# Patient Record
Sex: FEMALE | Race: WHITE | NOT HISPANIC OR LATINO
[De-identification: names, ages, dates, MRNs, and addresses within clinical notes are randomized per-mention and may not be internally consistent; named-entity substitution may affect disease eponyms.]

---

## 2014-12-09 RX ORDER — DILTIAZEM HCL 120 MG
1 CAPSULE, EXT RELEASE 24 HR ORAL
Qty: 0 | Refills: 0 | DISCHARGE
Start: 2014-12-09

## 2017-01-12 ENCOUNTER — APPOINTMENT (OUTPATIENT)
Dept: CARDIOLOGY | Facility: CLINIC | Age: 67
End: 2017-01-12

## 2017-01-12 VITALS
DIASTOLIC BLOOD PRESSURE: 75 MMHG | SYSTOLIC BLOOD PRESSURE: 131 MMHG | HEART RATE: 69 BPM | BODY MASS INDEX: 43.8 KG/M2 | WEIGHT: 232 LBS | OXYGEN SATURATION: 96 % | HEIGHT: 61 IN

## 2017-09-18 ENCOUNTER — APPOINTMENT (OUTPATIENT)
Dept: CARDIOLOGY | Facility: CLINIC | Age: 67
End: 2017-09-18
Payer: MEDICARE

## 2017-09-18 ENCOUNTER — NON-APPOINTMENT (OUTPATIENT)
Age: 67
End: 2017-09-18

## 2017-09-18 VITALS
DIASTOLIC BLOOD PRESSURE: 80 MMHG | HEART RATE: 61 BPM | BODY MASS INDEX: 44.21 KG/M2 | SYSTOLIC BLOOD PRESSURE: 136 MMHG | OXYGEN SATURATION: 97 % | WEIGHT: 234 LBS

## 2017-09-18 PROCEDURE — 99214 OFFICE O/P EST MOD 30 MIN: CPT

## 2017-09-18 PROCEDURE — 93970 EXTREMITY STUDY: CPT

## 2017-09-18 PROCEDURE — 93000 ELECTROCARDIOGRAM COMPLETE: CPT

## 2017-09-18 RX ORDER — AMOXICILLIN AND CLAVULANATE POTASSIUM 875; 125 MG/1; MG/1
875-125 TABLET, COATED ORAL
Qty: 20 | Refills: 0 | Status: DISCONTINUED | COMMUNITY
Start: 2017-03-16

## 2017-09-18 RX ORDER — TRIFLURIDINE 10 MG/ML
1 SOLUTION OPHTHALMIC
Qty: 7 | Refills: 0 | Status: DISCONTINUED | COMMUNITY
Start: 2017-08-08

## 2017-09-18 RX ORDER — THEOPHYLLINE 300 MG/1
300 TABLET, EXTENDED RELEASE ORAL
Qty: 180 | Refills: 0 | Status: DISCONTINUED | COMMUNITY
Start: 2017-06-15

## 2017-09-18 RX ORDER — HYDROCODONE BITARTRATE AND ACETAMINOPHEN 5; 325 MG/1; MG/1
5-325 TABLET ORAL
Qty: 12 | Refills: 0 | Status: DISCONTINUED | COMMUNITY
Start: 2017-09-11

## 2018-06-02 ENCOUNTER — EMERGENCY (EMERGENCY)
Facility: HOSPITAL | Age: 68
LOS: 1 days | Discharge: HOME | End: 2018-06-02
Attending: STUDENT IN AN ORGANIZED HEALTH CARE EDUCATION/TRAINING PROGRAM
Payer: MEDICARE

## 2018-06-02 VITALS
RESPIRATION RATE: 16 BRPM | OXYGEN SATURATION: 96 % | HEIGHT: 61 IN | DIASTOLIC BLOOD PRESSURE: 102 MMHG | WEIGHT: 231.49 LBS | TEMPERATURE: 98 F | SYSTOLIC BLOOD PRESSURE: 167 MMHG | HEART RATE: 128 BPM

## 2018-06-02 VITALS
OXYGEN SATURATION: 95 % | HEART RATE: 78 BPM | RESPIRATION RATE: 14 BRPM | TEMPERATURE: 98 F | SYSTOLIC BLOOD PRESSURE: 132 MMHG | DIASTOLIC BLOOD PRESSURE: 56 MMHG

## 2018-06-02 DIAGNOSIS — Z98.89 OTHER SPECIFIED POSTPROCEDURAL STATES: Chronic | ICD-10-CM

## 2018-06-02 LAB
ALBUMIN SERPL ELPH-MCNC: 3.3 G/DL — SIGNIFICANT CHANGE UP (ref 3.3–5)
ALP SERPL-CCNC: 76 U/L — SIGNIFICANT CHANGE UP (ref 40–120)
ALT FLD-CCNC: 14 U/L — SIGNIFICANT CHANGE UP (ref 12–78)
ANION GAP SERPL CALC-SCNC: 9 MMOL/L — SIGNIFICANT CHANGE UP (ref 5–17)
APTT BLD: 40.4 SEC — HIGH (ref 27.5–37.4)
AST SERPL-CCNC: 18 U/L — SIGNIFICANT CHANGE UP (ref 15–37)
BASOPHILS # BLD AUTO: 0.04 K/UL — SIGNIFICANT CHANGE UP (ref 0–0.2)
BASOPHILS NFR BLD AUTO: 0.6 % — SIGNIFICANT CHANGE UP (ref 0–2)
BILIRUB SERPL-MCNC: 0.4 MG/DL — SIGNIFICANT CHANGE UP (ref 0.2–1.2)
BUN SERPL-MCNC: 29 MG/DL — HIGH (ref 7–23)
CALCIUM SERPL-MCNC: 8.7 MG/DL — SIGNIFICANT CHANGE UP (ref 8.5–10.1)
CHLORIDE SERPL-SCNC: 109 MMOL/L — HIGH (ref 96–108)
CK MB BLD-MCNC: 1.7 % — SIGNIFICANT CHANGE UP (ref 0–3.5)
CK MB BLD-MCNC: 1.8 % — SIGNIFICANT CHANGE UP (ref 0–3.5)
CK MB CFR SERPL CALC: 1.7 NG/ML — SIGNIFICANT CHANGE UP (ref 0–3.6)
CK MB CFR SERPL CALC: 1.7 NG/ML — SIGNIFICANT CHANGE UP (ref 0–3.6)
CK SERPL-CCNC: 102 U/L — SIGNIFICANT CHANGE UP (ref 26–192)
CK SERPL-CCNC: 97 U/L — SIGNIFICANT CHANGE UP (ref 26–192)
CO2 SERPL-SCNC: 25 MMOL/L — SIGNIFICANT CHANGE UP (ref 22–31)
CREAT SERPL-MCNC: 1.1 MG/DL — SIGNIFICANT CHANGE UP (ref 0.5–1.3)
EOSINOPHIL # BLD AUTO: 0.19 K/UL — SIGNIFICANT CHANGE UP (ref 0–0.5)
EOSINOPHIL NFR BLD AUTO: 2.8 % — SIGNIFICANT CHANGE UP (ref 0–6)
GLUCOSE SERPL-MCNC: 91 MG/DL — SIGNIFICANT CHANGE UP (ref 70–99)
HCT VFR BLD CALC: 35.7 % — SIGNIFICANT CHANGE UP (ref 34.5–45)
HGB BLD-MCNC: 11.4 G/DL — LOW (ref 11.5–15.5)
IMM GRANULOCYTES NFR BLD AUTO: 0.4 % — SIGNIFICANT CHANGE UP (ref 0–1.5)
INR BLD: 2.01 RATIO — HIGH (ref 0.88–1.16)
LYMPHOCYTES # BLD AUTO: 1.27 K/UL — SIGNIFICANT CHANGE UP (ref 1–3.3)
LYMPHOCYTES # BLD AUTO: 19 % — SIGNIFICANT CHANGE UP (ref 13–44)
MCHC RBC-ENTMCNC: 28.5 PG — SIGNIFICANT CHANGE UP (ref 27–34)
MCHC RBC-ENTMCNC: 31.9 GM/DL — LOW (ref 32–36)
MCV RBC AUTO: 89.3 FL — SIGNIFICANT CHANGE UP (ref 80–100)
MONOCYTES # BLD AUTO: 0.47 K/UL — SIGNIFICANT CHANGE UP (ref 0–0.9)
MONOCYTES NFR BLD AUTO: 7 % — SIGNIFICANT CHANGE UP (ref 2–14)
NEUTROPHILS # BLD AUTO: 4.7 K/UL — SIGNIFICANT CHANGE UP (ref 1.8–7.4)
NEUTROPHILS NFR BLD AUTO: 70.2 % — SIGNIFICANT CHANGE UP (ref 43–77)
NRBC # BLD: 0 /100 WBCS — SIGNIFICANT CHANGE UP (ref 0–0)
NT-PROBNP SERPL-SCNC: 418 PG/ML — HIGH (ref 0–125)
PLATELET # BLD AUTO: 171 K/UL — SIGNIFICANT CHANGE UP (ref 150–400)
POTASSIUM SERPL-MCNC: 4.1 MMOL/L — SIGNIFICANT CHANGE UP (ref 3.5–5.3)
POTASSIUM SERPL-SCNC: 4.1 MMOL/L — SIGNIFICANT CHANGE UP (ref 3.5–5.3)
PROT SERPL-MCNC: 6.6 G/DL — SIGNIFICANT CHANGE UP (ref 6–8.3)
PROTHROM AB SERPL-ACNC: 22.2 SEC — HIGH (ref 9.8–12.7)
RBC # BLD: 4 M/UL — SIGNIFICANT CHANGE UP (ref 3.8–5.2)
RBC # FLD: 15.2 % — HIGH (ref 10.3–14.5)
SODIUM SERPL-SCNC: 143 MMOL/L — SIGNIFICANT CHANGE UP (ref 135–145)
TROPONIN I SERPL-MCNC: <.015 NG/ML — SIGNIFICANT CHANGE UP (ref 0.01–0.04)
TROPONIN I SERPL-MCNC: <.015 NG/ML — SIGNIFICANT CHANGE UP (ref 0.01–0.04)
WBC # BLD: 6.7 K/UL — SIGNIFICANT CHANGE UP (ref 3.8–10.5)
WBC # FLD AUTO: 6.7 K/UL — SIGNIFICANT CHANGE UP (ref 3.8–10.5)

## 2018-06-02 PROCEDURE — 71045 X-RAY EXAM CHEST 1 VIEW: CPT | Mod: 26

## 2018-06-02 PROCEDURE — 85610 PROTHROMBIN TIME: CPT

## 2018-06-02 PROCEDURE — 80053 COMPREHEN METABOLIC PANEL: CPT

## 2018-06-02 PROCEDURE — 99285 EMERGENCY DEPT VISIT HI MDM: CPT | Mod: 25

## 2018-06-02 PROCEDURE — 99285 EMERGENCY DEPT VISIT HI MDM: CPT

## 2018-06-02 PROCEDURE — 36415 COLL VENOUS BLD VENIPUNCTURE: CPT

## 2018-06-02 PROCEDURE — 71045 X-RAY EXAM CHEST 1 VIEW: CPT

## 2018-06-02 PROCEDURE — 85730 THROMBOPLASTIN TIME PARTIAL: CPT

## 2018-06-02 PROCEDURE — 82553 CREATINE MB FRACTION: CPT

## 2018-06-02 PROCEDURE — 83880 ASSAY OF NATRIURETIC PEPTIDE: CPT

## 2018-06-02 PROCEDURE — 99284 EMERGENCY DEPT VISIT MOD MDM: CPT | Mod: 25

## 2018-06-02 PROCEDURE — 93005 ELECTROCARDIOGRAM TRACING: CPT

## 2018-06-02 PROCEDURE — 82550 ASSAY OF CK (CPK): CPT

## 2018-06-02 PROCEDURE — 84484 ASSAY OF TROPONIN QUANT: CPT

## 2018-06-02 PROCEDURE — 85027 COMPLETE CBC AUTOMATED: CPT

## 2018-06-02 RX ORDER — IPRATROPIUM/ALBUTEROL SULFATE 18-103MCG
3 AEROSOL WITH ADAPTER (GRAM) INHALATION ONCE
Qty: 0 | Refills: 0 | Status: DISCONTINUED | OUTPATIENT
Start: 2018-06-02 | End: 2018-06-02

## 2018-06-02 RX ORDER — ACETAMINOPHEN 500 MG
650 TABLET ORAL ONCE
Qty: 0 | Refills: 0 | Status: DISCONTINUED | OUTPATIENT
Start: 2018-06-02 | End: 2018-06-02

## 2018-06-02 RX ORDER — SODIUM CHLORIDE 9 MG/ML
3 INJECTION INTRAMUSCULAR; INTRAVENOUS; SUBCUTANEOUS ONCE
Qty: 0 | Refills: 0 | Status: COMPLETED | OUTPATIENT
Start: 2018-06-02 | End: 2018-06-02

## 2018-06-02 RX ORDER — ASPIRIN/CALCIUM CARB/MAGNESIUM 324 MG
325 TABLET ORAL ONCE
Qty: 0 | Refills: 0 | Status: COMPLETED | OUTPATIENT
Start: 2018-06-02 | End: 2018-06-02

## 2018-06-02 RX ADMIN — SODIUM CHLORIDE 3 MILLILITER(S): 9 INJECTION INTRAMUSCULAR; INTRAVENOUS; SUBCUTANEOUS at 07:07

## 2018-06-02 RX ADMIN — Medication 325 MILLIGRAM(S): at 06:57

## 2018-06-02 NOTE — CONSULT NOTE ADULT - SUBJECTIVE AND OBJECTIVE BOX
[facility] - Marcella Macias, Magalie, Ana Maria, Otilio, Mima Araujo  Office Number: [phone]    Initial Consult Note    CHIEF COMPLAINT: Patient is a 67y old  Female who presents with a chief complaint of palpitations    HPI:  This is a 67 year old woman with a history of PAF, on AC with Coumadin for stroke risk reduction, HTN, obesity, chronic LE edema who presents to the ED with palpitations and left arm pain.  SHe was cleaning windows yesterday, and has developed pain in the arm.  She thought nothing of it yesterday, but when it persisted today she was concerned.  SHe also has been feeling her heart racing for a few minutes to hours all week, but today it was worse.  Combined with the arm pain, she came in for evaluation.  She is under a lot of stress this week as she was found to be occult blood positive and needs a colonoscopy.  SHe was a bit dyspeic this AM, but is now better.  She denies PND, orthopnea, chest pain, or syncope.  SHe has occasional hemorrhoidal bleeding.  SHe is going to see ARLIN Navarrete of GI.      PAST MEDICAL & SURGICAL HISTORY:  Edema extremities  Hyperlipidemia  HTN (hypertension)  Hypothyroid  COPD (chronic obstructive pulmonary disease)  Hypothyroid  HTN (hypertension)  JUAN CARLOS (obstructive sleep apnea)  COPD (chronic obstructive pulmonary disease)  S/P eye surgery: age 5 unsure if right or left eye      SOCIAL HISTORY:  No tobacco, ethanol, or drug abuse.    FAMILY HISTORY:    No family history of acute MI or sudden cardiac death.    MEDICATIONS  (STANDING):  acetaminophen   Tablet. 650 milliGRAM(s) Oral once  ALBUTerol/ipratropium for Nebulization. 3 milliLiter(s) Nebulizer once   Home medications reviewed in detail.    Allergies    Levaquin (Anaphylaxis)  sulfa drugs (Unknown)  Sulfur (Unknown)    Intolerances        REVIEW OF SYSTEMS:      All other review of systems is negative unless indicated above    VITAL SIGNS:   Vital Signs Last 24 Hrs  T(C): 36.6 (02 Jun 2018 07:47), Max: 36.8 (02 Jun 2018 06:06)  T(F): 97.8 (02 Jun 2018 07:47), Max: 98.2 (02 Jun 2018 06:06)  HR: 74 (02 Jun 2018 07:47) (74 - 128)  BP: 120/51 (02 Jun 2018 07:47) (120/51 - 167/102)  BP(mean): --  RR: 15 (02 Jun 2018 07:47) (15 - 18)  SpO2: 95% (02 Jun 2018 07:47) (95% - 96%)    I&O's Summary      On Exam:    Constitutional: NAD, alert and oriented x 3  Lungs:  Non-labored, breath sounds are clear bilaterally, No wheezing, rales or rhonchi  Cardiovascular: RRR.  S1 and S2 positive.  2/6 systolic murmur  Gastrointestinal: Bowel Sounds present, soft, nontender.   Lymph: b/l peripheral edema, left greater than right. No cervical lymphadenopathy.  Neurological: Alert, no focal deficits  Skin: No rashes or ulcers   Psych:  Mood & affect appropriate.    LABS: All Labs Reviewed:                        11.4   6.70  )-----------( 171      ( 02 Jun 2018 07:11 )             35.7     02 Jun 2018 07:11    143    |  109    |  29     ----------------------------<  91     4.1     |  25     |  1.10     Ca    8.7        02 Jun 2018 07:11    TPro  6.6    /  Alb  3.3    /  TBili  0.4    /  DBili  x      /  AST  18     /  ALT  14     /  AlkPhos  76     02 Jun 2018 07:11    PT/INR - ( 02 Jun 2018 07:11 )   PT: 22.2 sec;   INR: 2.01 ratio         PTT - ( 02 Jun 2018 07:11 )  PTT:40.4 sec  CARDIAC MARKERS ( 02 Jun 2018 07:11 )  <.015 ng/mL / x     / 102 U/L / x     / 1.7 ng/mL      Blood Culture:   06-02 @ 07:11  Pro Bnp 418        RADIOLOGY:    EKG:  SInus with no acute changes.

## 2018-06-02 NOTE — ED ADULT NURSE NOTE - PMH
COPD (chronic obstructive pulmonary disease)    COPD (chronic obstructive pulmonary disease)    Edema extremities    HTN (hypertension)    HTN (hypertension)    Hyperlipidemia    Hypothyroid    Hypothyroid    JUAN CARLOS (obstructive sleep apnea)

## 2018-06-02 NOTE — ED PROVIDER NOTE - PROGRESS NOTE DETAILS
Patient agreeable to stay in ED for 2 sets CE and cardiology consult. Repeat EKG shows NS with PACs Pt seen by yesenia Abel to dc home after 2nd set - outpt fu with her cardio this week. At length discussion with patient regarding nature of the SOB. Discussed results of all diagnostic testing in ED. Discussed SOB/chest pain precautions and instructions. Patient demonstrates understanding that a cardiac cause of her symptoms has not been totally excluded, but at this time there is no evidence to warrant an inpatient workup.  Discussed the importance of continued follow-up with Cardiology as outpatient to complete cardiac workup.

## 2018-06-02 NOTE — CONSULT NOTE ADULT - ASSESSMENT
67 year old woman with above history with increased palpitations, left arm pain, dyspnea:    - CE x 1 negative.  Send second set at four hour diony.  IF negative, d/c home  - COntinue Coumadin with goal iNR 2-3  - Outpatient GI evaluation scheduled  - To follow with DR. Villarreal for consideration of repeat echo and event monitor

## 2018-06-02 NOTE — ED PROVIDER NOTE - OBJECTIVE STATEMENT
67 year old female with a history of a-fib, COPD, high cholesterol, HTN presents with SOB, left arm pain, rapid heart rate.  She states the symptoms started last night, initially with left arm pain which she associated with recent window cleaning.  She has noted "spurts" of tachycardia for the last few days but this morning, she woke up with sudden persistent tachycardia and SOB.  She was concerned that she was in a-fib, which was diagnosed in 2014.  On 2 liters home O2 at night.  Denies chest pain, fevers. Went to PMD yesterday, had INR of 2.3 (on coumadin.)  Took Tylenol without relief.  PMD Dr. Jj, Cardiology Dr. Beaver

## 2018-06-02 NOTE — ED ADULT NURSE REASSESSMENT NOTE - COMFORT CARE
plan of care explained/repositioned/darkened lights/meal provided/wait time explained/warm blanket provided/assisted to bathroom/side rails up

## 2018-06-02 NOTE — ED ADULT NURSE NOTE - OBJECTIVE STATEMENT
Pt received in wheel chair alert and oriented x 3 c/o heart racing and left upper arm pain. Pt stated 4/10 dull pain to left upper arm, non radiating  x hours. Pt also c/o sob x minutes, resolved. Pt denies chest pain, n/v/d, fever chills. Pt stated she took a tylenol for relief, but no relief. 20 G iv inserted to left ac, blood specimen obtained sent to lab. Aspirin administered as ordered. Pt remains on cardiac monitor. No acute distress at this time.

## 2018-06-02 NOTE — ED PROVIDER NOTE - MUSCULOSKELETAL, MLM
Spine appears normal, range of motion is not limited, no muscle or joint tenderness, b/l LE 1+ pitting edema

## 2018-06-02 NOTE — ED PROVIDER NOTE - CONDUCTED A DETAILED DISCUSSION WITH PATIENT AND/OR GUARDIAN REGARDING, MDM
lab results/radiology results/need for outpatient follow-up radiology results/lab results/return to ED if symptoms worsen, persist or questions arise/need for outpatient follow-up

## 2018-06-18 ENCOUNTER — NON-APPOINTMENT (OUTPATIENT)
Age: 68
End: 2018-06-18

## 2018-06-18 ENCOUNTER — APPOINTMENT (OUTPATIENT)
Dept: CARDIOLOGY | Facility: CLINIC | Age: 68
End: 2018-06-18
Payer: MEDICARE

## 2018-06-18 VITALS
SYSTOLIC BLOOD PRESSURE: 157 MMHG | WEIGHT: 235 LBS | BODY MASS INDEX: 44.37 KG/M2 | HEIGHT: 61 IN | DIASTOLIC BLOOD PRESSURE: 79 MMHG | HEART RATE: 71 BPM | OXYGEN SATURATION: 95 %

## 2018-06-18 VITALS — DIASTOLIC BLOOD PRESSURE: 70 MMHG | SYSTOLIC BLOOD PRESSURE: 164 MMHG

## 2018-06-18 VITALS — SYSTOLIC BLOOD PRESSURE: 158 MMHG | DIASTOLIC BLOOD PRESSURE: 72 MMHG

## 2018-06-18 VITALS — DIASTOLIC BLOOD PRESSURE: 80 MMHG | SYSTOLIC BLOOD PRESSURE: 140 MMHG

## 2018-06-18 PROCEDURE — 99214 OFFICE O/P EST MOD 30 MIN: CPT

## 2018-06-18 PROCEDURE — 93000 ELECTROCARDIOGRAM COMPLETE: CPT

## 2018-06-19 ENCOUNTER — APPOINTMENT (OUTPATIENT)
Dept: CARDIOLOGY | Facility: CLINIC | Age: 68
End: 2018-06-19
Payer: MEDICARE

## 2018-06-19 PROCEDURE — 93306 TTE W/DOPPLER COMPLETE: CPT

## 2018-06-29 ENCOUNTER — RESULT REVIEW (OUTPATIENT)
Age: 68
End: 2018-06-29

## 2018-07-24 NOTE — ED ADULT NURSE NOTE - NS ED NOTE  TALK SOMEONE YN
Spoke with patient's mother, she states patient has been having fever since Friday with body aches. Patient has just come back from [place]. But fever is only reolved with tylenol. But fever comes back when meds ware off. Advised her that non of the providers have anything for today but I have something for tomorrow at 2pm. States patient is leaving tomorrow to go to [place] again. Advised her that she should either bring him to urgent care or ER to resolve this before he leaves tomorrow. States she will bring him to the ER. Nothing further discussed.   No

## 2018-08-06 ENCOUNTER — APPOINTMENT (OUTPATIENT)
Dept: CARDIOLOGY | Facility: CLINIC | Age: 68
End: 2018-08-06

## 2018-08-20 ENCOUNTER — NON-APPOINTMENT (OUTPATIENT)
Age: 68
End: 2018-08-20

## 2018-08-20 ENCOUNTER — APPOINTMENT (OUTPATIENT)
Dept: CARDIOLOGY | Facility: CLINIC | Age: 68
End: 2018-08-20
Payer: MEDICARE

## 2018-08-20 VITALS
OXYGEN SATURATION: 97 % | HEART RATE: 67 BPM | WEIGHT: 224 LBS | BODY MASS INDEX: 42.29 KG/M2 | HEIGHT: 61 IN | DIASTOLIC BLOOD PRESSURE: 78 MMHG | SYSTOLIC BLOOD PRESSURE: 130 MMHG

## 2018-08-20 PROCEDURE — 99214 OFFICE O/P EST MOD 30 MIN: CPT

## 2018-08-20 PROCEDURE — 93000 ELECTROCARDIOGRAM COMPLETE: CPT

## 2018-11-01 ENCOUNTER — EMERGENCY (EMERGENCY)
Facility: HOSPITAL | Age: 68
LOS: 1 days | Discharge: HOME | End: 2018-11-01
Attending: EMERGENCY MEDICINE
Payer: MEDICARE

## 2018-11-01 VITALS
OXYGEN SATURATION: 97 % | HEIGHT: 61 IN | DIASTOLIC BLOOD PRESSURE: 72 MMHG | RESPIRATION RATE: 16 BRPM | SYSTOLIC BLOOD PRESSURE: 173 MMHG | HEART RATE: 76 BPM | WEIGHT: 222.01 LBS | TEMPERATURE: 98 F

## 2018-11-01 VITALS
SYSTOLIC BLOOD PRESSURE: 136 MMHG | RESPIRATION RATE: 16 BRPM | TEMPERATURE: 98 F | HEART RATE: 63 BPM | DIASTOLIC BLOOD PRESSURE: 84 MMHG | OXYGEN SATURATION: 93 %

## 2018-11-01 DIAGNOSIS — Z98.89 OTHER SPECIFIED POSTPROCEDURAL STATES: Chronic | ICD-10-CM

## 2018-11-01 LAB
ALBUMIN SERPL ELPH-MCNC: 3.6 G/DL — SIGNIFICANT CHANGE UP (ref 3.3–5)
ALP SERPL-CCNC: 88 U/L — SIGNIFICANT CHANGE UP (ref 40–120)
ALT FLD-CCNC: 14 U/L — SIGNIFICANT CHANGE UP (ref 12–78)
ANION GAP SERPL CALC-SCNC: 7 MMOL/L — SIGNIFICANT CHANGE UP (ref 5–17)
APTT BLD: 45.8 SEC — HIGH (ref 27.5–36.3)
AST SERPL-CCNC: 17 U/L — SIGNIFICANT CHANGE UP (ref 15–37)
BASOPHILS # BLD AUTO: 0.02 K/UL — SIGNIFICANT CHANGE UP (ref 0–0.2)
BASOPHILS NFR BLD AUTO: 0.3 % — SIGNIFICANT CHANGE UP (ref 0–2)
BILIRUB SERPL-MCNC: 0.5 MG/DL — SIGNIFICANT CHANGE UP (ref 0.2–1.2)
BUN SERPL-MCNC: 19 MG/DL — SIGNIFICANT CHANGE UP (ref 7–23)
CALCIUM SERPL-MCNC: 9.8 MG/DL — SIGNIFICANT CHANGE UP (ref 8.5–10.1)
CHLORIDE SERPL-SCNC: 107 MMOL/L — SIGNIFICANT CHANGE UP (ref 96–108)
CO2 SERPL-SCNC: 28 MMOL/L — SIGNIFICANT CHANGE UP (ref 22–31)
CREAT SERPL-MCNC: 1.3 MG/DL — SIGNIFICANT CHANGE UP (ref 0.5–1.3)
EOSINOPHIL # BLD AUTO: 0.15 K/UL — SIGNIFICANT CHANGE UP (ref 0–0.5)
EOSINOPHIL NFR BLD AUTO: 2.1 % — SIGNIFICANT CHANGE UP (ref 0–6)
GLUCOSE SERPL-MCNC: 88 MG/DL — SIGNIFICANT CHANGE UP (ref 70–99)
HCT VFR BLD CALC: 36 % — SIGNIFICANT CHANGE UP (ref 34.5–45)
HGB BLD-MCNC: 11.7 G/DL — SIGNIFICANT CHANGE UP (ref 11.5–15.5)
IMM GRANULOCYTES NFR BLD AUTO: 0.4 % — SIGNIFICANT CHANGE UP (ref 0–1.5)
INR BLD: 2.89 RATIO — HIGH (ref 0.88–1.16)
LYMPHOCYTES # BLD AUTO: 1.18 K/UL — SIGNIFICANT CHANGE UP (ref 1–3.3)
LYMPHOCYTES # BLD AUTO: 16.8 % — SIGNIFICANT CHANGE UP (ref 13–44)
MCHC RBC-ENTMCNC: 29 PG — SIGNIFICANT CHANGE UP (ref 27–34)
MCHC RBC-ENTMCNC: 32.5 GM/DL — SIGNIFICANT CHANGE UP (ref 32–36)
MCV RBC AUTO: 89.1 FL — SIGNIFICANT CHANGE UP (ref 80–100)
MONOCYTES # BLD AUTO: 0.41 K/UL — SIGNIFICANT CHANGE UP (ref 0–0.9)
MONOCYTES NFR BLD AUTO: 5.8 % — SIGNIFICANT CHANGE UP (ref 2–14)
NEUTROPHILS # BLD AUTO: 5.24 K/UL — SIGNIFICANT CHANGE UP (ref 1.8–7.4)
NEUTROPHILS NFR BLD AUTO: 74.6 % — SIGNIFICANT CHANGE UP (ref 43–77)
PLATELET # BLD AUTO: 210 K/UL — SIGNIFICANT CHANGE UP (ref 150–400)
POTASSIUM SERPL-MCNC: 3.9 MMOL/L — SIGNIFICANT CHANGE UP (ref 3.5–5.3)
POTASSIUM SERPL-SCNC: 3.9 MMOL/L — SIGNIFICANT CHANGE UP (ref 3.5–5.3)
PROT SERPL-MCNC: 7.4 G/DL — SIGNIFICANT CHANGE UP (ref 6–8.3)
PROTHROM AB SERPL-ACNC: 33.8 SEC — HIGH (ref 10–12.9)
RBC # BLD: 4.04 M/UL — SIGNIFICANT CHANGE UP (ref 3.8–5.2)
RBC # FLD: 15 % — HIGH (ref 10.3–14.5)
SODIUM SERPL-SCNC: 142 MMOL/L — SIGNIFICANT CHANGE UP (ref 135–145)
WBC # BLD: 7.03 K/UL — SIGNIFICANT CHANGE UP (ref 3.8–10.5)
WBC # FLD AUTO: 7.03 K/UL — SIGNIFICANT CHANGE UP (ref 3.8–10.5)

## 2018-11-01 PROCEDURE — 80053 COMPREHEN METABOLIC PANEL: CPT

## 2018-11-01 PROCEDURE — 70496 CT ANGIOGRAPHY HEAD: CPT | Mod: 26

## 2018-11-01 PROCEDURE — 70450 CT HEAD/BRAIN W/O DYE: CPT | Mod: 26,59

## 2018-11-01 PROCEDURE — 70496 CT ANGIOGRAPHY HEAD: CPT

## 2018-11-01 PROCEDURE — 85027 COMPLETE CBC AUTOMATED: CPT

## 2018-11-01 PROCEDURE — 85730 THROMBOPLASTIN TIME PARTIAL: CPT

## 2018-11-01 PROCEDURE — 96374 THER/PROPH/DIAG INJ IV PUSH: CPT | Mod: XU

## 2018-11-01 PROCEDURE — 85610 PROTHROMBIN TIME: CPT

## 2018-11-01 PROCEDURE — 99284 EMERGENCY DEPT VISIT MOD MDM: CPT

## 2018-11-01 PROCEDURE — 36415 COLL VENOUS BLD VENIPUNCTURE: CPT

## 2018-11-01 PROCEDURE — 99284 EMERGENCY DEPT VISIT MOD MDM: CPT | Mod: 25

## 2018-11-01 PROCEDURE — 70450 CT HEAD/BRAIN W/O DYE: CPT

## 2018-11-01 RX ORDER — METHOCARBAMOL 500 MG/1
1000 TABLET, FILM COATED ORAL ONCE
Qty: 0 | Refills: 0 | Status: COMPLETED | OUTPATIENT
Start: 2018-11-01 | End: 2018-11-01

## 2018-11-01 RX ORDER — ACETAMINOPHEN 500 MG
650 TABLET ORAL ONCE
Qty: 0 | Refills: 0 | Status: COMPLETED | OUTPATIENT
Start: 2018-11-01 | End: 2018-11-01

## 2018-11-01 RX ORDER — DIPHENHYDRAMINE HCL 50 MG
25 CAPSULE ORAL ONCE
Qty: 0 | Refills: 0 | Status: COMPLETED | OUTPATIENT
Start: 2018-11-01 | End: 2018-11-01

## 2018-11-01 RX ORDER — METOCLOPRAMIDE HCL 10 MG
10 TABLET ORAL ONCE
Qty: 0 | Refills: 0 | Status: COMPLETED | OUTPATIENT
Start: 2018-11-01 | End: 2018-11-01

## 2018-11-01 RX ADMIN — Medication 10 MILLIGRAM(S): at 15:01

## 2018-11-01 RX ADMIN — Medication 25 MILLIGRAM(S): at 15:01

## 2018-11-01 RX ADMIN — Medication 650 MILLIGRAM(S): at 13:28

## 2018-11-01 RX ADMIN — METHOCARBAMOL 1000 MILLIGRAM(S): 500 TABLET, FILM COATED ORAL at 13:28

## 2018-11-01 NOTE — ED ADULT NURSE NOTE - PMH
Patient has [facility] paperwork that needs to be re-filled out. Paperwork is in the red folder awaiting to be filled out by Dr Franco.    COPD (chronic obstructive pulmonary disease)    COPD (chronic obstructive pulmonary disease)    Edema extremities    HTN (hypertension)    HTN (hypertension)    Hyperlipidemia    Hypothyroid    Hypothyroid    JUAN CARLOS (obstructive sleep apnea)

## 2018-11-01 NOTE — ED ADULT NURSE REASSESSMENT NOTE - NS ED NURSE REASSESS COMMENT FT1
Assumed care for pt awake alert and oriented x 3, DUVAL. Pt is feeling much better after medication. Denies any pain at this time. Vss, afebrile. Pt is to be discharge. will continue to monitor.

## 2018-11-01 NOTE — ED PROVIDER NOTE - PHYSICAL EXAMINATION
Neuro- A&Ox3. Speech clear, without articulation or word-finding difficulties. Eyes- PERRL bilaterally. EOMs in tact. No nystagmus. CN II-XII in tact. No facial droop. No sensory or motor deficits throughout extremities bilaterally. Strength 5/5 throughout upper and lower extremities. No pronator drift. Gait stable.  Spine Exam:     Cervical: No erythema, ecchymosis, or visible deformity. No midline tenderness or step-off appreciated on palpation. No paravertebral tenderness. + R TRAP muscle spasm. FROM. NEG NEXUS criteria.

## 2018-11-01 NOTE — ED PROVIDER NOTE - OBJECTIVE STATEMENT
pt is a 66yo female with pmhx of a-fib, htn, hypothyroid, COPD, sleep apena pt is a 66yo female with pmhx of a-fib, htn, hypothyroid, COPD, sleep apena c/o headache x month. pt reports she had a headache 4 weeks ago for a week that resolved, came back and resolved, pt is a 68yo female with pmhx of a-fib, htn, hypothyroid, COPD, sleep apena c/o headache x month. pt reports she had a headache 4 weeks ago for a week that resolved, came back and resolved, and came back again 1.5 weeks ago. pt reports headache is described as pressure around her head. pt took tylenol for symptoms which provided minimal relief. pt reports vision changes for a few hours 1 week ago that resolved. pt is a 66yo female with pmhx of a-fib, htn, hypothyroid, COPD, sleep apnea c/o headache x month. pt reports she had a headache 4 weeks ago for a week that resolved, came back and resolved, and came back again 1.5 weeks ago. pt reports headache is described as pressure around her head. pt took tylenol for symptoms which provided minimal relief. pt reports vision changes for a few hours 1 week ago that resolved.

## 2018-11-01 NOTE — ED ADULT NURSE NOTE - NSIMPLEMENTINTERV_GEN_ALL_ED
Implemented All Universal Safety Interventions:  [place] to call system. Call bell, personal items and telephone within reach. Instruct patient to call for assistance. Room bathroom lighting operational. Non-slip footwear when patient is off stretcher. Physically safe environment: no spills, clutter or unnecessary equipment. Stretcher in lowest position, wheels locked, appropriate side rails in place.

## 2018-11-01 NOTE — ED PROVIDER NOTE - PROGRESS NOTE DETAILS
Dr. Cervantes: Pt still with HA, but now moved to a different part of her head. Will check labs and CTA. labs and ct reviewed. will do cta head and medicate pt improved. labs and ct reviewed. all results reviewed with pt including abnormal. pt anticoagulated already. pt advised to follow up with neuro. All questions answered and concerns addressed. pt verbalized understanding and agreement with plan and dx. pt advised to follow up with PMD. pt advised to return to ed for worsenng symptoms including fever, cp, sob. will dc.

## 2018-11-01 NOTE — ED PROVIDER NOTE - CHPI ED SYMPTOMS NEG
no blurred vision/no fever/no loss of consciousness/no nausea/no numbness/no change in level of consciousness/no vomiting/no dizziness/no confusion/no weakness

## 2018-11-01 NOTE — ED PROVIDER NOTE - ATTENDING CONTRIBUTION TO CARE
67y F hx of HTN on Coumadin for afib here with c/o HA since Oct 7th. States sx present daily but come and go. Pain migrates all over her head Describes as tightening sensation, currently 4/10. No associated vision change, occasional dizziness, no neck stiffness, no fever, no URI sx, no NV. States sx worse when lay down, feels like a "clanging" in her head when lays flat. Has appt with neuro Dr Diaz but not until Dec 4th.   Gen: WNWD NAD  HEENT: NCAT PERRL EOMI normal pharynx  Neck: supple  Neuro: A&Ox3, CN grossly intact, sensation intact, motor 5/5 throughout  AP: 67y F hx of HTN on Coumadin here with c/o HA since Oct 7th. CT head to r/o bleed as on Coumadin though unlikely as sx present for weeks. No infectious signs or sx, do not suspect meningitis. Meds. re-eval.

## 2019-03-25 ENCOUNTER — APPOINTMENT (OUTPATIENT)
Dept: CARDIOLOGY | Facility: CLINIC | Age: 69
End: 2019-03-25
Payer: MEDICARE

## 2019-03-25 ENCOUNTER — NON-APPOINTMENT (OUTPATIENT)
Age: 69
End: 2019-03-25

## 2019-03-25 VITALS
HEIGHT: 61 IN | HEART RATE: 68 BPM | RESPIRATION RATE: 14 BRPM | BODY MASS INDEX: 39.84 KG/M2 | WEIGHT: 211 LBS | OXYGEN SATURATION: 96 % | SYSTOLIC BLOOD PRESSURE: 138 MMHG | DIASTOLIC BLOOD PRESSURE: 74 MMHG

## 2019-03-25 DIAGNOSIS — R91.8 OTHER NONSPECIFIC ABNORMAL FINDING OF LUNG FIELD: ICD-10-CM

## 2019-03-25 PROCEDURE — 99214 OFFICE O/P EST MOD 30 MIN: CPT

## 2019-03-25 PROCEDURE — 93000 ELECTROCARDIOGRAM COMPLETE: CPT

## 2019-03-25 NOTE — HISTORY OF PRESENT ILLNESS
[FreeTextEntry1] : Mrs. Contreras is here for evaluation of palpitations, PAF, HLD\par She is on warfarin, INR is therapeutic most of the time. No bleeding.\par COPD under control, does mot require o2. no cp. no palpitations. \par  and HTN.\par She has lost weight with portion control No syncope or presyncope. \par \par EKG: NSR, normal axis and intervals, no st/t changes \par

## 2019-03-25 NOTE — REVIEW OF SYSTEMS
[Recent Weight Gain (___ Lbs)] : no recent weight gain [Feeling Fatigued] : not feeling fatigued [Recent Weight Loss (___ Lbs)] : no recent weight loss [Blurry Vision] : no blurred vision [Seeing Double (Diplopia)] : no diplopia [Eyeglasses] : currently wearing eyeglasses [Earache] : no earache [Discharge From The Ears] : no discharge from the ears [Mouth Sores] : no mouth sores [Shortness Of Breath] : no shortness of breath [Dyspnea on exertion] : dyspnea during exertion [Chest  Pressure] : no chest pressure [Chest Pain] : no chest pain [Lower Ext Edema] : no extremity edema [Palpitations] : no palpitations [Cough] : no cough [Wheezing] : no wheezing [Abdominal Pain] : no abdominal pain [Nausea] : no nausea [Vomiting] : no vomiting [Heartburn] : no heartburn [Change in Appetite] : no change in appetite [Change In The Stool] : no change in stool [Dysphagia] : no dysphagia [Dysuria] : no dysuria [Pelvic Pain] : no pelvic pain [Dysmenorrhea] : no dysmenorrhea [Joint Pain] : no joint pain [Joint Swelling] : no joint swelling [Joint Stiffness] : no joint stiffness [Muscle Cramps] : no muscle cramps [Skin: A Rash] : no rash: [Itching] : no itching [Skin Lesions] : no skin lesions [Dizziness] : no dizziness [Convulsions] : no convulsions [Confusion] : no confusion was observed [Anxiety] : no anxiety [Excessive Thirst] : no polydipsia [Easy Bleeding] : no tendency for easy bleeding [Swollen Glands] : no swollen glands [Easy Bruising] : no tendency for easy bruising [Swollen Glands In The Neck] : no swollen glands in the neck

## 2019-03-25 NOTE — ASSESSMENT
[FreeTextEntry1] : 1. CT scan from 7/2018 was reviewed with pt. Repeat T advised due to nodules found.\par 2. BP to goal\par 3. Labs reviewed as well, LDL elevated, she does not want to take any more medications at this time and hopes to be able to loose more weight\par 4. Patient was advised to partake in 150 minutes of moderate exercise per week.\par 5. Pt with coronary artery calcification on CT scan, stress test ordered.\par 6. afib, in nsr, cont with warfarin. \par patient was advised to see us in 6 months if stable and certainly earlier with any new symptoms.\par

## 2019-04-16 ENCOUNTER — APPOINTMENT (OUTPATIENT)
Dept: CARDIOLOGY | Facility: CLINIC | Age: 69
End: 2019-04-16
Payer: MEDICARE

## 2019-04-16 PROCEDURE — A9500: CPT

## 2019-04-16 PROCEDURE — 78452 HT MUSCLE IMAGE SPECT MULT: CPT

## 2019-04-16 PROCEDURE — 93015 CV STRESS TEST SUPVJ I&R: CPT

## 2019-11-04 ENCOUNTER — APPOINTMENT (OUTPATIENT)
Dept: CARDIOLOGY | Facility: CLINIC | Age: 69
End: 2019-11-04

## 2019-11-24 ENCOUNTER — INPATIENT (INPATIENT)
Facility: HOSPITAL | Age: 69
LOS: 2 days | Discharge: HOME | DRG: 193 | End: 2019-11-27
Attending: FAMILY MEDICINE | Admitting: FAMILY MEDICINE
Payer: MEDICARE

## 2019-11-24 VITALS
DIASTOLIC BLOOD PRESSURE: 65 MMHG | RESPIRATION RATE: 18 BRPM | SYSTOLIC BLOOD PRESSURE: 158 MMHG | HEART RATE: 83 BPM | OXYGEN SATURATION: 92 % | TEMPERATURE: 99 F | WEIGHT: 203.05 LBS | HEIGHT: 63 IN

## 2019-11-24 DIAGNOSIS — E78.5 HYPERLIPIDEMIA, UNSPECIFIED: ICD-10-CM

## 2019-11-24 DIAGNOSIS — I48.91 UNSPECIFIED ATRIAL FIBRILLATION: ICD-10-CM

## 2019-11-24 DIAGNOSIS — I48.0 PAROXYSMAL ATRIAL FIBRILLATION: ICD-10-CM

## 2019-11-24 DIAGNOSIS — E87.1 HYPO-OSMOLALITY AND HYPONATREMIA: ICD-10-CM

## 2019-11-24 DIAGNOSIS — J18.1 LOBAR PNEUMONIA, UNSPECIFIED ORGANISM: ICD-10-CM

## 2019-11-24 DIAGNOSIS — G47.33 OBSTRUCTIVE SLEEP APNEA (ADULT) (PEDIATRIC): ICD-10-CM

## 2019-11-24 DIAGNOSIS — J44.9 CHRONIC OBSTRUCTIVE PULMONARY DISEASE, UNSPECIFIED: ICD-10-CM

## 2019-11-24 DIAGNOSIS — Z29.9 ENCOUNTER FOR PROPHYLACTIC MEASURES, UNSPECIFIED: ICD-10-CM

## 2019-11-24 DIAGNOSIS — I10 ESSENTIAL (PRIMARY) HYPERTENSION: ICD-10-CM

## 2019-11-24 DIAGNOSIS — E03.9 HYPOTHYROIDISM, UNSPECIFIED: ICD-10-CM

## 2019-11-24 DIAGNOSIS — Z98.89 OTHER SPECIFIED POSTPROCEDURAL STATES: Chronic | ICD-10-CM

## 2019-11-24 LAB
ALBUMIN SERPL ELPH-MCNC: 3.3 G/DL — SIGNIFICANT CHANGE UP (ref 3.3–5)
ALP SERPL-CCNC: 105 U/L — SIGNIFICANT CHANGE UP (ref 40–120)
ALT FLD-CCNC: 12 U/L — SIGNIFICANT CHANGE UP (ref 12–78)
ANION GAP SERPL CALC-SCNC: 9 MMOL/L — SIGNIFICANT CHANGE UP (ref 5–17)
APPEARANCE UR: CLEAR — SIGNIFICANT CHANGE UP
APTT BLD: 45 SEC — HIGH (ref 28.5–37)
AST SERPL-CCNC: 15 U/L — SIGNIFICANT CHANGE UP (ref 15–37)
BACTERIA # UR AUTO: ABNORMAL
BASOPHILS # BLD AUTO: 0.04 K/UL — SIGNIFICANT CHANGE UP (ref 0–0.2)
BASOPHILS NFR BLD AUTO: 0.2 % — SIGNIFICANT CHANGE UP (ref 0–2)
BILIRUB SERPL-MCNC: 0.9 MG/DL — SIGNIFICANT CHANGE UP (ref 0.2–1.2)
BILIRUB UR-MCNC: ABNORMAL
BUN SERPL-MCNC: 17 MG/DL — SIGNIFICANT CHANGE UP (ref 7–23)
CALCIUM SERPL-MCNC: 9.3 MG/DL — SIGNIFICANT CHANGE UP (ref 8.5–10.1)
CHLORIDE SERPL-SCNC: 101 MMOL/L — SIGNIFICANT CHANGE UP (ref 96–108)
CK MB CFR SERPL CALC: 1 NG/ML — SIGNIFICANT CHANGE UP (ref 0–3.6)
CO2 SERPL-SCNC: 23 MMOL/L — SIGNIFICANT CHANGE UP (ref 22–31)
COLOR SPEC: YELLOW — SIGNIFICANT CHANGE UP
COMMENT - URINE: SIGNIFICANT CHANGE UP
CREAT SERPL-MCNC: 1.2 MG/DL — SIGNIFICANT CHANGE UP (ref 0.5–1.3)
DIFF PNL FLD: ABNORMAL
EOSINOPHIL # BLD AUTO: 0.03 K/UL — SIGNIFICANT CHANGE UP (ref 0–0.5)
EOSINOPHIL NFR BLD AUTO: 0.2 % — SIGNIFICANT CHANGE UP (ref 0–6)
EPI CELLS # UR: ABNORMAL
GLUCOSE SERPL-MCNC: 121 MG/DL — HIGH (ref 70–99)
GLUCOSE UR QL: NEGATIVE — SIGNIFICANT CHANGE UP
HCT VFR BLD CALC: 34 % — LOW (ref 34.5–45)
HGB BLD-MCNC: 11.2 G/DL — LOW (ref 11.5–15.5)
HYALINE CASTS # UR AUTO: ABNORMAL /LPF
IMM GRANULOCYTES NFR BLD AUTO: 0.7 % — SIGNIFICANT CHANGE UP (ref 0–1.5)
INR BLD: 2.91 RATIO — HIGH (ref 0.88–1.16)
KETONES UR-MCNC: ABNORMAL
LACTATE SERPL-SCNC: 1.2 MMOL/L — SIGNIFICANT CHANGE UP (ref 0.7–2)
LEUKOCYTE ESTERASE UR-ACNC: ABNORMAL
LYMPHOCYTES # BLD AUTO: 0.51 K/UL — LOW (ref 1–3.3)
LYMPHOCYTES # BLD AUTO: 3.1 % — LOW (ref 13–44)
MAGNESIUM SERPL-MCNC: 1.7 MG/DL — SIGNIFICANT CHANGE UP (ref 1.6–2.6)
MCHC RBC-ENTMCNC: 29.1 PG — SIGNIFICANT CHANGE UP (ref 27–34)
MCHC RBC-ENTMCNC: 32.9 GM/DL — SIGNIFICANT CHANGE UP (ref 32–36)
MCV RBC AUTO: 88.3 FL — SIGNIFICANT CHANGE UP (ref 80–100)
MONOCYTES # BLD AUTO: 0.69 K/UL — SIGNIFICANT CHANGE UP (ref 0–0.9)
MONOCYTES NFR BLD AUTO: 4.2 % — SIGNIFICANT CHANGE UP (ref 2–14)
NEUTROPHILS # BLD AUTO: 15.07 K/UL — HIGH (ref 1.8–7.4)
NEUTROPHILS NFR BLD AUTO: 91.6 % — HIGH (ref 43–77)
NITRITE UR-MCNC: NEGATIVE — SIGNIFICANT CHANGE UP
NRBC # BLD: 0 /100 WBCS — SIGNIFICANT CHANGE UP (ref 0–0)
NT-PROBNP SERPL-SCNC: 417 PG/ML — HIGH (ref 0–125)
PH UR: 5 — SIGNIFICANT CHANGE UP (ref 5–8)
PLATELET # BLD AUTO: 201 K/UL — SIGNIFICANT CHANGE UP (ref 150–400)
POTASSIUM SERPL-MCNC: 3.7 MMOL/L — SIGNIFICANT CHANGE UP (ref 3.5–5.3)
POTASSIUM SERPL-SCNC: 3.7 MMOL/L — SIGNIFICANT CHANGE UP (ref 3.5–5.3)
PROT SERPL-MCNC: 7.6 G/DL — SIGNIFICANT CHANGE UP (ref 6–8.3)
PROT UR-MCNC: 75 MG/DL
PROTHROM AB SERPL-ACNC: 34 SEC — HIGH (ref 10–12.9)
RBC # BLD: 3.85 M/UL — SIGNIFICANT CHANGE UP (ref 3.8–5.2)
RBC # FLD: 14.6 % — HIGH (ref 10.3–14.5)
RBC CASTS # UR COMP ASSIST: SIGNIFICANT CHANGE UP /HPF (ref 0–4)
SODIUM SERPL-SCNC: 133 MMOL/L — LOW (ref 135–145)
SP GR SPEC: 1.01 — SIGNIFICANT CHANGE UP (ref 1.01–1.02)
TROPONIN I SERPL-MCNC: <.015 NG/ML — SIGNIFICANT CHANGE UP (ref 0.01–0.04)
UROBILINOGEN FLD QL: NEGATIVE — SIGNIFICANT CHANGE UP
WBC # BLD: 16.45 K/UL — HIGH (ref 3.8–10.5)
WBC # FLD AUTO: 16.45 K/UL — HIGH (ref 3.8–10.5)
WBC UR QL: SIGNIFICANT CHANGE UP

## 2019-11-24 PROCEDURE — 99285 EMERGENCY DEPT VISIT HI MDM: CPT

## 2019-11-24 PROCEDURE — 93010 ELECTROCARDIOGRAM REPORT: CPT

## 2019-11-24 PROCEDURE — 99223 1ST HOSP IP/OBS HIGH 75: CPT | Mod: GC,AI

## 2019-11-24 PROCEDURE — 71046 X-RAY EXAM CHEST 2 VIEWS: CPT | Mod: 26

## 2019-11-24 PROCEDURE — 71250 CT THORAX DX C-: CPT | Mod: 26

## 2019-11-24 RX ORDER — IPRATROPIUM/ALBUTEROL SULFATE 18-103MCG
3 AEROSOL WITH ADAPTER (GRAM) INHALATION ONCE
Refills: 0 | Status: COMPLETED | OUTPATIENT
Start: 2019-11-24 | End: 2019-11-24

## 2019-11-24 RX ORDER — CEFTRIAXONE 500 MG/1
1000 INJECTION, POWDER, FOR SOLUTION INTRAMUSCULAR; INTRAVENOUS EVERY 24 HOURS
Refills: 0 | Status: DISCONTINUED | OUTPATIENT
Start: 2019-11-24 | End: 2019-11-24

## 2019-11-24 RX ORDER — WARFARIN SODIUM 2.5 MG/1
4 TABLET ORAL ONCE
Refills: 0 | Status: COMPLETED | OUTPATIENT
Start: 2019-11-24 | End: 2019-11-24

## 2019-11-24 RX ORDER — TIOTROPIUM BROMIDE 18 UG/1
1 CAPSULE ORAL; RESPIRATORY (INHALATION) DAILY
Refills: 0 | Status: DISCONTINUED | OUTPATIENT
Start: 2019-11-24 | End: 2019-11-27

## 2019-11-24 RX ORDER — BUDESONIDE AND FORMOTEROL FUMARATE DIHYDRATE 160; 4.5 UG/1; UG/1
2 AEROSOL RESPIRATORY (INHALATION)
Refills: 0 | Status: DISCONTINUED | OUTPATIENT
Start: 2019-11-24 | End: 2019-11-27

## 2019-11-24 RX ORDER — AZITHROMYCIN 500 MG/1
500 TABLET, FILM COATED ORAL ONCE
Refills: 0 | Status: COMPLETED | OUTPATIENT
Start: 2019-11-24 | End: 2019-11-24

## 2019-11-24 RX ORDER — THEOPHYLLINE ANHYDROUS 200 MG
300 TABLET, EXTENDED RELEASE 12 HR ORAL EVERY 12 HOURS
Refills: 0 | Status: DISCONTINUED | OUTPATIENT
Start: 2019-11-24 | End: 2019-11-27

## 2019-11-24 RX ORDER — CEFTRIAXONE 500 MG/1
1000 INJECTION, POWDER, FOR SOLUTION INTRAMUSCULAR; INTRAVENOUS ONCE
Refills: 0 | Status: COMPLETED | OUTPATIENT
Start: 2019-11-24 | End: 2019-11-24

## 2019-11-24 RX ORDER — AZITHROMYCIN 500 MG/1
500 TABLET, FILM COATED ORAL EVERY 24 HOURS
Refills: 0 | Status: DISCONTINUED | OUTPATIENT
Start: 2019-11-25 | End: 2019-11-26

## 2019-11-24 RX ORDER — SODIUM CHLORIDE 9 MG/ML
1000 INJECTION INTRAMUSCULAR; INTRAVENOUS; SUBCUTANEOUS ONCE
Refills: 0 | Status: COMPLETED | OUTPATIENT
Start: 2019-11-24 | End: 2019-11-24

## 2019-11-24 RX ORDER — THEOPHYLLINE ANHYDROUS 200 MG
300 TABLET, EXTENDED RELEASE 12 HR ORAL
Refills: 0 | Status: DISCONTINUED | OUTPATIENT
Start: 2019-11-24 | End: 2019-11-24

## 2019-11-24 RX ORDER — AZITHROMYCIN 500 MG/1
500 TABLET, FILM COATED ORAL EVERY 24 HOURS
Refills: 0 | Status: DISCONTINUED | OUTPATIENT
Start: 2019-11-24 | End: 2019-11-24

## 2019-11-24 RX ORDER — DILTIAZEM HCL 120 MG
240 CAPSULE, EXT RELEASE 24 HR ORAL DAILY
Refills: 0 | Status: DISCONTINUED | OUTPATIENT
Start: 2019-11-24 | End: 2019-11-27

## 2019-11-24 RX ORDER — LEVOTHYROXINE SODIUM 125 MCG
25 TABLET ORAL DAILY
Refills: 0 | Status: DISCONTINUED | OUTPATIENT
Start: 2019-11-24 | End: 2019-11-27

## 2019-11-24 RX ORDER — CEFTRIAXONE 500 MG/1
1000 INJECTION, POWDER, FOR SOLUTION INTRAMUSCULAR; INTRAVENOUS EVERY 24 HOURS
Refills: 0 | Status: DISCONTINUED | OUTPATIENT
Start: 2019-11-25 | End: 2019-11-27

## 2019-11-24 RX ORDER — LOSARTAN POTASSIUM 100 MG/1
100 TABLET, FILM COATED ORAL DAILY
Refills: 0 | Status: DISCONTINUED | OUTPATIENT
Start: 2019-11-24 | End: 2019-11-27

## 2019-11-24 RX ORDER — ACETAMINOPHEN 500 MG
650 TABLET ORAL ONCE
Refills: 0 | Status: COMPLETED | OUTPATIENT
Start: 2019-11-24 | End: 2019-11-24

## 2019-11-24 RX ADMIN — AZITHROMYCIN 255 MILLIGRAM(S): 500 TABLET, FILM COATED ORAL at 07:20

## 2019-11-24 RX ADMIN — CEFTRIAXONE 100 MILLIGRAM(S): 500 INJECTION, POWDER, FOR SOLUTION INTRAMUSCULAR; INTRAVENOUS at 06:58

## 2019-11-24 RX ADMIN — Medication 650 MILLIGRAM(S): at 13:26

## 2019-11-24 RX ADMIN — LOSARTAN POTASSIUM 100 MILLIGRAM(S): 100 TABLET, FILM COATED ORAL at 10:17

## 2019-11-24 RX ADMIN — Medication 650 MILLIGRAM(S): at 12:26

## 2019-11-24 RX ADMIN — WARFARIN SODIUM 4 MILLIGRAM(S): 2.5 TABLET ORAL at 21:12

## 2019-11-24 RX ADMIN — SODIUM CHLORIDE 1000 MILLILITER(S): 9 INJECTION INTRAMUSCULAR; INTRAVENOUS; SUBCUTANEOUS at 07:51

## 2019-11-24 RX ADMIN — SODIUM CHLORIDE 1000 MILLILITER(S): 9 INJECTION INTRAMUSCULAR; INTRAVENOUS; SUBCUTANEOUS at 06:51

## 2019-11-24 RX ADMIN — Medication 25 MICROGRAM(S): at 14:12

## 2019-11-24 RX ADMIN — Medication 300 MILLIGRAM(S): at 18:40

## 2019-11-24 RX ADMIN — Medication 240 MILLIGRAM(S): at 10:17

## 2019-11-24 RX ADMIN — Medication 3 MILLILITER(S): at 06:51

## 2019-11-24 NOTE — H&P ADULT - NSHPSOCIALHISTORY_GEN_ALL_CORE
Lives alone. Ambulates and preforms all ADL's independently. Former smoker: quits 17yrs ago, 1.5-2ppd for 30yrs. Rare alcohol consumption(5 times a year), denies illicit drug use. Lives alone. Ambulates and preforms all ADL's independently. Former smoker: quits 17yrs ago, 1.5-2ppd for 30yrs. Rare alcohol consumption (5 times a year), denies illicit drug use.

## 2019-11-24 NOTE — ED ADULT TRIAGE NOTE - CHIEF COMPLAINT QUOTE
difficulty breathing, mid left sided back pain and cough since Wednesday. " It feels like the last time I had Pneumonia"

## 2019-11-24 NOTE — H&P ADULT - PROBLEM SELECTOR PLAN 4
-Continue home Advair, Spiriva, theophyline -Continue home Advair(therapeutic interchange budesonide/formoterol) , Spiriva, theophyline

## 2019-11-24 NOTE — H&P ADULT - NSHPPHYSICALEXAM_GEN_ALL_CORE
T(C): 37.1 (11-24-19 @ 05:57), Max: 37.1 (11-24-19 @ 05:57)  HR: 75 (11-24-19 @ 07:23) (75 - 83)  BP: 150/80 (11-24-19 @ 07:23) (150/80 - 158/65)  RR: 16 (11-24-19 @ 07:23) (16 - 18)  SpO2: 95% (11-24-19 @ 07:23) (92% - 95%)    GENERAL: patient appears well, no acute distress, appropriate, pleasant  ENMT: oropharynx clear without erythema, no exudates, moist mucous membranes  LUNGS: decreased BS LLL, no wheezing or rhonchi appreciated  HEART: soft S1/S2, regular rate and rhythm, no murmurs noted, no lower extremity edema  GASTROINTESTINAL: abdomen is soft, nontender, nondistended, normoactive bowel sounds, no palpable masses  INTEGUMENT: good skin turgor, warm skin, appears well perfused  MUSCULOSKELETAL: no clubbing or cyanosis, no obvious deformity, back not TPP  NEUROLOGIC: awake, alert, oriented x3, good muscle tone in 4 extremities, no obvious sensory deficits  PSYCHIATRIC: mood is good, affect is congruent, linear and logical thought process

## 2019-11-24 NOTE — H&P ADULT - ATTENDING COMMENTS
Patient seen and examined. Case discussed with PGY1 Dr. Fairchild, and I agree with her documentation above, edited where necessary.     Plan discussed with patient in depth, including antibiotics, pulmonology eval, need for repeat CT scan in 4-6 weeks, anticipated hospital course.

## 2019-11-24 NOTE — H&P ADULT - PROBLEM SELECTOR PLAN 8
-Continue home coumadin based in INR for afib   BB IMPROVE VTE Individual Risk Assessment          RISK                                                          Points    [  ] Previous VTE                                                3  [  ] Thrombophilia                                             2  [  ] Lower limb paralysis                                   2        (unable to hold up >15 seconds)    [  ] Current Cancer                                            2         (within 6 months)  [  ] Immobilization > 24 hrs                              1  [  ] ICU/CCU stay > 24 hours                            1  [ X] Age > 60                                                    1    IMPROVE VTE Score ____1_____

## 2019-11-24 NOTE — H&P ADULT - PROBLEM SELECTOR PLAN 3
-Continue home medications coumadin based on INR   -Continue home medication Diltiazem -Continue home medications coumadin based on INR, INR 2.9 today, will ______  -Continue home medication Diltiazem -Continue home medications coumadin based on INR, INR 2.91 today, will give***  -Continue home medication Diltiazem -Continue home medications coumadin(home dose: 8mg) based on INR, INR 2.91 today, will give 4mg today  -Continue home medication Diltiazem -Continue home medications coumadin (home dose: 8mg) based on INR, INR 2.91 today, will give 4mg today  -Continue home medication Diltiazem

## 2019-11-24 NOTE — ED PROVIDER NOTE - OBJECTIVE STATEMENT
68 female drove to ER c/o difficulty breathing. Patient states on Thursday she had been feeling tired, exhausted, feeling shortness of breath with exertion, last night developing cough with blood tinged sputum, chills and came to ER.

## 2019-11-24 NOTE — ED ADULT NURSE NOTE - OBJECTIVE STATEMENT
Pt. complaining of shortness of breath with dyspnea on exertion and cough starting Wednesday. Pt. reported chills yesterday, complaining of left mid back pain, stated around 1am she observed blood tinged sputum. Denied fevers or chest pain.

## 2019-11-24 NOTE — ED ADULT NURSE REASSESSMENT NOTE - NS ED NURSE REASSESS COMMENT FT1
report received and care assumed. pt resting in bed, reports feeling better, a/o x 3, on monitor, receiving treatment, aware of plan of care and will continue to monitor

## 2019-11-24 NOTE — ED ADULT NURSE NOTE - PMH
Afib    COPD (chronic obstructive pulmonary disease)    COPD (chronic obstructive pulmonary disease)    Edema extremities    HTN (hypertension)    HTN (hypertension)    Hyperlipidemia    Hypothyroid    Hypothyroid    JUAN CARLOS (obstructive sleep apnea)    Psoriasis

## 2019-11-24 NOTE — PATIENT PROFILE ADULT - PRIMARY ROLES/RESPONSIBILITIES
Render Post-Care Instructions In Note?: no Detail Level: Simple Post-Care Instructions: I reviewed with the patient in detail post-care instructions. Patient is to wear sunprotection, and avoid picking at any of the treated lesions. Pt may apply aquaphor to crusted or scabbing areas. Duration Of Freeze Thaw-Cycle (Seconds): 0 Consent: The patient's consent was obtained including but not limited to risks of crusting, scabbing, blistering, scarring, darker or lighter pigmentary change, recurrence, incomplete removal and infection. none

## 2019-11-24 NOTE — H&P ADULT - PROBLEM SELECTOR PLAN 1
-Presented with cough, dyspnea, leukocytosis, CT chest: Left lower lobe consolidation and Patchy subsolid nodular foci in both lungs, admitted for CAP   -S/p ceftriaxone and azithro in the ED, will continue   -F/u blood cultures -Presented with cough, dyspnea, leukocytosis  -CT chest: Left lower lobe consolidation and Patchy subsolid nodular foci in both lungs, admitted for CAP   -S/p ceftriaxone and azithro in the ED, will continue   -F/u blood cultures, UCx  - UA negative  - lactate 1.2  - ProBNP: 417  - f/u am CMP, CBC -Presented with cough, dyspnea, leukocytosis  -CT chest: Left lower lobe consolidation and Patchy subsolid nodular foci in both lungs, admitted for CAP  -Follow-up CT is recommended in one month after therapy to ensure   resolution of the imaging findings end exclude underlying malignancy  -S/p ceftriaxone and azithro in the ED, will continue   -F/u blood cultures, UCx  - UA negative  - lactate 1.2  - ProBNP: 417  - f/u am CMP, CBC

## 2019-11-24 NOTE — H&P ADULT - PROBLEM SELECTOR PLAN 9
-Continue home coumadin based in INR for afib   BB IMPROVE VTE Individual Risk Assessment          RISK                                                          Points    [  ] Previous VTE                                                3  [  ] Thrombophilia                                             2  [  ] Lower limb paralysis                                   2        (unable to hold up >15 seconds)    [  ] Current Cancer                                            2         (within 6 months)  [  ] Immobilization > 24 hrs                              1  [  ] ICU/CCU stay > 24 hours                            1  [  ] Age > 60                                                    1    IMPROVE VTE Score _________

## 2019-11-24 NOTE — H&P ADULT - PROBLEM SELECTOR PLAN 2
-Presented to the ED with mild hyponatremia, 133   -s/p NS bolus in the ED   -F/u AM labs -Presented to the ED with mild hyponatremia, 133  - likely 2/2 decreased PO intake  -s/p NS bolus in the ED   -F/u AM labs

## 2019-11-24 NOTE — ED ADULT NURSE REASSESSMENT NOTE - NS ED NURSE REASSESS COMMENT FT1
bedside report given to rn virginia, 20 g  left forearm, vitals are as charted, pt voices no complaints, a/ ox 4

## 2019-11-24 NOTE — H&P ADULT - ASSESSMENT
INCOMPLETE     68yo female with PMHx of a-fib, HTN, hypothyroid, COPD, sleep apnea _________ presenting with   admitted with pneumonia. INCOMPLETE     66yo female with PMHx of paroxysmal a-fib (on coumadin), HTN, hypothyroid, COPD, sleep apnea presenting with   admitted for 66yo female with PMHx of paroxysmal a-fib (on coumadin), HTN, hypothyroid, COPD presenting with admitted for Pneumonia (CAP) 66yo female with PMHx of paroxysmal a-fib (on coumadin), HTN, hypothyroid, COPD presenting with cough, hemoptysis, OTTO, admitted for Pneumonia (CAP)

## 2019-11-24 NOTE — H&P ADULT - NSICDXPASTMEDICALHX_GEN_ALL_CORE_FT
PAST MEDICAL HISTORY:  Afib     COPD (chronic obstructive pulmonary disease)     COPD (chronic obstructive pulmonary disease)     Edema extremities     HTN (hypertension)     HTN (hypertension)     Hyperlipidemia     Hypothyroid     Hypothyroid     Psoriasis

## 2019-11-24 NOTE — H&P ADULT - NSHPREVIEWOFSYSTEMS_GEN_ALL_CORE
CONSTITUTIONAL: denies fever, admits chills, fatigue  HEENT: denies blurred vision, sore throat  SKIN: denies new lesions, rash  CARDIOVASCULAR: denies chest pain, chest pressure, palpitations  RESPIRATORY: denies shortness of breath at rest, admits sputum production(blood tinged), cough, OTTO  GASTROINTESTINAL: denies nausea, vomiting, diarrhea, abdominal pain  GENITOURINARY: denies dysuria, discharge  NEUROLOGICAL: denies numbness, headache, focal weakness  MUSCULOSKELETAL: denies new joint pain, muscle aches  HEMATOLOGIC: denies gross bleeding, bruising, admits blood tinged sputum  LYMPHATICS: denies extremity swelling

## 2019-11-24 NOTE — ED ADULT NURSE NOTE - NSIMPLEMENTINTERV_GEN_ALL_ED
Implemented All Fall with Harm Risk Interventions:  Panther to call system. Call bell, personal items and telephone within reach. Instruct patient to call for assistance. Room bathroom lighting operational. Non-slip footwear when patient is off stretcher. Physically safe environment: no spills, clutter or unnecessary equipment. Stretcher in lowest position, wheels locked, appropriate side rails in place. Provide visual cue, wrist band, yellow gown, etc. Monitor gait and stability. Monitor for mental status changes and reorient to person, place, and time. Review medications for side effects contributing to fall risk. Reinforce activity limits and safety measures with patient and family. Provide visual clues: red socks.

## 2019-11-24 NOTE — H&P ADULT - HISTORY OF PRESENT ILLNESS
ED Vitals: T: 98.7 HR: 83, BP: 158/65, RR: 18, O2: 92% then 95%   Labs significant for: WBC: 16.45, H/H: 11.2/34, Na: 133, BNP: 417   EKG:   CT chest: Left lower lobe consolidation suspicious for pneumonia.  Patchy subsolid   nodular foci in both lungs may reflect additional foci of infection.    Pulmonary hemorrhage may have a similar appearance.  Follow-up CT is recommended in one month after therapy to ensure   resolution of the imaging findings end exclude underlying malignancy.  Small left pleural effusion.  Small pericardial effusion    In the ED patient was given: Azithro and Ceftriaxone X1, NS bolus X1, DUONEB X1 INCOMPLETE     66yo female with PMHx of a-fib, HTN, hypothyroid, COPD, sleep apnea _________ presenting with   admitted for     ED Vitals: T: 98.7 HR: 83, BP: 158/65, RR: 18, O2: 92% then 95%   Labs significant for: WBC: 16.45, H/H: 11.2/34, Na: 133, BNP: 417   EKG:   CT chest: Left lower lobe consolidation suspicious for pneumonia.  Patchy subsolid   nodular foci in both lungs may reflect additional foci of infection.    Pulmonary hemorrhage may have a similar appearance.  Follow-up CT is recommended in one month after therapy to ensure   resolution of the imaging findings end exclude underlying malignancy.  Small left pleural effusion.  Small pericardial effusion    In the ED patient was given: Azithro and Ceftriaxone X1, NS bolus X1, DUONEB X1 INCOMPLETE     68yo female with PMHx of paroxysmal a-fib (on coumadin), HTN, hypothyroid, COPD, sleep apnea presenting with   admitted for     ED Vitals: T: 98.7 HR: 83, BP: 158/65, RR: 18, O2: 92% then 95%   Labs significant for: WBC: 16.45, H/H: 11.2/34, Na: 133, BNP: 417   EKG:   CT chest: Left lower lobe consolidation suspicious for pneumonia.  Patchy subsolid   nodular foci in both lungs may reflect additional foci of infection.    Pulmonary hemorrhage may have a similar appearance.  Follow-up CT is recommended in one month after therapy to ensure   resolution of the imaging findings end exclude underlying malignancy.  Small left pleural effusion.  Small pericardial effusion    In the ED patient was given: Azithro and Ceftriaxone X1, NS bolus X1, DUONEB X1 68yo female with PMHx of paroxysmal a-fib (on coumadin), HTN, hypothyroid, COPD presenting with feeling fatigued since Thursday. States she spent Friday in bed and last night developed mid back left sided pain that felt like she pulled a muscle. Pt states rated the pain as an 8/10 at that time and states it is now a tolerable 4/10. Pt also reports productive cough since yesterday with blood tinged( red) sputum. Reports she has had pneumonia is the past and it felt similar. Of note pt states on Thursday she had a sore throat and blister on her tongue for which she went to Urgent Care, but it has resolved since. Pt admits to dyspnea on excretion and chills. States she took Tylenol and Robitussin which helped with her symptoms Denies fever, headache, n/v, rash, dysuria.      ED Vitals: T: 98.7 HR: 83, BP: 158/65, RR: 18, O2: 92% then 95%   Labs significant for: WBC: 16.45, H/H: 11.2/34, Na: 133, BNP: 417   EKG: (prelim) NSR  CT chest: Left lower lobe consolidation suspicious for pneumonia.  Patchy subsolid   nodular foci in both lungs may reflect additional foci of infection.    Pulmonary hemorrhage may have a similar appearance.  Follow-up CT is recommended in one month after therapy to ensure   resolution of the imaging findings end exclude underlying malignancy.  Small left pleural effusion.  Small pericardial effusion    In the ED patient was given: Azithro and Ceftriaxone X1, NS bolus X1, DUONEB X1 66yo female with PMHx of paroxysmal a-fib (on coumadin), HTN, hypothyroid, COPD presenting with feeling fatigued since Thursday. States she spent Friday in bed and last night developed mid back left sided pain that felt like she pulled a muscle. Pt states rated the pain as an 8/10 at that time and states it is now a tolerable 4/10. Pt also reports productive cough since yesterday with blood tinged (red) sputum. Reports she has had pneumonia in the past and it felt similar. Of note pt states on Thursday she had a sore throat and blister on her tongue for which she went to Urgent Care, but it has resolved since. Pt admits to dyspnea on excretion and chills. States she took Tylenol and Robitussin which helped with her symptoms Denies fever, headache, n/v, rash, dysuria.      ED Vitals: T: 98.7 HR: 83, BP: 158/65, RR: 18, O2: 92% then 95%   Labs significant for: WBC: 16.45, H/H: 11.2/34, Na: 133, BNP: 417   EKG: (prelim) NSR  CT chest: Left lower lobe consolidation suspicious for pneumonia.  Patchy subsolid   nodular foci in both lungs may reflect additional foci of infection.    Pulmonary hemorrhage may have a similar appearance.  Follow-up CT is recommended in one month after therapy to ensure   resolution of the imaging findings end exclude underlying malignancy.  Small left pleural effusion.  Small pericardial effusion    In the ED patient was given: Azithro and Ceftriaxone X1, NS bolus X1, DUONEB X1

## 2019-11-25 ENCOUNTER — TRANSCRIPTION ENCOUNTER (OUTPATIENT)
Age: 69
End: 2019-11-25

## 2019-11-25 DIAGNOSIS — R04.2 HEMOPTYSIS: ICD-10-CM

## 2019-11-25 LAB
ANION GAP SERPL CALC-SCNC: 7 MMOL/L — SIGNIFICANT CHANGE UP (ref 5–17)
APTT BLD: 43.7 SEC — HIGH (ref 28.5–37)
BASOPHILS # BLD AUTO: 0.02 K/UL — SIGNIFICANT CHANGE UP (ref 0–0.2)
BASOPHILS NFR BLD AUTO: 0.2 % — SIGNIFICANT CHANGE UP (ref 0–2)
BUN SERPL-MCNC: 19 MG/DL — SIGNIFICANT CHANGE UP (ref 7–23)
CALCIUM SERPL-MCNC: 8.4 MG/DL — LOW (ref 8.5–10.1)
CHLORIDE SERPL-SCNC: 107 MMOL/L — SIGNIFICANT CHANGE UP (ref 96–108)
CO2 SERPL-SCNC: 25 MMOL/L — SIGNIFICANT CHANGE UP (ref 22–31)
CREAT SERPL-MCNC: 1.1 MG/DL — SIGNIFICANT CHANGE UP (ref 0.5–1.3)
EOSINOPHIL # BLD AUTO: 0.16 K/UL — SIGNIFICANT CHANGE UP (ref 0–0.5)
EOSINOPHIL NFR BLD AUTO: 1.8 % — SIGNIFICANT CHANGE UP (ref 0–6)
GLUCOSE SERPL-MCNC: 132 MG/DL — HIGH (ref 70–99)
HCT VFR BLD CALC: 29.9 % — LOW (ref 34.5–45)
HCV AB S/CO SERPL IA: 0.11 S/CO — SIGNIFICANT CHANGE UP (ref 0–0.99)
HCV AB SERPL-IMP: SIGNIFICANT CHANGE UP
HGB BLD-MCNC: 9.5 G/DL — LOW (ref 11.5–15.5)
IMM GRANULOCYTES NFR BLD AUTO: 0.3 % — SIGNIFICANT CHANGE UP (ref 0–1.5)
INR BLD: 3.58 RATIO — HIGH (ref 0.88–1.16)
LEGIONELLA AG UR QL: NEGATIVE — SIGNIFICANT CHANGE UP
LYMPHOCYTES # BLD AUTO: 0.62 K/UL — LOW (ref 1–3.3)
LYMPHOCYTES # BLD AUTO: 6.9 % — LOW (ref 13–44)
MCHC RBC-ENTMCNC: 28.4 PG — SIGNIFICANT CHANGE UP (ref 27–34)
MCHC RBC-ENTMCNC: 31.8 GM/DL — LOW (ref 32–36)
MCV RBC AUTO: 89.5 FL — SIGNIFICANT CHANGE UP (ref 80–100)
MONOCYTES # BLD AUTO: 0.55 K/UL — SIGNIFICANT CHANGE UP (ref 0–0.9)
MONOCYTES NFR BLD AUTO: 6.1 % — SIGNIFICANT CHANGE UP (ref 2–14)
NEUTROPHILS # BLD AUTO: 7.57 K/UL — HIGH (ref 1.8–7.4)
NEUTROPHILS NFR BLD AUTO: 84.7 % — HIGH (ref 43–77)
NRBC # BLD: 0 /100 WBCS — SIGNIFICANT CHANGE UP (ref 0–0)
PLATELET # BLD AUTO: 212 K/UL — SIGNIFICANT CHANGE UP (ref 150–400)
POTASSIUM SERPL-MCNC: 3.6 MMOL/L — SIGNIFICANT CHANGE UP (ref 3.5–5.3)
POTASSIUM SERPL-SCNC: 3.6 MMOL/L — SIGNIFICANT CHANGE UP (ref 3.5–5.3)
PROTHROM AB SERPL-ACNC: 42.4 SEC — HIGH (ref 10–12.9)
RBC # BLD: 3.34 M/UL — LOW (ref 3.8–5.2)
RBC # FLD: 14.7 % — HIGH (ref 10.3–14.5)
SODIUM SERPL-SCNC: 139 MMOL/L — SIGNIFICANT CHANGE UP (ref 135–145)
WBC # BLD: 8.95 K/UL — SIGNIFICANT CHANGE UP (ref 3.8–10.5)
WBC # FLD AUTO: 8.95 K/UL — SIGNIFICANT CHANGE UP (ref 3.8–10.5)

## 2019-11-25 PROCEDURE — 99233 SBSQ HOSP IP/OBS HIGH 50: CPT | Mod: GC

## 2019-11-25 RX ORDER — WARFARIN SODIUM 2.5 MG/1
4 TABLET ORAL ONCE
Refills: 0 | Status: DISCONTINUED | OUTPATIENT
Start: 2019-11-25 | End: 2019-11-25

## 2019-11-25 RX ADMIN — BUDESONIDE AND FORMOTEROL FUMARATE DIHYDRATE 2 PUFF(S): 160; 4.5 AEROSOL RESPIRATORY (INHALATION) at 17:22

## 2019-11-25 RX ADMIN — Medication 240 MILLIGRAM(S): at 05:27

## 2019-11-25 RX ADMIN — CEFTRIAXONE 100 MILLIGRAM(S): 500 INJECTION, POWDER, FOR SOLUTION INTRAMUSCULAR; INTRAVENOUS at 05:28

## 2019-11-25 RX ADMIN — LOSARTAN POTASSIUM 100 MILLIGRAM(S): 100 TABLET, FILM COATED ORAL at 05:27

## 2019-11-25 RX ADMIN — AZITHROMYCIN 255 MILLIGRAM(S): 500 TABLET, FILM COATED ORAL at 06:27

## 2019-11-25 RX ADMIN — Medication 300 MILLIGRAM(S): at 17:21

## 2019-11-25 RX ADMIN — TIOTROPIUM BROMIDE 1 CAPSULE(S): 18 CAPSULE ORAL; RESPIRATORY (INHALATION) at 06:32

## 2019-11-25 RX ADMIN — Medication 25 MICROGRAM(S): at 05:27

## 2019-11-25 RX ADMIN — Medication 300 MILLIGRAM(S): at 05:27

## 2019-11-25 RX ADMIN — BUDESONIDE AND FORMOTEROL FUMARATE DIHYDRATE 2 PUFF(S): 160; 4.5 AEROSOL RESPIRATORY (INHALATION) at 06:32

## 2019-11-25 NOTE — PROGRESS NOTE ADULT - SUBJECTIVE AND OBJECTIVE BOX
PULMONARY/CRITICAL CARE      INTERVAL HPI/OVERNIGHT EVENTS:  Pt. well known to me admitted for left pneumonia.   68y FemaleHPI:  68yo female with PMHx of paroxysmal a-fib (on coumadin), HTN, hypothyroid, COPD presenting with feeling fatigued since Thursday. States she spent Friday in bed and last night developed mid back left sided pain that felt like she pulled a muscle. Pt states rated the pain as an 8/10 at that time and states it is now a tolerable 4/10. Pt also reports productive cough since yesterday with blood tinged (red) sputum. Reports she has had pneumonia in the past and it felt similar. Of note pt states on Thursday she had a sore throat and blister on her tongue for which she went to Urgent Care, but it has resolved since. Pt admits to dyspnea on excretion and chills. States she took Tylenol and Robitussin which helped with her symptoms Denies fever, headache, n/v, rash, dysuria.  Coughed up some bloody sputum.   Had hi INR  Feels better today.     ED Vitals: T: 98.7 HR: 83, BP: 158/65, RR: 18, O2: 92% then 95%   Labs significant for: WBC: 16.45, H/H: 11.2/34, Na: 133, BNP: 417   EKG: (prelim) NSR  CT chest: Left lower lobe consolidation suspicious for pneumonia.  Patchy subsolid   nodular foci in both lungs may reflect additional foci of infection.    Pulmonary hemorrhage may have a similar appearance.  Follow-up CT is recommended in one month after therapy to ensure   resolution of the imaging findings end exclude underlying malignancy.  Small left pleural effusion.  Small pericardial effusion    In the ED patient was given: Azithro and Ceftriaxone X1, NS bolus X1, DUONEB X1 (2019 08:19)        PAST MEDICAL & SURGICAL HISTORY:  Psoriasis  Afib  Edema extremities  Hyperlipidemia  HTN (hypertension)  Hypothyroid  COPD (chronic obstructive pulmonary disease)  Hypothyroid  HTN (hypertension)  COPD (chronic obstructive pulmonary disease)  S/P eye surgery: age 5 unsure if right or left eye        ICU Vital Signs Last 24 Hrs  T(C): 36.8 (2019 05:24), Max: 38.4 (2019 11:25)  T(F): 98.3 (2019 05:24), Max: 101.1 (2019 11:25)  HR: 75 (2019 05:24) (71 - 92)  BP: 105/63 (2019 05:24) (105/63 - 119/57)  BP(mean): 74 (2019 21:16) (74 - 74)  ABP: --  ABP(mean): --  RR: 17 (2019 05:24) (14 - 17)  SpO2: 92% (2019 06:31) (89% - 94%)    Qtts:     I&O's Summary    2019 07:01  -  2019 07:00  --------------------------------------------------------  IN: 300 mL / OUT: 500 mL / NET: -200 mL            REVIEW OF SYSTEMS:    CONSTITUTIONAL: had fever,  or fatigue  EYES: No eye pain, visual disturbances, or discharge  ENMT:  No difficulty hearing, tinnitus, vertigo; No sinus or throat pain  NECK: No pain or stiffness  BREASTS: No pain, masses, or nipple discharge  RESPIRATORY: has cough, hemoptysis; mild  shortness of breath  CARDIOVASCULAR: No chest pain, palpitations, dizziness, or leg swelling  GASTROINTESTINAL: No abdominal or epigastric pain. No nausea, vomiting, or hematemesis; No diarrhea or constipation. No melena or hematochezia.  GENITOURINARY: No dysuria, frequency, hematuria, or incontinence  NEUROLOGICAL: No headaches, memory loss, loss of strength, numbness, or tremors  SKIN: No itching, burning, rashes, or lesions   LYMPH NODES: No enlarged glands  ENDOCRINE: No heat or cold intolerance; No hair loss  MUSCULOSKELETAL: No joint pain or swelling; No muscle, back, or extremity pain, no calf tenderness  PSYCHIATRIC: No depression, anxiety, mood swings, or difficulty sleeping  HEME/LYMPH: No easy bruising, or bleeding gums  ALLERGY AND IMMUNOLOGIC: No hives or eczema      PHYSICAL EXAM:    GENERAL: NAD, well-groomed, well-developed, NAD  HEAD:  Atraumatic, Normocephalic  EYES: EOMI, PERRLA, conjunctiva and sclera clear  ENMT: No tonsillar erythema, exudates, or enlargement; Moist mucous membranes, Good dentition, No lesions  NECK: Supple, No JVD, Normal thyroid  NERVOUS SYSTEM:  Alert & Oriented X3, Good concentration; Motor Strength 5/5 B/L upper and lower extremities  CHEST/LUNG: Clear to percussion bilaterally; No rales, rhonchi, wheezing, or rubs  HEART: Regular rate and rhythm; No murmurs, rubs, or gallops  ABDOMEN: Soft, Nontender, Nondistended; Bowel sounds present  EXTREMITIES:  2+ Peripheral Pulses, No clubbing, cyanosis, or edema  LYMPH: No lymphadenopathy noted  SKIN: No rashes or lesions        LABS:                        9.5    8.95  )-----------( 212      ( 2019 08:34 )             29.9         139  |  107  |  19  ----------------------------<  132<H>  3.6   |  25  |  1.10    Ca    8.4<L>      2019 08:34  Mg     1.7         TPro  7.6  /  Alb  3.3  /  TBili  0.9  /  DBili  x   /  AST  15  /  ALT  12  /  AlkPhos  105  -    PT/INR - ( 2019 08:34 )   PT: 42.4 sec;   INR: 3.58 ratio         PTT - ( 2019 08:34 )  PTT:43.7 sec  Urinalysis Basic - ( 2019 06:40 )    Color: Yellow / Appearance: Clear / S.015 / pH: x  Gluc: x / Ketone: Small  / Bili: Small / Urobili: Negative   Blood: x / Protein: 75 mg/dL / Nitrite: Negative   Leuk Esterase: Small / RBC: 0-2 /HPF / WBC 3-5   Sq Epi: x / Non Sq Epi: Moderate / Bacteria: Moderate        vanco through     RADIOLOGY & ADDITIONAL STUDIES:    < from: Xray Chest 2 Views PA/Lat (19 @ 07:47) >    EXAM:  XR CHEST PA LAT 2V                            PROCEDURE DATE:  2019          INTERPRETATION:  Clinical Information: Cough    Technique: PA and lateral views of the chest.     Comparison: -18    Findings/  Impression: The heart is unremarkable. Left pleural pneumonia. Bones are   unremarkable for age.                  JANINE MILNER M.D., ATTENDING RADIOLOGIST  This document has been electronically signed. 2019  8:48AM        < end of copied text >    CRITICAL CARE TIME SPENT:

## 2019-11-25 NOTE — PROGRESS NOTE ADULT - PROBLEM SELECTOR PLAN 8
On Coumadin  BB IMPROVE VTE Individual Risk Assessment          RISK                                                          Points    [  ] Previous VTE                                                3  [  ] Thrombophilia                                             2  [  ] Lower limb paralysis                                   2        (unable to hold up >15 seconds)    [  ] Current Cancer                                            2         (within 6 months)  [  ] Immobilization > 24 hrs                              1  [  ] ICU/CCU stay > 24 hours                            1  [ X] Age > 60                                                    1    IMPROVE VTE Score ____1_____

## 2019-11-25 NOTE — DISCHARGE NOTE PROVIDER - NSDCMRMEDTOKEN_GEN_ALL_CORE_FT
Advair Diskus 500 mcg-50 mcg inhalation powder: 1 puff(s) inhaled 2 times a day  B12 Complex:   Coumadin: 8 milligram(s) orally once a day  dilTIAZem 240 mg/24 hours oral tablet, extended release: 1 tab(s) orally once a day  levothyroxine 25 mcg (0.025 mg) oral tablet: 1 tab(s) orally once a day  losartan 100 mg oral tablet: 1 tab(s) orally once a day  Red Yeast Rice 600 mg oral capsule: 2 tab(s) orally once a day (at bedtime)  Spiriva 18 mcg inhalation capsule: 1 each inhaled once a day  theophylline 300 mg oral tablet, extended release: tab(s) orally every 12 hours  Vitamin D3: Advair Diskus 500 mcg-50 mcg inhalation powder: 1 puff(s) inhaled 2 times a day  azithromycin 500 mg oral tablet: 1 tab(s) orally once a day  B12 Complex:   cefuroxime 500 mg oral tablet: 1 tab(s) orally 2 times a day   Coumadin: 8 milligram(s) orally once a day  dilTIAZem 240 mg/24 hours oral tablet, extended release: 1 tab(s) orally once a day  levothyroxine 25 mcg (0.025 mg) oral tablet: 1 tab(s) orally once a day  losartan 100 mg oral tablet: 1 tab(s) orally once a day  Red Yeast Rice 600 mg oral capsule: 2 tab(s) orally once a day (at bedtime)  Spiriva 18 mcg inhalation capsule: 1 each inhaled once a day  theophylline 300 mg oral tablet, extended release: tab(s) orally every 12 hours  Vitamin D3: Advair Diskus 500 mcg-50 mcg inhalation powder: 1 puff(s) inhaled 2 times a day  azithromycin 500 mg oral tablet: 1 tab(s) orally once a day  B12 Complex:   cefuroxime 500 mg oral tablet: 1 tab(s) orally 2 times a day   Coumadin: Hold for 2 more days 11/27 and 11/28, and then resume one half tablet daily. Please have repeat INR drawn no later than 12/2.  dilTIAZem 240 mg/24 hours oral tablet, extended release: 1 tab(s) orally once a day  levothyroxine 25 mcg (0.025 mg) oral tablet: 1 tab(s) orally once a day  losartan 100 mg oral tablet: 1 tab(s) orally once a day  Red Yeast Rice 600 mg oral capsule: 2 tab(s) orally once a day (at bedtime)  Spiriva 18 mcg inhalation capsule: 1 each inhaled once a day  theophylline 300 mg oral tablet, extended release: tab(s) orally every 12 hours  Vitamin D3:

## 2019-11-25 NOTE — PROGRESS NOTE ADULT - ASSESSMENT
68yo female with PMHx of paroxysmal a-fib (on coumadin), HTN, hypothyroid, COPD presenting with cough, hemoptysis, OTTO, admitted for Pneumonia (CAP)

## 2019-11-25 NOTE — DISCHARGE NOTE PROVIDER - CARE PROVIDER_API CALL
Jero Jj)  Family Medicine Medicine  [address] [place] [address], [address]  [place], [place] [id number]  Phone: [phone]  Fax: [phone]  Follow Up Time:

## 2019-11-25 NOTE — DISCHARGE NOTE PROVIDER - HOSPITAL COURSE
HPI: 68yo female with PMHx of paroxysmal a-fib (on coumadin), HTN, hypothyroid, COPD presenting with feeling fatigued since Thursday. States she spent Friday in bed and last night developed mid back left sided pain that felt like she pulled a muscle. Pt states rated the pain as an 8/10 at that time and states it is now a tolerable 4/10. Pt also reports productive cough since yesterday with blood tinged (red) sputum. Reports she has had pneumonia in the past and it felt similar. Of note pt states on Thursday she had a sore throat and blister on her tongue for which she went to Urgent Care, but it has resolved since. Pt admits to dyspnea on excretion and chills. States she took Tylenol and Robitussin which helped with her symptoms Denies fever, headache, n/v, rash, dysuria.        Hospital course: admitted for CAP, started on broad spec abx, other home medical regimen continued and adjusted as necessary. Urine legionella found to be ____. Pt was transitioned to PO abx to continue for ____ days after discharge. Pt was taken off nc o2 and ambulated without sob. Pt responded well to above treatment and interventions. Pt seen and examined day of discharge. Clinically stable and medically optimized for discharge home. HPI: 66yo female with PMHx of paroxysmal a-fib (on coumadin), HTN, hypothyroid, COPD presenting with feeling fatigued since Thursday. States she spent Friday in bed and last night developed mid back left sided pain that felt like she pulled a muscle. Pt states rated the pain as an 8/10 at that time and states it is now a tolerable 4/10. Pt also reports productive cough since yesterday with blood tinged (red) sputum. Reports she has had pneumonia in the past and it felt similar. Of note pt states on Thursday she had a sore throat and blister on her tongue for which she went to Urgent Care, but it has resolved since. Pt admits to dyspnea on excretion and chills. States she took Tylenol and Robitussin which helped with her symptoms Denies fever, headache, n/v, rash, dysuria.        Hospital course: admitted for CAP, started on broad spec abx, other home medical regimen continued and adjusted as necessary. Urine legionella found to be negative. Pt was transitioned to PO abx to complete course on discharge. Pt was taken off nc o2 and ambulated without sob. Pt responded well to above treatment and interventions. Pt seen and examined day of discharge. Clinically stable and medically optimized for discharge home.

## 2019-11-25 NOTE — DISCHARGE NOTE PROVIDER - NSDCCPCAREPLAN_GEN_ALL_CORE_FT
PRINCIPAL DISCHARGE DIAGNOSIS  Diagnosis: Pneumonia of left lower lobe due to infectious organism  Assessment and Plan of Treatment: You had a infection of your lungs. You were treated with IV antibiotics during your hospital course.  Please continue to take oral ____ for ____ days  Please follow up with your PMD and get a repeat CT to ensure proper resolution of your pneumonia.      SECONDARY DISCHARGE DIAGNOSES  Diagnosis: JUAN CARLOS (obstructive sleep apnea)  Assessment and Plan of Treatment: Continue CPAP    Diagnosis: Hyperlipidemia  Assessment and Plan of Treatment: Continue home reigmen    Diagnosis: Hypothyroid  Assessment and Plan of Treatment: Continue home reigmen    Diagnosis: COPD (chronic obstructive pulmonary disease)  Assessment and Plan of Treatment: Continue home reigmen    Diagnosis: HTN (hypertension)  Assessment and Plan of Treatment: Continue home reigmen    Diagnosis: Paroxysmal atrial fibrillation  Assessment and Plan of Treatment: Continue home reigmen PRINCIPAL DISCHARGE DIAGNOSIS  Diagnosis: Pneumonia of left lower lobe due to infectious organism  Assessment and Plan of Treatment: You had a infection of your lungs. You were treated with IV antibiotics during your hospital course.  Please continue to take antibiotics as instructed  Please follow up with your PMD and get a repeat CT to ensure proper resolution of your pneumonia.      SECONDARY DISCHARGE DIAGNOSES  Diagnosis: JUAN CARLOS (obstructive sleep apnea)  Assessment and Plan of Treatment: Continue CPAP    Diagnosis: Hyperlipidemia  Assessment and Plan of Treatment: Continue home reigmen    Diagnosis: Hypothyroid  Assessment and Plan of Treatment: Continue home reigmen    Diagnosis: COPD (chronic obstructive pulmonary disease)  Assessment and Plan of Treatment: Continue home reigmen    Diagnosis: HTN (hypertension)  Assessment and Plan of Treatment: Continue home reigmen    Diagnosis: Paroxysmal atrial fibrillation  Assessment and Plan of Treatment: Continue home reigmen PRINCIPAL DISCHARGE DIAGNOSIS  Diagnosis: Pneumonia of left lower lobe due to infectious organism  Assessment and Plan of Treatment: You had a infection of your lungs. You were treated with IV antibiotics during your hospital course.  Please continue to take antibiotics as instructed  Please follow up with your PMD and get a repeat CT to ensure proper resolution of your pneumonia.      SECONDARY DISCHARGE DIAGNOSES  Diagnosis: HTN (hypertension)  Assessment and Plan of Treatment: Continue home reigmen    Diagnosis: COPD (chronic obstructive pulmonary disease)  Assessment and Plan of Treatment: Continue home reigmen    Diagnosis: Hypothyroid  Assessment and Plan of Treatment: Continue home reigmen    Diagnosis: Hyperlipidemia  Assessment and Plan of Treatment: Continue home reigmen    Diagnosis: JUAN CARLOS (obstructive sleep apnea)  Assessment and Plan of Treatment: Continue CPAP    Diagnosis: Paroxysmal atrial fibrillation  Assessment and Plan of Treatment: Your INR is high, possibly due to antibiotics. Please hold your Coumadin dose for the next two days (11/27 and 11/28) Then resume ONE HALF TABLET (4 MG) daily. Please have your INR level repeated no later than Monday.

## 2019-11-25 NOTE — PROGRESS NOTE ADULT - SUBJECTIVE AND OBJECTIVE BOX
Patient is a 68y old  Female who presents with a chief complaint of PNA (2019 08:19)      INTERVAL HPI/OVERNIGHT EVENTS: Pt seen and examined at bedside. Reports feeling better, doing well. Still has some productive cough but improving. Mild SOB, pleuritic cp resolved     MEDICATIONS  (STANDING):  azithromycin  IVPB 500 milliGRAM(s) IV Intermittent every 24 hours  budesonide 160 MICROgram(s)/formoterol 4.5 MICROgram(s) Inhaler 2 Puff(s) Inhalation two times a day  cefTRIAXone   IVPB 1000 milliGRAM(s) IV Intermittent every 24 hours  diltiazem    milliGRAM(s) Oral daily  levothyroxine 25 MICROGram(s) Oral daily  losartan 100 milliGRAM(s) Oral daily  theophylline SR Tablet 300 milliGRAM(s) Oral every 12 hours  tiotropium 18 MICROgram(s) Capsule 1 Capsule(s) Inhalation daily    MEDICATIONS  (PRN):      Allergies    artichokes (Unknown)  Levaquin (Anaphylaxis)  sulfa drugs (Unknown)  Sulfur (Unknown)    Intolerances        REVIEW OF SYSTEMS:  CONSTITUTIONAL: No fever or chills  HEENT:  No headache, no sore throat  RESPIRATORY: + SOB, + cough, no wheezing  CARDIOVASCULAR: No chest pain, palpitations, or leg swelling  GASTROINTESTINAL: No nausea, vomiting, or diarrhea  GENITOURINARY: No dysuria, frequency, or hematuria  NEUROLOGICAL: no focal weakness or dizziness  SKIN:  No rashes or lesions   MUSCULOSKELETAL: no myalgias   PSYCHIATRIC: No depression or anxiety  ROS Unable to obtain due to - [ ] Dementia  [ ] Lethargy  REST OF REVIEW Of SYSTEM - [ x] Normal     Vital Signs Last 24 Hrs  T(C): 36.8 (2019 05:24), Max: 38.4 (2019 11:25)  T(F): 98.3 (2019 05:24), Max: 101.1 (2019 11:25)  HR: 75 (2019 05:24) (71 - 92)  BP: 105/63 (2019 05:24) (105/63 - 119/57)  BP(mean): 74 (2019 21:16) (74 - 74)  RR: 17 (2019 05:24) (14 - 17)  SpO2: 92% (2019 06:31) (89% - 94%)    PHYSICAL EXAM:  GENERAL: NAD, obese  HEENT:  EOMI, moist mucous membranes  CHEST/LUNG:  mild  bs left lower lung field, no rales, wheezes  HEART:  RRR, S1, S2, no murmur  ABDOMEN:  BS+, soft, nontender, nondistended  EXTREMITIES: no edema or calf tenderness  SKIN:  no rash  NERVOUS SYSTEM: AA&Ox3    LABS: AM labs pending    CBC Full  -  ( 2019 06:32 )  WBC Count : 16.45 K/uL  Hemoglobin : 11.2 g/dL  Hematocrit : 34.0 %  Platelet Count - Automated : 201 K/uL  Mean Cell Volume : 88.3 fl  Mean Cell Hemoglobin : 29.1 pg  Mean Cell Hemoglobin Concentration : 32.9 gm/dL  Auto Neutrophil # : 15.07 K/uL  Auto Lymphocyte # : 0.51 K/uL  Auto Monocyte # : 0.69 K/uL  Auto Eosinophil # : 0.03 K/uL  Auto Basophil # : 0.04 K/uL  Auto Neutrophil % : 91.6 %  Auto Lymphocyte % : 3.1 %  Auto Monocyte % : 4.2 %  Auto Eosinophil % : 0.2 %  Auto Basophil % : 0.2 %      Ca    9.3        2019 06:32      PT/INR - ( 2019 06:32 )   PT: 34.0 sec;   INR: 2.91 ratio         PTT - ( 2019 06:32 )  PTT:45.0 sec  Urinalysis Basic - ( 2019 06:40 )    Color: Yellow / Appearance: Clear / S.015 / pH: x  Gluc: x / Ketone: Small  / Bili: Small / Urobili: Negative   Blood: x / Protein: 75 mg/dL / Nitrite: Negative   Leuk Esterase: Small / RBC: 0-2 /HPF / WBC 3-5   Sq Epi: x / Non Sq Epi: Moderate / Bacteria: Moderate      CAPILLARY BLOOD GLUCOSE              RADIOLOGY & ADDITIONAL TESTS:    Personally reviewed.     Consultant(s) Notes Reviewed:  [x] YES  [ ] NO    Care Discussed with [x] Consultants  [x] Patient  [ ] Family  [ ]      [ ] Other; RN  DVT ppx

## 2019-11-25 NOTE — PROGRESS NOTE ADULT - PROBLEM SELECTOR PLAN 1
Likely CAP, improving cough/dyspnea/leukocytosis, fever resolved  -CT chest consistent with LLL pna  -Follow-up CT is recommended in one month after therapy to ensure   resolution of the imaging findings end exclude underlying malignancy  -continue ceftriaxone and azithro, can d/c azithro if legionella neg   -F/u blood cultures, UCx  - UA negative  - lactate 1.2  - ProBNP: 417  - will need to monitor o2 while ambulating on room air prior to discharge  - pt is functional/ambulates independently, no assistive devices

## 2019-11-25 NOTE — PROGRESS NOTE ADULT - ASSESSMENT
Pt with hx COPD admitted for Pneumonia.  Hemoptysis likely due to hi INR  Has pulmonary nodules which need followup since pt is former smoker

## 2019-11-25 NOTE — DISCHARGE NOTE PROVIDER - NSDCQMPCI_CARD_ALL_CORE
2017      RE: Stefano Garcia  [address]  [facility] [id number]         Date: 2017    PATIENT:  Stefano Garcia  :          2011  ANNETTE:          2017    Dear Angela Proctor:    I had the pleasure of seeing your patient, Stefano Garcia, for a follow-up visit in the [facility] on 2017 at the [facility].  Stefano was last seen in this clinic 2 mos ago.  Please see below for my assessment and plan of care.    Intercurrent History:    Stefano was accompanied to this appointment by his mom.  As you may recall, Stefano is a 5 year old boy with severe complicated obesity.  Over the past 2 mos he gained 1 lb and grew about 1/2 inch.     Mom reports that Tino's behaviors have worsened since our last visit.  He is having more tantrums and is swearing more.  His eating, however, has not particularly worsened and there has not been an increase in conflicts around food.  Tino continues to meet with the [facility] therapist (play therapy).  Mom says this is kid directed therapy and does not address parenting skills.     He is going to school though not every day.  Mom reports that she thinks Tino is sleepy, sad and looks depressed.  He is going to bed around 9:30.  No snoring or witnessed apneic events.      He continues to take topiramate 75 mg daily.  Seems to be helping.      Current Medications:    Current Outpatient Rx   Medication Sig Dispense Refill     topiramate (TOPAMAX) 50 MG tablet Take 50 mg twice daily 30 tablet 1     [DISCONTINUED] topiramate (TOPAMAX) 25 MG tablet Take 1 tab daily for week 1, then take 2 tabs daily for week 2, then take 3 tabs daily thereafter 90 tablet 1     order for DME XL good nites pull ups (Patient not taking: Reported on 2/15/2017) 180 Units 11       Physical Exam:    Vitals:  B/P: 127/54, P: 103, R: Data Unavailable   BP:  Blood  "pressure percentiles are >99 % systolic and 45 % diastolic based on NHBPEP's 4th Report. Blood pressure percentile targets: 90: 111/70, 95: 115/75, 99 + 5 mmH/88.    Measured Weights:  Wt Readings from Last 4 Encounters:   17 53.8 kg (118 lb 9.7 oz) (>99 %)*   02/15/17 53.4 kg (117 lb 11.6 oz) (>99 %)*   17 55.2 kg (121 lb 11.1 oz) (>99 %)*   17 56.2 kg (123 lb 14.4 oz) (>99 %)*     * Growth percentiles are based on CDC 2-20 Years data.       Height:    Ht Readings from Last 4 Encounters:   17 1.155 m (3' 9.47\") (71 %)*   02/15/17 1.113 m (3' 7.82\") (46 %)*   17 1.141 m (3' 8.92\") (72 %)*   17 1.116 m (3' 7.94\") (55 %)*     * Growth percentiles are based on CDC 2-20 Years data.       Body Mass Index:  Body mass index is 40.33 kg/(m^2).  Body Mass Index Percentile:  >99 %ile based on CDC 2-20 Years BMI-for-age data using vitals from 2017.       Labs:  None today.    Assessment:      Stefano is a 5 year old male with a BMI in the severe obese category (BMI > 1.2 times the 95th percentile or BMI > 35) complicated by a difficult psychosocial situation.  His BMI has been decreasing since about January which is good progress.  Mom reports that he is eating less food and that the topiramate is helping.  She still struggles with managing his behavior.  Mom wishes to have Tino return to PT but she has not been making the appointments.      I spent a total of 25 minutes face-to-face with Stefano during today s office visit. Over 50% of this time was spent counseling the patient and/or coordinating care regarding obesity. See note for details.     Stefano s current problem list reviewed today includes:    Encounter Diagnosis   Name Primary?     Morbid obesity due to excess calories (H)         Care Plan:    We are looking forward to seeing Stefano for a follow-up visit in 4 weeks to see RD and psychology.    Thank you for including me in the care of your patient.  Please " do not hesitate to call with questions or concerns.    Sincerely,    Estephania Joseph MD MPH  Diplomate, American Board of Obesity Medicine    Director, Pediatric Weight Management Clinic  Department of Pediatrics  [facility] [phone]  [facility] [phone]  [facility] [phone]  [facility] [place] [phone]    Copy to patient    Parent(s) of Stefano Garcia  [address]  [facility] [address]         No

## 2019-11-25 NOTE — PROGRESS NOTE ADULT - PROBLEM SELECTOR PLAN 3
-Continue home medications coumadin (home dose: 8mg) based on INR, INR 2.91 on admission, therapeutic, will continue/dose accordingly   -Continue home medication Diltiazem

## 2019-11-26 LAB
-  AMIKACIN: SIGNIFICANT CHANGE UP
-  AMPICILLIN/SULBACTAM: SIGNIFICANT CHANGE UP
-  AMPICILLIN: SIGNIFICANT CHANGE UP
-  AZTREONAM: SIGNIFICANT CHANGE UP
-  CEFAZOLIN: SIGNIFICANT CHANGE UP
-  CEFEPIME: SIGNIFICANT CHANGE UP
-  CEFOXITIN: SIGNIFICANT CHANGE UP
-  CEFTRIAXONE: SIGNIFICANT CHANGE UP
-  CIPROFLOXACIN: SIGNIFICANT CHANGE UP
-  GENTAMICIN: SIGNIFICANT CHANGE UP
-  IMIPENEM: SIGNIFICANT CHANGE UP
-  LEVOFLOXACIN: SIGNIFICANT CHANGE UP
-  MEROPENEM: SIGNIFICANT CHANGE UP
-  NITROFURANTOIN: SIGNIFICANT CHANGE UP
-  PIPERACILLIN/TAZOBACTAM: SIGNIFICANT CHANGE UP
-  TIGECYCLINE: SIGNIFICANT CHANGE UP
-  TOBRAMYCIN: SIGNIFICANT CHANGE UP
-  TRIMETHOPRIM/SULFAMETHOXAZOLE: SIGNIFICANT CHANGE UP
ANION GAP SERPL CALC-SCNC: 7 MMOL/L — SIGNIFICANT CHANGE UP (ref 5–17)
BUN SERPL-MCNC: 21 MG/DL — SIGNIFICANT CHANGE UP (ref 7–23)
CALCIUM SERPL-MCNC: 9.4 MG/DL — SIGNIFICANT CHANGE UP (ref 8.5–10.1)
CHLORIDE SERPL-SCNC: 107 MMOL/L — SIGNIFICANT CHANGE UP (ref 96–108)
CO2 SERPL-SCNC: 25 MMOL/L — SIGNIFICANT CHANGE UP (ref 22–31)
CREAT SERPL-MCNC: 1.1 MG/DL — SIGNIFICANT CHANGE UP (ref 0.5–1.3)
CULTURE RESULTS: SIGNIFICANT CHANGE UP
FOLATE SERPL-MCNC: 12 NG/ML — SIGNIFICANT CHANGE UP
GLUCOSE SERPL-MCNC: 121 MG/DL — HIGH (ref 70–99)
HCT VFR BLD CALC: 32.3 % — LOW (ref 34.5–45)
HGB BLD-MCNC: 10.4 G/DL — LOW (ref 11.5–15.5)
INR BLD: 3.52 RATIO — HIGH (ref 0.88–1.16)
IRON SATN MFR SERPL: 33 UG/DL — SIGNIFICANT CHANGE UP (ref 30–160)
MCHC RBC-ENTMCNC: 28.8 PG — SIGNIFICANT CHANGE UP (ref 27–34)
MCHC RBC-ENTMCNC: 32.2 GM/DL — SIGNIFICANT CHANGE UP (ref 32–36)
MCV RBC AUTO: 89.5 FL — SIGNIFICANT CHANGE UP (ref 80–100)
METHOD TYPE: SIGNIFICANT CHANGE UP
NRBC # BLD: 0 /100 WBCS — SIGNIFICANT CHANGE UP (ref 0–0)
ORGANISM # SPEC MICROSCOPIC CNT: SIGNIFICANT CHANGE UP
ORGANISM # SPEC MICROSCOPIC CNT: SIGNIFICANT CHANGE UP
PLATELET # BLD AUTO: 232 K/UL — SIGNIFICANT CHANGE UP (ref 150–400)
POTASSIUM SERPL-MCNC: 3.7 MMOL/L — SIGNIFICANT CHANGE UP (ref 3.5–5.3)
POTASSIUM SERPL-SCNC: 3.7 MMOL/L — SIGNIFICANT CHANGE UP (ref 3.5–5.3)
PROTHROM AB SERPL-ACNC: 41.4 SEC — HIGH (ref 10–12.9)
RBC # BLD: 3.61 M/UL — LOW (ref 3.8–5.2)
RBC # FLD: 14.7 % — HIGH (ref 10.3–14.5)
S PNEUM AG UR QL: NEGATIVE — SIGNIFICANT CHANGE UP
SODIUM SERPL-SCNC: 139 MMOL/L — SIGNIFICANT CHANGE UP (ref 135–145)
SPECIMEN SOURCE: SIGNIFICANT CHANGE UP
VIT B12 SERPL-MCNC: >2000 PG/ML — HIGH (ref 232–1245)
WBC # BLD: 8.23 K/UL — SIGNIFICANT CHANGE UP (ref 3.8–10.5)
WBC # FLD AUTO: 8.23 K/UL — SIGNIFICANT CHANGE UP (ref 3.8–10.5)

## 2019-11-26 PROCEDURE — 71045 X-RAY EXAM CHEST 1 VIEW: CPT | Mod: 26

## 2019-11-26 PROCEDURE — 99232 SBSQ HOSP IP/OBS MODERATE 35: CPT | Mod: GC

## 2019-11-26 RX ORDER — CEFUROXIME AXETIL 250 MG
1 TABLET ORAL
Qty: 10 | Refills: 0
Start: 2019-11-26 | End: 2019-11-30

## 2019-11-26 RX ORDER — AZITHROMYCIN 500 MG/1
1 TABLET, FILM COATED ORAL
Qty: 5 | Refills: 0
Start: 2019-11-26 | End: 2019-11-30

## 2019-11-26 RX ORDER — AZITHROMYCIN 500 MG/1
500 TABLET, FILM COATED ORAL DAILY
Refills: 0 | Status: DISCONTINUED | OUTPATIENT
Start: 2019-11-27 | End: 2019-11-27

## 2019-11-26 RX ADMIN — Medication 240 MILLIGRAM(S): at 05:24

## 2019-11-26 RX ADMIN — BUDESONIDE AND FORMOTEROL FUMARATE DIHYDRATE 2 PUFF(S): 160; 4.5 AEROSOL RESPIRATORY (INHALATION) at 12:30

## 2019-11-26 RX ADMIN — LOSARTAN POTASSIUM 100 MILLIGRAM(S): 100 TABLET, FILM COATED ORAL at 05:24

## 2019-11-26 RX ADMIN — BUDESONIDE AND FORMOTEROL FUMARATE DIHYDRATE 2 PUFF(S): 160; 4.5 AEROSOL RESPIRATORY (INHALATION) at 18:18

## 2019-11-26 RX ADMIN — AZITHROMYCIN 255 MILLIGRAM(S): 500 TABLET, FILM COATED ORAL at 06:21

## 2019-11-26 RX ADMIN — Medication 300 MILLIGRAM(S): at 18:18

## 2019-11-26 RX ADMIN — CEFTRIAXONE 100 MILLIGRAM(S): 500 INJECTION, POWDER, FOR SOLUTION INTRAMUSCULAR; INTRAVENOUS at 05:24

## 2019-11-26 RX ADMIN — Medication 300 MILLIGRAM(S): at 05:24

## 2019-11-26 RX ADMIN — Medication 25 MICROGRAM(S): at 05:24

## 2019-11-26 RX ADMIN — TIOTROPIUM BROMIDE 1 CAPSULE(S): 18 CAPSULE ORAL; RESPIRATORY (INHALATION) at 12:30

## 2019-11-26 NOTE — PROGRESS NOTE ADULT - PROBLEM SELECTOR PLAN 3
-Continue home medications coumadin (home dose: 8mg) based on INR, INR supra therapeutic yesterday, will continue/dose accordingly   -Continue home medication Diltiazem

## 2019-11-26 NOTE — GOALS OF CARE CONVERSATION - ADVANCED CARE PLANNING - CONVERSATION DETAILS
Patient requested completion of a health care proxy.Proxy completed after careful discussion and explaination.A copy was placed on the chart, original and copies were given to patient.

## 2019-11-26 NOTE — PROGRESS NOTE ADULT - SUBJECTIVE AND OBJECTIVE BOX
Patient is a 68y old  Female who presents with a chief complaint of PNA (2019 08:19)      INTERVAL HPI/OVERNIGHT EVENTS: Pt seen and examined at bedside. Reports feeling much better, cough is improving, not as productive anymore. Reports no long SOB but feels more comfortable on the nc o2, denies cp, abdominal pain, headaches    MEDICATIONS  (STANDING):  budesonide 160 MICROgram(s)/formoterol 4.5 MICROgram(s) Inhaler 2 Puff(s) Inhalation two times a day  cefTRIAXone   IVPB 1000 milliGRAM(s) IV Intermittent every 24 hours  diltiazem    milliGRAM(s) Oral daily  levothyroxine 25 MICROGram(s) Oral daily  losartan 100 milliGRAM(s) Oral daily  theophylline SR Tablet 300 milliGRAM(s) Oral every 12 hours  tiotropium 18 MICROgram(s) Capsule 1 Capsule(s) Inhalation daily    MEDICATIONS  (PRN):      Allergies    artichokes (Unknown)  Levaquin (Anaphylaxis)  sulfa drugs (Unknown)  Sulfur (Unknown)    Intolerances        REVIEW OF SYSTEMS:  CONSTITUTIONAL: No fever or chills  HEENT:  No headache, no sore throat  RESPIRATORY: + cough, no wheezing, no SOB  CARDIOVASCULAR: No chest pain, palpitations, or leg swelling  GASTROINTESTINAL: No nausea, vomiting, or diarrhea  GENITOURINARY: No dysuria, frequency, or hematuria  NEUROLOGICAL: no focal weakness or dizziness  SKIN:  No rashes or lesions   MUSCULOSKELETAL: no myalgias   PSYCHIATRIC: No depression or anxiety  ROS Unable to obtain due to - [ ] Dementia  [ ] Lethargy  REST OF REVIEW Of SYSTEM - [ x] Normal     Vital Signs Last 24 Hrs  T(C): 36.6 (2019 05:13), Max: 37.1 (2019 21:35)  T(F): 97.8 (2019 05:13), Max: 98.7 (2019 21:35)  HR: 73 (2019 05:13) (68 - 73)  BP: 127/69 (2019 05:13) (122/72 - 133/76)  BP(mean): 95 (2019 21:35) (95 - 95)  RR: 17 (2019 05:13) (16 - 17)  SpO2: 92% (2019 05:13) (92% - 94%)    PHYSICAL EXAM:  GENERAL: NAD, obese  HEENT:  EOMI, moist mucous membranes  CHEST/LUNG:  mild  bs left lower lung field, no rales, wheezes  HEART:  RRR, S1, S2, no murmur  ABDOMEN:  BS+, soft, nontender, nondistended  EXTREMITIES: no edema or calf tenderness  SKIN:  no rash  NERVOUS SYSTEM: AA&Ox3    LABS: AM labs reviewed    CBC Full  -  ( 2019 06:32 )  WBC Count : 16.45 K/uL  Hemoglobin : 11.2 g/dL  Hematocrit : 34.0 %  Platelet Count - Automated : 201 K/uL  Mean Cell Volume : 88.3 fl  Mean Cell Hemoglobin : 29.1 pg  Mean Cell Hemoglobin Concentration : 32.9 gm/dL  Auto Neutrophil # : 15.07 K/uL  Auto Lymphocyte # : 0.51 K/uL  Auto Monocyte # : 0.69 K/uL  Auto Eosinophil # : 0.03 K/uL  Auto Basophil # : 0.04 K/uL  Auto Neutrophil % : 91.6 %  Auto Lymphocyte % : 3.1 %  Auto Monocyte % : 4.2 %  Auto Eosinophil % : 0.2 %  Auto Basophil % : 0.2 %      Ca    9.3        2019 06:32      PT/INR - ( 2019 06:32 )   PT: 34.0 sec;   INR: 2.91 ratio         PTT - ( 2019 06:32 )  PTT:45.0 sec  Urinalysis Basic - ( 2019 06:40 )    Color: Yellow / Appearance: Clear / S.015 / pH: x  Gluc: x / Ketone: Small  / Bili: Small / Urobili: Negative   Blood: x / Protein: 75 mg/dL / Nitrite: Negative   Leuk Esterase: Small / RBC: 0-2 /HPF / WBC 3-5   Sq Epi: x / Non Sq Epi: Moderate / Bacteria: Moderate      CAPILLARY BLOOD GLUCOSE              RADIOLOGY & ADDITIONAL TESTS:    Personally reviewed.     Consultant(s) Notes Reviewed:  [x] YES  [ ] NO    Care Discussed with [x] Consultants  [x] Patient  [ ] Family  [ ]      [ ] Other; RN  DVT ppx

## 2019-11-26 NOTE — PROGRESS NOTE ADULT - PROBLEM SELECTOR PLAN 1
Likely CAP, improving cough/dyspnea, fever and leukocytosis resolved  -CT chest consistent with LLL pna  -Follow-up CT is recommended in one month after therapy to ensure   resolution of the imaging findings end exclude underlying malignancy  -continue ceftriaxone, d/c azithro given negative legionella   - blood/urine cx ngtd  - check o2 while ambulating on room air prior to discharge  - pt is functional/ambulates independently, no assistive devices Likely CAP, improving cough/dyspnea, fever and leukocytosis resolved  -CT chest consistent with LLL pna  -Follow-up CT is recommended in one month after therapy to ensure   resolution of the imaging findings end exclude underlying malignancy  -continue ceftriaxone and Azithromycin, legionella negative, will d/c ceftin and azitho for x5 more days   - blood/urine cx ngtd  - check o2 while ambulating on room air prior to discharge  - pt is functional/ambulates independently, no assistive devices Likely CAP, improving cough/dyspnea, fever and leukocytosis resolved  -CT chest consistent with LLL pna  -Follow-up CT is recommended in one month after therapy to ensure   resolution of the imaging findings end exclude underlying malignancy  -continue ceftriaxone and Azithromycin, legionella negative, will discharge home on ceftin and azitho for x5 more days   - blood/urine cx ngtd  - check o2 while ambulating on room air prior to discharge  - pt is functional/ambulates independently, no assistive devices

## 2019-11-26 NOTE — PROGRESS NOTE ADULT - SUBJECTIVE AND OBJECTIVE BOX
PULMONARY/CRITICAL CARE      INTERVAL HPI/OVERNIGHT EVENTS:  No fever. Less sob. No hemoptysis.   68y FemaleHPI:  66yo female with PMHx of paroxysmal a-fib (on coumadin), HTN, hypothyroid, COPD presenting with feeling fatigued since Thursday. States she spent Friday in bed and last night developed mid back left sided pain that felt like she pulled a muscle. Pt states rated the pain as an 8/10 at that time and states it is now a tolerable 4/10. Pt also reports productive cough since yesterday with blood tinged (red) sputum. Reports she has had pneumonia in the past and it felt similar. Of note pt states on Thursday she had a sore throat and blister on her tongue for which she went to Urgent Care, but it has resolved since. Pt admits to dyspnea on excretion and chills. States she took Tylenol and Robitussin which helped with her symptoms Denies fever, headache, n/v, rash, dysuria.      ED Vitals: T: 98.7 HR: 83, BP: 158/65, RR: 18, O2: 92% then 95%   Labs significant for: WBC: 16.45, H/H: 11.2/34, Na: 133, BNP: 417   EKG: (prelim) NSR  CT chest: Left lower lobe consolidation suspicious for pneumonia.  Patchy subsolid   nodular foci in both lungs may reflect additional foci of infection.    Pulmonary hemorrhage may have a similar appearance.  Follow-up CT is recommended in one month after therapy to ensure   resolution of the imaging findings end exclude underlying malignancy.  Small left pleural effusion.  Small pericardial effusion    In the ED patient was given: Azithro and Ceftriaxone X1, NS bolus X1, DUONEB X1 (24 Nov 2019 08:19)        PAST MEDICAL & SURGICAL HISTORY:  Psoriasis  Afib  Edema extremities  Hyperlipidemia  HTN (hypertension)  Hypothyroid  COPD (chronic obstructive pulmonary disease)  Hypothyroid  HTN (hypertension)  COPD (chronic obstructive pulmonary disease)  S/P eye surgery: age 5 unsure if right or left eye        ICU Vital Signs Last 24 Hrs  T(C): 36.6 (26 Nov 2019 05:13), Max: 37.1 (25 Nov 2019 21:35)  T(F): 97.8 (26 Nov 2019 05:13), Max: 98.7 (25 Nov 2019 21:35)  HR: 73 (26 Nov 2019 05:13) (68 - 73)  BP: 127/69 (26 Nov 2019 05:13) (122/72 - 133/76)  BP(mean): 95 (25 Nov 2019 21:35) (95 - 95)  ABP: --  ABP(mean): --  RR: 17 (26 Nov 2019 05:13) (16 - 17)  SpO2: 92% (26 Nov 2019 05:13) (92% - 94%)    Qtts:     I&O's Summary    PHYSICAL EXAM:    GENERAL: NAD, well-groomed, well-developed, NAD  HEAD:  Atraumatic, Normocephalic  EYES: EOMI, PERRLA, conjunctiva and sclera clear  ENMT: No tonsillar erythema, exudates, or enlargement; Moist mucous membranes, Good dentition, No lesions  NECK: Supple, No JVD, Normal thyroid  NERVOUS SYSTEM:  Alert & Oriented X3, Good concentration; Motor Strength 5/5 B/L upper and lower extremities  CHEST/LUNG: few bilat, rhonchi, no rales,  wheezing, or rubs  HEART: Regular rate and rhythm; No murmurs, rubs, or gallops  ABDOMEN: Soft, Nontender, Nondistended; Bowel sounds present  EXTREMITIES:  2+ Peripheral Pulses, No clubbing, cyanosis, or edema varicose veins  LYMPH: No lymphadenopathy noted  SKIN: No rashes or lesions                LABS:                        9.5    8.95  )-----------( 212      ( 25 Nov 2019 08:34 )             29.9     11-25    139  |  107  |  19  ----------------------------<  132<H>  3.6   |  25  |  1.10    Ca    8.4<L>      25 Nov 2019 08:34      PT/INR - ( 25 Nov 2019 08:34 )   PT: 42.4 sec;   INR: 3.58 ratio         PTT - ( 25 Nov 2019 08:34 )  PTT:43.7 sec      vanco through     RADIOLOGY & ADDITIONAL STUDIES:      CRITICAL CARE TIME SPENT< from: Xray Chest 2 Views PA/Lat (11.24.19 @ 07:47) >  EXAM:  XR CHEST PA LAT 2V                            PROCEDURE DATE:  11/24/2019          INTERPRETATION:  Clinical Information: Cough    Technique: PA and lateral views of the chest.     Comparison: 6-18    Findings/  Impression: The heart is unremarkable. Left pleural pneumonia. Bones are   unremarkable for age.                  JANINE MILNER M.D., ATTENDING RADIOLOGIST  This document has been electronically signed. Nov 24 2019  8:48AM              < end of copied text >  :

## 2019-11-26 NOTE — PROGRESS NOTE ADULT - ASSESSMENT
Pt with hx COPD admitted for Pneumonia.  Hemoptysis likely due to hi INR  Has pulmonary nodules which need followup since pt is former smoker  Will check oximetry

## 2019-11-27 ENCOUNTER — TRANSCRIPTION ENCOUNTER (OUTPATIENT)
Age: 69
End: 2019-11-27

## 2019-11-27 VITALS
DIASTOLIC BLOOD PRESSURE: 68 MMHG | TEMPERATURE: 98 F | SYSTOLIC BLOOD PRESSURE: 109 MMHG | RESPIRATION RATE: 17 BRPM | HEART RATE: 71 BPM | OXYGEN SATURATION: 92 %

## 2019-11-27 LAB
HCT VFR BLD CALC: 29.9 % — LOW (ref 34.5–45)
HGB BLD-MCNC: 9.5 G/DL — LOW (ref 11.5–15.5)
INR BLD: 3.45 RATIO — HIGH (ref 0.88–1.16)
MCHC RBC-ENTMCNC: 28.8 PG — SIGNIFICANT CHANGE UP (ref 27–34)
MCHC RBC-ENTMCNC: 31.8 GM/DL — LOW (ref 32–36)
MCV RBC AUTO: 90.6 FL — SIGNIFICANT CHANGE UP (ref 80–100)
NRBC # BLD: 0 /100 WBCS — SIGNIFICANT CHANGE UP (ref 0–0)
PLATELET # BLD AUTO: 243 K/UL — SIGNIFICANT CHANGE UP (ref 150–400)
PROTHROM AB SERPL-ACNC: 40.9 SEC — HIGH (ref 10–12.9)
RBC # BLD: 3.3 M/UL — LOW (ref 3.8–5.2)
RBC # FLD: 14.8 % — HIGH (ref 10.3–14.5)
WBC # BLD: 6.84 K/UL — SIGNIFICANT CHANGE UP (ref 3.8–10.5)
WBC # FLD AUTO: 6.84 K/UL — SIGNIFICANT CHANGE UP (ref 3.8–10.5)

## 2019-11-27 PROCEDURE — 87040 BLOOD CULTURE FOR BACTERIA: CPT

## 2019-11-27 PROCEDURE — 85730 THROMBOPLASTIN TIME PARTIAL: CPT

## 2019-11-27 PROCEDURE — 82607 VITAMIN B-12: CPT

## 2019-11-27 PROCEDURE — 84484 ASSAY OF TROPONIN QUANT: CPT

## 2019-11-27 PROCEDURE — 82746 ASSAY OF FOLIC ACID SERUM: CPT

## 2019-11-27 PROCEDURE — 83735 ASSAY OF MAGNESIUM: CPT

## 2019-11-27 PROCEDURE — 81001 URINALYSIS AUTO W/SCOPE: CPT

## 2019-11-27 PROCEDURE — 99239 HOSP IP/OBS DSCHRG MGMT >30: CPT

## 2019-11-27 PROCEDURE — 80048 BASIC METABOLIC PNL TOTAL CA: CPT

## 2019-11-27 PROCEDURE — 71250 CT THORAX DX C-: CPT

## 2019-11-27 PROCEDURE — 71045 X-RAY EXAM CHEST 1 VIEW: CPT | Mod: 26

## 2019-11-27 PROCEDURE — 87899 AGENT NOS ASSAY W/OPTIC: CPT

## 2019-11-27 PROCEDURE — 82553 CREATINE MB FRACTION: CPT

## 2019-11-27 PROCEDURE — 93005 ELECTROCARDIOGRAM TRACING: CPT

## 2019-11-27 PROCEDURE — 36415 COLL VENOUS BLD VENIPUNCTURE: CPT

## 2019-11-27 PROCEDURE — 83605 ASSAY OF LACTIC ACID: CPT

## 2019-11-27 PROCEDURE — 87449 NOS EACH ORGANISM AG IA: CPT

## 2019-11-27 PROCEDURE — 99285 EMERGENCY DEPT VISIT HI MDM: CPT | Mod: 25

## 2019-11-27 PROCEDURE — 96374 THER/PROPH/DIAG INJ IV PUSH: CPT

## 2019-11-27 PROCEDURE — 71045 X-RAY EXAM CHEST 1 VIEW: CPT

## 2019-11-27 PROCEDURE — 96375 TX/PRO/DX INJ NEW DRUG ADDON: CPT

## 2019-11-27 PROCEDURE — 85027 COMPLETE CBC AUTOMATED: CPT

## 2019-11-27 PROCEDURE — 94640 AIRWAY INHALATION TREATMENT: CPT

## 2019-11-27 PROCEDURE — 85610 PROTHROMBIN TIME: CPT

## 2019-11-27 PROCEDURE — 71046 X-RAY EXAM CHEST 2 VIEWS: CPT

## 2019-11-27 PROCEDURE — 83880 ASSAY OF NATRIURETIC PEPTIDE: CPT

## 2019-11-27 PROCEDURE — 86803 HEPATITIS C AB TEST: CPT

## 2019-11-27 PROCEDURE — 87186 SC STD MICRODIL/AGAR DIL: CPT

## 2019-11-27 PROCEDURE — 87086 URINE CULTURE/COLONY COUNT: CPT

## 2019-11-27 PROCEDURE — 83540 ASSAY OF IRON: CPT

## 2019-11-27 PROCEDURE — 80053 COMPREHEN METABOLIC PANEL: CPT

## 2019-11-27 RX ORDER — WARFARIN SODIUM 2.5 MG/1
8 TABLET ORAL
Qty: 0 | Refills: 0 | DISCHARGE

## 2019-11-27 RX ADMIN — BUDESONIDE AND FORMOTEROL FUMARATE DIHYDRATE 2 PUFF(S): 160; 4.5 AEROSOL RESPIRATORY (INHALATION) at 08:15

## 2019-11-27 RX ADMIN — Medication 25 MICROGRAM(S): at 05:27

## 2019-11-27 RX ADMIN — TIOTROPIUM BROMIDE 1 CAPSULE(S): 18 CAPSULE ORAL; RESPIRATORY (INHALATION) at 08:16

## 2019-11-27 RX ADMIN — Medication 240 MILLIGRAM(S): at 05:27

## 2019-11-27 RX ADMIN — LOSARTAN POTASSIUM 100 MILLIGRAM(S): 100 TABLET, FILM COATED ORAL at 05:27

## 2019-11-27 RX ADMIN — CEFTRIAXONE 100 MILLIGRAM(S): 500 INJECTION, POWDER, FOR SOLUTION INTRAMUSCULAR; INTRAVENOUS at 05:28

## 2019-11-27 RX ADMIN — AZITHROMYCIN 500 MILLIGRAM(S): 500 TABLET, FILM COATED ORAL at 05:27

## 2019-11-27 RX ADMIN — Medication 300 MILLIGRAM(S): at 05:27

## 2019-11-27 NOTE — DISCHARGE NOTE NURSING/CASE MANAGEMENT/SOCIAL WORK - PATIENT PORTAL LINK FT
You can access the FollowMyHealth Patient Portal offered by [facility] by registering at the following website: [email or web address]. By joining [employer]’s FollowMyHealth portal, you will also be able to view your health information using other applications (apps) compatible with our system.

## 2019-11-27 NOTE — PROGRESS NOTE ADULT - PROBLEM SELECTOR PLAN 1
Likely CAP, resolving cough/dyspnea, no more fever or leukocytosis  -CT chest consistent with LLL pna  -Follow-up CT is recommended in one month after therapy to ensure   resolution of the imaging findings end exclude underlying malignancy  -continue ceftriaxone and Azithromycin, legionella negative, will discharge home on ceftin and azitho for x5 more days   - blood/urine cx ngtd  - baseline hx of JUAN CARLOS previously on cpap and copd, will likely need more time to return to baseline pulm fx, encourage ambulation, d/c planning this morning  - pt is functional/ambulates independently, no assistive devices

## 2019-11-27 NOTE — PROGRESS NOTE ADULT - ASSESSMENT
Pt with hx COPD admitted for Pneumonia.  Hemoptysis likely due to hi INR--no recurrence.  Has pulmonary nodules which need followup since pt is former smoker  Will check oximetry as outpt.  Advised brandon meyer in office.

## 2019-11-27 NOTE — PROGRESS NOTE ADULT - ATTENDING COMMENTS
See me in office next week.
66yo female with PMHx of paroxysmal a-fib (on coumadin), HTN, hypothyroid, COPD presenting with cough, hemoptysis, OTTO, admitted for Pneumonia (CAP)     Pneumonia of left lower lobe due to infectious organism.  Plan: Likely CAP, improving cough/dyspnea, fever and leukocytosis resolved  -CT chest consistent with LLL pna  -Follow-up CT is recommended in one month after therapy to ensure   resolution of the imaging findings end exclude underlying malignancy  pt on ambulating without nasal canula support becomes hypoxic and 02 sat goes to 85s,   pt does not want home 02 , will do trial without 02 supplement today, if tolerating will d/c
68yo female with PMHx of paroxysmal a-fib (on coumadin), HTN, hypothyroid, COPD presenting with cough, hemoptysis, OTTO, admitted for Pneumonia (CAP)     Pneumonia of left lower lobe due to infectious organism.  Plan: Likely CAP, improving cough/dyspnea/leukocytosis, fever resolved  -CT chest consistent with LLL pna  -Follow-up CT is recommended in one month after therapy to ensure   resolution of the imaging findings end exclude underlying malignancy  -continue ceftriaxone and azithro, can d/c azithro if legionella neg   d/c planning in am, once all cultures are resulted
Discharge home today.

## 2019-11-27 NOTE — PROGRESS NOTE ADULT - SUBJECTIVE AND OBJECTIVE BOX
PULMONARY/CRITICAL CARE      INTERVAL HPI/OVERNIGHT EVENTS:  Feels much better, wants to go home. Less sob. Some cough. Ambulated. Some desat at nite but refusing O2 home.   69y FemaleHPI:  68yo female with PMHx of paroxysmal a-fib (on coumadin), HTN, hypothyroid, COPD presenting with feeling fatigued since Thursday. States she spent Friday in bed and last night developed mid back left sided pain that felt like she pulled a muscle. Pt states rated the pain as an 8/10 at that time and states it is now a tolerable 4/10. Pt also reports productive cough since yesterday with blood tinged (red) sputum. Reports she has had pneumonia in the past and it felt similar. Of note pt states on Thursday she had a sore throat and blister on her tongue for which she went to Urgent Care, but it has resolved since. Pt admits to dyspnea on excretion and chills. States she took Tylenol and Robitussin which helped with her symptoms Denies fever, headache, n/v, rash, dysuria.      ED Vitals: T: 98.7 HR: 83, BP: 158/65, RR: 18, O2: 92% then 95%   Labs significant for: WBC: 16.45, H/H: 11.2/34, Na: 133, BNP: 417   EKG: (prelim) NSR  CT chest: Left lower lobe consolidation suspicious for pneumonia.  Patchy subsolid   nodular foci in both lungs may reflect additional foci of infection.    Pulmonary hemorrhage may have a similar appearance.  Follow-up CT is recommended in one month after therapy to ensure   resolution of the imaging findings end exclude underlying malignancy.  Small left pleural effusion.  Small pericardial effusion    In the ED patient was given: Azithro and Ceftriaxone X1, NS bolus X1, DUONEB X1 (24 Nov 2019 08:19)        PAST MEDICAL & SURGICAL HISTORY:  Psoriasis  Afib  Edema extremities  Hyperlipidemia  HTN (hypertension)  Hypothyroid  COPD (chronic obstructive pulmonary disease)  Hypothyroid  HTN (hypertension)  COPD (chronic obstructive pulmonary disease)  S/P eye surgery: age 5 unsure if right or left eye        ICU Vital Signs Last 24 Hrs  T(C): 36.8 (27 Nov 2019 05:30), Max: 37.1 (26 Nov 2019 13:08)  T(F): 98.2 (27 Nov 2019 05:30), Max: 98.7 (26 Nov 2019 13:08)  HR: 71 (27 Nov 2019 05:30) (59 - 71)  BP: 109/68 (27 Nov 2019 05:30) (109/68 - 113/57)  BP(mean): 76 (26 Nov 2019 20:50) (76 - 76)  ABP: --  ABP(mean): --  RR: 17 (27 Nov 2019 05:30) (16 - 17)  SpO2: 92% (27 Nov 2019 05:30) (88% - 96%)    Qtts:     I&O's Summary    PHYSICAL EXAM:    GENERAL: NAD, well-groomed, well-developed, NAD  HEAD:  Atraumatic, Normocephalic  EYES: EOMI, PERRLA, conjunctiva and sclera clear  ENMT: No tonsillar erythema, exudates, or enlargement; Moist mucous membranes, Good dentition, No lesions  NECK: Supple, No JVD, Normal thyroid  NERVOUS SYSTEM:  Alert & Oriented X3, Good concentration; Motor Strength 5/5 B/L upper and lower extremities  CHEST/LUNG: few bilat, rhonchi, no rales,  wheezing, or rubs  HEART: Regular rate and rhythm; No murmurs, rubs, or gallops  ABDOMEN: Soft, Nontender, Nondistended; Bowel sounds present  EXTREMITIES:  2+ Peripheral Pulses, No clubbing, cyanosis, or edema varicose veins  LYMPH: No lymphadenopathy noted  SKIN: No rashes or lesions            LABS:                        9.5    6.84  )-----------( 243      ( 27 Nov 2019 04:59 )             29.9     11-26    139  |  107  |  21  ----------------------------<  121<H>  3.7   |  25  |  1.10    Ca    9.4      26 Nov 2019 08:40      PT/INR - ( 27 Nov 2019 06:36 )   PT: 40.9 sec;   INR: 3.45 ratio               vanco through     RADIOLOGY & ADDITIONAL STUDIES:      < from: Xray Chest 1 View- PORTABLE-Routine (11.27.19 @ 08:16) >  EXAM:  XR CHEST PORTABLE ROUTINE 1V                            PROCEDURE DATE:  11/27/2019          INTERPRETATION:  Clinical indication:  pn fever and cough.    Technique: XR CHEST portable AP    Comparison:11/26/2019.    Findings:  Lines: None    Heart/Mediastinum/Lungs: Left basilar airspace disease and atelectasis in   the right lung base, unchanged. Stable appearance of the beverley.    Impression:    As above.      < end of copied text >  CRITICAL CARE TIME SPENT:

## 2019-11-29 LAB
CULTURE RESULTS: SIGNIFICANT CHANGE UP
CULTURE RESULTS: SIGNIFICANT CHANGE UP
SPECIMEN SOURCE: SIGNIFICANT CHANGE UP
SPECIMEN SOURCE: SIGNIFICANT CHANGE UP

## 2020-01-13 NOTE — ED PROVIDER NOTE - INTERPRETATION
----- Message from Leisa Alonso sent at 1/13/2020  2:54 PM CST -----  Contact: self  Pt states she called this morning stating she is in a lot of pain. Ask for pain meds, something she had before, and she want to be send to [facility] on [address]. Have not heard back from anyone with this matter. This is pt second time calling.    Contact Info    normal sinus rhythm

## 2020-03-19 ENCOUNTER — EMERGENCY (EMERGENCY)
Facility: HOSPITAL | Age: 70
LOS: 1 days | Discharge: HOME | End: 2020-03-19
Attending: EMERGENCY MEDICINE | Admitting: EMERGENCY MEDICINE
Payer: MEDICARE

## 2020-03-19 VITALS
OXYGEN SATURATION: 97 % | SYSTOLIC BLOOD PRESSURE: 189 MMHG | HEIGHT: 61 IN | RESPIRATION RATE: 16 BRPM | HEART RATE: 79 BPM | TEMPERATURE: 98 F | DIASTOLIC BLOOD PRESSURE: 83 MMHG | WEIGHT: 205.91 LBS

## 2020-03-19 VITALS
DIASTOLIC BLOOD PRESSURE: 69 MMHG | HEART RATE: 86 BPM | OXYGEN SATURATION: 98 % | RESPIRATION RATE: 16 BRPM | SYSTOLIC BLOOD PRESSURE: 154 MMHG

## 2020-03-19 DIAGNOSIS — Z98.89 OTHER SPECIFIED POSTPROCEDURAL STATES: Chronic | ICD-10-CM

## 2020-03-19 PROBLEM — L40.9 PSORIASIS, UNSPECIFIED: Chronic | Status: ACTIVE | Noted: 2019-11-24

## 2020-03-19 LAB
ALBUMIN SERPL ELPH-MCNC: 3.8 G/DL — SIGNIFICANT CHANGE UP (ref 3.3–5)
ALP SERPL-CCNC: 85 U/L — SIGNIFICANT CHANGE UP (ref 40–120)
ALT FLD-CCNC: 14 U/L — SIGNIFICANT CHANGE UP (ref 12–78)
ANION GAP SERPL CALC-SCNC: 8 MMOL/L — SIGNIFICANT CHANGE UP (ref 5–17)
APPEARANCE UR: CLEAR — SIGNIFICANT CHANGE UP
APTT BLD: 44.5 SEC — HIGH (ref 28.5–37)
AST SERPL-CCNC: 19 U/L — SIGNIFICANT CHANGE UP (ref 15–37)
BASOPHILS # BLD AUTO: 0.03 K/UL — SIGNIFICANT CHANGE UP (ref 0–0.2)
BASOPHILS NFR BLD AUTO: 0.6 % — SIGNIFICANT CHANGE UP (ref 0–2)
BILIRUB SERPL-MCNC: 0.3 MG/DL — SIGNIFICANT CHANGE UP (ref 0.2–1.2)
BILIRUB UR-MCNC: NEGATIVE — SIGNIFICANT CHANGE UP
BUN SERPL-MCNC: 21 MG/DL — SIGNIFICANT CHANGE UP (ref 7–23)
CALCIUM SERPL-MCNC: 9.7 MG/DL — SIGNIFICANT CHANGE UP (ref 8.5–10.1)
CHLORIDE SERPL-SCNC: 106 MMOL/L — SIGNIFICANT CHANGE UP (ref 96–108)
CO2 SERPL-SCNC: 28 MMOL/L — SIGNIFICANT CHANGE UP (ref 22–31)
COLOR SPEC: YELLOW — SIGNIFICANT CHANGE UP
CREAT SERPL-MCNC: 0.95 MG/DL — SIGNIFICANT CHANGE UP (ref 0.5–1.3)
D DIMER BLD IA.RAPID-MCNC: 338 NG/ML DDU — HIGH
DIFF PNL FLD: ABNORMAL
EOSINOPHIL # BLD AUTO: 0.12 K/UL — SIGNIFICANT CHANGE UP (ref 0–0.5)
EOSINOPHIL NFR BLD AUTO: 2.3 % — SIGNIFICANT CHANGE UP (ref 0–6)
EPI CELLS # UR: SIGNIFICANT CHANGE UP
GLUCOSE SERPL-MCNC: 85 MG/DL — SIGNIFICANT CHANGE UP (ref 70–99)
GLUCOSE UR QL: NEGATIVE — SIGNIFICANT CHANGE UP
HCT VFR BLD CALC: 37.7 % — SIGNIFICANT CHANGE UP (ref 34.5–45)
HGB BLD-MCNC: 11.9 G/DL — SIGNIFICANT CHANGE UP (ref 11.5–15.5)
IMM GRANULOCYTES NFR BLD AUTO: 0.4 % — SIGNIFICANT CHANGE UP (ref 0–1.5)
INR BLD: 2.65 RATIO — HIGH (ref 0.88–1.16)
KETONES UR-MCNC: NEGATIVE — SIGNIFICANT CHANGE UP
LACTATE SERPL-SCNC: 0.8 MMOL/L — SIGNIFICANT CHANGE UP (ref 0.7–2)
LEUKOCYTE ESTERASE UR-ACNC: NEGATIVE — SIGNIFICANT CHANGE UP
LYMPHOCYTES # BLD AUTO: 0.98 K/UL — LOW (ref 1–3.3)
LYMPHOCYTES # BLD AUTO: 18.6 % — SIGNIFICANT CHANGE UP (ref 13–44)
MCHC RBC-ENTMCNC: 28.5 PG — SIGNIFICANT CHANGE UP (ref 27–34)
MCHC RBC-ENTMCNC: 31.6 GM/DL — LOW (ref 32–36)
MCV RBC AUTO: 90.4 FL — SIGNIFICANT CHANGE UP (ref 80–100)
MONOCYTES # BLD AUTO: 0.29 K/UL — SIGNIFICANT CHANGE UP (ref 0–0.9)
MONOCYTES NFR BLD AUTO: 5.5 % — SIGNIFICANT CHANGE UP (ref 2–14)
NEUTROPHILS # BLD AUTO: 3.83 K/UL — SIGNIFICANT CHANGE UP (ref 1.8–7.4)
NEUTROPHILS NFR BLD AUTO: 72.6 % — SIGNIFICANT CHANGE UP (ref 43–77)
NITRITE UR-MCNC: NEGATIVE — SIGNIFICANT CHANGE UP
NRBC # BLD: 0 /100 WBCS — SIGNIFICANT CHANGE UP (ref 0–0)
NT-PROBNP SERPL-SCNC: 780 PG/ML — HIGH (ref 0–125)
PH UR: 6.5 — SIGNIFICANT CHANGE UP (ref 5–8)
PLATELET # BLD AUTO: 203 K/UL — SIGNIFICANT CHANGE UP (ref 150–400)
POTASSIUM SERPL-MCNC: 3.9 MMOL/L — SIGNIFICANT CHANGE UP (ref 3.5–5.3)
POTASSIUM SERPL-SCNC: 3.9 MMOL/L — SIGNIFICANT CHANGE UP (ref 3.5–5.3)
PROT SERPL-MCNC: 7.7 G/DL — SIGNIFICANT CHANGE UP (ref 6–8.3)
PROT UR-MCNC: NEGATIVE — SIGNIFICANT CHANGE UP
PROTHROM AB SERPL-ACNC: 30.6 SEC — HIGH (ref 10–12.9)
RBC # BLD: 4.17 M/UL — SIGNIFICANT CHANGE UP (ref 3.8–5.2)
RBC # FLD: 14.8 % — HIGH (ref 10.3–14.5)
RBC CASTS # UR COMP ASSIST: SIGNIFICANT CHANGE UP /HPF (ref 0–4)
SODIUM SERPL-SCNC: 142 MMOL/L — SIGNIFICANT CHANGE UP (ref 135–145)
SP GR SPEC: 1 — LOW (ref 1.01–1.02)
UROBILINOGEN FLD QL: NEGATIVE — SIGNIFICANT CHANGE UP
WBC # BLD: 5.27 K/UL — SIGNIFICANT CHANGE UP (ref 3.8–10.5)
WBC # FLD AUTO: 5.27 K/UL — SIGNIFICANT CHANGE UP (ref 3.8–10.5)
WBC UR QL: SIGNIFICANT CHANGE UP

## 2020-03-19 PROCEDURE — 85730 THROMBOPLASTIN TIME PARTIAL: CPT

## 2020-03-19 PROCEDURE — 36415 COLL VENOUS BLD VENIPUNCTURE: CPT

## 2020-03-19 PROCEDURE — 93010 ELECTROCARDIOGRAM REPORT: CPT

## 2020-03-19 PROCEDURE — 74176 CT ABD & PELVIS W/O CONTRAST: CPT | Mod: 26

## 2020-03-19 PROCEDURE — 71250 CT THORAX DX C-: CPT

## 2020-03-19 PROCEDURE — 83880 ASSAY OF NATRIURETIC PEPTIDE: CPT

## 2020-03-19 PROCEDURE — 78582 LUNG VENTILAT&PERFUS IMAGING: CPT | Mod: 26

## 2020-03-19 PROCEDURE — 80053 COMPREHEN METABOLIC PANEL: CPT

## 2020-03-19 PROCEDURE — A9567: CPT

## 2020-03-19 PROCEDURE — 99284 EMERGENCY DEPT VISIT MOD MDM: CPT | Mod: 25

## 2020-03-19 PROCEDURE — 99284 EMERGENCY DEPT VISIT MOD MDM: CPT

## 2020-03-19 PROCEDURE — 71250 CT THORAX DX C-: CPT | Mod: 26

## 2020-03-19 PROCEDURE — 81001 URINALYSIS AUTO W/SCOPE: CPT

## 2020-03-19 PROCEDURE — 78582 LUNG VENTILAT&PERFUS IMAGING: CPT

## 2020-03-19 PROCEDURE — A9540: CPT

## 2020-03-19 PROCEDURE — 85610 PROTHROMBIN TIME: CPT

## 2020-03-19 PROCEDURE — 93005 ELECTROCARDIOGRAM TRACING: CPT

## 2020-03-19 PROCEDURE — 71046 X-RAY EXAM CHEST 2 VIEWS: CPT

## 2020-03-19 PROCEDURE — 85379 FIBRIN DEGRADATION QUANT: CPT

## 2020-03-19 PROCEDURE — 83605 ASSAY OF LACTIC ACID: CPT

## 2020-03-19 PROCEDURE — 87040 BLOOD CULTURE FOR BACTERIA: CPT

## 2020-03-19 PROCEDURE — 71046 X-RAY EXAM CHEST 2 VIEWS: CPT | Mod: 26

## 2020-03-19 PROCEDURE — 85027 COMPLETE CBC AUTOMATED: CPT

## 2020-03-19 PROCEDURE — 74176 CT ABD & PELVIS W/O CONTRAST: CPT

## 2020-03-19 RX ORDER — CEFUROXIME AXETIL 250 MG
500 TABLET ORAL EVERY 12 HOURS
Refills: 0 | Status: DISCONTINUED | OUTPATIENT
Start: 2020-03-19 | End: 2020-03-23

## 2020-03-19 RX ORDER — ACETAMINOPHEN 500 MG
650 TABLET ORAL ONCE
Refills: 0 | Status: COMPLETED | OUTPATIENT
Start: 2020-03-19 | End: 2020-03-19

## 2020-03-19 RX ORDER — AZITHROMYCIN 500 MG/1
500 TABLET, FILM COATED ORAL ONCE
Refills: 0 | Status: COMPLETED | OUTPATIENT
Start: 2020-03-19 | End: 2020-03-19

## 2020-03-19 RX ORDER — CEFUROXIME AXETIL 250 MG
1 TABLET ORAL
Qty: 20 | Refills: 0
Start: 2020-03-19 | End: 2020-03-28

## 2020-03-19 RX ORDER — AZITHROMYCIN 500 MG/1
1 TABLET, FILM COATED ORAL
Qty: 4 | Refills: 0
Start: 2020-03-19 | End: 2020-03-22

## 2020-03-19 RX ORDER — AZITHROMYCIN 500 MG/1
500 TABLET, FILM COATED ORAL ONCE
Refills: 0 | Status: DISCONTINUED | OUTPATIENT
Start: 2020-03-19 | End: 2020-03-19

## 2020-03-19 RX ORDER — CEFTRIAXONE 500 MG/1
1000 INJECTION, POWDER, FOR SOLUTION INTRAMUSCULAR; INTRAVENOUS ONCE
Refills: 0 | Status: DISCONTINUED | OUTPATIENT
Start: 2020-03-19 | End: 2020-03-19

## 2020-03-19 RX ADMIN — Medication 500 MILLIGRAM(S): at 19:40

## 2020-03-19 RX ADMIN — AZITHROMYCIN 500 MILLIGRAM(S): 500 TABLET, FILM COATED ORAL at 19:40

## 2020-03-19 NOTE — ED PROVIDER NOTE - PROGRESS NOTE DETAILS
pt improved. advised tylenol for pain and ortho follow up. All imaging and labs reviewed. all results reviewed with pt including abnormal results. pt given a copy of results. pt advised to follow up with pmd regarding abnormal results. All questions answered and concerns addressed. pt verbalized understanding and agreement with plan and dx. pt advised on next step and when/where to follow up. pt advised on all take home and otc medications. pt advised to follow up with PMD. pt advised to return to ed for worsenng symptoms including fever, cp, sob. will dc. pt now c/o flank pain prior to dc. will do ct [place] and ua and re-eval ct reviewed. consulted pulm dr reid who advised rx azitho and ceftin and follow up in office. pt without sob or fever. advised follow up with pulm. pt feels comfortable going home. All imaging and labs reviewed. all results reviewed with pt including abnormal results. pt given a copy of results. pt advised to follow up with pmd regarding abnormal results. All questions answered and concerns addressed. pt verbalized understanding and agreement with plan and dx. pt advised on next step and when/where to follow up. pt advised on all take home and otc medications. pt advised to follow up with PMD. pt advised to return to ed for worsenng symptoms including fever, cp, sob. will dc.

## 2020-03-19 NOTE — ED PROVIDER NOTE - CARE PROVIDER_API CALL
Bandar Roland)  Orthopaedic Surgery  [address] [place] [address]  Phone: [phone]  Fax: [phone]  Follow Up Time: Bandar Roland)  Orthopaedic Surgery  [address] [facility]  [place] [address]  Phone: [phone]  Fax: [phone]  Follow Up Time:     Chico Phillips)  Cardiovascular Disease; Internal Medicine  [address] [place] [address], [address]  [place] [address]  Phone: [phone]  Fax: [phone]  Follow Up Time:

## 2020-03-19 NOTE — ED ADULT NURSE NOTE - OBJECTIVE STATEMENT
Pt received sitting on stretcher in NAD. Pt AOx3 C/o pain when she takes a deep breath to her back. Pt states I think I have a blood clot in my lung pt on blood thinners. Pt states it has been low for few days now. Pt . Neuro WNL. PERRLA. Lungs CTA, RR even unlabored. Ab soft non tender, + bowel sounds x 4quads. Denies Nausea, Vomiting, Diarrhea. Skin warm, dry, color appropriate for age and race.

## 2020-03-19 NOTE — ED PROVIDER NOTE - PATIENT PORTAL LINK FT
You can access the FollowMyHealth Patient Portal offered by [facility] by registering at the following website: [email or web address]. By joining [employer]’s FollowMyHealth portal, you will also be able to view your health information using other applications (apps) compatible with our system.

## 2020-03-19 NOTE — ED PROVIDER NOTE - CARE PLAN
Principal Discharge DX:	Back pain Principal Discharge DX:	PNA (pneumonia)  Secondary Diagnosis:	Back pain

## 2020-03-19 NOTE — ED ADULT NURSE NOTE - ED COMFORT CARE
Scribe Attestation (For Scribes USE Only)... Patient informed Scribe Attestation (For Scribes USE Only).../Attending Attestation (For Attendings USE Only)...

## 2020-03-19 NOTE — ED PROVIDER NOTE - CLINICAL SUMMARY MEDICAL DECISION MAKING FREE TEXT BOX
pt with upper back pain pleuritic. will do labs, d-dimer to r/o pe, vq scan as pt has contrast allergy, tylenol and re-eval

## 2020-03-19 NOTE — ED ADULT NURSE NOTE - PMH
Afib    COPD (chronic obstructive pulmonary disease)    COPD (chronic obstructive pulmonary disease)    Edema extremities    HTN (hypertension)    HTN (hypertension)    Hyperlipidemia    Hypothyroid    Hypothyroid    Psoriasis

## 2020-03-19 NOTE — ED PROVIDER NOTE - NSFOLLOWUPINSTRUCTIONS_ED_ALL_ED_FT
Back Pain    Back pain is very common in adults. The cause of back pain is rarely dangerous and the pain often gets better over time. The cause of your back pain may not be known and may include strain of muscles or ligaments, degeneration of the spinal disks, or arthritis. Occasionally the pain may radiate down your leg(s). Over-the-counter medicines to reduce pain and inflammation are often the most helpful. Stretching and remaining active frequently helps the healing process.     SEEK IMMEDIATE MEDICAL CARE IF YOU HAVE ANY OF THE FOLLOWING SYMPTOMS: bowel or bladder control problems, unusual weakness or numbness in your arms or legs, nausea or vomiting, abdominal pain, fever, dizziness/lightheadedness.     1. FOLLOW UP WITH YOUR PRIMARY DOCTOR IN 24-48 HOURS.   2. FOLLOW UP WITH ALL SPECIALIST DISCUSSED DURING YOUR VISIT.   3. TAKE ALL MEDICATIONS PRESCRIBED IN THE ER IF ANY ARE PRESCRIBED. CONTINUE YOUR HOME MEDICATIONS UNLESS OTHERWISE ADVISED DIFFERENTLY.   4. RETURN FOR WORSENING SYMPTOMS OR CONCERNS INCLUDING BUT NOT LIMITED TO FEVER, CHEST PAIN, OR TROUBLE BREATHING OR ANY OTHER CONCERNS  take tylenol as needed for back pain Back Pain    Back pain is very common in adults. The cause of back pain is rarely dangerous and the pain often gets better over time. The cause of your back pain may not be known and may include strain of muscles or ligaments, degeneration of the spinal disks, or arthritis. Occasionally the pain may radiate down your leg(s). Over-the-counter medicines to reduce pain and inflammation are often the most helpful. Stretching and remaining active frequently helps the healing process.     SEEK IMMEDIATE MEDICAL CARE IF YOU HAVE ANY OF THE FOLLOWING SYMPTOMS: bowel or bladder control problems, unusual weakness or numbness in your arms or legs, nausea or vomiting, abdominal pain, fever, dizziness/lightheadedness.       Pneumonia    Pneumonia is an infection of the lungs. Pneumonia may be caused by bacteria, viruses, or funguses. Symptoms include coughing, fever, chest pain when breathing deeply or coughing, shortness of breath, fatigue, or muscle aches. Pneumonia can be diagnosed with a medical history and physical exam, as well as other tests which may include a chest X-ray. If you were prescribed an antibiotic medicine, take it as told by your health care provider and do not stop taking the antibiotic even if you start to feel better. Do not use tobacco products, including cigarettes, chewing tobacco, and e-cigarettes.    SEEK IMMEDIATE MEDICAL CARE IF YOU HAVE ANY OF THE FOLLOWING SYMPTOMS: worsening shortness of breath, worsening chest pain, coughing up blood, change in mental status, lightheadedness/dizziness.     1. FOLLOW UP WITH YOUR PRIMARY DOCTOR IN 24-48 HOURS.   2. FOLLOW UP WITH ALL SPECIALIST DISCUSSED DURING YOUR VISIT.   3. TAKE ALL MEDICATIONS PRESCRIBED IN THE ER IF ANY ARE PRESCRIBED. CONTINUE YOUR HOME MEDICATIONS UNLESS OTHERWISE ADVISED DIFFERENTLY.   4. RETURN FOR WORSENING SYMPTOMS OR CONCERNS INCLUDING BUT NOT LIMITED TO FEVER, CHEST PAIN, OR TROUBLE BREATHING OR ANY OTHER CONCERNS  take tylenol as needed for back pain

## 2020-03-19 NOTE — ED ADULT NURSE REASSESSMENT NOTE - NS ED NURSE REASSESS COMMENT FT1
Pt c/o of right flank pain Pa discontinue Pt educated on discharge. Pt verbalized understanding and agree with plan. and ordered more test.

## 2020-03-19 NOTE — ED ADULT NURSE NOTE - AS SC BRADEN ACTIVITY
"Initial /67 (BP Location: Left arm, Patient Position: Chair, Cuff Size: Adult Regular)   Pulse 78   Temp 96.6  F (35.9  C) (Tympanic)   Ht 1.734 m (5' 8.25\")   Wt 73.2 kg (161 lb 6.4 oz)   LMP 10/30/2018   Breastfeeding? No   BMI 24.36 kg/m   Estimated body mass index is 24.36 kg/m  as calculated from the following:    Height as of this encounter: 1.734 m (5' 8.25\").    Weight as of this encounter: 73.2 kg (161 lb 6.4 oz). .    Estefany Armenta CMA    " (4) walks frequently

## 2020-03-19 NOTE — ED PROVIDER NOTE - OBJECTIVE STATEMENT
pt is a 70yo female with pmhx of a-fib on coumadin, copd former smoker, hypothyroid, htn presents with back pain since yesterday. pt reports right sided upper back pain worse when breathing in and out while cleaning her home. pt reports she had her inr evaluated recently which was low 1.7 so coumadin dose was increased to 12mg daily by dr muse. pt did not take anything for pain. pt denies fever, cp, sob, cough, recent travel, covid exposure,le swelling or pain, hx of dvt/pe.

## 2020-03-19 NOTE — ED PROVIDER NOTE - ATTENDING CONTRIBUTION TO CARE
68 yo F p/w R upper back pain x past several days. Pt states worse with deep insp. Pt with hx afib, on coumadin. Pt has been recently subtherapeutic with her INR. No known trauma. no other chest pain . no acute dyspnea. no weakness / dizziness. no numb/ting/focal weak. no recent travel / sick contacts. no agg/allev factors. No other inj or co.  exam: MM Moist. neck supple. no meningeal signs. abd soft NT, no hsm. Mild tend to lat R post/lat ribs, no crepitus. No other acute findings. Nl resp effort. no w/r/r  check labs, xr, VQ (known contrast allergy), reeval

## 2020-03-19 NOTE — ED PROVIDER NOTE - PHYSICAL EXAMINATION
SPINE: c-spine: supple, no spinal tenderness. no midline tenderness. no paraspinal tenderness. no body step off. no deformity. no muscle spasm. FROM neg nexus   Thoracic spine: no spinal tenderness. no midline tenderness. no paraspinal tenderness. no body step off. no deformity. no muscle spasm.FROM   LS-spine:no spinal tenderness. no midline tenderness. no paraspinal tenderness. no body step off. no deformity.no muscle spasm. FROM neg nexus from x 4. SENSATION GROSSLY INTACT X4. gait intact

## 2020-03-19 NOTE — ED PROVIDER NOTE - PROVIDER TOKENS
PROVIDER:[TOKEN:[[id number]]] PROVIDER:[TOKEN:[[id number]]],PROVIDER:[TOKEN:[[id number]]]

## 2020-03-21 ENCOUNTER — EMERGENCY (EMERGENCY)
Facility: HOSPITAL | Age: 70
LOS: 1 days | Discharge: HOME | End: 2020-03-21
Attending: EMERGENCY MEDICINE | Admitting: EMERGENCY MEDICINE
Payer: MEDICARE

## 2020-03-21 VITALS
RESPIRATION RATE: 20 BRPM | WEIGHT: 205.91 LBS | OXYGEN SATURATION: 97 % | HEART RATE: 75 BPM | HEIGHT: 61 IN | DIASTOLIC BLOOD PRESSURE: 72 MMHG | TEMPERATURE: 98 F | SYSTOLIC BLOOD PRESSURE: 197 MMHG

## 2020-03-21 DIAGNOSIS — Z98.89 OTHER SPECIFIED POSTPROCEDURAL STATES: Chronic | ICD-10-CM

## 2020-03-21 PROCEDURE — 99283 EMERGENCY DEPT VISIT LOW MDM: CPT

## 2020-03-21 NOTE — ED ADULT NURSE NOTE - OBJECTIVE STATEMENT
received pt in bed #8b Pt A&O states she was seen here friday dx w/ PN & placed on Azithromycin & cefuroxime Pt states she is allergic to Levaquin. Pt c/o itchy rash on arm & upper chest that started last & night took benadryl Pt denies any SOB/selling tongue.

## 2020-03-21 NOTE — ED PROVIDER NOTE - PATIENT PORTAL LINK FT
You can access the FollowMyHealth Patient Portal offered by [facility] by registering at the following website: [email or web address]. By joining [employer]’s FollowMyHealth portal, you will also be able to view your health information using other applications (apps) compatible with our system.

## 2020-03-21 NOTE — ED ADULT TRIAGE NOTE - CHIEF COMPLAINT QUOTE
Pt reports that she was here on friday and was diagnosed with PNA, and is now having an allergic reaction to the antibiotic Azithromycin, and Cefuroxime. Reports generalized rash all over body, and took Benadryl with no relief. Denies fever, denies chills, reports that she is otherwise feeling well. Pt reports that she did not take her BP medications today, BP elevated in traige

## 2020-03-21 NOTE — ED PROVIDER NOTE - CARE PROVIDER_API CALL
Joe Pelaez)  Critical Care Medicine; Internal Medicine; Pulmonary Disease  [address] [place] [address]  Phone: [phone]  Fax: [phone]  Follow Up Time:

## 2020-03-21 NOTE — ED PROVIDER NOTE - OBJECTIVE STATEMENT
70 yo white female started on Ceftin and Z-Pack last evening for possible PNA, this morning developed mild itchy rash to hands, upper chest and back and thus presents here today for evaluation. No fever or chills. No chest pain or SOB. No wheezing and no nausea, vomiting or diarrhea. Allergic to Levaquin. Feels better after taking Benadryl.

## 2020-03-21 NOTE — ED PROVIDER NOTE - CONSTITUTIONAL, MLM
normal... Well appearing, awake, elderly white female, alert, oriented to person, place, time/situation and in no apparent distress.

## 2020-03-21 NOTE — ED PROVIDER NOTE - CHPI ED SYMPTOMS NEG
no throat itching/no cough/no difficulty swallowing/no difficulty breathing/no shortness of breath/no swelling of face, tongue/no wheezing

## 2020-03-21 NOTE — ED PROVIDER NOTE - NSFOLLOWUPINSTRUCTIONS_ED_ALL_ED_FT
Rest  Take BENADRYL for itching if needed and as directed  Do Not Take Anymore CEFTIN or Z-PACK  May begin to take DOXYCYCLINE 1-tablet every 12-hours for the next 10-days  Follow-up with your doctor this week  Return here if needed

## 2020-03-21 NOTE — ED ADULT NURSE NOTE - CHPI ED NUR SYMPTOMS NEG
no wheezing/no shortness of breath/no swelling of face, tongue/no throat itching/no vomiting/no congestion/no difficulty breathing/no nausea/no difficulty swallowing

## 2020-03-21 NOTE — ED ADULT NURSE NOTE - NSIMPLEMENTINTERV_GEN_ALL_ED
Implemented All Universal Safety Interventions:  [place] to call system. Call bell, personal items and telephone within reach. Instruct patient to call for assistance. Room bathroom lighting operational. Non-slip footwear when patient is off stretcher. Physically safe environment: no spills, clutter or unnecessary equipment. Stretcher in lowest position, wheels locked, appropriate side rails in place.

## 2020-03-31 ENCOUNTER — EMERGENCY (EMERGENCY)
Facility: HOSPITAL | Age: 70
LOS: 1 days | Discharge: HOME | End: 2020-03-31
Attending: EMERGENCY MEDICINE
Payer: MEDICARE

## 2020-03-31 VITALS
HEIGHT: 61 IN | OXYGEN SATURATION: 99 % | TEMPERATURE: 98 F | HEART RATE: 70 BPM | RESPIRATION RATE: 16 BRPM | SYSTOLIC BLOOD PRESSURE: 154 MMHG | DIASTOLIC BLOOD PRESSURE: 82 MMHG | WEIGHT: 210.1 LBS

## 2020-03-31 VITALS
DIASTOLIC BLOOD PRESSURE: 77 MMHG | RESPIRATION RATE: 16 BRPM | SYSTOLIC BLOOD PRESSURE: 144 MMHG | TEMPERATURE: 98 F | HEART RATE: 60 BPM | OXYGEN SATURATION: 97 %

## 2020-03-31 DIAGNOSIS — Z98.89 OTHER SPECIFIED POSTPROCEDURAL STATES: Chronic | ICD-10-CM

## 2020-03-31 PROCEDURE — 99282 EMERGENCY DEPT VISIT SF MDM: CPT

## 2020-03-31 RX ORDER — FUROSEMIDE 40 MG
20 TABLET ORAL ONCE
Refills: 0 | Status: DISCONTINUED | OUTPATIENT
Start: 2020-03-31 | End: 2020-03-31

## 2020-03-31 NOTE — ED PROVIDER NOTE - OBJECTIVE STATEMENT
pt c/o of a mild pain in the R anterior chest, was seen for the same sx 1.5 weeks ago and had a CT chest which showed a R middle lobe consolidation, pt was treated with abx for pna and just finished her abx yesterday, pt returns to ED today stating that the pain in her R chest  is not resolved.  This is the same pain she had been having when she first came to ED 1.5weeks ago and was dx with PNA.  Also had V/Q scan that was neg. denies cough, fever, sob.  no CAD h/o.  pain in R chest is worse with movement.  Given concerns of being in ED with high cases of COVID pt amenable to outpatient work up with her doctor.

## 2020-03-31 NOTE — ED ADULT NURSE NOTE - OBJECTIVE STATEMENT
68 y/o female comes in with complaints of B/L LE swelling and pain in left arm. States she was diagnosed with pneumonia and taking oral antibiotics. Denies chest pain, SOB, fever, nausea or vomiting.

## 2020-03-31 NOTE — ED PROVIDER NOTE - PATIENT PORTAL LINK FT
You can access the FollowMyHealth Patient Portal offered by  by registering at the following website: [email or web address]. By joining [employer]’s FollowMyHealth portal, you will also be able to view your health information using other applications (apps) compatible with our system.

## 2020-03-31 NOTE — ED ADULT NURSE NOTE - CHPI ED NUR SYMPTOMS NEG
no nausea/no tingling/no weakness/no fever/no vomiting/no decreased eating/drinking/no chills/no dizziness

## 2020-03-31 NOTE — ED PROVIDER NOTE - NSFOLLOWUPINSTRUCTIONS_ED_ALL_ED_FT
-- Follow up with your primary care physician in 48 hours.    -- Start your lasix for your lower extremity swelling.    -- Take tylenol for right chest wall pain.    -- Return to the ER for worsening or persistent symptoms, and/or ANY NEW OR CONCERNING SYMPTOMS.    -- If you have difficulty following up, please call: [phone] to obtain a [facility] doctor or specialist who takes your insurance in your area.

## 2020-03-31 NOTE — ED PROVIDER NOTE - CLINICAL SUMMARY MEDICAL DECISION MAKING FREE TEXT BOX
pt with R chest wall pain likely 2/2 to her R middle lobe consolidation which will need time for resolution and amenable to outpatient follow up.  Pt is stable for discharge and outpatient work up given COVID-19 concerns in ED and high risk of infection.

## 2021-01-07 ENCOUNTER — NON-APPOINTMENT (OUTPATIENT)
Age: 71
End: 2021-01-07

## 2021-01-07 ENCOUNTER — APPOINTMENT (OUTPATIENT)
Dept: CARDIOLOGY | Facility: CLINIC | Age: 71
End: 2021-01-07
Payer: MEDICARE

## 2021-01-07 VITALS
WEIGHT: 200 LBS | HEIGHT: 61 IN | OXYGEN SATURATION: 99 % | SYSTOLIC BLOOD PRESSURE: 167 MMHG | DIASTOLIC BLOOD PRESSURE: 80 MMHG | HEART RATE: 51 BPM | BODY MASS INDEX: 37.76 KG/M2

## 2021-01-07 VITALS — DIASTOLIC BLOOD PRESSURE: 76 MMHG | SYSTOLIC BLOOD PRESSURE: 136 MMHG

## 2021-01-07 DIAGNOSIS — Z86.39 PERSONAL HISTORY OF OTHER ENDOCRINE, NUTRITIONAL AND METABOLIC DISEASE: ICD-10-CM

## 2021-01-07 DIAGNOSIS — Z86.69 PERSONAL HISTORY OF OTHER DISEASES OF THE NERVOUS SYSTEM AND SENSE ORGANS: ICD-10-CM

## 2021-01-07 PROCEDURE — 99205 OFFICE O/P NEW HI 60 MIN: CPT

## 2021-01-07 PROCEDURE — 93000 ELECTROCARDIOGRAM COMPLETE: CPT

## 2021-01-07 PROCEDURE — 99072 ADDL SUPL MATRL&STAF TM PHE: CPT

## 2021-01-07 NOTE — REASON FOR VISIT
[Consultation] : a consultation regarding [Coronary Artery Disease] : coronary artery disease [Dizziness] : dizziness [Hyperlipidemia] : hyperlipidemia [Hypertension] : hypertension [Medication Management] : Medication management

## 2021-01-07 NOTE — PHYSICAL EXAM
[Well Groomed] : well groomed [General Appearance - In No Acute Distress] : no acute distress [Normal Conjunctiva] : the conjunctiva exhibited no abnormalities [Normal Oral Mucosa] : normal oral mucosa [Normal Oropharynx] : normal oropharynx [Normal Jugular Venous A Waves Present] : normal jugular venous A waves present [Normal Jugular Venous V Waves Present] : normal jugular venous V waves present [Heart Rate And Rhythm] : heart rate and rhythm were normal [Heart Sounds] : normal S1 and S2 [Arterial Pulses Normal] : the arterial pulses were normal [Veins - Varicosity Changes] : no varicosital changes were noted in the lower extremities [Respiration, Rhythm And Depth] : normal respiratory rhythm and effort [Exaggerated Use Of Accessory Muscles For Inspiration] : no accessory muscle use [Auscultation Breath Sounds / Voice Sounds] : lungs were clear to auscultation bilaterally [Chest Palpation] : palpation of the chest revealed no abnormalities [Bowel Sounds] : normal bowel sounds [Abdomen Soft] : soft [Abdomen Tenderness] : non-tender [Abnormal Walk] : normal gait [Nail Clubbing] : no clubbing of the fingernails [Skin Turgor] : normal skin turgor [] : no rash [No Venous Stasis] : no venous stasis [Oriented To Time, Place, And Person] : oriented to person, place, and time [Impaired Insight] : insight and judgment were intact [Affect] : the affect was normal [FreeTextEntry1] : 3/6 sm lsb., no edema

## 2021-01-07 NOTE — ASSESSMENT
[FreeTextEntry1] : Dizziness , Palpitations   unclear etiology , possible tachy cem  will obtain zio patch for 2 weeks to rule out any arrhythmia \par \par \par Hx of PAF currently in sinus rhythm   continue cardizem , warfarin , discussed the alternate NOAC  givens names , she will speak to pharmacy for pricing then decide ,  all questions answered \par \par Cardiac murmur  aortic stenosis ; will obtain follow up echo  to significant stenosis , \par \par Hyperlipidemia  with evidence of coronary calcification suggestive coronary atherosclerosis  ( no evidence of ischemia on stress test ) , target LDL <70 explained to the patient about necessity of starting on medication  , will give atorvastatin 10 mg po daily , follow up lipid profile LFTS \par \par Obesity prior hx of sleep apnea  , patient was encouraged to loose more weight \par \par COPD pulmonary nodule stable continue current medication , follow up with pulmonary \par \par \par patient was advised to see us in 1 month  after having above test \par

## 2021-01-07 NOTE — REVIEW OF SYSTEMS
[Eyeglasses] : currently wearing eyeglasses [Dyspnea on exertion] : dyspnea during exertion [Recent Weight Gain (___ Lbs)] : no recent weight gain [Feeling Fatigued] : not feeling fatigued [Recent Weight Loss (___ Lbs)] : no recent weight loss [Blurry Vision] : no blurred vision [Seeing Double (Diplopia)] : no diplopia [Earache] : no earache [Discharge From The Ears] : no discharge from the ears [Mouth Sores] : no mouth sores [Shortness Of Breath] : no shortness of breath [Chest  Pressure] : no chest pressure [Chest Pain] : no chest pain [Lower Ext Edema] : no extremity edema [Palpitations] : no palpitations [Cough] : no cough [Wheezing] : no wheezing [Abdominal Pain] : no abdominal pain [Nausea] : no nausea [Vomiting] : no vomiting [Heartburn] : no heartburn [Change in Appetite] : no change in appetite [Change In The Stool] : no change in stool [Dysphagia] : no dysphagia [Dysuria] : no dysuria [Pelvic Pain] : no pelvic pain [Dysmenorrhea] : no dysmenorrhea [Joint Pain] : no joint pain [Joint Swelling] : no joint swelling [Joint Stiffness] : no joint stiffness [Muscle Cramps] : no muscle cramps [Skin: A Rash] : no rash: [Itching] : no itching [Skin Lesions] : no skin lesions [Dizziness] : no dizziness [Convulsions] : no convulsions [Confusion] : no confusion was observed [Anxiety] : no anxiety [Excessive Thirst] : no polydipsia [Easy Bleeding] : no tendency for easy bleeding [Swollen Glands] : no swollen glands [Easy Bruising] : no tendency for easy bruising [Swollen Glands In The Neck] : no swollen glands in the neck

## 2021-01-21 ENCOUNTER — FORM ENCOUNTER (OUTPATIENT)
Age: 71
End: 2021-01-21

## 2021-01-24 ENCOUNTER — INPATIENT (INPATIENT)
Facility: HOSPITAL | Age: 71
LOS: 3 days | Discharge: HOME | End: 2021-01-28
Attending: INTERNAL MEDICINE | Admitting: INTERNAL MEDICINE
Payer: MEDICARE

## 2021-01-24 ENCOUNTER — EMERGENCY (EMERGENCY)
Facility: HOSPITAL | Age: 71
LOS: 1 days | Discharge: ANOTHER HOSPITAL | End: 2021-01-24
Attending: EMERGENCY MEDICINE | Admitting: EMERGENCY MEDICINE
Payer: MEDICARE

## 2021-01-24 VITALS
OXYGEN SATURATION: 95 % | RESPIRATION RATE: 18 BRPM | HEART RATE: 40 BPM | TEMPERATURE: 99 F | SYSTOLIC BLOOD PRESSURE: 156 MMHG | HEIGHT: 61 IN | DIASTOLIC BLOOD PRESSURE: 54 MMHG

## 2021-01-24 VITALS
OXYGEN SATURATION: 97 % | TEMPERATURE: 97 F | HEIGHT: 61 IN | WEIGHT: 192.9 LBS | RESPIRATION RATE: 18 BRPM | HEART RATE: 43 BPM

## 2021-01-24 VITALS
SYSTOLIC BLOOD PRESSURE: 135 MMHG | RESPIRATION RATE: 17 BRPM | HEART RATE: 45 BPM | OXYGEN SATURATION: 98 % | DIASTOLIC BLOOD PRESSURE: 69 MMHG

## 2021-01-24 DIAGNOSIS — Z98.89 OTHER SPECIFIED POSTPROCEDURAL STATES: Chronic | ICD-10-CM

## 2021-01-24 LAB
ALBUMIN SERPL ELPH-MCNC: 4.1 G/DL — SIGNIFICANT CHANGE UP (ref 3.3–5)
ALP SERPL-CCNC: 98 U/L — SIGNIFICANT CHANGE UP (ref 40–120)
ALT FLD-CCNC: 27 U/L — SIGNIFICANT CHANGE UP (ref 12–78)
ANION GAP SERPL CALC-SCNC: 5 MMOL/L — SIGNIFICANT CHANGE UP (ref 5–17)
APTT BLD: 45.5 SEC — HIGH (ref 27.5–35.5)
AST SERPL-CCNC: 23 U/L — SIGNIFICANT CHANGE UP (ref 15–37)
BASOPHILS # BLD AUTO: 0.03 K/UL — SIGNIFICANT CHANGE UP (ref 0–0.2)
BASOPHILS NFR BLD AUTO: 0.4 % — SIGNIFICANT CHANGE UP (ref 0–2)
BILIRUB SERPL-MCNC: 0.7 MG/DL — SIGNIFICANT CHANGE UP (ref 0.2–1.2)
BUN SERPL-MCNC: 36 MG/DL — HIGH (ref 7–23)
CALCIUM SERPL-MCNC: 9.1 MG/DL — SIGNIFICANT CHANGE UP (ref 8.5–10.1)
CHLORIDE SERPL-SCNC: 108 MMOL/L — SIGNIFICANT CHANGE UP (ref 96–108)
CK MB CFR SERPL CALC: 2 NG/ML — SIGNIFICANT CHANGE UP (ref 0–3.6)
CO2 SERPL-SCNC: 26 MMOL/L — SIGNIFICANT CHANGE UP (ref 22–31)
CREAT SERPL-MCNC: 1.7 MG/DL — HIGH (ref 0.5–1.3)
EOSINOPHIL # BLD AUTO: 0.1 K/UL — SIGNIFICANT CHANGE UP (ref 0–0.5)
EOSINOPHIL NFR BLD AUTO: 1.3 % — SIGNIFICANT CHANGE UP (ref 0–6)
GLUCOSE SERPL-MCNC: 101 MG/DL — HIGH (ref 70–99)
HCT VFR BLD CALC: 39.1 % — SIGNIFICANT CHANGE UP (ref 34.5–45)
HGB BLD-MCNC: 12.7 G/DL — SIGNIFICANT CHANGE UP (ref 11.5–15.5)
IMM GRANULOCYTES NFR BLD AUTO: 0.4 % — SIGNIFICANT CHANGE UP (ref 0–1.5)
INR BLD: 2.78 RATIO — HIGH (ref 0.88–1.16)
LYMPHOCYTES # BLD AUTO: 1.19 K/UL — SIGNIFICANT CHANGE UP (ref 1–3.3)
LYMPHOCYTES # BLD AUTO: 15.7 % — SIGNIFICANT CHANGE UP (ref 13–44)
MAGNESIUM SERPL-MCNC: 2.2 MG/DL — SIGNIFICANT CHANGE UP (ref 1.6–2.6)
MCHC RBC-ENTMCNC: 29.7 PG — SIGNIFICANT CHANGE UP (ref 27–34)
MCHC RBC-ENTMCNC: 32.5 GM/DL — SIGNIFICANT CHANGE UP (ref 32–36)
MCV RBC AUTO: 91.6 FL — SIGNIFICANT CHANGE UP (ref 80–100)
MONOCYTES # BLD AUTO: 0.47 K/UL — SIGNIFICANT CHANGE UP (ref 0–0.9)
MONOCYTES NFR BLD AUTO: 6.2 % — SIGNIFICANT CHANGE UP (ref 2–14)
NEUTROPHILS # BLD AUTO: 5.74 K/UL — SIGNIFICANT CHANGE UP (ref 1.8–7.4)
NEUTROPHILS NFR BLD AUTO: 76 % — SIGNIFICANT CHANGE UP (ref 43–77)
NRBC # BLD: 0 /100 WBCS — SIGNIFICANT CHANGE UP (ref 0–0)
NT-PROBNP SERPL-SCNC: 1166 PG/ML — HIGH (ref 0–125)
PLATELET # BLD AUTO: 166 K/UL — SIGNIFICANT CHANGE UP (ref 150–400)
POTASSIUM SERPL-MCNC: 4.2 MMOL/L — SIGNIFICANT CHANGE UP (ref 3.5–5.3)
POTASSIUM SERPL-SCNC: 4.2 MMOL/L — SIGNIFICANT CHANGE UP (ref 3.5–5.3)
PROT SERPL-MCNC: 7.7 G/DL — SIGNIFICANT CHANGE UP (ref 6–8.3)
PROTHROM AB SERPL-ACNC: 31 SEC — HIGH (ref 10.6–13.6)
RBC # BLD: 4.27 M/UL — SIGNIFICANT CHANGE UP (ref 3.8–5.2)
RBC # FLD: 13.9 % — SIGNIFICANT CHANGE UP (ref 10.3–14.5)
SARS-COV-2 RNA SPEC QL NAA+PROBE: SIGNIFICANT CHANGE UP
SODIUM SERPL-SCNC: 139 MMOL/L — SIGNIFICANT CHANGE UP (ref 135–145)
TROPONIN I SERPL-MCNC: <.015 NG/ML — SIGNIFICANT CHANGE UP (ref 0.01–0.04)
TSH SERPL-MCNC: 4.05 UIU/ML — HIGH (ref 0.36–3.74)
WBC # BLD: 7.56 K/UL — SIGNIFICANT CHANGE UP (ref 3.8–10.5)
WBC # FLD AUTO: 7.56 K/UL — SIGNIFICANT CHANGE UP (ref 3.8–10.5)

## 2021-01-24 PROCEDURE — 83880 ASSAY OF NATRIURETIC PEPTIDE: CPT

## 2021-01-24 PROCEDURE — 71045 X-RAY EXAM CHEST 1 VIEW: CPT

## 2021-01-24 PROCEDURE — 93005 ELECTROCARDIOGRAM TRACING: CPT

## 2021-01-24 PROCEDURE — 36415 COLL VENOUS BLD VENIPUNCTURE: CPT

## 2021-01-24 PROCEDURE — 71045 X-RAY EXAM CHEST 1 VIEW: CPT | Mod: 26

## 2021-01-24 PROCEDURE — U0003: CPT

## 2021-01-24 PROCEDURE — 99285 EMERGENCY DEPT VISIT HI MDM: CPT

## 2021-01-24 PROCEDURE — 99284 EMERGENCY DEPT VISIT MOD MDM: CPT | Mod: 25

## 2021-01-24 PROCEDURE — 85610 PROTHROMBIN TIME: CPT

## 2021-01-24 PROCEDURE — 80053 COMPREHEN METABOLIC PANEL: CPT

## 2021-01-24 PROCEDURE — 83735 ASSAY OF MAGNESIUM: CPT

## 2021-01-24 PROCEDURE — 82553 CREATINE MB FRACTION: CPT

## 2021-01-24 PROCEDURE — 85025 COMPLETE CBC W/AUTO DIFF WBC: CPT

## 2021-01-24 PROCEDURE — U0005: CPT

## 2021-01-24 PROCEDURE — 84443 ASSAY THYROID STIM HORMONE: CPT

## 2021-01-24 PROCEDURE — 99285 EMERGENCY DEPT VISIT HI MDM: CPT | Mod: 25

## 2021-01-24 PROCEDURE — 84484 ASSAY OF TROPONIN QUANT: CPT

## 2021-01-24 PROCEDURE — 85730 THROMBOPLASTIN TIME PARTIAL: CPT

## 2021-01-24 PROCEDURE — 93010 ELECTROCARDIOGRAM REPORT: CPT

## 2021-01-24 NOTE — ED PROVIDER NOTE - ATTENDING CONTRIBUTION TO CARE
I performed a face-to-face evaluation of the patient and performed a history and physical examination. I agree with the history and physical examination.    Likely SSS in context of a. fib. TSH unremarkable. Accepted as transfer, to be seen by EP. Admit.

## 2021-01-24 NOTE — ED PROVIDER NOTE - OBJECTIVE STATEMENT
70 year old female with history of psoarsis, a-fib, lower ext edema 70 year old female with history of psoriases, a-fib, lower ext edema, HTN, HLD, hypothyroid, and COPD presents with low heart rate and on and off feelings of lightheadedness. was seen by PCP for on and off feelings of lightheadedness (not like today, more mild in nature). also was hoping for referral to new cardiology more local in area. PCP noticed Cr was elevated, referred to nephrology (Sammi). also referred to Dr. Palla and was seen on Jan 9th. heart rate was in 50's. told at that time may need a pacemaker, arranged to wear holter monitor for 2 weeks (still wearing). today was sitting and felt "tingling all over" and lightheadeness to the extend she felt like she may pass out. no chest pain or shortness of breath. checked heart rate and was about 55, then was in 40's, then dropped to 38 which prompted her to come to ED. heart rate increased when walking, but continued to be low when she sat back down 70 year old female with history of psoriases, a-fib, lower ext edema, HTN, HLD, hypothyroid, and COPD presents with low heart rate and on and off feelings of lightheadedness. was seen by PCP for on and off feelings of lightheadedness (not like today, more mild in nature). also was hoping for referral to new cardiology more local in area. PCP noticed Cr was elevated, referred to nephrology (Sammi). also referred to Dr. Palla and was seen on Jan 9th. heart rate was in 50's. told at that time may need a pacemaker, arranged to wear holter monitor for 2 weeks (still wearing). today was sitting and felt "tingling all over" and lightheadedness to the extend she felt like she may pass out. no chest pain or shortness of breath. checked heart rate and was about 55, then was in 40's, then dropped to 38 which prompted her to come to ED. heart rate increased when walking, but continued to be low when she sat back down. no fevers, recent illness, abd pain, n/v/d  PCP Jero Jj   Ne[facility]

## 2021-01-24 NOTE — ED ADULT TRIAGE NOTE - CHIEF COMPLAINT QUOTE
from [place] er for evaluation of heart rate 40s. pt states wearing halter monitor since 1/9  for low heart rate. today pt c/o lightheadedness. denies pain,diff breathing. pmh- htn,copd,thyroid . covid pending from today

## 2021-01-24 NOTE — ED PROVIDER NOTE - CLINICAL SUMMARY MEDICAL DECISION MAKING FREE TEXT BOX
presents with on and off feeling dizzy and lightheaded. low heart rate. differentials include but not limited to a-fib, heart block, arrhythmia, thyroid abnormality, dehydration, electrolyte abnormality, infection, ACS. will check labs, EKG, CXR, cardio consult

## 2021-01-24 NOTE — ED PROVIDER NOTE - PHYSICAL EXAMINATION
Well appearing, well nourished, awake, alert, oriented to person, place, time/situation and in no apparent distress.    Airway patent    Eyes without scleral injection. No jaundice.    Strong pulse. No M/R/G.    Respirations unlabored. Lungs clear.    Abdomen soft, non-tender, no guarding.    Spine appears normal, range of motion is not limited, no muscle or joint tenderness. No LE edema.     Alert and oriented, no gross motor or sensory deficits.    Skin normal color for race, warm, dry and intact. No evidence of rash.    No SI/HI.

## 2021-01-24 NOTE — CONSULT NOTE ADULT - SUBJECTIVE AND OBJECTIVE BOX
[facility] - Marcella Macias, Magalie, Ana Maria, Otilio, Mima Araujo  Office Number: [phone]    Initial Consult Note    CHIEF COMPLAINT: Patient is a 70y old  Female who presents with a chief complaint of dizziness.     HPI:  70 year old female with past medical history of afib, hypothyroid, HTN HLD COPD, followed by Dr Palla recently seen in office for dizziness and given holter monitor now presenting with dizziness and bradycardia.     PAST MEDICAL & SURGICAL HISTORY:  Psoriasis    Afib    Edema extremities    Hyperlipidemia    HTN (hypertension)    Hypothyroid    COPD (chronic obstructive pulmonary disease)    Hypothyroid    HTN (hypertension)    COPD (chronic obstructive pulmonary disease)    S/P eye surgery  age 5 unsure if right or left eye        SOCIAL HISTORY:  No tobacco, ethanol, or drug abuse.    FAMILY HISTORY:    No family history of acute MI or sudden cardiac death.    MEDICATIONS  (STANDING):    MEDICATIONS  (PRN):      Allergies    artichokes (Unknown)  IV Contrast (Unknown)  Levaquin (Anaphylaxis)  sulfa drugs (Unknown)  Sulfur (Unknown)    Intolerances        REVIEW OF SYSTEMS:    CONSTITUTIONAL: No weakness, fevers or chills  EYES/ENT: No visual changes;  No vertigo or throat pain   NECK: No pain or stiffness  RESPIRATORY: No cough, wheezing, hemoptysis; No shortness of breath  CARDIOVASCULAR: No chest pain or palpitations  GASTROINTESTINAL: No abdominal pain. No nausea, vomiting, or hematemesis; No diarrhea or constipation. No melena or hematochezia.  GENITOURINARY: No dysuria, frequency or hematuria  NEUROLOGICAL: No numbness or weakness  SKIN: No itching or rash  All other review of systems is negative unless indicated above    VITAL SIGNS:   Vital Signs Last 24 Hrs  T(C): 36.2 (24 Jan 2021 15:24), Max: 36.2 (24 Jan 2021 15:24)  T(F): 97.2 (24 Jan 2021 15:24), Max: 97.2 (24 Jan 2021 15:24)  HR: 62 (24 Jan 2021 16:01) (43 - 62)  BP: 195/85 (24 Jan 2021 16:01) (195/85 - 195/85)  BP(mean): --  RR: 18 (24 Jan 2021 16:01) (18 - 18)  SpO2: 97% (24 Jan 2021 16:01) (97% - 97%)    I&O's Summary      On Exam:    Constitutional: NAD, alert and oriented x 3  Lungs:  Non-labored, breath sounds are clear bilaterally, No wheezing, rales or rhonchi  Cardiovascular: RRR.  S1 and S2 positive.  No murmurs, rubs, gallops or clicks  Gastrointestinal: Bowel Sounds present, soft, nontender.   Lymph: No peripheral edema. No cervical lymphadenopathy.  Neurological: Alert, no focal deficits  Skin: No rashes or ulcers   Psych:  Mood & affect appropriate.    LABS: All Labs Reviewed:                        12.7   7.56  )-----------( 166      ( 24 Jan 2021 16:03 )             39.1                 Blood Culture:         RADIOLOGY:    EKG:       [facility] - Marcella Macias, Magalie, Ana Maria, Otilio, Mima Araujo  Office Number: [phone]    Initial Consult Note    CHIEF COMPLAINT: Patient is a 70y old  Female who presents with a chief complaint of dizziness.     HPI:  70 year old female with past medical history of pafib on coumadin, morbid obesity sp 150 pound weight loss, , hypothyroid, HTN HLD COPD, followed by Dr Palla recently seen in office for dizziness and given Zio monitor now presenting with dizziness and bradycardia.   States today she was in her usual states of health when she developed dizziness and was noted to be 30-40bpm on her monitor prompting her to come to Er.  Currently resting with no further episodes of dizziness. denies syncope no chest pain shortness of breath palpitatons. Denies recent illness, no nausea, vomiting diarrhea fever or chills.    PAST MEDICAL & SURGICAL HISTORY:  Psoriasis    Afib    Edema extremities    Hyperlipidemia    HTN (hypertension)    Hypothyroid    COPD (chronic obstructive pulmonary disease)    Hypothyroid    HTN (hypertension)    COPD (chronic obstructive pulmonary disease)    S/P eye surgery  age 5 unsure if right or left eye        SOCIAL HISTORY:  No tobacco, ethanol, or drug abuse.    FAMILY HISTORY:    No family history of acute MI or sudden cardiac death.    MEDICATIONS  (STANDING):    MEDICATIONS  (PRN):      Allergies    artichokes (Unknown)  IV Contrast (Unknown)  Levaquin (Anaphylaxis)  sulfa drugs (Unknown)  Sulfur (Unknown)    Intolerances        REVIEW OF SYSTEMS:    CONSTITUTIONAL: + weakness, fevers or chills  EYES/ENT: No visual changes;  No vertigo or throat pain   NECK: No pain or stiffness  RESPIRATORY: No cough, wheezing, hemoptysis; No shortness of breath  CARDIOVASCULAR: No chest pain or palpitations  GASTROINTESTINAL: No abdominal pain. No nausea, vomiting, or hematemesis; No diarrhea or constipation. No melena or hematochezia.  GENITOURINARY: No dysuria, frequency or hematuria  NEUROLOGICAL: No numbness or weakness  SKIN: No itching or rash  All other review of systems is negative unless indicated above    VITAL SIGNS:   Vital Signs Last 24 Hrs  T(C): 36.2 (24 Jan 2021 15:24), Max: 36.2 (24 Jan 2021 15:24)  T(F): 97.2 (24 Jan 2021 15:24), Max: 97.2 (24 Jan 2021 15:24)  HR: 62 (24 Jan 2021 16:01) (43 - 62)  BP: 195/85 (24 Jan 2021 16:01) (195/85 - 195/85)  BP(mean): --  RR: 18 (24 Jan 2021 16:01) (18 - 18)  SpO2: 97% (24 Jan 2021 16:01) (97% - 97%)    I&O's Summary      On Exam:    Constitutional: NAD, alert and oriented x 3  Lungs:  Non-labored, breath sounds are clear bilaterally, No wheezing, rales or rhonchi  Cardiovascular: RRR.  S1 and S2 positive.  No murmurs, rubs, gallops or clicks  Gastrointestinal: Bowel Sounds present, soft, nontender.   Lymph: No peripheral edema. No cervical lymphadenopathy.  Neurological: Alert, no focal deficits  Skin: No rashes or ulcers   Psych:  Mood & affect appropriate.    LABS: All Labs Reviewed:                        12.7   7.56  )-----------( 166      ( 24 Jan 2021 16:03 )             39.1                 Blood Culture:         RADIOLOGY:    EKG:       [facility] - Marcella Macias, Ana Maria Mejias, Van Yañez, Wojciech Guillermo  Office Number: [phone]    Initial Consult Note    CHIEF COMPLAINT: Patient is a 70y old  Female who presents with a chief complaint of dizziness.     HPI:  70 year old female with past medical history of pafib on coumadin, morbid obesity sp 150 pound weight loss, hypothyroid, HTN HLD COPD, followed by Dr Palla recently seen in office for dizziness and given Zio monitor now presenting with dizziness and bradycardia.   States today she was in her usual state of health when she developed dizziness, HR was noted to be 30-40bpm on her monitor prompting her to come to Er.  Currently resting with no further episodes of dizziness.  She denies syncope no chest pain shortness of breath palpitatons. Denies recent illness, no nausea, vomiting diarrhea fever or chills.  She is currently taking cardizem, dose has not changed    PAST MEDICAL & SURGICAL HISTORY:  Psoriasis    Afib    Edema extremities    Hyperlipidemia    HTN (hypertension)    Hypothyroid    COPD (chronic obstructive pulmonary disease)    Hypothyroid    HTN (hypertension)    COPD (chronic obstructive pulmonary disease)    S/P eye surgery  age 5 unsure if right or left eye        SOCIAL HISTORY:  No tobacco, ethanol, or drug abuse.    FAMILY HISTORY:    No family history of acute MI or sudden cardiac death.    MEDICATIONS  (STANDING):    MEDICATIONS  (PRN):      Allergies    artichokes (Unknown)  IV Contrast (Unknown)  Levaquin (Anaphylaxis)  sulfa drugs (Unknown)  Sulfur (Unknown)    Intolerances        REVIEW OF SYSTEMS:    CONSTITUTIONAL: + weakness, fevers or chills  EYES/ENT: No visual changes;  No vertigo or throat pain   NECK: No pain or stiffness  RESPIRATORY: No cough, wheezing, hemoptysis; No shortness of breath  CARDIOVASCULAR: No chest pain or palpitations  GASTROINTESTINAL: No abdominal pain. No nausea, vomiting, or hematemesis; No diarrhea or constipation. No melena or hematochezia.  GENITOURINARY: No dysuria, frequency or hematuria  NEUROLOGICAL: No numbness or weakness  SKIN: No itching or rash  All other review of systems is negative unless indicated above    VITAL SIGNS:   Vital Signs Last 24 Hrs  T(C): 36.2 (24 Jan 2021 15:24), Max: 36.2 (24 Jan 2021 15:24)  T(F): 97.2 (24 Jan 2021 15:24), Max: 97.2 (24 Jan 2021 15:24)  HR: 62 (24 Jan 2021 16:01) (43 - 62)  BP: 195/85 (24 Jan 2021 16:01) (195/85 - 195/85)  BP(mean): --  RR: 18 (24 Jan 2021 16:01) (18 - 18)  SpO2: 97% (24 Jan 2021 16:01) (97% - 97%)    I&O's Summary      On Exam:    Constitutional: NAD, alert and oriented x 3  Lungs:  Non-labored, breath sounds are clear bilaterally, No wheezing, rales or rhonchi  Cardiovascular: RRR.  S1 and S2 positive.  No murmurs, rubs, gallops or clicks  Gastrointestinal: Bowel Sounds present, soft, nontender.   Lymph: No peripheral edema. No cervical lymphadenopathy.  Neurological: Alert, no focal deficits  Skin: No rashes or ulcers   Psych:  Mood & affect appropriate.    LABS: All Labs Reviewed:                        12.7   7.56  )-----------( 166      ( 24 Jan 2021 16:03 )             39.1                 Blood Culture:         RADIOLOGY:    EKG:

## 2021-01-24 NOTE — ED PROVIDER NOTE - CARE PLAN
Principal Discharge DX:	Dizziness   Principal Discharge DX:	Dizziness  Secondary Diagnosis:	Heart block

## 2021-01-24 NOTE — ED PROVIDER NOTE - PROGRESS NOTE DETAILS
Patient seen by SUSIE mcgovernA to hospitalist for EP eval. No CCU need at this time, tba tele. KILEY Quinonez PGY2

## 2021-01-24 NOTE — ED PROVIDER NOTE - TEMPLATE, MLM
Endometrial Biopsy     Pre-Procedure Care:   Consent was obtained. Procedure/risks were explained. Questions were answered. Correct patient was identified. Correct side and site were confirmed.       Birth control method(s) used: vasectomy    Indication Cardiac

## 2021-01-24 NOTE — CONSULT NOTE ADULT - ASSESSMENT
70 year old female with past medical history of afib on coumadin, hypothyroid, HTN HLD COPD, followed by Dr Palla recently seen in office for dizziness and given holter monitor now presenting with dizziness and bradycardia.     Bradycardia  ekg revealing     70 year old female with past medical history of afib on coumadin, hypothyroid, HTN HLD COPD, followed by Dr Palla recently seen in office for dizziness and given holter monitor now presenting with dizziness and bradycardia.     Symptomatic Bradycardia  ekg revealing junctional rhythm   Spoke with EP, Dr Grover, pt to be transferred to [facility] for further evaluation, possible PPM  Hold cardizem  BP stable at this time, monitor hemodynamics closely    AF  possible tachy/cem  hold coumadin for now in preparation for possible procedure    HTN  elevated now which is helpful  monitor closely    Plan discussed with pt, ER staff    Thank you for the consult

## 2021-01-24 NOTE — ED ADULT NURSE NOTE - NSIMPLEMENTINTERV_GEN_ALL_ED
Implemented All Universal Safety Interventions:  [place] to call system. Call bell, personal items and telephone within reach. Instruct patient to call for assistance. Room bathroom lighting operational. Non-slip footwear when patient is off stretcher. Physically safe environment: no spills, clutter or unnecessary equipment. Stretcher in lowest position, wheels locked, appropriate side rails in place.

## 2021-01-24 NOTE — ED PROVIDER NOTE - ATTENDING CONTRIBUTION TO CARE
I have personally performed a face to face diagnostic evaluation on this patient.  I have reviewed the PA note and agree with the history, exam, and plan of care, except as noted.  History and Exam by me shows patient drove to ER from home c/o episodes of dizziness, light headed, near syncope, states her heart rate is going low, patient denies chest pain, no shortness of breath no fever, denies LOC or trauma, patient states she took her losartan 100mg and diltiazem 240mg at 11am today, patient alert and oriented, heart sounds irregular, lungs clear, abdomen soft, no pedal edema, f/u ekg, labs, chest xray, cardio consult.

## 2021-01-24 NOTE — ED ADULT NURSE NOTE - OBJECTIVE STATEMENT
71 y/o female presents to ED as a transfer from [place] ER for HR in the ~40's. Pt a&ox3, states that she got a loop recorder placed on 1/19. Denies chest pain, sob, or pain. Pt placed on cardiac monitor. Vitals as per flowsheets. NSR noted. Pt arrives with 20g IV to LAC. MD at bedside for eval. Report given to primary RN.

## 2021-01-24 NOTE — ED ADULT NURSE NOTE - CHIEF COMPLAINT QUOTE
from [place] er for evaluation of heart rate 40s. pt states wearing halter monitor since 1/9  for low heart rate. today pt c/o lightheadedness. denies pain,diff breathing. pmh- htn,copd,thyroid . covid pending from today

## 2021-01-24 NOTE — ED PROVIDER NOTE - PROGRESS NOTE DETAILS
spoke with Dr. Jeffrey (cards). will see patient in ED was seen at evaluated by Dr. Jeffrey. concerns for heart block. She spoke with EP physician (Cely Grover). will transfer to [facility] for possible pacemaker placement. covid pending. accepting physician ED attending, Dr. Dash Segura

## 2021-01-24 NOTE — ED ADULT NURSE NOTE - NSIMPLEMENTINTERV_GEN_ALL_ED
Implemented All Universal Safety Interventions:  [place] to call system. Call bell, personal items and telephone within reach. Instruct patient to call for assistance. Room bathroom lighting operational. Non-slip footwear when patient is off stretcher. Physically safe environment: no spills, clutter or unnecessary equipment. Stretcher in lowest position, wheels locked, appropriate side rails in place.

## 2021-01-25 DIAGNOSIS — Z29.9 ENCOUNTER FOR PROPHYLACTIC MEASURES, UNSPECIFIED: ICD-10-CM

## 2021-01-25 DIAGNOSIS — Z02.9 ENCOUNTER FOR ADMINISTRATIVE EXAMINATIONS, UNSPECIFIED: ICD-10-CM

## 2021-01-25 DIAGNOSIS — J44.9 CHRONIC OBSTRUCTIVE PULMONARY DISEASE, UNSPECIFIED: ICD-10-CM

## 2021-01-25 DIAGNOSIS — E03.9 HYPOTHYROIDISM, UNSPECIFIED: ICD-10-CM

## 2021-01-25 DIAGNOSIS — R00.1 BRADYCARDIA, UNSPECIFIED: ICD-10-CM

## 2021-01-25 DIAGNOSIS — I10 ESSENTIAL (PRIMARY) HYPERTENSION: ICD-10-CM

## 2021-01-25 DIAGNOSIS — I48.91 UNSPECIFIED ATRIAL FIBRILLATION: ICD-10-CM

## 2021-01-25 DIAGNOSIS — E78.5 HYPERLIPIDEMIA, UNSPECIFIED: ICD-10-CM

## 2021-01-25 DIAGNOSIS — N17.9 ACUTE KIDNEY FAILURE, UNSPECIFIED: ICD-10-CM

## 2021-01-25 LAB
A1C WITH ESTIMATED AVERAGE GLUCOSE RESULT: 5.7 % — HIGH (ref 4–5.6)
ANION GAP SERPL CALC-SCNC: 9 MMOL/L — SIGNIFICANT CHANGE UP (ref 7–14)
BASOPHILS # BLD AUTO: 0.05 K/UL — SIGNIFICANT CHANGE UP (ref 0–0.2)
BASOPHILS NFR BLD AUTO: 0.9 % — SIGNIFICANT CHANGE UP (ref 0–2)
BUN SERPL-MCNC: 29 MG/DL — HIGH (ref 7–23)
CALCIUM SERPL-MCNC: 9.9 MG/DL — SIGNIFICANT CHANGE UP (ref 8.4–10.5)
CHLORIDE SERPL-SCNC: 109 MMOL/L — HIGH (ref 98–107)
CHOLEST SERPL-MCNC: 172 MG/DL — SIGNIFICANT CHANGE UP
CO2 SERPL-SCNC: 24 MMOL/L — SIGNIFICANT CHANGE UP (ref 22–31)
CREAT SERPL-MCNC: 1.41 MG/DL — HIGH (ref 0.5–1.3)
EOSINOPHIL # BLD AUTO: 0.1 K/UL — SIGNIFICANT CHANGE UP (ref 0–0.5)
EOSINOPHIL NFR BLD AUTO: 1.8 % — SIGNIFICANT CHANGE UP (ref 0–6)
ESTIMATED AVERAGE GLUCOSE: 117 MG/DL — HIGH (ref 68–114)
GLUCOSE SERPL-MCNC: 78 MG/DL — SIGNIFICANT CHANGE UP (ref 70–99)
HCT VFR BLD CALC: 39.7 % — SIGNIFICANT CHANGE UP (ref 34.5–45)
HDLC SERPL-MCNC: 71 MG/DL — SIGNIFICANT CHANGE UP
HGB BLD-MCNC: 12.2 G/DL — SIGNIFICANT CHANGE UP (ref 11.5–15.5)
IANC: 3.81 K/UL — SIGNIFICANT CHANGE UP (ref 1.5–8.5)
IMM GRANULOCYTES NFR BLD AUTO: 0.2 % — SIGNIFICANT CHANGE UP (ref 0–1.5)
INR BLD: 2.52 RATIO — HIGH (ref 0.88–1.16)
LIPID PNL WITH DIRECT LDL SERPL: 92 MG/DL — SIGNIFICANT CHANGE UP
LYMPHOCYTES # BLD AUTO: 1.25 K/UL — SIGNIFICANT CHANGE UP (ref 1–3.3)
LYMPHOCYTES # BLD AUTO: 22.4 % — SIGNIFICANT CHANGE UP (ref 13–44)
MAGNESIUM SERPL-MCNC: 2.2 MG/DL — SIGNIFICANT CHANGE UP (ref 1.6–2.6)
MCHC RBC-ENTMCNC: 28.8 PG — SIGNIFICANT CHANGE UP (ref 27–34)
MCHC RBC-ENTMCNC: 30.7 GM/DL — LOW (ref 32–36)
MCV RBC AUTO: 93.9 FL — SIGNIFICANT CHANGE UP (ref 80–100)
MONOCYTES # BLD AUTO: 0.36 K/UL — SIGNIFICANT CHANGE UP (ref 0–0.9)
MONOCYTES NFR BLD AUTO: 6.5 % — SIGNIFICANT CHANGE UP (ref 2–14)
NEUTROPHILS # BLD AUTO: 3.81 K/UL — SIGNIFICANT CHANGE UP (ref 1.8–7.4)
NEUTROPHILS NFR BLD AUTO: 68.2 % — SIGNIFICANT CHANGE UP (ref 43–77)
NON HDL CHOLESTEROL: 101 MG/DL — SIGNIFICANT CHANGE UP
NRBC # BLD: 0 /100 WBCS — SIGNIFICANT CHANGE UP
NRBC # FLD: 0 K/UL — SIGNIFICANT CHANGE UP
PHOSPHATE SERPL-MCNC: 3 MG/DL — SIGNIFICANT CHANGE UP (ref 2.5–4.5)
PLATELET # BLD AUTO: 162 K/UL — SIGNIFICANT CHANGE UP (ref 150–400)
POTASSIUM SERPL-MCNC: 4.4 MMOL/L — SIGNIFICANT CHANGE UP (ref 3.5–5.3)
POTASSIUM SERPL-SCNC: 4.4 MMOL/L — SIGNIFICANT CHANGE UP (ref 3.5–5.3)
PROTHROM AB SERPL-ACNC: 27.5 SEC — HIGH (ref 10.6–13.6)
RBC # BLD: 4.23 M/UL — SIGNIFICANT CHANGE UP (ref 3.8–5.2)
RBC # FLD: 13.6 % — SIGNIFICANT CHANGE UP (ref 10.3–14.5)
SARS-COV-2 IGG SERPL QL IA: NEGATIVE — SIGNIFICANT CHANGE UP
SARS-COV-2 IGM SERPL IA-ACNC: 0.07 INDEX — SIGNIFICANT CHANGE UP
SODIUM SERPL-SCNC: 142 MMOL/L — SIGNIFICANT CHANGE UP (ref 135–145)
TRIGL SERPL-MCNC: 43 MG/DL — SIGNIFICANT CHANGE UP
TSH SERPL-MCNC: 4.67 UIU/ML — HIGH (ref 0.27–4.2)
WBC # BLD: 5.58 K/UL — SIGNIFICANT CHANGE UP (ref 3.8–10.5)
WBC # FLD AUTO: 5.58 K/UL — SIGNIFICANT CHANGE UP (ref 3.8–10.5)

## 2021-01-25 PROCEDURE — 99223 1ST HOSP IP/OBS HIGH 75: CPT

## 2021-01-25 PROCEDURE — 99233 SBSQ HOSP IP/OBS HIGH 50: CPT

## 2021-01-25 PROCEDURE — 99285 EMERGENCY DEPT VISIT HI MDM: CPT

## 2021-01-25 RX ORDER — TIOTROPIUM BROMIDE 18 UG/1
1 CAPSULE ORAL; RESPIRATORY (INHALATION) DAILY
Refills: 0 | Status: DISCONTINUED | OUTPATIENT
Start: 2021-01-25 | End: 2021-01-28

## 2021-01-25 RX ORDER — WARFARIN SODIUM 2.5 MG/1
8 TABLET ORAL ONCE
Refills: 0 | Status: DISCONTINUED | OUTPATIENT
Start: 2021-01-25 | End: 2021-01-25

## 2021-01-25 RX ORDER — CHOLECALCIFEROL (VITAMIN D3) 125 MCG
400 CAPSULE ORAL DAILY
Refills: 0 | Status: DISCONTINUED | OUTPATIENT
Start: 2021-01-25 | End: 2021-01-28

## 2021-01-25 RX ORDER — HYDRALAZINE HCL 50 MG
50 TABLET ORAL
Refills: 0 | Status: DISCONTINUED | OUTPATIENT
Start: 2021-01-25 | End: 2021-01-28

## 2021-01-25 RX ORDER — LEVOTHYROXINE SODIUM 125 MCG
25 TABLET ORAL DAILY
Refills: 0 | Status: DISCONTINUED | OUTPATIENT
Start: 2021-01-25 | End: 2021-01-28

## 2021-01-25 RX ORDER — SODIUM CHLORIDE 9 MG/ML
500 INJECTION INTRAMUSCULAR; INTRAVENOUS; SUBCUTANEOUS
Refills: 0 | Status: DISCONTINUED | OUTPATIENT
Start: 2021-01-25 | End: 2021-01-28

## 2021-01-25 RX ORDER — SODIUM CHLORIDE 9 MG/ML
3 INJECTION INTRAMUSCULAR; INTRAVENOUS; SUBCUTANEOUS EVERY 8 HOURS
Refills: 0 | Status: DISCONTINUED | OUTPATIENT
Start: 2021-01-25 | End: 2021-01-28

## 2021-01-25 RX ORDER — BUDESONIDE AND FORMOTEROL FUMARATE DIHYDRATE 160; 4.5 UG/1; UG/1
2 AEROSOL RESPIRATORY (INHALATION)
Refills: 0 | Status: DISCONTINUED | OUTPATIENT
Start: 2021-01-25 | End: 2021-01-28

## 2021-01-25 RX ADMIN — SODIUM CHLORIDE 3 MILLILITER(S): 9 INJECTION INTRAMUSCULAR; INTRAVENOUS; SUBCUTANEOUS at 23:35

## 2021-01-25 RX ADMIN — Medication 400 UNIT(S): at 10:15

## 2021-01-25 RX ADMIN — TIOTROPIUM BROMIDE 1 CAPSULE(S): 18 CAPSULE ORAL; RESPIRATORY (INHALATION) at 09:22

## 2021-01-25 RX ADMIN — SODIUM CHLORIDE 75 MILLILITER(S): 9 INJECTION INTRAMUSCULAR; INTRAVENOUS; SUBCUTANEOUS at 05:30

## 2021-01-25 RX ADMIN — BUDESONIDE AND FORMOTEROL FUMARATE DIHYDRATE 2 PUFF(S): 160; 4.5 AEROSOL RESPIRATORY (INHALATION) at 09:21

## 2021-01-25 RX ADMIN — Medication 25 MICROGRAM(S): at 05:30

## 2021-01-25 RX ADMIN — SODIUM CHLORIDE 3 MILLILITER(S): 9 INJECTION INTRAMUSCULAR; INTRAVENOUS; SUBCUTANEOUS at 05:30

## 2021-01-25 RX ADMIN — BUDESONIDE AND FORMOTEROL FUMARATE DIHYDRATE 2 PUFF(S): 160; 4.5 AEROSOL RESPIRATORY (INHALATION) at 23:35

## 2021-01-25 RX ADMIN — SODIUM CHLORIDE 3 MILLILITER(S): 9 INJECTION INTRAMUSCULAR; INTRAVENOUS; SUBCUTANEOUS at 13:19

## 2021-01-25 NOTE — CHART NOTE - NSCHARTNOTEFT_GEN_A_CORE
Spoke with EP today.   No plans for PPM today.  Patient was on cardizem at home, will monitor for today.   Diet resumed.   Per EP, coumadin should be resumed for afib.   Patient takes 8 mg of coumadin daily.   Will check INR today and dose coumadin accordingly.     Libra Ramirez NP  pager [phone]

## 2021-01-25 NOTE — H&P ADULT - PROBLEM SELECTOR PLAN 3
Continue Spiriva, and Advair. Cr 1.70. In March of 2020 Cr was 0.95.  Continue to trend. Cr 1.70. In March of 2020 Cr was 0.95.  Continue to trend.  Losartan on hold. Cr 1.70. In March of 2020 Cr was 0.95.  Continue to trend.  Losartan on hold.  500 cc NS at 75 cc/hr ordered.  UA ordered.  Bladder scan ordered. CHADVASC score of 3.   Warfarin and Diltiazem on hold due to possible PPM placement in AM.  Echo ordered.  Monitor HR.

## 2021-01-25 NOTE — H&P ADULT - PROBLEM SELECTOR PLAN 8
Transitions of Care Status:  1.  Name of PCP: Dr. Jj  2.  PCP Contacted on Admission: [ ] Y    [ ] N    3.  PCP contacted at Discharge: [ ] Y    [ ] N    [ ] N/A  4.  Post-Discharge Appointment Date and Location:  5.  Summary of Handoff given to PCP: Patient on Warfarin 4 mg daily at night. On hold now due to PPM placement.   Verify with EP in AM when it can be restarted.

## 2021-01-25 NOTE — CONSULT NOTE ADULT - ATTENDING COMMENTS
70 year old female with past medical history of afib on coumadin, hypothyroid, HTN HLD COPD, followed by Dr Palla recently seen in office for dizziness and given holter monitor now presenting with dizziness and bradycardia, transferred to [facility] for PPM. Will watch off cardizem. Doing well with SR in the 70s. Will follow and potential dc tomorrow. May need PPM in future.

## 2021-01-25 NOTE — CONSULT NOTE ADULT - SUBJECTIVE AND OBJECTIVE BOX
CHIEF COMPLAINT:Patient is a 70y old  Female who presents with a chief complaint of Bradycardia (25 Jan 2021 17:05)      HISTORY OF PRESENT ILLNESS:HPI:  69 y/o F with PMH of Atrial fibrillation (On Warfarin, CHADVASC score of 3), HTN, HLD, hypothyroidism, lower extremity edema, COPD, lower extremity swelling, presents to the ED as transfer from [facility] for bradycardia. Patient states that on January 9th she went to a new Cardiologist who noticed that her HR was low patient was given a Holter monitor. Patient states that today at home she put on her pulse ox and noticed her heart rate was in the 30s.  Patient states that she felt lightheaded at that time. Patient states that she got up and started walking and her heart rate improved to the 60s. Patient denies chest pain, shortness of breath, fever, chills. Patient does endorse having elevated Cr for which she saw a renal doctor recently. (25 Jan 2021 02:33)      PAST MEDICAL & SURGICAL HISTORY:  Psoriasis    Afib    Edema extremities    Hyperlipidemia    HTN (hypertension)    Hypothyroid    COPD (chronic obstructive pulmonary disease)    Hypothyroid    HTN (hypertension)    COPD (chronic obstructive pulmonary disease)    S/P eye surgery  age 5 unsure if right or left eye            MEDICATIONS:      budesonide 160 MICROgram(s)/formoterol 4.5 MICROgram(s) Inhaler 2 Puff(s) Inhalation two times a day  tiotropium 18 MICROgram(s) Capsule 1 Capsule(s) Inhalation daily        levothyroxine 25 MICROGram(s) Oral daily    cholecalciferol 400 Unit(s) Oral daily  sodium chloride 0.9% lock flush 3 milliLiter(s) IV Push every 8 hours  sodium chloride 0.9%. 500 milliLiter(s) IV Continuous <Continuous>      FAMILY HISTORY:  FH: hypertension        Non-contributory    SOCIAL HISTORY:    [ ] Tobacco  [ ] Drugs  [ ] Alcohol    Allergies    artichokes (Unknown)  IV Contrast (Unknown)  Levaquin (Anaphylaxis)  sulfa drugs (Unknown)  Sulfur (Unknown)    Intolerances    	    REVIEW OF SYSTEMS:  CONSTITUTIONAL: No fever  EYES: No eye pain, visual disturbances, or discharge  ENMT:  No difficulty hearing, tinnitus  NECK: No pain or stiffness  RESPIRATORY: No cough, wheezing,  CARDIOVASCULAR: No chest pain, palpitations, passing out, dizziness, or leg swelling  GASTROINTESTINAL:  No nausea, vomiting, diarrhea or constipation. No melena.  GENITOURINARY: No dysuria, hematuria  NEUROLOGICAL: No stroke like symptoms  SKIN: No burning or lesions   ENDOCRINE: No heat or cold intolerance  MUSCULOSKELETAL: No joint pain or swelling  PSYCHIATRIC: No  anxiety, mood swings  HEME/LYMPH: No bleeding gums  ALLERGY AND IMMUNOLOGIC: No hives or eczema	    All other ROS negative    PHYSICAL EXAM:  T(C): 36.9 (01-25-21 @ 18:12), Max: 37.1 (01-24-21 @ 19:31)  HR: 58 (01-25-21 @ 18:12) (40 - 68)  BP: 171/87 (01-25-21 @ 18:12) (135/69 - 182/74)  RR: 16 (01-25-21 @ 18:12) (15 - 18)  SpO2: 98% (01-25-21 @ 18:12) (95% - 98%)  Wt(kg): --  I&O's Summary      Appearance: Normal	  HEENT:   Normal oral mucosa, EOMI	  Cardiovascular:  S1 S2, No JVD,    Respiratory: Lungs clear to auscultation	  Psychiatry: Alert  Gastrointestinal:  Soft, Non-tender, + BS	  Skin: No rashes   Neurologic: Non-focal  Extremities:  No edema  Vascular: Peripheral pulses palpable    	    	  CARDIAC MARKERS:  Labs personally reviewed by me  Troponin I, Serum: <.015 ng/mL (01-24 @ 16:03)                        12.2   5.58  )-----------( 162      ( 25 Jan 2021 06:28 )             39.7     01-25    142  |  109<H>  |  29<H>  ----------------------------<  78  4.4   |  24  |  1.41<H>    Ca    9.9      25 Jan 2021 06:28  Phos  3.0     01-25  Mg     2.2     01-25    TPro  7.7  /  Alb  4.1  /  TBili  0.7  /  DBili  x   /  AST  23  /  ALT  27  /  AlkPhos  98  01-2    EKG: Personally reviewed by me -  Radiology: Personally reviewed by me -     CXR- clear lungs     Assessment /Plan:     · Assessment	  69 y/o F with PMH of Atrial fibrillation (On Warfarin), HTN, HLD, hypothyroidism, lower extremity edema, COPD presents to the ED as transfer from [facility] for bradycardia.     Problem/Plan - 1:  ·  Problem: Bradycardia.  Plan: Admit to tele.  Keep atropine and zoll at bedside.  Hold AV nate blocker, hold cardizem  TTE ordered.  NPO past midnight for possible PPM placement tomorrow    Problem/Plan - 2:  ·  Problem: ROBYN (acute kidney injury). Plan: Cr 1.70. In March of 2020 Cr was 0.95-hold ARBcr. 1.41 today   - [facility] renal consult      Problem/Plan - 3:  ·  Problem: Atrial fibrillation. Plan: CHADVASC score of 3.   Warfarinon hold due to possible PPM placement in AM. inr therapeutic today   Echo ordered.  Monitor HR.     Problem/Plan - 5:  ·  Problem: HTN (hypertension).  Plan: Patient takes Losartan 100 mg daily.   Will hold due to elevated Cr.  recc hydralazine 50mg BID and uptitrate as needed        Problem/Plan - 8:  ·  Problem: Need for prophylactic measure.  Plan: Patient on Warfarin 4 mg daily at night. On hold now due to PPM placement.   Verify with EP in AM when it can be restarted.   INR therapeutic 2.52 today         Lauren Sharp ANP-C  Rashi Holland DO [facility]  Cardiovascular Medicine  [address], [address]  Office [phone]  Cell [phone]

## 2021-01-25 NOTE — H&P ADULT - NSHPSOCIALHISTORY_GEN_ALL_CORE
Patient is retired [employer] . Patient lives by herself. Patient states she quit smoking 15 years ago and stopped drinking after being diagnosed with atrial fibrillation in 2014.

## 2021-01-25 NOTE — ED ADULT NURSE REASSESSMENT NOTE - NS ED NURSE REASSESS COMMENT FT1
[facility] No apparent distress noted at this time. Respirations even and unlabored, no accessory muscle use

## 2021-01-25 NOTE — H&P ADULT - NSHPPHYSICALEXAM_GEN_ALL_CORE
T(C): 36.9 (01-25-21 @ 05:21), Max: 37.1 (01-24-21 @ 19:31)  HR: 59 (01-25-21 @ 05:21) (40 - 68)  BP: 158/70 (01-25-21 @ 05:21) (135/69 - 195/85)  RR: 16 (01-25-21 @ 05:21) (15 - 18)  SpO2: 96% (01-25-21 @ 05:21) (95% - 98%)

## 2021-01-25 NOTE — CHART NOTE - NSCHARTNOTEFT_GEN_A_CORE
Electrophysiology:    This is a 70 year old woman with a pmhx of Atrial fibrillation (On Warfarin, CHADVASC score of 3), HTN, HLD, hypothyroidism, HTN, HLD, COPD who presented to [facility] ED for symptomatic bradycardia (lightheadedness, dizziness) who was transfer to [facility] for PPM. Of note patient went to see her cardiologist on 1/9/2021 who noticed that her HR was low patient was given a Holter monitor.     Upon my evaluation patient HR was SB 50s. She denied HA, lightheadedness, dizziness, diaphoresis, covid-19 exposure, sore throat, runny nose, cough, CP, palpitation, hx of syncope/pre-syncope, abdominal pain, n/v/d, hematuria, hematochezia, melena, numbness and tingling. I spoke to the patient was the risk and benefits of a PPM was explained in detail which included but not limited to bleeding, infection, stroke, plural effusion, cardiac tamponade, intubation, and death. Patient expressed understanding an all questions were answered. Patient is consented to PPM.    Plan:  - Admitted to telemeteric floor  - Monitor electrolytes and replete K to 4 and Mg to 2  - Zoll pads on pt and zoll at bedside for transcutaneous pacing  - Hold AV nate blockers  - TTE to evaluate LV/RV, valvular function  - NPO after midnight. Possible PPM placement tomorrow    Gavin Chino PA-C Electrophysiology:    This is a 70 year old woman with a pmhx of Atrial fibrillation (On Warfarin, CHADVASC score of 3), HTN, HLD, hypothyroidism, HTN, HLD, COPD who presented to [facility] ED for symptomatic bradycardia (lightheadedness, dizziness) who was transfer to [facility] for PPM. Of note patient went to see her cardiologist on 1/9/2021 who noticed that her HR was low patient was given a Holter monitor.     Upon my evaluation patient HR was SB 50s. She denied HA, lightheadedness, dizziness, diaphoresis, covid-19 exposure, sore throat, runny nose, cough, CP, palpitation, hx of syncope/pre-syncope, abdominal pain, n/v/d, hematuria, hematochezia, melena, numbness and tingling. I spoke to the patient was the risk and benefits of a PPM was explained in detail which included but not limited to bleeding, infection, stroke, plural effusion, cardiac tamponade, intubation, and death. Patient expressed understanding an all questions were answered. Patient is consented to PPM.    Plan:  - Admitted to telemeteric floor  - Monitor electrolytes and replete K to 4 and Mg to 2  - Zoll pads on pt and zoll at bedside for transcutaneous pacing  - Hold AV nate blockers  - TTE to evaluate LV/RV, valvular function  - NPO after midnight. Possible PPM placement tomorrow (1/26/2021)  - Continue to hold coumadin with plan to change to Eliquis after PPM    Gavin Chino PA-C Electrophysiology:    This is a 70 year old woman with a pmhx of Atrial fibrillation (On Warfarin, CHADVASC score of 3), HTN, HLD, hypothyroidism, COPD who presented to [facility] ED for symptomatic bradycardia (lightheadedness, dizziness) who was transfer to [facility] for PPM. Of note patient went to see her cardiologist on 1/9/2021 who noticed that her HR was low patient was given a Holter monitor.     Upon my evaluation patient HR was SB 50s. She denied HA, lightheadedness, dizziness, diaphoresis, covid-19 exposure, sore throat, runny nose, cough, CP, palpitation, hx of syncope/pre-syncope, abdominal pain, n/v/d, hematuria, hematochezia, melena, numbness and tingling. I spoke to the patient was the risk and benefits of a PPM was explained in detail which included but not limited to bleeding, infection, stroke, plural effusion, cardiac tamponade, intubation, and death. Patient expressed understanding an all questions were answered. Patient is consented to PPM.    Plan:  - Admitted to telemeteric floor  - Monitor electrolytes and replete K to 4 and Mg to 2  - Zoll pads on pt and zoll at bedside for transcutaneous pacing  - Hold AV nate blockers (holding home Cardizem )  - TTE to evaluate LV/RV, valvular function  - NPO after midnight. Possible PPM placement tomorrow (1/26/2021)  - Continue to hold coumadin with plan to change to Eliquis after PPM    Gavin Chino PA-C

## 2021-01-25 NOTE — H&P ADULT - PROBLEM SELECTOR PLAN 4
Patient takes Losartan 100 mg daily.   Will hold due to elevated Cr. Patient takes Losartan 100 mg daily.   Will hold due to elevated Cr.  Consider Hydralazine if blood pressure control needed. Continue Spiriva, and Advair.

## 2021-01-25 NOTE — H&P ADULT - PROBLEM SELECTOR PLAN 5
Lipid level in AM.  Patient not on any medication. Continue to monitoring. Lipid level in AM.  Patient on red yeast rice. Continue to monitoring. Patient takes Losartan 100 mg daily.   Will hold due to elevated Cr.  Consider Hydralazine if blood pressure control needed.

## 2021-01-25 NOTE — H&P ADULT - ASSESSMENT
69 y/o F with PMH of Atrial fibrillation (On Warfarin), HTN, HLD, hypothyroidism, lower extremity edema, COPD presents to the ED as transfer from [facility] for bradycardia.

## 2021-01-25 NOTE — CONSULT NOTE ADULT - PROBLEM SELECTOR RECOMMENDATION 9
- Sinus bradycardia, ? conduction disease.  - Patients HR currently 60,  systolic  - Hemodynamically stable at present.   - Admit to tele for monitoring  - Zoll pads on pt and zoll at bedside for transcutaneous pacing  - Keep Atropine at bedside  - Hold AV nate blockers  - TTE to evaluate LV/RV, valvular function  - NPO after midnight. Possible PPM placement tomorrow.  - Hold Coumadin overnight for possible PPM tomorrow

## 2021-01-25 NOTE — CONSULT NOTE ADULT - ASSESSMENT
1 year old female with past medical history of afib on coumadin, hypothyroid, HTN HLD COPD, followed by Dr Palla recently seen in office for dizziness and given holter monitor now presenting with dizziness and bradycardia.    70 year old female with past medical history of afib on coumadin, hypothyroid, HTN HLD COPD, followed by Dr Palla recently seen in office for dizziness and given holter monitor now presenting with dizziness and bradycardia, transfered to [facility] for PPM

## 2021-01-25 NOTE — H&P ADULT - PROBLEM SELECTOR PLAN 7
Patient on Warfarin 4 mg daily at night. On hold now due to PPM placement.   Verify with EP in AM when it can be restarted. TSH ordered for AM.  Continue Levothyroxine 25 mcg daily.

## 2021-01-25 NOTE — H&P ADULT - PROBLEM SELECTOR PLAN 6
TSH ordered for AM.  Continue Levothyroxine 25 mcg daily. Lipid level in AM.  Patient on red yeast rice. Continue to monitoring.

## 2021-01-25 NOTE — PROGRESS NOTE ADULT - SUBJECTIVE AND OBJECTIVE BOX
DATE OF SERVICE: 01-25-21 @ 17:05    Subjective: Patient seen and examined. No new events except as noted.   Doing okay     REVIEW OF SYSTEMS:    CONSTITUTIONAL: No weakness, fevers or chills  EYES/ENT: No visual changes;  No vertigo or throat pain   NECK: No pain or stiffness  RESPIRATORY: No cough, wheezing, hemoptysis; No shortness of breath  CARDIOVASCULAR: No chest pain or palpitations  GASTROINTESTINAL: No abdominal or epigastric pain. No nausea, vomiting, or hematemesis; No diarrhea or constipation. No melena or hematochezia.  GENITOURINARY: No dysuria, frequency or hematuria  NEUROLOGICAL: No numbness or weakness  SKIN: No itching, burning, rashes, or lesions   All other review of systems is negative unless indicated above.    MEDICATIONS:  MEDICATIONS  (STANDING):  budesonide 160 MICROgram(s)/formoterol 4.5 MICROgram(s) Inhaler 2 Puff(s) Inhalation two times a day  cholecalciferol 400 Unit(s) Oral daily  levothyroxine 25 MICROGram(s) Oral daily  sodium chloride 0.9% lock flush 3 milliLiter(s) IV Push every 8 hours  sodium chloride 0.9%. 500 milliLiter(s) (75 mL/Hr) IV Continuous <Continuous>  tiotropium 18 MICROgram(s) Capsule 1 Capsule(s) Inhalation daily  warfarin 8 milliGRAM(s) Oral once      PHYSICAL EXAM:  T(C): 36.7 (01-25-21 @ 13:35), Max: 37.1 (01-24-21 @ 19:31)  HR: 57 (01-25-21 @ 13:35) (40 - 68)  BP: 159/64 (01-25-21 @ 13:35) (135/69 - 182/74)  RR: 17 (01-25-21 @ 13:35) (15 - 18)  SpO2: 97% (01-25-21 @ 13:35) (95% - 98%)  Wt(kg): --  I&O's Summary    Height (cm): 154.9 (01-24 @ 19:31)    Appearance: Normal	  HEENT:  PERRLA   Lymphatic: No lymphadenopathy   Cardiovascular: Normal S1 S2, no JVD  Respiratory: normal effort , clear  Gastrointestinal:  Soft, Non-tender  Skin: No rashes,  warm to touch  Psychiatry:  Mood & affect appropriate  Musculuskeletal: No edema      All labs, Imaging and EKGs personally reviewed                           12.2   5.58  )-----------( 162      ( 25 Jan 2021 06:28 )             39.7               01-25    142  |  109<H>  |  29<H>  ----------------------------<  78  4.4   |  24  |  1.41<H>    Ca    9.9      25 Jan 2021 06:28  Phos  3.0     01-25  Mg     2.2     01-25    TPro  7.7  /  Alb  4.1  /  TBili  0.7  /  DBili  x   /  AST  23  /  ALT  27  /  AlkPhos  98  01-24    PT/INR - ( 25 Jan 2021 10:56 )   PT: 27.5 sec;   INR: 2.52 ratio         PTT - ( 24 Jan 2021 16:03 )  PTT:45.5 sec       CARDIAC MARKERS ( 24 Jan 2021 16:03 )  <.015 ng/mL / x     / x     / x     / 2.0 ng/mL

## 2021-01-25 NOTE — CONSULT NOTE ADULT - SUBJECTIVE AND OBJECTIVE BOX
CHIEF COMPLAINT: Bradycardia    HISTORY OF PRESENT ILLNESS: 71 y/o female       Allergies  artichokes (Unknown)  IV Contrast (Unknown)  Levaquin (Anaphylaxis)  sulfa drugs (Unknown)  Sulfur (Unknown)    	    MEDICATIONS:  Home Medications:  Advair Diskus 500 mcg-50 mcg inhalation powder: 1 puff(s) inhaled 2 times a day (21 Mar 2020 11:59)  B12 Complex:  (21 Mar 2020 11:59)  Coumadin: Hold for 2 more days 11/27 and 11/28, and then resume one half tablet daily. Please have repeat INR drawn no later than 12/2. (21 Mar 2020 11:59)  dilTIAZem 240 mg/24 hours oral tablet, extended release: 1 tab(s) orally once a day (21 Mar 2020 11:59)  levothyroxine 25 mcg (0.025 mg) oral tablet: 1 tab(s) orally once a day (21 Mar 2020 11:59)  losartan 100 mg oral tablet: 1 tab(s) orally once a day (21 Mar 2020 11:59)  Red Yeast Rice 600 mg oral capsule: 2 tab(s) orally once a day (at bedtime) (21 Mar 2020 11:59)  Spiriva 18 mcg inhalation capsule: 1 each inhaled once a day (21 Mar 2020 11:59)  theophylline 300 mg oral tablet, extended release: tab(s) orally every 12 hours (21 Mar 2020 11:59)  Vitamin D3:  (21 Mar 2020 11:59)                  PAST MEDICAL & SURGICAL HISTORY:  Psoriasis    Afib    Edema extremities    Hyperlipidemia    HTN (hypertension)    Hypothyroid    COPD (chronic obstructive pulmonary disease)    Hypothyroid    HTN (hypertension)    COPD (chronic obstructive pulmonary disease)    S/P eye surgery  age 5 unsure if right or left eye        FAMILY HISTORY:      SOCIAL HISTORY:    [x] Non-smoker  [ ] Smoker  [ ] Alcohol  [ ] Denies Alcohol      REVIEW OF SYSTEMS:  CONSTITUTIONAL: no fevers or chills  EYES/ENT: No visual changes; No vertigo or throat pain  NECK: No JVD  RESPIRATORY:  No cough, wheezing, chills or hemoptysis, SOB  CARDIOVASCULAR: No chest pain, palpitations, passing out, dizziness, or leg swelling  GASTROINTESTINAL: No abdominal or epigastric pain. No nausea/vomiting/melena  Genitourinary: No dysuria, frequency or hematuria  NEUROLOGICAL: No numbness or weakness  SKIN: No itching, burning, rashes or lesions      PHYSICAL EXAM:  T(C): 36.2 (01-24-21 @ 23:02), Max: 37.1 (01-24-21 @ 19:31)  HR: 65 (01-24-21 @ 23:02) (40 - 68)  BP: 182/73 (01-24-21 @ 23:02) (135/69 - 195/85)  RR: 18 (01-24-21 @ 23:02) (17 - 18)  SpO2: 95% (01-24-21 @ 23:02) (95% - 98%)            Appearance: Normal  HEENT:   Normal oral mucosa	  Cardiovascular: Normal S1 S2, No JVD, No murmurs, No edema  Respiratory: Lungs clear to auscultation	  Psychiatry: A & O x 3, Mood & affect appropriate  Gastrointestinal:  Soft, Non-tender, + BS	  Skin: No rashes, No ecchymoses, No cyanosis	  Neurologic: Non-focal  Extremities: Warm and well perfused. 2+ pulses bilaterally        LABS:	 	    CBC Full  -  ( 24 Jan 2021 16:03 )  WBC Count : 7.56 K/uL  Hemoglobin : 12.7 g/dL  Hematocrit : 39.1 %  Platelet Count - Automated : 166 K/uL  Mean Cell Volume : 91.6 fl  Mean Cell Hemoglobin : 29.7 pg  Mean Cell Hemoglobin Concentration : 32.5 gm/dL  Auto Neutrophil # : 5.74 K/uL  Auto Lymphocyte # : 1.19 K/uL  Auto Monocyte # : 0.47 K/uL  Auto Eosinophil # : 0.10 K/uL  Auto Basophil # : 0.03 K/uL  Auto Neutrophil % : 76.0 %  Auto Lymphocyte % : 15.7 %  Auto Monocyte % : 6.2 %  Auto Eosinophil % : 1.3 %  Auto Basophil % : 0.4 %    01-24    139  |  108  |  36<H>  ----------------------------<  101<H>  4.2   |  26  |  1.70<H>    Ca    9.1      24 Jan 2021 16:03  Mg     2.2     01-24    TPro  7.7  /  Alb  4.1  /  TBili  0.7  /  DBili  x   /  AST  23  /  ALT  27  /  AlkPhos  98  01-24      proBNP: Serum Pro-Brain Natriuretic Peptide: 1166 pg/mL (01-24 @ 16:03)    Lipid Profile:   HgA1c:   TSH: Thyroid Stimulating Hormone, Serum: 4.05 uIU/mL (01-24 @ 16:03)      CARDIAC MARKERS:    Troponin I, Serum: <.015 ng/mL (01-24-21 @ 16:03)        TELEMETRY: Normal sinus rhythm @ 63 bpm	    ECG: Normal sinus rhythm @ 66 bpm in [facility], Junctional rhythm while in [place] ED  	    PREVIOUS DIAGNOSTIC TESTING:    IMPRESSION:  Summary:   1. Left ventricular ejection fraction,by visual estimation, is 65 to   70%.   2. Normal global left ventricular systolic function.   3. Spectral Doppler shows impaired relaxation pattern of left   ventricular myocardial filling (Grade I diastolic dysfunction).   4. There is borderline concentric left ventricular hypertrophy.   5. There is no evidence of pericardial effusion.   6. Thickening of the anterior and posterior mitral valve leaflets.   7. Trace mitral valve regurgitation.   8. Sclerotic aortic valve with normal opening.   9.Estimated pulmonary artery systolic pressure is 35.9 mmHg assuming a   right atrial pressure of 10 mmHg, which is consistent with borderline   pulmonary hypertension.    < end of copied text >   ] Echocardiogram:  [ ]  Catheterization:  [ ] Stress Test:  	   CHIEF COMPLAINT: Bradycardia    HISTORY OF PRESENT ILLNESS: 71 y/o female       Allergies  artichokes (Unknown)  IV Contrast (Unknown)  Levaquin (Anaphylaxis)  sulfa drugs (Unknown)  Sulfur (Unknown)    	    MEDICATIONS:  Home Medications:  Advair Diskus 500 mcg-50 mcg inhalation powder: 1 puff(s) inhaled 2 times a day (21 Mar 2020 11:59)  B12 Complex:  (21 Mar 2020 11:59)  Coumadin: Hold for 2 more days 11/27 and 11/28, and then resume one half tablet daily. Please have repeat INR drawn no later than 12/2. (21 Mar 2020 11:59)  dilTIAZem 240 mg/24 hours oral tablet, extended release: 1 tab(s) orally once a day (21 Mar 2020 11:59)  levothyroxine 25 mcg (0.025 mg) oral tablet: 1 tab(s) orally once a day (21 Mar 2020 11:59)  losartan 100 mg oral tablet: 1 tab(s) orally once a day (21 Mar 2020 11:59)  Red Yeast Rice 600 mg oral capsule: 2 tab(s) orally once a day (at bedtime) (21 Mar 2020 11:59)  Spiriva 18 mcg inhalation capsule: 1 each inhaled once a day (21 Mar 2020 11:59)  theophylline 300 mg oral tablet, extended release: tab(s) orally every 12 hours (21 Mar 2020 11:59)  Vitamin D3:  (21 Mar 2020 11:59)                  PAST MEDICAL & SURGICAL HISTORY:  Psoriasis    Afib    Edema extremities    Hyperlipidemia    HTN (hypertension)    Hypothyroid    COPD (chronic obstructive pulmonary disease)    Hypothyroid    HTN (hypertension)    COPD (chronic obstructive pulmonary disease)    S/P eye surgery  age 5 unsure if right or left eye        FAMILY HISTORY:      SOCIAL HISTORY:    [x] Non-smoker  [ ] Smoker  [ ] Alcohol  [ ] Denies Alcohol      REVIEW OF SYSTEMS:  CONSTITUTIONAL: + weakness, fevers or chills  EYES/ENT: No visual changes;  No vertigo or throat pain   NECK: No pain or stiffness  RESPIRATORY: No cough, wheezing, hemoptysis; No shortness of breath  CARDIOVASCULAR: No chest pain or palpitations  GASTROINTESTINAL: No abdominal pain. No nausea, vomiting, or hematemesis; No diarrhea or constipation. No melena or hematochezia.  GENITOURINARY: No dysuria, frequency or hematuria  NEUROLOGICAL: No numbness or weakness  SKIN: No itching or rash      PHYSICAL EXAM:  T(C): 36.2 (01-24-21 @ 23:02), Max: 37.1 (01-24-21 @ 19:31)  HR: 65 (01-24-21 @ 23:02) (40 - 68)  BP: 182/73 (01-24-21 @ 23:02) (135/69 - 195/85)  RR: 18 (01-24-21 @ 23:02) (17 - 18)  SpO2: 95% (01-24-21 @ 23:02) (95% - 98%)            Appearance: 71 y/o female sitting up in bed answering questions  HEENT:   Normal oral mucosa	  Cardiovascular: Normal S1 S2, No JVD, No murmurs, No edema  Respiratory: Lungs clear to auscultation	  Psychiatry: A & O x 3, Mood & affect appropriate  Gastrointestinal:  Soft, Non-tender, + BS	  Skin: No rashes, No ecchymoses, No cyanosis	  Neurologic: Non-focal  Extremities: Warm and well perfused. 2+ pulses bilaterally        LABS:	 	    CBC Full  -  ( 24 Jan 2021 16:03 )  WBC Count : 7.56 K/uL  Hemoglobin : 12.7 g/dL  Hematocrit : 39.1 %  Platelet Count - Automated : 166 K/uL  Mean Cell Volume : 91.6 fl  Mean Cell Hemoglobin : 29.7 pg  Mean Cell Hemoglobin Concentration : 32.5 gm/dL  Auto Neutrophil # : 5.74 K/uL  Auto Lymphocyte # : 1.19 K/uL  Auto Monocyte # : 0.47 K/uL  Auto Eosinophil # : 0.10 K/uL  Auto Basophil # : 0.03 K/uL  Auto Neutrophil % : 76.0 %  Auto Lymphocyte % : 15.7 %  Auto Monocyte % : 6.2 %  Auto Eosinophil % : 1.3 %  Auto Basophil % : 0.4 %    01-24    139  |  108  |  36<H>  ----------------------------<  101<H>  4.2   |  26  |  1.70<H>    Ca    9.1      24 Jan 2021 16:03  Mg     2.2     01-24    TPro  7.7  /  Alb  4.1  /  TBili  0.7  /  DBili  x   /  AST  23  /  ALT  27  /  AlkPhos  98  01-24      proBNP: Serum Pro-Brain Natriuretic Peptide: 1166 pg/mL (01-24 @ 16:03)    Lipid Profile:   HgA1c:   TSH: Thyroid Stimulating Hormone, Serum: 4.05 uIU/mL (01-24 @ 16:03)      CARDIAC MARKERS:    Troponin I, Serum: <.015 ng/mL (01-24-21 @ 16:03)        TELEMETRY: Normal sinus rhythm @ 63 bpm	    ECG: Normal sinus rhythm @ 66 bpm in [facility], Junctional rhythm while in [place] ED  	    PREVIOUS DIAGNOSTIC TESTING:    [x] Echocardiogram  IMPRESSION:  Summary:   1. Left ventricular ejection fraction,by visual estimation, is 65 to   70%.   2. Normal global left ventricular systolic function.   3. Spectral Doppler shows impaired relaxation pattern of left   ventricular myocardial filling (Grade I diastolic dysfunction).   4. There is borderline concentric left ventricular hypertrophy.   5. There is no evidence of pericardial effusion.   6. Thickening of the anterior and posterior mitral valve leaflets.   7. Trace mitral valve regurgitation.   8. Sclerotic aortic valve with normal opening.   9.Estimated pulmonary artery systolic pressure is 35.9 mmHg assuming a   right atrial pressure of 10 mmHg, which is consistent with borderline   pulmonary hypertension.    [x] Stress Test:  	  IMPRESSIONS:Normal Study  * Review of raw data shows: Breast attenuation artifact.,  Increased bowel uptake.  * There are medium sized, moderate defects in inferior and  apical  walls that are predominantly fixed, suggestive of  no clear evidence of ischemia or infarct consistent with  attenuation artifact.  * Perfusion defects are in part the result of breast  attenuation.  * Gated wall motion analysis is performed, and shows  normal wall motion with post stress LVEF of 61%.  * Normal LV size.  * Chest Pain: No chest pain with administration of  Dobutamine.  * Symptom: No Symptom.  * HR Response: Normal.  * BP Response: Appropriate.  * Heart Rhythm: Normal Sinus Rhythm - 74 BPM.  * ECG Abnormalities: There were no diagnostic changes.  * Arrhythmia: Frequent PVC's.       CHIEF COMPLAINT: Bradycardia    HISTORY OF PRESENT ILLNESS: 69 y/o F with PMH of Atrial fibrillation (On Warfarin, CHADVASC score of 3), HTN, HLD, hypothyroidism, lower extremity edema, COPD, lower extremity swelling, presents to the ED as transfer from [facility] for bradycardia. Patient states that on January 9th she went to a new Cardiologist who noticed that her HR was low patient was given a Holter monitor. Patient states that today at home she put on her pulse ox and noticed her heart rate was in the 30s.  Patient states that she felt lightheaded at that time. Patient states that she got up and started walking and her heart rate improved to the 60s. Patient denies chest pain, shortness of breath, fever, chills, lightheadedness, and dizziness while in the ED    Allergies  artichokes (Unknown)  IV Contrast (Unknown)  Levaquin (Anaphylaxis)  sulfa drugs (Unknown)  Sulfur (Unknown)    	    MEDICATIONS:  Home Medications:  Advair Diskus 500 mcg-50 mcg inhalation powder: 1 puff(s) inhaled 2 times a day (21 Mar 2020 11:59)  B12 Complex:  (21 Mar 2020 11:59)  Coumadin: Hold for 2 more days 11/27 and 11/28, and then resume one half tablet daily. Please have repeat INR drawn no later than 12/2. (21 Mar 2020 11:59)  dilTIAZem 240 mg/24 hours oral tablet, extended release: 1 tab(s) orally once a day (21 Mar 2020 11:59)  levothyroxine 25 mcg (0.025 mg) oral tablet: 1 tab(s) orally once a day (21 Mar 2020 11:59)  losartan 100 mg oral tablet: 1 tab(s) orally once a day (21 Mar 2020 11:59)  Red Yeast Rice 600 mg oral capsule: 2 tab(s) orally once a day (at bedtime) (21 Mar 2020 11:59)  Spiriva 18 mcg inhalation capsule: 1 each inhaled once a day (21 Mar 2020 11:59)  theophylline 300 mg oral tablet, extended release: tab(s) orally every 12 hours (21 Mar 2020 11:59)  Vitamin D3:  (21 Mar 2020 11:59)                  PAST MEDICAL & SURGICAL HISTORY:  Psoriasis    Afib    Edema extremities    Hyperlipidemia    HTN (hypertension)    Hypothyroid    COPD (chronic obstructive pulmonary disease)    Hypothyroid    HTN (hypertension)    COPD (chronic obstructive pulmonary disease)    S/P eye surgery  age 5 unsure if right or left eye        FAMILY HISTORY:      SOCIAL HISTORY:    [x] Non-smoker  [ ] Smoker  [ ] Alcohol  [ ] Denies Alcohol      REVIEW OF SYSTEMS:  CONSTITUTIONAL: + weakness, fevers or chills  EYES/ENT: No visual changes;  No vertigo or throat pain   NECK: No pain or stiffness  RESPIRATORY: No cough, wheezing, hemoptysis; No shortness of breath  CARDIOVASCULAR: No chest pain or palpitations  GASTROINTESTINAL: No abdominal pain. No nausea, vomiting, or hematemesis; No diarrhea or constipation. No melena or hematochezia.  GENITOURINARY: No dysuria, frequency or hematuria  NEUROLOGICAL: No numbness or weakness  SKIN: No itching or rash      PHYSICAL EXAM:  T(C): 36.2 (01-24-21 @ 23:02), Max: 37.1 (01-24-21 @ 19:31)  HR: 65 (01-24-21 @ 23:02) (40 - 68)  BP: 182/73 (01-24-21 @ 23:02) (135/69 - 195/85)  RR: 18 (01-24-21 @ 23:02) (17 - 18)  SpO2: 95% (01-24-21 @ 23:02) (95% - 98%)            Appearance: 69 y/o female sitting up in bed answering questions  HEENT:   Normal oral mucosa	  Cardiovascular: Normal S1 S2, No JVD, No murmurs, No edema  Respiratory: Lungs clear to auscultation	  Psychiatry: A & O x 3, Mood & affect appropriate  Gastrointestinal:  Soft, Non-tender, + BS	  Skin: No rashes, No ecchymoses, No cyanosis	  Neurologic: Non-focal  Extremities: Warm and well perfused. 2+ pulses bilaterally        LABS:	 	    CBC Full  -  ( 24 Jan 2021 16:03 )  WBC Count : 7.56 K/uL  Hemoglobin : 12.7 g/dL  Hematocrit : 39.1 %  Platelet Count - Automated : 166 K/uL  Mean Cell Volume : 91.6 fl  Mean Cell Hemoglobin : 29.7 pg  Mean Cell Hemoglobin Concentration : 32.5 gm/dL  Auto Neutrophil # : 5.74 K/uL  Auto Lymphocyte # : 1.19 K/uL  Auto Monocyte # : 0.47 K/uL  Auto Eosinophil # : 0.10 K/uL  Auto Basophil # : 0.03 K/uL  Auto Neutrophil % : 76.0 %  Auto Lymphocyte % : 15.7 %  Auto Monocyte % : 6.2 %  Auto Eosinophil % : 1.3 %  Auto Basophil % : 0.4 %    01-24    139  |  108  |  36<H>  ----------------------------<  101<H>  4.2   |  26  |  1.70<H>    Ca    9.1      24 Jan 2021 16:03  Mg     2.2     01-24    TPro  7.7  /  Alb  4.1  /  TBili  0.7  /  DBili  x   /  AST  23  /  ALT  27  /  AlkPhos  98  01-24      proBNP: Serum Pro-Brain Natriuretic Peptide: 1166 pg/mL (01-24 @ 16:03)    Lipid Profile:   HgA1c:   TSH: Thyroid Stimulating Hormone, Serum: 4.05 uIU/mL (01-24 @ 16:03)      CARDIAC MARKERS:    Troponin I, Serum: <.015 ng/mL (01-24-21 @ 16:03)        TELEMETRY: Normal sinus rhythm @ 63 bpm	    ECG: Normal sinus rhythm @ 66 bpm in [facility] Junctional rhythm while in [place] ED  	    PREVIOUS DIAGNOSTIC TESTING:    [x] Echocardiogram  IMPRESSION:  Summary:   1. Left ventricular ejection fraction,by visual estimation, is 65 to   70%.   2. Normal global left ventricular systolic function.   3. Spectral Doppler shows impaired relaxation pattern of left   ventricular myocardial filling (Grade I diastolic dysfunction).   4. There is borderline concentric left ventricular hypertrophy.   5. There is no evidence of pericardial effusion.   6. Thickening of the anterior and posterior mitral valve leaflets.   7. Trace mitral valve regurgitation.   8. Sclerotic aortic valve with normal opening.   9.Estimated pulmonary artery systolic pressure is 35.9 mmHg assuming a   right atrial pressure of 10 mmHg, which is consistent with borderline   pulmonary hypertension.    [x] Stress Test:  	  IMPRESSIONS:Normal Study  * Review of raw data shows: Breast attenuation artifact.,  Increased bowel uptake.  * There are medium sized, moderate defects in inferior and  apical  walls that are predominantly fixed, suggestive of  no clear evidence of ischemia or infarct consistent with  attenuation artifact.  * Perfusion defects are in part the result of breast  attenuation.  * Gated wall motion analysis is performed, and shows  normal wall motion with post stress LVEF of 61%.  * Normal LV size.  * Chest Pain: No chest pain with administration of  Dobutamine.  * Symptom: No Symptom.  * HR Response: Normal.  * BP Response: Appropriate.  * Heart Rhythm: Normal Sinus Rhythm - 74 BPM.  * ECG Abnormalities: There were no diagnostic changes.  * Arrhythmia: Frequent PVC's.

## 2021-01-25 NOTE — H&P ADULT - NSHPLABSRESULTS_GEN_ALL_CORE
Vital Signs Last 24 Hrs  T(C): 36.3 (25 Jan 2021 01:51), Max: 37.1 (24 Jan 2021 19:31)  T(F): 97.4 (25 Jan 2021 01:51), Max: 98.8 (24 Jan 2021 19:31)  HR: 57 (25 Jan 2021 01:51) (40 - 68)  BP: 163/73 (25 Jan 2021 01:51) (135/69 - 195/85)  BP(mean): --  RR: 15 (25 Jan 2021 01:51) (15 - 18)  SpO2: 97% (25 Jan 2021 01:51) (95% - 98%)                          12.7   7.56  )-----------( 166      ( 24 Jan 2021 16:03 )             39.1   01-24    139  |  108  |  36<H>  ----------------------------<  101<H>  4.2   |  26  |  1.70<H>    Ca    9.1      24 Jan 2021 16:03  Mg     2.2     01-24    TPro  7.7  /  Alb  4.1  /  TBili  0.7  /  DBili  x   /  AST  23  /  ALT  27  /  AlkPhos  98  01-24    PT/INR - ( 24 Jan 2021 16:03 )   PT: 31.0 sec;   INR: 2.78 ratio         PTT - ( 24 Jan 2021 16:03 )  PTT:45.5 sec Vital Signs Last 24 Hrs  T(C): 36.3 (25 Jan 2021 01:51), Max: 37.1 (24 Jan 2021 19:31)  T(F): 97.4 (25 Jan 2021 01:51), Max: 98.8 (24 Jan 2021 19:31)  HR: 57 (25 Jan 2021 01:51) (40 - 68)  BP: 163/73 (25 Jan 2021 01:51) (135/69 - 195/85)  BP(mean): --  RR: 15 (25 Jan 2021 01:51) (15 - 18)  SpO2: 97% (25 Jan 2021 01:51) (95% - 98%)                          12.7   7.56  )-----------( 166      ( 24 Jan 2021 16:03 )             39.1   01-24    139  |  108  |  36<H>  ----------------------------<  101<H>  4.2   |  26  |  1.70<H>    Ca    9.1      24 Jan 2021 16:03  Mg     2.2     01-24    TPro  7.7  /  Alb  4.1  /  TBili  0.7  /  DBili  x   /  AST  23  /  ALT  27  /  AlkPhos  98  01-24    PT/INR - ( 24 Jan 2021 16:03 )   PT: 31.0 sec;   INR: 2.78 ratio         PTT - ( 24 Jan 2021 16:03 )  PTT:45.5 sec    ProBNP: 1166    COVID PCR: Negative

## 2021-01-25 NOTE — H&P ADULT - PROBLEM SELECTOR PLAN 1
Admit to tele, EP recs appreciated.  Monitor HR currently in 70s.  Keep atropine and zoll at bedside.  Hold AV nate blockers.  TTE ordered.  NPO for possible PPM placement.  Coumadin held for possible PPM placement. Admit to tele, EP recs appreciated.  Monitor HR currently in 70s.  Keep atropine and zoll at bedside.  Hold AV nate blockers.  TTE ordered.  NPO for possible PPM placement.  Coumadin held for possible PPM placement.  Patient last took Warfarin on night of 1/23.   As per Cardiology no need to reverse INR.

## 2021-01-25 NOTE — H&P ADULT - PROBLEM SELECTOR PLAN 2
CHADVASC score of 3.   Warfarin and Diltiazem on hold due to possible PPM placement in AM.  Echo ordered. CHADVASC score of 3.   Warfarin and Diltiazem on hold due to possible PPM placement in AM.  Echo ordered.  Monitor HR. Cr 1.70. In March of 2020 Cr was 0.95, start IVF, urine studies, hold ARB, check bladder scan

## 2021-01-25 NOTE — PROGRESS NOTE ADULT - ASSESSMENT
71 y/o F with PMH of Atrial fibrillation (On Warfarin), HTN, HLD, hypothyroidism, lower extremity edema, COPD presents to the ED as transfer from [facility] for bradycardia.

## 2021-01-25 NOTE — H&P ADULT - HISTORY OF PRESENT ILLNESS
71 y/o F with PMH of Atrial fibrillation (On Warfarin), HTN, HLD, hypothyroidism, lower extremity edema, COPD presents to the ED as transfer from [facility] for bradycardia. Patient states that on January 9th she went to a new Cardiologist who noticed that her HR was low patient was given a Holter monitor. Patient states that today at home she put on her pulse ox and noticed her heart rate was in the 30s.  Patient states that she felt lightheaded at that time. Patient states that she got up and started walking and her heart rate improved to the 60s. Patient denies chest pain, shortness of breath, fever, chills. 71 y/o F with PMH of Atrial fibrillation (On Warfarin, CHADVASC score of 3), HTN, HLD, hypothyroidism, lower extremity edema, COPD presents to the ED as transfer from [facility] for bradycardia. Patient states that on January 9th she went to a new Cardiologist who noticed that her HR was low patient was given a Holter monitor. Patient states that today at home she put on her pulse ox and noticed her heart rate was in the 30s.  Patient states that she felt lightheaded at that time. Patient states that she got up and started walking and her heart rate improved to the 60s. Patient denies chest pain, shortness of breath, fever, chills. 69 y/o F with PMH of Atrial fibrillation (On Warfarin, CHADVASC score of 3), HTN, HLD, hypothyroidism, lower extremity edema, COPD presents to the ED as transfer from [facility] for bradycardia. Patient states that on January 9th she went to a new Cardiologist who noticed that her HR was low patient was given a Holter monitor. Patient states that today at home she put on her pulse ox and noticed her heart rate was in the 30s.  Patient states that she felt lightheaded at that time. Patient states that she got up and started walking and her heart rate improved to the 60s. Patient denies chest pain, shortness of breath, fever, chills. Patient does endorse having elevated Cr for which she saw a renal doctor recently. 71 y/o F with PMH of Atrial fibrillation (On Warfarin, CHADVASC score of 3), HTN, HLD, hypothyroidism, lower extremity edema, COPD, lower extremity swelling, presents to the ED as transfer from [facility] for bradycardia. Patient states that on January 9th she went to a new Cardiologist who noticed that her HR was low patient was given a Holter monitor. Patient states that today at home she put on her pulse ox and noticed her heart rate was in the 30s.  Patient states that she felt lightheaded at that time. Patient states that she got up and started walking and her heart rate improved to the 60s. Patient denies chest pain, shortness of breath, fever, chills. Patient does endorse having elevated Cr for which she saw a renal doctor recently.

## 2021-01-25 NOTE — H&P ADULT - PROBLEM SELECTOR PLAN 9
Transitions of Care Status:  1.  Name of PCP: Dr. Jj  2.  PCP Contacted on Admission: [ ] Y    [ ] N    3.  PCP contacted at Discharge: [ ] Y    [ ] N    [ ] N/A  4.  Post-Discharge Appointment Date and Location:  5.  Summary of Handoff given to PCP:

## 2021-01-26 ENCOUNTER — APPOINTMENT (OUTPATIENT)
Dept: CARDIOLOGY | Facility: CLINIC | Age: 71
End: 2021-01-26

## 2021-01-26 LAB
ANION GAP SERPL CALC-SCNC: 8 MMOL/L — SIGNIFICANT CHANGE UP (ref 7–14)
BUN SERPL-MCNC: 26 MG/DL — HIGH (ref 7–23)
CALCIUM SERPL-MCNC: 9 MG/DL — SIGNIFICANT CHANGE UP (ref 8.4–10.5)
CHLORIDE SERPL-SCNC: 107 MMOL/L — SIGNIFICANT CHANGE UP (ref 98–107)
CO2 SERPL-SCNC: 26 MMOL/L — SIGNIFICANT CHANGE UP (ref 22–31)
CREAT SERPL-MCNC: 1.33 MG/DL — HIGH (ref 0.5–1.3)
GLUCOSE SERPL-MCNC: 78 MG/DL — SIGNIFICANT CHANGE UP (ref 70–99)
HCT VFR BLD CALC: 41.5 % — SIGNIFICANT CHANGE UP (ref 34.5–45)
HGB BLD-MCNC: 12.5 G/DL — SIGNIFICANT CHANGE UP (ref 11.5–15.5)
INR BLD: 2.16 RATIO — HIGH (ref 0.88–1.16)
MAGNESIUM SERPL-MCNC: 2 MG/DL — SIGNIFICANT CHANGE UP (ref 1.6–2.6)
MCHC RBC-ENTMCNC: 28.7 PG — SIGNIFICANT CHANGE UP (ref 27–34)
MCHC RBC-ENTMCNC: 30.1 GM/DL — LOW (ref 32–36)
MCV RBC AUTO: 95.2 FL — SIGNIFICANT CHANGE UP (ref 80–100)
NRBC # BLD: 0 /100 WBCS — SIGNIFICANT CHANGE UP
NRBC # FLD: 0 K/UL — SIGNIFICANT CHANGE UP
PHOSPHATE SERPL-MCNC: 3.4 MG/DL — SIGNIFICANT CHANGE UP (ref 2.5–4.5)
PLATELET # BLD AUTO: 171 K/UL — SIGNIFICANT CHANGE UP (ref 150–400)
POTASSIUM SERPL-MCNC: 4.5 MMOL/L — SIGNIFICANT CHANGE UP (ref 3.5–5.3)
POTASSIUM SERPL-SCNC: 4.5 MMOL/L — SIGNIFICANT CHANGE UP (ref 3.5–5.3)
PROTHROM AB SERPL-ACNC: 23.7 SEC — HIGH (ref 10.6–13.6)
RBC # BLD: 4.36 M/UL — SIGNIFICANT CHANGE UP (ref 3.8–5.2)
RBC # FLD: 13.5 % — SIGNIFICANT CHANGE UP (ref 10.3–14.5)
SODIUM SERPL-SCNC: 141 MMOL/L — SIGNIFICANT CHANGE UP (ref 135–145)
WBC # BLD: 5.64 K/UL — SIGNIFICANT CHANGE UP (ref 3.8–10.5)
WBC # FLD AUTO: 5.64 K/UL — SIGNIFICANT CHANGE UP (ref 3.8–10.5)

## 2021-01-26 PROCEDURE — 99232 SBSQ HOSP IP/OBS MODERATE 35: CPT

## 2021-01-26 RX ORDER — LOSARTAN POTASSIUM 100 MG/1
1 TABLET, FILM COATED ORAL
Qty: 0 | Refills: 0 | DISCHARGE

## 2021-01-26 RX ORDER — WARFARIN SODIUM 2.5 MG/1
8 TABLET ORAL
Qty: 0 | Refills: 0 | DISCHARGE

## 2021-01-26 RX ORDER — SODIUM CHLORIDE 0.65 %
1 AEROSOL, SPRAY (ML) NASAL EVERY 6 HOURS
Refills: 0 | Status: DISCONTINUED | OUTPATIENT
Start: 2021-01-26 | End: 2021-01-28

## 2021-01-26 RX ORDER — APIXABAN 2.5 MG/1
1 TABLET, FILM COATED ORAL
Qty: 60 | Refills: 0
Start: 2021-01-26 | End: 2021-02-24

## 2021-01-26 RX ADMIN — SODIUM CHLORIDE 75 MILLILITER(S): 9 INJECTION INTRAMUSCULAR; INTRAVENOUS; SUBCUTANEOUS at 22:09

## 2021-01-26 RX ADMIN — Medication 50 MILLIGRAM(S): at 17:19

## 2021-01-26 RX ADMIN — BUDESONIDE AND FORMOTEROL FUMARATE DIHYDRATE 2 PUFF(S): 160; 4.5 AEROSOL RESPIRATORY (INHALATION) at 21:59

## 2021-01-26 RX ADMIN — SODIUM CHLORIDE 75 MILLILITER(S): 9 INJECTION INTRAMUSCULAR; INTRAVENOUS; SUBCUTANEOUS at 06:01

## 2021-01-26 RX ADMIN — SODIUM CHLORIDE 3 MILLILITER(S): 9 INJECTION INTRAMUSCULAR; INTRAVENOUS; SUBCUTANEOUS at 05:55

## 2021-01-26 RX ADMIN — SODIUM CHLORIDE 75 MILLILITER(S): 9 INJECTION INTRAMUSCULAR; INTRAVENOUS; SUBCUTANEOUS at 17:19

## 2021-01-26 RX ADMIN — Medication 400 UNIT(S): at 11:57

## 2021-01-26 RX ADMIN — BUDESONIDE AND FORMOTEROL FUMARATE DIHYDRATE 2 PUFF(S): 160; 4.5 AEROSOL RESPIRATORY (INHALATION) at 09:22

## 2021-01-26 RX ADMIN — TIOTROPIUM BROMIDE 1 CAPSULE(S): 18 CAPSULE ORAL; RESPIRATORY (INHALATION) at 10:19

## 2021-01-26 RX ADMIN — Medication 1 SPRAY(S): at 22:09

## 2021-01-26 RX ADMIN — SODIUM CHLORIDE 3 MILLILITER(S): 9 INJECTION INTRAMUSCULAR; INTRAVENOUS; SUBCUTANEOUS at 22:00

## 2021-01-26 RX ADMIN — SODIUM CHLORIDE 3 MILLILITER(S): 9 INJECTION INTRAMUSCULAR; INTRAVENOUS; SUBCUTANEOUS at 13:22

## 2021-01-26 NOTE — PROGRESS NOTE ADULT - SUBJECTIVE AND OBJECTIVE BOX
Patient is a 70y old  Female who presents with a chief complaint of Bradycardia (26 Jan 2021 18:54)      INTERVAL HISTORY: feels ok  	  MEDICATIONS:  hydrALAZINE 50 milliGRAM(s) Oral two times a day        PHYSICAL EXAM:  T(C): 36.6 (01-26-21 @ 17:20), Max: 36.8 (01-26-21 @ 10:00)  HR: 85 (01-26-21 @ 17:20) (45 - 85)  BP: 147/55 (01-26-21 @ 17:20) (139/75 - 170/75)  RR: 18 (01-26-21 @ 17:20) (17 - 18)  SpO2: 97% (01-26-21 @ 17:20) (97% - 99%)  Wt(kg): --  I&O's Summary    25 Jan 2021 07:01  -  26 Jan 2021 07:00  --------------------------------------------------------  IN: 0 mL / OUT: 600 mL / NET: -600 mL      Height (cm): 154.9 (01-25 @ 23:35)  Weight (kg): 86.7 (01-25 @ 23:35)  BMI (kg/m2): 36.1 (01-25 @ 23:35)  BSA (m2): 1.85 (01-25 @ 23:35)    Appearance: In no distress	  HEENT:    PERRL, EOMI	  Cardiovascular:  S1 S2, No JVD  Respiratory: Lungs clear to auscultation	  Gastrointestinal:  Soft, Non-tender, + BS	  Vascularature:  No edema of LE  Psychiatric: Appropriate affect   Neuro: no acute focal deficits                               12.5   5.64  )-----------( 171      ( 26 Jan 2021 06:45 )             41.5     01-26    141  |  107  |  26<H>  ----------------------------<  78  4.5   |  26  |  1.33<H>    Ca    9.0      26 Jan 2021 06:45  Phos  3.4     01-26  Mg     2.0     01-26          Labs personally reviewed      Assessment /Plan:     · Assessment	  69 y/o F with PMH of Atrial fibrillation (On Warfarin), HTN, HLD, hypothyroidism, lower extremity edema, COPD presents to the ED as transfer from [facility] for bradycardia.     Problem/Plan - 1:  ·  Problem: Bradycardia.  Plan: Admit to tele.  Hold AV nate blocker, discontinue cardizem  TTE ordered.    Problem/Plan - 2:  ·  Problem: ROBYN (acute kidney injury). Plan: Cr 1.70. In March of 2020 Cr was 0.95-hold ARBcr.   improving  - [facility] renal consult      Problem/Plan - 3:  ·  Problem: Atrial fibrillation. Plan: CHADVASC score of 3.   Resume coumadin if no PPM planned    Problem/Plan - 4:  ·  Problem: HTN (hypertension).  Plan: Patient takes Losartan 100 mg daily.   Holding due to elevated Cr.  recc hydralazine 50mg BID and uptitrate as needed               Rashi Holland DO [facility]  Cardiovascular Medicine  [address], [address]  Office: [phone]  Cell: [phone]

## 2021-01-26 NOTE — PROGRESS NOTE ADULT - SUBJECTIVE AND OBJECTIVE BOX
Interval History:  Sinus bradycardia 40s bpm while sleeping; NSR 60-70's while awake  Patient denies any chest pain, palpitations, lightheadedness, dizziness   Able to ambulate without any presyncopal symptoms     MEDICATIONS  (STANDING):  budesonide 160 MICROgram(s)/formoterol 4.5 MICROgram(s) Inhaler 2 Puff(s) Inhalation two times a day  cholecalciferol 400 Unit(s) Oral daily  hydrALAZINE 50 milliGRAM(s) Oral two times a day  levothyroxine 25 MICROGram(s) Oral daily  sodium chloride 0.9% lock flush 3 milliLiter(s) IV Push every 8 hours  sodium chloride 0.9%. 500 milliLiter(s) (75 mL/Hr) IV Continuous <Continuous>  tiotropium 18 MICROgram(s) Capsule 1 Capsule(s) Inhalation daily    MEDICATIONS  (PRN):    Vital Signs Last 24 Hrs  T(C): 36.8 (26 Jan 2021 10:00), Max: 36.9 (25 Jan 2021 18:12)  T(F): 98.2 (26 Jan 2021 10:00), Max: 98.4 (25 Jan 2021 18:12)  HR: 65 (26 Jan 2021 10:00) (45 - 65)  BP: 151/74 (26 Jan 2021 10:00) (139/75 - 171/87)  BP(mean): --  RR: 18 (26 Jan 2021 10:00) (16 - 18)  SpO2: 97% (26 Jan 2021 10:00) (97% - 99%)    Appearance: Normal	  HEENT:   Normal oral mucosa, PERRL, EOMI	  Lymphatic: No lymphadenopathy  Cardiovascular: Normal S1 S2, No JVD, No murmurs, No edema  Respiratory: Lungs clear to auscultation	  Psychiatry: A & O x 3, Mood & affect appropriate  Gastrointestinal:  Soft, Non-tender, + BS	  Skin: No rashes, No ecchymoses, No cyanosis	  Neurologic: Non-focal  Extremities: Normal range of motion, No clubbing, cyanosis or edema  Vascular: Peripheral pulses palpable 2+ bilaterally    LABS:	 	    CBC Full  -  ( 26 Jan 2021 06:45 )  WBC Count : 5.64 K/uL  Hemoglobin : 12.5 g/dL  Hematocrit : 41.5 %  Platelet Count - Automated : 171 K/uL  Mean Cell Volume : 95.2 fL  Mean Cell Hemoglobin : 28.7 pg  Mean Cell Hemoglobin Concentration : 30.1 gm/dL  Auto Neutrophil # : x  Auto Lymphocyte # : x  Auto Monocyte # : x  Auto Eosinophil # : x  Auto Basophil # : x  Auto Neutrophil % : x  Auto Lymphocyte % : x  Auto Monocyte % : x  Auto Eosinophil % : x  Auto Basophil % : x    01-26    141  |  107  |  26<H>  ----------------------------<  78  4.5   |  26  |  1.33<H>  01-25    142  |  109<H>  |  29<H>  ----------------------------<  78  4.4   |  24  |  1.41<H>    Ca    9.0      26 Jan 2021 06:45  Ca    9.9      25 Jan 2021 06:28  Phos  3.4     01-26  Phos  3.0     01-25  Mg     2.0     01-26  Mg     2.2     01-25    TPro  7.7  /  Alb  4.1  /  TBili  0.7  /  DBili  x   /  AST  23  /  ALT  27  /  AlkPhos  98  01-24    PT/INR - ( 26 Jan 2021 06:45 )   PT: 23.7 sec;   INR: 2.16 ratio         PTT - ( 24 Jan 2021 16:03 )  PTT:45.5 sec    LIVER FUNCTIONS - ( 24 Jan 2021 16:03 )  Alb: 4.1 g/dL / Pro: 7.7 g/dL / ALK PHOS: 98 U/L / ALT: 27 U/L / AST: 23 U/L / GGT: x             CARDIAC MARKERS:  Troponin I, Serum: <.015 ng/mL (01-24 @ 16:03)

## 2021-01-26 NOTE — PROGRESS NOTE ADULT - ASSESSMENT
69 y/o F with PMH of Atrial fibrillation (On Warfarin), HTN, HLD, hypothyroidism, lower extremity edema, COPD presents to the ED as transfer from [facility] for bradycardia.

## 2021-01-26 NOTE — PROVIDER CONTACT NOTE (OTHER) - BACKGROUND
Patient is a 71 yo F admitted for bradycardia pending pacemaker placement, Zio device in place for remote telemetry monitoring [Since 1/7/21] The patient has been re-examined and I agree with the above assessment or I updated with my findings.

## 2021-01-26 NOTE — PROGRESS NOTE ADULT - ASSESSMENT
70 year old female with past medical history of afib on coumadin, hypothyroid, HTN HLD COPD, followed by Dr Palla recently seen in office for dizziness and given holter monitor now presenting with dizziness and bradycardia, transfered to [facility] for PPM    Patient's cardizem was discontinued with improvement of symptoms  Patient remains asymptomatic with HR in 60-70's when awake   Continue to monitor off cardizem  Hold AV nate blockers for now    70 year old female with past medical history of afib on coumadin, hypothyroid, HTN HLD COPD, followed by Dr Palla recently seen in office for dizziness and given holter monitor now presenting with dizziness and bradycardia, transfered to [facility] for PPM    Patient's cardizem was discontinued with improvement of symptoms  Patient remains asymptomatic with HR in 60-70's when awake   Continue to monitor off cardizem  Hold AV nate blockers for now   Hold PPM at this time  Follow up with Reilly Navarro MD after discharge; awaiting appointment date 70 year old female with past medical history of afib on coumadin, hypothyroid, HTN HLD COPD, followed by Dr Palla recently seen in office for dizziness and given holter monitor now presenting with dizziness and bradycardia, transfered to [facility] for PPM    Patient's cardizem was discontinued with improvement of symptoms  Patient remains asymptomatic with HR in 60-70's when awake   Continue to monitor off cardizem  Hold AV nate blockers for now   Hold PPM at this time  Follow up with Reilly Jacobson MD after discharge; awaiting appointment date

## 2021-01-26 NOTE — PROGRESS NOTE ADULT - SUBJECTIVE AND OBJECTIVE BOX
DATE OF SERVICE: 01-26-21 @ 18:54    Subjective: Patient seen and examined. No new events except as noted.   HR is betetr since stopping cardizem  no plan for PPM placement       REVIEW OF SYSTEMS:    CONSTITUTIONAL: No weakness, fevers or chills  EYES/ENT: No visual changes;  No vertigo or throat pain   NECK: No pain or stiffness  RESPIRATORY: No cough, wheezing, hemoptysis; No shortness of breath  CARDIOVASCULAR: No chest pain or palpitations  GASTROINTESTINAL: No abdominal or epigastric pain. No nausea, vomiting, or hematemesis; No diarrhea or constipation. No melena or hematochezia.  GENITOURINARY: No dysuria, frequency or hematuria  NEUROLOGICAL: No numbness or weakness  SKIN: No itching, burning, rashes, or lesions   All other review of systems is negative unless indicated above.    MEDICATIONS:  MEDICATIONS  (STANDING):  budesonide 160 MICROgram(s)/formoterol 4.5 MICROgram(s) Inhaler 2 Puff(s) Inhalation two times a day  cholecalciferol 400 Unit(s) Oral daily  hydrALAZINE 50 milliGRAM(s) Oral two times a day  levothyroxine 25 MICROGram(s) Oral daily  sodium chloride 0.9% lock flush 3 milliLiter(s) IV Push every 8 hours  sodium chloride 0.9%. 500 milliLiter(s) (75 mL/Hr) IV Continuous <Continuous>  tiotropium 18 MICROgram(s) Capsule 1 Capsule(s) Inhalation daily      PHYSICAL EXAM:  T(C): 36.6 (01-26-21 @ 17:20), Max: 36.8 (01-26-21 @ 10:00)  HR: 85 (01-26-21 @ 17:20) (45 - 85)  BP: 147/55 (01-26-21 @ 17:20) (139/75 - 170/75)  RR: 18 (01-26-21 @ 17:20) (17 - 18)  SpO2: 97% (01-26-21 @ 17:20) (97% - 99%)  Wt(kg): --  I&O's Summary    25 Jan 2021 07:01  -  26 Jan 2021 07:00  --------------------------------------------------------  IN: 0 mL / OUT: 600 mL / NET: -600 mL      Height (cm): 154.9 (01-25 @ 23:35)  Weight (kg): 86.7 (01-25 @ 23:35)  BMI (kg/m2): 36.1 (01-25 @ 23:35)  BSA (m2): 1.85 (01-25 @ 23:35)    Appearance: Normal	  HEENT:  PERRLA   Lymphatic: No lymphadenopathy   Cardiovascular: Normal S1 S2, no JVD  Respiratory: normal effort , clear  Gastrointestinal:  Soft, Non-tender  Skin: No rashes,  warm to touch  Psychiatry:  Mood & affect appropriate  Musculuskeletal: No edema      All labs, Imaging and EKGs personally reviewed                           12.5   5.64  )-----------( 171      ( 26 Jan 2021 06:45 )             41.5               01-26    141  |  107  |  26<H>  ----------------------------<  78  4.5   |  26  |  1.33<H>    Ca    9.0      26 Jan 2021 06:45  Phos  3.4     01-26  Mg     2.0     01-26      PT/INR - ( 26 Jan 2021 06:45 )   PT: 23.7 sec;   INR: 2.16 ratio

## 2021-01-26 NOTE — PROVIDER CONTACT NOTE (OTHER) - ASSESSMENT
NPO after Midnight, IV Fluids infusing, telemetry monitoring in place, patient noted with asymptomatic bradycardia while sleeping overnight, Zoll and atropine at bedside.

## 2021-01-26 NOTE — PROVIDER CONTACT NOTE (OTHER) - SITUATION
Patient c/o of 2/10 [place] and states it may be related to being nervous. Patients Blood pressure 170/75 HR: 58

## 2021-01-27 ENCOUNTER — TRANSCRIPTION ENCOUNTER (OUTPATIENT)
Age: 71
End: 2021-01-27

## 2021-01-27 LAB
ANION GAP SERPL CALC-SCNC: 10 MMOL/L — SIGNIFICANT CHANGE UP (ref 7–14)
APTT BLD: 37.3 SEC — HIGH (ref 27–36.3)
BUN SERPL-MCNC: 27 MG/DL — HIGH (ref 7–23)
CALCIUM SERPL-MCNC: 9.3 MG/DL — SIGNIFICANT CHANGE UP (ref 8.4–10.5)
CHLORIDE SERPL-SCNC: 107 MMOL/L — SIGNIFICANT CHANGE UP (ref 98–107)
CO2 SERPL-SCNC: 25 MMOL/L — SIGNIFICANT CHANGE UP (ref 22–31)
CREAT SERPL-MCNC: 1.38 MG/DL — HIGH (ref 0.5–1.3)
GLUCOSE SERPL-MCNC: 72 MG/DL — SIGNIFICANT CHANGE UP (ref 70–99)
HCT VFR BLD CALC: 40.3 % — SIGNIFICANT CHANGE UP (ref 34.5–45)
HGB BLD-MCNC: 12.4 G/DL — SIGNIFICANT CHANGE UP (ref 11.5–15.5)
INR BLD: 1.68 RATIO — HIGH (ref 0.88–1.16)
MCHC RBC-ENTMCNC: 28.8 PG — SIGNIFICANT CHANGE UP (ref 27–34)
MCHC RBC-ENTMCNC: 30.8 GM/DL — LOW (ref 32–36)
MCV RBC AUTO: 93.5 FL — SIGNIFICANT CHANGE UP (ref 80–100)
NRBC # BLD: 0 /100 WBCS — SIGNIFICANT CHANGE UP
NRBC # FLD: 0 K/UL — SIGNIFICANT CHANGE UP
PLATELET # BLD AUTO: 166 K/UL — SIGNIFICANT CHANGE UP (ref 150–400)
POTASSIUM SERPL-MCNC: 4.3 MMOL/L — SIGNIFICANT CHANGE UP (ref 3.5–5.3)
POTASSIUM SERPL-SCNC: 4.3 MMOL/L — SIGNIFICANT CHANGE UP (ref 3.5–5.3)
PROTHROM AB SERPL-ACNC: 18.8 SEC — HIGH (ref 10.6–13.6)
RBC # BLD: 4.31 M/UL — SIGNIFICANT CHANGE UP (ref 3.8–5.2)
RBC # FLD: 13.5 % — SIGNIFICANT CHANGE UP (ref 10.3–14.5)
SODIUM SERPL-SCNC: 142 MMOL/L — SIGNIFICANT CHANGE UP (ref 135–145)
WBC # BLD: 6.16 K/UL — SIGNIFICANT CHANGE UP (ref 3.8–10.5)
WBC # FLD AUTO: 6.16 K/UL — SIGNIFICANT CHANGE UP (ref 3.8–10.5)

## 2021-01-27 PROCEDURE — 99232 SBSQ HOSP IP/OBS MODERATE 35: CPT

## 2021-01-27 PROCEDURE — 93306 TTE W/DOPPLER COMPLETE: CPT | Mod: 26

## 2021-01-27 RX ORDER — APIXABAN 2.5 MG/1
5 TABLET, FILM COATED ORAL EVERY 12 HOURS
Refills: 0 | Status: DISCONTINUED | OUTPATIENT
Start: 2021-01-27 | End: 2021-01-28

## 2021-01-27 RX ORDER — CHOLECALCIFEROL (VITAMIN D3) 125 MCG
400 CAPSULE ORAL
Qty: 0 | Refills: 0 | DISCHARGE
Start: 2021-01-27

## 2021-01-27 RX ORDER — HYDRALAZINE HCL 50 MG
1 TABLET ORAL
Qty: 60 | Refills: 0
Start: 2021-01-27 | End: 2021-02-25

## 2021-01-27 RX ORDER — CHOLECALCIFEROL (VITAMIN D3) 125 MCG
0 CAPSULE ORAL
Qty: 0 | Refills: 0 | DISCHARGE

## 2021-01-27 RX ADMIN — APIXABAN 5 MILLIGRAM(S): 2.5 TABLET, FILM COATED ORAL at 18:18

## 2021-01-27 RX ADMIN — SODIUM CHLORIDE 3 MILLILITER(S): 9 INJECTION INTRAMUSCULAR; INTRAVENOUS; SUBCUTANEOUS at 21:22

## 2021-01-27 RX ADMIN — TIOTROPIUM BROMIDE 1 CAPSULE(S): 18 CAPSULE ORAL; RESPIRATORY (INHALATION) at 08:50

## 2021-01-27 RX ADMIN — SODIUM CHLORIDE 75 MILLILITER(S): 9 INJECTION INTRAMUSCULAR; INTRAVENOUS; SUBCUTANEOUS at 05:45

## 2021-01-27 RX ADMIN — SODIUM CHLORIDE 3 MILLILITER(S): 9 INJECTION INTRAMUSCULAR; INTRAVENOUS; SUBCUTANEOUS at 12:32

## 2021-01-27 RX ADMIN — Medication 25 MICROGRAM(S): at 05:45

## 2021-01-27 RX ADMIN — Medication 50 MILLIGRAM(S): at 18:17

## 2021-01-27 RX ADMIN — SODIUM CHLORIDE 3 MILLILITER(S): 9 INJECTION INTRAMUSCULAR; INTRAVENOUS; SUBCUTANEOUS at 05:46

## 2021-01-27 RX ADMIN — Medication 400 UNIT(S): at 12:04

## 2021-01-27 RX ADMIN — BUDESONIDE AND FORMOTEROL FUMARATE DIHYDRATE 2 PUFF(S): 160; 4.5 AEROSOL RESPIRATORY (INHALATION) at 08:50

## 2021-01-27 RX ADMIN — Medication 1 SPRAY(S): at 18:18

## 2021-01-27 RX ADMIN — BUDESONIDE AND FORMOTEROL FUMARATE DIHYDRATE 2 PUFF(S): 160; 4.5 AEROSOL RESPIRATORY (INHALATION) at 21:25

## 2021-01-27 RX ADMIN — Medication 50 MILLIGRAM(S): at 05:45

## 2021-01-27 NOTE — PROGRESS NOTE ADULT - SUBJECTIVE AND OBJECTIVE BOX
DATE OF SERVICE: 01-27-21 @ 18:58    Subjective: Patient seen and examined. No new events except as noted.   doing okay HR controlled  started on Eliquis     REVIEW OF SYSTEMS:    CONSTITUTIONAL: No weakness, fevers or chills  EYES/ENT: No visual changes;  No vertigo or throat pain   NECK: No pain or stiffness  RESPIRATORY: No cough, wheezing, hemoptysis; No shortness of breath  CARDIOVASCULAR: No chest pain or palpitations  GASTROINTESTINAL: No abdominal or epigastric pain. No nausea, vomiting, or hematemesis; No diarrhea or constipation. No melena or hematochezia.  GENITOURINARY: No dysuria, frequency or hematuria  NEUROLOGICAL: No numbness or weakness  SKIN: No itching, burning, rashes, or lesions   All other review of systems is negative unless indicated above.    MEDICATIONS:  MEDICATIONS  (STANDING):  apixaban 5 milliGRAM(s) Oral every 12 hours  budesonide 160 MICROgram(s)/formoterol 4.5 MICROgram(s) Inhaler 2 Puff(s) Inhalation two times a day  cholecalciferol 400 Unit(s) Oral daily  hydrALAZINE 50 milliGRAM(s) Oral two times a day  levothyroxine 25 MICROGram(s) Oral daily  sodium chloride 0.9% lock flush 3 milliLiter(s) IV Push every 8 hours  sodium chloride 0.9%. 500 milliLiter(s) (75 mL/Hr) IV Continuous <Continuous>  tiotropium 18 MICROgram(s) Capsule 1 Capsule(s) Inhalation daily      PHYSICAL EXAM:  T(C): 36.6 (01-27-21 @ 18:16), Max: 36.9 (01-26-21 @ 21:58)  HR: 73 (01-27-21 @ 18:16) (62 - 99)  BP: 176/75 (01-27-21 @ 18:16) (148/66 - 176/75)  RR: 18 (01-27-21 @ 18:16) (18 - 18)  SpO2: 98% (01-27-21 @ 18:16) (96% - 98%)  Wt(kg): --  I&O's Summary        Appearance: Normal	  HEENT:  PERRLA   Lymphatic: No lymphadenopathy   Cardiovascular: Normal S1 S2, no JVD  Respiratory: normal effort , clear  Gastrointestinal:  Soft, Non-tender  Skin: No rashes,  warm to touch  Psychiatry:  Mood & affect appropriate  Musculuskeletal: No edema      All labs, Imaging and EKGs personally reviewed                           12.4   6.16  )-----------( 166      ( 27 Jan 2021 07:21 )             40.3               01-27    142  |  107  |  27<H>  ----------------------------<  72  4.3   |  25  |  1.38<H>    Ca    9.3      27 Jan 2021 07:21  Phos  3.4     01-26  Mg     2.0     01-26      PT/INR - ( 27 Jan 2021 07:21 )   PT: 18.8 sec;   INR: 1.68 ratio         PTT - ( 27 Jan 2021 07:21 )  PTT:37.3 sec                < from: Transthoracic Echocardiogram (01.27.21 @ 02:49) >  CONCLUSIONS:  1. Mitral annular calcification, otherwise normal mitral  valve. Mild mitral regurgitation.  2. Calcified trileaflet aortic valve with decreased  opening. Peak transaortic valve gradient equals 22 mm Hg,  mean transaortic valve gradient equals 11 mm Hg, consistent  with mild aortic stenosis. Mild aortic regurgitation.  3. Mildly dilated left atrium.  LA volume index = 36 cc/m2.  4. Normal left ventricular internal dimensions and wall  thicknesses.  5. Normal left ventricular systolic function. No segmental  wall motion abnormalities.  6. Mild diastolic dysfunction (Stage I).  7. Mild right atrial enlargement.  8. Normal right ventricular size and function.  9. Normal pericardium with trace pericardial effusion.

## 2021-01-27 NOTE — DISCHARGE NOTE PROVIDER - NSDCFUSCHEDAPPT_GEN_ALL_CORE_FT
BALBIR ANGEL ; 02/11/2021 ; [facility] [address]  ABLBIR ANGEL ; 02/25/2021 ; NPP Cardio [facility] [phone]

## 2021-01-27 NOTE — PROGRESS NOTE ADULT - SUBJECTIVE AND OBJECTIVE BOX
Subjective: No new complaints. Denies HA, lightheadedness, dizziness, CP, SOB, abdominal pain, N/V.      Interval events:  - Presented to [facility] ED for symptomatic bradycardia (lightheadedness, dizziness) who was transfer to [facility] for possisble PPM. Of note patient went to see her cardiologist on 1/9/2021 who noticed that her HR was low patient was given a Holter monitor.   - Home Cardizem was d/c with improvement in symptoms and SB overnight with chronotropic response. HR 70-80s when awake/ ambulating.  - F/u with Dr. Jacobson in the office from continue monitoring for SB while off Cardizem No plan for PPM during this admission as patient has chronotropic response      MEDICATIONS  (STANDING):  budesonide 160 MICROgram(s)/formoterol 4.5 MICROgram(s) Inhaler 2 Puff(s) Inhalation two times a day  cholecalciferol 400 Unit(s) Oral daily  hydrALAZINE 50 milliGRAM(s) Oral two times a day  levothyroxine 25 MICROGram(s) Oral daily  sodium chloride 0.9% lock flush 3 milliLiter(s) IV Push every 8 hours  sodium chloride 0.9%. 500 milliLiter(s) (75 mL/Hr) IV Continuous <Continuous>  tiotropium 18 MICROgram(s) Capsule 1 Capsule(s) Inhalation daily    MEDICATIONS  (PRN):  sodium chloride 0.65% Nasal 1 Spray(s) Both Nostrils every 6 hours PRN Nasal Congestion      Vital Signs Last 24 Hrs  T(C): 36.7 (27 Jan 2021 05:44), Max: 36.9 (26 Jan 2021 21:58)  T(F): 98.1 (27 Jan 2021 05:44), Max: 98.5 (26 Jan 2021 21:58)  HR: 65 (27 Jan 2021 05:44) (62 - 85)  BP: 155/65 (27 Jan 2021 05:44) (146/72 - 155/65)  BP(mean): --  RR: 18 (27 Jan 2021 05:44) (18 - 18)  SpO2: 97% (27 Jan 2021 05:44) (96% - 97%)  I&O's Detail      Physical Exam:  GENERAL: Lying in bed, well appearing, speaking in full sentence, in NAD  HEART: S1S2 RRR; No murmurs, rubs, or gallops appreciated  PULMONARY: CTABL, normal respiratory effort.  No rales, wheezing, or rhonchi apperciated bilaterally.  ABDOMEN: + Bowel sounds present, soft, NDNT  EXTREMITIES:  Warm, well -perfused, no pedal edema, distal pulses present  NEUROLOGICAL:AOx3    TELEMETERIC:                            12.4   6.16  )-----------( 166      ( 27 Jan 2021 07:21 )             40.3     PT/INR - ( 26 Jan 2021 06:45 )   PT: 23.7 sec;   INR: 2.16 ratio           01-26    141  |  107  |  26<H>  ----------------------------<  78  4.5   |  26  |  1.33<H>    Ca    9.0      26 Jan 2021 06:45  Phos  3.4     01-26  Mg     2.0     01-26             Subjective: No complaints. Denies HA, lightheadedness, dizziness, CP, SOB, abdominal pain, N/V.      Interval events:  - Presented to [facility] ED for symptomatic bradycardia (lightheadedness, dizziness) who was transfer to [facility] for possisble PPM. Of note patient went to see her cardiologist on 1/9/2021 who noticed that her HR was low patient was given a Holter monitor.   - Home Cardizem was d/c with improvement in symptoms and SB. Patient does have chronotropic response. HR 70-80s when awake/ ambulating when she was in SB.  - F/u with Dr. Jacobson in the office from continue monitoring for SB while off Cardizem No plan for PPM during this admission as patient has chronotropic response. Patient has an appointment on 2/22/2021 at 1pm    MEDICATIONS  (STANDING):  budesonide 160 MICROgram(s)/formoterol 4.5 MICROgram(s) Inhaler 2 Puff(s) Inhalation two times a day  cholecalciferol 400 Unit(s) Oral daily  hydrALAZINE 50 milliGRAM(s) Oral two times a day  levothyroxine 25 MICROGram(s) Oral daily  sodium chloride 0.9% lock flush 3 milliLiter(s) IV Push every 8 hours  sodium chloride 0.9%. 500 milliLiter(s) (75 mL/Hr) IV Continuous <Continuous>  tiotropium 18 MICROgram(s) Capsule 1 Capsule(s) Inhalation daily    MEDICATIONS  (PRN):  sodium chloride 0.65% Nasal 1 Spray(s) Both Nostrils every 6 hours PRN Nasal Congestion      Vital Signs Last 24 Hrs  T(C): 36.7 (27 Jan 2021 05:44), Max: 36.9 (26 Jan 2021 21:58)  T(F): 98.1 (27 Jan 2021 05:44), Max: 98.5 (26 Jan 2021 21:58)  HR: 65 (27 Jan 2021 05:44) (62 - 85)  BP: 155/65 (27 Jan 2021 05:44) (146/72 - 155/65)  BP(mean): --  RR: 18 (27 Jan 2021 05:44) (18 - 18)  SpO2: 97% (27 Jan 2021 05:44) (96% - 97%)  I&O's Detail      Physical Exam:  GENERAL: Lying in bed, well appearing, speaking in full sentence, in NAD  HEART: S1S2 RRR; No murmurs, rubs, or gallops appreciated  PULMONARY: CTABL, normal respiratory effort.  No rales, wheezing, or rhonchi appreciated bilaterally.  ABDOMEN: + Bowel sounds present, soft, NDNT  EXTREMITIES:  Warm, well -perfused, no pedal edema, distal pulses present  NEUROLOGICAL:AOx3    TELEMETERIC:SR 70-80s with PVCs                            12.4   6.16  )-----------( 166      ( 27 Jan 2021 07:21 )             40.3     PT/INR - ( 26 Jan 2021 06:45 )   PT: 23.7 sec;   INR: 2.16 ratio           01-26    141  |  107  |  26<H>  ----------------------------<  78  4.5   |  26  |  1.33<H>    Ca    9.0      26 Jan 2021 06:45  Phos  3.4     01-26  Mg     2.0     01-26             Subjective: No complaints. Denies HA, lightheadedness, dizziness, CP, SOB, abdominal pain, N/V.      Interval events:  - Presented to [facility] ED for symptomatic bradycardia (lightheadedness, dizziness) and was transfer to [facility] for possible PPM. Of note patient went to see her cardiologist on 1/9/2021 who noticed that her HR was low. Patient was given a Holter monitor.   - Home Cardizem was d/c with improvement in symptoms and SB. Patient does have chronotropic response. HR 70-80s when awake/ ambulating when she was in SB.  - F/u with Dr. Jacobson in the office from continue monitoring for SB while off Cardizem No plan for PPM during this admission as patient has chronotropic response. Patient has an appointment on 2/22/2021 at 1pm    MEDICATIONS  (STANDING):  budesonide 160 MICROgram(s)/formoterol 4.5 MICROgram(s) Inhaler 2 Puff(s) Inhalation two times a day  cholecalciferol 400 Unit(s) Oral daily  hydrALAZINE 50 milliGRAM(s) Oral two times a day  levothyroxine 25 MICROGram(s) Oral daily  sodium chloride 0.9% lock flush 3 milliLiter(s) IV Push every 8 hours  sodium chloride 0.9%. 500 milliLiter(s) (75 mL/Hr) IV Continuous <Continuous>  tiotropium 18 MICROgram(s) Capsule 1 Capsule(s) Inhalation daily    MEDICATIONS  (PRN):  sodium chloride 0.65% Nasal 1 Spray(s) Both Nostrils every 6 hours PRN Nasal Congestion      Vital Signs Last 24 Hrs  T(C): 36.7 (27 Jan 2021 05:44), Max: 36.9 (26 Jan 2021 21:58)  T(F): 98.1 (27 Jan 2021 05:44), Max: 98.5 (26 Jan 2021 21:58)  HR: 65 (27 Jan 2021 05:44) (62 - 85)  BP: 155/65 (27 Jan 2021 05:44) (146/72 - 155/65)  BP(mean): --  RR: 18 (27 Jan 2021 05:44) (18 - 18)  SpO2: 97% (27 Jan 2021 05:44) (96% - 97%)  I&O's Detail      Physical Exam:  GENERAL: Lying in bed, well appearing, speaking in full sentence, in NAD  HEART: S1S2 RRR; No murmurs, rubs, or gallops appreciated  PULMONARY: CTABL, normal respiratory effort.  No rales, wheezing, or rhonchi appreciated bilaterally.  ABDOMEN: + Bowel sounds present, soft, NDNT  EXTREMITIES:  Warm, well -perfused, no pedal edema, distal pulses present  NEUROLOGICAL:AOx3    TELEMETERIC:SR 70-80s with PVCs                            12.4   6.16  )-----------( 166      ( 27 Jan 2021 07:21 )             40.3     PT/INR - ( 26 Jan 2021 06:45 )   PT: 23.7 sec;   INR: 2.16 ratio           01-26    141  |  107  |  26<H>  ----------------------------<  78  4.5   |  26  |  1.33<H>    Ca    9.0      26 Jan 2021 06:45  Phos  3.4     01-26  Mg     2.0     01-26

## 2021-01-27 NOTE — DISCHARGE NOTE PROVIDER - HOSPITAL COURSE
69 y/o F with PMH of Atrial fibrillation (On Warfarin), HTN, HLD, hypothyroidism, lower extremity edema, COPD presents to the ED as transfer from [facility] for bradycardia.     Hospital Course    · Bradycardia.  Plan: Cont tele,   EP recs appreciated.  Monitor HR currently in 70s.  Keep atropine and zoll at bedside.  Hold AV nate blockers and monitor on tele   TTE ordered.  PPM placement canceled due to improvement o HR off Cardizem   Coumadin D/Candido, Now on Eliquis    · ROBYN (acute kidney injury).  Plan: monitor renal function   avoid nephrotoxic medications   monitor urine output.     ·  Atrial fibrillation.  Plan: CHADVASC score of 3.   warfarin switch to eliquis for AC   Echo:-------------------------------------------------  Monitor HR.     ·  COPD (chronic obstructive pulmonary disease).  Plan: Continue Spiriva, and Advair.     ·  HTN (hypertension).  Plan: Patient takes Losartan 100 mg daily.   Will hold due to elevated Cr.     .   Hyperlipidemia. Plan: Lipid panel   Patient on red yeast rice  Continue to monitoring.    · Hypothyroid.  Plan: TFts   Continue Levothyroxine 25 mcg daily.   Case discussed with attending, Pt is stable for Discharge  Home 71 y/o F with PMH of Atrial fibrillation (On Warfarin), HTN, HLD, hypothyroidism, lower extremity edema, COPD presents to the ED as transfer from [facility] for bradycardia.     Hospital Course    · Bradycardia.  Plan: Cont tele,   EP recs appreciated.  Monitor HR currently in 70s.  Keep atropine and zoll at bedside.  Hold AV nate blockers and monitor on tele   TTE : Unremarkable  PPM placement canceled due to improvement o HR off Cardizem , Pt to follow up with Dr Jacobson on 2/22/2021 at 1pm.     Coumadin D/Candido, Now on Eliquis    · ROBYN (acute kidney injury).  Plan: monitor renal function   avoid nephrotoxic medications   monitor urine output.     ·  Atrial fibrillation.  Plan: CHADVASC score of 3.   warfarin switch to eliquis for AC   Monitor HR.     ·  COPD (chronic obstructive pulmonary disease).  Plan: Continue Spiriva, and Advair.     ·  HTN (hypertension).  Plan: Patient takes Losartan 100 mg daily.   Will hold due to elevated Cr.     .   Hyperlipidemia. Plan: Lipid panel   Patient on red yeast rice  Continue to monitoring.    · Hypothyroid.  Plan: TFts   Continue Levothyroxine 25 mcg daily.   Case discussed with attending, Pt is stable for Discharge  Home

## 2021-01-27 NOTE — PROGRESS NOTE ADULT - ASSESSMENT
This is a 70 year old woman with a pmhx of Atrial fibrillation (On Warfarin, CHADVASC score of 3), HTN, HLD, hypothyroidism, COPD who presented to [facility] ED for symptomatic bradycardia (lightheadedness, dizziness) who was [facility]. Home Cardizem was d/c with improvement in symptoms. Patient also demonstrate chronotropic response. HR 70-80s when awake/ ambulating.     Plan:  - Continuous telemeteric monitoring for bradycardia  - Continue to hold home Cardizem  - Hold AV nate blockers for now   - Given that patient symptoms has improve off cardizem and has chronotropic response, PPM is not indicated at this time. Patient will f/u with Dr. Jacobson for continue monitoring for bradycardia and symptoms while off Cardizem     Gavin Chino PA-C  Patient to be staff with attending. Please awaiting attending addendum      This is a 70 year old woman with a pmhx of Atrial fibrillation (On Warfarin, CHADVASC score of 3), HTN, HLD, hypothyroidism, COPD who presented to [facility] ED for symptomatic bradycardia (lightheadedness, dizziness) who was transfer to [facility] for PPM. Home Cardizem was d/c with improvement in symptoms. Patient also demonstrate chronotropic response. HR 70-80s when awake/ ambulating.     Plan:  - Continuous telemeteric monitoring for bradycardia  - Continue to hold home Cardizem  - Hold AV nate blockers for now   - Given that patient symptoms has improve off cardizem and has chronotropic response, PPM is not indicated at this time. Patient will f/u with Dr. Jacobson for continue monitoring for bradycardia and symptoms while off Cardizem. Patient has an appointment on 2/22/2021 at 1pm     Gavin Chino PA-C  Patient to be staff with attending. Please awaiting attending addendum      This is a 70 year old woman with a pmhx of Atrial fibrillation (On Warfarin, CHADVASC score of 3), HTN, HLD, hypothyroidism, COPD who presented to [facility] ED for symptomatic bradycardia (lightheadedness, dizziness) who was transfer to [facility] for PPM. Home Cardizem was d/c with improvement in symptoms. Patient also demonstrate chronotropic response. HR 70-80s when awake/ ambulating.     Plan:  - Continuous telemeteric monitoring for bradycardia  - Monitor electrolytes and replete K to 4 and Mg to 2  - Continue to hold home Cardizem  - Hold AV nate blockers for now   - Given that patient symptoms has improve off cardizem and has chronotropic response, PPM is not indicated at this time. Patient will f/u with Dr. Jacobson for continue monitoring for bradycardia and symptoms while off Cardizem. Patient has an appointment on 2/22/2021 at 1pm   - Continue care per primary team     Gavin Chino PA-C  Patient to be staff with attending. Please awaiting attending addendum

## 2021-01-27 NOTE — DISCHARGE NOTE PROVIDER - CARE PROVIDERS DIRECT ADDRESSES
,DirectAddress_Unknown ,DirectAddress_Unknown,[email or web address]

## 2021-01-27 NOTE — PROGRESS NOTE ADULT - ASSESSMENT
69 y/o F with PMH of Atrial fibrillation (On Warfarin), HTN, HLD, hypothyroidism, lower extremity edema, COPD presents to the ED as transfer from [facility] for bradycardia.

## 2021-01-27 NOTE — PROGRESS NOTE ADULT - SUBJECTIVE AND OBJECTIVE BOX
Patient is a 70y old  Female who presents with a chief complaint of Bradycardia (27 Jan 2021 15:24)      INTERVAL HISTORY: feels ok going home today     REVIEW OF SYSTEMS:   CONSTITUTIONAL: No weakness  EYES/ENT: No visual changes; No throat pain  Neck: No pain or stiffness  Respiratory: No cough, wheezing, No shortness of breath  CARDIOVASCULAR: no chest pain or palpitations  GASTROINTESTINAL: No abdominal pain, no nausea, vomiting or hematemesis  GENITOURINARY: No dysuria, frequency or hematuria  NEUROLOGICAL: No stroke like symptoms  SKIN: No rashes  	  MEDICATIONS:  hydrALAZINE 50 milliGRAM(s) Oral two times a day      PHYSICAL EXAM:  T(C): 36.7 (01-27-21 @ 13:40), Max: 36.9 (01-26-21 @ 21:58)  HR: 89 (01-27-21 @ 13:40) (62 - 99)  BP: 151/71 (01-27-21 @ 13:40) (147/55 - 155/65)  RR: 18 (01-27-21 @ 13:40) (18 - 18)  SpO2: 97% (01-27-21 @ 13:40) (96% - 98%)  Wt(kg): --  I&O's Summary        Appearance: In no distress	  HEENT:    PERRL, EOMI	  Cardiovascular:  S1 S2, No JVD  Respiratory: Lungs clear to auscultation	  Gastrointestinal:  Soft, Non-tender, + BS	  Vascularature:  No edema of LE  Psychiatric: Appropriate affect   Neuro: no acute focal deficits                           12.4   6.16  )-----------( 166      ( 27 Jan 2021 07:21 )             40.3     01-27    142  |  107  |  27<H>  ----------------------------<  72  4.3   |  25  |  1.38<H>    Ca    9.3      27 Jan 2021 07:21  Phos  3.4     01-26  Mg     2.0     01-26    Labs personally reviewed  ASSESSMENT/PLAN: 	    69 y/o F with PMH of Atrial fibrillation (On Warfarin), HTN, HLD, hypothyroidism, lower extremity edema, COPD presents to the ED as transfer from [facility] for bradycardia.     Problem/Plan - 1:  ·  Problem: Bradycardia.  Plan: Admit to tele.  Hold AV nate blocker, discontinue cardizem  Heart rate improved off cardizem - PPM implant discontinued.   echo     Problem/Plan - 2:  ·  Problem: ROBYN (acute kidney injury). Plan: Cr 1.70. In March of 2020 Cr was 0.95-hold ARBcr.   improving  - [facility] renal consult      Problem/Plan - 3:  ·  Problem: Atrial fibrillation. Plan: CHADVASC score of 3.   switched from coumadin to eliquis today     Problem/Plan - 4:  ·  Problem: HTN (hypertension).  Plan: Patient takes Losartan 100 mg daily.   Holding due to elevated Cr.  recc hydralazine 50mg BID and uptitrate as needed               Lauren Holland DO [facility]  Cardiovascular Medicine  [address] [place] [address], [address]  Office: [phone]  Cell: [phone]

## 2021-01-27 NOTE — DISCHARGE NOTE PROVIDER - NSDCCPCAREPLAN_GEN_ALL_CORE_FT
PRINCIPAL DISCHARGE DIAGNOSIS  Diagnosis: Bradycardia  Assessment and Plan of Treatment: Now Improved, Follow up with Your Cardiologist      SECONDARY DISCHARGE DIAGNOSES  Diagnosis: Hypothyroid  Assessment and Plan of Treatment: Continue withYour Current Medication    Diagnosis: Hyperlipidemia  Assessment and Plan of Treatment: Low fat diet, exercise daily and continue current medications. Follow up with primary care physician and cardiologist for management.      Diagnosis: HTN (hypertension)  Assessment and Plan of Treatment: Low sodium and fat diet, continue anti-hypertensive medications, and follow up with primary care physician.      Diagnosis: COPD (chronic obstructive pulmonary disease)  Assessment and Plan of Treatment: Smoking cessation, continue current medications as prescribed. Follow up with your routine physician's appointments.      Diagnosis: Atrial fibrillation  Assessment and Plan of Treatment: Please take your medications as prescribed.  Continue to take your blood thinner as prescribed and follow with your physician to monitor your levels.  Low fat diet, reduce caffeine intake, and exercise at least 30 minutes daily.      Diagnosis: ROBYN (acute kidney injury)  Assessment and Plan of Treatment: Stable forNow, Please repeat BMP in 1 week at your PMD     PRINCIPAL DISCHARGE DIAGNOSIS  Diagnosis: Bradycardia  Assessment and Plan of Treatment: Now Improved, Follow up with Dr Jacobson on 2/22/2021 at 1pm.         SECONDARY DISCHARGE DIAGNOSES  Diagnosis: Hypothyroid  Assessment and Plan of Treatment: Continue withYour Current Medication    Diagnosis: Hyperlipidemia  Assessment and Plan of Treatment: Low fat diet, exercise daily and continue current medications. Follow up with primary care physician and cardiologist for management.      Diagnosis: HTN (hypertension)  Assessment and Plan of Treatment: Low sodium and fat diet, continue anti-hypertensive medications, and follow up with primary care physician.      Diagnosis: COPD (chronic obstructive pulmonary disease)  Assessment and Plan of Treatment: Smoking cessation, continue current medications as prescribed. Follow up with your routine physician's appointments.      Diagnosis: Atrial fibrillation  Assessment and Plan of Treatment: Please take your medications as prescribed.  Continue to take your blood thinner as prescribed and follow with your physician to monitor your levels.  Low fat diet, reduce caffeine intake, and exercise at least 30 minutes daily.      Diagnosis: ROBYN (acute kidney injury)  Assessment and Plan of Treatment: Stable forNow, Please repeat BMP in 1 week at your PMD

## 2021-01-27 NOTE — DISCHARGE NOTE PROVIDER - NSDCMRMEDTOKEN_GEN_ALL_CORE_FT
cholecalciferol oral tablet: 400 unit(s) orally once a day  Eliquis 5 mg oral tablet: 1 tab(s) orally 2 times a day     AFIB   Test [id number]  hydrALAZINE 50 mg oral tablet: 1 tab(s) orally 2 times a day  levothyroxine 25 mcg (0.025 mg) oral tablet: 1 tab(s) orally once a day  Spiriva 18 mcg inhalation capsule: 1 each inhaled once a day

## 2021-01-27 NOTE — DISCHARGE NOTE PROVIDER - PROVIDER TOKENS
PROVIDER:[TOKEN:[[id number]]] PROVIDER:[TOKEN:[[id number]]],PROVIDER:[TOKEN:[[id number]]]

## 2021-01-28 ENCOUNTER — TRANSCRIPTION ENCOUNTER (OUTPATIENT)
Age: 71
End: 2021-01-28

## 2021-01-28 VITALS
TEMPERATURE: 98 F | SYSTOLIC BLOOD PRESSURE: 154 MMHG | OXYGEN SATURATION: 96 % | DIASTOLIC BLOOD PRESSURE: 70 MMHG | RESPIRATION RATE: 18 BRPM | HEART RATE: 69 BPM

## 2021-01-28 LAB
ANION GAP SERPL CALC-SCNC: 9 MMOL/L — SIGNIFICANT CHANGE UP (ref 7–14)
BASOPHILS # BLD AUTO: 0.04 K/UL — SIGNIFICANT CHANGE UP (ref 0–0.2)
BASOPHILS NFR BLD AUTO: 0.6 % — SIGNIFICANT CHANGE UP (ref 0–2)
BUN SERPL-MCNC: 24 MG/DL — HIGH (ref 7–23)
CALCIUM SERPL-MCNC: 9 MG/DL — SIGNIFICANT CHANGE UP (ref 8.4–10.5)
CHLORIDE SERPL-SCNC: 110 MMOL/L — HIGH (ref 98–107)
CO2 SERPL-SCNC: 24 MMOL/L — SIGNIFICANT CHANGE UP (ref 22–31)
CREAT SERPL-MCNC: 1.15 MG/DL — SIGNIFICANT CHANGE UP (ref 0.5–1.3)
EOSINOPHIL # BLD AUTO: 0.14 K/UL — SIGNIFICANT CHANGE UP (ref 0–0.5)
EOSINOPHIL NFR BLD AUTO: 2.2 % — SIGNIFICANT CHANGE UP (ref 0–6)
GLUCOSE SERPL-MCNC: 71 MG/DL — SIGNIFICANT CHANGE UP (ref 70–99)
HCT VFR BLD CALC: 37.4 % — SIGNIFICANT CHANGE UP (ref 34.5–45)
HGB BLD-MCNC: 11.7 G/DL — SIGNIFICANT CHANGE UP (ref 11.5–15.5)
IANC: 4.56 K/UL — SIGNIFICANT CHANGE UP (ref 1.5–8.5)
IMM GRANULOCYTES NFR BLD AUTO: 0.3 % — SIGNIFICANT CHANGE UP (ref 0–1.5)
LYMPHOCYTES # BLD AUTO: 1.29 K/UL — SIGNIFICANT CHANGE UP (ref 1–3.3)
LYMPHOCYTES # BLD AUTO: 19.9 % — SIGNIFICANT CHANGE UP (ref 13–44)
MCHC RBC-ENTMCNC: 28.8 PG — SIGNIFICANT CHANGE UP (ref 27–34)
MCHC RBC-ENTMCNC: 31.3 GM/DL — LOW (ref 32–36)
MCV RBC AUTO: 92.1 FL — SIGNIFICANT CHANGE UP (ref 80–100)
MONOCYTES # BLD AUTO: 0.43 K/UL — SIGNIFICANT CHANGE UP (ref 0–0.9)
MONOCYTES NFR BLD AUTO: 6.6 % — SIGNIFICANT CHANGE UP (ref 2–14)
NEUTROPHILS # BLD AUTO: 4.56 K/UL — SIGNIFICANT CHANGE UP (ref 1.8–7.4)
NEUTROPHILS NFR BLD AUTO: 70.4 % — SIGNIFICANT CHANGE UP (ref 43–77)
NRBC # BLD: 0 /100 WBCS — SIGNIFICANT CHANGE UP
NRBC # FLD: 0 K/UL — SIGNIFICANT CHANGE UP
PLATELET # BLD AUTO: 148 K/UL — LOW (ref 150–400)
POTASSIUM SERPL-MCNC: 4.1 MMOL/L — SIGNIFICANT CHANGE UP (ref 3.5–5.3)
POTASSIUM SERPL-SCNC: 4.1 MMOL/L — SIGNIFICANT CHANGE UP (ref 3.5–5.3)
RBC # BLD: 4.06 M/UL — SIGNIFICANT CHANGE UP (ref 3.8–5.2)
RBC # FLD: 13.7 % — SIGNIFICANT CHANGE UP (ref 10.3–14.5)
SODIUM SERPL-SCNC: 143 MMOL/L — SIGNIFICANT CHANGE UP (ref 135–145)
WBC # BLD: 6.48 K/UL — SIGNIFICANT CHANGE UP (ref 3.8–10.5)
WBC # FLD AUTO: 6.48 K/UL — SIGNIFICANT CHANGE UP (ref 3.8–10.5)

## 2021-01-28 PROCEDURE — 99232 SBSQ HOSP IP/OBS MODERATE 35: CPT

## 2021-01-28 RX ADMIN — APIXABAN 5 MILLIGRAM(S): 2.5 TABLET, FILM COATED ORAL at 05:26

## 2021-01-28 RX ADMIN — Medication 25 MICROGRAM(S): at 05:26

## 2021-01-28 RX ADMIN — Medication 50 MILLIGRAM(S): at 05:26

## 2021-01-28 RX ADMIN — TIOTROPIUM BROMIDE 1 CAPSULE(S): 18 CAPSULE ORAL; RESPIRATORY (INHALATION) at 10:56

## 2021-01-28 RX ADMIN — BUDESONIDE AND FORMOTEROL FUMARATE DIHYDRATE 2 PUFF(S): 160; 4.5 AEROSOL RESPIRATORY (INHALATION) at 10:57

## 2021-01-28 RX ADMIN — Medication 400 UNIT(S): at 10:56

## 2021-01-28 RX ADMIN — SODIUM CHLORIDE 3 MILLILITER(S): 9 INJECTION INTRAMUSCULAR; INTRAVENOUS; SUBCUTANEOUS at 05:26

## 2021-01-28 NOTE — PROGRESS NOTE ADULT - PROBLEM SELECTOR PLAN 1
Cont tele,   EP recs appreciated.  Monitor HR currently in 70s.  Keep atropine and zoll at bedside.  Hold AV nate blockers and monitor on tele   TTE ordered.  PPM placement canceled due to improvement o HR off Cardizem   Coumadin D/Candido, started on eliquis 5 BID  Echo noted  D/C planing with outpatient follow up
Cont tele,   EP recs appreciated.  Monitor HR currently in 70s.  Keep atropine and zoll at bedside.  Hold AV nate blockers and monitor on tele   TTE ordered.  PPM placement for likely tomorrow   Coumadin held for possible PPM placement. will switch to oral eliquis for full AC after procedure   Patient last took Warfarin on night of 1/23.   As per Cardiology no need to reverse INR.
Cont tele,   EP recs appreciated.  Monitor HR currently in 70s.  Keep atropine and zoll at bedside.  Hold AV nate blockers and monitor on tele   TTE ordered.  PPM placement canceled due to improvement o HR off Cardizem   Coumadin D/Candido, awaiting for INR to be elow 2 and start eliquis for full AC
Cont tele,   EP recs appreciated.  Monitor HR currently in 70s.  Keep atropine and zoll at bedside.  Hold AV nate blockers and monitor on tele   TTE ordered.  PPM placement canceled due to improvement o HR off Cardizem   Coumadin D/Candido, started on eliquis 5 BID  Echo noted  D/C planing with outpatient follow up

## 2021-01-28 NOTE — PROGRESS NOTE ADULT - PROVIDER SPECIALTY LIST ADULT
Electrophysiology
Cardiology
Cardiology
Electrophysiology
Electrophysiology
Internal Medicine

## 2021-01-28 NOTE — PROGRESS NOTE ADULT - PROBLEM SELECTOR PLAN 3
CHADVASC score of 3.   warfarin switch to eliquis for AC   Echo noted   Monitor HR.
CHADVASC score of 3.   Warfarin and Diltiazem on hold due to possible PPM placement   resume AC with eliquis when stable   Echo ordered.  Monitor HR.
CHADVASC score of 3.   warfarin switch to eliquis for AC   Echo ordered.  Monitor HR.
CHADVASC score of 3.   warfarin switch to eliquis for AC   Echo noted   Monitor HR.

## 2021-01-28 NOTE — PROGRESS NOTE ADULT - PROBLEM SELECTOR PLAN 6
Lipid panel   Patient on red yeast rice  Continue to monitoring.

## 2021-01-28 NOTE — DISCHARGE NOTE NURSING/CASE MANAGEMENT/SOCIAL WORK - PATIENT PORTAL LINK FT
You can access the FollowMyHealth Patient Portal offered by [facility] by registering at the following website: [email or web address]. By joining [employer]’s FollowMyHealth portal, you will also be able to view your health information using other applications (apps) compatible with our system.

## 2021-01-28 NOTE — PROGRESS NOTE ADULT - SUBJECTIVE AND OBJECTIVE BOX
chief complaint:    Subjective:Waiting to go home. Denies HA, lightheadedness, dizziness, CP, SOB, abdominal pain, N/V.      Interval events:  - Presented to [facility] ED for symptomatic bradycardia (lightheadedness, dizziness) and was transfer to [facility] for possible PPM. Of note patient went to see her cardiologist on 1/9/2021 who noticed that her HR was low. Patient was given a Holter monitor.   - Home Cardizem was d/c with improvement in symptoms and SB. Patient does have chronotropic response. HR 70-80s when awake/ ambulating when she was in SB.  - F/u with Dr. Jacobson in the office from continue monitoring for SB while off Cardizem No plan for PPM during this admission as patient has chronotropic response. Patient has an appointment on 2/22/2021 at 1pm      MEDICATIONS  (STANDING):  apixaban 5 milliGRAM(s) Oral every 12 hours  budesonide 160 MICROgram(s)/formoterol 4.5 MICROgram(s) Inhaler 2 Puff(s) Inhalation two times a day  cholecalciferol 400 Unit(s) Oral daily  hydrALAZINE 50 milliGRAM(s) Oral two times a day  levothyroxine 25 MICROGram(s) Oral daily  sodium chloride 0.9% lock flush 3 milliLiter(s) IV Push every 8 hours  sodium chloride 0.9%. 500 milliLiter(s) (75 mL/Hr) IV Continuous <Continuous>  tiotropium 18 MICROgram(s) Capsule 1 Capsule(s) Inhalation daily    MEDICATIONS  (PRN):  sodium chloride 0.65% Nasal 1 Spray(s) Both Nostrils every 6 hours PRN Nasal Congestion          Vital Signs Last 24 Hrs  T(C): 36.4 (28 Jan 2021 05:23), Max: 36.7 (27 Jan 2021 13:40)  T(F): 97.6 (28 Jan 2021 05:23), Max: 98.1 (27 Jan 2021 13:40)  HR: 69 (28 Jan 2021 05:23) (69 - 89)  BP: 154/70 (28 Jan 2021 05:23) (132/56 - 176/75)  BP(mean): --  RR: 18 (28 Jan 2021 05:23) (18 - 18)  SpO2: 96% (28 Jan 2021 05:23) (96% - 98%)  I&O's Detail    Physical Exam:  GENERAL: Lying in bed, well appearing, speaking in full sentence, in NAD  HEART: S1S2 RRR; No murmurs, rubs, or gallops appreciated  PULMONARY: CTABL, normal respiratory effort.  No rales, wheezing, or rhonchi appreciated bilaterally.  ABDOMEN: + Bowel sounds present, soft, NDNT  EXTREMITIES:  Warm, well -perfused, no pedal edema, distal pulses present  NEUROLOGICAL:AOx3    TELEMETERIC:SR 70-80s with PVCs, overnight 2 pauses which cause the patient to go bradycardia to the high 40s briefly (Around 5 am)                            11.7   6.48  )-----------( 148      ( 28 Jan 2021 08:38 )             37.4     PT/INR - ( 27 Jan 2021 07:21 )   PT: 18.8 sec;   INR: 1.68 ratio         PTT - ( 27 Jan 2021 07:21 )  PTT:37.3 sec  01-28    143  |  110<H>  |  24<H>  ----------------------------<  71  4.1   |  24  |  1.15    Ca    9.0      28 Jan 2021 08:38

## 2021-01-28 NOTE — PROGRESS NOTE ADULT - PROBLEM SELECTOR PLAN 2
monitor renal function   avoid nephrotoxic medications   monitor urine output

## 2021-01-28 NOTE — PROGRESS NOTE ADULT - PROBLEM SELECTOR PROBLEM 2
ROBYN (acute kidney injury)

## 2021-01-28 NOTE — PROGRESS NOTE ADULT - ASSESSMENT
71 y/o F with PMH of Atrial fibrillation (On Warfarin), HTN, HLD, hypothyroidism, lower extremity edema, COPD presents to the ED as transfer from [facility] for bradycardia.

## 2021-01-28 NOTE — PROGRESS NOTE ADULT - PROBLEM SELECTOR PLAN 7
TFts   Continue Levothyroxine 25 mcg daily.

## 2021-01-28 NOTE — PROGRESS NOTE ADULT - SUBJECTIVE AND OBJECTIVE BOX
DATE OF SERVICE: 01-28-21 @ 13:58    Subjective: Patient seen and examined. No new events except as noted.   doing okay  pending discharge     REVIEW OF SYSTEMS:    CONSTITUTIONAL: No weakness, fevers or chills  EYES/ENT: No visual changes;  No vertigo or throat pain   NECK: No pain or stiffness  RESPIRATORY: No cough, wheezing, hemoptysis; No shortness of breath  CARDIOVASCULAR: No chest pain or palpitations  GASTROINTESTINAL: No abdominal or epigastric pain.   GENITOURINARY: No dysuria, frequency or hematuria  NEUROLOGICAL: No numbness or weakness  SKIN: No itching, burning, rashes, or lesions   All other review of systems is negative unless indicated above.    MEDICATIONS:  MEDICATIONS  (STANDING):  apixaban 5 milliGRAM(s) Oral every 12 hours  budesonide 160 MICROgram(s)/formoterol 4.5 MICROgram(s) Inhaler 2 Puff(s) Inhalation two times a day  cholecalciferol 400 Unit(s) Oral daily  hydrALAZINE 50 milliGRAM(s) Oral two times a day  levothyroxine 25 MICROGram(s) Oral daily  sodium chloride 0.9% lock flush 3 milliLiter(s) IV Push every 8 hours  sodium chloride 0.9%. 500 milliLiter(s) (75 mL/Hr) IV Continuous <Continuous>  tiotropium 18 MICROgram(s) Capsule 1 Capsule(s) Inhalation daily      PHYSICAL EXAM:  T(C): 36.4 (01-28-21 @ 05:23), Max: 36.6 (01-27-21 @ 18:16)  HR: 69 (01-28-21 @ 05:23) (69 - 75)  BP: 154/70 (01-28-21 @ 05:23) (132/56 - 176/75)  RR: 18 (01-28-21 @ 05:23) (18 - 18)  SpO2: 96% (01-28-21 @ 05:23) (96% - 98%)  Wt(kg): --  I&O's Summary        Appearance: Normal	  HEENT:  PERRLA   Lymphatic: No lymphadenopathy   Cardiovascular: Normal S1 S2, no JVD  Respiratory: normal effort , clear  Gastrointestinal:  Soft, Non-tender  Skin: No rashes,  warm to touch  Psychiatry:  Mood & affect appropriate  Musculuskeletal: No edema      All labs, Imaging and EKGs personally reviewed                             11.7   6.48  )-----------( 148      ( 28 Jan 2021 08:38 )             37.4               01-28    143  |  110<H>  |  24<H>  ----------------------------<  71  4.1   |  24  |  1.15    Ca    9.0      28 Jan 2021 08:38      PT/INR - ( 27 Jan 2021 07:21 )   PT: 18.8 sec;   INR: 1.68 ratio         PTT - ( 27 Jan 2021 07:21 )  PTT:37.3 sec

## 2021-01-28 NOTE — PROGRESS NOTE ADULT - ATTENDING COMMENTS
Differential diagnosis and plan of care discussed with patient after the evaluation.   Advanced care planning was discussed with patient.  Advanced care planning forms were reviewed and discussed.  OMT on six regions for acute somatic dysfunctions done at the bedside   Pain assessed and judicious use of narcotics when appropriate was discussed.  Importance of Fall prevention discussed.  Counseling on Smoking and Alcohol cessation was offered when appropriate.  Counseling on Diet, exercise, and medication compliance was done.
Differential diagnosis and plan of care discussed with patient after the evaluation.   Advanced care planning was discussed with patient.  Advanced care planning forms were reviewed and discussed.  OMT on six regions for acute somatic dysfunctions done at the bedside   Pain assessed and judicious use of narcotics when appropriate was discussed.  Importance of Fall prevention discussed.  Counseling on Smoking and Alcohol cessation was offered when appropriate.  Counseling on Diet, exercise, and medication compliance was done.
70 year old female with past medical history of afib on coumadin, hypothyroid, HTN HLD COPD, followed by Dr Palla recently seen in office for dizziness and given holter monitor now presenting with dizziness and bradycardia, transferred to [facility] for PPM. Off cardizem and doing well. Will fu as outpt.
70 year old female with past medical history of afib on coumadin, hypothyroid, HTN HLD COPD, followed by Dr Palla recently seen in office for dizziness and given holter monitor now presenting with dizziness and bradycardia, transferred to [facility] for PPM. Off cardizem and doing well. Will fu as outpt.
D/C planing with outpatient follow up

## 2021-01-28 NOTE — PROGRESS NOTE ADULT - PROBLEM SELECTOR PLAN 5
Patient takes Losartan 100 mg daily.   Will hold due to elevated Cr.

## 2021-01-28 NOTE — PROGRESS NOTE ADULT - ASSESSMENT
This is a 70 year old woman with a pmhx of Atrial fibrillation (On Warfarin, CHADVASC score of 3), HTN, HLD, hypothyroidism, COPD who presented to [facility] ED for symptomatic bradycardia (lightheadedness, dizziness) who was transfer to [facility] for PPM. Home Cardizem was d/c with improvement in symptoms. Patient also demonstrate chronotropic response. HR 70-80s when awake/ ambulating.     Plan:  - Continuous telemeteric monitoring for bradycardia  - Monitor electrolytes and replete K to 4 and Mg to 2  - Continue to hold home Cardizem  - Hold AV nate blockers for now   - Given that patient symptoms has improve off cardizem and has chronotropic response, PPM is not indicated at this time. Patient will f/u with Dr. Jacobson for continue monitoring for bradycardia and symptoms while off Cardizem. OKay to be d/c from EP perspective .Patient has an appointment on 2/22/2021 at 1pm.   - Continue care per primary team     Gavin Chino PA-C  Patient to be staff with attending. Please awaiting attending addendum

## 2021-01-31 ENCOUNTER — TRANSCRIPTION ENCOUNTER (OUTPATIENT)
Age: 71
End: 2021-01-31

## 2021-02-06 ENCOUNTER — INPATIENT (INPATIENT)
Facility: HOSPITAL | Age: 71
LOS: 4 days | Discharge: HOME | DRG: 177 | End: 2021-02-11
Attending: INTERNAL MEDICINE | Admitting: STUDENT IN AN ORGANIZED HEALTH CARE EDUCATION/TRAINING PROGRAM
Payer: MEDICARE

## 2021-02-06 VITALS
OXYGEN SATURATION: 94 % | SYSTOLIC BLOOD PRESSURE: 194 MMHG | RESPIRATION RATE: 16 BRPM | HEART RATE: 89 BPM | HEIGHT: 61 IN | DIASTOLIC BLOOD PRESSURE: 84 MMHG | WEIGHT: 160.06 LBS | TEMPERATURE: 99 F

## 2021-02-06 DIAGNOSIS — I89.0 LYMPHEDEMA, NOT ELSEWHERE CLASSIFIED: ICD-10-CM

## 2021-02-06 DIAGNOSIS — J44.9 CHRONIC OBSTRUCTIVE PULMONARY DISEASE, UNSPECIFIED: ICD-10-CM

## 2021-02-06 DIAGNOSIS — U07.1 COVID-19: ICD-10-CM

## 2021-02-06 DIAGNOSIS — E03.9 HYPOTHYROIDISM, UNSPECIFIED: ICD-10-CM

## 2021-02-06 DIAGNOSIS — E78.5 HYPERLIPIDEMIA, UNSPECIFIED: ICD-10-CM

## 2021-02-06 DIAGNOSIS — I10 ESSENTIAL (PRIMARY) HYPERTENSION: ICD-10-CM

## 2021-02-06 DIAGNOSIS — Z29.9 ENCOUNTER FOR PROPHYLACTIC MEASURES, UNSPECIFIED: ICD-10-CM

## 2021-02-06 DIAGNOSIS — R09.02 HYPOXEMIA: ICD-10-CM

## 2021-02-06 DIAGNOSIS — L40.9 PSORIASIS, UNSPECIFIED: ICD-10-CM

## 2021-02-06 DIAGNOSIS — I48.91 UNSPECIFIED ATRIAL FIBRILLATION: ICD-10-CM

## 2021-02-06 DIAGNOSIS — Z98.89 OTHER SPECIFIED POSTPROCEDURAL STATES: Chronic | ICD-10-CM

## 2021-02-06 LAB
ALBUMIN SERPL ELPH-MCNC: 3.5 G/DL — SIGNIFICANT CHANGE UP (ref 3.3–5)
ALP SERPL-CCNC: 87 U/L — SIGNIFICANT CHANGE UP (ref 40–120)
ALT FLD-CCNC: 20 U/L — SIGNIFICANT CHANGE UP (ref 12–78)
ANION GAP SERPL CALC-SCNC: 4 MMOL/L — LOW (ref 5–17)
APTT BLD: 35.4 SEC — SIGNIFICANT CHANGE UP (ref 27.5–35.5)
AST SERPL-CCNC: 31 U/L — SIGNIFICANT CHANGE UP (ref 15–37)
BASOPHILS # BLD AUTO: 0.02 K/UL — SIGNIFICANT CHANGE UP (ref 0–0.2)
BASOPHILS NFR BLD AUTO: 0.6 % — SIGNIFICANT CHANGE UP (ref 0–2)
BILIRUB SERPL-MCNC: 0.6 MG/DL — SIGNIFICANT CHANGE UP (ref 0.2–1.2)
BUN SERPL-MCNC: 17 MG/DL — SIGNIFICANT CHANGE UP (ref 7–23)
CALCIUM SERPL-MCNC: 8.5 MG/DL — SIGNIFICANT CHANGE UP (ref 8.5–10.1)
CHLORIDE SERPL-SCNC: 105 MMOL/L — SIGNIFICANT CHANGE UP (ref 96–108)
CO2 SERPL-SCNC: 29 MMOL/L — SIGNIFICANT CHANGE UP (ref 22–31)
CREAT SERPL-MCNC: 1.1 MG/DL — SIGNIFICANT CHANGE UP (ref 0.5–1.3)
CRP SERPL-MCNC: 4.44 MG/DL — HIGH (ref 0–0.4)
D DIMER BLD IA.RAPID-MCNC: <150 NG/ML DDU — SIGNIFICANT CHANGE UP
EOSINOPHIL # BLD AUTO: 0.03 K/UL — SIGNIFICANT CHANGE UP (ref 0–0.5)
EOSINOPHIL NFR BLD AUTO: 0.8 % — SIGNIFICANT CHANGE UP (ref 0–6)
FERRITIN SERPL-MCNC: 249 NG/ML — HIGH (ref 15–150)
FIBRINOGEN PPP-MCNC: 868 MG/DL — HIGH (ref 290–520)
GLUCOSE SERPL-MCNC: 87 MG/DL — SIGNIFICANT CHANGE UP (ref 70–99)
HCT VFR BLD CALC: 36.6 % — SIGNIFICANT CHANGE UP (ref 34.5–45)
HGB BLD-MCNC: 11.6 G/DL — SIGNIFICANT CHANGE UP (ref 11.5–15.5)
IMM GRANULOCYTES NFR BLD AUTO: 0.3 % — SIGNIFICANT CHANGE UP (ref 0–1.5)
INR BLD: 1.26 RATIO — HIGH (ref 0.88–1.16)
LACTATE SERPL-SCNC: 0.5 MMOL/L — LOW (ref 0.7–2)
LYMPHOCYTES # BLD AUTO: 0.51 K/UL — LOW (ref 1–3.3)
LYMPHOCYTES # BLD AUTO: 14.1 % — SIGNIFICANT CHANGE UP (ref 13–44)
MCHC RBC-ENTMCNC: 29.3 PG — SIGNIFICANT CHANGE UP (ref 27–34)
MCHC RBC-ENTMCNC: 31.7 GM/DL — LOW (ref 32–36)
MCV RBC AUTO: 92.4 FL — SIGNIFICANT CHANGE UP (ref 80–100)
MONOCYTES # BLD AUTO: 0.3 K/UL — SIGNIFICANT CHANGE UP (ref 0–0.9)
MONOCYTES NFR BLD AUTO: 8.3 % — SIGNIFICANT CHANGE UP (ref 2–14)
NEUTROPHILS # BLD AUTO: 2.74 K/UL — SIGNIFICANT CHANGE UP (ref 1.8–7.4)
NEUTROPHILS NFR BLD AUTO: 75.9 % — SIGNIFICANT CHANGE UP (ref 43–77)
NRBC # BLD: 0 /100 WBCS — SIGNIFICANT CHANGE UP (ref 0–0)
NT-PROBNP SERPL-SCNC: 771 PG/ML — HIGH (ref 0–125)
PLATELET # BLD AUTO: 117 K/UL — LOW (ref 150–400)
POTASSIUM SERPL-MCNC: 4.2 MMOL/L — SIGNIFICANT CHANGE UP (ref 3.5–5.3)
POTASSIUM SERPL-SCNC: 4.2 MMOL/L — SIGNIFICANT CHANGE UP (ref 3.5–5.3)
PROCALCITONIN SERPL-MCNC: <0.05 — SIGNIFICANT CHANGE UP (ref 0–0.04)
PROT SERPL-MCNC: 7 G/DL — SIGNIFICANT CHANGE UP (ref 6–8.3)
PROTHROM AB SERPL-ACNC: 14.6 SEC — HIGH (ref 10.6–13.6)
RBC # BLD: 3.96 M/UL — SIGNIFICANT CHANGE UP (ref 3.8–5.2)
RBC # FLD: 13.9 % — SIGNIFICANT CHANGE UP (ref 10.3–14.5)
SARS-COV-2 RNA SPEC QL NAA+PROBE: DETECTED
SODIUM SERPL-SCNC: 138 MMOL/L — SIGNIFICANT CHANGE UP (ref 135–145)
TROPONIN I SERPL-MCNC: <.015 NG/ML — SIGNIFICANT CHANGE UP (ref 0.01–0.04)
WBC # BLD: 3.61 K/UL — LOW (ref 3.8–10.5)
WBC # FLD AUTO: 3.61 K/UL — LOW (ref 3.8–10.5)

## 2021-02-06 PROCEDURE — 93970 EXTREMITY STUDY: CPT | Mod: 26

## 2021-02-06 PROCEDURE — 99223 1ST HOSP IP/OBS HIGH 75: CPT | Mod: GC,AI

## 2021-02-06 PROCEDURE — 93010 ELECTROCARDIOGRAM REPORT: CPT

## 2021-02-06 PROCEDURE — 99285 EMERGENCY DEPT VISIT HI MDM: CPT

## 2021-02-06 PROCEDURE — 71045 X-RAY EXAM CHEST 1 VIEW: CPT | Mod: 26

## 2021-02-06 RX ORDER — ENOXAPARIN SODIUM 100 MG/ML
72 INJECTION SUBCUTANEOUS ONCE
Refills: 0 | Status: DISCONTINUED | OUTPATIENT
Start: 2021-02-06 | End: 2021-02-06

## 2021-02-06 RX ORDER — DEXAMETHASONE 0.5 MG/5ML
6 ELIXIR ORAL ONCE
Refills: 0 | Status: COMPLETED | OUTPATIENT
Start: 2021-02-06 | End: 2021-02-06

## 2021-02-06 RX ORDER — ACETAMINOPHEN 500 MG
650 TABLET ORAL EVERY 6 HOURS
Refills: 0 | Status: DISCONTINUED | OUTPATIENT
Start: 2021-02-06 | End: 2021-02-11

## 2021-02-06 RX ORDER — HYDRALAZINE HCL 50 MG
50 TABLET ORAL
Refills: 0 | Status: DISCONTINUED | OUTPATIENT
Start: 2021-02-06 | End: 2021-02-11

## 2021-02-06 RX ORDER — FLUTICASONE PROPIONATE AND SALMETEROL 50; 250 UG/1; UG/1
1 POWDER ORAL; RESPIRATORY (INHALATION)
Qty: 0 | Refills: 0 | DISCHARGE

## 2021-02-06 RX ORDER — APIXABAN 2.5 MG/1
5 TABLET, FILM COATED ORAL EVERY 12 HOURS
Refills: 0 | Status: DISCONTINUED | OUTPATIENT
Start: 2021-02-06 | End: 2021-02-11

## 2021-02-06 RX ORDER — LEVOTHYROXINE SODIUM 125 MCG
25 TABLET ORAL DAILY
Refills: 0 | Status: DISCONTINUED | OUTPATIENT
Start: 2021-02-06 | End: 2021-02-11

## 2021-02-06 RX ORDER — PREGABALIN 225 MG/1
1000 CAPSULE ORAL DAILY
Refills: 0 | Status: DISCONTINUED | OUTPATIENT
Start: 2021-02-06 | End: 2021-02-11

## 2021-02-06 RX ORDER — REMDESIVIR 5 MG/ML
100 INJECTION INTRAVENOUS EVERY 24 HOURS
Refills: 0 | Status: COMPLETED | OUTPATIENT
Start: 2021-02-07 | End: 2021-02-10

## 2021-02-06 RX ORDER — BUDESONIDE AND FORMOTEROL FUMARATE DIHYDRATE 160; 4.5 UG/1; UG/1
2 AEROSOL RESPIRATORY (INHALATION)
Refills: 0 | Status: DISCONTINUED | OUTPATIENT
Start: 2021-02-06 | End: 2021-02-11

## 2021-02-06 RX ORDER — DEXAMETHASONE 0.5 MG/5ML
6 ELIXIR ORAL DAILY
Refills: 0 | Status: DISCONTINUED | OUTPATIENT
Start: 2021-02-07 | End: 2021-02-11

## 2021-02-06 RX ORDER — TIOTROPIUM BROMIDE 18 UG/1
1 CAPSULE ORAL; RESPIRATORY (INHALATION) DAILY
Refills: 0 | Status: DISCONTINUED | OUTPATIENT
Start: 2021-02-06 | End: 2021-02-11

## 2021-02-06 RX ORDER — REMDESIVIR 5 MG/ML
INJECTION INTRAVENOUS
Refills: 0 | Status: COMPLETED | OUTPATIENT
Start: 2021-02-06 | End: 2021-02-10

## 2021-02-06 RX ORDER — REMDESIVIR 5 MG/ML
200 INJECTION INTRAVENOUS EVERY 24 HOURS
Refills: 0 | Status: COMPLETED | OUTPATIENT
Start: 2021-02-06 | End: 2021-02-06

## 2021-02-06 RX ORDER — CHOLECALCIFEROL (VITAMIN D3) 125 MCG
400 CAPSULE ORAL DAILY
Refills: 0 | Status: DISCONTINUED | OUTPATIENT
Start: 2021-02-06 | End: 2021-02-11

## 2021-02-06 RX ORDER — CHOLECALCIFEROL (VITAMIN D3) 125 MCG
1 CAPSULE ORAL
Qty: 0 | Refills: 0 | DISCHARGE

## 2021-02-06 RX ORDER — SODIUM CHLORIDE 9 MG/ML
1000 INJECTION, SOLUTION INTRAVENOUS ONCE
Refills: 0 | Status: COMPLETED | OUTPATIENT
Start: 2021-02-06 | End: 2021-02-06

## 2021-02-06 RX ADMIN — Medication 6 MILLIGRAM(S): at 16:52

## 2021-02-06 RX ADMIN — SODIUM CHLORIDE 1000 MILLILITER(S): 9 INJECTION, SOLUTION INTRAVENOUS at 19:00

## 2021-02-06 RX ADMIN — REMDESIVIR 500 MILLIGRAM(S): 5 INJECTION INTRAVENOUS at 22:30

## 2021-02-06 RX ADMIN — SODIUM CHLORIDE 1000 MILLILITER(S): 9 INJECTION, SOLUTION INTRAVENOUS at 17:40

## 2021-02-06 NOTE — H&P ADULT - NSHPSOCIALHISTORY_GEN_ALL_CORE
Lives alone  Ambulates without assistance, ADLs independently   Former smoker, 30pack year history, quit 15 years ago  No EtOH use

## 2021-02-06 NOTE — ED PROVIDER NOTE - PROGRESS NOTE DETAILS
when pt ambulated in the room she desat to 85% on room air and required oxygen to normalize. Will require admission for hypoxia

## 2021-02-06 NOTE — H&P ADULT - PROBLEM SELECTOR PLAN 1
Day of symptom onset & diagnosis 1/31  -Continue with O2 support (currently on 2L); may use NC, Venturi Mask, NRB, HFNC; avoid aerosolizing procedures, metered dose inhalers acceptable   -Prone PRN, tolerate lower O2 saturations as possible; avoid intubation with such techniques  -Continue Decadron for 10 day course total in setting of hypoxia  -Start Remdeisvir for a 5 day course; closely monitor renal function and LFTs  -VTE ppx w/ home Eliquis 5mg PO BID  -Monitor volume status closely, will exercise caution with aggressive hydration  -Tylenol prn myalgias, fever  -Daily labs to include: CBC with differential along with a CMP  -CRP, CPK, trop, procal, lactate, fibrinogen, proBNP, procal, troponin resulted  -LDH, d-dimer, ferritin, PT/PTT, INR pending, f/u results   -If clinically decompensating, repeat d-dimer, CRP, ferritin q48-72h  -NLR on admission: 5.37  -Follow up culture data; will monitor off antibiotics unless there is high suspicion for superimposed bacterial infection  -Maintain isolation precautions - airborne and contact  -Code Status:   -ID Consulted (-- ), f/u recs Day of symptom onset & diagnosis 1/31  -Continue with O2 support (currently on 2L); may use NC, Venturi Mask, NRB, HFNC; avoid aerosolizing procedures, metered dose inhalers acceptable   -Prone PRN, tolerate lower O2 saturations as possible; avoid intubation with such techniques  -Continue Decadron for 10 day course total in setting of hypoxia  -Start Remdeisvir for a 5 day course; closely monitor renal function and LFTs  -VTE ppx w/ home Eliquis 5mg PO BID  -Monitor volume status closely, will exercise caution with aggressive hydration  -Tylenol prn myalgias, fever  -Robitussin, tessalon perles PRN cough   -Daily labs to include: CBC with differential along with a CMP  -CRP, CPK, trop, procal, lactate, fibrinogen, proBNP, procal, troponin resulted  -LDH, d-dimer, ferritin, PT/PTT, INR pending, f/u results   -If clinically decompensating, repeat d-dimer, CRP, ferritin q48-72h  -NLR on admission: 5.37  -Follow up culture data; will monitor off antibiotics unless there is high suspicion for superimposed bacterial infection  -Maintain isolation precautions - airborne and contact  -Code Status: Full Code  -Pulm (Dr. Walters) consulted, f/u recs acute hypoxic respiratory failure 2/2 COVID-19 PNA - no obvious consolidation noted on prelim read of CXR but with bilateral markings suspicious of developing viral pneumonia  Day of symptom onset & diagnosis 1/31  -Continue with O2 support (currently on 2L); may use NC, Venturi Mask, NRB, HFNC; avoid aerosolizing procedures, metered dose inhalers acceptable   -Prone PRN, tolerate lower O2 saturations as possible; avoid intubation with such techniques  -Continue Decadron for 10 day course total in setting of hypoxia  -Start Remdeisvir for a 5 day course; closely monitor renal function and LFTs  -VTE ppx w/ home Eliquis 5mg PO BID  -Monitor volume status closely, will exercise caution with aggressive hydration  -Tylenol prn myalgias, fever  -Robitussin, tessalon perles PRN cough   -Daily labs to include: CBC with differential along with a CMP  -CRP, CPK, trop, procal, lactate, fibrinogen, proBNP, procal, troponin resulted  -LDH, d-dimer, ferritin, PT/PTT, INR pending, f/u results   -If clinically decompensating, repeat d-dimer, CRP, ferritin q48-72h  -NLR on admission: 5.37  -Follow up culture data; will monitor off antibiotics unless there is high suspicion for superimposed bacterial infection  -Maintain isolation precautions - airborne and contact  -Code Status: Full Code  -Pulm (Dr. Walters) consulted, f/u recs

## 2021-02-06 NOTE — H&P ADULT - NSHPPHYSICALEXAM_GEN_ALL_CORE
T(C): 37.3 (06 Feb 2021 20:00), Max: 37.3 (06 Feb 2021 20:00)  T(F): 99.2 (06 Feb 2021 20:00), Max: 99.2 (06 Feb 2021 20:00)  HR: 74 (06 Feb 2021 20:00) (74 - 89)  BP: 165/83 (06 Feb 2021 20:00) (165/83 - 194/84)  RR: 16 (06 Feb 2021 20:00) (16 - 16)  SpO2: 95% (06 Feb 2021 20:00) (94% - 95%) T(C): 37.3 (06 Feb 2021 20:00), Max: 37.3 (06 Feb 2021 20:00)  T(F): 99.2 (06 Feb 2021 20:00), Max: 99.2 (06 Feb 2021 20:00)  HR: 74 (06 Feb 2021 20:00) (74 - 89)  BP: 165/83 (06 Feb 2021 20:00) (165/83 - 194/84)  RR: 16 (06 Feb 2021 20:00) (16 - 16)  SpO2: 95% (06 Feb 2021 20:00) (94% - 95%)    General: No apparent distress, on supplemental O2  Head: normocephalic, atraumatic  Eyes: EOMI, anicteric  ENT: moist mucous membranes, no pharyngeal exudates  Heart: RRR, S1, S2, no murmurs  Chest: decreased breath sounds  Abd: BS+, soft, NT, ND  Back: no CVA tenderness  Extr: no edema or cyanosis  Neuro: AA&Ox3, no focal weakness, sensation to light touch intact  Psych: normal affect T(C): 37.3 (06 Feb 2021 20:00), Max: 37.3 (06 Feb 2021 20:00)  T(F): 99.2 (06 Feb 2021 20:00), Max: 99.2 (06 Feb 2021 20:00)  HR: 74 (06 Feb 2021 20:00) (74 - 89)  BP: 165/83 (06 Feb 2021 20:00) (165/83 - 194/84)  RR: 16 (06 Feb 2021 20:00) (16 - 16)  SpO2: 95% (06 Feb 2021 20:00) (94% - 95%)    General: No apparent distress, on supplemental O2  Head: normocephalic, atraumatic  Eyes: EOMI, anicteric  ENT: moist mucous membranes, no pharyngeal exudates  Heart: RRR, S1, S2, no murmurs  Chest: fair air entry bilaterally; course breath sounds in bilateral lower lungs  Abd: BS+, soft, NT, ND  Back: no CVA tenderness  Extr: no edema or cyanosis  Neuro: AA&Ox3, no focal weakness, sensation to light touch intact  Psych: normal affect

## 2021-02-06 NOTE — H&P ADULT - NSICDXPASTMEDICALHX_GEN_ALL_CORE_FT
PAST MEDICAL HISTORY:  Afib On Eliquis    COPD (chronic obstructive pulmonary disease)     Edema extremities     HTN (hypertension)     Hyperlipidemia     Hypothyroid     Psoriasis

## 2021-02-06 NOTE — H&P ADULT - PROBLEM SELECTOR PLAN 4
Chronic, not currently on home O2  -Pt previously on home O2, has not needed for several years Chronic, not currently on home O2  -Pt previously on home O2, has not needed for several years  -Continue home Spiriva  -On Advair at home, will start Symbicort   -Pulm (Dr. Walters) consulted, f/u recs

## 2021-02-06 NOTE — ED ADULT TRIAGE NOTE - CHIEF COMPLAINT QUOTE
Pt dx with COVID last Sunday - pt here today states she thinks she has pneumonia - pt with left side pain and cough -denies fever

## 2021-02-06 NOTE — H&P ADULT - PROBLEM SELECTOR PLAN 2
Acute 2/2 Covid-19  -Plan as above   -F/u CXR Acute 2/2 Covid-19  -Plan as above   -F/u CXR - no obvious infiltrates on read - increased bilateral markings - possible developing PNA

## 2021-02-06 NOTE — H&P ADULT - PROBLEM SELECTOR PLAN 10
IMPROVE VTE Individual Risk Assessment        RISK                                                          Points  [  ] Previous VTE                                                3  [  ] Thrombophilia                                             2  [  ] Lower limb paralysis                                   2        (unable to hold up >15 seconds)    [  ] Current Cancer                                             2         (within 6 months)  [  ] Immobilization > 24 hrs                              1  [  ] ICU/CCU stay > 24 hours                             1  [ x ] Age > 60                                                         1  IMPROVE VTE Score: 1 - DVT ppx w/ home Eliquis 5mg PO BID

## 2021-02-06 NOTE — ED ADULT NURSE NOTE - CHPI ED NUR SYMPTOMS NEG
no abdominal pain/no chills/no decreased eating/drinking/no diarrhea/no fever/no headache/no rash/no shortness of breath/no vomiting

## 2021-02-06 NOTE — ED PROVIDER NOTE - OBJECTIVE STATEMENT
Pt is a 69 yo female who presents to the ED with a cc of cough and left sided pain in the setting of COVID 19. PMHx of psoriases, A-fib on Eliquis, HTN, HLD, hypothyroidism, COPD was on home oxygen prior but has not required oxygen in several years. Pt was initially seen in the ED on 1/24 with lightheadedness and low HR. She was found to be bradycardic. Pt was admitted and transferred to [place] for possible pacemaker placement. She was observed off Cardizem per reports and her bradycardia resolved. She underwent medication adjustment and the pacemaker was deemed not necessary. She reports that since discharge she has continued to experience intermittent lightheadedness but she had not been bradycardic like she was on the prior admission. This past Sunday she developed myalgias and was seen at urgent care where she had a positive COVID PCR. Pt was sent home to isolate and had been doing well until today. She reports that she noted she had left posterior back pain similar to the time she had pneumonia. She also noted that she developed a cough productive of clear sputum. Denies documented fevers but reports that she has felt warm. Denies chills, N/V/D/C, CP, abd pain, ext numbness. Pt has noted SOB on exertion

## 2021-02-06 NOTE — ED PROVIDER NOTE - CLINICAL SUMMARY MEDICAL DECISION MAKING FREE TEXT BOX
Pt is a 71 yo female who presents to the ED with a cc of cough and left sided pain in the setting of COVID 19. PMHx of psoriases, A-fib on Eliquis, HTN, HLD, hypothyroidism, COPD was on home oxygen prior but has not required oxygen in several years. Pt was initially seen in the ED on 1/24 with lightheadedness and low HR. She was found to be bradycardic. Pt was admitted and transferred to [place] for possible pacemaker placement. She was observed off Cardizem per reports and her bradycardia resolved. She underwent medication adjustment and the pacemaker was deemed not necessary. She reports that since discharge she has continued to experience intermittent lightheadedness but she had not been bradycardic like she was on the prior admission. This past Sunday she developed myalgias and was seen at urgent care where she had a positive COVID PCR. Pt was sent home to isolate and had been doing well until today. She reports that she noted she had left posterior back pain similar to the time she had pneumonia. She also noted that she developed a cough productive of clear sputum. Denies documented fevers but reports that she has felt warm. Denies chills, N/V/D/C, CP, abd pain, ext numbness. Pt has noted SOB on exertion Pt is a 71 yo female who presents to the ED with a cc of cough and left sided pain in the setting of COVID 19. PMHx of psoriases, A-fib on Eliquis, HTN, HLD, hypothyroidism, COPD was on home oxygen prior but has not required oxygen in several years. Pt was initially seen in the ED on 1/24 with lightheadedness and low HR. She was found to be bradycardic. Pt was admitted and transferred to [place] for possible pacemaker placement. She was observed off Cardizem per reports and her bradycardia resolved. She underwent medication adjustment and the pacemaker was deemed not necessary. She reports that since discharge she has continued to experience intermittent lightheadedness but she had not been bradycardic like she was on the prior admission. This past Sunday she developed myalgias and was seen at urgent care where she had a positive COVID PCR. Pt was sent home to isolate and had been doing well until today. She reports that she noted she had left posterior back pain similar to the time she had pneumonia. She also noted that she developed a cough productive of clear sputum. Denies documented fevers but reports that she has felt warm. Denies chills, N/V/D/C, CP, abd pain, ext numbness. Pt has noted SOB on exertion. Pt presenting with worsening symptoms in the setting of COVID 19 now also with hypoxia. Will obtain screening COVID 19 biomarkers, will obtain chest x-ray, EKG, and monitor. Pt now on oxygen and will require admission

## 2021-02-06 NOTE — H&P ADULT - PROBLEM SELECTOR PLAN 3
Chronic  -Monitor on remote tele   -Continue a/c with home Eliquis 5mg PO BID Chronic  -Monitor on remote tele   -Continue a/c with home Eliquis 5mg PO BID  -Not currently on rate control given hospitalization for bradycardia Jan 2021

## 2021-02-06 NOTE — ED ADULT NURSE NOTE - OBJECTIVE STATEMENT
Pt A&Ox4, ambulatory to ED c/o cough and left sided pain.  Pt states she was diagnosed with COVID on 1/25/2021, had very little symptoms.  Today, pt awoke with a sharp pain to left back/ribs radiating to front and states it feels the same as when she had pneumonia.  Pt states highest temperature she had was 99.4. Pt A&Ox4, ambulatory to ED c/o cough and left sided pain.  Pt states she was diagnosed with COVID last Sunday and had very little symptoms.  Today, pt awoke with a sharp pain to left back/ribs radiating to front and states it feels the same as when she had pneumonia.  Pt states highest temperature she had was 99.4.

## 2021-02-06 NOTE — H&P ADULT - PROBLEM SELECTOR PLAN 8
Chronic IMPROVE VTE Individual Risk Assessment        RISK                                                          Points  [  ] Previous VTE                                                3  [  ] Thrombophilia                                             2  [  ] Lower limb paralysis                                   2        (unable to hold up >15 seconds)    [  ] Current Cancer                                             2         (within 6 months)  [  ] Immobilization > 24 hrs                              1  [  ] ICU/CCU stay > 24 hours                             1  [ x ] Age > 60                                                         1  IMPROVE VTE Score: 1 - DVT ppx w/ home Eliquis 5mg PO BID

## 2021-02-06 NOTE — H&P ADULT - HISTORY OF PRESENT ILLNESS
71 y/o female with PMHx Afib (on Eliquis), HTN, HLD, hypothyroid, psoriasis, COPD (not currently on home O2, was several years ago), lymphedema presented to the ED c/o cough and left sided pain in the setting of COVID-19. Of note, Pt was seen in the ED on 1/24 with lightheadedness and low HR, found to be bradycardic, admitted and transferred to [facility] for possible PPM. She was observed, bradycardia resolved, pacemaker deemed not necessary. Pt reports since discharge she has continued to experience intermittent lightheadedness but she had not been bradycardic like she was on the prior admission. This past Sunday (1/31) she developed myalgias and was seen at urgent care where she had a positive COVID-19 PCR.     The date the patient first felt unwell:   Fever or chills: yes [  ]   no [  ]   - Tmax:   Fatigue, malaise or generalized weakness: yes [  ]   no [  ]  Shortness of breath/dyspnea: yes [  ]   no [  ]  Cough: yes [  ]   no [  ], sputum production: yes [  ]   no [  ]  Blood in sputum: yes [  ]   no [  ]  Anorexia/PO intolerance: yes [  ]   no [  ]  Chest pain or chest tightness: yes [  ]   no [  ]  Edema in legs: yes [  ]   no [  ]  Myalgias: yes [  ]   no [  ]  Headache: yes [  ]   no [  ]  Sore throat: yes [  ]   no [  ]  Rhinorrhea: yes [  ]   no [  ]  Abdominal pain: yes [  ]   no [  ]  Nausea: yes [  ]   no [  ]  Vomiting: yes [  ]   no [  ]  Diarrhea: yes [  ]   no [  ]  Skin rashes: yes [  ]   no [  ]  Loss of sense of smell/anosmia: yes [  ]   no [  ]  Conjunctivitis: yes [  ]   no [  ]  Recent travel: yes [  ]   no [  ] - Location:   Any sick contacts: yes [  ]   no [  ]  Close contact with someone confirmed positive with COVID-19 / SARS-CoV2 in the last 14 days: yes [ x ]   no [  ]  Code status:    ED course:  VS T 98.8, HR 89, /84, RR 16 94% on RA -> 95% 2L NC  Labs significant for WBC 3.61, plts 117, fibrinogen 868  Pt received 1L LR x1, 6mg Decadron x1  EKG:   CXR: pending   US Duplex Venous Lower Ext Complete, Bilateral: No evidence of deep venous thrombosis in either lower extremity. 71 y/o female with PMHx Afib (on Eliquis), HTN, HLD, hypothyroid, psoriasis, COPD (not currently on home O2, was several years ago), lymphedema presented to the ED c/o cough and left sided pain in the setting of COVID-19. Of note, Pt was seen in the ED on 1/24 with lightheadedness and low HR, found to be bradycardic, admitted and transferred to [facility] for possible PPM. She was observed, bradycardia resolved, pacemaker deemed not necessary. Pt reports since discharge she has continued to experience intermittent lightheadedness but she had not been bradycardic like she was on the prior admission. This past Sunday (1/31) she developed myalgias and was seen at urgent care where she had a positive COVID-19 PCR.     The date the patient first felt unwell:   Fever or chills: yes [  ]   no [  ]   - Tmax:   Fatigue, malaise or generalized weakness: yes [  ]   no [  ]  Shortness of breath/dyspnea: yes [  ]   no [  ]  Cough: yes [  ]   no [  ], sputum production: yes [  ]   no [  ]  Blood in sputum: yes [  ]   no [  ]  Anorexia/PO intolerance: yes [  ]   no [  ]  Chest pain or chest tightness: yes [  ]   no [  ]  Edema in legs: yes [  ]   no [  ]  Myalgias: yes [  ]   no [  ]  Headache: yes [  ]   no [  ]  Sore throat: yes [  ]   no [  ]  Rhinorrhea: yes [  ]   no [  ]  Abdominal pain: yes [  ]   no [  ]  Nausea: yes [  ]   no [  ]  Vomiting: yes [  ]   no [  ]  Diarrhea: yes [  ]   no [  ]  Skin rashes: yes [  ]   no [  ]  Loss of sense of smell/anosmia: yes [  ]   no [  ]  Conjunctivitis: yes [  ]   no [  ]  Recent travel: yes [  ]   no [  ] - Location:   Any sick contacts: yes [  ]   no [  ]  Close contact with someone confirmed positive with COVID-19 / SARS-CoV2 in the last 14 days: yes [ x ]   no [  ]  Code status:    ED course:  VS T 98.8, HR 89, /84, RR 16 94% on RA -> 95% 2L NC  Labs significant for WBC 3.61, plts 117, fibrinogen 868, proBNP 771, trop neg x1  Pt received 1L LR x1, 6mg Decadron x1  EKG:   CXR: pending   US Duplex Venous Lower Ext Complete, Bilateral: No evidence of deep venous thrombosis in either lower extremity. 69 y/o female with PMHx Afib (on Eliquis), HTN, HLD, hypothyroid, psoriasis, COPD (not currently on home O2, was several years ago) presented to the ED c/o cough. Per patient, she reported to the ED after talking with pulm Dr. Walters because she has had PNA previously and "it feels the same" Denies SOB, chest pain, palpitations. Of note, Pt was seen in the ED on 1/24 with lightheadedness and low HR, found to be bradycardic, admitted and transferred to [facility] for possible PPM. She was observed, bradycardia resolved, pacemaker deemed not necessary. Pt reports since discharge she has continued to experience intermittent lightheadedness but she had not been bradycardic like she was on the prior admission. This past Sunday (1/31) she developed myalgias and was seen at urgent care where she had a positive COVID-19 PCR.     The date the patient first felt unwell: 1/31  Fever or chills: yes [  ]   no [ x ]   - Tmax:   Fatigue, malaise or generalized weakness: yes [  ]   no [ x ]  Shortness of breath/dyspnea: yes [  ]   no [ x ]  Cough: yes [ x ]   no [  ], sputum production: yes [ x ]   no [  ]  Blood in sputum: yes [  ]   no [ x ]  Anorexia/PO intolerance: yes [  ]   no [  x]  Chest pain or chest tightness: yes [  ]   no [ x ]  Edema in legs: yes [  ]   no [ x ]  Myalgias: yes [  ]   no [ x ]  Headache: yes [ x ]   no [  ]  Sore throat: yes [ x ]   no [  ]  Rhinorrhea: yes [ x ]   no [  ]  Abdominal pain: yes [  ]   no [ x ]  Nausea: yes [  ]   no [ x ]  Vomiting: yes [  ]   no [ x ]  Diarrhea: yes [  ]   no [ x ]  Skin rashes: yes [  ]   no [  ]  Loss of sense of smell/anosmia: yes [ x ]   no [  ]  Conjunctivitis: yes [ x ]   no [  ]  Recent travel: yes [  ]   no [ x ] - Location:   Any sick contacts: yes [  ]   no [ x ]  Close contact with someone confirmed positive with COVID-19 / SARS-CoV2 in the last 14 days: yes [ x ]   no [  ]  Code status: Full Code     ED course:  VS T 98.8, HR 89, /84, RR 16 94% on RA -> 95% 2L NC  Labs significant for WBC 3.61, plts 117, fibrinogen 868, proBNP 771, trop neg x1  Pt received 1L LR x1, 6mg Decadron x1  EKG:   CXR: pending   US Duplex Venous Lower Ext Complete, Bilateral: No evidence of deep venous thrombosis in either lower extremity. 69 y/o female with PMHx Afib (on Eliquis), HTN, HLD, hypothyroid, psoriasis, COPD (not currently on home O2, was several years ago) presented to the ED c/o cough. Per patient, she reported to the ED after talking with pulm Dr. Walters because she has had PNA previously and "it feels the same." Of note, Pt was seen in the ED on 1/24 with lightheadedness and low HR, found to be bradycardic, admitted and transferred to [facility] for possible PPM. She was observed, bradycardia resolved, pacemaker deemed not necessary. Pt reports since discharge she has continued to experience intermittent lightheadedness but she had not been bradycardic like she was on the prior admission. This past Sunday (1/31) she developed myalgias and was seen at urgent care where she had a positive COVID-19 PCR. Admits cough, headache, runny nose, sore throat, anosmia and conjunctivitis. Denies chest pain, palpitations, dyspnea, abdominal pain, n/v/d.     ED course:  VS T 98.8, HR 89, /84, RR 16 94% on RA -> 95% 2L NC  Labs significant for WBC 3.61, plts 117, fibrinogen 868, proBNP 771, trop neg x1  Pt received 1L LR x1, 6mg Decadron x1  EKG: NSR   CXR: bilateral patchiness on personal read - similar to prior XR but with increased markings   US Duplex Venous Lower Ext Complete, Bilateral: No evidence of deep venous thrombosis in either lower extremity.

## 2021-02-06 NOTE — ED ADULT NURSE NOTE - NSIMPLEMENTINTERV_GEN_ALL_ED
Implemented All Universal Safety Interventions:  [place] to call system. Call bell, personal items and telephone within reach. Instruct patient to call for assistance. Room bathroom lighting operational. Non-slip footwear when patient is off stretcher. Physically safe environment: no spills, clutter or unnecessary equipment. Stretcher in lowest position, wheels locked, appropriate side rails in place. Implemented All Fall with Harm Risk Interventions:  [place] to call system. Call bell, personal items and telephone within reach. Instruct patient to call for assistance. Room bathroom lighting operational. Non-slip footwear when patient is off stretcher. Physically safe environment: no spills, clutter or unnecessary equipment. Stretcher in lowest position, wheels locked, appropriate side rails in place. Provide visual cue, wrist band, yellow gown, etc. Monitor gait and stability. Monitor for mental status changes and reorient to person, place, and time. Review medications for side effects contributing to fall risk. Reinforce activity limits and safety measures with patient and family. Provide visual clues: red socks.

## 2021-02-06 NOTE — H&P ADULT - ASSESSMENT
69 y/o female with PMHx Afib (on Eliquis), HTN, HLD, hypothyroid, psoriasis, COPD (not currently on home O2, was several years ago), lymphedema presented to the ED c/o cough and left sided pain in the setting of COVID-19, admitted for hypoxia in setting of Covid-19.  69 y/o female with PMHx Afib (on Eliquis), HTN, HLD, hypothyroid, psoriasis, COPD (not currently on home O2, was several years ago), lymphedema presented to the ED c/o cough and left sided pain in the setting of COVID-19, admitted for hypoxia in setting of Covid-19.      69 y/o female with PMHx Afib (on Eliquis), HTN, HLD, hypothyroid, psoriasis, COPD (not currently on home O2, was several years ago) presented to the ED c/o cough and left sided pain in the setting of COVID-19, admitted for hypoxia in setting of Covid-19.      69 y/o female with PMHx Afib (on Eliquis), HTN, HLD, hypothyroid, psoriasis, COPD (not currently on home O2, was several years ago) presented to the ED c/o cough and left sided pain in the setting of COVID-19, admitted for hypoxia in setting of Covid-19.

## 2021-02-06 NOTE — H&P ADULT - PROBLEM SELECTOR PLAN 5
Chronic  - Chronic, BP elevated on admission 194/84  -Continue home hydralazine 50mg BID with hold parameters  -DASH/TLC diet   -Follows outpatient with cardio Dr. Palla

## 2021-02-06 NOTE — H&P ADULT - NSHPREVIEWOFSYSTEMS_GEN_ALL_CORE
See HPI General: no fever, chills  Eyes: no vision changes  ENT: no hearing changes; admits rhinorrhea and sore throat  CV: no chest pain or palpitations  Pulm: no SOB, wheezing or hemoptysis; admits cough  Abd/GI: no nausea, vomiting, diarrhea, constipation, abd pain  : no dysuria, hematuria, urinary frequency  MSK: no joint pain; admits myalgias  Neuro: no syncope, dizziness, focal weakness; admits headache  Psych: no anxiety or depression  Endo: no heat or cold intolerance

## 2021-02-07 LAB
ALBUMIN SERPL ELPH-MCNC: 3 G/DL — LOW (ref 3.3–5)
ALP SERPL-CCNC: 76 U/L — SIGNIFICANT CHANGE UP (ref 40–120)
ALT FLD-CCNC: 17 U/L — SIGNIFICANT CHANGE UP (ref 12–78)
ANION GAP SERPL CALC-SCNC: 8 MMOL/L — SIGNIFICANT CHANGE UP (ref 5–17)
AST SERPL-CCNC: 26 U/L — SIGNIFICANT CHANGE UP (ref 15–37)
BASOPHILS # BLD AUTO: 0 K/UL — SIGNIFICANT CHANGE UP (ref 0–0.2)
BASOPHILS NFR BLD AUTO: 0 % — SIGNIFICANT CHANGE UP (ref 0–2)
BILIRUB SERPL-MCNC: 0.5 MG/DL — SIGNIFICANT CHANGE UP (ref 0.2–1.2)
BUN SERPL-MCNC: 17 MG/DL — SIGNIFICANT CHANGE UP (ref 7–23)
CALCIUM SERPL-MCNC: 8.3 MG/DL — LOW (ref 8.5–10.1)
CHLORIDE SERPL-SCNC: 108 MMOL/L — SIGNIFICANT CHANGE UP (ref 96–108)
CO2 SERPL-SCNC: 26 MMOL/L — SIGNIFICANT CHANGE UP (ref 22–31)
CREAT SERPL-MCNC: 0.93 MG/DL — SIGNIFICANT CHANGE UP (ref 0.5–1.3)
EOSINOPHIL # BLD AUTO: 0 K/UL — SIGNIFICANT CHANGE UP (ref 0–0.5)
EOSINOPHIL NFR BLD AUTO: 0 % — SIGNIFICANT CHANGE UP (ref 0–6)
GLUCOSE SERPL-MCNC: 100 MG/DL — HIGH (ref 70–99)
HCT VFR BLD CALC: 34.6 % — SIGNIFICANT CHANGE UP (ref 34.5–45)
HGB BLD-MCNC: 11 G/DL — LOW (ref 11.5–15.5)
LDH SERPL L TO P-CCNC: 316 U/L — HIGH (ref 50–242)
LYMPHOCYTES # BLD AUTO: 0.39 K/UL — LOW (ref 1–3.3)
LYMPHOCYTES # BLD AUTO: 24 % — SIGNIFICANT CHANGE UP (ref 13–44)
MAGNESIUM SERPL-MCNC: 2.2 MG/DL — SIGNIFICANT CHANGE UP (ref 1.6–2.6)
MCHC RBC-ENTMCNC: 29.3 PG — SIGNIFICANT CHANGE UP (ref 27–34)
MCHC RBC-ENTMCNC: 31.8 GM/DL — LOW (ref 32–36)
MCV RBC AUTO: 92 FL — SIGNIFICANT CHANGE UP (ref 80–100)
MONOCYTES # BLD AUTO: 0.11 K/UL — SIGNIFICANT CHANGE UP (ref 0–0.9)
MONOCYTES NFR BLD AUTO: 7 % — SIGNIFICANT CHANGE UP (ref 2–14)
NEUTROPHILS # BLD AUTO: 1.03 K/UL — LOW (ref 1.8–7.4)
NEUTROPHILS NFR BLD AUTO: 54 % — SIGNIFICANT CHANGE UP (ref 43–77)
NRBC # BLD: SIGNIFICANT CHANGE UP /100 WBCS (ref 0–0)
PHOSPHATE SERPL-MCNC: 3.5 MG/DL — SIGNIFICANT CHANGE UP (ref 2.5–4.5)
PLATELET # BLD AUTO: 120 K/UL — LOW (ref 150–400)
POTASSIUM SERPL-MCNC: 4.4 MMOL/L — SIGNIFICANT CHANGE UP (ref 3.5–5.3)
POTASSIUM SERPL-SCNC: 4.4 MMOL/L — SIGNIFICANT CHANGE UP (ref 3.5–5.3)
PROT SERPL-MCNC: 6.3 G/DL — SIGNIFICANT CHANGE UP (ref 6–8.3)
RBC # BLD: 3.76 M/UL — LOW (ref 3.8–5.2)
RBC # FLD: 13.6 % — SIGNIFICANT CHANGE UP (ref 10.3–14.5)
SODIUM SERPL-SCNC: 142 MMOL/L — SIGNIFICANT CHANGE UP (ref 135–145)
WBC # BLD: 1.64 K/UL — LOW (ref 3.8–10.5)
WBC # FLD AUTO: 1.64 K/UL — LOW (ref 3.8–10.5)

## 2021-02-07 PROCEDURE — 99233 SBSQ HOSP IP/OBS HIGH 50: CPT

## 2021-02-07 RX ADMIN — REMDESIVIR 500 MILLIGRAM(S): 5 INJECTION INTRAVENOUS at 22:00

## 2021-02-07 RX ADMIN — BUDESONIDE AND FORMOTEROL FUMARATE DIHYDRATE 2 PUFF(S): 160; 4.5 AEROSOL RESPIRATORY (INHALATION) at 17:20

## 2021-02-07 RX ADMIN — PREGABALIN 1000 MICROGRAM(S): 225 CAPSULE ORAL at 11:28

## 2021-02-07 RX ADMIN — APIXABAN 5 MILLIGRAM(S): 2.5 TABLET, FILM COATED ORAL at 06:16

## 2021-02-07 RX ADMIN — TIOTROPIUM BROMIDE 1 CAPSULE(S): 18 CAPSULE ORAL; RESPIRATORY (INHALATION) at 06:18

## 2021-02-07 RX ADMIN — Medication 6 MILLIGRAM(S): at 06:16

## 2021-02-07 RX ADMIN — Medication 50 MILLIGRAM(S): at 17:20

## 2021-02-07 RX ADMIN — BUDESONIDE AND FORMOTEROL FUMARATE DIHYDRATE 2 PUFF(S): 160; 4.5 AEROSOL RESPIRATORY (INHALATION) at 06:17

## 2021-02-07 RX ADMIN — Medication 50 MILLIGRAM(S): at 06:17

## 2021-02-07 RX ADMIN — Medication 400 UNIT(S): at 11:29

## 2021-02-07 RX ADMIN — Medication 25 MICROGRAM(S): at 06:16

## 2021-02-07 RX ADMIN — APIXABAN 5 MILLIGRAM(S): 2.5 TABLET, FILM COATED ORAL at 17:20

## 2021-02-07 NOTE — PROGRESS NOTE ADULT - PROBLEM SELECTOR PLAN 1
acute hypoxic respiratory failure 2/2 COVID-19 PNA - no obvious consolidation noted on prelim read of CXR but with bilateral markings suspicious of developing viral pneumonia  Day of symptom onset & diagnosis 1/31  -Continue with O2 support (currently on 2L); may use NC, Venturi Mask, NRB, HFNC; avoid aerosolizing procedures, metered dose inhalers acceptable   -Prone PRN, tolerate lower O2 saturations as possible; avoid intubation with such techniques  -Continue Decadron, today is day 2/10  -Continue Remdesivir [day 2/5]  -VTE ppx w/ home Eliquis 5mg PO BID  -Monitor volume status closely, will exercise caution with aggressive hydration  -Tylenol prn myalgias, fever  -Robitussin, tessalon perles PRN cough   -Daily labs to include: CBC with differential along with a CMP  -CRP, CPK, trop, procal, lactate, fibrinogen, proBNP, procal, troponin resulted  -LDH, d-dimer, ferritin, PT/PTT, INR pending, f/u results   -If clinically decompensating, repeat d-dimer, CRP, ferritin q48-72h  -NLR on admission: 5.37  -Follow up culture data; will monitor off antibiotics unless there is high suspicion for superimposed bacterial infection  -Maintain isolation precautions - airborne and contact  -Code Status: Full Code  -Pulm (Dr. Walters) consulted, f/u recs acute hypoxic respiratory failure 2/2 COVID-19 PNA - no obvious consolidation noted on prelim read of CXR but with bilateral markings suspicious of developing viral pneumonia  Day of symptom onset & diagnosis 1/31  -Continue with O2 support (currently on 2L); may use NC, Venturi Mask, NRB, HFNC; avoid aerosolizing procedures, metered dose inhalers acceptable   -Prone PRN, tolerate lower O2 saturations as possible; avoid intubation with such techniques  -Continue Decadron, today is day 2/10  -Continue Remdesivir [day 2/5]  -VTE ppx w/ home Eliquis 5mg PO BID  -Monitor volume status closely, will exercise caution with aggressive hydration  -Tylenol prn myalgias, fever  -Robitussin, tessalon perles PRN cough   -Daily labs to include: CBC with differential along with a CMP  -CRP, CPK, trop, procal, lactate, fibrinogen, proBNP, procal, troponin resulted  -LDH, d-dimer, ferritin, PT/PTT, INR pending, f/u results   -If clinically decompensating, repeat d-dimer, CRP, ferritin q48-72h  -NLR on admission: 5.37  -Follow up culture data; will monitor off antibiotics unless there is high suspicion for superimposed bacterial infection  -Maintain isolation precautions - airborne and contact  -her leukopenia likely due to COVID infection. Continue to monitor  -Code Status: Full Code  -Pulm (Dr. Walters) consulted, f/u recs acute hypoxic respiratory failure 2/2 COVID-19 PNA - no obvious consolidation noted on prelim read of CXR but with bilateral markings suspicious of developing viral pneumonia  Day of symptom onset & diagnosis 1/31  -Continue with O2 support (currently on 2L); may use NC, Venturi Mask, NRB, HFNC; avoid aerosolizing procedures, metered dose inhalers acceptable   -Prone PRN, tolerate lower O2 saturations as possible; avoid intubation with such techniques  -Continue Decadron, today is day 2/10  -Continue Remdesivir [day 2/5]  -VTE ppx w/ home Eliquis 5mg PO BID  -Monitor volume status closely, will exercise caution with aggressive hydration  -Tylenol prn myalgias, fever  -Robitussin, tessalon perles PRN cough   -Daily labs to include: CBC with differential along with a CMP  -CRP, CPK, trop, procal, lactate, fibrinogen, proBNP, procal, troponin resulted  -LDH, d-dimer, ferritin, PT/PTT, INR pending, f/u results   -If clinically decompensating, repeat d-dimer, CRP, ferritin q48-72h  -NLR on admission: 5.37  -Follow up culture data; will monitor off antibiotics unless there is high suspicion for superimposed bacterial infection  -Maintain isolation precautions - airborne and contact  -her leukopenia and thrombocytopenia likely due to COVID infection. Continue to monitor  -Code Status: Full Code  -Pulm (Dr. Walters) consulted, f/u recs

## 2021-02-07 NOTE — PROGRESS NOTE ADULT - PROBLEM SELECTOR PLAN 8
IMPROVE VTE Individual Risk Assessment        RISK                                                          Points  [  ] Previous VTE                                                3  [  ] Thrombophilia                                             2  [  ] Lower limb paralysis                                   2        (unable to hold up >15 seconds)    [  ] Current Cancer                                             2         (within 6 months)  [  ] Immobilization > 24 hrs                              1  [  ] ICU/CCU stay > 24 hours                             1  [ x ] Age > 60                                                         1  IMPROVE VTE Score: 1 - DVT ppx w/ home Eliquis 5mg PO BID Chronic, stable  -Continue home Synthroid

## 2021-02-07 NOTE — PROGRESS NOTE ADULT - PROBLEM SELECTOR PLAN 4
Chronic, not currently on home O2  -Pt previously on home O2, has not needed for several years  -Continue home Spiriva  -On Advair at home, will start Symbicort   -Pulm (Dr. Walters) consulted, f/u recs no signs or symptoms of active bleeding. Continue to monitor hgb.

## 2021-02-07 NOTE — PROGRESS NOTE ADULT - PROBLEM SELECTOR PLAN 5
Chronic, BP elevated but improved since admission  -Continue home hydralazine 50mg BID with hold parameters  -DASH/TLC diet   -Follows outpatient with cardio Dr. Palla Chronic, not currently on home O2  -Pt previously on home O2, has not needed for several years  -Continue home Spiriva  -On Advair at home, will start Symbicort   -Pulm (Dr. Walters) consulted, f/u recs

## 2021-02-07 NOTE — PROGRESS NOTE ADULT - PROBLEM SELECTOR PLAN 6
Chronic, stable  -Diet controlled Chronic, BP elevated but improved since admission  -Continue home hydralazine 50mg BID with hold parameters  -DASH/TLC diet   -Follows outpatient with cardio Dr. Palla

## 2021-02-07 NOTE — PROGRESS NOTE ADULT - SUBJECTIVE AND OBJECTIVE BOX
Ms Contreras is a 71 yo F who presented to the ED with cough found to have hypoxia due to COVID.     Patient seen and examined at bedside.          Ms Contreras is a 71 yo F who presented to the ED with cough found to have hypoxia due to COVID.   Patient seen and examined at bedside.   She is feeling better. Her SOB is improved, She has no other complaints.   Denies fevers, chills, headaches, nausea, vomiting, chest pain, palpitations, abdominal pain, constipation, diarrhea, melena, hematochezia, dysuria.    T(C): 36.5 (02-07-21 @ 04:20), Max: 37.3 (02-06-21 @ 20:00)  HR: 66 (02-07-21 @ 04:20) (65 - 89)  BP: 155/74 (02-07-21 @ 04:20) (155/74 - 194/84)  RR: 18 (02-07-21 @ 04:20) (16 - 18)  SpO2: 96% (02-07-21 @ 04:20) (94% - 97%)  Wt(kg): --    Physical Exam:   GENERAL: well-groomed, well-developed, NAD  HEENT: head NC/AT; EOM intact, conjunctiva & sclera clear; hearing grossly intact, moist mucous membranes  NECK: supple, no JVD  RESPIRATORY: decreased breath sounds b/l, no rales  CARDIOVASCULAR: S1&S2, RRR, no murmurs or gallops  ABDOMEN: soft, non-tender, non-distended, + Bowel sounds x4 quadrants, no guarding, rebound or rigidity  MUSCULOSKELETAL:  no clubbing, cyanosis or edema of all 4 extremities  LYMPH: no cervical lymphadenopathy  VASCULAR: Radial pulses 2+ bilaterally, no varicose veins   SKIN: warm and dry, color normal  NEUROLOGIC: AA&O X3, CN2-12 intact w/ no focal deficits, no sensory loss, motor Strength 5/5 in UE & LE B/L  Psych: Normal mood and affect, normal behavior

## 2021-02-08 LAB
ALBUMIN SERPL ELPH-MCNC: 2.8 G/DL — LOW (ref 3.3–5)
ALP SERPL-CCNC: 76 U/L — SIGNIFICANT CHANGE UP (ref 40–120)
ALT FLD-CCNC: 18 U/L — SIGNIFICANT CHANGE UP (ref 12–78)
ANION GAP SERPL CALC-SCNC: 6 MMOL/L — SIGNIFICANT CHANGE UP (ref 5–17)
AST SERPL-CCNC: 22 U/L — SIGNIFICANT CHANGE UP (ref 15–37)
BASOPHILS # BLD AUTO: 0.01 K/UL — SIGNIFICANT CHANGE UP (ref 0–0.2)
BASOPHILS NFR BLD AUTO: 0.1 % — SIGNIFICANT CHANGE UP (ref 0–2)
BILIRUB SERPL-MCNC: 0.4 MG/DL — SIGNIFICANT CHANGE UP (ref 0.2–1.2)
BUN SERPL-MCNC: 26 MG/DL — HIGH (ref 7–23)
CALCIUM SERPL-MCNC: 8.3 MG/DL — LOW (ref 8.5–10.1)
CHLORIDE SERPL-SCNC: 108 MMOL/L — SIGNIFICANT CHANGE UP (ref 96–108)
CO2 SERPL-SCNC: 28 MMOL/L — SIGNIFICANT CHANGE UP (ref 22–31)
CREAT SERPL-MCNC: 1.1 MG/DL — SIGNIFICANT CHANGE UP (ref 0.5–1.3)
EOSINOPHIL # BLD AUTO: 0.01 K/UL — SIGNIFICANT CHANGE UP (ref 0–0.5)
EOSINOPHIL NFR BLD AUTO: 0.1 % — SIGNIFICANT CHANGE UP (ref 0–6)
GLUCOSE SERPL-MCNC: 98 MG/DL — SIGNIFICANT CHANGE UP (ref 70–99)
HCT VFR BLD CALC: 34.7 % — SIGNIFICANT CHANGE UP (ref 34.5–45)
HGB BLD-MCNC: 10.9 G/DL — LOW (ref 11.5–15.5)
IMM GRANULOCYTES NFR BLD AUTO: 0.4 % — SIGNIFICANT CHANGE UP (ref 0–1.5)
LYMPHOCYTES # BLD AUTO: 0.67 K/UL — LOW (ref 1–3.3)
LYMPHOCYTES # BLD AUTO: 10 % — LOW (ref 13–44)
MCHC RBC-ENTMCNC: 29.1 PG — SIGNIFICANT CHANGE UP (ref 27–34)
MCHC RBC-ENTMCNC: 31.4 GM/DL — LOW (ref 32–36)
MCV RBC AUTO: 92.8 FL — SIGNIFICANT CHANGE UP (ref 80–100)
MONOCYTES # BLD AUTO: 0.38 K/UL — SIGNIFICANT CHANGE UP (ref 0–0.9)
MONOCYTES NFR BLD AUTO: 5.7 % — SIGNIFICANT CHANGE UP (ref 2–14)
NEUTROPHILS # BLD AUTO: 5.59 K/UL — SIGNIFICANT CHANGE UP (ref 1.8–7.4)
NEUTROPHILS NFR BLD AUTO: 83.7 % — HIGH (ref 43–77)
NRBC # BLD: 0 /100 WBCS — SIGNIFICANT CHANGE UP (ref 0–0)
PLATELET # BLD AUTO: 139 K/UL — LOW (ref 150–400)
POTASSIUM SERPL-MCNC: 4.8 MMOL/L — SIGNIFICANT CHANGE UP (ref 3.5–5.3)
POTASSIUM SERPL-SCNC: 4.8 MMOL/L — SIGNIFICANT CHANGE UP (ref 3.5–5.3)
PROT SERPL-MCNC: 6 G/DL — SIGNIFICANT CHANGE UP (ref 6–8.3)
RBC # BLD: 3.74 M/UL — LOW (ref 3.8–5.2)
RBC # FLD: 13.4 % — SIGNIFICANT CHANGE UP (ref 10.3–14.5)
SODIUM SERPL-SCNC: 142 MMOL/L — SIGNIFICANT CHANGE UP (ref 135–145)
WBC # BLD: 6.69 K/UL — SIGNIFICANT CHANGE UP (ref 3.8–10.5)
WBC # FLD AUTO: 6.69 K/UL — SIGNIFICANT CHANGE UP (ref 3.8–10.5)

## 2021-02-08 PROCEDURE — 99233 SBSQ HOSP IP/OBS HIGH 50: CPT

## 2021-02-08 RX ADMIN — TIOTROPIUM BROMIDE 1 CAPSULE(S): 18 CAPSULE ORAL; RESPIRATORY (INHALATION) at 05:33

## 2021-02-08 RX ADMIN — Medication 50 MILLIGRAM(S): at 05:33

## 2021-02-08 RX ADMIN — BUDESONIDE AND FORMOTEROL FUMARATE DIHYDRATE 2 PUFF(S): 160; 4.5 AEROSOL RESPIRATORY (INHALATION) at 16:02

## 2021-02-08 RX ADMIN — BUDESONIDE AND FORMOTEROL FUMARATE DIHYDRATE 2 PUFF(S): 160; 4.5 AEROSOL RESPIRATORY (INHALATION) at 05:34

## 2021-02-08 RX ADMIN — REMDESIVIR 500 MILLIGRAM(S): 5 INJECTION INTRAVENOUS at 22:02

## 2021-02-08 RX ADMIN — Medication 25 MICROGRAM(S): at 05:33

## 2021-02-08 RX ADMIN — Medication 6 MILLIGRAM(S): at 05:32

## 2021-02-08 RX ADMIN — APIXABAN 5 MILLIGRAM(S): 2.5 TABLET, FILM COATED ORAL at 16:01

## 2021-02-08 RX ADMIN — Medication 400 UNIT(S): at 10:22

## 2021-02-08 RX ADMIN — PREGABALIN 1000 MICROGRAM(S): 225 CAPSULE ORAL at 10:22

## 2021-02-08 RX ADMIN — APIXABAN 5 MILLIGRAM(S): 2.5 TABLET, FILM COATED ORAL at 05:33

## 2021-02-08 RX ADMIN — Medication 50 MILLIGRAM(S): at 16:01

## 2021-02-08 NOTE — PROGRESS NOTE ADULT - SUBJECTIVE AND OBJECTIVE BOX
INTERVAL HPI/OVERNIGHT EVENTS:  Patient seen and examined at bedside. States she feels well. States her breathing is stable, denies feeling more short of breath than yesterday. Patient on 5L O2 on initial examination, titrated down to 3L throughout the day. Patient ambulating well without getting SOB (on O2).    MEDICATIONS  (STANDING):  apixaban 5 milliGRAM(s) Oral every 12 hours  budesonide 160 MICROgram(s)/formoterol 4.5 MICROgram(s) Inhaler 2 Puff(s) Inhalation two times a day  cholecalciferol 400 Unit(s) Oral daily  cyanocobalamin 1000 MICROGram(s) Oral daily  dexAMETHasone     Tablet 6 milliGRAM(s) Oral daily  hydrALAZINE 50 milliGRAM(s) Oral two times a day  levothyroxine 25 MICROGram(s) Oral daily  remdesivir  IVPB   IV Intermittent   remdesivir  IVPB 100 milliGRAM(s) IV Intermittent every 24 hours  tiotropium 18 MICROgram(s) Capsule 1 Capsule(s) Inhalation daily    MEDICATIONS  (PRN):  acetaminophen   Tablet .. 650 milliGRAM(s) Oral every 6 hours PRN Temp greater or equal to 38C (100.4F)  benzonatate 100 milliGRAM(s) Oral three times a day PRN Cough  guaiFENesin   Syrup  (Sugar-Free) 200 milliGRAM(s) Oral every 6 hours PRN Cough      Allergies    artichokes (Unknown)  IV Contrast (Unknown)  Levaquin (Anaphylaxis)  sulfa drugs (Unknown)  Sulfur (Unknown)    Intolerances      ROS:  CONSTITUTIONAL: No weakness, fevers or chills  EYES/ENT: No visual changes;  No vertigo or throat pain   NECK: No pain or stiffness  RESPIRATORY: No wheezing, hemoptysis; No shortness of breath (while on O2)  CARDIOVASCULAR: No chest pain or palpitations  GASTROINTESTINAL: No abdominal or epigastric pain. No nausea, vomiting, or hematemesis  GENITOURINARY: No dysuria, frequency or hematuria  NEUROLOGICAL: No numbness or weakness  SKIN: No itching, burning, rashes, or lesions   All other review of systems is negative unless indicated above.    Vital Signs Last 24 Hrs  T(C): 36.9 (08 Feb 2021 17:03), Max: 36.9 (08 Feb 2021 17:03)  T(F): 98.4 (08 Feb 2021 17:03), Max: 98.4 (08 Feb 2021 17:03)  HR: 59 (08 Feb 2021 17:03) (59 - 63)  BP: 146/81 (08 Feb 2021 17:03) (130/75 - 146/81)  BP(mean): --  RR: 18 (08 Feb 2021 17:03) (18 - 18)  SpO2: 99% (08 Feb 2021 17:03) (95% - 99%)    Physical Exam:  General: WN/WD NAD  Neurology: A&Ox3, nonfocal, DUVAL x 4  Respiratory: +bibasilar crackles (R>L)  CV: RRR, S1S2, no murmurs, rubs or gallops  Abdominal: Soft, NT, ND +BS  Extremities: No edema, + peripheral pulses      LABS:                        10.9   6.69  )-----------( 139      ( 08 Feb 2021 07:29 )             34.7     02-08    142  |  108  |  26<H>  ----------------------------<  98  4.8   |  28  |  1.10    Ca    8.3<L>      08 Feb 2021 07:29  Phos  3.5     02-07  Mg     2.2     02-07    TPro  6.0  /  Alb  2.8<L>  /  TBili  0.4  /  DBili  x   /  AST  22  /  ALT  18  /  AlkPhos  76  02-08    PT/INR - ( 06 Feb 2021 22:19 )   PT: 14.6 sec;   INR: 1.26 ratio         PTT - ( 06 Feb 2021 22:19 )  PTT:35.4 sec      RADIOLOGY & ADDITIONAL TESTS:

## 2021-02-08 NOTE — PROGRESS NOTE ADULT - PROBLEM SELECTOR PLAN 5
Chronic, not currently on home O2  -Pt previously on home O2, has not needed for several years  -Continue home Spiriva  -On Advair at home, will start Symbicort   -Pulm (Dr. Walters) consulted, f/u recs

## 2021-02-08 NOTE — PROGRESS NOTE ADULT - SUBJECTIVE AND OBJECTIVE BOX
BALBIR ANGEL is a 70yFemale , patient examined and chart reviewed.     INTERVAL HPI/ OVERNIGHT EVENTS:   No events. Feeling well.   On 2L NC.    PAST MEDICAL & SURGICAL HISTORY:  Psoriasis  Afib  On Eliquis  Edema extremities  Hyperlipidemia  HTN (hypertension)  Hypothyroid  COPD (chronic obstructive pulmonary disease)  S/P eye surgery  age 5 unsure if right or left eye      For details regarding the patient's social history, family history, and other miscellaneous elements, please refer the initial infectious diseases consultation and/or the admitting history and physical examination for this admission.      ROS:  CONSTITUTIONAL:  Negative fever or chills  EYES:  Negative  blurry vision or double vision  CARDIOVASCULAR:  Negative for chest pain or palpitations  RESPIRATORY:  Negative for cough, wheezing, or SOB   GASTROINTESTINAL:  Negative for nausea, vomiting, diarrhea, constipation, or abdominal pain  GENITOURINARY:  Negative frequency, urgency or dysuria  NEUROLOGIC:  No headache, confusion, dizziness, lightheadedness  All other systems were reviewed and are negative       ALLERGIES  artichokes (Unknown)  IV Contrast (Unknown)  Levaquin (Anaphylaxis)  sulfa drugs (Unknown)  Sulfur (Unknown)      Current inpatient medications :    ANTIBIOTICS/RELEVANT:  remdesivir  IVPB   IV Intermittent   remdesivir  IVPB 100 milliGRAM(s) IV Intermittent every 24 hours      acetaminophen   Tablet .. 650 milliGRAM(s) Oral every 6 hours PRN  apixaban 5 milliGRAM(s) Oral every 12 hours  benzonatate 100 milliGRAM(s) Oral three times a day PRN  budesonide 160 MICROgram(s)/formoterol 4.5 MICROgram(s) Inhaler 2 Puff(s) Inhalation two times a day  cholecalciferol 400 Unit(s) Oral daily  cyanocobalamin 1000 MICROGram(s) Oral daily  dexAMETHasone     Tablet 6 milliGRAM(s) Oral daily  guaiFENesin   Syrup  (Sugar-Free) 200 milliGRAM(s) Oral every 6 hours PRN  hydrALAZINE 50 milliGRAM(s) Oral two times a day  levothyroxine 25 MICROGram(s) Oral daily  tiotropium 18 MICROgram(s) Capsule 1 Capsule(s) Inhalation daily      Objective:    T(C): 36.9 (02-08-21 @ 17:03), Max: 36.9 (02-08-21 @ 17:03)  HR: 59 (02-08-21 @ 17:03) (59 - 63)  BP: 146/81 (02-08-21 @ 17:03) (130/75 - 146/81)  RR: 18 (02-08-21 @ 17:03) (18 - 18)  SpO2: 99% (02-08-21 @ 17:03) (95% - 99%)  Wt(kg): --      Physical Exam:  General: well developed well nourished, in no acute distress  Neck: supple, trachea midline  Lungs: clear, no wheeze/rhonchi  Cardiovascular: regular rate and rhythm, S1 S2  Abdomen: soft, nontender,  bowel sounds normal  Neurological: alert and oriented x3  Skin: no rash  Extremities: no cyanosis/clubbing/edema    LABS:                        10.9   6.69  )-----------( 139      ( 08 Feb 2021 07:29 )             34.7       02-08    142  |  108  |  26<H>  ----------------------------<  98  4.8   |  28  |  1.10    Ca    8.3<L>      08 Feb 2021 07:29  Phos  3.5     02-07  Mg     2.2     02-07    TPro  6.0  /  Alb  2.8<L>  /  TBili  0.4  /  DBili  x   /  AST  22  /  ALT  18  /  AlkPhos  76  02-08      PT/INR - ( 06 Feb 2021 22:19 )   PT: 14.6 sec;   INR: 1.26 ratio         PTT - ( 06 Feb 2021 22:19 )  PTT:35.4 sec    Ferritin, Serum (02.06.21 @ 22:48)    Ferritin, Serum: 249 ng/mL    D-Dimer Assay, Quantitative (02.06.21 @ 22:19)    D-Dimer Assay, Quantitative: <150 ng/mL DDU    MICROBIOLOGY:  COVID-19 PCR . (02.06.21 @ 16:53)    COVID-19 PCR: Detected      RADIOLOGY & ADDITIONAL STUDIES:    EXAM:  XR CHEST PORTABLE IMMED 1V                            PROCEDURE DATE:  02/06/2021          INTERPRETATION:  Portable chest radiograph    CLINICAL INFORMATION: Dyspnea, shortness of breath.    TECHNIQUE:  Portable  AP view of the chest was obtained.    COMPARISON: 11/27/2019 chest available for review.    FINDINGS:  The lungs are clear of airspace consolidations or effusions. No pneumothorax.    Heart size within normal limits allowing for magnification effect of AP projection. Visualizedosseous structures are intact.        IMPRESSION:   No evidence of active chest disease.      Assessment :   69 y/o female with PMHx Afib (on Eliquis), HTN, HLD, hypothyroid, psoriasis, COPD, recent admission at [facility] for bradycardia- pacemaker deemed not necessary after med adjusted and was dc home1/28/21 when a few days after she developed myalgias and was seen at urgent care where she had a positive COVID-19 PCR on 1/31/21 admitted with worsening SOB and hypoxia likely COVID 19 pna Noted to have progressive leukocytopenia and with bandemia. On 2L NC.  Leukopenia sec Viral illness resolved  Bandemia likely sec steroids    2/8/21- On 2L NC sats 99% NLR 8.34    Plan :   Trend temps and cbc  Cont Decadron 6mg daily x 10 days  Remdesivir x 5 days course  AC per primary team  COVID-19--critical period for decompensation  (7-14 days post symptom onset), avoid aerosolizing procedures, NSAIDs   -monitor respiratory status closely ( route of 02 and saturation) and titrate when able  -isolation precautions per protocol  -avoid excessive blood draws, blood cultures and frequent CXRs  -monitor biomarkers- CBC w diff  for NLR<3 low vs >5 high) Ferritin (lower risk <450 vs >850) CRP (low risk <2 and higher risk >6) and LDH, D Dimer, procalcitonin  -therapies remains investigational  -antibiotics only if there is concern for a bacterial process  -Steroids for hypoxia/ARDS /shock          Continue with present regiment.  Appropriate use of antibiotics and adverse effects reviewed.      I have discussed the above plan of care with patient/ family in detail. They expressed understanding of the  treatment plan . Risks, benefits and alternatives discussed in detail. I have asked if they have any questions or concerns and appropriately addressed them to the best of my ability .    > 35 minutes were spent in direct patient care reviewing notes, medications ,labs data/ imaging , discussion with multidisciplinary team.    Thank you for allowing me to participate in care of your patient .    Britt Peraza MD  Infectious Disease  [phone]

## 2021-02-08 NOTE — PROGRESS NOTE ADULT - PROBLEM SELECTOR PLAN 1
acute hypoxic respiratory failure 2/2 COVID-19 PNA - no obvious consolidation noted on prelim read of CXR but with bilateral markings suspicious of developing viral pneumonia  Day of symptom onset & diagnosis 1/31  -Continue with O2 support (currently on 3L); may use NC, Venturi Mask, NRB, HFNC; avoid aerosolizing procedures, metered dose inhalers acceptable   -Prone PRN, tolerate lower O2 saturations as possible; avoid intubation with such techniques  -Continue Decadron, today is day 3/10  -Continue Remdesivir [day 3/5]  -VTE ppx w/ home Eliquis 5mg PO BID  -Monitor volume status closely, will exercise caution with aggressive hydration  -Tylenol prn myalgias, fever  -Robitussin, tessalon perles PRN cough   -Daily labs to include: CBC with differential along with a CMP  -CRP, CPK, trop, procal, lactate, fibrinogen, proBNP, procal, troponin resulted  -LDH, d-dimer, ferritin, PT/PTT, INR pending, f/u results   -If clinically decompensating, repeat d-dimer, CRP, ferritin q48-72h  -NLR on admission: 5.37  -Follow up culture data; will monitor off antibiotics unless there is high suspicion for superimposed bacterial infection  -Maintain isolation precautions - airborne and contact  -her leukopenia and thrombocytopenia likely due to COVID infection. Continue to monitor  -Code Status: Full Code  -Pulm (Dr. Walters) consulted, f/u recs

## 2021-02-08 NOTE — PROGRESS NOTE ADULT - PROBLEM SELECTOR PLAN 6
Chronic, BP elevated but improved since admission  -Continue home hydralazine 50mg BID with hold parameters  -DASH/TLC diet   -Follows outpatient with cardio Dr. Palla

## 2021-02-09 DIAGNOSIS — D61.818 OTHER PANCYTOPENIA: ICD-10-CM

## 2021-02-09 DIAGNOSIS — J43.9 EMPHYSEMA, UNSPECIFIED: ICD-10-CM

## 2021-02-09 LAB
ALBUMIN SERPL ELPH-MCNC: 3 G/DL — LOW (ref 3.3–5)
ALP SERPL-CCNC: 82 U/L — SIGNIFICANT CHANGE UP (ref 40–120)
ALT FLD-CCNC: 20 U/L — SIGNIFICANT CHANGE UP (ref 12–78)
ANION GAP SERPL CALC-SCNC: 4 MMOL/L — LOW (ref 5–17)
AST SERPL-CCNC: 23 U/L — SIGNIFICANT CHANGE UP (ref 15–37)
BASOPHILS # BLD AUTO: 0 K/UL — SIGNIFICANT CHANGE UP (ref 0–0.2)
BASOPHILS NFR BLD AUTO: 0 % — SIGNIFICANT CHANGE UP (ref 0–2)
BILIRUB SERPL-MCNC: 0.4 MG/DL — SIGNIFICANT CHANGE UP (ref 0.2–1.2)
BUN SERPL-MCNC: 31 MG/DL — HIGH (ref 7–23)
CALCIUM SERPL-MCNC: 8.5 MG/DL — SIGNIFICANT CHANGE UP (ref 8.5–10.1)
CHLORIDE SERPL-SCNC: 110 MMOL/L — HIGH (ref 96–108)
CO2 SERPL-SCNC: 29 MMOL/L — SIGNIFICANT CHANGE UP (ref 22–31)
CREAT SERPL-MCNC: 1.1 MG/DL — SIGNIFICANT CHANGE UP (ref 0.5–1.3)
EOSINOPHIL # BLD AUTO: 0 K/UL — SIGNIFICANT CHANGE UP (ref 0–0.5)
EOSINOPHIL NFR BLD AUTO: 0 % — SIGNIFICANT CHANGE UP (ref 0–6)
GLUCOSE SERPL-MCNC: 138 MG/DL — HIGH (ref 70–99)
HCT VFR BLD CALC: 35 % — SIGNIFICANT CHANGE UP (ref 34.5–45)
HGB BLD-MCNC: 11 G/DL — LOW (ref 11.5–15.5)
IMM GRANULOCYTES NFR BLD AUTO: 2.4 % — HIGH (ref 0–1.5)
LYMPHOCYTES # BLD AUTO: 0.5 K/UL — LOW (ref 1–3.3)
LYMPHOCYTES # BLD AUTO: 10.7 % — LOW (ref 13–44)
MCHC RBC-ENTMCNC: 29.3 PG — SIGNIFICANT CHANGE UP (ref 27–34)
MCHC RBC-ENTMCNC: 31.4 GM/DL — LOW (ref 32–36)
MCV RBC AUTO: 93.3 FL — SIGNIFICANT CHANGE UP (ref 80–100)
MONOCYTES # BLD AUTO: 0.29 K/UL — SIGNIFICANT CHANGE UP (ref 0–0.9)
MONOCYTES NFR BLD AUTO: 6.2 % — SIGNIFICANT CHANGE UP (ref 2–14)
NEUTROPHILS # BLD AUTO: 3.77 K/UL — SIGNIFICANT CHANGE UP (ref 1.8–7.4)
NEUTROPHILS NFR BLD AUTO: 80.7 % — HIGH (ref 43–77)
NRBC # BLD: 0 /100 WBCS — SIGNIFICANT CHANGE UP (ref 0–0)
PLATELET # BLD AUTO: 173 K/UL — SIGNIFICANT CHANGE UP (ref 150–400)
POTASSIUM SERPL-MCNC: 4.7 MMOL/L — SIGNIFICANT CHANGE UP (ref 3.5–5.3)
POTASSIUM SERPL-SCNC: 4.7 MMOL/L — SIGNIFICANT CHANGE UP (ref 3.5–5.3)
PROT SERPL-MCNC: 6.2 G/DL — SIGNIFICANT CHANGE UP (ref 6–8.3)
RBC # BLD: 3.75 M/UL — LOW (ref 3.8–5.2)
RBC # FLD: 13.6 % — SIGNIFICANT CHANGE UP (ref 10.3–14.5)
SARS-COV-2 IGG SERPL QL IA: NEGATIVE — SIGNIFICANT CHANGE UP
SARS-COV-2 IGM SERPL IA-ACNC: <3.8 AU/ML — SIGNIFICANT CHANGE UP
SODIUM SERPL-SCNC: 143 MMOL/L — SIGNIFICANT CHANGE UP (ref 135–145)
WBC # BLD: 4.67 K/UL — SIGNIFICANT CHANGE UP (ref 3.8–10.5)
WBC # FLD AUTO: 4.67 K/UL — SIGNIFICANT CHANGE UP (ref 3.8–10.5)

## 2021-02-09 PROCEDURE — 99232 SBSQ HOSP IP/OBS MODERATE 35: CPT

## 2021-02-09 RX ADMIN — APIXABAN 5 MILLIGRAM(S): 2.5 TABLET, FILM COATED ORAL at 16:35

## 2021-02-09 RX ADMIN — Medication 50 MILLIGRAM(S): at 16:35

## 2021-02-09 RX ADMIN — BUDESONIDE AND FORMOTEROL FUMARATE DIHYDRATE 2 PUFF(S): 160; 4.5 AEROSOL RESPIRATORY (INHALATION) at 05:36

## 2021-02-09 RX ADMIN — PREGABALIN 1000 MICROGRAM(S): 225 CAPSULE ORAL at 11:06

## 2021-02-09 RX ADMIN — REMDESIVIR 500 MILLIGRAM(S): 5 INJECTION INTRAVENOUS at 21:48

## 2021-02-09 RX ADMIN — TIOTROPIUM BROMIDE 1 CAPSULE(S): 18 CAPSULE ORAL; RESPIRATORY (INHALATION) at 05:35

## 2021-02-09 RX ADMIN — Medication 50 MILLIGRAM(S): at 05:35

## 2021-02-09 RX ADMIN — Medication 400 UNIT(S): at 11:06

## 2021-02-09 RX ADMIN — Medication 6 MILLIGRAM(S): at 05:35

## 2021-02-09 RX ADMIN — APIXABAN 5 MILLIGRAM(S): 2.5 TABLET, FILM COATED ORAL at 05:35

## 2021-02-09 RX ADMIN — BUDESONIDE AND FORMOTEROL FUMARATE DIHYDRATE 2 PUFF(S): 160; 4.5 AEROSOL RESPIRATORY (INHALATION) at 16:35

## 2021-02-09 RX ADMIN — Medication 25 MICROGRAM(S): at 05:35

## 2021-02-09 NOTE — PROGRESS NOTE ADULT - ATTENDING COMMENTS
Patient off supplemental O2, feeling well. Still with SOB on exertion- SpO2 88%- will reevaluate in AM. Continue remdesivir and steroids.
Updated MERRILL Coto with plan.   Patient does not wish for me to call her family to update them.

## 2021-02-09 NOTE — PROGRESS NOTE ADULT - SUBJECTIVE AND OBJECTIVE BOX
INTERVAL HPI/OVERNIGHT EVENTS:  Patient seen and examined at bedside. States she feels a bit SOB after walking the hallway today. Patient off O2, on RA, saturating in low 90s. Patient's O2 sat did drop down to 88% while ambulating but increased after resting. Patient states she feels well, denies any CP, abd pain. Endorses cough.    MEDICATIONS  (STANDING):  apixaban 5 milliGRAM(s) Oral every 12 hours  budesonide 160 MICROgram(s)/formoterol 4.5 MICROgram(s) Inhaler 2 Puff(s) Inhalation two times a day  cholecalciferol 400 Unit(s) Oral daily  cyanocobalamin 1000 MICROGram(s) Oral daily  dexAMETHasone     Tablet 6 milliGRAM(s) Oral daily  hydrALAZINE 50 milliGRAM(s) Oral two times a day  levothyroxine 25 MICROGram(s) Oral daily  remdesivir  IVPB   IV Intermittent   remdesivir  IVPB 100 milliGRAM(s) IV Intermittent every 24 hours  tiotropium 18 MICROgram(s) Capsule 1 Capsule(s) Inhalation daily    MEDICATIONS  (PRN):  acetaminophen   Tablet .. 650 milliGRAM(s) Oral every 6 hours PRN Temp greater or equal to 38C (100.4F)  benzonatate 100 milliGRAM(s) Oral three times a day PRN Cough  guaiFENesin   Syrup  (Sugar-Free) 200 milliGRAM(s) Oral every 6 hours PRN Cough      Allergies    artichokes (Unknown)  IV Contrast (Unknown)  Levaquin (Anaphylaxis)  sulfa drugs (Unknown)  Sulfur (Unknown)    Intolerances        ROS:  CONSTITUTIONAL: No weakness, fevers or chills  EYES/ENT: No visual changes;  No vertigo or throat pain   NECK: No pain or stiffness  RESPIRATORY: No wheezing, hemoptysis; + shortness of breath on exertion  CARDIOVASCULAR: No chest pain or palpitations  GASTROINTESTINAL: No abdominal or epigastric pain. No nausea, vomiting, or hematemesis  GENITOURINARY: No dysuria, frequency or hematuria  NEUROLOGICAL: No numbness or weakness  SKIN: No itching, burning, rashes, or lesions   All other review of systems is negative unless indicated above.    Vital Signs Last 24 Hrs  T(C): 36.5 (09 Feb 2021 09:07), Max: 36.9 (09 Feb 2021 04:20)  T(F): 97.7 (09 Feb 2021 09:07), Max: 98.4 (09 Feb 2021 04:20)  HR: 83 (09 Feb 2021 10:06) (61 - 90)  BP: 146/80 (09 Feb 2021 09:07) (146/80 - 173/89)  BP(mean): --  RR: 19 (09 Feb 2021 10:06) (19 - 19)  SpO2: 93% (09 Feb 2021 10:06) (88% - 98%)      Physical Exam:  General: WN/WD NAD  Neurology: A&Ox3, nonfocal, DUVAL x 4  Respiratory: CTA B/L  CV: RRR, S1S2, no murmurs, rubs or gallops  Abdominal: Soft, NT, ND +BS  Extremities: No edema, + peripheral pulses        LABS:                        11.0   4.67  )-----------( 173      ( 09 Feb 2021 16:53 )             35.0     02-09    143  |  110<H>  |  31<H>  ----------------------------<  138<H>  4.7   |  29  |  1.10    Ca    8.5      09 Feb 2021 16:53    TPro  6.2  /  Alb  3.0<L>  /  TBili  0.4  /  DBili  x   /  AST  23  /  ALT  20  /  AlkPhos  82  02-09          RADIOLOGY & ADDITIONAL TESTS:

## 2021-02-09 NOTE — CONSULT NOTE ADULT - SUBJECTIVE AND OBJECTIVE BOX
PULMONARY/CRITICAL CARE        Patient is a 70y old  Female who presents with a chief complaint of Hypoxia (08 Feb 2021 17:39)    BRIEF HOSPITAL COURSE: ***69 y/o female with PMHx Afib (on Eliquis), HTN, HLD, hypothyroid, psoriasis, COPD, recent admission at [facility] for bradycardia- pacemaker deemed not necessary after med adjusted and was dc home1/28/21 when a few days after she developed myalgias and was seen at urgent care where she had a positive COVID-19 PCR on 1/31/21 who presented to the hospital with CC of worsening SOB and hypoxia. Noted to have progressive leukocytopenia and with bandemia. On 2L NC.  Improved breathing, but some wheeze.  Events last 24 hours: ***    PAST MEDICAL & SURGICAL HISTORY:  Psoriasis    Afib  On Eliquis    Edema extremities    Hyperlipidemia    HTN (hypertension)    Hypothyroid    COPD (chronic obstructive pulmonary disease)    S/P eye surgery  age 5 unsure if right or left eye      Allergies    artichokes (Unknown)  IV Contrast (Unknown)  Levaquin (Anaphylaxis)  sulfa drugs (Unknown)  Sulfur (Unknown)    Intolerances      FAMILY HISTORY: former smoker  FH: hypertension        Review of Systems:  CONSTITUTIONAL: No fever, chills, or fatigue  EYES: No eye pain, visual disturbances, or discharge  ENMT:  No difficulty hearing, tinnitus, vertigo; No sinus or throat pain  NECK: No pain or stiffness  RESPIRATORY: has mild  cough, wheezing, no chills or hemoptysis; some shortness of breath  CARDIOVASCULAR: No chest pain, palpitations, dizziness, or leg swelling  GASTROINTESTINAL: No abdominal or epigastric pain. No nausea, vomiting, or hematemesis; No diarrhea or constipation. No melena or hematochezia.  GENITOURINARY: No dysuria, frequency, hematuria, or incontinence  NEUROLOGICAL: No headaches, memory loss, loss of strength, numbness, or tremors  SKIN: No itching, burning, rashes, or lesions   MUSCULOSKELETAL: No joint pain or swelling; No muscle, back, or extremity pain  PSYCHIATRIC: No depression, anxiety, mood swings, or difficulty sleeping      Medications:  remdesivir  IVPB   IV Intermittent   remdesivir  IVPB 100 milliGRAM(s) IV Intermittent every 24 hours    hydrALAZINE 50 milliGRAM(s) Oral two times a day    benzonatate 100 milliGRAM(s) Oral three times a day PRN  budesonide 160 MICROgram(s)/formoterol 4.5 MICROgram(s) Inhaler 2 Puff(s) Inhalation two times a day  guaiFENesin   Syrup  (Sugar-Free) 200 milliGRAM(s) Oral every 6 hours PRN  tiotropium 18 MICROgram(s) Capsule 1 Capsule(s) Inhalation daily    acetaminophen   Tablet .. 650 milliGRAM(s) Oral every 6 hours PRN      apixaban 5 milliGRAM(s) Oral every 12 hours        dexAMETHasone     Tablet 6 milliGRAM(s) Oral daily  levothyroxine 25 MICROGram(s) Oral daily    cholecalciferol 400 Unit(s) Oral daily  cyanocobalamin 1000 MICROGram(s) Oral daily                ICU Vital Signs Last 24 Hrs  T(C): 36.9 (09 Feb 2021 04:20), Max: 36.9 (08 Feb 2021 17:03)  T(F): 98.4 (09 Feb 2021 04:20), Max: 98.4 (08 Feb 2021 17:03)  HR: 67 (09 Feb 2021 04:20) (59 - 67)  BP: 173/89 (09 Feb 2021 04:20) (130/75 - 173/89)  BP(mean): --  ABP: --  ABP(mean): --  RR: 19 (09 Feb 2021 04:20) (18 - 19)  SpO2: 98% (09 Feb 2021 04:20) (95% - 99%)    Vital Signs Last 24 Hrs  T(C): 36.9 (09 Feb 2021 04:20), Max: 36.9 (08 Feb 2021 17:03)  T(F): 98.4 (09 Feb 2021 04:20), Max: 98.4 (08 Feb 2021 17:03)  HR: 67 (09 Feb 2021 04:20) (59 - 67)  BP: 173/89 (09 Feb 2021 04:20) (130/75 - 173/89)  BP(mean): --  RR: 19 (09 Feb 2021 04:20) (18 - 19)  SpO2: 98% (09 Feb 2021 04:20) (95% - 99%)        I&O's Detail        LABS:                        10.9   6.69  )-----------( 139      ( 08 Feb 2021 07:29 )             34.7     02-08    142  |  108  |  26<H>  ----------------------------<  98  4.8   |  28  |  1.10    Ca    8.3<L>      08 Feb 2021 07:29    TPro  6.0  /  Alb  2.8<L>  /  TBili  0.4  /  DBili  x   /  AST  22  /  ALT  18  /  AlkPhos  76  02-08          CAPILLARY BLOOD GLUCOSE            CULTURES:  Culture Results:   No growth to date. (02-07 @ 21:28)  Culture Results:   No growth to date. (02-07 @ 21:28)      Physical Examination:    General: No acute distress.  overweight female    HEENT: Pupils equal, reactive to light.  Symmetric.    PULM: few wheeze bilat, rhonchi; no rales; good excursion.    CVS: Regular rate and rhythm, no murmurs, rubs, or gallops    ABD: Soft, nondistended, nontender, normoactive bowel sounds, no masses    EXT: No edema, nontender calves    SKIN: Warm and well perfused, no rashes noted.    NEURO: Alert, oriented, interactive, nonfocal    RADIOLOGY: ***rad< from: US Duplex Venous Lower Ext Complete, Bilateral (02.06.21 @ 19:51) >  EXAM:  US DPLX LWR EXT VEINS COMPL BI                            PROCEDURE DATE:  02/06/2021          INTERPRETATION:  CLINICAL INFORMATION: Lower extremity edema, Covid positive    COMPARISON: 12/16/2014.    TECHNIQUE: Duplex sonography of the BILATERAL LOWER extremity veins with color and spectral Doppler, with and without compression.    FINDINGS:    There is normal compressibility of the bilateral common femoral, femoral and popliteal veins.  Doppler examination shows normal spontaneous andphasic flow.    No calf vein thrombosis is detected.    IMPRESSION:  No evidence of deep venous thrombosis in either lower extremity.    < end of copied text >  < from: Xray Chest 1 View-PORTABLE IMMEDIATE (02.06.21 @ 17:39) >  EXAM:  XR CHEST PORTABLE IMMED 1V                            PROCEDURE DATE:  02/06/2021          INTERPRETATION:  Portable chest radiograph    CLINICAL INFORMATION: Dyspnea, shortness of breath.    TECHNIQUE:  Portable  AP view of the chest was obtained.    COMPARISON: 11/27/2019 chest available for review.    FINDINGS:  The lungs are clear of airspace consolidations or effusions. No pneumothorax.    Heart size within normal limits allowing for magnification effect of AP projection. Visualizedosseous structures are intact.        IMPRESSION:   No evidence of active chest disease.    < end of copied text >      CRITICAL CARE TIME SPENT: ***  
    HPI:  69 y/o female with PMHx Afib (on Eliquis), HTN, HLD, hypothyroid, psoriasis, COPD, recent admission at [facility] for bradycardia- pacemaker deemed not necessary after med adjusted and was dc home1/28/21 when a few days after she developed myalgias and was seen at urgent care where she had a positive COVID-19 PCR on 1/31/21 who presented to the hospital with CC of worsening SOB and hypoxia. Noted to have progressive leukocytopenia and with bandemia. On 2L NC.    Infectious Disease consult was called to evaluate pt.;    Past Medical & Surgical Hx:  PAST MEDICAL & SURGICAL HISTORY:  Psoriasis  Afib  On Eliquis  Edema extremities  Hyperlipidemia  HTN (hypertension)  Hypothyroid  COPD (chronic obstructive pulmonary disease)  S/P eye surgery  age 5 unsure if right or left eye      Social History--  EtOH: denies  Tobacco: denies   Drug Use: denies    FAMILY HISTORY:  FH: hypertension      Allergies  artichokes (Unknown)  IV Contrast (Unknown)  Levaquin (Anaphylaxis)  sulfa drugs (Unknown)  Sulfur (Unknown)    Intolerances  NONe      Home Medications:  Advair Diskus 500 mcg-50 mcg inhalation powder: 1 puff(s) inhaled 2 times a day (06 Feb 2021 21:08)  cholecalciferol oral tablet: 400 unit(s) orally once a day (06 Feb 2021 21:08)  levothyroxine 25 mcg (0.025 mg) oral tablet: 1 tab(s) orally once a day (06 Feb 2021 21:08)  Spiriva 18 mcg inhalation capsule: 1 each inhaled once a day (06 Feb 2021 21:08)  Vitamin B-12: 1 tab(s) orally once a day (06 Feb 2021 21:08)      Current Inpatient Medications :    ANTIBIOTICS:   remdesivir  IVPB   IV Intermittent   remdesivir  IVPB 100 milliGRAM(s) IV Intermittent every 24 hours      OTHER RELEVANT MEDICATIONS :  acetaminophen   Tablet .. 650 milliGRAM(s) Oral every 6 hours PRN  apixaban 5 milliGRAM(s) Oral every 12 hours  benzonatate 100 milliGRAM(s) Oral three times a day PRN  budesonide 160 MICROgram(s)/formoterol 4.5 MICROgram(s) Inhaler 2 Puff(s) Inhalation two times a day  cholecalciferol 400 Unit(s) Oral daily  cyanocobalamin 1000 MICROGram(s) Oral daily  dexAMETHasone     Tablet 6 milliGRAM(s) Oral daily  guaiFENesin   Syrup  (Sugar-Free) 200 milliGRAM(s) Oral every 6 hours PRN  hydrALAZINE 50 milliGRAM(s) Oral two times a day  levothyroxine 25 MICROGram(s) Oral daily  tiotropium 18 MICROgram(s) Capsule 1 Capsule(s) Inhalation daily      ROS:  CONSTITUTIONAL:  Negative fever or chills  EYES:  Negative  blurry vision or double vision  CARDIOVASCULAR:  Negative for chest pain or palpitations  RESPIRATORY:  Negative for cough, wheezing, or SOB   GASTROINTESTINAL:  Negative for nausea, vomiting, diarrhea, constipation, or abdominal pain  GENITOURINARY:  Negative frequency, urgency , dysuria or hematuria   NEUROLOGIC:  No headache, confusion, dizziness, lightheadedness  All other systems were reviewed and are negative      I&O's Detail    06 Feb 2021 07:01  -  07 Feb 2021 07:00  --------------------------------------------------------  IN:    Lactated Ringers Bolus: 1000 mL  Total IN: 1000 mL    OUT:  Total OUT: 0 mL    Total NET: 1000 mL      Physical Exam:  Vital Signs Last 24 Hrs  T(C): 37.1 (07 Feb 2021 15:41), Max: 37.2 (06 Feb 2021 22:10)  T(F): 98.7 (07 Feb 2021 15:41), Max: 99 (06 Feb 2021 22:10)  HR: 61 (07 Feb 2021 15:41) (61 - 75)  BP: 155/74 (07 Feb 2021 15:41) (124/74 - 164/81)  RR: 18 (07 Feb 2021 15:41) (16 - 18)  SpO2: 96% (07 Feb 2021 15:41) (96% - 97%)  Height (cm): 154.9 (02-07 @ 05:46)  Weight (kg): 88.3 (02-07 @ 05:46)  BMI (kg/m2): 36.8 (02-07 @ 05:46)  BSA (m2): 1.87 (02-07 @ 05:46)    General: no acute distress  Neck: supple, trachea midline  Lungs: Decreased, no wheeze/rhonchi  Cardiovascular: regular rate and rhythm, S1 S2  Abdomen: soft, nontender, ND, bowel sounds normal  Neurological:  alert and oriented x3  Skin: no rash  Extremities: no cyanosis/clubbing/edema    Labs:                         11.0   1.64  )-----------( 120      ( 07 Feb 2021 07:34 )             34.6     Manual Differential (02.07.21 @ 07:34)    Red Cell Morphology: Normal    Platelet Morphology: Normal    Reactive Lymphocytes %: 6.0 %    Manual Smear Verification: Performed    Band Neutrophils %: 9.0 %    Nucleated RBC: 0      02-07    142  |  108  |  17  ----------------------------<  100<H>  4.4   |  26  |  0.93    Ca    8.3<L>      07 Feb 2021 07:34  Phos  3.5     02-07  Mg     2.2     02-07    TPro  6.3  /  Alb  3.0<L>  /  TBili  0.5  /  DBili  x   /  AST  26  /  ALT  17  /  AlkPhos  76  02-07      RECENT CULTURES:  COVID-19 PCR . (02.06.21 @ 16:53)    COVID-19 PCR: Detected      RADIOLOGY & ADDITIONAL STUDIES:    EXAM:  XR CHEST PORTABLE IMMED 1V                            PROCEDURE DATE:  02/06/2021          INTERPRETATION:  Portable chest radiograph    CLINICAL INFORMATION: Dyspnea, shortness of breath.    TECHNIQUE:  Portable  AP view of the chest was obtained.    COMPARISON: 11/27/2019 chest available for review.    FINDINGS:  The lungs are clear of airspace consolidations or effusions. No pneumothorax.    Heart size within normal limits allowing for magnification effect of AP projection. Visualizedosseous structures are intact.        IMPRESSION:   No evidence of active chest disease.      Assessment :   69 y/o female with PMHx Afib (on Eliquis), HTN, HLD, hypothyroid, psoriasis, COPD, recent admission at [facility] for bradycardia- pacemaker deemed not necessary after med adjusted and was dc home1/28/21 when a few days after she developed myalgias and was seen at urgent care where she had a positive COVID-19 PCR on 1/31/21 admitted with worsening SOB and hypoxia likely COVID 19 pna Noted to have progressive leukocytopenia and with bandemia. On 2L NC.  Leukopenia sec Viral illness  Bandemia likely sec steroids    Plan :   Trend temps and cbc  Cont Decadron 6mg daily x 10 days  Remdesivir x 5 days course  AC per primary team  COVID-19--critical period for decompensation  (7-14 days post symptom onset), avoid aerosolizing procedures, NSAIDs   -monitor respiratory status closely ( route of 02 and saturation) and titrate when able  -isolation precautions per protocol  -avoid excessive blood draws, blood cultures and frequent CXRs  -monitor biomarkers- CBC w diff  for NLR<3 low vs >5 high) Ferritin (lower risk <450 vs >850) CRP (low risk <2 and higher risk >6) and LDH, D Dimer, procalcitonin  -therapies remains investigational  -antibiotics only if there is concern for a bacterial process  -Steroids for hypoxia/ARDS /shock        Continue with present regiment .  Approptiate use of antibiotics and adverse effects reviewed.      I have discussed the above plan of care with patient/family in detail. They expressed understanding of the treatment plan . Risks, benefits and alternatives discussed in detail. I have asked if they have any questions or concerns and appropriately addressed them to the best of my ability .      > 45 minutes spent in direct patient care reviewing  the notes, lab data/ imaging , discussion with multidisciplinary team. All questions were addressed and answered to the best of my capacity .    Thank you for allowing me to participate in the care of your patient .      Britt Peraza MD  Infectious Disease  [phone]

## 2021-02-09 NOTE — PROGRESS NOTE ADULT - PROBLEM SELECTOR PLAN 5
Chronic, not currently on home O2  -Pt previously on home O2, has not needed for several years  -Continue home Spiriva  -On Advair at home, will start Symbicort   -Pulm (Dr. Pelaez) consulted, f/u recs

## 2021-02-09 NOTE — PROGRESS NOTE ADULT - PROBLEM SELECTOR PLAN 1
acute hypoxic respiratory failure 2/2 COVID-19 PNA - no obvious consolidation noted on prelim read of CXR but with bilateral markings suspicious of developing viral pneumonia  Day of symptom onset & diagnosis 1/31  -Continue with O2 support as needed; may use NC, Venturi Mask, NRB, HFNC; avoid aerosolizing procedures, metered dose inhalers acceptable   -Prone PRN, tolerate lower O2 saturations as possible; avoid intubation with such techniques  -Continue Decadron, today is day 4/10  -Continue Remdesivir [day 4/5]  -VTE ppx w/ home Eliquis 5mg PO BID  -Monitor volume status closely, will exercise caution with aggressive hydration  -Tylenol prn myalgias, fever  -Robitussin, tessalon perles PRN cough   -Daily labs  -CRP, CPK, trop, procal, lactate, fibrinogen, proBNP, procal, troponin resulted  -If clinically decompensating, repeat d-dimer, CRP, ferritin q48-72h  -NLR on admission: 5.37  -Follow up culture data; will monitor off antibiotics unless there is high suspicion for superimposed bacterial infection  -Maintain isolation precautions - airborne and contact  -her leukopenia and thrombocytopenia likely due to COVID infection. Continue to monitor  -Code Status: Full Code  -Pulm (Dr. Pelaez) consulted, f/u recs

## 2021-02-09 NOTE — CONSULT NOTE ADULT - ASSESSMENT
Pt with covid, possible occult pneumonia.   COPD with wheezing.  Pancytopenia due to covid, improved.  Hx bradycardia/AF

## 2021-02-09 NOTE — PROGRESS NOTE ADULT - SUBJECTIVE AND OBJECTIVE BOX
BALBIR ANGEL is a 70yFemale , patient examined and chart reviewed.    INTERVAL HPI/ OVERNIGHT EVENTS:   Feeling better. Afebrile.   O 02. On RA.    PAST MEDICAL & SURGICAL HISTORY:  Psoriasis  Afib  On Eliquis  Edema extremities  Hyperlipidemia  HTN (hypertension)  Hypothyroid  COPD (chronic obstructive pulmonary disease)  S/P eye surgery  age 5 unsure if right or left eye      For details regarding the patient's social history, family history, and other miscellaneous elements, please refer the initial infectious diseases consultation and/or the admitting history and physical examination for this admission.    ROS:  CONSTITUTIONAL:  Negative fever or chills  EYES:  Negative  blurry vision or double vision  CARDIOVASCULAR:  Negative for chest pain or palpitations  RESPIRATORY:  Negative for cough, wheezing, or SOB   GASTROINTESTINAL:  Negative for nausea, vomiting, diarrhea, constipation, or abdominal pain  GENITOURINARY:  Negative frequency, urgency or dysuria  NEUROLOGIC:  No headache, confusion, dizziness, lightheadedness  All other systems were reviewed and are negative     ALLERGIES  artichokes (Unknown)  IV Contrast (Unknown)  Levaquin (Anaphylaxis)  sulfa drugs (Unknown)  Sulfur (Unknown)      Current inpatient medications :    ANTIBIOTICS/RELEVANT:  remdesivir  IVPB   IV Intermittent   remdesivir  IVPB 100 milliGRAM(s) IV Intermittent every 24 hours      acetaminophen   Tablet .. 650 milliGRAM(s) Oral every 6 hours PRN  apixaban 5 milliGRAM(s) Oral every 12 hours  benzonatate 100 milliGRAM(s) Oral three times a day PRN  budesonide 160 MICROgram(s)/formoterol 4.5 MICROgram(s) Inhaler 2 Puff(s) Inhalation two times a day  cholecalciferol 400 Unit(s) Oral daily  cyanocobalamin 1000 MICROGram(s) Oral daily  dexAMETHasone     Tablet 6 milliGRAM(s) Oral daily  guaiFENesin   Syrup  (Sugar-Free) 200 milliGRAM(s) Oral every 6 hours PRN  hydrALAZINE 50 milliGRAM(s) Oral two times a day  levothyroxine 25 MICROGram(s) Oral daily  tiotropium 18 MICROgram(s) Capsule 1 Capsule(s) Inhalation daily      Objective:    T(C): 37.1 (02-09-21 @ 19:47), Max: 37.1 (02-09-21 @ 19:47)  HR: 67 (02-09-21 @ 19:47) (61 - 90)  BP: 151/79 (02-09-21 @ 19:47) (146/80 - 173/89)  RR: 17 (02-09-21 @ 19:47) (17 - 19)  SpO2: 92% (02-09-21 @ 19:47) (88% - 98%)    Physical Exam:  General: no acute distress  Neck: supple, trachea midline  Lungs: Decreased, no wheeze/rhonchi  Cardiovascular: regular rate and rhythm, S1 S2  Abdomen: soft, nontender,  bowel sounds normal  Neurological: alert and oriented x3  Skin: no rash  Extremities: no cyanosis/clubbing/edema      LABS:                          11.0   4.67  )-----------( 173      ( 09 Feb 2021 16:53 )             35.0       02-09    143  |  110<H>  |  31<H>  ----------------------------<  138<H>  4.7   |  29  |  1.10    Ca    8.5      09 Feb 2021 16:53    TPro  6.2  /  Alb  3.0<L>  /  TBili  0.4  /  DBili  x   /  AST  23  /  ALT  20  /  AlkPhos  82  02-09      Ferritin, Serum (02.06.21 @ 22:48)    Ferritin, Serum: 249 ng/mL    D-Dimer Assay, Quantitative (02.06.21 @ 22:19)    D-Dimer Assay, Quantitative: <150 ng/mL DDU    MICROBIOLOGY:  COVID-19 PCR . (02.06.21 @ 16:53)    COVID-19 PCR: Detected      RADIOLOGY & ADDITIONAL STUDIES:    EXAM:  XR CHEST PORTABLE IMMED 1V                            PROCEDURE DATE:  02/06/2021          INTERPRETATION:  Portable chest radiograph    CLINICAL INFORMATION: Dyspnea, shortness of breath.    TECHNIQUE:  Portable  AP view of the chest was obtained.    COMPARISON: 11/27/2019 chest available for review.    FINDINGS:  The lungs are clear of airspace consolidations or effusions. No pneumothorax.    Heart size within normal limits allowing for magnification effect of AP projection. Visualizedosseous structures are intact.        IMPRESSION:   No evidence of active chest disease.      Assessment :   69 y/o female with PMHx Afib (on Eliquis), HTN, HLD, hypothyroid, psoriasis, COPD, recent admission at [facility] for bradycardia- pacemaker deemed not necessary after med adjusted and was dc home1/28/21 when a few days after she developed myalgias and was seen at urgent care where she had a positive COVID-19 PCR on 1/31/21 admitted with worsening SOB and hypoxia likely COVID 19 pna Noted to have progressive leukocytopenia and with bandemia. On 2L NC.  Leukopenia sec Viral illness resolved    2/8/21- On 2L NC sats 99% NLR 8.34  2/9/21- NLR 7.54 Off 02 on RA    Plan :   Trend temps and cbc  Cont Decadron 6mg daily x 10 days  Remdesivir x 5 days course  AC per primary team  COVID-19--critical period for decompensation  (7-14 days post symptom onset), avoid aerosolizing procedures, NSAIDs   -monitor respiratory status closely ( route of 02 and saturation) and titrate when able  -isolation precautions per protocol  -avoid excessive blood draws, blood cultures and frequent CXRs  -monitor biomarkers- CBC w diff  for NLR<3 low vs >5 high) Ferritin (lower risk <450 vs >850) CRP (low risk <2 and higher risk >6) and LDH, D Dimer, procalcitonin  -therapies remains investigational  -antibiotics only if there is concern for a bacterial process  -Steroids for hypoxia/ARDS /shock      Continue with present regiment.  Appropriate use of antibiotics and adverse effects reviewed.      I have discussed the above plan of care with patient in detail. She expressed understanding of the  treatment plan . Risks, benefits and alternatives discussed in detail. I have asked if she has any questions or concerns and appropriately addressed them to the best of my ability .    > 35 minutes were spent in direct patient care reviewing notes, medications ,labs data/ imaging , discussion with multidisciplinary team.    Thank you for allowing me to participate in care of your patient .    Britt Peraza MD  Infectious Disease  [phone]

## 2021-02-10 LAB
ANION GAP SERPL CALC-SCNC: 8 MMOL/L — SIGNIFICANT CHANGE UP (ref 5–17)
BASOPHILS # BLD AUTO: 0.01 K/UL — SIGNIFICANT CHANGE UP (ref 0–0.2)
BASOPHILS NFR BLD AUTO: 0.1 % — SIGNIFICANT CHANGE UP (ref 0–2)
BUN SERPL-MCNC: 29 MG/DL — HIGH (ref 7–23)
CALCIUM SERPL-MCNC: 8.8 MG/DL — SIGNIFICANT CHANGE UP (ref 8.5–10.1)
CHLORIDE SERPL-SCNC: 109 MMOL/L — HIGH (ref 96–108)
CO2 SERPL-SCNC: 25 MMOL/L — SIGNIFICANT CHANGE UP (ref 22–31)
CREAT SERPL-MCNC: 1.1 MG/DL — SIGNIFICANT CHANGE UP (ref 0.5–1.3)
EOSINOPHIL # BLD AUTO: 0.01 K/UL — SIGNIFICANT CHANGE UP (ref 0–0.5)
EOSINOPHIL NFR BLD AUTO: 0.1 % — SIGNIFICANT CHANGE UP (ref 0–6)
FERRITIN SERPL-MCNC: 293 NG/ML — HIGH (ref 15–150)
GLUCOSE SERPL-MCNC: 165 MG/DL — HIGH (ref 70–99)
HCT VFR BLD CALC: 36.1 % — SIGNIFICANT CHANGE UP (ref 34.5–45)
HGB BLD-MCNC: 11.7 G/DL — SIGNIFICANT CHANGE UP (ref 11.5–15.5)
IMM GRANULOCYTES NFR BLD AUTO: 0.8 % — SIGNIFICANT CHANGE UP (ref 0–1.5)
LYMPHOCYTES # BLD AUTO: 0.7 K/UL — LOW (ref 1–3.3)
LYMPHOCYTES # BLD AUTO: 7.3 % — LOW (ref 13–44)
MCHC RBC-ENTMCNC: 29.8 PG — SIGNIFICANT CHANGE UP (ref 27–34)
MCHC RBC-ENTMCNC: 32.4 GM/DL — SIGNIFICANT CHANGE UP (ref 32–36)
MCV RBC AUTO: 91.9 FL — SIGNIFICANT CHANGE UP (ref 80–100)
MONOCYTES # BLD AUTO: 0.17 K/UL — SIGNIFICANT CHANGE UP (ref 0–0.9)
MONOCYTES NFR BLD AUTO: 1.8 % — LOW (ref 2–14)
NEUTROPHILS # BLD AUTO: 8.68 K/UL — HIGH (ref 1.8–7.4)
NEUTROPHILS NFR BLD AUTO: 89.9 % — HIGH (ref 43–77)
NRBC # BLD: 0 /100 WBCS — SIGNIFICANT CHANGE UP (ref 0–0)
PLATELET # BLD AUTO: 191 K/UL — SIGNIFICANT CHANGE UP (ref 150–400)
POTASSIUM SERPL-MCNC: 4 MMOL/L — SIGNIFICANT CHANGE UP (ref 3.5–5.3)
POTASSIUM SERPL-SCNC: 4 MMOL/L — SIGNIFICANT CHANGE UP (ref 3.5–5.3)
RBC # BLD: 3.93 M/UL — SIGNIFICANT CHANGE UP (ref 3.8–5.2)
RBC # FLD: 13.3 % — SIGNIFICANT CHANGE UP (ref 10.3–14.5)
SODIUM SERPL-SCNC: 142 MMOL/L — SIGNIFICANT CHANGE UP (ref 135–145)
WBC # BLD: 9.65 K/UL — SIGNIFICANT CHANGE UP (ref 3.8–10.5)
WBC # FLD AUTO: 9.65 K/UL — SIGNIFICANT CHANGE UP (ref 3.8–10.5)

## 2021-02-10 PROCEDURE — 71045 X-RAY EXAM CHEST 1 VIEW: CPT | Mod: 26

## 2021-02-10 PROCEDURE — 99232 SBSQ HOSP IP/OBS MODERATE 35: CPT | Mod: GC

## 2021-02-10 RX ADMIN — BUDESONIDE AND FORMOTEROL FUMARATE DIHYDRATE 2 PUFF(S): 160; 4.5 AEROSOL RESPIRATORY (INHALATION) at 17:46

## 2021-02-10 RX ADMIN — Medication 6 MILLIGRAM(S): at 05:19

## 2021-02-10 RX ADMIN — Medication 400 UNIT(S): at 13:10

## 2021-02-10 RX ADMIN — APIXABAN 5 MILLIGRAM(S): 2.5 TABLET, FILM COATED ORAL at 05:19

## 2021-02-10 RX ADMIN — APIXABAN 5 MILLIGRAM(S): 2.5 TABLET, FILM COATED ORAL at 17:44

## 2021-02-10 RX ADMIN — Medication 25 MICROGRAM(S): at 05:19

## 2021-02-10 RX ADMIN — TIOTROPIUM BROMIDE 1 CAPSULE(S): 18 CAPSULE ORAL; RESPIRATORY (INHALATION) at 05:34

## 2021-02-10 RX ADMIN — Medication 50 MILLIGRAM(S): at 17:44

## 2021-02-10 RX ADMIN — BUDESONIDE AND FORMOTEROL FUMARATE DIHYDRATE 2 PUFF(S): 160; 4.5 AEROSOL RESPIRATORY (INHALATION) at 05:34

## 2021-02-10 RX ADMIN — REMDESIVIR 500 MILLIGRAM(S): 5 INJECTION INTRAVENOUS at 21:08

## 2021-02-10 RX ADMIN — PREGABALIN 1000 MICROGRAM(S): 225 CAPSULE ORAL at 13:10

## 2021-02-10 RX ADMIN — Medication 50 MILLIGRAM(S): at 05:19

## 2021-02-10 NOTE — PROGRESS NOTE ADULT - SUBJECTIVE AND OBJECTIVE BOX
PULMONARY/CRITICAL CARE    DOS 2/10/21  Less sob, cough. Some wheeze. Desats on ambulation.     Patient is a 70y old  Female who presents with a chief complaint of Hypoxia (08 Feb 2021 17:39)    BRIEF HOSPITAL COURSE: ***71 y/o female with PMHx Afib (on Eliquis), HTN, HLD, hypothyroid, psoriasis, COPD, recent admission at [facility] for bradycardia- pacemaker deemed not necessary after med adjusted and was dc home1/28/21 when a few days after she developed myalgias and was seen at urgent care where she had a positive COVID-19 PCR on 1/31/21 who presented to the hospital with CC of worsening SOB and hypoxia. Noted to have progressive leukocytopenia and with bandemia. On 2L NC.  Improved breathing, but some wheeze.  Events last 24 hours: ***    PAST MEDICAL & SURGICAL HISTORY:  Psoriasis    Afib  On Eliquis    Edema extremities    Hyperlipidemia    HTN (hypertension)    Hypothyroid    COPD (chronic obstructive pulmonary disease)    S/P eye surgery  age 5 unsure if right or left eye      Allergies    artichokes (Unknown)  IV Contrast (Unknown)  Levaquin (Anaphylaxis)  sulfa drugs (Unknown)  Sulfur (Unknown)    Intolerances      FAMILY HISTORY: former smoker  FH: hypertension            Medications:  remdesivir  IVPB   IV Intermittent   remdesivir  IVPB 100 milliGRAM(s) IV Intermittent every 24 hours    hydrALAZINE 50 milliGRAM(s) Oral two times a day    benzonatate 100 milliGRAM(s) Oral three times a day PRN  budesonide 160 MICROgram(s)/formoterol 4.5 MICROgram(s) Inhaler 2 Puff(s) Inhalation two times a day  guaiFENesin   Syrup  (Sugar-Free) 200 milliGRAM(s) Oral every 6 hours PRN  tiotropium 18 MICROgram(s) Capsule 1 Capsule(s) Inhalation daily    acetaminophen   Tablet .. 650 milliGRAM(s) Oral every 6 hours PRN      apixaban 5 milliGRAM(s) Oral every 12 hours        dexAMETHasone     Tablet 6 milliGRAM(s) Oral daily  levothyroxine 25 MICROGram(s) Oral daily    cholecalciferol 400 Unit(s) Oral daily  cyanocobalamin 1000 MICROGram(s) Oral daily                ICU Vital Signs Last 24 Hrs  T(C): 36.9 (09 Feb 2021 04:20), Max: 36.9 (08 Feb 2021 17:03)  T(F): 98.4 (09 Feb 2021 04:20), Max: 98.4 (08 Feb 2021 17:03)  HR: 67 (09 Feb 2021 04:20) (59 - 67)  BP: 173/89 (09 Feb 2021 04:20) (130/75 - 173/89)  BP(mean): --  ABP: --  ABP(mean): --  RR: 19 (09 Feb 2021 04:20) (18 - 19)  SpO2: 98% (09 Feb 2021 04:20) (95% - 99%)    Vital Signs Last 24 Hrs  T(C): 36.9 (09 Feb 2021 04:20), Max: 36.9 (08 Feb 2021 17:03)  T(F): 98.4 (09 Feb 2021 04:20), Max: 98.4 (08 Feb 2021 17:03)  HR: 67 (09 Feb 2021 04:20) (59 - 67)  BP: 173/89 (09 Feb 2021 04:20) (130/75 - 173/89)  BP(mean): --  RR: 19 (09 Feb 2021 04:20) (18 - 19)  SpO2: 98% (09 Feb 2021 04:20) (95% - 99%)        I&O's Detail        LABS:                        10.9   6.69  )-----------( 139      ( 08 Feb 2021 07:29 )             34.7     02-08    142  |  108  |  26<H>  ----------------------------<  98  4.8   |  28  |  1.10    Ca    8.3<L>      08 Feb 2021 07:29    TPro  6.0  /  Alb  2.8<L>  /  TBili  0.4  /  DBili  x   /  AST  22  /  ALT  18  /  AlkPhos  76  02-08          CAPILLARY BLOOD GLUCOSE            CULTURES:  Culture Results:   No growth to date. (02-07 @ 21:28)  Culture Results:   No growth to date. (02-07 @ 21:28)      Physical Examination:    General: No acute distress.  overweight female    HEENT: Pupils equal, reactive to light.  Symmetric.    PULM: few wheeze bilat, rhonchi; no rales; good excursion.    CVS: Regular rate and rhythm, no murmurs, rubs, or gallops    ABD: Soft, nondistended, nontender, normoactive bowel sounds, no masses    EXT: No edema, nontender calves    SKIN: Warm and well perfused, no rashes noted.    NEURO: Alert, oriented, interactive, nonfocal    RADIOLOGY: ***rad< from: US Duplex Venous Lower Ext Complete, Bilateral (02.06.21 @ 19:51) >  EXAM:  US DPLX LWR EXT VEINS COMPL BI                            PROCEDURE DATE:  02/06/2021          INTERPRETATION:  CLINICAL INFORMATION: Lower extremity edema, Covid positive    COMPARISON: 12/16/2014.    TECHNIQUE: Duplex sonography of the BILATERAL LOWER extremity veins with color and spectral Doppler, with and without compression.    FINDINGS:    There is normal compressibility of the bilateral common femoral, femoral and popliteal veins.  Doppler examination shows normal spontaneous andphasic flow.    No calf vein thrombosis is detected.    IMPRESSION:  No evidence of deep venous thrombosis in either lower extremity.    < end of copied text >  < from: Xray Chest 1 View-PORTABLE IMMEDIATE (02.06.21 @ 17:39) >  EXAM:  XR CHEST PORTABLE IMMED 1V                            PROCEDURE DATE:  02/06/2021          INTERPRETATION:  Portable chest radiograph    CLINICAL INFORMATION: Dyspnea, shortness of breath.    TECHNIQUE:  Portable  AP view of the chest was obtained.    COMPARISON: 11/27/2019 chest available for review.    FINDINGS:  The lungs are clear of airspace consolidations or effusions. No pneumothorax.    Heart size within normal limits allowing for magnification effect of AP projection. Visualizedosseous structures are intact.        IMPRESSION:   No evidence of active chest disease.    < end of copied text >      CXR ok

## 2021-02-10 NOTE — PROGRESS NOTE ADULT - SUBJECTIVE AND OBJECTIVE BOX
Patient is a 70y old  Female who presents with a chief complaint of Hypoxia (10 Feb 2021 07:46)        HPI:  71 y/o female with PMHx Afib (on Eliquis), HTN, HLD, hypothyroid, psoriasis, COPD (not currently on home O2, was several years ago) presented to the ED c/o cough. Per patient, she reported to the ED after talking with pulm Dr. Walters because she has had PNA previously and "it feels the same." Of note, Pt was seen in the ED on 1/24 with lightheadedness and low HR, found to be bradycardic, admitted and transferred to [facility] for possible PPM. She was observed, bradycardia resolved, pacemaker deemed not necessary. Pt reports since discharge she has continued to experience intermittent lightheadedness but she had not been bradycardic like she was on the prior admission. This past Sunday (1/31) she developed myalgias and was seen at urgent care where she had a positive COVID-19 PCR. Admits cough, headache, runny nose, sore throat, anosmia and conjunctivitis. Denies chest pain, palpitations, dyspnea, abdominal pain, n/v/d.     ED course:  VS T 98.8, HR 89, /84, RR 16 94% on RA -> 95% 2L NC  Labs significant for WBC 3.61, plts 117, fibrinogen 868, proBNP 771, trop neg x1  Pt received 1L LR x1, 6mg Decadron x1  EKG: NSR   CXR: bilateral patchiness on personal read - similar to prior XR but with increased markings   US Duplex Venous Lower Ext Complete, Bilateral: No evidence of deep venous thrombosis in either lower extremity. (06 Feb 2021 19:58)      SUBJECTIVE & OBJECTIVE: Pt seen and examined at bedside. decrease air entry at hte bases     PHYSICAL EXAM:  T(C): 36.6 (02-10-21 @ 11:46), Max: 37.1 (02-09-21 @ 19:47)  HR: 73 (02-10-21 @ 11:46) (61 - 73)  BP: 156/75 (02-10-21 @ 11:46) (151/79 - 156/75)  RR: 15 (02-10-21 @ 11:46) (15 - 17)  SpO2: 91% (02-10-21 @ 11:46) (89% - 92%)  Wt(kg): --   GENERAL: NAD, well-groomed, well-developed  HEAD:  Atraumatic, Normocephalic  EYES: EOMI, PERRLA, conjunctiva and sclera clear  ENMT: Moist mucous membranes  NECK: Supple, No JVD  NERVOUS SYSTEM:  Alert & Oriented X3  CHEST/LUNG: decrease air entry at the bases   HEART: Regular rate and rhythm; No murmurs, rubs, or gallops  ABDOMEN: Soft, Nontender, Nondistended; Bowel sounds present  EXTREMITIES:  2+ Peripheral Pulses, No clubbing, cyanosis, or edema        MEDICATIONS  (STANDING):  apixaban 5 milliGRAM(s) Oral every 12 hours  budesonide 160 MICROgram(s)/formoterol 4.5 MICROgram(s) Inhaler 2 Puff(s) Inhalation two times a day  cholecalciferol 400 Unit(s) Oral daily  cyanocobalamin 1000 MICROGram(s) Oral daily  dexAMETHasone     Tablet 6 milliGRAM(s) Oral daily  hydrALAZINE 50 milliGRAM(s) Oral two times a day  levothyroxine 25 MICROGram(s) Oral daily  remdesivir  IVPB 100 milliGRAM(s) IV Intermittent every 24 hours  remdesivir  IVPB   IV Intermittent   tiotropium 18 MICROgram(s) Capsule 1 Capsule(s) Inhalation daily    MEDICATIONS  (PRN):  acetaminophen   Tablet .. 650 milliGRAM(s) Oral every 6 hours PRN Temp greater or equal to 38C (100.4F)  benzonatate 100 milliGRAM(s) Oral three times a day PRN Cough  guaiFENesin   Syrup  (Sugar-Free) 200 milliGRAM(s) Oral every 6 hours PRN Cough      LABS:                        11.7   9.65  )-----------( 191      ( 10 Feb 2021 09:54 )             36.1     02-10    142  |  109<H>  |  29<H>  ----------------------------<  165<H>  4.0   |  25  |  1.10    Ca    8.8      10 Feb 2021 09:54    TPro  6.2  /  Alb  3.0<L>  /  TBili  0.4  /  DBili  x   /  AST  23  /  ALT  20  /  AlkPhos  82  02-09          CAPILLARY BLOOD GLUCOSE          CAPILLARY BLOOD GLUCOSE        CAPILLARY BLOOD GLUCOSE                RECENT CULTURES:      RADIOLOGY & ADDITIONAL TESTS:                        DVT/GI ppx  Discussed with pt @ bedside

## 2021-02-10 NOTE — PROGRESS NOTE ADULT - PROBLEM SELECTOR PLAN 3
Chronic  -Monitor on remote tele   -Continue a/c with home Eliquis 5mg PO BID  -Not currently on rate control given hospitalization for bradycardia Jan 2021

## 2021-02-10 NOTE — PROGRESS NOTE ADULT - PROBLEM SELECTOR PLAN 1
acute hypoxic respiratory failure 2/2 COVID-19 PNA - no obvious consolidation noted on prelim read of CXR but with bilateral markings suspicious of developing viral pneumonia  Day of symptom onset & diagnosis 1/31  -Continue with O2 support as needed; may use NC, Venturi Mask, NRB, HFNC; avoid aerosolizing procedures, metered dose inhalers acceptable   -Prone PRN, tolerate lower O2 saturations as possible; avoid intubation with such techniques  -Continue Decadron, today is day 4/10  -Continue Remdesivir [day 4/5]  -VTE ppx w/ home Eliquis 5mg PO BID  -Monitor volume status closely, will exercise caution with aggressive hydration  -Tylenol prn myalgias, fever  -Robitussin, tessalon perles PRN cough   -Daily labs  -CRP, CPK, trop, procal, lactate, fibrinogen, proBNP, procal, troponin resulted  -If clinically decompensating, repeat d-dimer, CRP, ferritin q48-72h  -NLR on admission: 5.37  -Follow up culture data; will monitor off antibiotics unless there is high suspicion for superimposed bacterial infection  -Maintain isolation precautions - airborne and contact  -her leukopenia and thrombocytopenia likely due to COVID infection. Continue to monitor  -Code Status: Full Code  -Pulm (Dr. Pelaez) consulted, f/u recs  currently on ra saturating 98% at rest,ifno hypoxic episdes within 24 hours, will start d/c planning  used

## 2021-02-10 NOTE — PROGRESS NOTE ADULT - SUBJECTIVE AND OBJECTIVE BOX
BALBIR ANGEL is a 70yFemale , patient examined and chart reviewed.    INTERVAL HPI/ OVERNIGHT EVENTS:   Feeling better. Afebrile.   Remains stable on RA.    PAST MEDICAL & SURGICAL HISTORY:  Psoriasis  Afib  On Eliquis  Edema extremities  Hyperlipidemia  HTN (hypertension)  Hypothyroid  COPD (chronic obstructive pulmonary disease)  S/P eye surgery  age 5 unsure if right or left eye      For details regarding the patient's social history, family history, and other miscellaneous elements, please refer the initial infectious diseases consultation and/or the admitting history and physical examination for this admission.    ROS:  CONSTITUTIONAL:  Negative fever or chills  EYES:  Negative  blurry vision or double vision  CARDIOVASCULAR:  Negative for chest pain or palpitations  RESPIRATORY:  Negative for cough, wheezing, or SOB   GASTROINTESTINAL:  Negative for nausea, vomiting, diarrhea, constipation, or abdominal pain  GENITOURINARY:  Negative frequency, urgency or dysuria  NEUROLOGIC:  No headache, confusion, dizziness, lightheadedness  All other systems were reviewed and are negative     ALLERGIES  artichokes (Unknown)  IV Contrast (Unknown)  Levaquin (Anaphylaxis)  sulfa drugs (Unknown)  Sulfur (Unknown)      Current inpatient medications :    ANTIBIOTICS/RELEVANT:  remdesivir  IVPB   IV Intermittent   remdesivir  IVPB 100 milliGRAM(s) IV Intermittent every 24 hours      acetaminophen   Tablet .. 650 milliGRAM(s) Oral every 6 hours PRN  apixaban 5 milliGRAM(s) Oral every 12 hours  benzonatate 100 milliGRAM(s) Oral three times a day PRN  budesonide 160 MICROgram(s)/formoterol 4.5 MICROgram(s) Inhaler 2 Puff(s) Inhalation two times a day  cholecalciferol 400 Unit(s) Oral daily  cyanocobalamin 1000 MICROGram(s) Oral daily  dexAMETHasone     Tablet 6 milliGRAM(s) Oral daily  guaiFENesin   Syrup  (Sugar-Free) 200 milliGRAM(s) Oral every 6 hours PRN  hydrALAZINE 50 milliGRAM(s) Oral two times a day  levothyroxine 25 MICROGram(s) Oral daily  tiotropium 18 MICROgram(s) Capsule 1 Capsule(s) Inhalation daily      Vital Signs Last 24 Hrs  T(C): 36.6 (10 Feb 2021 11:46), Max: 37.1 (09 Feb 2021 19:47)  T(F): 97.9 (10 Feb 2021 11:46), Max: 98.7 (09 Feb 2021 19:47)  HR: 73 (10 Feb 2021 11:46) (61 - 73)  BP: 156/75 (10 Feb 2021 11:46) (151/79 - 156/75)  RR: 15 (10 Feb 2021 11:46) (15 - 17)  SpO2: 91% (10 Feb 2021 11:46) (89% - 92%)    Physical Exam:  General: no acute distress  Neck: supple, trachea midline  Lungs: Decreased, no wheeze/rhonchi  Cardiovascular: regular rate and rhythm, S1 S2  Abdomen: soft, nontender,  bowel sounds normal  Neurological: alert and oriented x3  Skin: no rash  Extremities: no cyanosis/clubbing/edema      LABS:                        11.7   9.65  )-----------( 191      ( 10 Feb 2021 09:54 )             36.1   02-10    142  |  109<H>  |  29<H>  ----------------------------<  165<H>  4.0   |  25  |  1.10    Ca    8.8      10 Feb 2021 09:54    TPro  6.2  /  Alb  3.0<L>  /  TBili  0.4  /  DBili  x   /  AST  23  /  ALT  20  /  AlkPhos  82  02-09      Ferritin, Serum (02.06.21 @ 22:48)    Ferritin, Serum: 249 ng/mL    D-Dimer Assay, Quantitative (02.06.21 @ 22:19)    D-Dimer Assay, Quantitative: <150 ng/mL DDU    MICROBIOLOGY:  COVID-19 PCR . (02.06.21 @ 16:53)    COVID-19 PCR: Detected      RADIOLOGY & ADDITIONAL STUDIES:    EXAM:  XR CHEST PORTABLE IMMED 1V                            PROCEDURE DATE:  02/06/2021          INTERPRETATION:  Portable chest radiograph    CLINICAL INFORMATION: Dyspnea, shortness of breath.    TECHNIQUE:  Portable  AP view of the chest was obtained.    COMPARISON: 11/27/2019 chest available for review.    FINDINGS:  The lungs are clear of airspace consolidations or effusions. No pneumothorax.    Heart size within normal limits allowing for magnification effect of AP projection. Visualizedosseous structures are intact.        IMPRESSION:   No evidence of active chest disease.      Assessment :   71 y/o female with PMHx Afib (on Eliquis), HTN, HLD, hypothyroid, psoriasis, COPD, recent admission at [facility] for bradycardia- pacemaker deemed not necessary after med adjusted and was dc home1/28/21 when a few days after she developed myalgias and was seen at urgent care where she had a positive COVID-19 PCR on 1/31/21 admitted with worsening SOB and hypoxia likely COVID 19 pna Noted to have progressive leukocytopenia and with bandemia. On 2L NC.  Leukopenia sec Viral illness resolved    2/8/21- On 2L NC sats 99% NLR 8.34  2/9/21- NLR 7.54 Off 02 on RA    Plan :   Supportive care  Cont Decadron 6mg daily x 10 days  Remdesivir x 5 days course (day 5/5 today)  AC per primary team  COVID-19--critical period for decompensation  (7-14 days post symptom onset), avoid aerosolizing procedures, NSAIDs   -monitor respiratory status closely ( route of 02 and saturation) and titrate when able  -isolation precautions per protocol  -avoid excessive blood draws, blood cultures and frequent CXRs  -monitor biomarkers- CBC w diff  for NLR<3 low vs >5 high) Ferritin (lower risk <450 vs >850) CRP (low risk <2 and higher risk >6) and LDH, D Dimer, procalcitonin  -therapies remains investigational  -antibiotics only if there is concern for a bacterial process  -Steroids for hypoxia/ARDS /shock    I will be away 2/11/2021 to 2/21/2021. Dr Ayush Harris et al will be covering. call if needed.      Continue with present regiment.  Appropriate use of antibiotics and adverse effects reviewed.      I have discussed the above plan of care with patient in detail. She expressed understanding of the  treatment plan . Risks, benefits and alternatives discussed in detail. I have asked if she has any questions or concerns and appropriately addressed them to the best of my ability .    > 35 minutes were spent in direct patient care reviewing notes, medications ,labs data/ imaging , discussion with multidisciplinary team.    Thank you for allowing me to participate in care of your patient .    Britt Peraza MD  Infectious Disease  [phone]

## 2021-02-10 NOTE — PROGRESS NOTE ADULT - PROBLEM SELECTOR PLAN 2
Acute 2/2 Covid-19  -Plan as above   -F/u CXR - no obvious infiltrates on read - increased bilateral markings - possible developing PNA

## 2021-02-11 ENCOUNTER — TRANSCRIPTION ENCOUNTER (OUTPATIENT)
Age: 71
End: 2021-02-11

## 2021-02-11 VITALS
HEART RATE: 71 BPM | OXYGEN SATURATION: 92 % | RESPIRATION RATE: 20 BRPM | SYSTOLIC BLOOD PRESSURE: 148 MMHG | DIASTOLIC BLOOD PRESSURE: 80 MMHG | TEMPERATURE: 98 F

## 2021-02-11 LAB
HCT VFR BLD CALC: 33.3 % — LOW (ref 34.5–45)
HGB BLD-MCNC: 10.6 G/DL — LOW (ref 11.5–15.5)
MCHC RBC-ENTMCNC: 29.1 PG — SIGNIFICANT CHANGE UP (ref 27–34)
MCHC RBC-ENTMCNC: 31.8 GM/DL — LOW (ref 32–36)
MCV RBC AUTO: 91.5 FL — SIGNIFICANT CHANGE UP (ref 80–100)
NRBC # BLD: 0 /100 WBCS — SIGNIFICANT CHANGE UP (ref 0–0)
PLATELET # BLD AUTO: 199 K/UL — SIGNIFICANT CHANGE UP (ref 150–400)
RBC # BLD: 3.64 M/UL — LOW (ref 3.8–5.2)
RBC # FLD: 13.3 % — SIGNIFICANT CHANGE UP (ref 10.3–14.5)
WBC # BLD: 7.23 K/UL — SIGNIFICANT CHANGE UP (ref 3.8–10.5)
WBC # FLD AUTO: 7.23 K/UL — SIGNIFICANT CHANGE UP (ref 3.8–10.5)

## 2021-02-11 PROCEDURE — 85610 PROTHROMBIN TIME: CPT

## 2021-02-11 PROCEDURE — 85027 COMPLETE CBC AUTOMATED: CPT

## 2021-02-11 PROCEDURE — 93005 ELECTROCARDIOGRAM TRACING: CPT

## 2021-02-11 PROCEDURE — 93970 EXTREMITY STUDY: CPT

## 2021-02-11 PROCEDURE — 94640 AIRWAY INHALATION TREATMENT: CPT

## 2021-02-11 PROCEDURE — 85025 COMPLETE CBC W/AUTO DIFF WBC: CPT

## 2021-02-11 PROCEDURE — 83735 ASSAY OF MAGNESIUM: CPT

## 2021-02-11 PROCEDURE — 85384 FIBRINOGEN ACTIVITY: CPT

## 2021-02-11 PROCEDURE — 99239 HOSP IP/OBS DSCHRG MGMT >30: CPT | Mod: GC

## 2021-02-11 PROCEDURE — U0003: CPT

## 2021-02-11 PROCEDURE — 84145 PROCALCITONIN (PCT): CPT

## 2021-02-11 PROCEDURE — 85730 THROMBOPLASTIN TIME PARTIAL: CPT

## 2021-02-11 PROCEDURE — 84100 ASSAY OF PHOSPHORUS: CPT

## 2021-02-11 PROCEDURE — 86769 SARS-COV-2 COVID-19 ANTIBODY: CPT

## 2021-02-11 PROCEDURE — 84484 ASSAY OF TROPONIN QUANT: CPT

## 2021-02-11 PROCEDURE — 83880 ASSAY OF NATRIURETIC PEPTIDE: CPT

## 2021-02-11 PROCEDURE — 80053 COMPREHEN METABOLIC PANEL: CPT

## 2021-02-11 PROCEDURE — 82728 ASSAY OF FERRITIN: CPT

## 2021-02-11 PROCEDURE — U0005: CPT

## 2021-02-11 PROCEDURE — 87040 BLOOD CULTURE FOR BACTERIA: CPT

## 2021-02-11 PROCEDURE — 36415 COLL VENOUS BLD VENIPUNCTURE: CPT

## 2021-02-11 PROCEDURE — 83605 ASSAY OF LACTIC ACID: CPT

## 2021-02-11 PROCEDURE — 83615 LACTATE (LD) (LDH) ENZYME: CPT

## 2021-02-11 PROCEDURE — 96374 THER/PROPH/DIAG INJ IV PUSH: CPT

## 2021-02-11 PROCEDURE — 86140 C-REACTIVE PROTEIN: CPT

## 2021-02-11 PROCEDURE — 85379 FIBRIN DEGRADATION QUANT: CPT

## 2021-02-11 PROCEDURE — 71045 X-RAY EXAM CHEST 1 VIEW: CPT

## 2021-02-11 PROCEDURE — 99285 EMERGENCY DEPT VISIT HI MDM: CPT | Mod: 25

## 2021-02-11 PROCEDURE — 80048 BASIC METABOLIC PNL TOTAL CA: CPT

## 2021-02-11 PROCEDURE — 96361 HYDRATE IV INFUSION ADD-ON: CPT

## 2021-02-11 RX ORDER — DEXAMETHASONE 0.5 MG/5ML
1 ELIXIR ORAL
Qty: 6 | Refills: 0
Start: 2021-02-11 | End: 2021-02-16

## 2021-02-11 RX ADMIN — TIOTROPIUM BROMIDE 1 CAPSULE(S): 18 CAPSULE ORAL; RESPIRATORY (INHALATION) at 05:30

## 2021-02-11 RX ADMIN — PREGABALIN 1000 MICROGRAM(S): 225 CAPSULE ORAL at 11:12

## 2021-02-11 RX ADMIN — BUDESONIDE AND FORMOTEROL FUMARATE DIHYDRATE 2 PUFF(S): 160; 4.5 AEROSOL RESPIRATORY (INHALATION) at 05:30

## 2021-02-11 RX ADMIN — Medication 50 MILLIGRAM(S): at 05:25

## 2021-02-11 RX ADMIN — Medication 400 UNIT(S): at 11:12

## 2021-02-11 RX ADMIN — APIXABAN 5 MILLIGRAM(S): 2.5 TABLET, FILM COATED ORAL at 05:25

## 2021-02-11 RX ADMIN — Medication 25 MICROGRAM(S): at 05:25

## 2021-02-11 RX ADMIN — Medication 6 MILLIGRAM(S): at 05:25

## 2021-02-11 NOTE — DISCHARGE NOTE PROVIDER - HOSPITAL COURSE
71 y/o female with PMHx Afib (on Eliquis), HTN, HLD, hypothyroid, psoriasis, COPD (not currently on home O2, was several years ago) presented to the ED c/o cough and left sided pain in the setting of COVID-19, admitted for hypoxia in setting of Covid-19.        COVID-19.  acute hypoxic respiratory failure 2/2 COVID-19 PNA - no obvious consolidation noted on prelim read of CXR but with bilateral markings suspicious of developing viral pneumonia  Day of symptom onset & diagnosis 1/31  -Continue with O2 support as needed; may use NC, Venturi Mask, NRB, HFNC; avoid aerosolizing procedures, metered dose inhalers acceptable   -Prone PRN, tolerate lower O2 saturations as possible; avoid intubation with such techniques  -Continue Decadron, today is day 4/10  -Continue Remdesivir [day 4/5]  -VTE ppx w/ home Eliquis 5mg PO BID  -Monitor volume status closely, will exercise caution with aggressive hydration  -Tylenol prn myalgias, fever  -Robitussin, tessalon perles PRN cough   -Daily labs  -CRP, CPK, trop, procal, lactate, fibrinogen, proBNP, procal, troponin resulted  -If clinically decompensating, repeat d-dimer, CRP, ferritin q48-72h  -NLR o     Afib.  Chronic  -Monitor on remote tele   -Continue a/c with home Eliquis 5mg PO BID  -Not currently on rate control given hospitalization for bradycardia Jan 2021.        R/O Anemia, unspecified type.  Plan: no signs or symptoms of active bleeding. Continue to monitor hgb.      COPD (chronic obstructive pulmonary disease).  Plan: Chronic, not currently on home O2  -Pt previously on home O2, has not needed for several years  -Continue home Spiriva  -On Advair at home, will start Symbicort   -Pulm (Dr. Pelaez) consulted, f/u recs.     HTN (hypertension). Plan: Chronic, BP elevated but improved since admission  -Continue home hydralazine 50mg BID with hold parameters       Hyperlipidemia.  Plan: Chronic, stable  -Diet controlled.     Hypothyroid.  Plan: Chronic, stable

## 2021-02-11 NOTE — DISCHARGE NOTE PROVIDER - NSDCFUSCHEDAPPT_GEN_ALL_CORE_FT
BALBIR ANGEL ; 02/25/2021 ; [facility] [id number]  BALBIR ANGEL ; 03/04/2021 ; [facility] [address]

## 2021-02-11 NOTE — PROGRESS NOTE ADULT - ASSESSMENT
69 y/o female with PMHx Afib (on Eliquis), HTN, HLD, hypothyroid, psoriasis, COPD (not currently on home O2, was several years ago) presented to the ED c/o cough and left sided pain in the setting of COVID-19, admitted for hypoxia in setting of Covid-19. 
Pt with covid, possible occult pneumonia.   COPD with wheezing. Some improvement.  Can dc pt on tapering dose steroids, inhalers.    To see me in office next week for fu CXR and oximetry and PFT.   Pancytopenia due to covid, improved.  Hx bradycardia/AF
Pt with covid, possible occult pneumonia.   COPD with wheezing. Some improvement.  Likely will need home portable O2 for a while.   Can dc pt after 5th dose Remdesivir.   To see me in office next week for fu CXR and oximetry and PFT.   Pancytopenia due to covid, improved.  Hx bradycardia/AF
71 y/o female with PMHx Afib (on Eliquis), HTN, HLD, hypothyroid, psoriasis, COPD (not currently on home O2, was several years ago) presented to the ED c/o cough and left sided pain in the setting of COVID-19, admitted for hypoxia in setting of Covid-19. 
69 y/o female with PMHx Afib (on Eliquis), HTN, HLD, hypothyroid, psoriasis, COPD (not currently on home O2, was several years ago) presented to the ED c/o cough and left sided pain in the setting of COVID-19, admitted for hypoxia in setting of Covid-19. 
71 y/o female with PMHx Afib (on Eliquis), HTN, HLD, hypothyroid, psoriasis, COPD (not currently on home O2, was several years ago) presented to the ED c/o cough and left sided pain in the setting of COVID-19, admitted for hypoxia in setting of Covid-19.

## 2021-02-11 NOTE — DISCHARGE NOTE PROVIDER - NSDCCPCAREPLAN_GEN_ALL_CORE_FT
PRINCIPAL DISCHARGE DIAGNOSIS  Diagnosis: COVID-19  Assessment and Plan of Treatment: resolving  saturatnig 98% on RA      SECONDARY DISCHARGE DIAGNOSES  Diagnosis: Hypoxia  Assessment and Plan of Treatment:

## 2021-02-11 NOTE — DISCHARGE NOTE NURSING/CASE MANAGEMENT/SOCIAL WORK - NSDCPEPTCAREGIVEDUMATLIST _GEN_ALL_CORE
Influenza Vaccination/Apixaban/Eliquis/Coronavirus/COVID19 The patient is a 49y Male complaining of pain, abdominal.

## 2021-02-11 NOTE — DISCHARGE NOTE NURSING/CASE MANAGEMENT/SOCIAL WORK - PATIENT PORTAL LINK FT
You can access the FollowMyHealth Patient Portal offered by [facility] by registering at the following website: [email or web address]. By joining [facility]’s FollowMyHealth portal, you will also be able to view your health information using other applications (apps) compatible with our system.

## 2021-02-11 NOTE — DISCHARGE NOTE PROVIDER - NSDCMRMEDTOKEN_GEN_ALL_CORE_FT
Advair Diskus 500 mcg-50 mcg inhalation powder: 1 puff(s) inhaled 2 times a day  benzonatate 100 mg oral capsule: 1 cap(s) orally 3 times a day, As needed, Cough  cholecalciferol oral tablet: 400 unit(s) orally once a day  dexamethasone 2 mg oral tablet: 1 tab(s) orally once a day (at bedtime)   Eliquis 5 mg oral tablet: 1 tab(s) orally 2 times a day     AFIB   Test [id number]  hydrALAZINE 50 mg oral tablet: 1 tab(s) orally 2 times a day  levothyroxine 25 mcg (0.025 mg) oral tablet: 1 tab(s) orally once a day  Spiriva 18 mcg inhalation capsule: 1 each inhaled once a day  Vitamin B-12: 1 tab(s) orally once a day

## 2021-02-11 NOTE — PROGRESS NOTE ADULT - SUBJECTIVE AND OBJECTIVE BOX
PULMONARY/CRITICAL CARE    DOS 2/11/21  Doing well. Sats better on RA  Less sob, cough. Some wheeze.     Patient is a 70y old  Female who presents with a chief complaint of Hypoxia (08 Feb 2021 17:39)    BRIEF HOSPITAL COURSE: ***71 y/o female with PMHx Afib (on Eliquis), HTN, HLD, hypothyroid, psoriasis, COPD, recent admission at [facility] for bradycardia- pacemaker deemed not necessary after med adjusted and was dc home1/28/21 when a few days after she developed myalgias and was seen at urgent care where she had a positive COVID-19 PCR on 1/31/21 who presented to the hospital with CC of worsening SOB and hypoxia. Noted to have progressive leukocytopenia and with bandemia. On 2L NC.  Improved breathing, but some wheeze.  Events last 24 hours: ***    PAST MEDICAL & SURGICAL HISTORY:  Psoriasis    Afib  On Eliquis    Edema extremities    Hyperlipidemia    HTN (hypertension)    Hypothyroid    COPD (chronic obstructive pulmonary disease)    S/P eye surgery  age 5 unsure if right or left eye      Allergies    artichokes (Unknown)  IV Contrast (Unknown)  Levaquin (Anaphylaxis)  sulfa drugs (Unknown)  Sulfur (Unknown)    Intolerances      FAMILY HISTORY: former smoker  FH: hypertension            Medications:  remdesivir  IVPB   IV Intermittent   remdesivir  IVPB 100 milliGRAM(s) IV Intermittent every 24 hours    hydrALAZINE 50 milliGRAM(s) Oral two times a day    benzonatate 100 milliGRAM(s) Oral three times a day PRN  budesonide 160 MICROgram(s)/formoterol 4.5 MICROgram(s) Inhaler 2 Puff(s) Inhalation two times a day  guaiFENesin   Syrup  (Sugar-Free) 200 milliGRAM(s) Oral every 6 hours PRN  tiotropium 18 MICROgram(s) Capsule 1 Capsule(s) Inhalation daily    acetaminophen   Tablet .. 650 milliGRAM(s) Oral every 6 hours PRN      apixaban 5 milliGRAM(s) Oral every 12 hours        dexAMETHasone     Tablet 6 milliGRAM(s) Oral daily  levothyroxine 25 MICROGram(s) Oral daily    cholecalciferol 400 Unit(s) Oral daily  cyanocobalamin 1000 MICROGram(s) Oral daily                ICU Vital Signs Last 24 Hrs  T(C): 36.9 (09 Feb 2021 04:20), Max: 36.9 (08 Feb 2021 17:03)  T(F): 98.4 (09 Feb 2021 04:20), Max: 98.4 (08 Feb 2021 17:03)  HR: 67 (09 Feb 2021 04:20) (59 - 67)  BP: 173/89 (09 Feb 2021 04:20) (130/75 - 173/89)  BP(mean): --  ABP: --  ABP(mean): --  RR: 19 (09 Feb 2021 04:20) (18 - 19)  SpO2: 98% (09 Feb 2021 04:20) (95% - 99%)    Vital Signs Last 24 Hrs  T(C): 36.9 (09 Feb 2021 04:20), Max: 36.9 (08 Feb 2021 17:03)  T(F): 98.4 (09 Feb 2021 04:20), Max: 98.4 (08 Feb 2021 17:03)  HR: 67 (09 Feb 2021 04:20) (59 - 67)  BP: 173/89 (09 Feb 2021 04:20) (130/75 - 173/89)  BP(mean): --  RR: 19 (09 Feb 2021 04:20) (18 - 19)  SpO2: 98% (09 Feb 2021 04:20) (95% - 99%)        I&O's Detail        LABS:                        10.9   6.69  )-----------( 139      ( 08 Feb 2021 07:29 )             34.7     02-08    142  |  108  |  26<H>  ----------------------------<  98  4.8   |  28  |  1.10    Ca    8.3<L>      08 Feb 2021 07:29    TPro  6.0  /  Alb  2.8<L>  /  TBili  0.4  /  DBili  x   /  AST  22  /  ALT  18  /  AlkPhos  76  02-08          CAPILLARY BLOOD GLUCOSE            CULTURES:  Culture Results:   No growth to date. (02-07 @ 21:28)  Culture Results:   No growth to date. (02-07 @ 21:28)      Physical Examination:    General: No acute distress.  overweight female    HEENT: Pupils equal, reactive to light.  Symmetric.    PULM: few wheeze bilat, rhonchi; no rales; good excursion.    CVS: Regular rate and rhythm, no murmurs, rubs, or gallops    ABD: Soft, nondistended, nontender, normoactive bowel sounds, no masses    EXT: No edema, nontender calves    SKIN: Warm and well perfused, no rashes noted.    NEURO: Alert, oriented, interactive, nonfocal    RADIOLOGY: ***rad< from: US Duplex Venous Lower Ext Complete, Bilateral (02.06.21 @ 19:51) >  EXAM:  US DPLX LWR EXT VEINS COMPL BI                            PROCEDURE DATE:  02/06/2021          INTERPRETATION:  CLINICAL INFORMATION: Lower extremity edema, Covid positive    COMPARISON: 12/16/2014.    TECHNIQUE: Duplex sonography of the BILATERAL LOWER extremity veins with color and spectral Doppler, with and without compression.    FINDINGS:    There is normal compressibility of the bilateral common femoral, femoral and popliteal veins.  Doppler examination shows normal spontaneous andphasic flow.    No calf vein thrombosis is detected.    IMPRESSION:  No evidence of deep venous thrombosis in either lower extremity.    < end of copied text >  < from: Xray Chest 1 View-PORTABLE IMMEDIATE (02.06.21 @ 17:39) >  EXAM:  XR CHEST PORTABLE IMMED 1V                            PROCEDURE DATE:  02/06/2021          INTERPRETATION:  Portable chest radiograph    CLINICAL INFORMATION: Dyspnea, shortness of breath.    TECHNIQUE:  Portable  AP view of the chest was obtained.    COMPARISON: 11/27/2019 chest available for review.    FINDINGS:  The lungs are clear of airspace consolidations or effusions. No pneumothorax.    Heart size within normal limits allowing for magnification effect of AP projection. Visualizedosseous structures are intact.        IMPRESSION:   No evidence of active chest disease.    < end of copied text >      CXR ok

## 2021-02-11 NOTE — PROGRESS NOTE ADULT - PROBLEM SELECTOR PROBLEM 2
Pulmonary emphysema, unspecified emphysema type
Pulmonary emphysema, unspecified emphysema type
Hypoxia

## 2021-02-11 NOTE — PROGRESS NOTE ADULT - PROVIDER SPECIALTY LIST ADULT
Hospitalist
Infectious Disease
Pulmonology
Pulmonology
Hospitalist

## 2021-02-12 ENCOUNTER — TRANSCRIPTION ENCOUNTER (OUTPATIENT)
Age: 71
End: 2021-02-12

## 2021-02-21 NOTE — CONSULT NOTE ADULT - PROBLEM/RECOMMENDATION-3
Event Note Event Note Event Note Surgery/Event Note Surgery/Event Note Code Transfusion./Event Note Discussion/Event Note Event Note Event Note Event Note Nutrition Services DISPLAY PLAN FREE TEXT

## 2021-02-25 ENCOUNTER — APPOINTMENT (OUTPATIENT)
Dept: ELECTROPHYSIOLOGY | Facility: CLINIC | Age: 71
End: 2021-02-25
Payer: MEDICARE

## 2021-02-25 ENCOUNTER — NON-APPOINTMENT (OUTPATIENT)
Age: 71
End: 2021-02-25

## 2021-02-25 VITALS
DIASTOLIC BLOOD PRESSURE: 83 MMHG | HEIGHT: 61 IN | SYSTOLIC BLOOD PRESSURE: 177 MMHG | HEART RATE: 74 BPM | OXYGEN SATURATION: 96 % | BODY MASS INDEX: 35.71 KG/M2

## 2021-02-25 VITALS — WEIGHT: 189 LBS | BODY MASS INDEX: 35.71 KG/M2

## 2021-02-25 VITALS — SYSTOLIC BLOOD PRESSURE: 158 MMHG | DIASTOLIC BLOOD PRESSURE: 74 MMHG | TEMPERATURE: 96.7 F

## 2021-02-25 PROCEDURE — 99215 OFFICE O/P EST HI 40 MIN: CPT

## 2021-02-25 PROCEDURE — 99072 ADDL SUPL MATRL&STAF TM PHE: CPT

## 2021-02-25 PROCEDURE — 93000 ELECTROCARDIOGRAM COMPLETE: CPT

## 2021-02-25 NOTE — HISTORY OF PRESENT ILLNESS
[FreeTextEntry1] : \par Melissa Contreras is a 69y/o woman with Hx of Atrial fibrillation (On Eliquis, CHADVASC score of 3), HTN, HLD, hypothyroidism, COPD and recent hospitalization for symptomatic bradycardia who presents today for routine f/u. Was initially at [facility] where she presented with lightheadedness, dizziness in setting of bradycardia. She was [facility]  [facility]. Home Cardizem was d/c with improvement in symptoms. Patient also demonstrated chronotropic response. HR 70-80s when awake/ ambulating and PPM ultimately was not placed. Has been feeling well of diltiazem. Unfortunately, she was hospitalized for symptomatic COVID earlier this month. During hospitalization, no noted marked bradycardia. Denies any recurrence of dizziness or feeling lightheaded. Underwent 2 week Holter which reveals  no evidence of pauses > 4 sec and brief runs of tachycardia to the 150s. No indication for PPM or need to resume AV nodals at this time. Denies chest pain, palpitations, SOB, syncope or near syncope.\par \par

## 2021-02-25 NOTE — PHYSICAL EXAM
[General Appearance - Well Developed] : well developed [Normal Appearance] : normal appearance [Well Groomed] : well groomed [General Appearance - Well Nourished] : well nourished [No Deformities] : no deformities [General Appearance - In No Acute Distress] : no acute distress [Normal Conjunctiva] : the conjunctiva exhibited no abnormalities [Eyelids - No Xanthelasma] : the eyelids demonstrated no xanthelasmas [Normal Oral Mucosa] : normal oral mucosa [No Oral Pallor] : no oral pallor [No Oral Cyanosis] : no oral cyanosis [Normal Jugular Venous A Waves Present] : normal jugular venous A waves present [Normal Jugular Venous V Waves Present] : normal jugular venous V waves present [No Jugular Venous Delaney A Waves] : no jugular venous delaney A waves [Heart Rate And Rhythm] : heart rate and rhythm were normal [Heart Sounds] : normal S1 and S2 [Murmurs] : no murmurs present [Respiration, Rhythm And Depth] : normal respiratory rhythm and effort [Exaggerated Use Of Accessory Muscles For Inspiration] : no accessory muscle use [Auscultation Breath Sounds / Voice Sounds] : lungs were clear to auscultation bilaterally [Abdomen Soft] : soft [Abdomen Tenderness] : non-tender [Abdomen Mass (___ Cm)] : no abdominal mass palpated [Abnormal Walk] : normal gait [Gait - Sufficient For Exercise Testing] : the gait was sufficient for exercise testing [Nail Clubbing] : no clubbing of the fingernails [Cyanosis, Localized] : no localized cyanosis [Petechial Hemorrhages (___cm)] : no petechial hemorrhages [Skin Color & Pigmentation] : normal skin color and pigmentation [] : no rash [No Venous Stasis] : no venous stasis [Skin Lesions] : no skin lesions [No Skin Ulcers] : no skin ulcer [No Xanthoma] : no  xanthoma was observed [Oriented To Time, Place, And Person] : oriented to person, place, and time [Affect] : the affect was normal [Mood] : the mood was normal [No Anxiety] : not feeling anxious

## 2021-02-25 NOTE — DISCUSSION/SUMMARY
[FreeTextEntry1] : Melissa Contreras is a 69y/o woman with Hx of Atrial fibrillation (On Eliquis, CHADVASC score of 3), HTN, HLD, hypothyroidism, COPD and recent hospitalization for symptomatic bradycardia who presents today for routine f/u. \par \par Impression:\par \par 1. Sick sinus syndrome: EKG performed today to assess for presence of conduction disease and reveals NSR with apc's, heart rate 66bpm. Review of recent hospitalization without marked bradycardia noted. Remains off AV nodals without symptomatic bradycardia/symptoms. Review of 14 day Holter reveals no evidence of marked bradycardia or pauses > 4 sec. Remain off AV nodals. No indication for PPM at this time. \par \par 2. Paroxysmal afib: resume Eliquis for thromboembolic prophylaxis. \par \par 3. HTN: resume oral antihypertensives as prescribed. Encouraged heart healthy diet, sodium restriction, and weight loss. Continue regular f/u with Cardiologist for further HTN management.\par \par 4. HLD: resume statin therapy as prescribed and regular f/u with Cardiologist for routine lipid monitoring and management.\par \par 5. Hypothyroid: resume levothyroxine as prescribed and regular f/u with PCP for routine TFT monitoring and management.\par \par Will continue f/u with Cardiologist and may RTO as needed or if any new or worsening symptoms or findings occur.

## 2021-03-04 ENCOUNTER — NON-APPOINTMENT (OUTPATIENT)
Age: 71
End: 2021-03-04

## 2021-03-04 ENCOUNTER — APPOINTMENT (OUTPATIENT)
Dept: CARDIOLOGY | Facility: CLINIC | Age: 71
End: 2021-03-04
Payer: MEDICARE

## 2021-03-04 VITALS — SYSTOLIC BLOOD PRESSURE: 140 MMHG | DIASTOLIC BLOOD PRESSURE: 70 MMHG

## 2021-03-04 VITALS
HEART RATE: 76 BPM | DIASTOLIC BLOOD PRESSURE: 79 MMHG | SYSTOLIC BLOOD PRESSURE: 171 MMHG | TEMPERATURE: 98.7 F | OXYGEN SATURATION: 100 % | BODY MASS INDEX: 37 KG/M2 | HEIGHT: 61 IN | WEIGHT: 196 LBS

## 2021-03-04 PROCEDURE — 99072 ADDL SUPL MATRL&STAF TM PHE: CPT

## 2021-03-04 PROCEDURE — 99215 OFFICE O/P EST HI 40 MIN: CPT

## 2021-03-04 PROCEDURE — 93000 ELECTROCARDIOGRAM COMPLETE: CPT

## 2021-03-04 RX ORDER — WARFARIN SODIUM 4 MG/1
4 TABLET ORAL AT BEDTIME
Refills: 0 | Status: DISCONTINUED | COMMUNITY
End: 2021-03-04

## 2021-03-04 NOTE — PHYSICAL EXAM
[Well Groomed] : well groomed [General Appearance - In No Acute Distress] : no acute distress [Normal Conjunctiva] : the conjunctiva exhibited no abnormalities [Normal Oral Mucosa] : normal oral mucosa [Normal Oropharynx] : normal oropharynx [Normal Jugular Venous A Waves Present] : normal jugular venous A waves present [Normal Jugular Venous V Waves Present] : normal jugular venous V waves present [Respiration, Rhythm And Depth] : normal respiratory rhythm and effort [Exaggerated Use Of Accessory Muscles For Inspiration] : no accessory muscle use [Auscultation Breath Sounds / Voice Sounds] : lungs were clear to auscultation bilaterally [Chest Palpation] : palpation of the chest revealed no abnormalities [Heart Rate And Rhythm] : heart rate and rhythm were normal [Heart Sounds] : normal S1 and S2 [Arterial Pulses Normal] : the arterial pulses were normal [Veins - Varicosity Changes] : no varicosital changes were noted in the lower extremities [Bowel Sounds] : normal bowel sounds [Abdomen Soft] : soft [Abdomen Tenderness] : non-tender [Abnormal Walk] : normal gait [Nail Clubbing] : no clubbing of the fingernails [Skin Turgor] : normal skin turgor [] : no rash [No Venous Stasis] : no venous stasis [Oriented To Time, Place, And Person] : oriented to person, place, and time [Impaired Insight] : insight and judgment were intact [Affect] : the affect was normal [FreeTextEntry1] : 3/6 sm lsb., no edema

## 2021-03-04 NOTE — ASSESSMENT
[FreeTextEntry1] : Hx of PAF currently in sinus rhythm  tachy cem syndrome with resolved symptoms  after changing medication ,   avoid negative chronotropic drugs , continue losartan , hydralazine \par \par Hypertension , normal home blood pressure reading , continue low salt diet and current medication \par \par Cardiac murmur  mild   aortic stenosis ;  continue to monitor \par \par Hyperlipidemia  with evidence of coronary calcification suggestive coronary atherosclerosis  ( no evidence of ischemia on stress test ) , target LDL <70 explained to the patient about necessity of starting on medication  , will give atorvastatin 10 mg po daily , follow up lipid profile LFTS \par \par Obesity prior hx of sleep apnea  , patient was encouraged to loose more weight \par \par COPD pulmonary nodule stable continue current medication , follow up with pulmonary \par \par s/p covid  infection ,hypoxia , resolved \par \par \par follow up after 2 months \par

## 2021-03-04 NOTE — HISTORY OF PRESENT ILLNESS
[FreeTextEntry1] : 70 year old female with hx of morbid obesity  improved sleep apnea with weight loss , HTN  hyperlipidemia  PAF   who was  hospitalized with bradycardia  , patient  was evaluated by EP , her Cardizem was discontinued  , placed on hydralazine , patients palpitations resolved , the following week she was  hospitalized  with covid pneumonia 2/7/21  patient was treated and sent home , patient doing overall better , \par \par patient does get sob with fatigue if she walks longer than usual , patient denies any chest pain or dizziness , \par \par Patient blood pressure  is fairly  controlled , based on her home blood pressure readings  patient did have blood work showed mild elevated cholesterol , \par \par Patient used to be very obese , did loose some weight , with some improvement in sleep apnea , now she does not use cpap , patient had long standing hx of copd ,  used to use oxygen  NC  now she is fine on bronchodilator treatment , \par \par

## 2021-03-05 NOTE — ED ADULT NURSE NOTE - FALLEN IN THE PAST
no Transposition Flap Text: The defect edges were debeveled with a #15 scalpel blade. Given the location of the defect and the proximity to free margins a transposition flap was deemed most appropriate. Using a sterile surgical marker, an appropriate transposition flap was drawn incorporating the defect. The area thus outlined was incised deep to adipose tissue with a #15 scalpel blade. The skin margins were undermined to an appropriate distance in all directions utilizing iris scissors.

## 2021-04-06 PROBLEM — I48.91 UNSPECIFIED ATRIAL FIBRILLATION: Chronic | Status: ACTIVE | Noted: 2019-11-24

## 2021-05-19 ENCOUNTER — INPATIENT (INPATIENT)
Facility: HOSPITAL | Age: 71
LOS: 1 days | Discharge: HOME | End: 2021-05-21
Attending: INTERNAL MEDICINE | Admitting: INTERNAL MEDICINE
Payer: MEDICARE

## 2021-05-19 VITALS
OXYGEN SATURATION: 100 % | HEIGHT: 61 IN | TEMPERATURE: 97 F | SYSTOLIC BLOOD PRESSURE: 147 MMHG | RESPIRATION RATE: 16 BRPM | DIASTOLIC BLOOD PRESSURE: 53 MMHG | HEART RATE: 46 BPM | WEIGHT: 192.02 LBS

## 2021-05-19 DIAGNOSIS — R00.1 BRADYCARDIA, UNSPECIFIED: ICD-10-CM

## 2021-05-19 DIAGNOSIS — Z29.9 ENCOUNTER FOR PROPHYLACTIC MEASURES, UNSPECIFIED: ICD-10-CM

## 2021-05-19 DIAGNOSIS — E78.5 HYPERLIPIDEMIA, UNSPECIFIED: ICD-10-CM

## 2021-05-19 DIAGNOSIS — E03.9 HYPOTHYROIDISM, UNSPECIFIED: ICD-10-CM

## 2021-05-19 DIAGNOSIS — Z98.89 OTHER SPECIFIED POSTPROCEDURAL STATES: Chronic | ICD-10-CM

## 2021-05-19 DIAGNOSIS — J44.9 CHRONIC OBSTRUCTIVE PULMONARY DISEASE, UNSPECIFIED: ICD-10-CM

## 2021-05-19 DIAGNOSIS — N17.9 ACUTE KIDNEY FAILURE, UNSPECIFIED: ICD-10-CM

## 2021-05-19 DIAGNOSIS — I10 ESSENTIAL (PRIMARY) HYPERTENSION: ICD-10-CM

## 2021-05-19 DIAGNOSIS — I48.91 UNSPECIFIED ATRIAL FIBRILLATION: ICD-10-CM

## 2021-05-19 LAB
ALBUMIN SERPL ELPH-MCNC: 3.7 G/DL — SIGNIFICANT CHANGE UP (ref 3.3–5)
ALBUMIN SERPL ELPH-MCNC: 4.3 G/DL — SIGNIFICANT CHANGE UP (ref 3.3–5)
ALP SERPL-CCNC: 106 U/L — SIGNIFICANT CHANGE UP (ref 40–120)
ALP SERPL-CCNC: 93 U/L — SIGNIFICANT CHANGE UP (ref 40–120)
ALT FLD-CCNC: 23 U/L — SIGNIFICANT CHANGE UP (ref 4–33)
ALT FLD-CCNC: 24 U/L — SIGNIFICANT CHANGE UP (ref 4–33)
ANION GAP SERPL CALC-SCNC: 9 MMOL/L — SIGNIFICANT CHANGE UP (ref 7–14)
ANION GAP SERPL CALC-SCNC: 9 MMOL/L — SIGNIFICANT CHANGE UP (ref 7–14)
APTT BLD: 43.8 SEC — HIGH (ref 27–36.3)
AST SERPL-CCNC: 36 U/L — HIGH (ref 4–32)
AST SERPL-CCNC: 39 U/L — HIGH (ref 4–32)
BASOPHILS # BLD AUTO: 0.07 K/UL — SIGNIFICANT CHANGE UP (ref 0–0.2)
BASOPHILS NFR BLD AUTO: 0.7 % — SIGNIFICANT CHANGE UP (ref 0–2)
BILIRUB SERPL-MCNC: 0.3 MG/DL — SIGNIFICANT CHANGE UP (ref 0.2–1.2)
BILIRUB SERPL-MCNC: 0.4 MG/DL — SIGNIFICANT CHANGE UP (ref 0.2–1.2)
BLD GP AB SCN SERPL QL: NEGATIVE — SIGNIFICANT CHANGE UP
BUN SERPL-MCNC: 33 MG/DL — HIGH (ref 7–23)
BUN SERPL-MCNC: 35 MG/DL — HIGH (ref 7–23)
CALCIUM SERPL-MCNC: 8.9 MG/DL — SIGNIFICANT CHANGE UP (ref 8.4–10.5)
CALCIUM SERPL-MCNC: 9.3 MG/DL — SIGNIFICANT CHANGE UP (ref 8.4–10.5)
CHLORIDE SERPL-SCNC: 101 MMOL/L — SIGNIFICANT CHANGE UP (ref 98–107)
CHLORIDE SERPL-SCNC: 99 MMOL/L — SIGNIFICANT CHANGE UP (ref 98–107)
CO2 SERPL-SCNC: 19 MMOL/L — LOW (ref 22–31)
CO2 SERPL-SCNC: 23 MMOL/L — SIGNIFICANT CHANGE UP (ref 22–31)
CREAT SERPL-MCNC: 1.33 MG/DL — HIGH (ref 0.5–1.3)
CREAT SERPL-MCNC: 1.49 MG/DL — HIGH (ref 0.5–1.3)
EOSINOPHIL # BLD AUTO: 0.09 K/UL — SIGNIFICANT CHANGE UP (ref 0–0.5)
EOSINOPHIL NFR BLD AUTO: 1 % — SIGNIFICANT CHANGE UP (ref 0–6)
GLUCOSE SERPL-MCNC: 113 MG/DL — HIGH (ref 70–99)
GLUCOSE SERPL-MCNC: 205 MG/DL — HIGH (ref 70–99)
HCT VFR BLD CALC: 37.1 % — SIGNIFICANT CHANGE UP (ref 34.5–45)
HGB BLD-MCNC: 11.5 G/DL — SIGNIFICANT CHANGE UP (ref 11.5–15.5)
IANC: 6.84 K/UL — SIGNIFICANT CHANGE UP (ref 1.5–8.5)
IMM GRANULOCYTES NFR BLD AUTO: 0.4 % — SIGNIFICANT CHANGE UP (ref 0–1.5)
INR BLD: 1.49 RATIO — HIGH (ref 0.88–1.16)
LYMPHOCYTES # BLD AUTO: 1.85 K/UL — SIGNIFICANT CHANGE UP (ref 1–3.3)
LYMPHOCYTES # BLD AUTO: 19.6 % — SIGNIFICANT CHANGE UP (ref 13–44)
MAGNESIUM SERPL-MCNC: 2.2 MG/DL — SIGNIFICANT CHANGE UP (ref 1.6–2.6)
MCHC RBC-ENTMCNC: 29.3 PG — SIGNIFICANT CHANGE UP (ref 27–34)
MCHC RBC-ENTMCNC: 31 GM/DL — LOW (ref 32–36)
MCV RBC AUTO: 94.4 FL — SIGNIFICANT CHANGE UP (ref 80–100)
MONOCYTES # BLD AUTO: 0.56 K/UL — SIGNIFICANT CHANGE UP (ref 0–0.9)
MONOCYTES NFR BLD AUTO: 5.9 % — SIGNIFICANT CHANGE UP (ref 2–14)
NEUTROPHILS # BLD AUTO: 6.84 K/UL — SIGNIFICANT CHANGE UP (ref 1.8–7.4)
NEUTROPHILS NFR BLD AUTO: 72.4 % — SIGNIFICANT CHANGE UP (ref 43–77)
NRBC # BLD: 0 /100 WBCS — SIGNIFICANT CHANGE UP
NRBC # FLD: 0 K/UL — SIGNIFICANT CHANGE UP
PLATELET # BLD AUTO: 219 K/UL — SIGNIFICANT CHANGE UP (ref 150–400)
POTASSIUM SERPL-MCNC: 5.4 MMOL/L — HIGH (ref 3.5–5.3)
POTASSIUM SERPL-MCNC: 5.7 MMOL/L — HIGH (ref 3.5–5.3)
POTASSIUM SERPL-SCNC: 5.4 MMOL/L — HIGH (ref 3.5–5.3)
POTASSIUM SERPL-SCNC: 5.7 MMOL/L — HIGH (ref 3.5–5.3)
PROT SERPL-MCNC: 6.5 G/DL — SIGNIFICANT CHANGE UP (ref 6–8.3)
PROT SERPL-MCNC: 7.1 G/DL — SIGNIFICANT CHANGE UP (ref 6–8.3)
PROTHROM AB SERPL-ACNC: 16.8 SEC — HIGH (ref 10.6–13.6)
RBC # BLD: 3.93 M/UL — SIGNIFICANT CHANGE UP (ref 3.8–5.2)
RBC # FLD: 15.3 % — HIGH (ref 10.3–14.5)
RH IG SCN BLD-IMP: POSITIVE — SIGNIFICANT CHANGE UP
RH IG SCN BLD-IMP: POSITIVE — SIGNIFICANT CHANGE UP
SARS-COV-2 RNA SPEC QL NAA+PROBE: SIGNIFICANT CHANGE UP
SODIUM SERPL-SCNC: 129 MMOL/L — LOW (ref 135–145)
SODIUM SERPL-SCNC: 131 MMOL/L — LOW (ref 135–145)
TROPONIN T, HIGH SENSITIVITY RESULT: 22 NG/L — SIGNIFICANT CHANGE UP
TROPONIN T, HIGH SENSITIVITY RESULT: 25 NG/L — SIGNIFICANT CHANGE UP
WBC # BLD: 9.45 K/UL — SIGNIFICANT CHANGE UP (ref 3.8–10.5)
WBC # FLD AUTO: 9.45 K/UL — SIGNIFICANT CHANGE UP (ref 3.8–10.5)

## 2021-05-19 PROCEDURE — 99285 EMERGENCY DEPT VISIT HI MDM: CPT | Mod: 25

## 2021-05-19 PROCEDURE — 99233 SBSQ HOSP IP/OBS HIGH 50: CPT

## 2021-05-19 PROCEDURE — 71045 X-RAY EXAM CHEST 1 VIEW: CPT | Mod: 26

## 2021-05-19 PROCEDURE — 93010 ELECTROCARDIOGRAM REPORT: CPT | Mod: 76

## 2021-05-19 RX ORDER — HYDRALAZINE HCL 50 MG
50 TABLET ORAL
Refills: 0 | Status: DISCONTINUED | OUTPATIENT
Start: 2021-05-19 | End: 2021-05-20

## 2021-05-19 RX ORDER — DEXAMETHASONE 0.5 MG/5ML
2 ELIXIR ORAL AT BEDTIME
Refills: 0 | Status: DISCONTINUED | OUTPATIENT
Start: 2021-05-19 | End: 2021-05-21

## 2021-05-19 RX ORDER — LEVOTHYROXINE SODIUM 125 MCG
25 TABLET ORAL DAILY
Refills: 0 | Status: DISCONTINUED | OUTPATIENT
Start: 2021-05-19 | End: 2021-05-21

## 2021-05-19 RX ORDER — DEXTROSE 50 % IN WATER 50 %
100 SYRINGE (ML) INTRAVENOUS ONCE
Refills: 0 | Status: DISCONTINUED | OUTPATIENT
Start: 2021-05-19 | End: 2021-05-19

## 2021-05-19 RX ORDER — INSULIN HUMAN 100 [IU]/ML
5 INJECTION, SOLUTION SUBCUTANEOUS ONCE
Refills: 0 | Status: COMPLETED | OUTPATIENT
Start: 2021-05-19 | End: 2021-05-19

## 2021-05-19 RX ORDER — SODIUM ZIRCONIUM CYCLOSILICATE 10 G/10G
5 POWDER, FOR SUSPENSION ORAL ONCE
Refills: 0 | Status: COMPLETED | OUTPATIENT
Start: 2021-05-19 | End: 2021-05-19

## 2021-05-19 RX ORDER — TIOTROPIUM BROMIDE 18 UG/1
1 CAPSULE ORAL; RESPIRATORY (INHALATION) DAILY
Refills: 0 | Status: DISCONTINUED | OUTPATIENT
Start: 2021-05-19 | End: 2021-05-21

## 2021-05-19 RX ORDER — HEPARIN SODIUM 5000 [USP'U]/ML
5000 INJECTION INTRAVENOUS; SUBCUTANEOUS EVERY 8 HOURS
Refills: 0 | Status: DISCONTINUED | OUTPATIENT
Start: 2021-05-19 | End: 2021-05-21

## 2021-05-19 RX ORDER — DEXTROSE 50 % IN WATER 50 %
50 SYRINGE (ML) INTRAVENOUS ONCE
Refills: 0 | Status: COMPLETED | OUTPATIENT
Start: 2021-05-19 | End: 2021-05-19

## 2021-05-19 RX ADMIN — Medication 2 MILLIGRAM(S): at 22:04

## 2021-05-19 RX ADMIN — Medication 50 MILLILITER(S): at 09:33

## 2021-05-19 RX ADMIN — SODIUM ZIRCONIUM CYCLOSILICATE 5 GRAM(S): 10 POWDER, FOR SUSPENSION ORAL at 14:59

## 2021-05-19 RX ADMIN — HEPARIN SODIUM 5000 UNIT(S): 5000 INJECTION INTRAVENOUS; SUBCUTANEOUS at 22:05

## 2021-05-19 RX ADMIN — Medication 50 MILLIGRAM(S): at 22:04

## 2021-05-19 RX ADMIN — INSULIN HUMAN 5 UNIT(S): 100 INJECTION, SOLUTION SUBCUTANEOUS at 09:33

## 2021-05-19 NOTE — ED ADULT TRIAGE NOTE - CHIEF COMPLAINT QUOTE
Pt arrives to ED reporting she felt "jolted" from sleep.  Pt saw that here HR was in the 40's.  Pt was admitted at [facility] for low HR for about 1 week in January.  Pt states Dr. Larson changed her meds to Hydralazine and also switched to Eliquis from Warfarin for her Afib.  There was a discussion of potentially needing a pacemaker. Pt denies CP.  Pt states she feels ok now but reports while she was at home she felt a little bit of dizziness.

## 2021-05-19 NOTE — ED PROVIDER NOTE - NS ED ROS FT
Constitutional: No fever or Chills.    Head, Eyes, Ears, Nose, Throat: No visual changes.   Neck: No neck stiffness.  Pulmonary: No cough.  No wheezing.  Cardiovascular: No chest pain, palpitations, or diaphoresis.  Abdominal: No abdominal pain. No nausea, vomiting, diarrhea.  No bloody or melenic stools.  Genitourinary: No dysuria or hematuria.    Allergic: No new exposures, mucosal swelling  Neurologic: No headache, weakness, visual changes, speech changes, or sensory abnormalities.  No fainting episodes.

## 2021-05-19 NOTE — H&P ADULT - NSHPPHYSICALEXAM_GEN_ALL_CORE
Vital Signs Last 24 Hrs  T(C): 36.8 (19 May 2021 14:46), Max: 36.8 (19 May 2021 14:46)  T(F): 98.2 (19 May 2021 14:46), Max: 98.2 (19 May 2021 14:46)  HR: 63 (19 May 2021 14:46) (36 - 63)  BP: 145/67 (19 May 2021 14:46) (126/42 - 171/70)  BP(mean): --  RR: 17 (19 May 2021 14:46) (16 - 18)  SpO2: 99% (19 May 2021 14:46) (97% - 100%)    Appearance: Normal	  HEENT:   Normal oral mucosa, PERRL, EOMI	  Lymphatic: No lymphadenopathy , no edema  Cardiovascular: Normal S1 S2, No JVD, No murmurs , Peripheral pulses palpable 2+ bilaterally  Respiratory: Lungs clear to auscultation, normal effort 	  Gastrointestinal:  Soft, Non-tender, + BS	  Skin: No rashes, No ecchymoses, No cyanosis, warm to touch  Musculoskeletal: Normal range of motion, normal strength  Psychiatry:  Mood & affect appropriate  Ext: No edema

## 2021-05-19 NOTE — H&P ADULT - HISTORY OF PRESENT ILLNESS
Patient is a 70 year old female with PMHx of HTN, HLD, COPD, psoriasis, hx of SARS-CoV 2 infection in Feb 2021 s/p Remdesvir, hypothyroidism, PAF-on AC Eliquis, hx of symptomatic PAF with slow ventricular rate secondary to AV nate agent  now presents with recurrent symptomatic bradycardia.  Patient endorses developed transient SOB and lightheadedness when her heart rate went down to 38 bpm this am.  She denies CP/near syncope or syncope.  EKG demonstrated junctional bradycardia at 42 bpm.  Telemetry recorded persistent junctional bradycardia from 35 to low 40's most of time.  HR up to high 40's low 50's transiently.  Patient currently asymptomatic and with stable BP.  Patient was seen by EP for PPM evaluation during January 2021.  No PPM recommended at that time as her heart rate improved to NSR after discontinuation of Cardizem.  She was followed as an outpatient by Dr. Jacobson.  She denies any recurrent symptoms of lightheadedness or dizziness since off Cardizem until this am.

## 2021-05-19 NOTE — ED PROVIDER NOTE - ATTENDING CONTRIBUTION TO CARE
Afebrile. Awake and Alert. Lungs CTA. Heart regular slow. Abdomen soft NTND. CN II-XII grossly intact. Moves all extremities without lateralization.    r/o heart block (pt currently asymptomatic)  r/o electrolyte disturbance

## 2021-05-19 NOTE — CONSULT NOTE ADULT - SUBJECTIVE AND OBJECTIVE BOX
[facility] [place] [facility]   MD MADDIE DENNIS DO ANAM SIDDIQUI ANGELA WONG, PA        TEL:  OFFICE: [phone]  DR RUBIO CELL: [phone]  DR. FITZGERALD CELL: [phone]  DR. WETZEL CELL: [phone]  SHARON VAN CELL: [phone]    From 5pm-7am answering service [phone]    --- INITIAL RENAL CONSULT NOTE ---date of service 05-19-21 @ 11:51    HPI:  70 year old female with PMHx of HTN, HLD, COPD, psoriasis, hx of SARS-CoV 2 infection in Feb 2021 s/p Remdesvir, hypothyroidism, PAF-on AC Eliquis, hx of symptomatic PAF with slow ventricular rate secondary to AV nate agent  now presents with recurrent symptomatic bradycardia.  Patient endorses developed transient SOB and lightheadedness when her heart rate went down to 38 bpm this am.  She denies CP/near syncope or syncope.  EKG demonstrated junctional bradycardia at 42 bpm.  Telemetry recorded persistent junctional bradycardia from 35 to low 40's most of time.  HR up to high 40's low 50's transiently.  Patient currently asymptomatic and with stable BP.  Patient was seen by EP for PPM evaluation during January 2021.  No PPM recommended at that time as her heart rate improved to NSR after discontinuation of Cardizem.  She was followed as an outpatient by Dr. Jacobson.  She denies any recurrent symptoms of lightheadedness or dizziness since off Cardizem until this am.       nephrology consulted for electrolyte abnormalities         Allergies:  artichokes (Unknown)  IV Contrast (Unknown)  Levaquin (Anaphylaxis)  sulfa drugs (Unknown)  Sulfur (Unknown)      PAST MEDICAL & SURGICAL HISTORY:  COPD (chronic obstructive pulmonary disease)    Hypothyroid    HTN (hypertension)    Hyperlipidemia    Edema extremities    Afib  On Eliquis    Psoriasis    S/P eye surgery  age 5 unsure if right or left eye        Home Medications Reviewed    Hospital Medications:   MEDICATIONS  (STANDING):  dexAMETHasone     Tablet 2 milliGRAM(s) Oral at bedtime  hydrALAZINE 50 milliGRAM(s) Oral two times a day  levothyroxine 25 MICROGram(s) Oral daily  tiotropium 18 MICROgram(s) Capsule 1 Capsule(s) Inhalation daily      SOCIAL HISTORY:  Denies ETOh, Smoking,     FAMILY HISTORY:  FH: hypertension        REVIEW OF SYSTEMS:  CONSTITUTIONAL: No weakness, fevers or chills  EYES/ENT: No visual changes;  No vertigo or throat pain   NECK: No pain or stiffness  RESPIRATORY: No cough, wheezing, hemoptysis; No shortness of breath  CARDIOVASCULAR: No chest pain or palpitations.  GASTROINTESTINAL: No abdominal or epigastric pain. No nausea, vomiting, or hematemesis; No diarrhea or constipation. No melena or hematochezia.  GENITOURINARY: No dysuria, frequency, foamy urine, urinary urgency, incontinence or hematuria  NEUROLOGICAL: No numbness or weakness  SKIN: No itching, burning, rashes, or lesions   VASCULAR: No bilateral lower extremity edema.   All other review of systems is negative unless indicated above.    VITALS:  T(F): 98.1 (05-19-21 @ 07:42), Max: 98.1 (05-19-21 @ 07:42)  HR: 45 (05-19-21 @ 10:36)  BP: 136/52 (05-19-21 @ 10:36)  RR: 18 (05-19-21 @ 10:36)  SpO2: 97% (05-19-21 @ 10:36)  Wt(kg): --    Height (cm): 154.9 (05-19 @ 07:11)  Weight (kg): 87.1 (05-19 @ 07:11)  BMI (kg/m2): 36.3 (05-19 @ 07:11)  BSA (m2): 1.86 (05-19 @ 07:11)    PHYSICAL EXAM:  Constitutional: NAD  HEENT: anicteric sclera, oropharynx clear, MMM  Neck: No JVD  Respiratory: CTAB, no wheezes, rales or rhonchi  Cardiovascular: S1, S2, RRR  Gastrointestinal: BS+, soft, NT/ND  Extremities: No cyanosis or clubbing. No peripheral edema  Neurological: A/O x 3, no focal deficits  Psychiatric: Normal mood, normal affect  : No CVA tenderness. No allison.   Skin: No rashes      LABS:  05-19    129<L>  |  101  |  33<H>  ----------------------------<  205<H>  5.7<H>   |  19<L>  |  1.33<H>    Ca    8.9      19 May 2021 10:09  Mg     2.2     05-19    TPro  6.5  /  Alb  3.7  /  TBili  0.3  /  DBili      /  AST  39<H>  /  ALT  23  /  AlkPhos  93  05-19    Creatinine Trend: 1.33 <--, 1.49 <--                        11.5   9.45  )-----------( 219      ( 19 May 2021 08:06 )             37.1     Urine Studies:        RADIOLOGY & ADDITIONAL STUDIES:                
Patient is a 70y old  Female who presents with a chief complaint of         HPI:    70 year old female who is known to EP team Dr. Jacobson during her previous admission in Jan, 2021.  Patient has a PMH of HTN, HLD, COPD, psoriasis, hx of SARS-CoV 2 infection in Feb 2021 s/p Remdesvir, hypothyroidism, PAF-on AC Eliquis, hx of symptomatic PAF with slow ventricular rate secondary to AV nate agent  now presents with recurrent symptomatic bradycardia.  Patient endorses developed transient SOB and lightheadedness when her heart rate went down to 38 bpm this am.  She denies CP/near syncope or syncope.  EKG demonstrated junctional bradycardia at 42 bpm.  Telemetry recorded persistent junctional bradycardia from 35 to low 40's most of time.  HR up to high 40's low 50's transiently.  Patient currently asymptomatic and with stable BP.  Patient was seen by EP for PPM evaluation during January 2021.  No PPM recommended at that time as her heart rate improved to NSR after discontinuation of Cardizem.  She was followed as an outpatient by Dr. Jacobson.  She denies any recurrent symptoms of lightheadedness or dizziness since off Cardizem until this am.       Echo from 1/27/21 showed LVEF 55% with mildly dilated LA and mild diastolic dysfunction.  EKG's from Jan to Feb 2021 demonstrated SR as well as PAF.  Of note, patient states she followed by her pulmonologist for COPD and had a CT of chest done which showed a mucus plug. She uses inhaler for prevention of wheezing and not on home O2.         PAST MEDICAL & SURGICAL HISTORY:  COPD (chronic obstructive pulmonary disease)    Hypothyroid    HTN (hypertension)    Hyperlipidemia    Edema extremities    Afib  On Eliquis    Psoriasis    S/P eye surgery  age 5 unsure if right or left eye        MEDICATIONS  (STANDING): Eliquis, inhalers, thyroid HTN, HLD, DMT2, losartan,    MEDICATIONS  (PRN):    Allergies    artichokes (Unknown)  IV Contrast (Unknown)  Levaquin (Anaphylaxis)  sulfa drugs (Unknown)  Sulfur (Unknown)    Intolerances      FAMILY HISTORY:  FH: hypertension        SOCIAL HISTORY:  ex- smoker, quit 15 yo ago; no   Alcohol  or  Drug abuse         REVIEW OF SYSTEMS:    CONSTITUTIONAL: No fever, weight loss, chills, shakes, or fatigue  EYES: No eye pain, visual disturbances, or discharge  ENMT:  No difficulty hearing, tinnitus, vertigo; No sinus or throat pain  NECK: No pain or stiffness  RESPIRATORY: No cough, wheezing, hemoptysis, + transient shortness of breath  CARDIOVASCULAR: No chest pain, dyspnea, palpitations, syncope, paroxysmal nocturnal dyspnea, orthopnea, or arm or leg swelling  + dizziness,  GASTROINTESTINAL: No abdominal  or epigastric pain, nausea, vomiting, hematemesis, diarrhea, constipation, melena or bright red blood.  GENITOURINARY: No dysuria, nocturia, hematuria, or urinary incontinence  NEUROLOGICAL: No headaches, memory loss, slurred speech, limb weakness, loss of strength, numbness, or tremors  SKIN: No itching, burning, rashes, or lesions   LYMPH NODES: No enlarged glands  ENDOCRINE: No heat or cold intolerance, or hair loss  MUSCULOSKELETAL: No joint pain or swelling, muscle, back, or extremity pain  PSYCHIATRIC: No depression, anxiety, or difficulty sleeping  HEME/LYMPH: No easy bruising or bleeding gums  ALLERY AND IMMUNOLOGIC: No hives or rash.      Vital Signs Last 24 Hrs  T(C): 36.7 (19 May 2021 07:42), Max: 36.7 (19 May 2021 07:42)  T(F): 98.1 (19 May 2021 07:42), Max: 98.1 (19 May 2021 07:42)  HR: 43 (19 May 2021 09:05) (36 - 46)  BP: 126/42 (19 May 2021 09:05) (126/42 - 171/70)  BP(mean): --  RR: 18 (19 May 2021 09:05) (16 - 18)  SpO2: 98% (19 May 2021 09:05) (98% - 100%)    PHYSICAL EXAM:    GENERAL: In no apparent distress, well nourished, and hydrated.  HEAD:  Atraumatic, Normocephalic  NECK: Supple . No JVD or carotid bruit or thyroidmegaly.  Carotid pulse is 2+ bilaterally.  PULMONARY: Clear to auscultation and perfusion.  No rales, wheezing, or rhonchi bilaterally.  HEART: S1S2 cem; No murmurs, rubs, or gallops.  ABDOMEN: Soft, Nontender, Nondistended; Bowel sounds present  EXTREMITIES: No clubbing, cyanosis, or edema  NEUROLOGICAL: Alert oriented to person, place and time.  Speech clear.  Skin: Dry intact, no rashes or lesions.          INTERPRETATION OF TELEMETRY:  junctional bradycardia in 35-40's bpm    ECG: junctional bradycardia 42         LABS:                        11.5   9.45  )-----------( 219      ( 19 May 2021 08:06 )             37.1     05-19    131<L>  |  99  |  35<H>  ----------------------------<  113<H>  5.4<H>   |  23  |  1.49<H>    Ca    9.3      19 May 2021 08:06  Mg     2.2     05-19    TPro  7.1  /  Alb  4.3  /  TBili  0.4  /  DBili  x   /  AST  36<H>  /  ALT  24  /  AlkPhos  106  05-19        PT/INR - ( 19 May 2021 08:06 )   PT: 16.8 sec;   INR: 1.49 ratio         PTT - ( 19 May 2021 08:06 )  PTT:43.8 sec      BNP  RADIOLOGY & ADDITIONAL STUDIES:  FINDINGS:    No evidence of mediastinal or hilar lymphadenopathy. No evidence of pleural  effusion. There is a small hiatus hernia.    No evidence of axillary or supraclavicular lymphadenopathy. Atherosclerotic changes of the abdominal aorta. No evidence of aortic aneurysm.    There is a small pericardial effusion. No evidence of cardiomegaly.    There is a 4 mm nodule in the left upper lobe, unchanged since 11/24/2019. There is a linear band of atelectasis inthe left lower lobe. The left lung is otherwise clear. Consolidation noted in the left lower lobe on 11/24/2019 has resolved since that time. There is a region of consolidation in the lateral segment of the right middle lobe which is new since 11/24/2019. No underlying endobronchial lesion. Linear atelectasis in the right lower lobe, unchanged.    No significant chest wall abnormality. Mild degenerative changes in the spine.        IMPRESSION:     Small pericardial effusion, smaller since 11/24/2019..    Region of consolidation in the lateral segment of the right middle lobe. This is new since 11/24/2019.    4 mm left upper lobe nodule, unchanged.                    CARMEN ROWLEY M.D., ATTENDING RADIOLOGIST  This document has been electronically signed. Mar 19 2020  6:11PM                PREVIOUS DIAGNOSTIC TESTING:      ECHO FINDINGS:  CONCLUSIONS:  1. Mitral annular calcification, otherwise normal mitral  valve. Mild mitral regurgitation.  2. Calcified trileaflet aortic valve with decreased  opening. Peak transaortic valve gradient equals 22 mm Hg,  mean transaortic valve gradient equals 11 mm Hg, consistent  with mild aortic stenosis. Mild aortic regurgitation.  3. Mildly dilated left atrium.  LA volume index = 36 cc/m2.  4. Normal left ventricular internal dimensions and wall  thicknesses.  5. Normal left ventricular systolic function. No segmental  wall motion abnormalities.  6. Mild diastolic dysfunction (Stage I).  7. Mild right atrial enlargement.  8. Normal right ventricular size and function.  9. Normal pericardium with trace pericardial effusion.  ------------------------------------------------------------------------  Confirmed on  1/27/2021 - 18:19:27 by BILL Victoria  ------------------------------------------------------------------------      STRESS FINDINGS:    CATHETERIZATION FINDINGS:

## 2021-05-19 NOTE — ED PROVIDER NOTE - OBJECTIVE STATEMENT
69 y/o female with PMHx Afib (on Eliquis), HTN, HLD, hypothyroid, psoriasis, COPD, admited in Jan at [facility] for bradycardia, pacemaker deemed not necessary after diltiazem discontinued, presents for bradycardia. Patient reports she woke up from sleep about 4 hours ago with SOB. SOB quickly improved but when she checked her heart rate it ranged from 38 to low 50s. No symptoms at that time including pain, SOB, dizziness. Came to the ED out of concern for her heart rate. Denies other bradycardia since stopping diltiazem in Jan. Feeling well recently including no fever, significant pain, cough/congestions, n/v/d, urinary or new GI symptoms, or change to LE swelling. Denies recent med changes or EtOH/drugs.   Here in the ED she denies symptoms although did feel some dizziness when walking in.

## 2021-05-19 NOTE — ED ADULT NURSE NOTE - PMH
Afib  On Eliquis  COPD (chronic obstructive pulmonary disease)    Edema extremities    HTN (hypertension)    Hyperlipidemia    Hypothyroid    Psoriasis

## 2021-05-19 NOTE — CONSULT NOTE ADULT - ATTENDING COMMENTS
70 year old female with a PMH of HTN, HLD, COPD, psoriasis, hx of SARS-CoV 2 infection in Feb 2021 s/p Remdesvir, hypothyroidism, PAF-on AC Eliquis, hx of symptomatic PAF with slow ventricular rate secondary to AV nate agent  now presents with recurrent symptomatic bradycardia.  She is noted new ROBYN ?etiology.  EKG demonstrated junctional bradycardia at 42 bpm.  Telemetry recorded persistent junctional bradycardia from 35 to low 40's most of time.  HR up to high 40's low 50's transiently.  Patient is asymptomatic and hemodynamically stable at present time.  The etiology of her junctional cem is unclear, possibly associated with ROBYN. Patient meets class I criteria for a permanent pacemaker in light of her symptomatic junctional cem in 30-40'sbpm.   She has been off AV nate  agent (Cardizem) since previous admission in Jan, 2021. Plan for possible PPM on Fri. Will follow.

## 2021-05-19 NOTE — ED PROVIDER NOTE - PHYSICAL EXAMINATION
General: Seated in Stretcher, Awake, Alert, Conversant  Head, Ears, Eyes, Nose, Throat: Pupils equal, round, reactive to light, normal sclera, moist oral mucosa  Pulmonary: Breath sounds bilaterally, no increased work of breathing, speaking full sentences  Cardiovascular: Normal and regular heart rate, normal S1/S2, no murmurs, rubs, or gallops  Abdominal: Soft and nontender, no rebound or guarding  Extremities: No lower extremity edema or erythema, nontender  Dermatologic: No rash  Neurologic: Alert and oriented x3, clear and fluent speech, moving all extremities with good strength, ambulates with steady gait

## 2021-05-19 NOTE — ED ADULT NURSE NOTE - OBJECTIVE STATEMENT
Received pt to [facility], A&Ox4, ambulatory. 71y/o female hx of afib on eliquis, HLD, HTN, Hypothyroid, COPD complaining of low HR. pt states she woke up this morning "with a start," though it could be a "breathing issue," checked her O2 on a home pulse ox and saw HR was in the 40s. pt states she began feeling dizzy upon arriving to ED. PT HR noted to be in the 40s-50s, EKG obtained. Pt also noted to have bilateral swelling of lower extremities, 2+ pitting edema observed. Resps are even and unlabored, spo2 100% on RA. Abdomen soft, nontender. Denies chest pain, abdominal pain, fever/chills, palpitations, blurred vision. 18G IV placed to right arm, labs sent. MD at bedside for eval.

## 2021-05-19 NOTE — ED PROVIDER NOTE - PROGRESS NOTE DETAILS
Discussed with EP who will come see Patient resting comfortably. Asymptomatic. HRs mid 30s-50s. Pads in place. Evaluated by EP.   GREGORIA 5.4. Treating with insulin/dextrose, will repeat K. +ROBYN.

## 2021-05-19 NOTE — H&P ADULT - PROBLEM SELECTOR PLAN 8
DVT an dgI PPX     Differential diagnosis and plan of care discussed with patient after the evaluation.   Advanced care planning/advanced directives discussed with patient/family. DNR status including forceful chest compressions to attempt to restart the heart, ventilator support/artificial breathing, electric shock, artificial nutrition, health care proxy, Molst form all discussed with pt. Pt wishes to consider.   OMT on six regions for acute somatic dysfunctions done at the bedside   Importance of Fall prevention discussed.

## 2021-05-19 NOTE — CONSULT NOTE ADULT - ASSESSMENT
70 year old female with a PMH of HTN, HLD, COPD, psoriasis, hx of SARS-CoV 2 infection in Feb 2021 s/p Remdesvir, hypothyroidism, PAF-on AC Eliquis, hx of symptomatic PAF with slow ventricular rate secondary to AV nate agent  now presents with recurrent symptomatic bradycardia.  EKG demonstrated junctional bradycardia at 42 bpm.  Telemetry recorded persistent junctional bradycardia from 35 to low 40's most of time.  HR up to high 40's low 50's transiently.  Patient is asymptomatic and hemodynamically stable at present time.  Patient meets class I criteria for a permanent pacemaker in light of her symptomatic junctional cem in 30-40'sbpm.   She has been off AV nate  agent (Cardizem) since previous admission in Jan, 2021.   -Admit to telemetry bed under hospitalist service  -Repeat echocardiogram  -keep Zoll pads on, Atropine at bedside, administer Atropine if patient become symptomatic  -NPO after midnight for possible PPM  -Hold off Eliquis pm dose for now  -Will discuss with Dr. Jacobson   70 year old female with a PMH of HTN, HLD, COPD, psoriasis, hx of SARS-CoV 2 infection in Feb 2021 s/p Remdesvir, hypothyroidism, PAF-on AC Eliquis, hx of symptomatic PAF with slow ventricular rate secondary to AV nate agent  now presents with recurrent symptomatic bradycardia.  She is noted new ROBYN ?etiology.  EKG demonstrated junctional bradycardia at 42 bpm.  Telemetry recorded persistent junctional bradycardia from 35 to low 40's most of time.  HR up to high 40's low 50's transiently.  Patient is asymptomatic and hemodynamically stable at present time.  The etiology of her junctional cem is unclear, possibly associated with ROBYN. Patient meets class I criteria for a permanent pacemaker in light of her symptomatic junctional cem in 30-40'sbpm.   She has been off AV nate  agent (Cardizem) since previous admission in Jan, 2021.   -Admit to telemetry bed under hospitalist service  -Repeat echocardiogram  -Repeat EKG after correction of electrolytes (K 5.4)  -Continue to monitor for symptomatic cem    -Correct electrolytes abnormality, consider renal consult  -keep Zoll pads on, Atropine at bedside, administer Atropine if patient become symptomatic  -NPO after midnight for possible PPM  -Hold off Eliquis pm dose for now  -Will discuss with Dr. Jacobson

## 2021-05-19 NOTE — ED PROVIDER NOTE - CLINICAL SUMMARY MEDICAL DECISION MAKING FREE TEXT BOX
69 yo F with prior admission for bradycardia presents for recurrent bradycardia. Non toxic appearing. Asymptomatic on presentation. HR high 30s-low 50s mostly on monitor. Repeat EKGs show junctional bradycardia. Will obtain labs and discuss with EP. Pads placed. No indication for emergent pacing at this time.

## 2021-05-19 NOTE — ED ADULT NURSE REASSESSMENT NOTE - NS ED NURSE REASSESS COMMENT FT1
Pt received from [facility], a&ox4. No complaints at this time. No distress noted. Pt on zoll for bradycardia. ACP made aware of potassium level. Admitted to tele. Awaiting bed. Will continue to monitor.

## 2021-05-19 NOTE — CONSULT NOTE ADULT - ASSESSMENT
70 year old female with PMHx of HTN, HLD, COPD, psoriasis, hx of SARS-CoV 2 infection in Feb 2021 s/p Remdesvir, hypothyroidism, PAF-on AC Eliquis, hx of symptomatic PAF with slow ventricular rate secondary to AV nate agent  now presents with recurrent symptomatic bradycardia.  nephrology consulted for electrolyte abnormalities     ROBYN  baseline ~ 1-1.1  admitted with scr 1.49 slightly better now  ROBYN possible ATN vs prerenal on losartan and hyperglycemia  check urine cr, na, UA  hold losartan  monitor bmp  avoid nephrotoxic agents    hyperkalemia  likely sec to ROBYN on losartan. high k diet  given d50 and insulin   lokelma 5x1  optimize dm control  monitor     Hyponatremia  acute, asymptomatic  ? etiology  elevated TSH check T3 T4  check urine na and osmo  check cortisol   monitor  avoid over correction. Na should not correct >8meq in 24 hrs    bradycardia  f/u EP  plan for PPM

## 2021-05-19 NOTE — H&P ADULT - ASSESSMENT
Patient is a 70 year old female with PMHx of HTN, HLD, COPD, psoriasis, hx of SARS-CoV 2 infection in Feb 2021 s/p Remdesvir, hypothyroidism, PAF-on AC Eliquis, hx of symptomatic PAF with slow ventricular rate secondary to AV nate agent  now presents with recurrent symptomatic bradycardia.  Patient endorses developed transient SOB and lightheadedness when her heart rate went down to 38 bpm this am.  She denies CP/near syncope or syncope.  EKG demonstrated junctional bradycardia at 42 bpm.  Telemetry recorded persistent junctional bradycardia from 35 to low 40's most of time.

## 2021-05-19 NOTE — ED ADULT NURSE NOTE - CHIEF COMPLAINT QUOTE
Pt arrives to ED reporting she felt "jolted" from sleep.  Pt saw that here HR was in the 40's.  Pt was admitted at [facility] for low HR for about 1 week in January.  Pt states Dr. Larson changed her meds to Hydralazine and also switched to Eliquis from Warfarin for her Afib.  There was a discussion of potentially needing a pacemaker. Pt denies CP.  Pt states she feels ok now but reports while she was at home she felt a little bit of dizziness.

## 2021-05-19 NOTE — H&P ADULT - PROBLEM SELECTOR PLAN 2
acute onset iv hydration  adjust electrolytes, elevated K level  renal eval  EKLG noted   monitor urine output

## 2021-05-19 NOTE — ED ADULT NURSE REASSESSMENT NOTE - NS ED NURSE REASSESS COMMENT FT1
Pt awake, alert and oriented x 4 denies dizziness, denies headache, denies chest pain or SOB.   Respirations even and unlabored.   Pt remains on cardiac monitor with zoll monitor and continuous pulse oximetry.   IV site unremarkable.   Pt denies n/v/d.   Pt remains bradycardic with BPs as charted.   Pt c/o some tingling to B/L lower extremities and left hand earlier today.   PMH HTN, COPD, a-fib and bradycardia.   Insulin/dextrose to be given for hyperkalemia with repeat CMP.    will continue to monitor pt closely.

## 2021-05-19 NOTE — ED ADULT NURSE REASSESSMENT NOTE - NS ED NURSE REASSESS COMMENT FT1
Pt awake, alert and oriented x 4 denies chest pain or SOB.   Denies n/v/d.   Respirations even and unlabored.   Denies dizziness and headache.   IV site unremarkable.   Pt remains on cardiac monitor, continuous pulse ox and zoll monitor.   Awaiting admission to tele bed.   pt urinated to bedpan without difficulty.   awaiting further plan of care.

## 2021-05-20 LAB
ALBUMIN SERPL ELPH-MCNC: 3.7 G/DL — SIGNIFICANT CHANGE UP (ref 3.3–5)
ALP SERPL-CCNC: 95 U/L — SIGNIFICANT CHANGE UP (ref 40–120)
ALT FLD-CCNC: 19 U/L — SIGNIFICANT CHANGE UP (ref 4–33)
ANION GAP SERPL CALC-SCNC: 10 MMOL/L — SIGNIFICANT CHANGE UP (ref 7–14)
APPEARANCE UR: CLEAR — SIGNIFICANT CHANGE UP
AST SERPL-CCNC: 27 U/L — SIGNIFICANT CHANGE UP (ref 4–32)
BILIRUB SERPL-MCNC: 0.3 MG/DL — SIGNIFICANT CHANGE UP (ref 0.2–1.2)
BILIRUB UR-MCNC: NEGATIVE — SIGNIFICANT CHANGE UP
BUN SERPL-MCNC: 25 MG/DL — HIGH (ref 7–23)
CALCIUM SERPL-MCNC: 9.7 MG/DL — SIGNIFICANT CHANGE UP (ref 8.4–10.5)
CHLORIDE SERPL-SCNC: 107 MMOL/L — SIGNIFICANT CHANGE UP (ref 98–107)
CO2 SERPL-SCNC: 24 MMOL/L — SIGNIFICANT CHANGE UP (ref 22–31)
COLOR SPEC: SIGNIFICANT CHANGE UP
COVID-19 SPIKE DOMAIN AB INTERP: POSITIVE
COVID-19 SPIKE DOMAIN ANTIBODY RESULT: >250 U/ML — HIGH
CREAT ?TM UR-MCNC: 64 MG/DL — SIGNIFICANT CHANGE UP
CREAT SERPL-MCNC: 1.2 MG/DL — SIGNIFICANT CHANGE UP (ref 0.5–1.3)
DIFF PNL FLD: NEGATIVE — SIGNIFICANT CHANGE UP
GLUCOSE SERPL-MCNC: 116 MG/DL — HIGH (ref 70–99)
GLUCOSE UR QL: NEGATIVE — SIGNIFICANT CHANGE UP
HCT VFR BLD CALC: 34.7 % — SIGNIFICANT CHANGE UP (ref 34.5–45)
HGB BLD-MCNC: 10.8 G/DL — LOW (ref 11.5–15.5)
KETONES UR-MCNC: NEGATIVE — SIGNIFICANT CHANGE UP
LEUKOCYTE ESTERASE UR-ACNC: NEGATIVE — SIGNIFICANT CHANGE UP
MAGNESIUM SERPL-MCNC: 2 MG/DL — SIGNIFICANT CHANGE UP (ref 1.6–2.6)
MCHC RBC-ENTMCNC: 29.3 PG — SIGNIFICANT CHANGE UP (ref 27–34)
MCHC RBC-ENTMCNC: 31.1 GM/DL — LOW (ref 32–36)
MCV RBC AUTO: 94 FL — SIGNIFICANT CHANGE UP (ref 80–100)
NITRITE UR-MCNC: NEGATIVE — SIGNIFICANT CHANGE UP
NRBC # BLD: 0 /100 WBCS — SIGNIFICANT CHANGE UP
NRBC # FLD: 0 K/UL — SIGNIFICANT CHANGE UP
PH UR: 5 — SIGNIFICANT CHANGE UP (ref 5–8)
PLATELET # BLD AUTO: 163 K/UL — SIGNIFICANT CHANGE UP (ref 150–400)
POTASSIUM SERPL-MCNC: 4.6 MMOL/L — SIGNIFICANT CHANGE UP (ref 3.5–5.3)
POTASSIUM SERPL-SCNC: 4.6 MMOL/L — SIGNIFICANT CHANGE UP (ref 3.5–5.3)
PROT SERPL-MCNC: 6.2 G/DL — SIGNIFICANT CHANGE UP (ref 6–8.3)
PROT UR-MCNC: NEGATIVE — SIGNIFICANT CHANGE UP
RBC # BLD: 3.69 M/UL — LOW (ref 3.8–5.2)
RBC # FLD: 15.4 % — HIGH (ref 10.3–14.5)
SARS-COV-2 IGG+IGM SERPL QL IA: >250 U/ML — HIGH
SARS-COV-2 IGG+IGM SERPL QL IA: POSITIVE
SODIUM SERPL-SCNC: 141 MMOL/L — SIGNIFICANT CHANGE UP (ref 135–145)
SODIUM UR-SCNC: 52 MMOL/L — SIGNIFICANT CHANGE UP
SP GR SPEC: 1.01 — SIGNIFICANT CHANGE UP (ref 1.01–1.02)
T4 FREE SERPL-MCNC: 1.3 NG/DL — SIGNIFICANT CHANGE UP (ref 0.9–1.8)
TSH SERPL-MCNC: 2.52 UIU/ML — SIGNIFICANT CHANGE UP (ref 0.27–4.2)
UROBILINOGEN FLD QL: SIGNIFICANT CHANGE UP
WBC # BLD: 4.38 K/UL — SIGNIFICANT CHANGE UP (ref 3.8–10.5)
WBC # FLD AUTO: 4.38 K/UL — SIGNIFICANT CHANGE UP (ref 3.8–10.5)

## 2021-05-20 PROCEDURE — 93306 TTE W/DOPPLER COMPLETE: CPT | Mod: 26

## 2021-05-20 PROCEDURE — 99232 SBSQ HOSP IP/OBS MODERATE 35: CPT

## 2021-05-20 RX ORDER — HYDRALAZINE HCL 50 MG
50 TABLET ORAL
Refills: 0 | Status: DISCONTINUED | OUTPATIENT
Start: 2021-05-20 | End: 2021-05-21

## 2021-05-20 RX ORDER — BUDESONIDE AND FORMOTEROL FUMARATE DIHYDRATE 160; 4.5 UG/1; UG/1
2 AEROSOL RESPIRATORY (INHALATION)
Refills: 0 | Status: DISCONTINUED | OUTPATIENT
Start: 2021-05-20 | End: 2021-05-21

## 2021-05-20 RX ADMIN — Medication 50 MILLIGRAM(S): at 17:14

## 2021-05-20 RX ADMIN — HEPARIN SODIUM 5000 UNIT(S): 5000 INJECTION INTRAVENOUS; SUBCUTANEOUS at 05:47

## 2021-05-20 RX ADMIN — HEPARIN SODIUM 5000 UNIT(S): 5000 INJECTION INTRAVENOUS; SUBCUTANEOUS at 22:33

## 2021-05-20 RX ADMIN — Medication 25 MICROGRAM(S): at 05:46

## 2021-05-20 RX ADMIN — TIOTROPIUM BROMIDE 1 CAPSULE(S): 18 CAPSULE ORAL; RESPIRATORY (INHALATION) at 15:24

## 2021-05-20 RX ADMIN — Medication 50 MILLIGRAM(S): at 05:46

## 2021-05-20 RX ADMIN — Medication 2 MILLIGRAM(S): at 22:33

## 2021-05-20 NOTE — PROGRESS NOTE ADULT - PROBLEM SELECTOR PLAN 2
acute onset iv hydration  improved,   adjust electrolytes, elevated K level  renal eval  EKG noted   monitor urine output

## 2021-05-20 NOTE — PROGRESS NOTE ADULT - ASSESSMENT
70 year old female with a PMH of HTN, HLD, COPD, psoriasis, hx of SARS-CoV 2 infection in Feb 2021 s/p Remdesvir, hypothyroidism, PAF-on AC Eliquis, hx of symptomatic PAF with slow ventricular rate secondary to AV nate agent  now presents with recurrent symptomatic bradycardia.  Renal consulted for new ROBYN and hyperkalemia/hyponatremia.   Junctional bradycardia resolved after correction of hyperkalemia at present time.  Overnight telemetry recorded transient sinus cem down to 38-40 and 2 seconds pauses.  PPM is recommended for significant conduction diseases as evidenced by sick sinus syndrome sinus cem with pauses and junctional cem.        -follow up echo result  -Repeat EKG today   -Maintain optimal electrolytes, off ARB per renal   -NPO after midnight for  PPM on 5/21  -Hold off Eliquis for now  -discussed with Dr. Jacobson

## 2021-05-20 NOTE — PROGRESS NOTE ADULT - PROBLEM SELECTOR PLAN 1
symptomatic bradycardia   EP eval appreciated   plan for ppm placement tomorrow per EP   monitor electrolytes   tele monitoring, atropin at the bedside   fall precautions  Avoid AV nate blocker

## 2021-05-20 NOTE — PROGRESS NOTE ADULT - ASSESSMENT
70 year old female with PMHx of HTN, HLD, COPD, psoriasis, hx of SARS-CoV 2 infection in Feb 2021 s/p Remdesvir, hypothyroidism, PAF-on AC Eliquis, hx of symptomatic PAF with slow ventricular rate secondary to AV nate agent  now presents with recurrent symptomatic bradycardia.  nephrology consulted for electrolyte abnormalities     ROBYN  baseline ~ 1-1.1  admitted with scr 1.49 better now  ROBYN possible ATN vs prerenal on losartan and hyperglycemia  check urine cr, na, UA  hold losartan  monitor bmp  avoid nephrotoxic agents    hyperkalemia  likely sec to ROBYN on losartan. high k diet  given d50 and insulin   lokelma 5x1  optimize dm control  improved  monitor     Hyponatremia  acute, asymptomatic  improved  ? etiology  elevated TSH check T3 T4  check urine na and osmo  check cortisol   monitor  avoid over correction. Na should not correct >8meq in 24 hrs    bradycardia  f/u EP  plan for PPM

## 2021-05-21 ENCOUNTER — TRANSCRIPTION ENCOUNTER (OUTPATIENT)
Age: 71
End: 2021-05-21

## 2021-05-21 VITALS
DIASTOLIC BLOOD PRESSURE: 61 MMHG | HEART RATE: 74 BPM | OXYGEN SATURATION: 99 % | RESPIRATION RATE: 16 BRPM | SYSTOLIC BLOOD PRESSURE: 122 MMHG

## 2021-05-21 LAB
ALBUMIN SERPL ELPH-MCNC: 3.6 G/DL — SIGNIFICANT CHANGE UP (ref 3.3–5)
ALP SERPL-CCNC: 87 U/L — SIGNIFICANT CHANGE UP (ref 40–120)
ALT FLD-CCNC: 17 U/L — SIGNIFICANT CHANGE UP (ref 4–33)
ANION GAP SERPL CALC-SCNC: 9 MMOL/L — SIGNIFICANT CHANGE UP (ref 7–14)
AST SERPL-CCNC: 23 U/L — SIGNIFICANT CHANGE UP (ref 4–32)
BILIRUB SERPL-MCNC: 0.2 MG/DL — SIGNIFICANT CHANGE UP (ref 0.2–1.2)
BUN SERPL-MCNC: 29 MG/DL — HIGH (ref 7–23)
CALCIUM SERPL-MCNC: 9.4 MG/DL — SIGNIFICANT CHANGE UP (ref 8.4–10.5)
CHLORIDE SERPL-SCNC: 108 MMOL/L — HIGH (ref 98–107)
CO2 SERPL-SCNC: 24 MMOL/L — SIGNIFICANT CHANGE UP (ref 22–31)
CREAT SERPL-MCNC: 1.28 MG/DL — SIGNIFICANT CHANGE UP (ref 0.5–1.3)
GLUCOSE SERPL-MCNC: 124 MG/DL — HIGH (ref 70–99)
HCT VFR BLD CALC: 34.5 % — SIGNIFICANT CHANGE UP (ref 34.5–45)
HGB BLD-MCNC: 10.9 G/DL — LOW (ref 11.5–15.5)
MCHC RBC-ENTMCNC: 29.5 PG — SIGNIFICANT CHANGE UP (ref 27–34)
MCHC RBC-ENTMCNC: 31.6 GM/DL — LOW (ref 32–36)
MCV RBC AUTO: 93.5 FL — SIGNIFICANT CHANGE UP (ref 80–100)
NRBC # BLD: 0 /100 WBCS — SIGNIFICANT CHANGE UP
NRBC # FLD: 0 K/UL — SIGNIFICANT CHANGE UP
PLATELET # BLD AUTO: 177 K/UL — SIGNIFICANT CHANGE UP (ref 150–400)
POTASSIUM SERPL-MCNC: 4.7 MMOL/L — SIGNIFICANT CHANGE UP (ref 3.5–5.3)
POTASSIUM SERPL-SCNC: 4.7 MMOL/L — SIGNIFICANT CHANGE UP (ref 3.5–5.3)
PROT SERPL-MCNC: 6.2 G/DL — SIGNIFICANT CHANGE UP (ref 6–8.3)
RBC # BLD: 3.69 M/UL — LOW (ref 3.8–5.2)
RBC # FLD: 15.1 % — HIGH (ref 10.3–14.5)
SODIUM SERPL-SCNC: 141 MMOL/L — SIGNIFICANT CHANGE UP (ref 135–145)
WBC # BLD: 5.55 K/UL — SIGNIFICANT CHANGE UP (ref 3.8–10.5)
WBC # FLD AUTO: 5.55 K/UL — SIGNIFICANT CHANGE UP (ref 3.8–10.5)

## 2021-05-21 PROCEDURE — 99232 SBSQ HOSP IP/OBS MODERATE 35: CPT

## 2021-05-21 RX ADMIN — HEPARIN SODIUM 5000 UNIT(S): 5000 INJECTION INTRAVENOUS; SUBCUTANEOUS at 05:19

## 2021-05-21 RX ADMIN — Medication 50 MILLIGRAM(S): at 05:20

## 2021-05-21 RX ADMIN — BUDESONIDE AND FORMOTEROL FUMARATE DIHYDRATE 2 PUFF(S): 160; 4.5 AEROSOL RESPIRATORY (INHALATION) at 09:46

## 2021-05-21 RX ADMIN — Medication 25 MICROGRAM(S): at 05:20

## 2021-05-21 RX ADMIN — TIOTROPIUM BROMIDE 1 CAPSULE(S): 18 CAPSULE ORAL; RESPIRATORY (INHALATION) at 09:44

## 2021-05-21 NOTE — DISCHARGE NOTE PROVIDER - NSDCCPCAREPLAN_GEN_ALL_CORE_FT
PRINCIPAL DISCHARGE DIAGNOSIS  Diagnosis: Bradycardia  Assessment and Plan of Treatment: you were evaluated for a Pacemaker and it was determined that you should have one placed you currenlty declined. Please follow up with Dr. Jacobson on 6/21 at 11am for further management of your bradycardia, Please return to the ER if you experince any lightheaded, dizziness, shortness of breath, or CP.         SECONDARY DISCHARGE DIAGNOSES  Diagnosis: ROBYN (acute kidney injury)  Assessment and Plan of Treatment: now resolved, your losartan was discontinued    Diagnosis: Hypothyroid  Assessment and Plan of Treatment: please have your thyroid function test checked and have your snythroid dose adjusted accordingly.    Diagnosis: Hyperkalemia  Assessment and Plan of Treatment: now resolved after a dose of lokelma, your losartan was also discontinued to prevent high potassium    Diagnosis: Afib  Assessment and Plan of Treatment: continue your eliquis for anticoagulation, currently rate controlled    Diagnosis: Hyponatremia  Assessment and Plan of Treatment: now resolved s/p intravenous fluids

## 2021-05-21 NOTE — DISCHARGE NOTE PROVIDER - NSDCMRMEDTOKEN_GEN_ALL_CORE_FT
Advair Diskus 500 mcg-50 mcg inhalation powder: 1 puff(s) inhaled 2 times a day  cholecalciferol oral tablet: 400 unit(s) orally once a day  dexamethasone 2 mg oral tablet: 1 tab(s) orally once a day (at bedtime)   Eliquis 5 mg oral tablet: 1 tab(s) orally 2 times a day     AFIB   Test [id number]  hydrALAZINE 50 mg oral tablet: 1 tab(s) orally 2 times a day  levothyroxine 25 mcg (0.025 mg) oral tablet: 1 tab(s) orally once a day  Spiriva 18 mcg inhalation capsule: 1 each inhaled once a day  Vitamin B-12: 1 tab(s) orally once a day

## 2021-05-21 NOTE — DISCHARGE NOTE PROVIDER - PROVIDER TOKENS
PROVIDER:[TOKEN:[[id number]],FOLLOWUP:[1 week]],PROVIDER:[TOKEN:[[id number]],SCHEDULEDAPPT:[06/21/2021],SCHEDULEDAPPTTIME:[11:00 AM]]

## 2021-05-21 NOTE — PROGRESS NOTE ADULT - ASSESSMENT
70 year old female with PMHx of HTN, HLD, COPD, psoriasis, hx of SARS-CoV 2 infection in Feb 2021 s/p Remdesvir, hypothyroidism, PAF-on AC Eliquis, hx of symptomatic PAF with slow ventricular rate secondary to AV nate agent  now presents with recurrent symptomatic bradycardia.  nephrology consulted for electrolyte abnormalities     ROBYN  baseline ~ 1-1.1  admitted with scr 1.49 better now  ROBYN  prerenal on losartan and hyperglycemia  Fena<1%  hold losartan  monitor bmp  avoid nephrotoxic agents    hyperkalemia  likely sec to ROBYN on losartan. high k diet  given d50 and insulin   lokelma 5x1  optimize dm control  improved  monitor     Hyponatremia  acute, asymptomatic  improved  ? etiology  elevated TSH check T3 T4  monitor  avoid over correction. Na should not correct >8meq in 24 hrs    bradycardia  f/u EP

## 2021-05-21 NOTE — DISCHARGE NOTE NURSING/CASE MANAGEMENT/SOCIAL WORK - PATIENT PORTAL LINK FT
You can access the FollowMyHealth Patient Portal offered by [facility] by registering at the following website: [email or web address]. By joining [employer]’s FollowMyHealth portal, you will also be able to view your health information using other applications (apps) compatible with our system.

## 2021-05-21 NOTE — DISCHARGE NOTE PROVIDER - NSDCFUSCHEDAPPT_GEN_ALL_CORE_FT
BALBIR ANGEL ; 06/10/2021 ; NPP Cardio [address]  BALBIR ANGEL ; 06/21/2021 ; NPP Cardio Electro [phone]

## 2021-05-21 NOTE — PROGRESS NOTE ADULT - SUBJECTIVE AND OBJECTIVE BOX
[facility] [place] [facility]   MD MADDIE DENNIS DO ANAM SIDDIQUI ANGELA WONG, PA    TEL:  OFFICE: [phone]  DR RUBIO CELL: [phone]  DR. FITZGERALD CELL: [phone]  DR. WETZEL CELL: [phone]  SHARON VAN CELL: [phone]    From 5pm-7am Answering Service [phone]    -- RENAL FOLLOW UP NOTE ---Date of Service 05-21-21 @ 10:43    Patient is a 70y old  Female who presents with a chief complaint of SOB/lightheadedness (21 May 2021 09:07)      Patient seen and examined at bedside. No chest pain/sob    VITALS:  T(F): 98.1 (05-21-21 @ 05:17), Max: 98.1 (05-20-21 @ 10:51)  HR: 74 (05-21-21 @ 10:21)  BP: 122/61 (05-21-21 @ 10:21)  RR: 16 (05-21-21 @ 10:21)  SpO2: 99% (05-21-21 @ 10:21)  Wt(kg): --        PHYSICAL EXAM:  Constitutional: NAD  Neck: No JVD  Respiratory: CTAB, no wheezes, rales or rhonchi  Cardiovascular: S1, S2, RRR  Gastrointestinal: BS+, soft, NT/ND  Extremities: No peripheral edema    Hospital Medications:   MEDICATIONS  (STANDING):  budesonide 160 MICROgram(s)/formoterol 4.5 MICROgram(s) Inhaler 2 Puff(s) Inhalation two times a day  dexAMETHasone     Tablet 2 milliGRAM(s) Oral at bedtime  heparin   Injectable 5000 Unit(s) SubCutaneous every 8 hours  hydrALAZINE 50 milliGRAM(s) Oral two times a day  levothyroxine 25 MICROGram(s) Oral daily  tiotropium 18 MICROgram(s) Capsule 1 Capsule(s) Inhalation daily      LABS:  05-21    141  |  108<H>  |  29<H>  ----------------------------<  124<H>  4.7   |  24  |  1.28    Ca    9.4      21 May 2021 08:28  Mg     2.0     05-20    TPro  6.2  /  Alb  3.6  /  TBili  0.2  /  DBili      /  AST  23  /  ALT  17  /  AlkPhos  87  05-21    Creatinine Trend: 1.28 <--, 1.20 <--, 1.33 <--, 1.49 <--    Albumin, Serum: 3.6 g/dL (05-21 @ 08:28)                              10.9   5.55  )-----------( 177      ( 21 May 2021 08:28 )             34.5     Urine Studies:  Urinalysis - [05-20-21 @ 19:08]      Color Light Yellow / Appearance Clear / SG 1.012 / pH 5.0      Gluc Negative / Ketone Negative  / Bili Negative / Urobili <2 mg/dL       Blood Negative / Protein Negative / Leuk Est Negative / Nitrite Negative      RBC  / WBC  / Hyaline  / Gran  / Sq Epi  / Non Sq Epi  / Bacteria     Urine Creatinine 64      [05-20-21 @ 19:08]  Urine Sodium 52      [05-20-21 @ 19:08]    Ferritin 293      [02-10-21 @ 17:21]  TSH 2.52      [05-20-21 @ 07:27]  Lipid: chol 172, TG 43, HDL 71, LDL --      [01-25-21 @ 06:28]        RADIOLOGY & ADDITIONAL STUDIES:  
Name of Patient : BALBIR ANGEL  MRN: [id number]  DATE OF SERVICE: 05-20-21 @ 09:32    Subjective: Patient seen and examined. No new events except as noted.   HR around 60  doing okay     REVIEW OF SYSTEMS:    CONSTITUTIONAL: No weakness, fevers or chills  EYES/ENT: No visual changes;  No vertigo or throat pain   NECK: No pain or stiffness  RESPIRATORY: No cough, wheezing, hemoptysis; No shortness of breath  CARDIOVASCULAR: No chest pain or palpitations  GASTROINTESTINAL: No abdominal or epigastric pain. No nausea, vomiting, or hematemesis; No diarrhea or constipation. No melena or hematochezia.  GENITOURINARY: No dysuria, frequency or hematuria  NEUROLOGICAL: No numbness or weakness  SKIN: No itching, burning, rashes, or lesions   All other review of systems is negative unless indicated above.    MEDICATIONS:  MEDICATIONS  (STANDING):  dexAMETHasone     Tablet 2 milliGRAM(s) Oral at bedtime  heparin   Injectable 5000 Unit(s) SubCutaneous every 8 hours  hydrALAZINE 50 milliGRAM(s) Oral two times a day  levothyroxine 25 MICROGram(s) Oral daily  tiotropium 18 MICROgram(s) Capsule 1 Capsule(s) Inhalation daily      PHYSICAL EXAM:  T(C): 36.5 (05-20-21 @ 05:40), Max: 36.8 (05-19-21 @ 14:46)  HR: 63 (05-20-21 @ 05:40) (45 - 76)  BP: 140/62 (05-20-21 @ 05:40) (127/63 - 155/74)  RR: 17 (05-20-21 @ 05:40) (17 - 18)  SpO2: 97% (05-20-21 @ 05:40) (97% - 99%)  Wt(kg): --  I&O's Summary        Appearance: Normal	  HEENT:  PERRLA   Lymphatic: No lymphadenopathy   Cardiovascular: Normal S1 S2, no JVD  Respiratory: normal effort , clear  Gastrointestinal:  Soft, Non-tender  Skin: No rashes,  warm to touch  Psychiatry:  Mood & affect appropriate  Musculuskeletal: No edema      All labs, Imaging and EKGs personally reviewed                           10.8   4.38  )-----------( 163      ( 20 May 2021 07:27 )             34.7               05-20    141  |  107  |  25<H>  ----------------------------<  116<H>  4.6   |  24  |  1.20    Ca    9.7      20 May 2021 07:27  Mg     2.0     05-20    TPro  6.2  /  Alb  3.7  /  TBili  0.3  /  DBili  x   /  AST  27  /  ALT  19  /  AlkPhos  95  05-20    PT/INR - ( 19 May 2021 08:06 )   PT: 16.8 sec;   INR: 1.49 ratio         PTT - ( 19 May 2021 08:06 )  PTT:43.8 sec                                       
Patient is a 70y old  Female who presents with a chief complaint of symptomatic bradycardia (20 May 2021 08:59)  States "I felt the pauses".  Denies lightheadedness or dizziness.  HR trend in 60's most of time.     PAST MEDICAL & SURGICAL HISTORY:  COPD (chronic obstructive pulmonary disease)    JUAN CARLOS (obstructive sleep apnea)    HTN (hypertension)    Hypothyroid    COPD (chronic obstructive pulmonary disease)    Hypothyroid    HTN (hypertension)    Hyperlipidemia    Edema extremities    Afib  On Eliquis    Psoriasis    HTN (hypertension)    Hypothyroid    No significant past surgical history    S/P eye surgery  age 5 unsure if right or left eye        MEDICATIONS  (STANDING):  budesonide 160 MICROgram(s)/formoterol 4.5 MICROgram(s) Inhaler 2 Puff(s) Inhalation two times a day  dexAMETHasone     Tablet 2 milliGRAM(s) Oral at bedtime  heparin   Injectable 5000 Unit(s) SubCutaneous every 8 hours  hydrALAZINE 50 milliGRAM(s) Oral two times a day  levothyroxine 25 MICROGram(s) Oral daily  tiotropium 18 MICROgram(s) Capsule 1 Capsule(s) Inhalation daily    MEDICATIONS  (PRN):            Vital Signs Last 24 Hrs  T(C): 36.7 (21 May 2021 05:17), Max: 36.7 (20 May 2021 10:51)  T(F): 98.1 (21 May 2021 05:17), Max: 98.1 (20 May 2021 10:51)  HR: 78 (21 May 2021 05:17) (66 - 82)  BP: 129/61 (21 May 2021 05:17) (129/61 - 156/74)  BP(mean): --  RR: 18 (21 May 2021 05:17) (16 - 18)  SpO2: 99% (21 May 2021 05:17) (97% - 99%)            INTERPRETATION OF TELEMETRY:  SR 60's with occasional pauses <2 seconds/PAC's    ECG:        LABS:                        10.9   5.55  )-----------( 177      ( 21 May 2021 08:28 )             34.5     05-20    141  |  107  |  25<H>  ----------------------------<  116<H>  4.6   |  24  |  1.20    Ca    9.7      20 May 2021 07:27  Mg     2.0     -    TPro  6.2  /  Alb  3.7  /  TBili  0.3  /  DBili  x   /  AST  27  /  ALT  19  /  AlkPhos  95  -  Thyroid Stimulating Hormone, Serum: 2.52 uIU/mL (21 @ 07:27)              Urinalysis Basic - ( 20 May 2021 19:08 )    Color: Light Yellow / Appearance: Clear / S.012 / pH: x  Gluc: x / Ketone: Negative  / Bili: Negative / Urobili: <2 mg/dL   Blood: x / Protein: Negative / Nitrite: Negative   Leuk Esterase: Negative / RBC: x / WBC x   Sq Epi: x / Non Sq Epi: x / Bacteria: x        BNP  RADIOLOGY & ADDITIONAL STUDIES:      PHYSICAL EXAM:    GENERAL: In no apparent distress, well nourished, and hydrated.  NECK: Supple and normal thyroid.  No JVD or carotid bruit.  Carotid pulse is 2+ bilaterally.  HEART: Regular rate and rhythm; No murmurs, rubs, or gallops.  PULMONARY: Clear to auscultation and perfusion.  No rales, wheezing, or rhonchi bilaterally.  ABDOMEN: Soft, Nontender, Nondistended; Bowel sounds present  EXTREMITIES:  2+ Peripheral Pulses, No clubbing, cyanosis, or edema  NEUROLOGICAL: Grossly nonfocal        
[facility] [place] [facility]   MD MADDIE DENNIS DO ANAM SIDDIQUI ANGELA WONG, [place]    TEL:  OFFICE: [phone]  DR RUBOI CELL: [phone]  DR. FITZGERALD CELL: [phone]  DR. WETZEL CELL: [phone]  SHARON VAN CELL: [phone]    From 5pm-7am Answering Service [phone]    -- RENAL FOLLOW UP NOTE ---Date of Service 05-20-21 @ 13:02    Patient is a 70y old  Female who presents with a chief complaint of symptomatic bradycardia (20 May 2021 08:59)      Patient seen and examined at bedside. No chest pain/sob    VITALS:  T(F): 98.1 (05-20-21 @ 10:51), Max: 98.2 (05-19-21 @ 14:46)  HR: 66 (05-20-21 @ 10:51)  BP: 156/74 (05-20-21 @ 10:51)  RR: 17 (05-20-21 @ 10:51)  SpO2: 97% (05-20-21 @ 10:51)  Wt(kg): --        PHYSICAL EXAM:  Constitutional: NAD  Neck: No JVD  Respiratory: CTAB, no wheezes, rales or rhonchi  Cardiovascular: S1, S2, RRR  Gastrointestinal: BS+, soft, NT/ND  Extremities: No peripheral edema    Hospital Medications:   MEDICATIONS  (STANDING):  dexAMETHasone     Tablet 2 milliGRAM(s) Oral at bedtime  heparin   Injectable 5000 Unit(s) SubCutaneous every 8 hours  hydrALAZINE 50 milliGRAM(s) Oral two times a day  levothyroxine 25 MICROGram(s) Oral daily  tiotropium 18 MICROgram(s) Capsule 1 Capsule(s) Inhalation daily      LABS:  05-20    141  |  107  |  25<H>  ----------------------------<  116<H>  4.6   |  24  |  1.20    Ca    9.7      20 May 2021 07:27  Mg     2.0     05-20    TPro  6.2  /  Alb  3.7  /  TBili  0.3  /  DBili      /  AST  27  /  ALT  19  /  AlkPhos  95  05-20    Creatinine Trend: 1.20 <--, 1.33 <--, 1.49 <--    Albumin, Serum: 3.7 g/dL (05-20 @ 07:27)                              10.8   4.38  )-----------( 163      ( 20 May 2021 07:27 )             34.7     Urine Studies:      Ferritin 293      [02-10-21 @ 17:21]  TSH 2.52      [05-20-21 @ 07:27]  Lipid: chol 172, TG 43, HDL 71, LDL --      [01-25-21 @ 06:28]        RADIOLOGY & ADDITIONAL STUDIES:  
Patient is a 70y old  Female who presents with a chief complaint of bradycardia (19 May 2021 11:51)  Patient denies CP, SOB, palpitations, lightheadedness or dizziness.     PAST MEDICAL & SURGICAL HISTORY:  COPD (chronic obstructive pulmonary disease)    JUAN CARLOS (obstructive sleep apnea)    HTN (hypertension)    Hypothyroid    COPD (chronic obstructive pulmonary disease)    Hypothyroid    HTN (hypertension)    Hyperlipidemia    Edema extremities    Afib  On Eliquis    Psoriasis    HTN (hypertension)    Hypothyroid    No significant past surgical history    S/P eye surgery  age 5 unsure if right or left eye        MEDICATIONS  (STANDING):  dexAMETHasone     Tablet 2 milliGRAM(s) Oral at bedtime  heparin   Injectable 5000 Unit(s) SubCutaneous every 8 hours  hydrALAZINE 50 milliGRAM(s) Oral two times a day  levothyroxine 25 MICROGram(s) Oral daily  tiotropium 18 MICROgram(s) Capsule 1 Capsule(s) Inhalation daily    MEDICATIONS  (PRN):            Vital Signs Last 24 Hrs  T(C): 36.5 (20 May 2021 05:40), Max: 36.8 (19 May 2021 14:46)  T(F): 97.7 (20 May 2021 05:40), Max: 98.2 (19 May 2021 14:46)  HR: 63 (20 May 2021 05:40) (43 - 76)  BP: 140/62 (20 May 2021 05:40) (126/42 - 155/74)  BP(mean): --  RR: 17 (20 May 2021 05:40) (17 - 18)  SpO2: 97% (20 May 2021 05:40) (97% - 99%)            INTERPRETATION OF TELEMETRY:  SR with transient sinus bradyarrhythmia down to 38-40's seen on tele and sinus pauses up to 2 seconds, no recurrent junctional rhythm seen    ECG:        LABS:                        10.8   4.38  )-----------( 163      ( 20 May 2021 07:27 )             34.7     05-20    141  |  107  |  25<H>  ----------------------------<  116<H>  4.6   |  24  |  1.20    Ca    9.7      20 May 2021 07:27  Mg     2.0     05-20    TPro  6.2  /  Alb  3.7  /  TBili  0.3  /  DBili  x   /  AST  27  /  ALT  19  /  AlkPhos  95  05-20        PT/INR - ( 19 May 2021 08:06 )   PT: 16.8 sec;   INR: 1.49 ratio         PTT - ( 19 May 2021 08:06 )  PTT:43.8 sec      BNP  RADIOLOGY & ADDITIONAL STUDIES:      PHYSICAL EXAM:    GENERAL: In no apparent distress, well nourished, and hydrated.  NECK: Supple and normal thyroid.  No JVD or carotid bruit.  Carotid pulse is 2+ bilaterally.  HEART: S1S2 cem; No murmurs, rubs, or gallops.  PULMONARY: Clear to auscultation and perfusion.  No rales, wheezing, or rhonchi bilaterally.  ABDOMEN: Soft, Nontender, Nondistended; Bowel sounds present  EXTREMITIES:  2+ Peripheral Pulses, No clubbing, cyanosis, trace pitting edema  NEUROLOGICAL: Grossly nonfocal

## 2021-05-21 NOTE — DISCHARGE NOTE PROVIDER - HOSPITAL COURSE
70 year old female with a PMH of HTN, HLD, COPD, psoriasis, hx of SARS-CoV 2 infection in Feb 2021 s/p Remdesvir, hypothyroidism, PAF-on AC Eliquis, hx of symptomatic PAF with slow ventricular rate secondary to AV nate agent now presents with recurrent symptomatic bradycardia.  Renal consulted for new ROBYN and hyperkalemia/hyponatremia.   Junctional bradycardia resolved after correction of hyperkalemia at present time.  Overnight telemetry recorded transient sinus cem down to 38-40 and 2 seconds pauses.  PPM is recommended for significant conduction diseases as evidenced by sick sinus syndrome sinus cem with pauses and junctional cem.  Junctional cem resolved now after correction of electrolytes abnormality associated with ROBYN.  Echo showed normal LVEF.  Patient now desire to postpone the PPM implantation states that she has been skipping her synthroid for past few weeks.  Currently in SR with few pauses < 2 seconds.  Asymptomatic. EP team discussed with Dr. Jacobson, cancelled PPM for today per patient's requests as her rhythm improved.   Eliquis was restarted on discharge. As per renal will continue to hold Losartan.   Above discussed with Dr. Mahoney, patient stable and cleared for discharge home 5/21, outpatient follow up for  PPM eval with Dr. Jacobson on June 21 at 11:00am setup.

## 2021-05-21 NOTE — PROGRESS NOTE ADULT - ASSESSMENT
70 year old female with a PMH of HTN, HLD, COPD, psoriasis, hx of SARS-CoV 2 infection in Feb 2021 s/p Remdesvir, hypothyroidism, PAF-on AC Eliquis, hx of symptomatic PAF with slow ventricular rate secondary to AV nate agent  now presents with recurrent symptomatic bradycardia.  Renal consulted for new ROBYN and hyperkalemia/hyponatremia.   Junctional bradycardia resolved after correction of hyperkalemia at present time.  Overnight telemetry recorded transient sinus cem down to 38-40 and 2 seconds pauses.  PPM is recommended for significant conduction diseases as evidenced by sick sinus syndrome sinus cem with pauses and junctional cem.  Junctional cem resolved now after correction of electrolytes abnormality associated with ROBYN.  Echo showed normal LVEF.  Patient now desire to postpone the PPM implantation states that she has been skipping her synthroid for past few weeks.  Currently in SR with few pauses < 2 seconds.  Asymptomatic.        Discussed with Dr. Jacobson, cancel PPM today per patient's requests as her rhythm imporved  -Resume AC Eliquis for discharge  -Repeat EKG today   -Maintain optimal electrolytes, off ARB per renal   -Advise to follow up with her endocrinologist for hypothyroid management (TSH 2.52 on 5/20)  -Will provide a follow up for  PPM eval with Dr. Jacobson in one month  -Stable from EP standpoint for discharge if remains asymptomatic  70 year old female with a PMH of HTN, HLD, COPD, psoriasis, hx of SARS-CoV 2 infection in Feb 2021 s/p Remdesvir, hypothyroidism, PAF-on AC Eliquis, hx of symptomatic PAF with slow ventricular rate secondary to AV nate agent  now presents with recurrent symptomatic bradycardia.  Renal consulted for new ROBYN and hyperkalemia/hyponatremia.   Junctional bradycardia resolved after correction of hyperkalemia at present time.  Overnight telemetry recorded transient sinus cem down to 38-40 and 2 seconds pauses.  PPM is recommended for significant conduction diseases as evidenced by sick sinus syndrome sinus cem with pauses and junctional cem.  Junctional cem resolved now after correction of electrolytes abnormality associated with ROBYN.  Echo showed normal LVEF.  Patient now desire to postpone the PPM implantation states that she has been skipping her synthroid for past few weeks.  Currently in SR with few pauses < 2 seconds.  Asymptomatic.        Discussed with Dr. Jacobson, cancel PPM today per patient's requests as her rhythm imporved  -Resume AC Eliquis for discharge  -Repeat EKG today   -Maintain optimal electrolytes, off ARB per renal   -Advise to follow up with her endocrinologist for hypothyroid management (TSH 2.52 on 5/20)  - follow up for  PPM eval with Dr. Jacobson on June 21 at 11:00am  -Stable from EP standpoint for discharge if remains asymptomatic  70 year old female with a PMH of HTN, HLD, COPD, psoriasis, hx of SARS-CoV 2 infection in Feb 2021 s/p Remdesvir, hypothyroidism, PAF-on AC Eliquis, hx of symptomatic PAF with slow ventricular rate secondary to AV nate agent  now presents with recurrent symptomatic bradycardia.  Renal consulted for new ROBYN and hyperkalemia/hyponatremia.   Junctional bradycardia resolved after correction of hyperkalemia at present time.  Overnight telemetry recorded transient sinus cem down to 38-40 and 2 seconds pauses.  PPM is recommended for significant conduction diseases as evidenced by sick sinus syndrome sinus cem with pauses and junctional cem.  Junctional cem resolved now after correction of electrolytes abnormality associated with ROBYN.  Echo showed normal LVEF.  Patient now desire to postpone the PPM implantation states that she has been skipping her synthroid for past few weeks.  Currently in SR with few pauses < 2 seconds.  Asymptomatic.        Discussed with Dr. Jacobson, cancel PPM today as her rhythm imporved  -Resume AC Eliquis for discharge  -Repeat EKG today   -Maintain optimal electrolytes, off ARB per renal   -Advise to follow up with her endocrinologist for hypothyroid management (TSH 2.52 on 5/20)  - follow up for  PPM eval with Dr. Jacobson on June 21 at 11:00am  -Stable from EP standpoint for discharge if remains asymptomatic

## 2021-05-21 NOTE — DISCHARGE NOTE PROVIDER - CARE PROVIDER_API CALL
Jero Jj)  Family Medicine Medicine  [address] [place] [address], [address]  [place], [place] [id number]  Phone: [phone]  Fax: [phone]  Follow Up Time: 1 week    Reilly Jacobson)  Cardiac Electrophysiology; Cardiology; Internal Medicine  [address] [place], [place] [address]  Phone: [phone]  Fax: [phone]  Scheduled Appointment: 06/21/2021 11:00 AM

## 2021-05-21 NOTE — PROGRESS NOTE ADULT - ATTENDING COMMENTS
as above
70 year old female with a PMH of HTN, HLD, COPD, psoriasis, hx of SARS-CoV 2 infection in Feb 2021 s/p Remdesvir, hypothyroidism, PAF-on AC Eliquis, hx of symptomatic PAF with slow ventricular rate secondary to AV nate agent  now presents with recurrent symptomatic bradycardia.  Renal consulted for new ROBYN and hyperkalemia/hyponatremia.   Junctional bradycardia resolved after correction of hyperkalemia at present time.  Overnight telemetry recorded transient sinus cem down to 38-40 and 2 seconds pauses.  PPM is recommended for significant conduction diseases as evidenced by sick sinus syndrome sinus cem with pauses and junctional cem. Will plan for tomorrow.
70 year old female with a PMH of HTN, HLD, COPD, psoriasis, hx of SARS-CoV 2 infection in Feb 2021 s/p Remdesvir, hypothyroidism, PAF-on AC Eliquis, hx of symptomatic PAF with slow ventricular rate secondary to AV nate agent  now presents with recurrent symptomatic bradycardia.  Renal consulted for new ROBYN and hyperkalemia/hyponatremia.   Junctional bradycardia resolved after correction of hyperkalemia at present time.  Overnight telemetry recorded transient sinus cem down to 38-40 and 2 seconds pauses.  PPM is recommended for significant conduction diseases as evidenced by sick sinus syndrome sinus cem with pauses and junctional cem.  Junctional cem resolved now after correction of electrolytes abnormality associated with ROBYN.  Echo showed normal LVEF.  Will fu as outpt.
improved renal function

## 2021-05-27 LAB — CORTIS SERPL-MCNC: <1 UG/DL — LOW

## 2021-06-09 NOTE — REASON FOR VISIT
[Symptom and Test Evaluation] : symptom and test evaluation [Arrhythmia/ECG Abnorrmalities] : arrhythmia/ECG abnormalities [Structural Heart and Valve Disease] : structural heart and valve disease [Hyperlipidemia] : hyperlipidemia [Hypertension] : hypertension [Other: ____] : [unfilled]

## 2021-06-10 ENCOUNTER — TRANSCRIPTION ENCOUNTER (OUTPATIENT)
Age: 71
End: 2021-06-10

## 2021-06-10 ENCOUNTER — APPOINTMENT (OUTPATIENT)
Dept: CARDIOLOGY | Facility: CLINIC | Age: 71
End: 2021-06-10
Payer: COMMERCIAL

## 2021-06-10 ENCOUNTER — NON-APPOINTMENT (OUTPATIENT)
Age: 71
End: 2021-06-10

## 2021-06-10 VITALS
OXYGEN SATURATION: 98 % | HEIGHT: 61 IN | HEART RATE: 71 BPM | DIASTOLIC BLOOD PRESSURE: 80 MMHG | SYSTOLIC BLOOD PRESSURE: 178 MMHG | WEIGHT: 190 LBS | TEMPERATURE: 98.6 F | BODY MASS INDEX: 35.87 KG/M2

## 2021-06-10 DIAGNOSIS — I49.8 OTHER SPECIFIED CARDIAC ARRHYTHMIAS: ICD-10-CM

## 2021-06-10 PROCEDURE — 93000 ELECTROCARDIOGRAM COMPLETE: CPT

## 2021-06-10 PROCEDURE — 99072 ADDL SUPL MATRL&STAF TM PHE: CPT

## 2021-06-10 PROCEDURE — 99214 OFFICE O/P EST MOD 30 MIN: CPT

## 2021-06-10 RX ORDER — LOSARTAN POTASSIUM 100 MG/1
100 TABLET, FILM COATED ORAL DAILY
Refills: 0 | Status: DISCONTINUED | COMMUNITY
End: 2021-06-10

## 2021-06-10 NOTE — PHYSICAL EXAM
[Well Developed] : well developed [Well Nourished] : well nourished [No Acute Distress] : no acute distress [Normal Conjunctiva] : normal conjunctiva [Normal Venous Pressure] : normal venous pressure [Normal S1, S2] : normal S1, S2 [No Rub] : no rub [No Gallop] : no gallop [Clear Lung Fields] : clear lung fields [Good Air Entry] : good air entry [No Respiratory Distress] : no respiratory distress  [Soft] : abdomen soft [Non Tender] : non-tender [No Masses/organomegaly] : no masses/organomegaly [Normal Bowel Sounds] : normal bowel sounds [Normal Gait] : normal gait [No Edema] : no edema [No Cyanosis] : no cyanosis [No Clubbing] : no clubbing [No Varicosities] : no varicosities [No Rash] : no rash [No Skin Lesions] : no skin lesions [Moves all extremities] : moves all extremities [No Focal Deficits] : no focal deficits [Normal Speech] : normal speech [Alert and Oriented] : alert and oriented [Normal memory] : normal memory [Carotid Bruit] : carotid bruit [de-identified] : bilateral  [de-identified] : ALYSSA

## 2021-06-10 NOTE — HISTORY OF PRESENT ILLNESS
[FreeTextEntry1] : 70 year old female with hx of morbid obesity  improved sleep apnea with weight loss ,  Monoclonal gammopathy HTN  hyperlipidemia  PAF   who was  hospitalized with bradycardia  again  in may 2021 she was noted to have ROBYN , patient  was evaluated by EP  placed on hydralazine , patients palpitations resolved came for follow up , patient did not feel well , feel like having cough , no fever or chills ,  patient had negative covid test this AM , \par \par  patient denies any chest pain or dizziness , \par \par Patient blood pressure  is elevated as she has white coat component  her home BP is 130s  ,, based on her home blood pressure readings  patient did have blood work showed mild elevated cholesterol , \par \par Patient used to be very obese , did loose some weight , with some improvement in sleep apnea , now she does not use cpap , patient had long standing hx of copd ,  used to use oxygen  NC  now she is fine on bronchodilator treatment , \par \par \par Patient has elevated cholesterol , did not start medication yet , she had questions which was answered in details , \par

## 2021-06-10 NOTE — ASSESSMENT
[FreeTextEntry1] : Hx of PAF currently in sinus rhythm  tachy cem syndrome with resolved symptoms  after changing medication ,   avoid negative chronotropic drugs , continue losartan , hydralazine \par \par Hypertension , normal home blood pressure reading , continue low salt diet and current medication \par \par Cardiac murmur  mild   aortic stenosis ;  continue to monitor \par \par Hyperlipidemia  with evidence of coronary calcification suggestive coronary atherosclerosis  ( no evidence of ischemia on stress test ) , target LDL <70 explained to the patient about necessity of starting on medication  , will give atorvastatin 10 mg po daily , follow up lipid profile LFTS \par \par Obesity prior hx of sleep apnea  , patient was encouraged to loose more weight \par \par COPD pulmonary nodule stable continue current medication , follow up with pulmonary \par \par \par \par \par follow up after 3  months \par

## 2021-06-10 NOTE — CARDIOLOGY SUMMARY
[de-identified] :  normal sinus rhythm  [de-identified] : MILES 1/27/21  Mild MR , Mild aortic stenosis , mild AI  normal LVEF 55% mild DD  JADE

## 2021-06-10 NOTE — REVIEW OF SYSTEMS
[Dyspnea on exertion] : dyspnea during exertion [Negative] : Heme/Lymph [SOB] : no shortness of breath [Leg Claudication] : no intermittent leg claudication [Syncope] : no syncope

## 2021-06-18 ENCOUNTER — APPOINTMENT (OUTPATIENT)
Dept: CARDIOLOGY | Facility: CLINIC | Age: 71
End: 2021-06-18
Payer: MEDICARE

## 2021-06-18 PROCEDURE — 93880 EXTRACRANIAL BILAT STUDY: CPT

## 2021-06-18 PROCEDURE — 99072 ADDL SUPL MATRL&STAF TM PHE: CPT

## 2021-06-21 ENCOUNTER — APPOINTMENT (OUTPATIENT)
Dept: ELECTROPHYSIOLOGY | Facility: CLINIC | Age: 71
End: 2021-06-21
Payer: MEDICARE

## 2021-06-21 VITALS
DIASTOLIC BLOOD PRESSURE: 82 MMHG | HEIGHT: 61 IN | OXYGEN SATURATION: 96 % | SYSTOLIC BLOOD PRESSURE: 178 MMHG | BODY MASS INDEX: 36.06 KG/M2 | HEART RATE: 66 BPM | WEIGHT: 191 LBS

## 2021-06-21 PROCEDURE — 93000 ELECTROCARDIOGRAM COMPLETE: CPT

## 2021-06-21 PROCEDURE — 99072 ADDL SUPL MATRL&STAF TM PHE: CPT

## 2021-06-21 PROCEDURE — 99215 OFFICE O/P EST HI 40 MIN: CPT

## 2021-06-21 NOTE — CARDIOLOGY SUMMARY
[de-identified] : 1/27/2021, CONCLUSIONS:1. Mitral annular calcification, otherwise normal mitralvalve. Mild mitral regurgitation.2. Calcified trileaflet aortic valve with decreasedopening. Peak transaortic valve gradient equals 22 mm Hg,mean transaortic valve gradient equals 11 mm Hg, consistentwith mild aortic stenosis. Mild aortic regurgitation.3. Mildly dilated left atrium. LA volume index = 36 cc/m2.4. Normal left ventricular internal dimensions and wallthicknesses.5. Normal left ventricular systolic function. No segmentalwall motion abnormalities.6. Mild diastolic dysfunction (Stage I).7. Mild right atrial enlargement.8. Normal right ventricular size and function.9. Normal pericardium with trace pericardial effusion. LVEF 55%.

## 2021-06-21 NOTE — HISTORY OF PRESENT ILLNESS
[FreeTextEntry1] : Melissa Contreras is a 71y/o woman with Hx of Atrial fibrillation (On Eliquis, CHADVASC score of 3), HTN, HLD, hypothyroidism, COPD and recent hospitalization for symptomatic bradycardia who presents today for routine f/u. Was initially at [facility] where she presented with lightheadedness, dizziness in setting of bradycardia. She was transferred to [facility] for PPM. Home Cardizem was d/c with improvement in symptoms. Patient also demonstrated chronotropic response. HR 70-80s when awake/ ambulating and PPM ultimately was not placed. Has been feeling well off diltiazem. Was hospitalized again in May for bradycardia as well in setting of hyperkalemia. Was noted to be in junctional but resolved once hyperkalemia was corrected. Was offered pacemaker at the time but refused. Denies any recurrence of dizziness or feeling lightheaded. Underwent 2 week Holter which reveals no evidence of pauses > 4 sec and brief runs of tachycardia to the 150s. No indication for PPM or need to resume AV nodals at this time. Denies chest pain, palpitations, SOB, syncope or near syncope.

## 2021-06-23 ENCOUNTER — NON-APPOINTMENT (OUTPATIENT)
Age: 71
End: 2021-06-23

## 2021-06-29 ENCOUNTER — NON-APPOINTMENT (OUTPATIENT)
Age: 71
End: 2021-06-29

## 2021-07-16 NOTE — PROGRESS NOTE ADULT - PROBLEM SELECTOR PLAN 4
Refilled per protocol.        
Continue Spiriva, and Advair.

## 2021-09-02 ENCOUNTER — FORM ENCOUNTER (OUTPATIENT)
Age: 71
End: 2021-09-02

## 2021-09-09 ENCOUNTER — FORM ENCOUNTER (OUTPATIENT)
Age: 71
End: 2021-09-09

## 2021-09-09 ENCOUNTER — NON-APPOINTMENT (OUTPATIENT)
Age: 71
End: 2021-09-09

## 2021-09-09 ENCOUNTER — APPOINTMENT (OUTPATIENT)
Dept: CARDIOLOGY | Facility: CLINIC | Age: 71
End: 2021-09-09
Payer: MEDICARE

## 2021-09-09 VITALS
SYSTOLIC BLOOD PRESSURE: 134 MMHG | OXYGEN SATURATION: 97 % | HEIGHT: 61 IN | BODY MASS INDEX: 36.82 KG/M2 | HEART RATE: 65 BPM | WEIGHT: 195 LBS | DIASTOLIC BLOOD PRESSURE: 72 MMHG

## 2021-09-09 VITALS — SYSTOLIC BLOOD PRESSURE: 130 MMHG | DIASTOLIC BLOOD PRESSURE: 70 MMHG

## 2021-09-09 PROCEDURE — 93000 ELECTROCARDIOGRAM COMPLETE: CPT

## 2021-09-09 PROCEDURE — 99214 OFFICE O/P EST MOD 30 MIN: CPT

## 2021-09-09 NOTE — PHYSICAL EXAM
[Well Developed] : well developed [Well Nourished] : well nourished [No Acute Distress] : no acute distress [Normal Conjunctiva] : normal conjunctiva [Normal Venous Pressure] : normal venous pressure [Carotid Bruit] : carotid bruit [Clear Lung Fields] : clear lung fields [Good Air Entry] : good air entry [No Respiratory Distress] : no respiratory distress  [Soft] : abdomen soft [Non Tender] : non-tender [Normal Bowel Sounds] : normal bowel sounds [Normal Gait] : normal gait [No Edema] : no edema [No Cyanosis] : no cyanosis [No Clubbing] : no clubbing [No Varicosities] : no varicosities [No Rash] : no rash [No Skin Lesions] : no skin lesions [Moves all extremities] : moves all extremities [No Focal Deficits] : no focal deficits [Normal Speech] : normal speech [Alert and Oriented] : alert and oriented [Normal memory] : normal memory [Normal Rate] : normal [Normal S1] : normal S1 [Normal S2] : normal S2 [II] : a grade 2 [No Pitting Edema] : no pitting edema present [Right Carotid Bruit] : right carotid bruit heard [Left Carotid Bruit] : left carotid bruit heard [2+] : left 2+ [No Abnormalities] : the abdominal aorta was not enlarged and no bruit was heard [S3] : no S3 [S4] : no S4 [Rt] : no varicose veins of the right leg [Lt] : no varicose veins of the left leg [Right Femoral Bruit] : no bruit heard over the right femoral artery [Left Femoral Bruit] : no bruit heard over the left femoral artery [de-identified] : ALYSSA [de-identified] : bilateral

## 2021-09-09 NOTE — ASSESSMENT
[FreeTextEntry1] : Hx of PAF currently in sinus rhythm  tachy cem syndrome with resolved symptoms  after changing medication ,   avoid negative chronotropic drugs , continue losartan , hydralazine  eliquis \par \par Hypertension , normal home blood pressure reading , continue low salt diet and current medication \par \par Cardiac murmur  mild   aortic stenosis ;  continue to monitor \par \par Hyperlipidemia  with evidence of coronary calcification suggestive coronary atherosclerosis  ( no evidence of ischemia on stress test ) , target LDL <70 explained to the patient about necessity of starting on medication  , will continue atorvastatin 10 mg po daily , follow up lipid profile LFTS \par \par Obesity prior hx of sleep apnea  , patient was encouraged to loose more weight \par \par COPD pulmonary nodule stable continue current medication , follow up with pulmonary \par \par \par follow up after 4  months \par

## 2021-09-09 NOTE — HISTORY OF PRESENT ILLNESS
[FreeTextEntry1] : 70 year old female with hx of morbid obesity  improved sleep apnea with weight loss ,  Monoclonal gammopathy HTN  hyperlipidemia  PAF   who was  hospitalized with bradycardia with daisy, chronotropic response  ,was evaluated by EP  placed on hydralazine , patients palpitations resolved came for follow up ,says she is doing well \par \par  patient denies any chest pain or dizziness , \par \par Patient blood pressure  is elevated as she has white coat component  her home BP is 130s  ,, based on her home blood pressure readings  patient did have blood work showed mild elevated cholesterol , \par \par Patient used to be very obese , did loose some weight , with some improvement in sleep apnea , now she does not use cpap , patient had long standing hx of copd ,  used to use oxygen  NC  now she is fine on bronchodilator treatment , \par \par \par Patient tolerating statin , her blood work showed  improved cholesterol LDL level at target level , \par

## 2021-09-09 NOTE — REVIEW OF SYSTEMS
[Dyspnea on exertion] : dyspnea during exertion [Negative] : Musculoskeletal [SOB] : no shortness of breath [Chest Discomfort] : no chest discomfort [Leg Claudication] : no intermittent leg claudication [Joint Pain] : no joint pain [Syncope] : no syncope

## 2021-09-09 NOTE — CARDIOLOGY SUMMARY
[de-identified] : 9/9/21 normal sinus rhythm  [de-identified] : 4/16/19  no ischemia on chemical nuclear stress test  [de-identified] : 1/27/21  [facility]    EF 55% Mild DD MIld AS AI , JADE

## 2021-10-28 ENCOUNTER — APPOINTMENT (OUTPATIENT)
Dept: ELECTROPHYSIOLOGY | Facility: CLINIC | Age: 71
End: 2021-10-28
Payer: MEDICARE

## 2021-10-28 VITALS
HEIGHT: 61 IN | OXYGEN SATURATION: 97 % | WEIGHT: 190 LBS | HEART RATE: 68 BPM | TEMPERATURE: 96.9 F | SYSTOLIC BLOOD PRESSURE: 209 MMHG | RESPIRATION RATE: 16 BRPM | DIASTOLIC BLOOD PRESSURE: 90 MMHG | BODY MASS INDEX: 35.87 KG/M2

## 2021-10-28 VITALS — DIASTOLIC BLOOD PRESSURE: 84 MMHG | SYSTOLIC BLOOD PRESSURE: 186 MMHG

## 2021-10-28 DIAGNOSIS — E03.9 HYPOTHYROIDISM, UNSPECIFIED: ICD-10-CM

## 2021-10-28 DIAGNOSIS — R00.2 PALPITATIONS: ICD-10-CM

## 2021-10-28 PROCEDURE — 99215 OFFICE O/P EST HI 40 MIN: CPT

## 2021-10-28 PROCEDURE — 93000 ELECTROCARDIOGRAM COMPLETE: CPT

## 2021-10-28 NOTE — CARDIOLOGY SUMMARY
[de-identified] : 1/27/2021, CONCLUSIONS:1. Mitral annular calcification, otherwise normal mitralvalve. Mild mitral regurgitation.2. Calcified trileaflet aortic valve with decreasedopening. Peak transaortic valve gradient equals 22 mm Hg,mean transaortic valve gradient equals 11 mm Hg, consistentwith mild aortic stenosis. Mild aortic regurgitation.3. Mildly dilated left atrium. LA volume index = 36 cc/m2.4. Normal left ventricular internal dimensions and wallthicknesses.5. Normal left ventricular systolic function. No segmentalwall motion abnormalities.6. Mild diastolic dysfunction (Stage I).7. Mild right atrial enlargement.8. Normal right ventricular size and function.9. Normal pericardium with trace pericardial effusion. LVEF 55%.

## 2021-10-30 ENCOUNTER — NON-APPOINTMENT (OUTPATIENT)
Age: 71
End: 2021-10-30

## 2021-11-02 NOTE — DISCUSSION/SUMMARY
Will continue current insulin regimen for now. Will continue monitoring FS, log, and FU.  Patient counseled for compliance with consistent low carb diet and exercise as tolerated outpatient. [FreeTextEntry1] : Impression:\par \par 1. Sick sinus syndrome: EKG performed today to assess for presence of conduction disease and reveals NSR. Review of recent hospitalization with junctional bradycardia in setting of hyperkalemia. Has prior history of symptomatic bradycardia as well. Remains off AV nodals without symptomatic bradycardia/symptoms. Now off losartan with hopes to avoid recurrent hyperkalemia. If continues to have recurrent symptomatic bradycardia, consider PPM placement at that time. \par \par 2. Paroxysmal afib: resume Eliquis for thromboembolic prophylaxis. \par \par 3. HTN: resume oral antihypertensives as prescribed. Encouraged heart healthy diet, sodium restriction, and weight loss. Continue regular f/u with Cardiologist for further HTN management.\par \par 4. HLD: resume statin therapy as prescribed and regular f/u with Cardiologist for routine lipid monitoring and management.\par \par 5. Hypothyroid: resume levothyroxine as prescribed and regular f/u with PCP for routine TFT monitoring and management.\par \par Will continue f/u with Cardiologist and may RTO as needed or if any new or worsening symptoms or findings occur.

## 2021-11-08 ENCOUNTER — NON-APPOINTMENT (OUTPATIENT)
Age: 71
End: 2021-11-08

## 2021-12-16 ENCOUNTER — NON-APPOINTMENT (OUTPATIENT)
Age: 71
End: 2021-12-16

## 2021-12-16 ENCOUNTER — APPOINTMENT (OUTPATIENT)
Dept: ELECTROPHYSIOLOGY | Facility: CLINIC | Age: 71
End: 2021-12-16
Payer: MEDICARE

## 2021-12-16 VITALS
BODY MASS INDEX: 35.14 KG/M2 | DIASTOLIC BLOOD PRESSURE: 74 MMHG | HEIGHT: 61 IN | SYSTOLIC BLOOD PRESSURE: 165 MMHG | HEART RATE: 65 BPM | OXYGEN SATURATION: 99 %

## 2021-12-16 VITALS — BODY MASS INDEX: 35.14 KG/M2 | WEIGHT: 186 LBS

## 2021-12-16 DIAGNOSIS — I49.5 SICK SINUS SYNDROME: ICD-10-CM

## 2021-12-16 PROCEDURE — 93000 ELECTROCARDIOGRAM COMPLETE: CPT

## 2021-12-16 PROCEDURE — 99215 OFFICE O/P EST HI 40 MIN: CPT

## 2021-12-20 NOTE — HISTORY OF PRESENT ILLNESS
[FreeTextEntry1] : Melissa Contreras is a 71y/o woman with Hx of Atrial fibrillation (On Eliquis, CHADVASC score of 3), HTN, HLD, hypothyroidism, COPD and recent hospitalization for symptomatic bradycardia who presents today for routine f/u post CardioNet monitoring. On prior visit (10/2021) she had two episodes of palpitations which were bothersome to her lasting for up to 6 hours in duration. Not on AV nodals given history of bradycardia (TBS).  Has remained on Eliquis for thromboembolic prophylaxis. Unsure of afib recurrence. Denies chest pain, SOB, syncope or near syncope. Now s/p CardioNet monitor. \par \par On initial visit:  Was initially at [facility] where she presented with lightheadedness, dizziness in setting of bradycardia. She was transferred to [facility] for PPM. Home Cardizem was d/c with improvement in symptoms. Patient also demonstrated chronotropic response. HR 70-80s when awake/ ambulating and PPM ultimately was not placed. Has been feeling well off diltiazem. Was hospitalized again in May for bradycardia as well in setting of hyperkalemia. Was noted to be in junctional but resolved once hyperkalemia was corrected. Was offered pacemaker at the time but refused. Denies any recurrence of dizziness or feeling lightheaded. Underwent 2 week Holter which reveals no evidence of pauses > 4 sec and brief runs of tachycardia to the 150s. No indication for PPM or need to resume AV nodals at this time.

## 2021-12-20 NOTE — DISCUSSION/SUMMARY
[FreeTextEntry1] : Impression: \par \par 1. Sick sinus syndrome: EKG performed today to assess for presence of conduction disease and reveals NSR. Review of past hospitalization with junctional bradycardia in setting of hyperkalemia. Has prior history of symptomatic bradycardia as well. Remains off AV nodals without symptomatic bradycardia/symptoms. Now off losartan with hopes to avoid recurrent hyperkalemia. Review of 30 day CardioNet without evidence of bradyarrhythmias and no indication for PPM placement. \par \par 2. Paroxysmal afib: Review of 30 day CardioNet without evidence of paroxysmal afib. Remains on Eliquis for thromboembolic prophylaxis. Concern about long term use of anticoagulation. Recommend undergoing placement of ILR for long term monitoring of afib and to allow for safe discontinuation of AC in future. Risks, benefits, and alternatives discussed.\par \par 3. HTN: resume oral antihypertensives as prescribed. Encouraged heart healthy diet, sodium restriction, and weight loss. Continue regular f/u with Cardiologist for further HTN management.\par \par 4. HLD: resume statin therapy as prescribed and regular f/u with Cardiologist for routine lipid monitoring and management.\par \par 5. Hypothyroid: resume levothyroxine as prescribed and regular f/u with PCP for routine TFT monitoring and management.\par \par 6. NSVT: noted NSVT during sleeping hours on CardioNet, not on beta blockers given bradycardia in the past. Review of ECHO with normal LVEF. Consider JUAN CARLOS eval. States had JUAN CARLOS in the past and used to be on CPAP. Discussed importance of compliance with JUAN CARLOS management to prevent future sinus node dysfunction and atrial fibrillation. Encouraged weight loss.\par \par Will continue f/u with Cardiologist and may RTO as needed or if any new or worsening symptoms or findings occur. Call office to schedule ILR placement if chooses to proceed. \par

## 2021-12-20 NOTE — CARDIOLOGY SUMMARY
[de-identified] : 1/27/2021, CONCLUSIONS:1. Mitral annular calcification, otherwise normal mitralvalve. Mild mitral regurgitation.2. Calcified trileaflet aortic valve with decreasedopening. Peak transaortic valve gradient equals 22 mm Hg,mean transaortic valve gradient equals 11 mm Hg, consistentwith mild aortic stenosis. Mild aortic regurgitation.3. Mildly dilated left atrium. LA volume index = 36 cc/m2.4. Normal left ventricular internal dimensions and wallthicknesses.5. Normal left ventricular systolic function. No segmentalwall motion abnormalities.6. Mild diastolic dysfunction (Stage I).7. Mild right atrial enlargement.8. Normal right ventricular size and function.9. Normal pericardium with trace pericardial effusion. LVEF 55%.

## 2022-01-13 ENCOUNTER — APPOINTMENT (OUTPATIENT)
Dept: CARDIOLOGY | Facility: CLINIC | Age: 72
End: 2022-01-13

## 2022-01-17 ENCOUNTER — FORM ENCOUNTER (OUTPATIENT)
Age: 72
End: 2022-01-17

## 2022-01-20 ENCOUNTER — FORM ENCOUNTER (OUTPATIENT)
Age: 72
End: 2022-01-20

## 2022-02-04 ENCOUNTER — APPOINTMENT (OUTPATIENT)
Dept: CARDIOLOGY | Facility: CLINIC | Age: 72
End: 2022-02-04
Payer: MEDICARE

## 2022-02-04 ENCOUNTER — NON-APPOINTMENT (OUTPATIENT)
Age: 72
End: 2022-02-04

## 2022-02-04 VITALS
OXYGEN SATURATION: 99 % | DIASTOLIC BLOOD PRESSURE: 72 MMHG | SYSTOLIC BLOOD PRESSURE: 148 MMHG | BODY MASS INDEX: 35.3 KG/M2 | HEART RATE: 62 BPM | HEIGHT: 61 IN | WEIGHT: 187 LBS

## 2022-02-04 PROCEDURE — 99214 OFFICE O/P EST MOD 30 MIN: CPT

## 2022-02-04 PROCEDURE — 93000 ELECTROCARDIOGRAM COMPLETE: CPT

## 2022-02-04 NOTE — PHYSICAL EXAM
[S3] : no S3 [S4] : no S4 [Rt] : no varicose veins of the right leg [Lt] : no varicose veins of the left leg [Right Femoral Bruit] : no bruit heard over the right femoral artery [Left Femoral Bruit] : no bruit heard over the left femoral artery [de-identified] : bilateral  [de-identified] : ALYSSA

## 2022-02-04 NOTE — HISTORY OF PRESENT ILLNESS
[FreeTextEntry1] : 70 year old female with hx of morbid obesity  improved sleep apnea with weight loss ,  Monoclonal gammopathy HTN  hyperlipidemia  PAF , bradycardia with normal chronotropic response  ,was evaluated by EP  placed on hydralazine , patients palpitations resolved came for follow up ,says she is doing well \par \par  patient denies any chest pain or dizziness ,  her pulsation in ear resolved ( carotid pulsations ) \par \par Patient blood pressure  is elevated as she has white coat component  her home BP is 130s  ,, based on her home blood pressure readings   \par \par Patient used to be very obese , did loose some weight , with some improvement in sleep apnea , now she does not use cpap , patient had long standing hx of copd ,  used to use oxygen  NC  now she is fine on bronchodilator treatment , \par \par Patient tolerating statin , her blood work showed  improved cholesterol LDL level at target level , \par

## 2022-02-04 NOTE — ASSESSMENT
[FreeTextEntry1] : Hx of PAF currently in sinus rhythm  tachy cem syndrome with resolved symptoms  after changing medication ,   avoid negative chronotropic drugs , continue losartan , hydralazine  eliquis   patient scheduled to have  ILR placement  with EP \par \par Hypertension ,elevated today ?   normal home blood pressure reading , continue low salt diet and current medication \par \par Cardiac murmur  mild   aortic stenosis ;  continue to monitor \par \par Hyperlipidemia   controlled , with evidence of coronary calcification suggestive coronary atherosclerosis  ( no evidence of ischemia on stress test ) , target LDL <70, will continue atorvastatin 10 mg po daily , follow up lipid profile LFTS  periodically \par \par Obesity prior hx of sleep apnea  , patient was encouraged to loose more weight \par \par COPD pulmonary nodule stable continue current medication , follow up with pulmonary \par \par \par follow up after 4  months \par

## 2022-02-04 NOTE — REVIEW OF SYSTEMS
[SOB] : no shortness of breath [Chest Discomfort] : no chest discomfort [Leg Claudication] : no intermittent leg claudication [Syncope] : no syncope [Joint Pain] : no joint pain

## 2022-02-04 NOTE — CARDIOLOGY SUMMARY
[de-identified] : 2/4/22  sinus bradycardia  [de-identified] : 4/16/19  no ischemia on chemical nuclear stress test  [de-identified] : 1/27/21  [facility]    EF 55% Mild DD MIld AS AI , JADE

## 2022-03-06 ENCOUNTER — EMERGENCY (EMERGENCY)
Facility: HOSPITAL | Age: 72
LOS: 1 days | Discharge: HOME | End: 2022-03-06
Attending: EMERGENCY MEDICINE | Admitting: EMERGENCY MEDICINE
Payer: MEDICARE

## 2022-03-06 VITALS
DIASTOLIC BLOOD PRESSURE: 84 MMHG | HEIGHT: 61 IN | WEIGHT: 186.07 LBS | TEMPERATURE: 98 F | RESPIRATION RATE: 14 BRPM | HEART RATE: 88 BPM | SYSTOLIC BLOOD PRESSURE: 178 MMHG | OXYGEN SATURATION: 99 %

## 2022-03-06 VITALS
OXYGEN SATURATION: 99 % | DIASTOLIC BLOOD PRESSURE: 84 MMHG | HEART RATE: 70 BPM | RESPIRATION RATE: 16 BRPM | TEMPERATURE: 98 F | SYSTOLIC BLOOD PRESSURE: 164 MMHG

## 2022-03-06 DIAGNOSIS — Z98.89 OTHER SPECIFIED POSTPROCEDURAL STATES: Chronic | ICD-10-CM

## 2022-03-06 PROCEDURE — 30901 CONTROL OF NOSEBLEED: CPT | Mod: RT

## 2022-03-06 PROCEDURE — 99282 EMERGENCY DEPT VISIT SF MDM: CPT | Mod: 25

## 2022-03-06 PROCEDURE — 30901 CONTROL OF NOSEBLEED: CPT

## 2022-03-06 PROCEDURE — 99284 EMERGENCY DEPT VISIT MOD MDM: CPT | Mod: 25

## 2022-03-06 NOTE — ED PROVIDER NOTE - CARE PROVIDER_API CALL
Deniz Ramires)  Otolaryngology  [address], [address]  [place] [address]  Phone: [phone]  Fax: [phone]  Follow Up Time: 1-3 Days

## 2022-03-06 NOTE — ED PROVIDER NOTE - CLINICAL SUMMARY MEDICAL DECISION MAKING FREE TEXT BOX
72 y/o F with hx of afib on eliquis, HTN, HLD, hypothyroid presents with c/o epistaxis from R nostril today. Pt states that she was at home reading newspaper when she had spontaneous epistaxis from R nostril this morning. Denies trauma, nasal congestion, dizziness, weakness or other symptoms. Pt has hx of nose bleed and has seen ENT, Dr. Ramires. PE: as above A/P: EPISTAXIS, will cauterize, nasal packing if warranted, reassess, f/u ENT

## 2022-03-06 NOTE — ED PROVIDER NOTE - ENMT, MLM
Airway patent, +mild active bleeding noted from R anterior nasal septal mucosa, Left nostril clear, posterior pharynx clear

## 2022-03-06 NOTE — ED PROVIDER NOTE - OBJECTIVE STATEMENT
72 y/o F with hx of afib on eliquis, HTN, HLD, hypothyroid, COPD presents with c/o epistaxis from R nostril today. Pt states that she was at home reading newspaper when she had spontaneous epistaxis from R nostril this morning. Denies trauma, nasal congestion, dizziness, weakness or other symptoms. Pt has hx of nose bleed and has seen ENT, Dr. Ramires.

## 2022-03-06 NOTE — ED ADULT NURSE NOTE - OBJECTIVE STATEMENT
71 YOF A&OX3 with pmh of a-fib presents to ED for nose bleed. pt on blood thinner Eliquis, started having nosebleed few minutes prior to arrival to ED. upon assessment pt noted to have oozing blood from right nostril. pt denies sob, chest pain, headaches, dizziness, blurry vision. safety maintained.

## 2022-03-06 NOTE — ED PROVIDER NOTE - PROGRESS NOTE DETAILS
Geni ROMERO for ED attending, Dr. Mina: 72 y/o F w/  PMHx of HTN, HLD, COPD, psoriasis, hx of SARS-CoV 2 infection in 2021 s/p Remdesvir, hypothyroidism, PAF-on AC Eliquis, hx of symptomatic PAF with slow ventricular rate secondary to AV nate agent presents to ED c/o sudden onset epistaxis today. Pt states she in on Eliquis for possible Afib. On exam: vital signs normal, afebrile and in no distress, small active bleeding R nostril nasal septal mucosa, L nostril clear posterior pharynx clear, remainder unremarkable. Impression is nose bleed. Will attempt cauterize and may need nasal packing w/ ENT f/u. Geni ROMERO for ED attending, Dr. Mina: 72 y/o F w/  PMHx of HTN, HLD, COPD, psoriasis, hx of SARS-CoV 2 infection in 2021 s/p Remdesvir, hypothyroidism, PAF-on AC Eliquis, hx of symptomatic PAF with slow ventricular rate secondary to AV nate agent presents to ED c/o sudden onset epistaxis today. Denies dizziness, weakness, trauma or any other complaint. Pt has history of nose bleeds and follows w/ ENT Dr. Ramires. Pt states she in on Eliquis for possible Afib. On exam: vital signs normal, afebrile and in no distress, small active bleeding R nostril nasal septal mucosa, L nostril clear posterior pharynx clear, remainder unremarkable. Impression is nose bleed. Will attempt cauterize and may need nasal packing w/ ENT f/u. Silver nitrate used to cauterize the nose bleed. Pt observed in ED without any further bleeding, Hemostasis obtained, Will d/c home and f/u ENT.

## 2022-03-06 NOTE — ED PROVIDER NOTE - PATIENT PORTAL LINK FT
You can access the FollowMyHealth Patient Portal offered by [facility] by registering at the following website: [email or web address]. By joining [employer]’s FollowMyHealth portal, you will also be able to view your health information using other applications (apps) compatible with our system.

## 2022-03-06 NOTE — ED ADULT NURSE NOTE - BREATH SOUNDS, RIGHT
Methadone and Oxycodone       Last Written Prescription Date: 02/28/2017  Last Fill Quantity: one month of each ,  # refills: 0   Last Office Visit with [facility], [facility] or [facility] prescribing provider: 03/17/2017    Thanks  Dorothy Tony [facility]   [phone]                                                    clear

## 2022-03-06 NOTE — ED PROVIDER NOTE - NSFOLLOWUPINSTRUCTIONS_ED_ALL_ED_FT
Nosebleed    WHAT YOU NEED TO KNOW:    A nosebleed, or epistaxis, occurs when one or more of the blood vessels in your nose break. You may have dark or bright red blood from one or both nostrils. A nosebleed is most commonly caused by dry air or picking your nose. A direct blow to your nose, irritation from a cold or allergies, or a foreign object can also cause a nosebleed.     DISCHARGE INSTRUCTIONS:    Return to the emergency department if:   •Your nasal packing is soaked with blood.      •Your nose is still bleeding after 20 minutes, even after you pinch it.       •You have a foul-smelling discharge coming out of your nose.      •You feel so weak and dizzy that you have trouble standing up.      •You have trouble breathing or talking.       Contact your healthcare provider if:   •You have a fever and are vomiting.      •You have pain in and around your nose that is getting worse even after you take pain medicines.      •Your nasal pack is loose.      •You have questions or concerns about your condition or care.      First aid:   •Sit up and lean forward. This will help prevent you from swallowing blood. Spit blood and saliva into a bowl.       •Apply pressure to your nose. Use 2 fingers to pinch your nose shut for 10       •Apply ice on the bridge of your nose to decrease swelling and bleeding. Use a cold pack or put crushed ice in a plastic bag. Cover it with a towel to protect your skin.      •Pack your nose with a cotton ball, tissue, tampon, or gauze bandage to stop the bleeding.      Medicines:   •Medicines applied to a small piece of cotton and placed in your nose. Medicine may also be sprayed in or applied directly to your nose. You may need medicine to prevent an infection. If bleeding is severe, medicine may be injected into a blood vessel in your nose.       •Take your medicine as directed. Contact your healthcare provider if you think your medicine is not helping or if you have side effects. Tell him of her if you are allergic to any medicine. Keep a list of the medicines, vitamins, and herbs you take. Include the amounts, and when and why you take them. Bring the list or the pill bottles to follow-up visits. Carry your medicine list with you in case of an emergency.      Prevent another nosebleed:   •Keep your nose moist. Put a small amount of petroleum jelly inside your nostrils as needed. Use a saline (saltwater) nasal spray. Do not put anything else inside your nose unless your healthcare provider says it is okay. Do not use oil-based lubricants if you use oxygen therapy. They may be flammable.      •Use a cool mist humidifier to increase air moisture in your home. This will help your nose stay moist.       •Do not pick or blow your nose for at least a week. You can irritate or damage your nose if you pick it. Blowing your nose too hard may cause the bleeding to start again. Do not bend over or strain as this can cause the bleeding to start again.      •Avoid irritants such as tobacco smoke or chemical sprays such as .      Follow up with your healthcare provider as directed: Any packing in your nose should be removed within 2 to 3 days. Write down your questions so you remember to ask them during your visits. Follow up with ENT in 1-3 days for re-evaluation, ongoing care and treatment. Avoid blowing your nose. If having worsening of symptoms or other related symptoms, RETURN TO THE ER IMMEDIATELY.     Nosebleed    WHAT YOU NEED TO KNOW:    A nosebleed, or epistaxis, occurs when one or more of the blood vessels in your nose break. You may have dark or bright red blood from one or both nostrils. A nosebleed is most commonly caused by dry air or picking your nose. A direct blow to your nose, irritation from a cold or allergies, or a foreign object can also cause a nosebleed.     DISCHARGE INSTRUCTIONS:    Return to the emergency department if:   •Your nasal packing is soaked with blood.      •Your nose is still bleeding after 20 minutes, even after you pinch it.       •You have a foul-smelling discharge coming out of your nose.      •You feel so weak and dizzy that you have trouble standing up.      •You have trouble breathing or talking.       Contact your healthcare provider if:   •You have a fever and are vomiting.      •You have pain in and around your nose that is getting worse even after you take pain medicines.      •Your nasal pack is loose.      •You have questions or concerns about your condition or care.      First aid:   •Sit up and lean forward. This will help prevent you from swallowing blood. Spit blood and saliva into a bowl.       •Apply pressure to your nose. Use 2 fingers to pinch your nose shut for 10       •Apply ice on the bridge of your nose to decrease swelling and bleeding. Use a cold pack or put crushed ice in a plastic bag. Cover it with a towel to protect your skin.      •Pack your nose with a cotton ball, tissue, tampon, or gauze bandage to stop the bleeding.      Medicines:   •Medicines applied to a small piece of cotton and placed in your nose. Medicine may also be sprayed in or applied directly to your nose. You may need medicine to prevent an infection. If bleeding is severe, medicine may be injected into a blood vessel in your nose.       •Take your medicine as directed. Contact your healthcare provider if you think your medicine is not helping or if you have side effects. Tell him of her if you are allergic to any medicine. Keep a list of the medicines, vitamins, and herbs you take. Include the amounts, and when and why you take them. Bring the list or the pill bottles to follow-up visits. Carry your medicine list with you in case of an emergency.      Prevent another nosebleed:   •Keep your nose moist. Put a small amount of petroleum jelly inside your nostrils as needed. Use a saline (saltwater) nasal spray. Do not put anything else inside your nose unless your healthcare provider says it is okay. Do not use oil-based lubricants if you use oxygen therapy. They may be flammable.      •Use a cool mist humidifier to increase air moisture in your home. This will help your nose stay moist.       •Do not pick or blow your nose for at least a week. You can irritate or damage your nose if you pick it. Blowing your nose too hard may cause the bleeding to start again. Do not bend over or strain as this can cause the bleeding to start again.      •Avoid irritants such as tobacco smoke or chemical sprays such as .      Follow up with your healthcare provider as directed: Any packing in your nose should be removed within 2 to 3 days. Write down your questions so you remember to ask them during your visits.

## 2022-03-06 NOTE — ED ADULT NURSE NOTE - NSIMPLEMENTINTERV_GEN_ALL_ED
Implemented All Universal Safety Interventions:  [place] to call system. Call bell, personal items and telephone within reach. Instruct patient to call for assistance. Room bathroom lighting operational. Non-slip footwear when patient is off stretcher. Physically safe environment: no spills, clutter or unnecessary equipment. Stretcher in lowest position, wheels locked, appropriate side rails in place. Implemented All Fall with Harm Risk Interventions:  [place] to call system. Call bell, personal items and telephone within reach. Instruct patient to call for assistance. Room bathroom lighting operational. Non-slip footwear when patient is off stretcher. Physically safe environment: no spills, clutter or unnecessary equipment. Stretcher in lowest position, wheels locked, appropriate side rails in place. Provide visual cue, wrist band, yellow gown, etc. Monitor gait and stability. Monitor for mental status changes and reorient to person, place, and time. Review medications for side effects contributing to fall risk. Reinforce activity limits and safety measures with patient and family. Provide visual clues: red socks.

## 2022-03-07 ENCOUNTER — APPOINTMENT (OUTPATIENT)
Dept: OTOLARYNGOLOGY | Facility: CLINIC | Age: 72
End: 2022-03-07
Payer: MEDICARE

## 2022-03-07 VITALS
WEIGHT: 186 LBS | BODY MASS INDEX: 35.12 KG/M2 | HEART RATE: 64 BPM | DIASTOLIC BLOOD PRESSURE: 68 MMHG | SYSTOLIC BLOOD PRESSURE: 130 MMHG | HEIGHT: 61 IN

## 2022-03-07 DIAGNOSIS — J31.0 CHRONIC RHINITIS: ICD-10-CM

## 2022-03-07 PROCEDURE — 99213 OFFICE O/P EST LOW 20 MIN: CPT

## 2022-03-07 NOTE — PHYSICAL EXAM
[de-identified] : scab intact right septum.  no bleeding [Normal] : mucosa is normal [Midline] : trachea located in midline position

## 2022-03-07 NOTE — HISTORY OF PRESENT ILLNESS
[de-identified] : 71 yr old female with recurrent right sided epistaxis, most recently yesterday, she went to the ER at [facility] and it was cauterized.  No bleeding since then.\par was cauterized in the past and packed in an ER once\par was switched from warfarin to eloquis 1/2021\par

## 2022-03-09 ENCOUNTER — FORM ENCOUNTER (OUTPATIENT)
Age: 72
End: 2022-03-09

## 2022-03-10 ENCOUNTER — FORM ENCOUNTER (OUTPATIENT)
Age: 72
End: 2022-03-10

## 2022-03-10 ENCOUNTER — EMERGENCY (EMERGENCY)
Facility: HOSPITAL | Age: 72
LOS: 1 days | Discharge: HOME | End: 2022-03-10
Attending: EMERGENCY MEDICINE | Admitting: EMERGENCY MEDICINE
Payer: MEDICARE

## 2022-03-10 VITALS
WEIGHT: 186.07 LBS | HEIGHT: 61 IN | DIASTOLIC BLOOD PRESSURE: 72 MMHG | RESPIRATION RATE: 16 BRPM | HEART RATE: 68 BPM | OXYGEN SATURATION: 97 % | SYSTOLIC BLOOD PRESSURE: 189 MMHG | TEMPERATURE: 98 F

## 2022-03-10 VITALS
OXYGEN SATURATION: 95 % | RESPIRATION RATE: 16 BRPM | DIASTOLIC BLOOD PRESSURE: 66 MMHG | SYSTOLIC BLOOD PRESSURE: 155 MMHG | HEART RATE: 70 BPM | TEMPERATURE: 98 F

## 2022-03-10 DIAGNOSIS — Z98.89 OTHER SPECIFIED POSTPROCEDURAL STATES: Chronic | ICD-10-CM

## 2022-03-10 PROCEDURE — 99283 EMERGENCY DEPT VISIT LOW MDM: CPT

## 2022-03-10 PROCEDURE — 99282 EMERGENCY DEPT VISIT SF MDM: CPT

## 2022-03-10 NOTE — ED PROVIDER NOTE - CLINICAL SUMMARY MEDICAL DECISION MAKING FREE TEXT BOX
70 y/o F with hx of afib on eliquis, HTN, HLD, hypothyroid, COPD presents with c/o epistaxis from R nostril tonight. Pt states that she was at home eating dinner when she had an episode of spontaneous nose bleed, she felt small "trickle" of blood from her R nostril which immediately stopped after wiping it off as per pt. States that she was nervous about worsening bleeding in the middle of the night and hence came to be evaluated. States that she was seen at [facility] on 3/6, had nasal cautery done, followed up with ENT, Dr. Kelly on 3/7 and was advised that everything looked fine and to use saline nasal spray and humidifier. States that she does have nasal congestion. States that she did not take her dose of hydralazine tonight since she had to come to ED but will take it when she gets home. Denies trauma, dizziness, weakness or other symptoms. PE; as above A/P: Epistaxis resolved, will observe in ED and if no further bleeding, d/c home and f/u ent

## 2022-03-10 NOTE — ED PROVIDER NOTE - OBJECTIVE STATEMENT
72 y/o F with hx of afib on eliquis, HTN, HLD, hypothyroid, COPD presents with c/o epistaxis from R nostril tonight. Pt states that she was at home eating dinner when she had an episode of spontaneous nose bleed, she felt small "trickle" of blood from her R nostril which immediately stopped after wiping it off as per pt. States that she was nervous about worsening bleeding in the middle of the night and hence came to be evaluated. States that she was seen at [facility] ED on 3/6, had nasal cautery done, followed up with ENT, Dr. Kelly on 3/7 and was advised that everything looked fine and to use saline nasal spray and humidifier. States that she does have nasal congestion. States that she did not take her dose of hydralazine tonight since she had to come to ED but will take it when she gets home. Denies trauma, dizziness, headache, weakness or other symptoms.   ENT: Dr. Ramires.

## 2022-03-10 NOTE — ED PROVIDER NOTE - CARE PROVIDER_API CALL
Deniz Ramires)  Otolaryngology  [address], [address]  [place] [address]  Phone: [phone]  Fax: [phone]  Follow Up Time: 1-3 Days

## 2022-03-10 NOTE — ED PROVIDER NOTE - ATTENDING CONTRIBUTION TO CARE
71 female on eliqis, presents with epistaxis, now resolved, was recently in ER for same and saw her ENT on Monday, patient alert and oriented, no acute distress, dried blood in right nares, to observe patient for any rebleeding, otherwise dc home, noted elevated blood pressure, patient states she is due for her htn meds.

## 2022-03-10 NOTE — ED ADULT NURSE NOTE - OBJECTIVE STATEMENT
71 yr old female c/o epistaxis since today. A+O x 4. On eliquis daily. Pt reports that nose bleeding stopped before admission to the ED, but was afraid "it would restart again like last time." Bilateral nares clean and dry. Throat asymptomatic. Pt denies swallowing blood or metallic taste in her mouth. no cough noted. Lungs clear bilaterally. Resp even and unlabored. skin warm dry and intact. Pt denies any pain at this time. will continue to monitor.

## 2022-03-10 NOTE — ED PROVIDER NOTE - PATIENT PORTAL LINK FT
You can access the FollowMyHealth Patient Portal offered by [facility] by registering at the following website: [email or web address]. By joining [facility]’s FollowMyHealth portal, you will also be able to view your health information using other applications (apps) compatible with our system.

## 2022-03-10 NOTE — ED PROVIDER NOTE - ENMT, MLM
Airway patent. Mouth with normal mucosa. Throat has no vesicles, no oropharyngeal exudates and uvula is midline. +cauterized right nasal septal mucosa noted without any active bleeding, L nostril clear, posterior pharynx clear

## 2022-03-10 NOTE — ED PROVIDER NOTE - PROGRESS NOTE DETAILS
Reevaluated patient at bedside.  Patient feeling much improved. No active epistaxis in ED, advised to continue use nasal spray and humidifier and to take her BP meds as directed (will take her night time dose when she gets home). an opportunity to ask questions was given.  Discussed the importance of prompt, close medical follow-up.  Patient will return with any changes, concerns or persistent / worsening symptoms.  Understanding of all instructions verbalized. Reevaluated patient at bedside.  Patient feeling much improved. No active epistaxis in ED, advised to continue use nasal spray and humidifier and to take her BP meds as directed (will take her night time dose when she gets home), f/u ent. an opportunity to ask questions was given.  Discussed the importance of prompt, close medical follow-up.  Patient will return with any changes, concerns or persistent / worsening symptoms.  Understanding of all instructions verbalized.

## 2022-03-10 NOTE — ED ADULT NURSE NOTE - NSIMPLEMENTINTERV_GEN_ALL_ED
Implemented All Universal Safety Interventions:  [place] to call system. Call bell, personal items and telephone within reach. Instruct patient to call for assistance. Room bathroom lighting operational. Non-slip footwear when patient is off stretcher. Physically safe environment: no spills, clutter or unnecessary equipment. Stretcher in lowest position, wheels locked, appropriate side rails in place.

## 2022-03-17 ENCOUNTER — FORM ENCOUNTER (OUTPATIENT)
Age: 72
End: 2022-03-17

## 2022-03-31 ENCOUNTER — FORM ENCOUNTER (OUTPATIENT)
Age: 72
End: 2022-03-31

## 2022-04-06 ENCOUNTER — APPOINTMENT (OUTPATIENT)
Dept: OTOLARYNGOLOGY | Facility: CLINIC | Age: 72
End: 2022-04-06
Payer: MEDICARE

## 2022-04-06 VITALS
DIASTOLIC BLOOD PRESSURE: 92 MMHG | HEIGHT: 61 IN | SYSTOLIC BLOOD PRESSURE: 181 MMHG | HEART RATE: 62 BPM | BODY MASS INDEX: 34.93 KG/M2 | WEIGHT: 185 LBS

## 2022-04-06 PROCEDURE — 99213 OFFICE O/P EST LOW 20 MIN: CPT | Mod: 25

## 2022-04-06 PROCEDURE — 30901 CONTROL OF NOSEBLEED: CPT

## 2022-04-06 NOTE — ASSESSMENT
[FreeTextEntry1] : EPISTAXIS INSTRUCTIONS\par RIGHT SIDE NORMAL SALINE DROPS 3 TO 4 DROPS EVERY 3 TO 4 HOURS WHILE NASOPORE THERE\par MUPIROCIN BID\par F/U 10 DAYS\par ELIQUIS AGGRAVATING FACTOR

## 2022-04-06 NOTE — PHYSICAL EXAM
[de-identified] : RIGHT SIDED BLEEDING AREA ANTERIOR SEPTUM IDENTIFIED AND CAUTERIZED/ NASOPORE APPLIED [Normal] : mucosa is normal [Midline] : trachea located in midline position

## 2022-04-06 NOTE — HISTORY OF PRESENT ILLNESS
[de-identified] : RIGHT SIDED RECURRENT NOSE BLEED FOR A WHILE\par TAKING ELIQUIS\par MEDICAL HX REVIEWED\par LAST TIME BLED TODAY

## 2022-04-07 ENCOUNTER — FORM ENCOUNTER (OUTPATIENT)
Age: 72
End: 2022-04-07

## 2022-04-15 ENCOUNTER — APPOINTMENT (OUTPATIENT)
Dept: OTOLARYNGOLOGY | Facility: CLINIC | Age: 72
End: 2022-04-15
Payer: MEDICARE

## 2022-04-15 VITALS
HEIGHT: 61 IN | BODY MASS INDEX: 34.93 KG/M2 | SYSTOLIC BLOOD PRESSURE: 189 MMHG | HEART RATE: 67 BPM | WEIGHT: 185 LBS | DIASTOLIC BLOOD PRESSURE: 80 MMHG

## 2022-04-15 PROCEDURE — 99213 OFFICE O/P EST LOW 20 MIN: CPT

## 2022-04-15 NOTE — PHYSICAL EXAM
[de-identified] : CAUTERIZED AREA HEALING WELL [Normal] : mucosa is normal [Midline] : trachea located in midline position

## 2022-04-28 ENCOUNTER — FORM ENCOUNTER (OUTPATIENT)
Age: 72
End: 2022-04-28

## 2022-06-10 ENCOUNTER — NON-APPOINTMENT (OUTPATIENT)
Age: 72
End: 2022-06-10

## 2022-06-10 ENCOUNTER — APPOINTMENT (OUTPATIENT)
Dept: CARDIOLOGY | Facility: CLINIC | Age: 72
End: 2022-06-10
Payer: MEDICARE

## 2022-06-10 VITALS
HEIGHT: 61 IN | HEART RATE: 65 BPM | WEIGHT: 193 LBS | DIASTOLIC BLOOD PRESSURE: 80 MMHG | OXYGEN SATURATION: 95 % | BODY MASS INDEX: 36.44 KG/M2 | SYSTOLIC BLOOD PRESSURE: 150 MMHG

## 2022-06-10 DIAGNOSIS — R52 PAIN, UNSPECIFIED: ICD-10-CM

## 2022-06-10 PROCEDURE — 99214 OFFICE O/P EST MOD 30 MIN: CPT

## 2022-06-10 PROCEDURE — 93000 ELECTROCARDIOGRAM COMPLETE: CPT

## 2022-06-10 NOTE — ASSESSMENT
[FreeTextEntry1] : Hx of PAF currently in sinus rhythm  tachy cem syndrome with resolved symptoms  after changing medication ,   avoid negative chronotropic drugs , continue losartan , hydralazine  eliquis   patient scheduled to have  ILR placement  with EP \par \par Hypertension ,elevated  , continue low salt diet and  increase losartan to 50 mg po daily , blood work in 1 month \par \par Cardiac murmur  mild   aortic stenosis ;  continue to monitor \par \par Hyperlipidemia   controlled , with evidence of coronary calcification suggestive coronary atherosclerosis  ( no evidence of ischemia on stress test ) , target LDL <70,  hold  lipitor for 4 weeks and observe for symptom resolution , \par \par Body aches ? arthritis    doubt statin related , recommend to hold lipitor and observe \par \par Obesity prior hx of sleep apnea  , patient was encouraged to loose more weight \par \par COPD pulmonary nodule stable continue current medication , follow up with pulmonary \par \par \par follow up after 3 months \par

## 2022-06-10 NOTE — REVIEW OF SYSTEMS
[Dyspnea on exertion] : dyspnea during exertion [Negative] : Heme/Lymph [Fever] : no fever [Chills] : no chills [Blurry Vision] : no blurred vision [SOB] : no shortness of breath [Chest Discomfort] : no chest discomfort [Leg Claudication] : no intermittent leg claudication [Syncope] : no syncope [Joint Pain] : no joint pain

## 2022-06-10 NOTE — HISTORY OF PRESENT ILLNESS
[FreeTextEntry1] : 70 year old female with hx of morbid obesity  improved sleep apnea with weight loss ,  Monoclonal gammopathy HTN  hyperlipidemia  PAF , bradycardia with normal chronotropic response  ,was evaluated by EP  placed on hydralazine , patients palpitations resolved came for follow up says she is having side effects with lipitor ,multiple joint pains , unable to sleep longer at night , can not lay on left side due to hip pain \par \par  patient denies any chest pain or dizziness ,  her pulsation in ear resolved ( carotid pulsations ) \par \par Patient blood pressure  is elevated as she has white coat component \par \par Patient used to be very obese , did loose some weight , with some improvement in sleep apnea , now she does not use cpap , patient had long standing hx of copd ,  used to use oxygen  NC  now she is fine on bronchodilator treatment ,  now she is not using oxygen \par \par Patient tolerating statin , her blood work showed  improved cholesterol LDL level at target level , \par

## 2022-06-10 NOTE — PHYSICAL EXAM
[Normal Conjunctiva] : normal conjunctiva [Normal Venous Pressure] : normal venous pressure [Carotid Bruit] : carotid bruit [Normal Rate] : normal [Normal S1] : normal S1 [Normal S2] : normal S2 [II] : a grade 2 [No Pitting Edema] : no pitting edema present [Right Carotid Bruit] : right carotid bruit heard [Left Carotid Bruit] : left carotid bruit heard [2+] : left 2+ [No Abnormalities] : the abdominal aorta was not enlarged and no bruit was heard [Clear Lung Fields] : clear lung fields [Good Air Entry] : good air entry [No Respiratory Distress] : no respiratory distress  [Soft] : abdomen soft [Non Tender] : non-tender [Normal Bowel Sounds] : normal bowel sounds [No Edema] : no edema [No Cyanosis] : no cyanosis [No Clubbing] : no clubbing [No Varicosities] : no varicosities [No Rash] : no rash [No Skin Lesions] : no skin lesions [Moves all extremities] : moves all extremities [No Focal Deficits] : no focal deficits [Normal Speech] : normal speech [Alert and Oriented] : alert and oriented [Normal memory] : normal memory [Obese] : obese [Abnormal Gait] : abnormal gait [Normal Radial B/L] : normal radial B/L [S3] : no S3 [S4] : no S4 [Rt] : no varicose veins of the right leg [Lt] : no varicose veins of the left leg [Right Femoral Bruit] : no bruit heard over the right femoral artery [Left Femoral Bruit] : no bruit heard over the left femoral artery [de-identified] : bilateral  [de-identified] : ALYSSA [de-identified] : arthritis

## 2022-06-10 NOTE — CARDIOLOGY SUMMARY
[de-identified] : 6/10/22   sinus bradycardia  [de-identified] : 4/16/19  no ischemia on chemical nuclear stress test  [de-identified] : 1/27/21  [facility]    EF 55% Mild DD MIld AS AI , JADE

## 2022-07-07 RX ORDER — ATORVASTATIN CALCIUM 10 MG/1
10 TABLET, FILM COATED ORAL DAILY
Qty: 90 | Refills: 3 | Status: DISCONTINUED | COMMUNITY
Start: 2021-03-04 | End: 2022-07-07

## 2022-07-21 ENCOUNTER — FORM ENCOUNTER (OUTPATIENT)
Age: 72
End: 2022-07-21

## 2022-08-25 NOTE — ED ADULT NURSE NOTE - NS ED NOTE  TALK SOMEONE YN
[Fatigue] : fatigue [Heartburn] : heartburn [Nocturia] : nocturia [Headache] : headache [Insomnia] : insomnia [Anxiety] : anxiety [Depression] : depression [Fever] : no fever [Chills] : no chills [Hot Flashes] : no hot flashes [Night Sweats] : no night sweats [Recent Change In Weight] : ~T no recent weight change [Discharge] : no discharge [Pain] : no pain [Redness] : no redness [Dryness] : no dryness  No [Vision Problems] : no vision problems [Itching] : no itching [Earache] : no earache [Hearing Loss] : no hearing loss [Nosebleed] : no nosebleeds [Hoarseness] : no hoarseness [Nasal Discharge] : no nasal discharge [Sore Throat] : no sore throat [Postnasal Drip] : no postnasal drip [Chest Pain] : no chest pain [Palpitations] : no palpitations [Leg Claudication] : no leg claudication [Lower Ext Edema] : no lower extremity edema [Orthopnea] : no orthopnea [Paroxysmal Nocturnal Dyspnea] : no paroxysmal nocturnal dyspnea [Shortness Of Breath] : no shortness of breath [Wheezing] : no wheezing [Cough] : no cough [Dyspnea on Exertion] : no dyspnea on exertion [Abdominal Pain] : no abdominal pain [Nausea] : no nausea [Constipation] : no constipation [Diarrhea] : diarrhea [Vomiting] : no vomiting [Melena] : no melena [Dysuria] : no dysuria [Incontinence] : no incontinence [Poor Libido] : libido not poor [Hematuria] : no hematuria [Frequency] : no frequency [Vaginal Discharge] : no vaginal discharge [Dysmenorrhea] : no dysmenorrhea [Joint Pain] : joint pain [Joint Stiffness] : no joint stiffness [Joint Swelling] : no joint swelling [Muscle Weakness] : no muscle weakness [Muscle Pain] : no muscle pain [Back Pain] : no back pain [Itching] : no itching [Mole Changes] : no mole changes [Nail Changes] : no nail changes [Hair Changes] : no hair changes [Skin Rash] : no skin rash [Dizziness] : no dizziness [Fainting] : no fainting [Confusion] : no confusion [Memory Loss] : no memory loss [Unsteady Walking] : no ataxia [Suicidal] : not suicidal [Easy Bleeding] : no easy bleeding [Easy Bruising] : no easy bruising [Swollen Glands] : no swollen glands [FreeTextEntry9] : right hip

## 2022-09-29 ENCOUNTER — APPOINTMENT (OUTPATIENT)
Dept: CARDIOLOGY | Facility: CLINIC | Age: 72
End: 2022-09-29

## 2022-10-14 NOTE — DISCHARGE NOTE NURSING/CASE MANAGEMENT/SOCIAL WORK - NURSING SECTION COMPLETE
BMI Counseling: Body mass index is 29 4 kg/m²  The BMI is above normal  Nutrition recommendations include reducing portion sizes, decreasing overall calorie intake and 3-5 servings of fruits/vegetables daily  Patient/Caregiver provided printed discharge information.

## 2022-10-20 NOTE — H&P ADULT - PROBLEM SELECTOR PLAN 6
Patient came in for new ob and the gestational sac was to small to be measured, spoke with Adali Leon and she advised to draw HCG and Type and Screen.
-Continue home synthroid

## 2022-10-23 ENCOUNTER — FORM ENCOUNTER (OUTPATIENT)
Age: 72
End: 2022-10-23

## 2022-10-27 ENCOUNTER — FORM ENCOUNTER (OUTPATIENT)
Age: 72
End: 2022-10-27

## 2022-10-31 ENCOUNTER — INPATIENT (INPATIENT)
Facility: HOSPITAL | Age: 72
LOS: 1 days | Discharge: HOME | DRG: 379 | End: 2022-11-02
Attending: HOSPITALIST | Admitting: INTERNAL MEDICINE
Payer: MEDICARE

## 2022-10-31 VITALS
DIASTOLIC BLOOD PRESSURE: 84 MMHG | TEMPERATURE: 98 F | HEART RATE: 68 BPM | WEIGHT: 188.94 LBS | SYSTOLIC BLOOD PRESSURE: 204 MMHG | OXYGEN SATURATION: 98 % | RESPIRATION RATE: 16 BRPM | HEIGHT: 61 IN

## 2022-10-31 DIAGNOSIS — J44.9 CHRONIC OBSTRUCTIVE PULMONARY DISEASE, UNSPECIFIED: ICD-10-CM

## 2022-10-31 DIAGNOSIS — E03.9 HYPOTHYROIDISM, UNSPECIFIED: ICD-10-CM

## 2022-10-31 DIAGNOSIS — Z29.9 ENCOUNTER FOR PROPHYLACTIC MEASURES, UNSPECIFIED: ICD-10-CM

## 2022-10-31 DIAGNOSIS — D64.9 ANEMIA, UNSPECIFIED: ICD-10-CM

## 2022-10-31 DIAGNOSIS — K62.5 HEMORRHAGE OF ANUS AND RECTUM: ICD-10-CM

## 2022-10-31 DIAGNOSIS — K92.2 GASTROINTESTINAL HEMORRHAGE, UNSPECIFIED: ICD-10-CM

## 2022-10-31 DIAGNOSIS — E78.5 HYPERLIPIDEMIA, UNSPECIFIED: ICD-10-CM

## 2022-10-31 DIAGNOSIS — I10 ESSENTIAL (PRIMARY) HYPERTENSION: ICD-10-CM

## 2022-10-31 DIAGNOSIS — I48.0 PAROXYSMAL ATRIAL FIBRILLATION: ICD-10-CM

## 2022-10-31 DIAGNOSIS — Z98.89 OTHER SPECIFIED POSTPROCEDURAL STATES: Chronic | ICD-10-CM

## 2022-10-31 LAB
ALBUMIN SERPL ELPH-MCNC: 3.6 G/DL — SIGNIFICANT CHANGE UP (ref 3.3–5)
ALP SERPL-CCNC: 75 U/L — SIGNIFICANT CHANGE UP (ref 40–120)
ALT FLD-CCNC: 33 U/L — SIGNIFICANT CHANGE UP (ref 12–78)
ANION GAP SERPL CALC-SCNC: 6 MMOL/L — SIGNIFICANT CHANGE UP (ref 5–17)
APPEARANCE UR: CLEAR — SIGNIFICANT CHANGE UP
APTT BLD: 41.8 SEC — HIGH (ref 27.5–35.5)
AST SERPL-CCNC: 39 U/L — HIGH (ref 15–37)
BASOPHILS # BLD AUTO: 0.04 K/UL — SIGNIFICANT CHANGE UP (ref 0–0.2)
BASOPHILS NFR BLD AUTO: 0.7 % — SIGNIFICANT CHANGE UP (ref 0–2)
BILIRUB SERPL-MCNC: 0.5 MG/DL — SIGNIFICANT CHANGE UP (ref 0.2–1.2)
BILIRUB UR-MCNC: NEGATIVE — SIGNIFICANT CHANGE UP
BUN SERPL-MCNC: 23 MG/DL — SIGNIFICANT CHANGE UP (ref 7–23)
CALCIUM SERPL-MCNC: 9.3 MG/DL — SIGNIFICANT CHANGE UP (ref 8.5–10.1)
CHLORIDE SERPL-SCNC: 107 MMOL/L — SIGNIFICANT CHANGE UP (ref 96–108)
CO2 SERPL-SCNC: 28 MMOL/L — SIGNIFICANT CHANGE UP (ref 22–31)
COLOR SPEC: SIGNIFICANT CHANGE UP
CREAT SERPL-MCNC: 1.1 MG/DL — SIGNIFICANT CHANGE UP (ref 0.5–1.3)
DIFF PNL FLD: NEGATIVE — SIGNIFICANT CHANGE UP
EGFR: 54 ML/MIN/1.73M2 — LOW
EOSINOPHIL # BLD AUTO: 0.14 K/UL — SIGNIFICANT CHANGE UP (ref 0–0.5)
EOSINOPHIL NFR BLD AUTO: 2.4 % — SIGNIFICANT CHANGE UP (ref 0–6)
EPI CELLS # UR: NEGATIVE — SIGNIFICANT CHANGE UP
GLUCOSE SERPL-MCNC: 83 MG/DL — SIGNIFICANT CHANGE UP (ref 70–99)
GLUCOSE UR QL: NEGATIVE — SIGNIFICANT CHANGE UP
HCT VFR BLD CALC: 35.3 % — SIGNIFICANT CHANGE UP (ref 34.5–45)
HGB BLD-MCNC: 11.1 G/DL — LOW (ref 11.5–15.5)
IMM GRANULOCYTES NFR BLD AUTO: 0.2 % — SIGNIFICANT CHANGE UP (ref 0–0.9)
INR BLD: 1.43 RATIO — HIGH (ref 0.88–1.16)
KETONES UR-MCNC: ABNORMAL
LEUKOCYTE ESTERASE UR-ACNC: NEGATIVE — SIGNIFICANT CHANGE UP
LYMPHOCYTES # BLD AUTO: 1.1 K/UL — SIGNIFICANT CHANGE UP (ref 1–3.3)
LYMPHOCYTES # BLD AUTO: 18.7 % — SIGNIFICANT CHANGE UP (ref 13–44)
MCHC RBC-ENTMCNC: 30.6 PG — SIGNIFICANT CHANGE UP (ref 27–34)
MCHC RBC-ENTMCNC: 31.4 GM/DL — LOW (ref 32–36)
MCV RBC AUTO: 97.2 FL — SIGNIFICANT CHANGE UP (ref 80–100)
MONOCYTES # BLD AUTO: 0.36 K/UL — SIGNIFICANT CHANGE UP (ref 0–0.9)
MONOCYTES NFR BLD AUTO: 6.1 % — SIGNIFICANT CHANGE UP (ref 2–14)
NEUTROPHILS # BLD AUTO: 4.23 K/UL — SIGNIFICANT CHANGE UP (ref 1.8–7.4)
NEUTROPHILS NFR BLD AUTO: 71.9 % — SIGNIFICANT CHANGE UP (ref 43–77)
NITRITE UR-MCNC: NEGATIVE — SIGNIFICANT CHANGE UP
NRBC # BLD: 0 /100 WBCS — SIGNIFICANT CHANGE UP (ref 0–0)
OB PNL STL: POSITIVE
PH UR: 6.5 — SIGNIFICANT CHANGE UP (ref 5–8)
PLATELET # BLD AUTO: 172 K/UL — SIGNIFICANT CHANGE UP (ref 150–400)
POTASSIUM SERPL-MCNC: 4.3 MMOL/L — SIGNIFICANT CHANGE UP (ref 3.5–5.3)
POTASSIUM SERPL-SCNC: 4.3 MMOL/L — SIGNIFICANT CHANGE UP (ref 3.5–5.3)
PROT SERPL-MCNC: 6.9 G/DL — SIGNIFICANT CHANGE UP (ref 6–8.3)
PROT UR-MCNC: 15
PROTHROM AB SERPL-ACNC: 16.8 SEC — HIGH (ref 10.5–13.4)
RBC # BLD: 3.63 M/UL — LOW (ref 3.8–5.2)
RBC # FLD: 14 % — SIGNIFICANT CHANGE UP (ref 10.3–14.5)
RBC CASTS # UR COMP ASSIST: SIGNIFICANT CHANGE UP /HPF (ref 0–4)
SARS-COV-2 RNA SPEC QL NAA+PROBE: SIGNIFICANT CHANGE UP
SODIUM SERPL-SCNC: 141 MMOL/L — SIGNIFICANT CHANGE UP (ref 135–145)
SP GR SPEC: 1 — LOW (ref 1.01–1.02)
UROBILINOGEN FLD QL: NEGATIVE — SIGNIFICANT CHANGE UP
WBC # BLD: 5.88 K/UL — SIGNIFICANT CHANGE UP (ref 3.8–10.5)
WBC # FLD AUTO: 5.88 K/UL — SIGNIFICANT CHANGE UP (ref 3.8–10.5)
WBC UR QL: SIGNIFICANT CHANGE UP

## 2022-10-31 PROCEDURE — 99285 EMERGENCY DEPT VISIT HI MDM: CPT

## 2022-10-31 PROCEDURE — 93010 ELECTROCARDIOGRAM REPORT: CPT

## 2022-10-31 PROCEDURE — 99223 1ST HOSP IP/OBS HIGH 75: CPT | Mod: GC

## 2022-10-31 PROCEDURE — 74176 CT ABD & PELVIS W/O CONTRAST: CPT | Mod: 26,MA

## 2022-10-31 RX ORDER — ACETAMINOPHEN 500 MG
650 TABLET ORAL EVERY 6 HOURS
Refills: 0 | Status: DISCONTINUED | OUTPATIENT
Start: 2022-10-31 | End: 2022-11-02

## 2022-10-31 RX ORDER — PREGABALIN 225 MG/1
1000 CAPSULE ORAL DAILY
Refills: 0 | Status: DISCONTINUED | OUTPATIENT
Start: 2022-10-31 | End: 2022-11-02

## 2022-10-31 RX ORDER — LOSARTAN POTASSIUM 100 MG/1
50 TABLET, FILM COATED ORAL DAILY
Refills: 0 | Status: DISCONTINUED | OUTPATIENT
Start: 2022-10-31 | End: 2022-11-02

## 2022-10-31 RX ORDER — INFLUENZA VIRUS VACCINE 15; 15; 15; 15 UG/.5ML; UG/.5ML; UG/.5ML; UG/.5ML
0.7 SUSPENSION INTRAMUSCULAR ONCE
Refills: 0 | Status: DISCONTINUED | OUTPATIENT
Start: 2022-10-31 | End: 2022-11-02

## 2022-10-31 RX ORDER — CHOLECALCIFEROL (VITAMIN D3) 125 MCG
400 CAPSULE ORAL DAILY
Refills: 0 | Status: DISCONTINUED | OUTPATIENT
Start: 2022-10-31 | End: 2022-11-02

## 2022-10-31 RX ORDER — LANOLIN ALCOHOL/MO/W.PET/CERES
3 CREAM (GRAM) TOPICAL AT BEDTIME
Refills: 0 | Status: DISCONTINUED | OUTPATIENT
Start: 2022-10-31 | End: 2022-11-02

## 2022-10-31 RX ORDER — TIOTROPIUM BROMIDE 18 UG/1
1 CAPSULE ORAL; RESPIRATORY (INHALATION) DAILY
Refills: 0 | Status: DISCONTINUED | OUTPATIENT
Start: 2022-10-31 | End: 2022-11-02

## 2022-10-31 RX ORDER — ONDANSETRON 8 MG/1
4 TABLET, FILM COATED ORAL EVERY 8 HOURS
Refills: 0 | Status: DISCONTINUED | OUTPATIENT
Start: 2022-10-31 | End: 2022-11-02

## 2022-10-31 RX ORDER — LEVOTHYROXINE SODIUM 125 MCG
25 TABLET ORAL DAILY
Refills: 0 | Status: DISCONTINUED | OUTPATIENT
Start: 2022-10-31 | End: 2022-11-02

## 2022-10-31 RX ORDER — BUDESONIDE AND FORMOTEROL FUMARATE DIHYDRATE 160; 4.5 UG/1; UG/1
2 AEROSOL RESPIRATORY (INHALATION)
Refills: 0 | Status: DISCONTINUED | OUTPATIENT
Start: 2022-10-31 | End: 2022-11-02

## 2022-10-31 RX ORDER — PANTOPRAZOLE SODIUM 20 MG/1
40 TABLET, DELAYED RELEASE ORAL
Refills: 0 | Status: DISCONTINUED | OUTPATIENT
Start: 2022-10-31 | End: 2022-10-31

## 2022-10-31 RX ORDER — SODIUM CHLORIDE 9 MG/ML
1000 INJECTION INTRAMUSCULAR; INTRAVENOUS; SUBCUTANEOUS
Refills: 0 | Status: DISCONTINUED | OUTPATIENT
Start: 2022-10-31 | End: 2022-11-02

## 2022-10-31 RX ORDER — ASCORBIC ACID 60 MG
500 TABLET,CHEWABLE ORAL DAILY
Refills: 0 | Status: DISCONTINUED | OUTPATIENT
Start: 2022-10-31 | End: 2022-11-02

## 2022-10-31 RX ORDER — HYDRALAZINE HCL 50 MG
50 TABLET ORAL THREE TIMES A DAY
Refills: 0 | Status: DISCONTINUED | OUTPATIENT
Start: 2022-10-31 | End: 2022-11-02

## 2022-10-31 RX ADMIN — SODIUM CHLORIDE 60 MILLILITER(S): 9 INJECTION INTRAMUSCULAR; INTRAVENOUS; SUBCUTANEOUS at 18:10

## 2022-10-31 RX ADMIN — Medication 1 DROP(S): at 21:58

## 2022-10-31 RX ADMIN — SODIUM CHLORIDE 60 MILLILITER(S): 9 INJECTION INTRAMUSCULAR; INTRAVENOUS; SUBCUTANEOUS at 19:51

## 2022-10-31 RX ADMIN — PREGABALIN 1000 MICROGRAM(S): 225 CAPSULE ORAL at 21:58

## 2022-10-31 RX ADMIN — Medication 50 MILLIGRAM(S): at 21:58

## 2022-10-31 RX ADMIN — BUDESONIDE AND FORMOTEROL FUMARATE DIHYDRATE 2 PUFF(S): 160; 4.5 AEROSOL RESPIRATORY (INHALATION) at 19:50

## 2022-10-31 NOTE — H&P ADULT - NSHPSOCIALHISTORY_GEN_ALL_CORE
Living Situation: lives at home alone  ADLs/Mobility: independent ambulation and ADLs  Tobacco Use: previous 30 pack year smoker, quit >15 year sago  Alcohol Use: denies  Drug Use: denies  Vaccination: covid vaccinated x3 pfizer x1 booster moderna

## 2022-10-31 NOTE — ED ADULT NURSE NOTE - NSIMPLEMENTINTERV_GEN_ALL_ED
Implemented All Universal Safety Interventions:  [place] to call system. Call bell, personal items and telephone within reach. Instruct patient to call for assistance. Room bathroom lighting operational. Non-slip footwear when patient is off stretcher. Physically safe environment: no spills, clutter or unnecessary equipment. Stretcher in lowest position, wheels locked, appropriate side rails in place.

## 2022-10-31 NOTE — H&P ADULT - ATTENDING COMMENTS
71y Female with PMHx of paroxysmal AFib on Eliquis, HTN, HLD, COPD, hypothyroidism, psoriasis presenting with bright red bloody stools being admitted for likely diverticular bleed    GI consulted, plan for colonoscopy this admission, clear liquid diet, IVF NS, monitor Hb closely, at this time no indication for transfusion.  Hold eliquis, cards consult.    Iggy [place], Attending Physician

## 2022-10-31 NOTE — H&P ADULT - PROBLEM SELECTOR PLAN 1
Patient presents with bright red blood stools likely 2/2 diverticular bleed  - Hg 11.1 on admission, baseline hemoglobin ~11  - CTAP showed extensive colonic diverticulosis without diverticulitis. Hiatal hernia. Subtle hypodense hepatic lesions unchanged from 2019.  - FOBT+  - NPO (except meds)  - IVF 75cc/hr maintenance fluids  - Protonix 40 mg IVP BID  - SCDs, hold home Eliquis for now   - Currently hemodynamically stable, monitor for signs and symptoms   - Consider transfusion for hemoglobin <7   - Trend CBC  - F/u vitamin B12, folate, iron panel: iron, ferritin, TIBC, transferrin  - GI (Dr. Low) consulted, f/u recs Patient presents with bright red blood stools likely 2/2 diverticular bleed  - Hg 11.1 on admission, baseline hemoglobin ~11  - CTAP showed extensive colonic diverticulosis without diverticulitis. Hiatal hernia. Subtle hypodense hepatic lesions unchanged from 2019.  - FOBT+  - Clear liquid diet  - IVF 60cc/hr maintenance fluids  - SCDs, hold home Eliquis for now   - Currently hemodynamically stable, monitor for signs and symptoms   - Consider transfusion for hemoglobin <7   - Trend CBC  - F/u vitamin B12, folate, iron panel: iron, ferritin, TIBC, transferrin  - GI (Dr. Low) consulted, f/u recs Patient presents with bright red blood stools likely 2/2 diverticular bleed  - Hg 11.1 on admission, baseline hemoglobin ~11  - CTAP showed extensive colonic diverticulosis without diverticulitis. Hiatal hernia. Subtle hypodense hepatic lesions unchanged from 2019.  - FOBT+  - Clear liquid diet  - IVF 60cc/hr maintenance fluids  - SCDs, hold home Eliquis for now   - Currently hemodynamically stable, monitor for signs and symptoms   - Consider transfusion for hemoglobin <7   - Trend CBC  - F/u vitamin B12, folate, iron panel: iron, ferritin, TIBC, transferrin  - Pending possible colonoscopy Wednesday as per GI  - GI (Dr. Low) consulted, f/u recs  - Cardioloy (Dr. Araujo's group) consulted for medical optimization, f/u recs

## 2022-10-31 NOTE — H&P ADULT - PROBLEM SELECTOR PLAN 4
Chronic, stable on admission  - Continue home hydralazine 50mg TID  - Continue home losartan 50mg qd  - Monitor routine hemodynamics

## 2022-10-31 NOTE — PATIENT PROFILE ADULT - LIVING ENVIRONMENT
Elevated tumor markers Empyema Empyema Empyema Plasma cell dyscrasia Empyema Empyema Empyema Empyema no

## 2022-10-31 NOTE — ED PROVIDER NOTE - CLINICAL SUMMARY MEDICAL DECISION MAKING FREE TEXT BOX
Patient is a 71-year-old female who has a history of COPD A. fib on Eliquis hypertension and hyperlipidemia.  She presents emergency room today with sudden onset of urge to defecate passing large bright red blood per rectum for 2 episodes.  She also has mild abdominal pain.  She never had bleeding like this before.  She has no history of abdominal surgeries.  She has no recent illness or injury.  She is a remote former smoker.    On evaluation is a well-developed well-nourished female no apparent distress.  HEENT is normal without GFR.  Neck is supple.  Mucous members are moist without pallor.  Cardiac exam is essentially normal, lung exam patient has end expiratory wheeze without respiratory distress.  Abdominal exam patient has been mild nonspecific abdominal discomfort to palpation in the upper abdomen, without guarding or rebound.  No CVA tenderness.  Musculoskeletal exam patient has nonpitting bilateral lower extremity edema.  Skin exam patient has mild petechial bruising on her arms.  No rashes.  Neurologic exam is normal.    Plan 71-year-old female on Eliquis with rectal bleeding without abdominal pain.  CAT scan rule out colitis or diverticulitis.  Consider bleeding scan.  Consult GI.  Type and screen plan for admission, coag COVID testing.

## 2022-10-31 NOTE — PATIENT PROFILE ADULT - FALL HARM RISK - UNIVERSAL INTERVENTIONS
Bed in lowest position, wheels locked, appropriate side rails in place/Call bell, personal items and telephone in reach/Instruct patient to call for assistance before getting out of bed or chair/Non-slip footwear when patient is out of bed/[place] to call system/Physically safe environment - no spills, clutter or unnecessary equipment/Purposeful Proactive Rounding/Room/bathroom lighting operational, light cord in reach

## 2022-10-31 NOTE — CONSULT NOTE ADULT - ASSESSMENT
Anemia  Rectal bleed    CT noted, d/w patient  Monitor cbc, transfuse prn  Hold a/c  Suspect diverticular bleed  Ok to have clear liquids  Monitor for further overt bleeding  Will follow    I reviewed the overnight course of events on the unit, re-confirming the patient history. I discussed the care with the patient and their family  Differential diagnosis and plan of care discussed with patient after the evaluation  35 minutes spent on total encounter of which more than fifty percent of the encounter was spent counseling and/or coordinating care by the attending physician.  Advanced care planning was discussed with patient and family.  Advanced care planning forms were reviewed and discussed.  Risks, benefits and alternatives of gastroenterologic procedures were discussed in detail and all questions were answered. Anemia  Rectal bleed    CT noted, d/w patient  Monitor cbc, transfuse prn  Hold a/c  Suspect diverticular bleed  Ok to have clear liquids  Monitor for further overt bleeding  Will need colonoscopy this week, likely Wednesday  Will need cardiac clearance  Will follow    I reviewed the overnight course of events on the unit, re-confirming the patient history. I discussed the care with the patient and their family  Differential diagnosis and plan of care discussed with patient after the evaluation  35 minutes spent on total encounter of which more than fifty percent of the encounter was spent counseling and/or coordinating care by the attending physician.  Advanced care planning was discussed with patient and family.  Advanced care planning forms were reviewed and discussed.  Risks, benefits and alternatives of gastroenterologic procedures were discussed in detail and all questions were answered.

## 2022-10-31 NOTE — H&P ADULT - ASSESSMENT
71y Female with PMHx of paroxysmal AFib on Eliquis, HTN, HLD, COPD, hypothyroidism, psoriasis presenting with bright red bloody stools being admitted for likely diverticular bleed.

## 2022-10-31 NOTE — H&P ADULT - PROBLEM SELECTOR PLAN 3
Chronic COPD  - Currently asymptomatic, satting 94-97% on RA  - Continue home Advair therapeutic interchange   - Continue home Spiriva  - Monitor respiratory status

## 2022-10-31 NOTE — ED PROVIDER NOTE - OBJECTIVE STATEMENT
72 yo F PMHx Afib on Eliquis, HTN, HLD, COPD presents to ED c/o BRBPR x this AM. states toilet was filled with blood after BM this morning. later again stool mixed with BRB. pt reports some minimal abd discomfort this AM, resolved. Pt asymptomatic. Denies HA, dizziness, palpitations, SOB, abd pain, N/V/D, fever/chills, urinary symptoms

## 2022-10-31 NOTE — ED PROVIDER NOTE - NSCAREINITIATED _GEN_ER
Call to make a follow-up appointment with [facility] or at [facility] for further evaluation of IUD.  I would continue with ibuprofen and Tylenol as needed for pain control.  However would recommend that you were seen in the next few days.  Return with new or worsening symptoms of worsening abdominal pain, high fever, persistent vomiting.  
Xiang Nevarez)

## 2022-10-31 NOTE — ED ADULT NURSE NOTE - OBJECTIVE STATEMENT
Presents to ER with rectal bleeding since this morning (2 episodes total).  Denies any bloody vomit, nausea, dizziness, or abd pain.  Has been on Eliquis since 2021.

## 2022-10-31 NOTE — CONSULT NOTE ADULT - SUBJECTIVE AND OBJECTIVE BOX
[facility]  Smith Comer PA-C  [address] [place] [id number] [phone]      Chief Complaint:  Patient is a 71y old  Female who presents with a chief complaint of rectal bleed    HPI:  72 yo F PMHx Afib on Eliquis, HTN, HLD, COPD presents to ED c/o BRBPR x this AM. states toilet was filled with blood after BM this morning. later again stool mixed with BRB. pt reports some minimal abd discomfort this AM, resolved. Pt asymptomatic. Denies HA, dizziness, palpitations, SOB, abd pain, N/V/D, fever/chills, urinary symptoms    INTERVAL HPI:  Pt seen in emergency department  She reports same history as above  States this is the first time she's had rectal bleeding and she has never had colonoscopy before  Last bloody BM was about 9am this am  Denies further GI complaints    Allergies:  artichokes (Unknown)  IV Contrast (Unknown)  Levaquin (Anaphylaxis)  sulfa drugs (Unknown)  Sulfur (Unknown)      Medications:  acetaminophen     Tablet .. 650 milliGRAM(s) Oral every 6 hours PRN  artificial  tears Solution 1 Drop(s) Both EYES three times a day  ascorbic acid 500 milliGRAM(s) Oral daily  budesonide 160 MICROgram(s)/formoterol 4.5 MICROgram(s) Inhaler 2 Puff(s) Inhalation two times a day  cholecalciferol 400 Unit(s) Oral daily  cyanocobalamin 1000 MICROGram(s) Oral daily  hydrALAZINE 50 milliGRAM(s) Oral three times a day  levothyroxine 25 MICROGram(s) Oral daily  losartan 50 milliGRAM(s) Oral daily  melatonin 3 milliGRAM(s) Oral at bedtime PRN  ondansetron Injectable 4 milliGRAM(s) IV Push every 8 hours PRN  pantoprazole  Injectable 40 milliGRAM(s) IV Push two times a day  tiotropium 18 MICROgram(s) Capsule 1 Capsule(s) Inhalation daily      PMHX/PSHX:  COPD (chronic obstructive pulmonary disease)    JUAN CARLOS (obstructive sleep apnea)    HTN (hypertension)    Hypothyroid    COPD (chronic obstructive pulmonary disease)    Hypothyroid    HTN (hypertension)    Hyperlipidemia    Edema extremities    Afib    Psoriasis    HTN (hypertension)    Hypothyroid    No significant past surgical history    S/P eye surgery        Family history:  FH: hypertension      ROS:   General:  No wt loss, fevers, chills, night sweats, fatigue,   Eyes:  Good vision, no reported pain  ENT:  No sore throat, pain, runny nose, dysphagia  CV:  No pain, palpitations, hypo/hypertension  Resp:  No dyspnea, cough, tachypnea, wheezing  GI:  No pain, No nausea, No vomiting, No diarrhea, No constipation, No weight loss, No fever, No pruritis, +rectal bleeding, No tarry stools, No dysphagia  :  No pain, bleeding, incontinence, nocturia  Muscle:  No pain, weakness  Neuro:  No weakness, tingling, memory problems  Psych:  No fatigue, insomnia, mood problems, depression  Endocrine:  No polyuria, polydipsia, cold/heat intolerance  Heme:  No petechiae, ecchymosis, easy bruisability  Skin:  No rash, tattoos, scars, edema      PHYSICAL EXAM:   Vital Signs:  Vital Signs Last 24 Hrs  T(C): 36.7 (31 Oct 2022 14:32), Max: 36.8 (31 Oct 2022 09:54)  T(F): 98 (31 Oct 2022 14:32), Max: 98.3 (31 Oct 2022 09:54)  HR: 76 (31 Oct 2022 14:32) (68 - 76)  BP: 159/76 (31 Oct 2022 14:32) (159/76 - 204/84)  BP(mean): --  RR: 17 (31 Oct 2022 14:32) (16 - 17)  SpO2: 94% (31 Oct 2022 14:32) (94% - 98%)    Parameters below as of 31 Oct 2022 14:32  Patient On (Oxygen Delivery Method): room air      Daily Height in cm: 154.94 (31 Oct 2022 09:54)    Daily     GENERAL:  Appears stated age  ABDOMEN:  Soft, non-tender, non-distended  NEURO:  Alert    LABS:                        11.1   5.88  )-----------( 172      ( 31 Oct 2022 10:15 )             35.3     10-31    141  |  107  |  23  ----------------------------<  83  4.3   |  28  |  1.10    Ca    9.3      31 Oct 2022 10:15    TPro  6.9  /  Alb  3.6  /  TBili  0.5  /  DBili  x   /  AST  39<H>  /  ALT  33  /  AlkPhos  75  10-31    LIVER FUNCTIONS - ( 31 Oct 2022 10:15 )  Alb: 3.6 g/dL / Pro: 6.9 g/dL / ALK PHOS: 75 U/L / ALT: 33 U/L / AST: 39 U/L / GGT: x           PT/INR - ( 31 Oct 2022 10:15 )   PT: 16.8 sec;   INR: 1.43 ratio         PTT - ( 31 Oct 2022 10:15 )  PTT:41.8 sec        Imaging:    < from: CT Abdomen and Pelvis No Cont (10.31.22 @ 12:23) >    ACC: [id number] EXAM:  CT ABDOMEN AND PELVIS                          PROCEDURE DATE:  10/31/2022          INTERPRETATION:  CLINICAL INFORMATION: Rectal bleeding    COMPARISON: 3/19/2020    CONTRAST/COMPLICATIONS:  IV Contrast: NONE  Oral Contrast: NONE  Complications: None reported at time of study completion    PROCEDURE:  CT of the Abdomen and Pelvis was performed.  Sagittal and coronal reformats were performed.    FINDINGS:  LOWER CHEST: Chronic changes in lung bases. Trace pericardial effusion.   Coronary vascular calcification. Hiatal hernia.    LIVER: Subtle hypodense lesions in the right hepatic dome which in   retrospect appear unchanged as compared to 3/19/2020 however limitedly   evaluated in this noncontrast study.  BILE DUCTS: Normal caliber.  GALLBLADDER: Within normal limits.  SPLEEN: Within normal limits.  PANCREAS: Within normal limits.  ADRENALS: Within normal limits.  KIDNEYS/URETERS: No hydronephrosis bilaterally. No intrarenal calculus or   renal masses. The ureters are unremarkable.    BLADDER: Within normal limits.  REPRODUCTIVE ORGANS: Posterior calcified fibroid. No adnexal masses. No   pelvic adenopathy or pelvic free fluid.    BOWEL: No bowel obstruction. Appendix is normal. Extensive colonic   diverticulosis without diverticulitis.  PERITONEUM: No ascites.  VESSELS: Atherosclerotic changes.  RETROPERITONEUM/LYMPH NODES: No lymphadenopathy.  ABDOMINAL WALL: Within normal limits.  BONES: Degenerative changes.    IMPRESSION:  No bowel obstruction or gross bowel wall thickening. Colonic   diverticulosis without diverticulitis. Normal appendix.    --- End of Report ---      ASHA MARISCAL MD; Attending Radiologist  This document has been electronically signed. Oct 31 2022 12:43PM    < end of copied text >

## 2022-10-31 NOTE — H&P ADULT - PROBLEM SELECTOR PLAN 5
Chronic  - Was previously on atorvastatin but pt discussed with her PCP and has been off statins for a bit, wanted to try using red yeast rice

## 2022-10-31 NOTE — H&P ADULT - NSHPREVIEWOFSYSTEMS_GEN_ALL_CORE
CONSTITUTIONAL: Denies weakness, fevers, or chills. Denies headaches. Denies dizziness or lightheadedness.  EYES/ENT: Denies vertigo. Denies visual changes. Denies hearing changes.  NECK: Denies pain or stiffness.  RESPIRATORY: Denies cough, wheezing. Denies shortness of breath.  CARDIOVASCULAR: Denies chest pain or palpitations.  GASTROINTESTINAL: Denies abdominal pain. Denies nausea, vomiting. Denies diarrhea or constipation. +Bright red blood mixed with formed stools x2  GENITOURINARY: Denies dysuria, frequency, or hematuria.  EXTREMITIES: Denies any pain. +B/l LE swelling  NEUROLOGICAL: Denies numbness. Denies any focal weakness. Denies any episode of syncope.  SKIN: Denies rashes, bruising.

## 2022-10-31 NOTE — H&P ADULT - HISTORY OF PRESENT ILLNESS
The patient is a 71y Female with PMHx of paroxysmal AFib on Eliquis, HTN, HLD, COPD, hypothyroidism, psoriasis presenting with bright red bloody stools. Patient stated that this morning after breakfast of cheerio and water, she had a bowel movement that had bright red blood mixed with brown formed stools. She again had another similar bloody bowel movement a couple hours later so she came to the ED for evaluation. No associated abdominal or rectal pain. Last night, she ate some turkey cutlets with rice, salad, and kidney beans. This has never happened to her before. Never had a previous EGD or colonoscopy. Did take her Eliquis this AM. Denies any fevers, chills, chest pain, sob, n/v/c/d, numbness, dizziness, or lightheadedness.    In the ED,  VS: Temp 98F, HR 76, /76. RR 17. SpO2 94% on RA  Labs: Hg 11.1, PT 16.8, INR 1.43, PTT 4.18, +FOBT  Imaging: CTAP showed extensive colonic diverticulosis without diverticulitis. Hiatal hernia. Subtle hypodense hepatic lesions unchanged from 2019.  EKG: NSR 75bpm  Received:

## 2022-11-01 ENCOUNTER — TRANSCRIPTION ENCOUNTER (OUTPATIENT)
Age: 72
End: 2022-11-01

## 2022-11-01 LAB
ALBUMIN SERPL ELPH-MCNC: 3.2 G/DL — LOW (ref 3.3–5)
ALP SERPL-CCNC: 66 U/L — SIGNIFICANT CHANGE UP (ref 40–120)
ALT FLD-CCNC: 28 U/L — SIGNIFICANT CHANGE UP (ref 12–78)
ANION GAP SERPL CALC-SCNC: 5 MMOL/L — SIGNIFICANT CHANGE UP (ref 5–17)
AST SERPL-CCNC: 31 U/L — SIGNIFICANT CHANGE UP (ref 15–37)
BASOPHILS # BLD AUTO: 0.04 K/UL — SIGNIFICANT CHANGE UP (ref 0–0.2)
BASOPHILS NFR BLD AUTO: 0.8 % — SIGNIFICANT CHANGE UP (ref 0–2)
BILIRUB SERPL-MCNC: 0.6 MG/DL — SIGNIFICANT CHANGE UP (ref 0.2–1.2)
BUN SERPL-MCNC: 18 MG/DL — SIGNIFICANT CHANGE UP (ref 7–23)
CALCIUM SERPL-MCNC: 9.1 MG/DL — SIGNIFICANT CHANGE UP (ref 8.5–10.1)
CHLORIDE SERPL-SCNC: 112 MMOL/L — HIGH (ref 96–108)
CO2 SERPL-SCNC: 27 MMOL/L — SIGNIFICANT CHANGE UP (ref 22–31)
CREAT SERPL-MCNC: 0.87 MG/DL — SIGNIFICANT CHANGE UP (ref 0.5–1.3)
EGFR: 71 ML/MIN/1.73M2 — SIGNIFICANT CHANGE UP
EOSINOPHIL # BLD AUTO: 0.15 K/UL — SIGNIFICANT CHANGE UP (ref 0–0.5)
EOSINOPHIL NFR BLD AUTO: 2.8 % — SIGNIFICANT CHANGE UP (ref 0–6)
FERRITIN SERPL-MCNC: 55 NG/ML — SIGNIFICANT CHANGE UP (ref 15–150)
FOLATE SERPL-MCNC: >20 NG/ML — SIGNIFICANT CHANGE UP
GLUCOSE SERPL-MCNC: 80 MG/DL — SIGNIFICANT CHANGE UP (ref 70–99)
HCT VFR BLD CALC: 32.5 % — LOW (ref 34.5–45)
HGB BLD-MCNC: 10.3 G/DL — LOW (ref 11.5–15.5)
IMM GRANULOCYTES NFR BLD AUTO: 0.2 % — SIGNIFICANT CHANGE UP (ref 0–0.9)
IRON SATN MFR SERPL: 19 % — SIGNIFICANT CHANGE UP (ref 14–50)
IRON SATN MFR SERPL: 63 UG/DL — SIGNIFICANT CHANGE UP (ref 30–160)
LYMPHOCYTES # BLD AUTO: 1.11 K/UL — SIGNIFICANT CHANGE UP (ref 1–3.3)
LYMPHOCYTES # BLD AUTO: 20.9 % — SIGNIFICANT CHANGE UP (ref 13–44)
MCHC RBC-ENTMCNC: 30.7 PG — SIGNIFICANT CHANGE UP (ref 27–34)
MCHC RBC-ENTMCNC: 31.7 GM/DL — LOW (ref 32–36)
MCV RBC AUTO: 96.7 FL — SIGNIFICANT CHANGE UP (ref 80–100)
MONOCYTES # BLD AUTO: 0.36 K/UL — SIGNIFICANT CHANGE UP (ref 0–0.9)
MONOCYTES NFR BLD AUTO: 6.8 % — SIGNIFICANT CHANGE UP (ref 2–14)
NEUTROPHILS # BLD AUTO: 3.64 K/UL — SIGNIFICANT CHANGE UP (ref 1.8–7.4)
NEUTROPHILS NFR BLD AUTO: 68.5 % — SIGNIFICANT CHANGE UP (ref 43–77)
NRBC # BLD: 0 /100 WBCS — SIGNIFICANT CHANGE UP (ref 0–0)
PLATELET # BLD AUTO: 162 K/UL — SIGNIFICANT CHANGE UP (ref 150–400)
POTASSIUM SERPL-MCNC: 4 MMOL/L — SIGNIFICANT CHANGE UP (ref 3.5–5.3)
POTASSIUM SERPL-SCNC: 4 MMOL/L — SIGNIFICANT CHANGE UP (ref 3.5–5.3)
PROT SERPL-MCNC: 6 G/DL — SIGNIFICANT CHANGE UP (ref 6–8.3)
RBC # BLD: 3.36 M/UL — LOW (ref 3.8–5.2)
RBC # FLD: 13.7 % — SIGNIFICANT CHANGE UP (ref 10.3–14.5)
SODIUM SERPL-SCNC: 144 MMOL/L — SIGNIFICANT CHANGE UP (ref 135–145)
TIBC SERPL-MCNC: 329 UG/DL — SIGNIFICANT CHANGE UP (ref 220–430)
TRANSFERRIN SERPL-MCNC: 270 MG/DL — SIGNIFICANT CHANGE UP (ref 200–360)
UIBC SERPL-MCNC: 265 UG/DL — SIGNIFICANT CHANGE UP (ref 110–370)
VIT B12 SERPL-MCNC: 1085 PG/ML — SIGNIFICANT CHANGE UP (ref 232–1245)
WBC # BLD: 5.31 K/UL — SIGNIFICANT CHANGE UP (ref 3.8–10.5)
WBC # FLD AUTO: 5.31 K/UL — SIGNIFICANT CHANGE UP (ref 3.8–10.5)

## 2022-11-01 PROCEDURE — 99222 1ST HOSP IP/OBS MODERATE 55: CPT

## 2022-11-01 PROCEDURE — 99233 SBSQ HOSP IP/OBS HIGH 50: CPT

## 2022-11-01 RX ORDER — SOD SULF/SODIUM/NAHCO3/KCL/PEG
1000 SOLUTION, RECONSTITUTED, ORAL ORAL EVERY 4 HOURS
Refills: 0 | Status: COMPLETED | OUTPATIENT
Start: 2022-11-01 | End: 2022-11-01

## 2022-11-01 RX ADMIN — Medication 1000 MILLILITER(S): at 18:24

## 2022-11-01 RX ADMIN — Medication 500 MILLIGRAM(S): at 14:00

## 2022-11-01 RX ADMIN — Medication 25 MICROGRAM(S): at 05:52

## 2022-11-01 RX ADMIN — Medication 50 MILLIGRAM(S): at 05:53

## 2022-11-01 RX ADMIN — Medication 1 DROP(S): at 13:59

## 2022-11-01 RX ADMIN — BUDESONIDE AND FORMOTEROL FUMARATE DIHYDRATE 2 PUFF(S): 160; 4.5 AEROSOL RESPIRATORY (INHALATION) at 21:09

## 2022-11-01 RX ADMIN — PREGABALIN 1000 MICROGRAM(S): 225 CAPSULE ORAL at 13:59

## 2022-11-01 RX ADMIN — Medication 50 MILLIGRAM(S): at 13:59

## 2022-11-01 RX ADMIN — Medication 1 DROP(S): at 05:53

## 2022-11-01 RX ADMIN — TIOTROPIUM BROMIDE 1 CAPSULE(S): 18 CAPSULE ORAL; RESPIRATORY (INHALATION) at 05:53

## 2022-11-01 RX ADMIN — Medication 50 MILLIGRAM(S): at 21:10

## 2022-11-01 RX ADMIN — Medication 1 DROP(S): at 21:10

## 2022-11-01 RX ADMIN — Medication 10 MILLIGRAM(S): at 16:26

## 2022-11-01 RX ADMIN — Medication 400 UNIT(S): at 13:59

## 2022-11-01 RX ADMIN — BUDESONIDE AND FORMOTEROL FUMARATE DIHYDRATE 2 PUFF(S): 160; 4.5 AEROSOL RESPIRATORY (INHALATION) at 05:53

## 2022-11-01 RX ADMIN — LOSARTAN POTASSIUM 50 MILLIGRAM(S): 100 TABLET, FILM COATED ORAL at 05:52

## 2022-11-01 RX ADMIN — Medication 1000 MILLILITER(S): at 21:08

## 2022-11-01 RX ADMIN — Medication 10 MILLIGRAM(S): at 21:09

## 2022-11-01 NOTE — CONSULT NOTE ADULT - SUBJECTIVE AND OBJECTIVE BOX
Patient is a 71y old  Female who presents with a chief complaint of GIB (31 Oct 2022 15:36)      HPI:  The patient is a 71y Female with PMHx of paroxysmal AFib on Eliquis, HTN, HLD, COPD, hypothyroidism, psoriasis presenting with bright red bloody stools. Patient stated that this morning after breakfast of cheerio and water, she had a bowel movement that had bright red blood mixed with brown formed stools. She again had another similar bloody bowel movement a couple hours later so she came to the ED for evaluation. No associated abdominal or rectal pain. Last night, she ate some turkey cutlets with rice, salad, and kidney beans. This has never happened to her before. Never had a previous EGD or colonoscopy. Did take her Eliquis this AM. Denies any fevers, chills, chest pain, sob, n/v/c/d, numbness, dizziness, or lightheadedness.    Last saw Dr. Palla (cardio) 06/10/22 and was supposed to have ILR placement. Losartan dose was increased at that time from 25 to 50mg daily.  Lipitor was also stopped in the setting of joint pain.     Previously hospitalized in May 2021 for bradycardia and was told she needed a PPM which she declined at the time.       In the ED,  VS: Temp 98F, HR 76, /76. RR 17. SpO2 94% on RA  Labs: Hg 11.1, PT 16.8, INR 1.43, PTT 4.18, +FOBT  Imaging: CTAP showed extensive colonic diverticulosis without diverticulitis. Hiatal hernia. Subtle hypodense hepatic lesions unchanged from 2019.  EKG: NSR 75bpm    INTERVAL HPI: Patient seen and examined at bedside. ***anesthesia. Eliquis stopped 10/31 in the setting of possible GI bleed.  Denies lighteheadedness, dizziness, chest pain, sob, nausea, vomiting, diarrhea.              PAST MEDICAL & SURGICAL HISTORY:  COPD (chronic obstructive pulmonary disease)      Hypothyroid      HTN (hypertension)      Hyperlipidemia      Edema extremities      Afib  On Eliquis      Psoriasis      S/P eye surgery  age 5 unsure if right or left eye                ECHO  FINDINGS: ( @[facility]) EF 55%, Mild AS, AI, mild diastolic dysfunction, b/l atrial enlargement.      MEDICATIONS  (STANDING):  artificial  tears Solution 1 Drop(s) Both EYES three times a day  ascorbic acid 500 milliGRAM(s) Oral daily  budesonide 160 MICROgram(s)/formoterol 4.5 MICROgram(s) Inhaler 2 Puff(s) Inhalation two times a day  cholecalciferol 400 Unit(s) Oral daily  cyanocobalamin 1000 MICROGram(s) Oral daily  hydrALAZINE 50 milliGRAM(s) Oral three times a day  influenza  Vaccine (HIGH DOSE) 0.7 milliLiter(s) IntraMuscular once  levothyroxine 25 MICROGram(s) Oral daily  losartan 50 milliGRAM(s) Oral daily  sodium chloride 0.9%. 1000 milliLiter(s) (60 mL/Hr) IV Continuous <Continuous>  tiotropium 18 MICROgram(s) Capsule 1 Capsule(s) Inhalation daily    MEDICATIONS  (PRN):  acetaminophen     Tablet .. 650 milliGRAM(s) Oral every 6 hours PRN Temp greater or equal to 38C (100.4F), Mild Pain (1 - 3)  melatonin 3 milliGRAM(s) Oral at bedtime PRN Insomnia  ondansetron Injectable 4 milliGRAM(s) IV Push every 8 hours PRN Nausea and/or Vomiting      FAMILY HISTORY:  FH: hypertension      Denies Family history of CAD or early MI      Constitutional: denies fever, chills  HEENT: denies blurry vision, difficulty hearing  Respiratory: denies SOB, OTTO, cough  Cardiovascular: denies CP, palpitations, orthopnea, PND, LE edema  Gastrointestinal: denies nausea, vomiting, abdominal pain  Genitourinary: denies urinary changes  Skin: Denies rashes, itching  Neurologic: denies headache, weakness, dizziness  Hematology/Oncology: denies bleeding, easy bruising  ROS negative except as noted above      SOCIAL HISTORY:    No tobacco, Alcohol or Drug use  Previous smoker with 30 pack year hx, quit over 15 years ago.    Vital Signs Last 24 Hrs  T(C): 36.6 (2022 04:24), Max: 36.8 (31 Oct 2022 09:54)  T(F): 97.9 (2022 04:24), Max: 98.3 (31 Oct 2022 09:54)  HR: 63 (2022 04:24) (63 - 76)  BP: 128/81 (2022 04:24) (121/71 - 204/84)  BP(mean): --  RR: 18 (2022 04:24) (16 - 18)  SpO2: 94% (2022 04:24) (94% - 98%)    Parameters below as of 2022 04:24  Patient On (Oxygen Delivery Method): room air        Physical Exam:  General: Well developed, well nourished, NAD  HEENT: NCAT, PERRLA, EOMI bl, moist mucous membranes   Neck: Supple, nontender, no mass  Neurology: A&Ox3, nonfocal, sensation intact   Respiratory: CTA B/L, No W/R/R  CV: RRR, +S1/S2, no murmurs, rubs or gallops  Abdominal: Soft, NT, ND +BSx4, no palpable masses  Extremities: No C/C/E, + peripheral pulses  MSK: Normal ROM, no joint erythema or warmth, no joint swelling   Heme: No obvious ecchymosis or petechiae   Skin: warm, dry, normal color      ECG: < from: 12 Lead ECG (10.31.22 @ 14:37) >  Ventricular Rate 64 BPM    Atrial Rate 64 BPM    P-R Interval 132 ms    QRS Duration 94 ms    Q-T Interval 408 ms    QTC Calculation(Bazett) 420 ms    P Axis 51 degrees    R Axis -11 degrees    T Axis 32 degrees    Diagnosis Line Normal sinus rhythm  Normal ECG  When compared with ECG of 2021 18:49,  No significant change was found  Confirmed by GRIFFIN LOPEZ ([id number]) on 10/31/2022 5:20:22 PM    < end of copied text >      I&O's Detail    31 Oct 2022 07:01  -  2022 07:00  --------------------------------------------------------  IN:    sodium chloride 0.9%: 720 mL  Total IN: 720 mL    OUT:  Total OUT: 0 mL    Total NET: 720 mL          LABS:                        10.3   5.31  )-----------( 162      ( 2022 06:30 )             32.5     11-01    x   |  x   |  18  ----------------------------<  80  x    |  27  |  0.87    Ca    9.1      2022 06:30    TPro  x   /  Alb  3.2<L>  /  TBili  x   /  DBili  x   /  AST  31  /  ALT  28  /  AlkPhos  x   11-01        PT/INR - ( 31 Oct 2022 10:15 )   PT: 16.8 sec;   INR: 1.43 ratio         PTT - ( 31 Oct 2022 10:15 )  PTT:41.8 sec  Urinalysis Basic - ( 31 Oct 2022 18:07 )    Color: Pale Yellow / Appearance: Clear / S.005 / pH: x  Gluc: x / Ketone: Trace  / Bili: Negative / Urobili: Negative   Blood: x / Protein: 15 / Nitrite: Negative   Leuk Esterase: Negative / RBC: 0-2 /HPF / WBC 0-2   Sq Epi: x / Non Sq Epi: Negative / Bacteria: x      I&O's Summary    31 Oct 2022 07:01  -  2022 07:00  --------------------------------------------------------  IN: 720 mL / OUT: 0 mL / NET: 720 mL      BNP  RADIOLOGY & ADDITIONAL STUDIES:  < from: CT Abdomen and Pelvis No Cont (10.31.22 @ 12:23) >  IMPRESSION:  No bowel obstruction or gross bowel wall thickening. Colonic   diverticulosis without diverticulitis. Normal appendix.      < end of copied text >   Patient is a 71y old  Female who presents with a chief complaint of GIB (31 Oct 2022 15:36)      HPI:  The patient is a 71y Female with PMHx of paroxysmal AFib on Eliquis, HTN, HLD, COPD, hypothyroidism, psoriasis presenting with bright red bloody stools. Patient stated that this morning after breakfast of cheerio and water, she had a bowel movement that had bright red blood mixed with brown formed stools. She again had another similar bloody bowel movement a couple hours later so she came to the ED for evaluation. No associated abdominal or rectal pain. Last night, she ate some turkey cutlets with rice, salad, and kidney beans. This has never happened to her before. Never had a previous EGD or colonoscopy. Did take her Eliquis this AM. Denies any fevers, chills, chest pain, sob, n/v/c/d, numbness, dizziness, or lightheadedness.    Last saw Dr. Palla (cardio) 06/10/22 and was supposed to have ILR placement. Losartan dose was increased at that time from 25 to 50mg daily.  Lipitor was also stopped in the setting of joint pain.     Previously hospitalized in May 2021 for bradycardia and was told she needed a PPM which she declined at the time.       In the ED,  VS: Temp 98F, HR 76, /76. RR 17. SpO2 94% on RA  Labs: Hg 11.1, PT 16.8, INR 1.43, PTT 4.18, +FOBT  Imaging: CTAP showed extensive colonic diverticulosis without diverticulitis. Hiatal hernia. Subtle hypodense hepatic lesions unchanged from 2019.  EKG: NSR 75bpm    INTERVAL HPI: Patient seen and examined at bedside. Had been under anesthesia when she was 5 years old but no since then. Eliquis stopped 10/31 in the setting of possible GI bleed. Last dose was in the AM on 10/31. Patient has no hx of stroke, CHF, ischemic heart disease, or insulin use. RCRI: 0 class I risk  She has no acute complaints today.  Denies light-headedness dizziness, chest pain, sob, nausea, vomiting, diarrhea.              PAST MEDICAL & SURGICAL HISTORY:  COPD (chronic obstructive pulmonary disease)      Hypothyroid      HTN (hypertension)      Hyperlipidemia      Edema extremities      Afib  On Eliquis      Psoriasis      S/P eye surgery  age 5 unsure if right or left eye                ECHO  FINDINGS: ( @[facility]) EF 55%, Mild AS, AI, mild diastolic dysfunction, b/l atrial enlargement.      MEDICATIONS  (STANDING):  artificial  tears Solution 1 Drop(s) Both EYES three times a day  ascorbic acid 500 milliGRAM(s) Oral daily  budesonide 160 MICROgram(s)/formoterol 4.5 MICROgram(s) Inhaler 2 Puff(s) Inhalation two times a day  cholecalciferol 400 Unit(s) Oral daily  cyanocobalamin 1000 MICROGram(s) Oral daily  hydrALAZINE 50 milliGRAM(s) Oral three times a day  influenza  Vaccine (HIGH DOSE) 0.7 milliLiter(s) IntraMuscular once  levothyroxine 25 MICROGram(s) Oral daily  losartan 50 milliGRAM(s) Oral daily  sodium chloride 0.9%. 1000 milliLiter(s) (60 mL/Hr) IV Continuous <Continuous>  tiotropium 18 MICROgram(s) Capsule 1 Capsule(s) Inhalation daily    MEDICATIONS  (PRN):  acetaminophen     Tablet .. 650 milliGRAM(s) Oral every 6 hours PRN Temp greater or equal to 38C (100.4F), Mild Pain (1 - 3)  melatonin 3 milliGRAM(s) Oral at bedtime PRN Insomnia  ondansetron Injectable 4 milliGRAM(s) IV Push every 8 hours PRN Nausea and/or Vomiting      FAMILY HISTORY:  FH: hypertension      Denies Family history of CAD or early MI      Constitutional: denies fever, chills  HEENT: denies blurry vision, difficulty hearing  Respiratory: denies SOB, OTTO, cough  Cardiovascular: denies CP, palpitations, orthopnea, PND, LE edema  Gastrointestinal: denies nausea, vomiting, abdominal pain  Genitourinary: denies urinary changes  Skin: Denies rashes, itching  Neurologic: denies headache, weakness, dizziness  Hematology/Oncology: denies bleeding, easy bruising  ROS negative except as noted above      SOCIAL HISTORY:    No tobacco, Alcohol or Drug use  Previous smoker with 30 pack year hx, quit over 15 years ago.    Vital Signs Last 24 Hrs  T(C): 36.6 (2022 04:24), Max: 36.8 (31 Oct 2022 09:54)  T(F): 97.9 (2022 04:24), Max: 98.3 (31 Oct 2022 09:54)  HR: 63 (2022 04:24) (63 - 76)  BP: 128/81 (2022 04:24) (121/71 - 204/84)  BP(mean): --  RR: 18 (2022 04:24) (16 - 18)  SpO2: 94% (2022 04:24) (94% - 98%)    Parameters below as of 2022 04:24  Patient On (Oxygen Delivery Method): room air        Physical Exam:  General: Well developed, well nourished, NAD  HEENT: NCAT, PERRLA, EOMI bl, moist mucous membranes   Neck: Supple, nontender, no mass  Neurology: A&Ox3, nonfocal, sensation intact   Respiratory: CTA B/L, No W/R/R  CV: RRR, +S1/S2, +systolic murmur  Abdominal: Soft, NT, ND +BSx4, no palpable masses  Extremities: +nonpitting edema No C/C, + peripheral pulses  MSK: Normal ROM, no joint erythema or warmth, no joint swelling   Heme: No obvious ecchymosis or petechiae   Skin: warm, dry, normal color      ECG: < from: 12 Lead ECG (10.31.22 @ 14:37) >  Ventricular Rate 64 BPM    Atrial Rate 64 BPM    P-R Interval 132 ms    QRS Duration 94 ms    Q-T Interval 408 ms    QTC Calculation(Bazett) 420 ms    P Axis 51 degrees    R Axis -11 degrees    T Axis 32 degrees    Diagnosis Line Normal sinus rhythm  Normal ECG  When compared with ECG of 2021 18:49,  No significant change was found  Confirmed by GRIFFIN LOPEZ ([id number]) on 10/31/2022 5:20:22 PM    < end of copied text >      I&O's Detail    31 Oct 2022 07:01  -  2022 07:00  --------------------------------------------------------  IN:    sodium chloride 0.9%: 720 mL  Total IN: 720 mL    OUT:  Total OUT: 0 mL    Total NET: 720 mL          LABS:                        10.3   5.31  )-----------( 162      ( 2022 06:30 )             32.5     11-01    x   |  x   |  18  ----------------------------<  80  x    |  27  |  0.87    Ca    9.1      2022 06:30    TPro  x   /  Alb  3.2<L>  /  TBili  x   /  DBili  x   /  AST  31  /  ALT  28  /  AlkPhos  x   11        PT/INR - ( 31 Oct 2022 10:15 )   PT: 16.8 sec;   INR: 1.43 ratio         PTT - ( 31 Oct 2022 10:15 )  PTT:41.8 sec  Urinalysis Basic - ( 31 Oct 2022 18:07 )    Color: Pale Yellow / Appearance: Clear / S.005 / pH: x  Gluc: x / Ketone: Trace  / Bili: Negative / Urobili: Negative   Blood: x / Protein: 15 / Nitrite: Negative   Leuk Esterase: Negative / RBC: 0-2 /HPF / WBC 0-2   Sq Epi: x / Non Sq Epi: Negative / Bacteria: x      I&O's Summary    31 Oct 2022 07:01  -  2022 07:00  --------------------------------------------------------  IN: 720 mL / OUT: 0 mL / NET: 720 mL      BNP  RADIOLOGY & ADDITIONAL STUDIES:  < from: CT Abdomen and Pelvis No Cont (10.31.22 @ 12:23) >  IMPRESSION:  No bowel obstruction or gross bowel wall thickening. Colonic   diverticulosis without diverticulitis. Normal appendix.      < end of copied text >

## 2022-11-01 NOTE — PROGRESS NOTE ADULT - SUBJECTIVE AND OBJECTIVE BOX
Patient seen and examined  no overnight events  patient lying comfortable denies any symptoms  Gi eval appreciated for colonoscopy wednesday  cardio evaluation appreciated    Review of Systems:  General:denies fever chills, headache, weakness  HEENT: denies blurry vision,diffculty swallowing, difficulty hearing, tinnitus  Cardiovascular: denies chest pain  ,palpitations  Pulmonary:denies shortness of breath, cough, wheezing, hemoptysis  Gastrointestinal: denies abdominal pain, constipation, diarrhea,nausea , vomiting, hematochezia  : denies hematuria, dysuria, or incontinence  Neurological: denies weakness, numbness , tingling, dizziness, tremors  MSK: denies muscle pain, difficulty ambulating, swelling, back pain  skin: denies skin rash, itching, burning, or  skin lesions  Psychiatrical: denies mood disturbances, anxierty, feeling depressed, depression , or difficulty sleeping    Objective:  Vitals  T(C): 36.6 (11-01-22 @ 04:24), Max: 36.8 (10-31-22 @ 09:54)  HR: 63 (11-01-22 @ 04:24) (63 - 76)  BP: 128/81 (11-01-22 @ 04:24) (121/71 - 204/84)  RR: 18 (11-01-22 @ 04:24) (16 - 18)  SpO2: 94% (11-01-22 @ 04:24) (94% - 98%)    Physical Exam:  General: comfortable, no acute distress, well nourished  HEENT: Atraumatic, no LAD, trachea midline, PERRLA  Cardiovascular: normal s1s2, no murmurs, gallops or fricition rubs  Pulmonary: clear to ausculation Bilaterally, no wheezing , rhonchi  Gastrointestinal: soft non tender non distended, no masses felt, no organomegally  Muscloskeletal: no lower extremity edema, intact bilateral lower extremity pulses  Neurological: CN II-12 intact. No focal weakness  Psychiatrical: normal mood, cooperative  SKIN: no rash, lesions or ulcers    Labs:                          10.3   5.31  )-----------( 162      ( 01 Nov 2022 06:30 )             32.5     11-01    144  |  112<H>  |  18  ----------------------------<  80  4.0   |  27  |  0.87    Ca    9.1      01 Nov 2022 06:30    TPro  6.0  /  Alb  3.2<L>  /  TBili  0.6  /  DBili  x   /  AST  31  /  ALT  28  /  AlkPhos  66  11-01    LIVER FUNCTIONS - ( 01 Nov 2022 06:30 )  Alb: 3.2 g/dL / Pro: 6.0 g/dL / ALK PHOS: 66 U/L / ALT: 28 U/L / AST: 31 U/L / GGT: x           PT/INR - ( 31 Oct 2022 10:15 )   PT: 16.8 sec;   INR: 1.43 ratio         PTT - ( 31 Oct 2022 10:15 )  PTT:41.8 sec      Active Medications  MEDICATIONS  (STANDING):  artificial  tears Solution 1 Drop(s) Both EYES three times a day  ascorbic acid 500 milliGRAM(s) Oral daily  bisacodyl 10 milliGRAM(s) Oral every 4 hours  budesonide 160 MICROgram(s)/formoterol 4.5 MICROgram(s) Inhaler 2 Puff(s) Inhalation two times a day  cholecalciferol 400 Unit(s) Oral daily  cyanocobalamin 1000 MICROGram(s) Oral daily  hydrALAZINE 50 milliGRAM(s) Oral three times a day  influenza  Vaccine (HIGH DOSE) 0.7 milliLiter(s) IntraMuscular once  levothyroxine 25 MICROGram(s) Oral daily  losartan 50 milliGRAM(s) Oral daily  polyethylene glycol/electrolyte Solution 1000 milliLiter(s) Oral every 4 hours  sodium chloride 0.9%. 1000 milliLiter(s) (60 mL/Hr) IV Continuous <Continuous>  tiotropium 18 MICROgram(s) Capsule 1 Capsule(s) Inhalation daily    MEDICATIONS  (PRN):  acetaminophen     Tablet .. 650 milliGRAM(s) Oral every 6 hours PRN Temp greater or equal to 38C (100.4F), Mild Pain (1 - 3)  melatonin 3 milliGRAM(s) Oral at bedtime PRN Insomnia  ondansetron Injectable 4 milliGRAM(s) IV Push every 8 hours PRN Nausea and/or Vomiting

## 2022-11-01 NOTE — PROGRESS NOTE ADULT - SUBJECTIVE AND OBJECTIVE BOX
[facility]  Smith Comer PA-C  [address] [place] [address]  [phone]      INTERVAL HPI/OVERNIGHT EVENTS:  Pt s/e  Reports no further overt GI bleeding  Otherwise no GI complaints    MEDICATIONS  (STANDING):  artificial  tears Solution 1 Drop(s) Both EYES three times a day  ascorbic acid 500 milliGRAM(s) Oral daily  bisacodyl 10 milliGRAM(s) Oral every 4 hours  budesonide 160 MICROgram(s)/formoterol 4.5 MICROgram(s) Inhaler 2 Puff(s) Inhalation two times a day  cholecalciferol 400 Unit(s) Oral daily  cyanocobalamin 1000 MICROGram(s) Oral daily  hydrALAZINE 50 milliGRAM(s) Oral three times a day  influenza  Vaccine (HIGH DOSE) 0.7 milliLiter(s) IntraMuscular once  levothyroxine 25 MICROGram(s) Oral daily  losartan 50 milliGRAM(s) Oral daily  polyethylene glycol/electrolyte Solution 1000 milliLiter(s) Oral every 4 hours  sodium chloride 0.9%. 1000 milliLiter(s) (60 mL/Hr) IV Continuous <Continuous>  tiotropium 18 MICROgram(s) Capsule 1 Capsule(s) Inhalation daily    MEDICATIONS  (PRN):  acetaminophen     Tablet .. 650 milliGRAM(s) Oral every 6 hours PRN Temp greater or equal to 38C (100.4F), Mild Pain (1 - 3)  melatonin 3 milliGRAM(s) Oral at bedtime PRN Insomnia  ondansetron Injectable 4 milliGRAM(s) IV Push every 8 hours PRN Nausea and/or Vomiting      Allergies    artichokes (Unknown)  IV Contrast (Unknown)  Levaquin (Anaphylaxis)  sulfa drugs (Unknown)  Sulfur (Unknown)      PHYSICAL EXAM:   Vital Signs:  Vital Signs Last 24 Hrs  T(C): 36.6 (2022 04:24), Max: 36.8 (31 Oct 2022 18:17)  T(F): 97.9 (2022 04:24), Max: 98.3 (31 Oct 2022 18:17)  HR: 63 (2022 04:24) (63 - 76)  BP: 128/81 (2022 04:24) (121/71 - 159/76)  BP(mean): --  RR: 18 (2022 04:24) (16 - 18)  SpO2: 94% (2022 04:24) (94% - 96%)    Parameters below as of 2022 04:24  Patient On (Oxygen Delivery Method): room air    GENERAL:  Appears stated age  ABDOMEN:  Soft, non-tender, non-distended  NEURO:  Alert    LABS:                        10.3   5.31  )-----------( 162      ( 2022 06:30 )             32.5     11-    144  |  112<H>  |  18  ----------------------------<  80  4.0   |  27  |  0.87    Ca    9.1      2022 06:30    TPro  6.0  /  Alb  3.2<L>  /  TBili  0.6  /  DBili  x   /  AST  31  /  ALT  28  /  AlkPhos  66  11-01    PT/INR - ( 31 Oct 2022 10:15 )   PT: 16.8 sec;   INR: 1.43 ratio         PTT - ( 31 Oct 2022 10:15 )  PTT:41.8 sec  Urinalysis Basic - ( 31 Oct 2022 18:07 )    Color: Pale Yellow / Appearance: Clear / S.005 / pH: x  Gluc: x / Ketone: Trace  / Bili: Negative / Urobili: Negative   Blood: x / Protein: 15 / Nitrite: Negative   Leuk Esterase: Negative / RBC: 0-2 /HPF / WBC 0-2   Sq Epi: x / Non Sq Epi: Negative / Bacteria: x    I spent 120 minutes face to face with the patient. Time was spent in discussion with patient. Discussed GI bleed, possible causes, plan for colonoscopy, bowel preparation, diet protocol, explained risks/benefits/alternatives to procedure, answered all patient questions. Visit start time: 8:00. Visit end time: 10:00.

## 2022-11-01 NOTE — CONSULT NOTE ADULT - NS ATTEND AMEND GEN_ALL_CORE FT
paf, in sr  cont to hold eliquis  history of bradycardia, and there was discussion for ilr  no cardiac contraindication to proceeding with colonoscopy

## 2022-11-01 NOTE — CONSULT NOTE ADULT - ASSESSMENT
71y Female with PMHx of paroxysmal AFib on Eliquis, HTN, HLD, COPD, hypothyroidism, psoriasis presenting with bright red bloody stools being admitted for likely diverticular bleed.   Cardiology consulted for colonoscopy clearance.    Afib on Eliquis/ HTN/ HLD  - EKG shows NSR, HR 64  - No acute changes on EKG compared to previous.  - No meaningful evidence of volume overload.  - Previous TTE (2021 @[facility]) shows EF 55% w/ mild AS, AI, DD, JADE  - BP well controlled, monitor routine hemodynamics.  - Continue Losartan and hydralazine  - Continue to hold Eliquis in setting of GI bleed  - Monitor and replete lytes, keep K>4, Mg>2.  - Pt has no active ischemia, decompensated heart failure, unstable arrythmia, or severe stenotic valvular disease, and has minimal cardiac risk factors. In the setting of low risk ,she is optimized from cardiovascular standpoint to proceed with planned procedure with routine hemodynamic monitoring.   - Other cardiovascular workup will depend on clinical course.  - All other workup per primary team.  - Will continue to follow.             71y Female with PMHx of paroxysmal AFib on Eliquis, HTN, HLD, COPD, hypothyroidism, psoriasis presenting with bright red bloody stools being admitted for likely diverticular bleed.   Cardiology consulted for colonoscopy clearance.    Afib on Eliquis/ HTN/ HLD  - EKG shows NSR, HR 64  - No acute changes on EKG compared to previous.  - No meaningful evidence of volume overload.  - Previous TTE (2021 @[facility]) shows EF 55% w/ mild AS, AI, DD, JADE  - BP well controlled, monitor routine hemodynamics.  - Continue Losartan and hydralazine  - Continue to hold Eliquis in setting of GI bleed, last dose was 10/31/22 in the AM  - Monitor and replete lytes, keep K>4, Mg>2.  - Pt has no active ischemia, decompensated heart failure, unstable arrythmia, or severe stenotic valvular disease, and has minimal cardiac risk factors. In the setting of low risk ,she is optimized from cardiovascular standpoint to proceed with planned procedure with routine hemodynamic monitoring.   - Plan for colonoscopy 11/02/22  - Other cardiovascular workup will depend on clinical course.  - All other workup per primary team.  - Will continue to follow.             71y Female with PMHx of paroxysmal AFib on Eliquis, HTN, HLD, COPD, hypothyroidism, psoriasis presenting with bright red bloody stools being admitted for likely diverticular bleed.   Cardiology consulted for colonoscopy clearance.    Afib on Eliquis/ HTN/ HLD  - EKG shows NSR, HR 64  - No acute changes on EKG compared to previous.  - No meaningful evidence of volume overload.  - Previous TTE (2021 @[facility]) shows EF 55% w/ mild AS, AI, DD, JADE  - BP well controlled, monitor routine hemodynamics.  - Continue Losartan and hydralazine  - Continue to hold Eliquis in setting of GI bleed, last dose was 10/31/22 in the AM  - Monitor and replete lytes, keep K>4, Mg>2.  - Pt has no active ischemia, decompensated heart failure, unstable arrythmia, or severe stenotic valvular disease, and has minimal cardiac risk factors. In the setting of low risk ,she is optimized from cardiovascular standpoint to proceed with planned procedure with routine hemodynamic monitoring.   - RCRI: 0 Class I risk  - Plan for colonoscopy 11/02/22  - Other cardiovascular workup will depend on clinical course.  - All other workup per primary team.  - Will continue to follow.

## 2022-11-01 NOTE — PROGRESS NOTE ADULT - PROBLEM SELECTOR PLAN 1
Patient presents with bright red blood stools likely 2/2 diverticular bleed  - Hg 11.1 on admission, baseline hemoglobin ~11  - CTAP showed extensive colonic diverticulosis without diverticulitis. Hiatal hernia. Subtle hypodense hepatic lesions unchanged from 2019.  - FOBT+  - Clear liquid diet  - IVF 60cc/hr maintenance fluids  - SCDs, hold home Eliquis for now   - Currently hemodynamically stable, monitor for signs and symptoms   - Consider transfusion for hemoglobin <7   - Trend CBC  - F/u vitamin B12, folate, iron panel: iron, ferritin, TIBC, transferrin  - Pending possible colonoscopy Wednesday as per GI  - GI (Dr. Low) consulted, f/u recs  - Cardioloy (Dr. Araujo's group) consulted for medical optimization, f/u recs

## 2022-11-02 ENCOUNTER — RESULT REVIEW (OUTPATIENT)
Age: 72
End: 2022-11-02

## 2022-11-02 ENCOUNTER — TRANSCRIPTION ENCOUNTER (OUTPATIENT)
Age: 72
End: 2022-11-02

## 2022-11-02 VITALS
DIASTOLIC BLOOD PRESSURE: 76 MMHG | SYSTOLIC BLOOD PRESSURE: 150 MMHG | RESPIRATION RATE: 18 BRPM | TEMPERATURE: 98 F | HEART RATE: 62 BPM | OXYGEN SATURATION: 93 %

## 2022-11-02 LAB
ANION GAP SERPL CALC-SCNC: 6 MMOL/L — SIGNIFICANT CHANGE UP (ref 5–17)
BASOPHILS # BLD AUTO: 0.04 K/UL — SIGNIFICANT CHANGE UP (ref 0–0.2)
BASOPHILS NFR BLD AUTO: 0.7 % — SIGNIFICANT CHANGE UP (ref 0–2)
BUN SERPL-MCNC: 16 MG/DL — SIGNIFICANT CHANGE UP (ref 7–23)
CALCIUM SERPL-MCNC: 8.7 MG/DL — SIGNIFICANT CHANGE UP (ref 8.5–10.1)
CHLORIDE SERPL-SCNC: 114 MMOL/L — HIGH (ref 96–108)
CO2 SERPL-SCNC: 26 MMOL/L — SIGNIFICANT CHANGE UP (ref 22–31)
CREAT SERPL-MCNC: 1.1 MG/DL — SIGNIFICANT CHANGE UP (ref 0.5–1.3)
EGFR: 54 ML/MIN/1.73M2 — LOW
EOSINOPHIL # BLD AUTO: 0.12 K/UL — SIGNIFICANT CHANGE UP (ref 0–0.5)
EOSINOPHIL NFR BLD AUTO: 2.2 % — SIGNIFICANT CHANGE UP (ref 0–6)
GLUCOSE SERPL-MCNC: 84 MG/DL — SIGNIFICANT CHANGE UP (ref 70–99)
HCT VFR BLD CALC: 33.1 % — LOW (ref 34.5–45)
HGB BLD-MCNC: 10.4 G/DL — LOW (ref 11.5–15.5)
IMM GRANULOCYTES NFR BLD AUTO: 0.2 % — SIGNIFICANT CHANGE UP (ref 0–0.9)
LYMPHOCYTES # BLD AUTO: 1.12 K/UL — SIGNIFICANT CHANGE UP (ref 1–3.3)
LYMPHOCYTES # BLD AUTO: 20.7 % — SIGNIFICANT CHANGE UP (ref 13–44)
MAGNESIUM SERPL-MCNC: 2.1 MG/DL — SIGNIFICANT CHANGE UP (ref 1.6–2.6)
MCHC RBC-ENTMCNC: 30.9 PG — SIGNIFICANT CHANGE UP (ref 27–34)
MCHC RBC-ENTMCNC: 31.4 GM/DL — LOW (ref 32–36)
MCV RBC AUTO: 98.2 FL — SIGNIFICANT CHANGE UP (ref 80–100)
MONOCYTES # BLD AUTO: 0.46 K/UL — SIGNIFICANT CHANGE UP (ref 0–0.9)
MONOCYTES NFR BLD AUTO: 8.5 % — SIGNIFICANT CHANGE UP (ref 2–14)
NEUTROPHILS # BLD AUTO: 3.65 K/UL — SIGNIFICANT CHANGE UP (ref 1.8–7.4)
NEUTROPHILS NFR BLD AUTO: 67.7 % — SIGNIFICANT CHANGE UP (ref 43–77)
NRBC # BLD: 0 /100 WBCS — SIGNIFICANT CHANGE UP (ref 0–0)
PHOSPHATE SERPL-MCNC: 4 MG/DL — SIGNIFICANT CHANGE UP (ref 2.5–4.5)
PLATELET # BLD AUTO: 168 K/UL — SIGNIFICANT CHANGE UP (ref 150–400)
POTASSIUM SERPL-MCNC: 4.1 MMOL/L — SIGNIFICANT CHANGE UP (ref 3.5–5.3)
POTASSIUM SERPL-SCNC: 4.1 MMOL/L — SIGNIFICANT CHANGE UP (ref 3.5–5.3)
RBC # BLD: 3.37 M/UL — LOW (ref 3.8–5.2)
RBC # FLD: 13.8 % — SIGNIFICANT CHANGE UP (ref 10.3–14.5)
SODIUM SERPL-SCNC: 146 MMOL/L — HIGH (ref 135–145)
WBC # BLD: 5.4 K/UL — SIGNIFICANT CHANGE UP (ref 3.8–10.5)
WBC # FLD AUTO: 5.4 K/UL — SIGNIFICANT CHANGE UP (ref 3.8–10.5)

## 2022-11-02 PROCEDURE — 99232 SBSQ HOSP IP/OBS MODERATE 35: CPT

## 2022-11-02 PROCEDURE — C1889: CPT

## 2022-11-02 PROCEDURE — 82607 VITAMIN B-12: CPT

## 2022-11-02 PROCEDURE — 85025 COMPLETE CBC W/AUTO DIFF WBC: CPT

## 2022-11-02 PROCEDURE — 86900 BLOOD TYPING SEROLOGIC ABO: CPT

## 2022-11-02 PROCEDURE — 94640 AIRWAY INHALATION TREATMENT: CPT

## 2022-11-02 PROCEDURE — 82272 OCCULT BLD FECES 1-3 TESTS: CPT

## 2022-11-02 PROCEDURE — 99239 HOSP IP/OBS DSCHRG MGMT >30: CPT

## 2022-11-02 PROCEDURE — 82728 ASSAY OF FERRITIN: CPT

## 2022-11-02 PROCEDURE — 81001 URINALYSIS AUTO W/SCOPE: CPT

## 2022-11-02 PROCEDURE — 74176 CT ABD & PELVIS W/O CONTRAST: CPT | Mod: MA

## 2022-11-02 PROCEDURE — 86850 RBC ANTIBODY SCREEN: CPT

## 2022-11-02 PROCEDURE — 80048 BASIC METABOLIC PNL TOTAL CA: CPT

## 2022-11-02 PROCEDURE — 88305 TISSUE EXAM BY PATHOLOGIST: CPT

## 2022-11-02 PROCEDURE — 83540 ASSAY OF IRON: CPT

## 2022-11-02 PROCEDURE — 85730 THROMBOPLASTIN TIME PARTIAL: CPT

## 2022-11-02 PROCEDURE — 84466 ASSAY OF TRANSFERRIN: CPT

## 2022-11-02 PROCEDURE — 82746 ASSAY OF FOLIC ACID SERUM: CPT

## 2022-11-02 PROCEDURE — 84100 ASSAY OF PHOSPHORUS: CPT

## 2022-11-02 PROCEDURE — 86901 BLOOD TYPING SEROLOGIC RH(D): CPT

## 2022-11-02 PROCEDURE — 99285 EMERGENCY DEPT VISIT HI MDM: CPT

## 2022-11-02 PROCEDURE — 83735 ASSAY OF MAGNESIUM: CPT

## 2022-11-02 PROCEDURE — 83550 IRON BINDING TEST: CPT

## 2022-11-02 PROCEDURE — 88305 TISSUE EXAM BY PATHOLOGIST: CPT | Mod: 26

## 2022-11-02 PROCEDURE — U0005: CPT

## 2022-11-02 PROCEDURE — 93005 ELECTROCARDIOGRAM TRACING: CPT

## 2022-11-02 PROCEDURE — U0003: CPT

## 2022-11-02 PROCEDURE — 36415 COLL VENOUS BLD VENIPUNCTURE: CPT

## 2022-11-02 PROCEDURE — 80053 COMPREHEN METABOLIC PANEL: CPT

## 2022-11-02 PROCEDURE — 85610 PROTHROMBIN TIME: CPT

## 2022-11-02 DEVICE — CLIP RESOLUTION 360 235CM: Type: IMPLANTABLE DEVICE | Status: FUNCTIONAL

## 2022-11-02 DEVICE — HEMOSPRAY HEMOSTAT ENDO 7F: Type: IMPLANTABLE DEVICE | Status: FUNCTIONAL

## 2022-11-02 RX ORDER — SODIUM CHLORIDE 9 MG/ML
1000 INJECTION, SOLUTION INTRAVENOUS
Refills: 0 | Status: DISCONTINUED | OUTPATIENT
Start: 2022-11-02 | End: 2022-11-02

## 2022-11-02 RX ADMIN — Medication 400 UNIT(S): at 12:55

## 2022-11-02 RX ADMIN — Medication 1 DROP(S): at 05:31

## 2022-11-02 RX ADMIN — BUDESONIDE AND FORMOTEROL FUMARATE DIHYDRATE 2 PUFF(S): 160; 4.5 AEROSOL RESPIRATORY (INHALATION) at 12:54

## 2022-11-02 RX ADMIN — Medication 50 MILLIGRAM(S): at 05:31

## 2022-11-02 RX ADMIN — LOSARTAN POTASSIUM 50 MILLIGRAM(S): 100 TABLET, FILM COATED ORAL at 05:32

## 2022-11-02 RX ADMIN — SODIUM CHLORIDE 75 MILLILITER(S): 9 INJECTION, SOLUTION INTRAVENOUS at 08:54

## 2022-11-02 RX ADMIN — TIOTROPIUM BROMIDE 1 CAPSULE(S): 18 CAPSULE ORAL; RESPIRATORY (INHALATION) at 12:54

## 2022-11-02 RX ADMIN — PREGABALIN 1000 MICROGRAM(S): 225 CAPSULE ORAL at 12:54

## 2022-11-02 RX ADMIN — Medication 25 MICROGRAM(S): at 05:32

## 2022-11-02 RX ADMIN — Medication 500 MILLIGRAM(S): at 12:54

## 2022-11-02 NOTE — DISCHARGE NOTE PROVIDER - HOSPITAL COURSE
71y Female with PMHx of paroxysmal AFib on Eliquis, HTN, HLD, COPD, hypothyroidism, psoriasis presented with bright red bloody stools, admitted for GIB. She was seen by GI and had Colonoscopy performed :  colonoscopy showed , no rectal bleeding, pandiverticular disease; , no colitis, 4 polyps resected, removed, clipped. Recommended to hold Eliquis for one weeks and out patient follow up. GI cleared patient for dc. VSS. Physical exam per note from today. Totals discharge time spent 35 minutes, including medication reconciliation, follow up with consultants, counseling and education.

## 2022-11-02 NOTE — PROGRESS NOTE ADULT - SUBJECTIVE AND OBJECTIVE BOX
s/p colonoscopy    no rectal bleeding  pandiverticular disease;   no colitis   4 polyps resected, removed, clipped    rec:   reg diet  dc home   resume a/c 1 week  outpatient follow up for results of biopsies

## 2022-11-02 NOTE — PROGRESS NOTE ADULT - PROBLEM SELECTOR PLAN 1
Patient presents with bright red blood stools likely 2/2 diverticular bleed  - Hg 11.1 on admission, baseline hemoglobin ~11  - CTAP showed extensive colonic diverticulosis without diverticulitis. Hiatal hernia. Subtle hypodense hepatic lesions unchanged from 2019.  - FOBT+  - NPO for Colonoscopy today. LR at 75ml per hour while NPO.   - SCDs, hold home Eliquis for now   - Currently hemodynamically stable, monitor for signs and symptoms   - Consider transfusion for hemoglobin <7   - Monitor CBC  - Pending possible colonoscopy Wednesday as per GI. Medically optimized for this urgent procedure   - Cardioloy (Dr. Araujo's group) consulted for medical optimization and clearance

## 2022-11-02 NOTE — PROGRESS NOTE ADULT - PROBLEM SELECTOR PLAN 4
Chronic, stable on admission  - Continue home hydralazine 50mg TID  - Continue home losartan 50mg qd  - Monitor routine hemodynamics
Chronic, stable on admission  - Continue home hydralazine 50mg TID  - Continue home losartan 50mg qd  - Monitor routine hemodynamics

## 2022-11-02 NOTE — PROGRESS NOTE ADULT - SUBJECTIVE AND OBJECTIVE BOX
[facility] -- Marcella Macias,  Ana Maria, Van Yañez Savella, Goodger  Office # [phone]    Follow Up:  Afib on Eliquis, HTN, HLD    Subjective/Observations: No events overnight resting comfortably in bed.  No complaints of chest pain, dyspnea, or palpitations reported. No signs of orthopnea or PND.      REVIEW OF SYSTEMS: All other review of systems is negative unless indicated above  PAST MEDICAL & SURGICAL HISTORY:  COPD (chronic obstructive pulmonary disease)      Hypothyroid      HTN (hypertension)      Hyperlipidemia      Edema extremities      Afib  On Eliquis      Psoriasis      S/P eye surgery  age 5 unsure if right or left eye        MEDICATIONS  (STANDING):  artificial  tears Solution 1 Drop(s) Both EYES three times a day  ascorbic acid 500 milliGRAM(s) Oral daily  budesonide 160 MICROgram(s)/formoterol 4.5 MICROgram(s) Inhaler 2 Puff(s) Inhalation two times a day  cholecalciferol 400 Unit(s) Oral daily  cyanocobalamin 1000 MICROGram(s) Oral daily  hydrALAZINE 50 milliGRAM(s) Oral three times a day  influenza  Vaccine (HIGH DOSE) 0.7 milliLiter(s) IntraMuscular once  lactated ringers. 1000 milliLiter(s) (75 mL/Hr) IV Continuous <Continuous>  levothyroxine 25 MICROGram(s) Oral daily  losartan 50 milliGRAM(s) Oral daily  sodium chloride 0.9%. 1000 milliLiter(s) (60 mL/Hr) IV Continuous <Continuous>  tiotropium 18 MICROgram(s) Capsule 1 Capsule(s) Inhalation daily    MEDICATIONS  (PRN):  acetaminophen     Tablet .. 650 milliGRAM(s) Oral every 6 hours PRN Temp greater or equal to 38C (100.4F), Mild Pain (1 - 3)  melatonin 3 milliGRAM(s) Oral at bedtime PRN Insomnia  ondansetron Injectable 4 milliGRAM(s) IV Push every 8 hours PRN Nausea and/or Vomiting    Allergies    artichokes (Unknown)  IV Contrast (Unknown)  Levaquin (Anaphylaxis)  sulfa drugs (Unknown)  Sulfur (Unknown)    Intolerances      Vital Signs Last 24 Hrs  T(C): 36.7 (02 Nov 2022 04:37), Max: 36.7 (01 Nov 2022 20:00)  T(F): 98.1 (02 Nov 2022 04:37), Max: 98.1 (01 Nov 2022 20:00)  HR: 66 (02 Nov 2022 04:37) (61 - 67)  BP: 143/77 (02 Nov 2022 04:37) (132/71 - 147/71)  BP(mean): --  RR: 18 (02 Nov 2022 04:37) (17 - 18)  SpO2: 95% (02 Nov 2022 04:37) (93% - 95%)    Parameters below as of 02 Nov 2022 04:37  Patient On (Oxygen Delivery Method): room air      I&O's Summary      PHYSICAL EXAM:  Constitutional: NAD, awake and alert  HEENT: Moist Mucous Membranes, Anicteric  Pulmonary: Non-labored, breath sounds are clear bilaterally, No wheezing, rales or rhonchi  Cardiovascular: Regular, S1 and S2, + sys murmur, rubs, gallops or clicks  Gastrointestinal: Bowel Sounds present, soft, nontender.   Lymph: trace b/l LE peripheral edema. No lymphadenopathy.  Skin: No visible rashes or ulcers.  Psych:  Mood & affect appropriate    LABS: All Labs Reviewed:                        10.4   5.40  )-----------( 168      ( 02 Nov 2022 07:25 )             33.1                         10.3   5.31  )-----------( 162      ( 01 Nov 2022 06:30 )             32.5                         11.1   5.88  )-----------( 172      ( 31 Oct 2022 10:15 )             35.3     02 Nov 2022 07:25    146    |  114    |  16     ----------------------------<  84     4.1     |  26     |  1.10   01 Nov 2022 06:30    144    |  112    |  18     ----------------------------<  80     4.0     |  27     |  0.87   31 Oct 2022 10:15    141    |  107    |  23     ----------------------------<  83     4.3     |  28     |  1.10     Ca    8.7        02 Nov 2022 07:25  Ca    9.1        01 Nov 2022 06:30  Ca    9.3        31 Oct 2022 10:15  Phos  4.0       02 Nov 2022 07:25  Mg     2.1       02 Nov 2022 07:25    TPro  6.0    /  Alb  3.2    /  TBili  0.6    /  DBili  x      /  AST  31     /  ALT  28     /  AlkPhos  66     01 Nov 2022 06:30  TPro  6.9    /  Alb  3.6    /  TBili  0.5    /  DBili  x      /  AST  39     /  ALT  33     /  AlkPhos  75     31 Oct 2022 10:15    PT/INR - ( 31 Oct 2022 10:15 )   PT: 16.8 sec;   INR: 1.43 ratio         PTT - ( 31 Oct 2022 10:15 )  PTT:41.8 sec      12 Lead ECG:   Ventricular Rate 64 BPM    Atrial Rate 64 BPM    P-R Interval 132 ms    QRS Duration 94 ms    Q-T Interval 408 ms    QTC Calculation(Bazett) 420 ms    P Axis 51 degrees    R Axis -11 degrees    T Axis 32 degrees    Diagnosis Line Normal sinus rhythm  Normal ECG  When compared with ECG of 06-FEB-2021 18:49,  No significant change was found  Confirmed by GRIFFIN LOPEZ ([id number]) on 10/31/2022 5:20:22 PM (10-31-22 @ 14:37)      Patient name: BALBIR ANGEL  YOB: 1950   Age: 70 (F)   MR#: [id number]  Study Date: 5/20/2021  [facility]  2nd Sonographer: Lisetet Adams M.D.  Study quality: Technically good  Referring Physician: Ayush Mahoney MD  Blood Pressure: 140/62 mmHg  Height: 152 cm  Weight: 87 kg  BSA: 1.8 m2  ------------------------------------------------------------------------  PROCEDURE: Transthoracic echocardiogram with 2-D, M-Mode  and complete spectral and color flow Doppler.  INDICATION: Abnormal electrocardiogram (ECG) (EKG) (R94.31)  ------------------------------------------------------------------------  DIMENSIONS:  Dimensions:     Normal Values:  LA:     4.1 cm    2.0 - 4.0 cm  Ao:     3.4 cm    2.0- 3.8 cm  SEPTUM: 0.8 cm    0.6 - 1.2 cm  PWT:    0.9 cm    0.6 - 1.1 cm  LVIDd:  5.2 cm    3.0 - 5.6 cm  LVIDs:  3.5 cm    1.8 - 4.0 cm  Derived Variables:  LVMI: 86 g/m2  RWT: 0.34  Fractional short: 33 %  Ejection Fraction (Teicholtz): 61 %  Peak Velocity (m/sec): AoV=2.4  ------------------------------------------------------------------------  OBSERVATIONS:  Mitral Valve: Mitral annular calcification, otherwise  normal mitral valve. Minimal mitral regurgitation.  Aortic Root: Normal aortic root.  Aortic Valve: Calcified trileaflet aortic valve with  decreased opening.The valve is probably mild to moderately  stenotic.  Peak transaortic valve gradient equals 23 mm Hg,  mean transaortic valve gradient equals 11 mm Hg.  Mild  aortic regurgitation.  Left Atrium: Mildly dilated left atrium.  LA volume index =  36 cc/m2.  Left Ventricle: Normal left ventricular systolic function.  No segmental wall motion abnormalities. Normal left  ventricular internal dimensions and wall thicknesses. Mild  diastolic dysfunction (Stage I).  Right Heart: Mild right atrial enlargement. Normal right  ventricular size and function. Normal tricuspid valve.  Minimal tricuspid regurgitation. Normal pulmonic valve.  Minimal pulmonic regurgitation.  Pericardium/PleuraNormal pericardium with trace pericardial  effusion.  Hemodynamic: Estimated right ventricular systolic pressure  equals 22 mm Hg, assuming right atrial pressure equals 10  mm Hg, consistent with normal pulmonary pressures.  ------------------------------------------------------------------------  CONCLUSIONS:  1. Mitral annular calcification, otherwise normal mitral  valve. Minimal mitral regurgitation.  2. Calcified trileaflet aortic valve with decreased  opening.The valve is probably mild to moderately stenotic.  Peak transaortic valve gradient equals 23 mm Hg, mean  transaortic valve gradient equals 11 mm Hg.  Mild aortic  regurgitation.  3. Mildly dilated left atrium.  LA volume index = 36 cc/m2.  4. Normal left ventricular internal dimensions and wall  thicknesses.  5. Normal left ventricular systolic function. No segmental  wall motion abnormalities.  6. Mild diastolic dysfunction (Stage I).  7. Normal right ventricular size and function.  8. Normal pericardium with trace pericardial effusion.  ------------------------------------------------------------------------  Confirmed on  5/20/2021 - 11:37:30 by BILL Victoria  ------------------------------------------------------------------------      [facility] -- Marcella Macias,  Ana Maria, Van Yañez Savella, Goodger  Office # [phone]    Follow Up:  Afib on Eliquis, HTN, HLD    Subjective/Observations: No events overnight resting comfortably in bed.  No complaints of chest pain, dyspnea, or palpitations reported. No signs of orthopnea or PND.  NPO for plan for colonoscopy 11/02/22    REVIEW OF SYSTEMS: All other review of systems is negative unless indicated above  PAST MEDICAL & SURGICAL HISTORY:  COPD (chronic obstructive pulmonary disease)      Hypothyroid      HTN (hypertension)      Hyperlipidemia      Edema extremities      Afib  On Eliquis      Psoriasis      S/P eye surgery  age 5 unsure if right or left eye        MEDICATIONS  (STANDING):  artificial  tears Solution 1 Drop(s) Both EYES three times a day  ascorbic acid 500 milliGRAM(s) Oral daily  budesonide 160 MICROgram(s)/formoterol 4.5 MICROgram(s) Inhaler 2 Puff(s) Inhalation two times a day  cholecalciferol 400 Unit(s) Oral daily  cyanocobalamin 1000 MICROGram(s) Oral daily  hydrALAZINE 50 milliGRAM(s) Oral three times a day  influenza  Vaccine (HIGH DOSE) 0.7 milliLiter(s) IntraMuscular once  lactated ringers. 1000 milliLiter(s) (75 mL/Hr) IV Continuous <Continuous>  levothyroxine 25 MICROGram(s) Oral daily  losartan 50 milliGRAM(s) Oral daily  sodium chloride 0.9%. 1000 milliLiter(s) (60 mL/Hr) IV Continuous <Continuous>  tiotropium 18 MICROgram(s) Capsule 1 Capsule(s) Inhalation daily    MEDICATIONS  (PRN):  acetaminophen     Tablet .. 650 milliGRAM(s) Oral every 6 hours PRN Temp greater or equal to 38C (100.4F), Mild Pain (1 - 3)  melatonin 3 milliGRAM(s) Oral at bedtime PRN Insomnia  ondansetron Injectable 4 milliGRAM(s) IV Push every 8 hours PRN Nausea and/or Vomiting    Allergies    artichokes (Unknown)  IV Contrast (Unknown)  Levaquin (Anaphylaxis)  sulfa drugs (Unknown)  Sulfur (Unknown)    Intolerances      Vital Signs Last 24 Hrs  T(C): 36.7 (02 Nov 2022 04:37), Max: 36.7 (01 Nov 2022 20:00)  T(F): 98.1 (02 Nov 2022 04:37), Max: 98.1 (01 Nov 2022 20:00)  HR: 66 (02 Nov 2022 04:37) (61 - 67)  BP: 143/77 (02 Nov 2022 04:37) (132/71 - 147/71)  BP(mean): --  RR: 18 (02 Nov 2022 04:37) (17 - 18)  SpO2: 95% (02 Nov 2022 04:37) (93% - 95%)    Parameters below as of 02 Nov 2022 04:37  Patient On (Oxygen Delivery Method): room air      I&O's Summary      PHYSICAL EXAM:  Constitutional: NAD, awake and alert  HEENT: Moist Mucous Membranes, Anicteric  Pulmonary: Non-labored, breath sounds are clear bilaterally, No wheezing, rales or rhonchi  Cardiovascular: Regular, S1 and S2, + sys murmur, rubs, gallops or clicks  Gastrointestinal: Bowel Sounds present, soft, nontender.   Lymph: trace b/l LE peripheral edema. No lymphadenopathy.  Skin: No visible rashes or ulcers.  Psych:  Mood & affect appropriate    LABS: All Labs Reviewed:                        10.4   5.40  )-----------( 168      ( 02 Nov 2022 07:25 )             33.1                         10.3   5.31  )-----------( 162      ( 01 Nov 2022 06:30 )             32.5                         11.1   5.88  )-----------( 172      ( 31 Oct 2022 10:15 )             35.3     02 Nov 2022 07:25    146    |  114    |  16     ----------------------------<  84     4.1     |  26     |  1.10   01 Nov 2022 06:30    144    |  112    |  18     ----------------------------<  80     4.0     |  27     |  0.87   31 Oct 2022 10:15    141    |  107    |  23     ----------------------------<  83     4.3     |  28     |  1.10     Ca    8.7        02 Nov 2022 07:25  Ca    9.1        01 Nov 2022 06:30  Ca    9.3        31 Oct 2022 10:15  Phos  4.0       02 Nov 2022 07:25  Mg     2.1       02 Nov 2022 07:25    TPro  6.0    /  Alb  3.2    /  TBili  0.6    /  DBili  x      /  AST  31     /  ALT  28     /  AlkPhos  66     01 Nov 2022 06:30  TPro  6.9    /  Alb  3.6    /  TBili  0.5    /  DBili  x      /  AST  39     /  ALT  33     /  AlkPhos  75     31 Oct 2022 10:15    PT/INR - ( 31 Oct 2022 10:15 )   PT: 16.8 sec;   INR: 1.43 ratio         PTT - ( 31 Oct 2022 10:15 )  PTT:41.8 sec      12 Lead ECG:   Ventricular Rate 64 BPM    Atrial Rate 64 BPM    P-R Interval 132 ms    QRS Duration 94 ms    Q-T Interval 408 ms    QTC Calculation(Bazett) 420 ms    P Axis 51 degrees    R Axis -11 degrees    T Axis 32 degrees    Diagnosis Line Normal sinus rhythm  Normal ECG  When compared with ECG of 06-FEB-2021 18:49,  No significant change was found  Confirmed by GRIFFIN LOPEZ ([id number]) on 10/31/2022 5:20:22 PM (10-31-22 @ 14:37)      Patient name: BALBIR ANGEL  YOB: 1950   Age: 70 (F)   MR#: [id number]  Study Date: 5/20/2021  [facility]  2nd Sonographer: Lisette Adams M.D.  Study quality: Technically good  Referring Physician: Ayush Mahoney MD  Blood Pressure: 140/62 mmHg  Height: 152 cm  Weight: 87 kg  BSA: 1.8 m2  ------------------------------------------------------------------------  PROCEDURE: Transthoracic echocardiogram with 2-D, M-Mode  and complete spectral and color flow Doppler.  INDICATION: Abnormal electrocardiogram (ECG) (EKG) (R94.31)  ------------------------------------------------------------------------  DIMENSIONS:  Dimensions:     Normal Values:  LA:     4.1 cm    2.0 - 4.0 cm  Ao:     3.4 cm    2.0- 3.8 cm  SEPTUM: 0.8 cm    0.6 - 1.2 cm  PWT:    0.9 cm    0.6 - 1.1 cm  LVIDd:  5.2 cm    3.0 - 5.6 cm  LVIDs:  3.5 cm    1.8 - 4.0 cm  Derived Variables:  LVMI: 86 g/m2  RWT: 0.34  Fractional short: 33 %  Ejection Fraction (Teicholtz): 61 %  Peak Velocity (m/sec): AoV=2.4  ------------------------------------------------------------------------  OBSERVATIONS:  Mitral Valve: Mitral annular calcification, otherwise  normal mitral valve. Minimal mitral regurgitation.  Aortic Root: Normal aortic root.  Aortic Valve: Calcified trileaflet aortic valve with  decreased opening.The valve is probably mild to moderately  stenotic.  Peak transaortic valve gradient equals 23 mm Hg,  mean transaortic valve gradient equals 11 mm Hg.  Mild  aortic regurgitation.  Left Atrium: Mildly dilated left atrium.  LA volume index =  36 cc/m2.  Left Ventricle: Normal left ventricular systolic function.  No segmental wall motion abnormalities. Normal left  ventricular internal dimensions and wall thicknesses. Mild  diastolic dysfunction (Stage I).  Right Heart: Mild right atrial enlargement. Normal right  ventricular size and function. Normal tricuspid valve.  Minimal tricuspid regurgitation. Normal pulmonic valve.  Minimal pulmonic regurgitation.  Pericardium/PleuraNormal pericardium with trace pericardial  effusion.  Hemodynamic: Estimated right ventricular systolic pressure  equals 22 mm Hg, assuming right atrial pressure equals 10  mm Hg, consistent with normal pulmonary pressures.  ------------------------------------------------------------------------  CONCLUSIONS:  1. Mitral annular calcification, otherwise normal mitral  valve. Minimal mitral regurgitation.  2. Calcified trileaflet aortic valve with decreased  opening.The valve is probably mild to moderately stenotic.  Peak transaortic valve gradient equals 23 mm Hg, mean  transaortic valve gradient equals 11 mm Hg.  Mild aortic  regurgitation.  3. Mildly dilated left atrium.  LA volume index = 36 cc/m2.  4. Normal left ventricular internal dimensions and wall  thicknesses.  5. Normal left ventricular systolic function. No segmental  wall motion abnormalities.  6. Mild diastolic dysfunction (Stage I).  7. Normal right ventricular size and function.  8. Normal pericardium with trace pericardial effusion.  ------------------------------------------------------------------------  Confirmed on  5/20/2021 - 11:37:30 by BILL Victoria  ------------------------------------------------------------------------

## 2022-11-02 NOTE — DISCHARGE NOTE PROVIDER - NSDCFUSCHEDAPPT_GEN_ALL_CORE_FT
Palla, Venugopal R  [facility] Physician [facility]  CARDIOLOGY [address] [facility] [address]  Scheduled Appointment: 11/04/2022

## 2022-11-02 NOTE — PROGRESS NOTE ADULT - PROBLEM SELECTOR PLAN 3
Chronic COPD  - Currently asymptomatic, satting 94-97% on RA  - Continue home Advair therapeutic interchange   - Continue home Spiriva  - Monitor respiratory status
Chronic COPD  - Currently asymptomatic, satting 94-97% on RA  - Continue home Advair therapeutic interchange   - Continue home Spiriva  - Monitor respiratory status

## 2022-11-02 NOTE — PROGRESS NOTE ADULT - SUBJECTIVE AND OBJECTIVE BOX
Patient is a 71y old  Female who presents with a chief complaint of GIB (01 Nov 2022 10:38)       INTERVAL HPI/OVERNIGHT EVENTS:  Seen and examined at bedside. No new events, complaints noted     MEDICATIONS  (STANDING):  artificial  tears Solution 1 Drop(s) Both EYES three times a day  ascorbic acid 500 milliGRAM(s) Oral daily  budesonide 160 MICROgram(s)/formoterol 4.5 MICROgram(s) Inhaler 2 Puff(s) Inhalation two times a day  cholecalciferol 400 Unit(s) Oral daily  cyanocobalamin 1000 MICROGram(s) Oral daily  hydrALAZINE 50 milliGRAM(s) Oral three times a day  influenza  Vaccine (HIGH DOSE) 0.7 milliLiter(s) IntraMuscular once  lactated ringers. 1000 milliLiter(s) (75 mL/Hr) IV Continuous <Continuous>  levothyroxine 25 MICROGram(s) Oral daily  losartan 50 milliGRAM(s) Oral daily  sodium chloride 0.9%. 1000 milliLiter(s) (60 mL/Hr) IV Continuous <Continuous>  tiotropium 18 MICROgram(s) Capsule 1 Capsule(s) Inhalation daily    MEDICATIONS  (PRN):  acetaminophen     Tablet .. 650 milliGRAM(s) Oral every 6 hours PRN Temp greater or equal to 38C (100.4F), Mild Pain (1 - 3)  melatonin 3 milliGRAM(s) Oral at bedtime PRN Insomnia  ondansetron Injectable 4 milliGRAM(s) IV Push every 8 hours PRN Nausea and/or Vomiting      Allergies    artichokes (Unknown)  IV Contrast (Unknown)  Levaquin (Anaphylaxis)  sulfa drugs (Unknown)  Sulfur (Unknown)    Intolerances        REVIEW OF SYSTEMS:  CONSTITUTIONAL: No fever, weight loss, or fatigue  EYES: No eye pain, visual disturbances, or discharge  ENMT:  No difficulty hearing, tinnitus, vertigo; No sinus or throat pain  NECK: No pain or stiffness  RESPIRATORY: No cough, wheezing, chills or hemoptysis; No shortness of breath  CARDIOVASCULAR: No chest pain, palpitations, dizziness, or leg swelling  GASTROINTESTINAL: No abdominal or epigastric pain. No nausea, vomiting, or hematemesis; No diarrhea or constipation. No melena or hematochezia.  GENITOURINARY: No dysuria, frequency, hematuria, or incontinence  NEUROLOGICAL: No headaches, memory loss, loss of strength, numbness, or tremors  SKIN: No itching, burning, rashes, or lesions   LYMPH NODES: No enlarged glands  ENDOCRINE: No heat or cold intolerance; No hair loss; No polydipsia or polyuria  MUSCULOSKELETAL: No joint pain or swelling; No muscle, back, or extremity pain  PSYCHIATRIC: No depression, anxiety, mood swings, or difficulty sleeping  HEME/LYMPH: No easy bruising, or bleeding gums  ALLERGY AND IMMUNOLOGIC: No hives or eczema    Vital Signs Last 24 Hrs  T(C): 36.7 (02 Nov 2022 04:37), Max: 36.7 (01 Nov 2022 20:00)  T(F): 98.1 (02 Nov 2022 04:37), Max: 98.1 (01 Nov 2022 20:00)  HR: 66 (02 Nov 2022 04:37) (61 - 67)  BP: 143/77 (02 Nov 2022 04:37) (132/71 - 147/71)  BP(mean): --  RR: 18 (02 Nov 2022 04:37) (17 - 18)  SpO2: 95% (02 Nov 2022 04:37) (93% - 95%)    Parameters below as of 02 Nov 2022 04:37  Patient On (Oxygen Delivery Method): room air        PHYSICAL EXAM:  GENERAL: NAD, well-groomed  HEAD:  Atraumatic, Normocephalic  EYES: EOMI, PERRLA, conjunctiva and sclera clear  ENMT: No tonsillar erythema, exudates, or enlargement; Moist mucous membranes  NECK: Supple, No JVD, Normal thyroid  NERVOUS SYSTEM:  Alert & Oriented X3, Good concentration; Motor Strength 5/5 B/L upper and lower extremities  CHEST/LUNG: Clear to auscultation bilaterally; No rales, rhonchi, wheezing, or rubs  HEART: Regular rate and rhythm; No murmurs, rubs, or gallops  ABDOMEN: Soft, Nontender, Nondistended; Bowel sounds present  EXTREMITIES:  2+ Peripheral Pulses, No clubbing, cyanosis, or edema  LYMPH: No lymphadenopathy noted  SKIN: No rashes or lesions    LABS:                        10.4   5.40  )-----------( 168      ( 02 Nov 2022 07:25 )             33.1     02 Nov 2022 07:25    146    |  114    |  16     ----------------------------<  84     4.1     |  26     |  1.10     Ca    8.7        02 Nov 2022 07:25  Phos  4.0       02 Nov 2022 07:25  Mg     2.1       02 Nov 2022 07:25      PT/INR - ( 31 Oct 2022 10:15 )   PT: 16.8 sec;   INR: 1.43 ratio         PTT - ( 31 Oct 2022 10:15 )  PTT:41.8 sec  CAPILLARY BLOOD GLUCOSE        BLOOD CULTURE    RADIOLOGY & ADDITIONAL TESTS:    Imaging Personally Reviewed:  [ ] YES     Consultant(s) Notes Reviewed:      Care Discussed with Consultants/Other Providers:

## 2022-11-02 NOTE — DISCHARGE NOTE NURSING/CASE MANAGEMENT/SOCIAL WORK - PATIENT PORTAL LINK FT
You can access the FollowMyHealth Patient Portal offered by [facility] by registering at the following website: [email or web address]. By joining [employer]’s FollowMyHealth portal, you will also be able to view your health information using other applications (apps) compatible with our system.

## 2022-11-02 NOTE — DISCHARGE NOTE PROVIDER - CARE PROVIDER_API CALL
Alex Low (DO)  Gastroenterology; Internal Medicine  [address] [place] [address]  Phone: [phone]  Fax: [phone]  Follow Up Time:

## 2022-11-02 NOTE — DISCHARGE NOTE NURSING/CASE MANAGEMENT/SOCIAL WORK - NSDCPEFALRISK_GEN_ALL_CORE
For information on Fall & Injury Prevention, visit: [email or web address] OR  [email or web address] OR  https://www.cdc.gov/steadi/patient.html

## 2022-11-02 NOTE — DISCHARGE NOTE PROVIDER - NSDCCPCAREPLAN_GEN_ALL_CORE_FT
PRINCIPAL DISCHARGE DIAGNOSIS  Diagnosis: Rectal bleeding  Assessment and Plan of Treatment: Hold Eliquis for one week as per GI Dr. Low  F/carol with GI in 1 week to follow up biopsy results  Meds per the list  Follow up with PCP in 2- 3 days.

## 2022-11-02 NOTE — PROGRESS NOTE ADULT - ASSESSMENT
71y Female with PMHx of paroxysmal AFib on Eliquis, HTN, HLD, COPD, hypothyroidism, psoriasis presenting with bright red bloody stools being admitted for likely diverticular bleed.   Cardiology consulted for colonoscopy clearance.    Afib on Eliquis/ HTN/ HLD  - EKG shows NSR, HR 64  - No acute changes on EKG compared to previous.  - No meaningful evidence of volume overload.  - Previous TTE (2021 @[facility]) shows EF 55% w/ mild AS, AI, DD, JADE  - BP well controlled, monitor routine hemodynamics.  - Continue Losartan and hydralazine  - Continue to hold Eliquis in setting of GI bleed, last dose was 10/31/22 in the AM    - Pt has no active ischemia, decompensated heart failure, unstable arrythmia, or severe stenotic valvular disease, and has minimal cardiac risk factors. In the setting of low risk ,she is optimized from cardiovascular standpoint to proceed with planned procedure with routine hemodynamic monitoring.   - RCRI: 0 Class I risk  - Plan for colonoscopy 11/02/22    - Monitor and replete lytes, keep K>4, Mg>2.  - Will continue to follow.    Anita Goodman NP  Nurse Practitioner- Cardiology   Spectra #[phone]
Anemia  Rectal bleed    CT noted, d/w patient  Monitor cbc, transfuse prn keep hgb >8  Hold a/c  Colonoscopy tomorrow  Clear liquids today  NPO after midnight  Bowel prep ordered  Cardiac clearance appreciated  Will need medical clearance  D/w patient  Will follow    I reviewed the overnight course of events on the unit, re-confirming the patient history. I discussed the care with the patient and their family  Differential diagnosis and plan of care discussed with patient after the evaluation  35 minutes spent on total encounter of which more than fifty percent of the encounter was spent counseling and/or coordinating care by the attending physician.  Advanced care planning was discussed with patient and family.  Advanced care planning forms were reviewed and discussed.  Risks, benefits and alternatives of gastroenterologic procedures were discussed in detail and all questions were answered.
71y Female with PMHx of paroxysmal AFib on Eliquis, HTN, HLD, COPD, hypothyroidism, psoriasis presenting with bright red bloody stools being admitted for likely diverticular bleed.
71y Female with PMHx of paroxysmal AFib on Eliquis, HTN, HLD, COPD, hypothyroidism, psoriasis presenting with bright red bloody stools being admitted for likely diverticular bleed.

## 2022-11-02 NOTE — PROGRESS NOTE ADULT - PROBLEM SELECTOR PLAN 7
DVT ppx w/ SCDs in setting of GIB, was on home Eliquis for Afib
DVT ppx w/ SCDs in setting of GIB, was on home Eliquis for Afib

## 2022-11-02 NOTE — PROGRESS NOTE ADULT - PROBLEM SELECTOR PLAN 2
Patient with known history of afib on Eliquis  - States that her previous EKGs has been normal  - NSR 75bpm on admission  - PT 16.8, INR 1.43, PTT 4.18  - Hold Eliquis in setting of GIB  - Monitor lytes and replete as needed
Patient with known history of afib on Eliquis  - States that her previous EKGs has been normal  - NSR 75bpm on admission  - PT 16.8, INR 1.43, PTT 4.18  - Hold Eliquis in setting of GIB  - Monitor lytes and replete as needed

## 2022-11-02 NOTE — PROGRESS NOTE ADULT - PROBLEM SELECTOR PLAN 5
Chronic  - Was previously on atorvastatin but pt discussed with her PCP and has been off statins for a bit, wanted to try using red yeast rice
Chronic  - Was previously on atorvastatin but pt discussed with her PCP and has been off statins for a bit, wanted to try using red yeast rice

## 2022-11-02 NOTE — DISCHARGE NOTE PROVIDER - NSDCMRMEDTOKEN_GEN_ALL_CORE_FT
Advair Diskus 500 mcg-50 mcg inhalation powder: 1 puff(s) inhaled 2 times a day  Artificial Tears ophthalmic solution: 1 drop(s) to each affected eye 3 times a day  cholecalciferol oral tablet: 400 unit(s) orally once a day  hydrALAZINE 50 mg oral tablet: 1 tab(s) orally 3 times a day  levothyroxine 25 mcg (0.025 mg) oral tablet: 1 tab(s) orally once a day  losartan 50 mg oral tablet: 1 tab(s) orally once a day  Lubricant Eye Drops ophthalmic solution: 1 drop(s) to each affected eye once a day (at bedtime)  Spiriva 18 mcg inhalation capsule: 1 each inhaled once a day  Vitamin B-12: 1 tab(s) orally once a day  Vitamin C 500 mg oral tablet: 1 tab(s) orally once a day

## 2022-11-04 ENCOUNTER — APPOINTMENT (OUTPATIENT)
Dept: CARDIOLOGY | Facility: CLINIC | Age: 72
End: 2022-11-04

## 2022-11-04 ENCOUNTER — NON-APPOINTMENT (OUTPATIENT)
Age: 72
End: 2022-11-04

## 2022-11-04 VITALS
BODY MASS INDEX: 35.5 KG/M2 | RESPIRATION RATE: 16 BRPM | OXYGEN SATURATION: 98 % | DIASTOLIC BLOOD PRESSURE: 68 MMHG | SYSTOLIC BLOOD PRESSURE: 142 MMHG | TEMPERATURE: 97 F | WEIGHT: 188 LBS | HEART RATE: 62 BPM | HEIGHT: 61 IN

## 2022-11-04 VITALS
SYSTOLIC BLOOD PRESSURE: 142 MMHG | BODY MASS INDEX: 35.5 KG/M2 | HEIGHT: 61 IN | HEART RATE: 62 BPM | OXYGEN SATURATION: 98 % | WEIGHT: 188 LBS | DIASTOLIC BLOOD PRESSURE: 66 MMHG

## 2022-11-04 DIAGNOSIS — K92.2 GASTROINTESTINAL HEMORRHAGE, UNSPECIFIED: ICD-10-CM

## 2022-11-04 PROCEDURE — 93000 ELECTROCARDIOGRAM COMPLETE: CPT

## 2022-11-04 PROCEDURE — 99214 OFFICE O/P EST MOD 30 MIN: CPT | Mod: 25

## 2022-11-04 RX ORDER — VITAMIN B COMPLEX
TABLET ORAL DAILY
Refills: 0 | Status: DISCONTINUED | COMMUNITY
End: 2022-11-04

## 2022-11-04 NOTE — ASSESSMENT
[FreeTextEntry1] : \par Recent GI bleeding less than 1 week ago , s/p colonoscopy, colonic polyps x 4 , off anticoagulation for one week , advised to resume 1 week from procedure ( as per GI /patient ) , patient is in sinus rhythm \par \par \par Hx of PAF currently in sinus rhythm  tachy cem syndrome with resolved symptoms  after changing medication ,   avoid negative chronotropic drugs , continue losartan , hydralazine  eliquis   patient scheduled to have  ILR placement  with EP \par \par Hypertension ,elevated  , continue low salt diet and  increase losartan to 50 mg po daily , blood work in 1 month \par \par Cardiac murmur/ caortid bruits :  mild   aortic stenosis ;  continue to monitor , mild carotid disease \par \par Hyperlipidemia   elevated compared to prior , with evidence of coronary calcification suggestive coronary atherosclerosis  ( no evidence of ischemia on stress test ) , target LDL <70,  hold  lipitor for 4 weeks and observe for symptom resolution , \par \par Body aches ? arthritis   improved aches on holding  lipitor, will rpeat blood work on red yeast \par \par Obesity prior hx of sleep apnea  , patient was encouraged to loose more weight \par \par COPD pulmonary nodule stable continue current medication , follow up with pulmonary \par \par \par follow up after 6 weeks \par

## 2022-11-04 NOTE — CARDIOLOGY SUMMARY
[de-identified] : 6/10/22   sinus bradycardia  [de-identified] : 4/16/19  no ischemia on chemical nuclear stress test  [de-identified] : 1/27/21  [facility]    EF 55% Mild DD MIld AS AI , JADE

## 2022-11-04 NOTE — PHYSICAL EXAM
[Obese] : obese [Normal Conjunctiva] : normal conjunctiva [Normal Venous Pressure] : normal venous pressure [Carotid Bruit] : carotid bruit [Normal Rate] : normal [Normal S1] : normal S1 [Normal S2] : normal S2 [II] : a grade 2 [No Pitting Edema] : no pitting edema present [Right Carotid Bruit] : right carotid bruit heard [Left Carotid Bruit] : left carotid bruit heard [2+] : left 2+ [No Abnormalities] : the abdominal aorta was not enlarged and no bruit was heard [Clear Lung Fields] : clear lung fields [Good Air Entry] : good air entry [No Respiratory Distress] : no respiratory distress  [Soft] : abdomen soft [Non Tender] : non-tender [Normal Bowel Sounds] : normal bowel sounds [Abnormal Gait] : abnormal gait [No Edema] : no edema [No Cyanosis] : no cyanosis [No Clubbing] : no clubbing [No Varicosities] : no varicosities [Normal Radial B/L] : normal radial B/L [No Rash] : no rash [No Skin Lesions] : no skin lesions [Moves all extremities] : moves all extremities [No Focal Deficits] : no focal deficits [Normal Speech] : normal speech [Alert and Oriented] : alert and oriented [Normal memory] : normal memory [S3] : no S3 [S4] : no S4 [Rt] : no varicose veins of the right leg [Lt] : no varicose veins of the left leg [Right Femoral Bruit] : no bruit heard over the right femoral artery [Left Femoral Bruit] : no bruit heard over the left femoral artery [de-identified] : bilateral  [de-identified] : ALYSSA [de-identified] : arthritis

## 2022-11-04 NOTE — HISTORY OF PRESENT ILLNESS
[FreeTextEntry1] : 70 year old female with hx of morbid obesity  improved sleep apnea with weight loss ,  Monoclonal gammopathy HTN  hyperlipidemia  PAF , bradycardia with normal chronotropic response  ,was evaluated by EP  placed on hydralazine , patients palpitations resolved came for follow up after hospitalization with GI bleeding ,last monday , had colonoscopy Wednesday ,had colonic polyps , advised to stop the eliquis until one week . patient had mild anemia , no PRBC transfusion given , patient is feeling fine , no chest pain or shortness of breath or palpitation or dizziness \par \par \par says she is having side effects with lipitor ,multiple joint pains , unable to sleep longer at night , can not lay on left side due to hip pain \par \par Patient blood pressure  is elevated as she has white coat component \par \par Patient used to be very obese , did loose some weight , with some improvement in sleep apnea , now she does not use cpap , patient had long standing hx of copd ,  used to use oxygen  NC  now she is fine on bronchodilator treatment ,  now she is not using oxygen \par  \par \par her blood work showed mild elevated TC LDL  compared to prior done july 2022  ? stopped medication \par

## 2022-12-16 NOTE — ED ADULT NURSE NOTE - NSFALLRSKINDICATORS_ED_ALL_ED
[FreeTextEntry1] : Reviewed recent notes, labs and imaging today. And updated patient's EMR. Discussed plan as below with patient. 
no

## 2022-12-19 NOTE — ED ADULT NURSE NOTE - CHIEF COMPLAINT QUOTE
C/O Rectal bleeding since this morning. Patient is on Eliquis for A fib. Minoxidil Pregnancy And Lactation Text: This medication has not been assigned a Pregnancy Risk Category but animal studies failed to show danger with the topical medication. It is unknown if the medication is excreted in breast milk.

## 2023-01-12 ENCOUNTER — APPOINTMENT (OUTPATIENT)
Dept: CARDIOLOGY | Facility: CLINIC | Age: 73
End: 2023-01-12
Payer: MEDICARE

## 2023-01-12 ENCOUNTER — NON-APPOINTMENT (OUTPATIENT)
Age: 73
End: 2023-01-12

## 2023-01-12 VITALS
BODY MASS INDEX: 36.06 KG/M2 | OXYGEN SATURATION: 98 % | HEART RATE: 56 BPM | DIASTOLIC BLOOD PRESSURE: 70 MMHG | SYSTOLIC BLOOD PRESSURE: 160 MMHG | WEIGHT: 191 LBS | RESPIRATION RATE: 16 BRPM | TEMPERATURE: 97 F | HEIGHT: 61 IN

## 2023-01-12 VITALS
DIASTOLIC BLOOD PRESSURE: 70 MMHG | SYSTOLIC BLOOD PRESSURE: 142 MMHG | OXYGEN SATURATION: 93 % | HEIGHT: 61 IN | HEART RATE: 67 BPM | WEIGHT: 191 LBS | BODY MASS INDEX: 36.06 KG/M2

## 2023-01-12 PROCEDURE — 99214 OFFICE O/P EST MOD 30 MIN: CPT | Mod: 25

## 2023-01-12 PROCEDURE — 93000 ELECTROCARDIOGRAM COMPLETE: CPT

## 2023-01-12 NOTE — PHYSICAL EXAM
[Obese] : obese [Normal Conjunctiva] : normal conjunctiva [Normal Venous Pressure] : normal venous pressure [Carotid Bruit] : carotid bruit [Normal Rate] : normal [Normal S1] : normal S1 [Normal S2] : normal S2 [II] : a grade 2 [No Pitting Edema] : no pitting edema present [Right Carotid Bruit] : right carotid bruit heard [Left Carotid Bruit] : left carotid bruit heard [2+] : left 2+ [No Abnormalities] : the abdominal aorta was not enlarged and no bruit was heard [Clear Lung Fields] : clear lung fields [Good Air Entry] : good air entry [No Respiratory Distress] : no respiratory distress  [Soft] : abdomen soft [Non Tender] : non-tender [Normal Bowel Sounds] : normal bowel sounds [Abnormal Gait] : abnormal gait [No Edema] : no edema [No Cyanosis] : no cyanosis [No Clubbing] : no clubbing [No Varicosities] : no varicosities [Normal Radial B/L] : normal radial B/L [No Rash] : no rash [No Skin Lesions] : no skin lesions [Moves all extremities] : moves all extremities [No Focal Deficits] : no focal deficits [Normal Speech] : normal speech [Alert and Oriented] : alert and oriented [Normal memory] : normal memory [S3] : no S3 [S4] : no S4 [Rt] : no varicose veins of the right leg [Lt] : no varicose veins of the left leg [Right Femoral Bruit] : no bruit heard over the right femoral artery [Left Femoral Bruit] : no bruit heard over the left femoral artery [de-identified] : bilateral  [de-identified] : ALYSSA [de-identified] : arthritis

## 2023-01-12 NOTE — ASSESSMENT
[FreeTextEntry1] : Prior GI bleeding  , s/p colonoscopy, colonic polyps x 4 , , patient is in sinus rhythm \par \par \par Hx of PAF currently in sinus rhythm  tachy cem syndrome with resolved symptoms  after changing medication ,   avoid negative chronotropic drugs , continue losartan , hydralazine  eliquis   patient scheduled to have  ILR placement  with EP \par \par Hypertension ,elevated  ,  normal home BP continue low salt diet and continue  losartan to 50 mg po daily , blood work in 1 month \par \par Cardiac murmur/ caortid bruits :  mild   aortic stenosis ;  continue to monitor , mild carotid disease   will obtain follow up echocardiogram \par \par Hyperlipidemia   elevated compared to prior , with evidence of coronary calcification suggestive coronary atherosclerosis  ( no evidence of ischemia on stress test ) , target LDL <70,  hold  lipitor for 4 weeks and observe for symptom resolution , \par \par Body aches ? arthritis   improved aches on holding  lipitor, will repeat blood work on red yeast \par \par Obesity prior hx of sleep apnea  , patient was encouraged to loose more weight \par \par COPD pulmonary nodule stable continue current medication , follow up with pulmonary \par \par \par follow up after 3 months \par  Less than or equal to 5 Contractions in 30 minutes

## 2023-01-12 NOTE — CARDIOLOGY SUMMARY
[de-identified] : 1/12/23    sinus bradycardia  [de-identified] : 4/16/19  no ischemia on chemical nuclear stress test  [de-identified] : 1/27/21  [facility]    EF 55% Mild DD MIld AS AI , JADE

## 2023-01-12 NOTE — HISTORY OF PRESENT ILLNESS
[FreeTextEntry1] : 70 year old female with hx of morbid obesity  improved sleep apnea with weight loss ,  Monoclonal gammopathy HTN  hyperlipidemia  PAF , bradycardia with normal chronotropic response  ,was evaluated by EP  placed on hydralazine , patients palpitations resolved , GI bleeding , on eliquis , came for follow up , say she was treated for pneumonia  last week , she is feeling better ,  no chest pain or shortness of breath or palpitation or dizziness \par \par Patient blood pressure  is elevated as she has white coat component , patient says her home BP readings are normal range \par \par says she is having side effects with lipitor ,multiple joint pains , unable to sleep longer at night , can not lay on left side due to hip pain stopped taking medication then she felt better \par \par Patient used to be very obese , did loose some weight , with some improvement in sleep apnea , now she does not use cpap , patient had long standing hx of copd ,  used to use oxygen  NC  now she is fine on bronchodilator treatment ,  now she is not using oxygen \par  \par \par her blood work showed mild elevated TC LDL  compared to prior done july 2022  ? total cholesterol 205 HDL 76  stopped medication  \par

## 2023-01-15 NOTE — ED ADULT NURSE NOTE - DRUG PRE-SCREENING (DAST -1)
"Chief Complaint  Hypertension and Follow-up (Pt states this is routine, he had labs with [facility] labs. He has no new issues. )    Subjective          Telly Fitch presents to [facility]     History of Present Illness  Patient pleasant 75-year-old male with underlying hypertension, hyperlipidemia, chronic kidney disease stage 3a, as well as diabetes mellitus, who is coming in 1/23 for routine 4-6-month follow-up.  We will review his labs, address any new concerns, and make new recommendations at that time.    Review of Systems   Constitutional: Negative for appetite change, fatigue and fever.   HENT: Negative for congestion and ear pain.    Eyes: Negative for blurred vision.   Respiratory: Negative for cough, chest tightness, shortness of breath and wheezing.    Cardiovascular: Negative for chest pain, palpitations and leg swelling.   Gastrointestinal: Negative for abdominal pain.   Genitourinary: Negative for difficulty urinating, dysuria and hematuria.   Musculoskeletal: Negative for arthralgias and gait problem.   Skin: Negative for skin lesions.   Neurological: Negative for syncope, memory problem and confusion.   Psychiatric/Behavioral: Negative for self-injury and depressed mood.       Objective   Vital Signs:   /78   Pulse 70   Temp 97.8 °F (36.6 °C) (Skin)   Ht 177.8 cm (70\")   Wt 98.5 kg (217 lb 3.2 oz)   SpO2 96%   BMI 31.16 kg/m²           Physical Exam  Vitals and nursing note reviewed.   Constitutional:       General: He is not in acute distress.     Appearance: Normal appearance. He is not toxic-appearing.   HENT:      Head: Atraumatic.      Right Ear: External ear normal.      Left Ear: External ear normal.      Nose: Nose normal.      Mouth/Throat:      Mouth: Mucous membranes are moist.   Eyes:      General:         Right eye: No discharge.         Left eye: No discharge.      Extraocular Movements: Extraocular movements intact.      Pupils: " Pupils are equal, round, and reactive to light.   Cardiovascular:      Rate and Rhythm: Normal rate and regular rhythm.      Pulses: Normal pulses.      Heart sounds: Normal heart sounds. No murmur heard.    No gallop.   Pulmonary:      Effort: Pulmonary effort is normal. No respiratory distress.      Breath sounds: No wheezing, rhonchi or rales.   Abdominal:      General: There is no distension.      Palpations: Abdomen is soft. There is no mass.      Tenderness: There is no abdominal tenderness. There is no guarding.   Musculoskeletal:         General: No swelling or tenderness.      Cervical back: No tenderness.      Right lower leg: No edema.      Left lower leg: No edema.   Skin:     General: Skin is warm and dry.      Findings: No rash.   Neurological:      General: No focal deficit present.      Mental Status: He is alert and oriented to person, place, and time. Mental status is at baseline.      Motor: No weakness.      Gait: Gait normal.   Psychiatric:         Mood and Affect: Mood normal.         Thought Content: Thought content normal.          Result Review :   The following data was reviewed by: Anuel Fisher MD on 09/16/2021:                  Assessment and Plan    Diagnoses and all orders for this visit:    1. Benign essential HTN (Primary)  Assessment & Plan:  Blood pressure remains well controlled as of his 1/23 office visit.  He stable to continue with full dose ARB along with full dose doxazosin and nifedipine.  Additionally he has moderate dose carvedilol as well as low-dose diuretic on board.      2. Mixed hyperlipidemia  -     Lipid Panel; Future    3. Type 2 diabetes mellitus without complication, without long-term current use of insulin ([facility])  Assessment & Plan:  A1c was 6.8 in 11/22.  His GFR is steady, I think were stable to continue with just 500 twice daily of metformin.  Follow-up labs in the spring.    Orders:  -     Hemoglobin A1c; Future    4. Hyperkalemia  Assessment &  Plan:  Potassium was better as of his 1/23 OV, it was 4.4.  He is being maintained on a 2000 mg daily potassium diet, that certainly helping.      5. Stage 3b chronic kidney disease (HCC)  Assessment & Plan:  GFR is stable around 41 as of his 1/23 OV.  He is following closely with nephrology as well.  Sodium bicarb was ordered recently to see if this would help with his loose stools.  No significant acidosis noted on recent labs.    Orders:  -     Comprehensive Metabolic Panel; Future    6. Vitamin D deficiency  Assessment & Plan:  Vitamin D is 36 as of his 1/23 OV.  Patient stable to continue with low-dose over-the-counter supplementation.       --  --  OLDER NOTES:  VISIT 5/21:  --  HTN typically controlled and please check at home...HCTZ added since last OV; currently high in AM, ? related to CPAP he quit using; will change flomax to cardura and titrate; if no benefit, then will need to get back on CPAP...try 12.5 HCTZ given urinary c/o...seeing Dr Barrera and I rec 24-hr BP monitor and then review results with him on RTO ( ? related to not being on CPAP)...better 8/18, but will try off HCTZ given incont and increase cardura...has not needed PRN clonidine given at 1/19 urgent visit; stable 2/19...up 3/20 and will see if RYR helps=wife thinks it will...145/70 at home=bring wrist cuff on RTO...his cuff reads about 10 pts high top/bottom, but his numbers are up more at home as well; will max out cardura 9/20---> wel controlled 5/21.  CKD3a stable=1.6 (42%) and d/w no nsaids...stable 10/16 = 44%...54% nice...42% ? realted to recent stone tx...46% is holding...53%...48%...60% is nice to see as of 9/20 OV...55% is fine 1/21---> 42% is a bit concerning b/c of proteinuria, but not too far off his norm.  --  LIPIDS...he's maxed out as far as TG's go, but ; rec add fish oil...TG's down with wt loss and/or fish oil... and defer changes...110...99...114 ok for now 2/19...109 in 3/20 and will stop Tricor and try  RYR with the statin...TG's 250 off tricor and on RYR; LDL only 70, so no changes needed---> 80 in 1/21.  CP is vague and has appt with cards next week or two...S/P CATH 4/13 with no signif lesion.  --  DM up some to 6.8, but no change meds needed...ditto for 7.0 after holidays...6.0 with wt loss, so lower amaryl to qd...5.5 so stop it...6.8 so resume 1/2 of 2 mg tab in AM only...he was on 1/2 bid up until just a week ago; A1C 6.0, so ok to stay on 1/2 bid...6.3...6.1...5.9 and rec only take 1/2 in AM...6.4 is fine and will stop it on RTO if same...6.0 and will stop the 2 mg of amaryl now=2/19...6.8...6.3 is better ballpark off it---> 6.3 is same in 5/21.  MICRO-ALB = 140 at 2/18 OV; on max ARB/CCB, so will just monitor for now---> was 500 in '20 and now is 5000 in 5/21 = to RENAL and I will get U/S.  MORBID OBESITY down 25 past 6 mo to 226 at 6/16 OV--->225 holding.  --  PULM NODULES 11/14 (former tobacco screening)...no change 6/15 with f/u needed...LLL 5 mm is stable many years, but RUL 7 mm needs one more...neg and no further rec.  --  UGI BLEED=PUD s/p inject...Dr Duarte 12/12 with neg EGD...Hgb recovered...12.5 so resume iron...13.1 better and likely== CKD...12.7 is fine...13 +  FE DEF ANEMIA and I rec lower to qd for ferritin of 310 at 10/17 OV...12.2 and ferritin better, so stay on qd...had scopes and has f/u with Dr Duarte for path and labs as of 5/18 OV...12.2... 12.7 and ok to stay on for now...12.1 with stable iron is ok...11.4 is wrong direction, so will ask Dr Arguello to eval...she has him on more iron and B12; had capsule endoscopy neg '19 as well=defer to Dr Arguello---> 12.7 in 1/21 with stable iron.  LFT's with mild bump that Dr Duarte noted as well...wnl...still wnl, but agree with checking Hep C... Hep B/C neg.  GERD w/o dysphagia.  --  A.R.....on zyrtec/nasonex/singulair, and sees Dr Charles...had CT per Dr Mosqueda (ENT).  RAD w/o flare.   RUPESH on CPAP @ 15 cmH2O...quit using it past year b/c issues with  getting parts?  --  URGENT VISIT 7/19 and 11/20:  R LEG PAIN=twisted knee a few weeks ago, and then fell in hole yesterday; throbs at rest, and painful to walk on; no swelling in knee, and actually says pain is moreso in the hip; decent ROM, but has point tenderness in the greater trochanter; per orders and call Monday.  LBP into L LEG=I d/w him it's not the hip=saw Dr Meredith last month; no change bowel/bladder; is tender L greater trochanter, neg SLR; will get into PTA for L hip and LBP and add gabapentin/medrol...sxs resolved with just gabapentin and ? with passing kidney stone=d/w try weaning off it now...sxs in L hand and in L leg as of 3/20 OV that may be overuse syndrome=laying tile/etc for past 3 months; will get NCS given weak  and refer to Dr Randhawa again if worse.  --  KIDNEY STONES per a Dr Benavidez.  BPH/OAB per Urology in [place].  --  PSA 0.3 (7/11/12)...defer  COLON/EGD 4/18...polyps x1...f/u per Dr Duarte...capsule endo neg '19.  Pneumovax x 1; Prevnar rec 10/16 and 8/18. Hep A x1-2.  Neither of them are interested in Covid vaccine.  (, my pt)    Follow Up   Return in about 14 weeks (around 4/24/2023).  Patient was given instructions and counseling regarding his condition or for health maintenance advice. Please see specific information pulled into the AVS if appropriate.        Statement Selected

## 2023-01-16 ENCOUNTER — FORM ENCOUNTER (OUTPATIENT)
Age: 73
End: 2023-01-16

## 2023-01-19 ENCOUNTER — FORM ENCOUNTER (OUTPATIENT)
Age: 73
End: 2023-01-19

## 2023-01-20 VITALS
DIASTOLIC BLOOD PRESSURE: 66 MMHG | TEMPERATURE: 96.4 F | HEIGHT: 61 IN | WEIGHT: 187 LBS | BODY MASS INDEX: 35.3 KG/M2 | SYSTOLIC BLOOD PRESSURE: 122 MMHG

## 2023-01-25 ENCOUNTER — EMERGENCY (EMERGENCY)
Facility: HOSPITAL | Age: 73
LOS: 1 days | Discharge: HOME | End: 2023-01-25
Attending: EMERGENCY MEDICINE | Admitting: EMERGENCY MEDICINE
Payer: MEDICARE

## 2023-01-25 VITALS
TEMPERATURE: 98 F | HEART RATE: 87 BPM | WEIGHT: 184.09 LBS | OXYGEN SATURATION: 94 % | HEIGHT: 61 IN | DIASTOLIC BLOOD PRESSURE: 84 MMHG | SYSTOLIC BLOOD PRESSURE: 170 MMHG | RESPIRATION RATE: 20 BRPM

## 2023-01-25 DIAGNOSIS — Z98.89 OTHER SPECIFIED POSTPROCEDURAL STATES: Chronic | ICD-10-CM

## 2023-01-25 PROCEDURE — 30903 CONTROL OF NOSEBLEED: CPT

## 2023-01-25 PROCEDURE — 99284 EMERGENCY DEPT VISIT MOD MDM: CPT | Mod: 25

## 2023-01-25 RX ORDER — TRANEXAMIC ACID 100 MG/ML
5 INJECTION, SOLUTION INTRAVENOUS ONCE
Refills: 0 | Status: COMPLETED | OUTPATIENT
Start: 2023-01-25 | End: 2023-01-25

## 2023-01-25 RX ORDER — OXYMETAZOLINE HYDROCHLORIDE 0.5 MG/ML
2 SPRAY NASAL ONCE
Refills: 0 | Status: COMPLETED | OUTPATIENT
Start: 2023-01-25 | End: 2023-01-25

## 2023-01-25 RX ADMIN — TRANEXAMIC ACID 5 MILLILITER(S): 100 INJECTION, SOLUTION INTRAVENOUS at 23:29

## 2023-01-25 RX ADMIN — OXYMETAZOLINE HYDROCHLORIDE 2 SPRAY(S): 0.5 SPRAY NASAL at 21:54

## 2023-01-25 NOTE — ED ADULT NURSE NOTE - OBJECTIVE STATEMENT
pt a/o x 4 with a calm affect c/o epistaxis while on Eliquis.  pending MD valdes and dispo.  patient denies headache or trauma

## 2023-01-26 ENCOUNTER — APPOINTMENT (OUTPATIENT)
Dept: OTOLARYNGOLOGY | Facility: CLINIC | Age: 73
End: 2023-01-26
Payer: MEDICARE

## 2023-01-26 VITALS
HEIGHT: 61 IN | DIASTOLIC BLOOD PRESSURE: 77 MMHG | WEIGHT: 184 LBS | BODY MASS INDEX: 34.74 KG/M2 | HEART RATE: 80 BPM | SYSTOLIC BLOOD PRESSURE: 143 MMHG

## 2023-01-26 VITALS
OXYGEN SATURATION: 95 % | HEART RATE: 90 BPM | DIASTOLIC BLOOD PRESSURE: 79 MMHG | SYSTOLIC BLOOD PRESSURE: 135 MMHG | TEMPERATURE: 98 F | RESPIRATION RATE: 18 BRPM

## 2023-01-26 PROCEDURE — 99214 OFFICE O/P EST MOD 30 MIN: CPT

## 2023-01-26 PROCEDURE — 99283 EMERGENCY DEPT VISIT LOW MDM: CPT | Mod: 25

## 2023-01-26 PROCEDURE — 30903 CONTROL OF NOSEBLEED: CPT | Mod: LT

## 2023-01-26 RX ORDER — CEPHALEXIN 500 MG
500 CAPSULE ORAL ONCE
Refills: 0 | Status: COMPLETED | OUTPATIENT
Start: 2023-01-26 | End: 2023-01-26

## 2023-01-26 RX ORDER — CEPHALEXIN 500 MG
1 CAPSULE ORAL
Qty: 15 | Refills: 0
Start: 2023-01-26 | End: 2023-01-30

## 2023-01-26 RX ADMIN — Medication 500 MILLIGRAM(S): at 00:30

## 2023-01-26 NOTE — ED PROVIDER NOTE - PROGRESS NOTE DETAILS
pt given afrin and after 15 minutes of correct pressure bleeding initially appeared resolved, but resumed after 10 minutes and pt prepared for nasal packing Patient's left nare was packed quite some time ago within the left Rhino Rocket soaked in TXA.  Patient initially with trace oozing of blood around the Rhino Rocket and the minimal clots from right nare which resolved quickly patient with no blood dripping in the posterior oropharynx and is ambulating without dizziness or recheck vital signs and discharge the patient with nasal packing on Keflex to follow-up with her ENT in 1 to 2 days

## 2023-01-26 NOTE — ED PROVIDER NOTE - OBJECTIVE STATEMENT
This patient is a 72-year-old female on Eliquis for atrial fibrillation patient states she has had multiple nosebleeds in her life and states that she uses mupirocin in the naris daily for moisture.  Patient states that 8 PM tonight she began with nasal bleeding that was unresolved with pressure with some spitting out of clots.  Patient denies shortness of breath, dizziness, chest pain or any other symptoms other than nasal bleeding.  Patient tried applying pressure to the upper part of the nose and bleeding has not resolved.  Patient came to the emergency room for evaluation

## 2023-01-26 NOTE — ED PROVIDER NOTE - CARE PROVIDER_API CALL
Deniz Ramires)  Otolaryngology  [address], [address]  [place] [address]  Phone: [phone]  Fax: [phone]  Follow Up Time: 1-3 Days

## 2023-01-26 NOTE — ED PROVIDER NOTE - CONSTITUTIONAL, MLM
normal... Well appearing, awake, alert, oriented to person, place, time/situation and in mild distress , anxious from bleeding

## 2023-01-26 NOTE — ED PROVIDER NOTE - PATIENT PORTAL LINK FT
You can access the FollowMyHealth Patient Portal offered by [facility] by registering at the following website: [email or web address]. By joining [employer]’s FollowMyHealth portal, you will also be able to view your health information using other applications (apps) compatible with our system.

## 2023-01-26 NOTE — ED PROVIDER NOTE - ENMT, MLM
Airway patent, b/l nares with blood minimal in right nare and left nare appears to have active bleeding in anterior plexus of capillaries but not able to visualize exact bleeding spot. Mouth with normal mucosa. Throat has no vesicles, no oropharyngeal exudates and uvula is midline.

## 2023-01-26 NOTE — ED PROVIDER NOTE - CLINICAL SUMMARY MEDICAL DECISION MAKING FREE TEXT BOX
PatientPatient on Eliquis with epistaxis since 8:30 PM not relieved with pressure or Afrin with pressure and with no symptoms of anemia or hypovolemia left nare was packed with a Rhino Rocket and TXA patient was treated with Keflex will be discharged on Keflex 3 times a day with Rhino Rocket in place to follow-up with her ENT in the next 1 to 2 days she will call in the morning for follow-up

## 2023-01-26 NOTE — ASSESSMENT
[FreeTextEntry1] : ADVISED TO KEEP THE PACKINBG FOR ANOTHER DAY AT LEAST\par ANTIBIOTICS AS PRESCRIBED\par F/U TOMORROW

## 2023-01-26 NOTE — ED PROVIDER NOTE - CARDIAC, MLM
Bariatric Manual    You were provided a manual specific to the procedure that you have chosen.  Please refer to that with any questions or call the office at [phone]    
Statement Selected
irreg, regular rhythm.  Heart sounds S1, S2.  No murmurs, rubs or gallops.

## 2023-01-26 NOTE — REASON FOR VISIT
[Subsequent Evaluation] : a subsequent evaluation for [FreeTextEntry2] : Bloody nose/ref by ER 1/25/23

## 2023-01-26 NOTE — ED PROVIDER NOTE - NSFOLLOWUPINSTRUCTIONS_ED_ALL_ED_FT
Avoid heavy straining or blowing your nose.  Keep nasal packing in place.  Take Keflex 1 tablet 3 times daily until finished.  Return to the emergency room for recurrent nasal bleeding or swallowing large amounts of blood down the back your throat, dizziness, palpitations, chest pain or shortness of breath.  Call  in the a.m. to arrange follow-up in the next 1 to 2 days.  Apply nasal saline or Vaseline to the right nare

## 2023-01-26 NOTE — PHYSICAL EXAM
[de-identified] : LEFT RHINOROKET IN PLACE/ NO ACTIVE BLEED [Normal] : mucosa is normal [Midline] : trachea located in midline position

## 2023-01-26 NOTE — HISTORY OF PRESENT ILLNESS
[de-identified] : LEFT EPISTAXIS PACKED RHINOROCKET MIDNIGH\par NO FURTHER BLEED SINCE\par ON ELOQUIS\par MEDICAL HX REVIEWED\par

## 2023-01-27 ENCOUNTER — APPOINTMENT (OUTPATIENT)
Dept: OTOLARYNGOLOGY | Facility: CLINIC | Age: 73
End: 2023-01-27
Payer: MEDICARE

## 2023-01-27 ENCOUNTER — EMERGENCY (EMERGENCY)
Facility: HOSPITAL | Age: 73
LOS: 1 days | Discharge: HOME | End: 2023-01-27
Attending: EMERGENCY MEDICINE | Admitting: EMERGENCY MEDICINE
Payer: MEDICARE

## 2023-01-27 VITALS
SYSTOLIC BLOOD PRESSURE: 130 MMHG | HEIGHT: 60.5 IN | WEIGHT: 178 LBS | DIASTOLIC BLOOD PRESSURE: 78 MMHG | HEART RATE: 79 BPM | BODY MASS INDEX: 34.04 KG/M2

## 2023-01-27 VITALS
TEMPERATURE: 98 F | DIASTOLIC BLOOD PRESSURE: 82 MMHG | HEART RATE: 72 BPM | RESPIRATION RATE: 18 BRPM | SYSTOLIC BLOOD PRESSURE: 128 MMHG | OXYGEN SATURATION: 97 %

## 2023-01-27 VITALS
RESPIRATION RATE: 18 BRPM | OXYGEN SATURATION: 96 % | SYSTOLIC BLOOD PRESSURE: 144 MMHG | HEIGHT: 66 IN | HEART RATE: 68 BPM | DIASTOLIC BLOOD PRESSURE: 82 MMHG | WEIGHT: 177.91 LBS | TEMPERATURE: 98 F

## 2023-01-27 DIAGNOSIS — Z98.89 OTHER SPECIFIED POSTPROCEDURAL STATES: Chronic | ICD-10-CM

## 2023-01-27 LAB
ANION GAP SERPL CALC-SCNC: 6 MMOL/L — SIGNIFICANT CHANGE UP (ref 5–17)
BUN SERPL-MCNC: 16 MG/DL — SIGNIFICANT CHANGE UP (ref 7–23)
CALCIUM SERPL-MCNC: 9.2 MG/DL — SIGNIFICANT CHANGE UP (ref 8.5–10.1)
CHLORIDE SERPL-SCNC: 108 MMOL/L — SIGNIFICANT CHANGE UP (ref 96–108)
CO2 SERPL-SCNC: 26 MMOL/L — SIGNIFICANT CHANGE UP (ref 22–31)
CREAT SERPL-MCNC: 1 MG/DL — SIGNIFICANT CHANGE UP (ref 0.5–1.3)
EGFR: 60 ML/MIN/1.73M2 — SIGNIFICANT CHANGE UP
GLUCOSE SERPL-MCNC: 90 MG/DL — SIGNIFICANT CHANGE UP (ref 70–99)
HCT VFR BLD CALC: 34.2 % — LOW (ref 34.5–45)
HGB BLD-MCNC: 10.7 G/DL — LOW (ref 11.5–15.5)
MCHC RBC-ENTMCNC: 29.8 PG — SIGNIFICANT CHANGE UP (ref 27–34)
MCHC RBC-ENTMCNC: 31.3 GM/DL — LOW (ref 32–36)
MCV RBC AUTO: 95.3 FL — SIGNIFICANT CHANGE UP (ref 80–100)
NRBC # BLD: 0 /100 WBCS — SIGNIFICANT CHANGE UP (ref 0–0)
PLATELET # BLD AUTO: 170 K/UL — SIGNIFICANT CHANGE UP (ref 150–400)
POTASSIUM SERPL-MCNC: 3.8 MMOL/L — SIGNIFICANT CHANGE UP (ref 3.5–5.3)
POTASSIUM SERPL-SCNC: 3.8 MMOL/L — SIGNIFICANT CHANGE UP (ref 3.5–5.3)
RBC # BLD: 3.59 M/UL — LOW (ref 3.8–5.2)
RBC # FLD: 13.3 % — SIGNIFICANT CHANGE UP (ref 10.3–14.5)
SODIUM SERPL-SCNC: 140 MMOL/L — SIGNIFICANT CHANGE UP (ref 135–145)
WBC # BLD: 8.95 K/UL — SIGNIFICANT CHANGE UP (ref 3.8–10.5)
WBC # FLD AUTO: 8.95 K/UL — SIGNIFICANT CHANGE UP (ref 3.8–10.5)

## 2023-01-27 PROCEDURE — 99283 EMERGENCY DEPT VISIT LOW MDM: CPT

## 2023-01-27 PROCEDURE — 30903 CONTROL OF NOSEBLEED: CPT

## 2023-01-27 PROCEDURE — 80048 BASIC METABOLIC PNL TOTAL CA: CPT

## 2023-01-27 PROCEDURE — 99215 OFFICE O/P EST HI 40 MIN: CPT | Mod: 25

## 2023-01-27 PROCEDURE — 99284 EMERGENCY DEPT VISIT MOD MDM: CPT

## 2023-01-27 PROCEDURE — 36415 COLL VENOUS BLD VENIPUNCTURE: CPT

## 2023-01-27 PROCEDURE — 93005 ELECTROCARDIOGRAM TRACING: CPT

## 2023-01-27 PROCEDURE — 85027 COMPLETE CBC AUTOMATED: CPT

## 2023-01-27 PROCEDURE — 93010 ELECTROCARDIOGRAM REPORT: CPT

## 2023-01-27 NOTE — ED PROVIDER NOTE - PATIENT PORTAL LINK FT
You can access the FollowMyHealth Patient Portal offered by [facility] by registering at the following website: [email or web address]. By joining [employer]’s FollowMyHealth portal, you will also be able to view your health information using other applications (apps) compatible with our system.

## 2023-01-27 NOTE — ED PROVIDER NOTE - PHYSICAL EXAMINATION
Gen: WD/WN older white female in NAD  Head:  NC/AT  ENT:  PERRLA, EOMI, Mucous membranes moist. Packing in place left nostril.  Neck: Supple/No Midline tenderness/No meningismus  CV:  RRR w/o any murmurs/rubs or gallops  Pulm:  Clear to A and P  Abd:  Soft, nontender, nondistended, +BS, no rebound/guarding  Ext:  warm, well perfused, moving all extremities spontaneously, no peripheral edema, distal pulses intact  Msk:No C-Spine tenderness/Pelvis and Hips non tender  Neuro:  A&Ox3, no focal neuro deficit, speech clear

## 2023-01-27 NOTE — ED PROVIDER NOTE - NSFOLLOWUPINSTRUCTIONS_ED_ALL_ED_FT
yes
Rest.  Increase fluid intake.  Follow-up with ENT as directed.  May take nasal packing out prior to going to sleep tonight as directed by your ENT doctor.  Return here if needed.        [employer]® CareNotes®     :  [facility]  	                     NEAR SYNCOPE - AfterCare(R) Instructions(ER/ED)            Near Syncope    WHAT YOU NEED TO KNOW:    Near syncope, also called presyncope, is the feeling that you may faint (lose consciousness), but you do not. You can control some health conditions that cause near syncope. Your healthcare providers can help you create a plan to manage near syncope and prevent episodes.    DISCHARGE INSTRUCTIONS:    Return to the emergency department if:   •You have sudden chest pain.       •You have trouble breathing or shortness of breath.       •You have vision changes, are sweating, and have nausea while you are sitting or lying down.       •You feel dizzy or flushed and your heart is fluttering.      •You lose consciousness.      •You cannot use your arm, hand, foot, or leg, or it feels weak.      •You have trouble speaking or understanding others when they speak.      Contact your healthcare provider if:   •You have new or worsening symptoms.      •Your heart beats faster or slower than usual.       •You have questions or concerns about your condition or care.      Medicines:   •Medicines may be needed to help your heart pump strongly and regularly. Your healthcare provider may also make changes to any medicines that are causing syncope.       •Take your medicine as directed. Contact your healthcare provider if you think your medicine is not helping or if you have side effects. Tell your provider if you are allergic to any medicine. Keep a list of the medicines, vitamins, and herbs you take. Include the amounts, and when and why you take them. Bring the list or the pill bottles to follow-up visits. Carry your medicine list with you in case of an emergency.      Follow up with your healthcare provider as directed: You may need more tests to help find the cause of your near syncope episodes. The tests will help healthcare providers plan the best treatment for you. Write down your questions so you remember to ask them during your visits.     Manage near syncope:   •Sit or lie down when needed. This includes when you feel dizzy, your throat is getting tight, and your vision changes. Raise your legs above the level of your heart.      •Take slow, deep breaths if you start to breathe faster with anxiety or fear. This can help decrease dizziness and the feeling that you might faint.       •Keep a record of your near syncope episodes. Include your symptoms and your activity before and after the episode. The record can help your healthcare provider find the cause of your near syncope and help you manage episodes.      Prevent a near syncope episode:   •Move slowly and let yourself get used to one position before you move to another position. This is very important when you change from a lying or sitting position to a standing position. Take some deep breaths before you stand up from a lying position. Stand up slowly. Sudden movements may cause a fainting spell. Sit on the side of the bed or couch for a few minutes before you stand up. If you are on bedrest, try to be upright for about 2 hours each day, or as directed. Do not lock your legs if you are standing for a long period of time. Move your legs and bend your knees to keep blood flowing.      •Follow your healthcare provider's recommendations. Your provider may recommend that you drink more liquids to prevent dehydration. You may also need to have more salt to keep your blood pressure from dropping too low and causing syncope. Your provider will tell you how much liquid and sodium to have each day. He or she will also tell you how much physical activity is safe for you. This will depend on what is causing your near syncope.      •Watch for signs of low blood sugar. These include hunger, nervousness, sweating, and fast or fluttery heartbeats. Talk with your healthcare provider about ways to keep your blood sugar level steady.      •Check your blood pressure often. This is important if you take medicine to lower your blood pressure. Check your blood pressure when you are lying down and when you are standing. Ask how often to check during the day. Keep a record of your blood pressure numbers. Your healthcare provider may use the record to help plan your treatment.  How to take a Blood Pressure           •Do not strain if you are constipated. You may faint if you strain to have a bowel movement. Walking is the best way to get your bowels moving. Eat foods high in fiber to make it easier to have a bowel movement. Good examples are high-fiber cereals, beans, vegetables, and whole-grain breads. Prune juice may help make bowel movements softer.      •Do not exercise outside on a hot day. The combination of physical activity and heat can lead to dehydration. This can cause syncope.         © Copyright [employer] 2023           back to top                          © Copyright [employer] 2023

## 2023-01-27 NOTE — ED ADULT NURSE NOTE - NSIMPLEMENTINTERV_GEN_ALL_ED
Implemented All Fall with Harm Risk Interventions:  [place] to call system. Call bell, personal items and telephone within reach. Instruct patient to call for assistance. Room bathroom lighting operational. Non-slip footwear when patient is off stretcher. Physically safe environment: no spills, clutter or unnecessary equipment. Stretcher in lowest position, wheels locked, appropriate side rails in place. Provide visual cue, wrist band, yellow gown, etc. Monitor gait and stability. Monitor for mental status changes and reorient to person, place, and time. Review medications for side effects contributing to fall risk. Reinforce activity limits and safety measures with patient and family. Provide visual clues: red socks.

## 2023-01-27 NOTE — ED PROVIDER NOTE - DIFFERENTIAL DIAGNOSIS
Differential Diagnosis Differential diagnosis includes vasovagal near syncope arrhythmia either tacky arrhythmia or bradycardia arrhythmia, TIA doubt GI bleed doubt seizure doubt

## 2023-01-27 NOTE — ED PROVIDER NOTE - CLINICAL SUMMARY MEDICAL DECISION MAKING FREE TEXT BOX
Near syncope post nasal procedure manipulation requiring thorough evaluation as well as labs and EKG.

## 2023-01-27 NOTE — ED PROVIDER NOTE - CONSIDERATION OF ADMISSION OBSERVATION
Consideration of Admission/Observation Will consider escalation of care to include admission if labs EKG warrant and/or if new symptoms develop.

## 2023-01-27 NOTE — ASSESSMENT
[FreeTextEntry1] : SHE HAD VASOVEGAL REACTION FOLLOWING CAUTERIZATION\par NO LOC\par WILL SENT TO ED FOR FURTHER EVALUATION\par

## 2023-01-27 NOTE — ED PROVIDER NOTE - CARE PROVIDER_API CALL
Ayush Rodgers)  Cardiovascular Disease  [address] [place] [address]  Phone: [phone]  Fax: [phone]  Follow Up Time:

## 2023-01-27 NOTE — PHYSICAL EXAM
[de-identified] : LEFT RHINOROKET removed/ left kisselbach irritated / oozing area cauterized [Normal] : mucosa is normal [Midline] : trachea located in midline position

## 2023-01-27 NOTE — HISTORY OF PRESENT ILLNESS
[de-identified] : LEFT EPISTAXIS PACKED RHINOROCKET for 1 1/2 day\par NO FURTHER BLEED SINCE\par ON ELOQUIS\par MEDICAL HX REVIEWED\par

## 2023-01-27 NOTE — ED PROVIDER NOTE - OBJECTIVE STATEMENT
Patient is a 72-year-old white female with history of atrial fibrillation on Eliquis COPD hypertension hyperlipidemia hypothyroidism and psoriasis presenting to the emergency department here at [facility] for evaluation of near syncope.  Patient was seen and evaluated in the emergency department here for epistaxis and was referred to ENT for further evaluation.  While in the office today and after packing was replaced patient began to feel slightly lightheaded warm and sweaty and had a near syncopal episode.  Because of those symptoms patient was ultimately sent here via EMS for further evaluation treatment and management.  Denied any headache chest pain palpitations shortness of breath.

## 2023-01-27 NOTE — ED ADULT NURSE NOTE - OBJECTIVE STATEMENT
pt BIBA from [facility] where she was having procedure r/t nosebleed in which she was seen in [facility]. during procedure pt felt dizzy with syncopal episode, denies hitting head. takes eliquis for hx afib. denies chest pain/shortness of breath. denies headache/dizziness at this time. EKG obtained pt BIBA from [facility] where she was having procedure r/t nosebleed in which she was seen in [facility]. during procedure pt felt dizzy with near syncopal episode, denies hitting head. takes eliquis for hx afib. denies chest pain/shortness of breath. denies headache/dizziness at this time. EKG obtained

## 2023-01-27 NOTE — ED ADULT TRIAGE NOTE - CHIEF COMPLAINT QUOTE
Patient A/Ox4 with clear speech. BIBA from ENT s/p cautery procedure for near syncope. Is on Eliquis and was having a lot of bleeding related to the procedure. Denies LOC and denies complaint at this time.

## 2023-01-31 NOTE — DISCHARGE NOTE PROVIDER - CARE PROVIDER_API CALL
Jero Jj  [address], [address]  [place], [place] [id number]  Phone: [phone]  Fax: [phone]  Follow Up Time:    No Vaccines Administered. Jero Jj  [address], [address]  [place], [place] [id number]  Phone: [phone]  Fax: [phone]  Follow Up Time:     Reilly Jacobosn)  Cardiac Electrophysiology; Cardiology; Internal Medicine  [address] [place] [address]  Phone: [phone]  Fax: [phone]  Follow Up Time:

## 2023-02-01 ENCOUNTER — APPOINTMENT (OUTPATIENT)
Dept: OTOLARYNGOLOGY | Facility: CLINIC | Age: 73
End: 2023-02-01
Payer: MEDICARE

## 2023-02-01 VITALS
SYSTOLIC BLOOD PRESSURE: 137 MMHG | HEIGHT: 60 IN | BODY MASS INDEX: 34.75 KG/M2 | DIASTOLIC BLOOD PRESSURE: 81 MMHG | HEART RATE: 70 BPM | WEIGHT: 177 LBS

## 2023-02-01 DIAGNOSIS — J31.0 CHRONIC RHINITIS: ICD-10-CM

## 2023-02-01 PROCEDURE — 99214 OFFICE O/P EST MOD 30 MIN: CPT

## 2023-02-01 NOTE — PHYSICAL EXAM
[de-identified] : CRUSTING OVER CAUTERIZED AREA / RIGHT MUCOSA ATROPHIC / DRYNESS [Normal] : mucosa is normal [Midline] : trachea located in midline position

## 2023-02-01 NOTE — HISTORY OF PRESENT ILLNESS
[de-identified] : F/U EPISTAXIS\par NO FURTHER BLEED SINCE LAST VISIT\par OFF ELOQUIS SINCE\par MEDICAL HX REVIEWED\par ED VISIT REVIEWED

## 2023-02-01 NOTE — ASSESSMENT
[FreeTextEntry1] : EPISTAXIS INSTRUCTIONS/ PERCAUTIONS\par EUCALYPTUS HUMIDIFIER\par F/U CARDIOLOGY\par F/U ONE MONTH PRN

## 2023-02-02 ENCOUNTER — NON-APPOINTMENT (OUTPATIENT)
Age: 73
End: 2023-02-02

## 2023-02-02 ENCOUNTER — APPOINTMENT (OUTPATIENT)
Dept: CARDIOLOGY | Facility: CLINIC | Age: 73
End: 2023-02-02
Payer: MEDICARE

## 2023-02-02 VITALS
HEIGHT: 62 IN | HEART RATE: 85 BPM | SYSTOLIC BLOOD PRESSURE: 148 MMHG | DIASTOLIC BLOOD PRESSURE: 70 MMHG | OXYGEN SATURATION: 95 % | WEIGHT: 185 LBS | BODY MASS INDEX: 34.04 KG/M2

## 2023-02-02 PROCEDURE — 93000 ELECTROCARDIOGRAM COMPLETE: CPT

## 2023-02-02 PROCEDURE — 99214 OFFICE O/P EST MOD 30 MIN: CPT | Mod: 25

## 2023-02-02 NOTE — HISTORY OF PRESENT ILLNESS
[FreeTextEntry1] : 72 year old female with hx of morbid obesity  improved sleep apnea with weight loss ,  Monoclonal gammopathy HTN  hyperlipidemia  PAF , bradycardia with normal chronotropic response  ,was evaluated by EP  placed on hydralazine , patients palpitations resolved , GI bleeding , on eliquis , came for follow up ,with complain that she felt dizziness when her nasal balloon removed which was placed for epistaxis , Patient was evaluated in [facility] , patient was having recurrent epistaxis ,\par Patient blood pressure  is elevated as she has white coat component , patient says her home BP readings are normal range \par says she is having side effects with lipitor ,multiple joint pains , unable to sleep longer at night , can not lay on left side due to hip pain stopped taking medication then she felt better \par \par Patient used to be very obese , did loose some weight , with some improvement in sleep apnea , now she does not use cpap , patient had long standing hx of copd ,  used to use oxygen  NC  now she is fine on bronchodilator treatment ,  now she is not using oxygen \par  \par \par her blood work showed mild elevated TC LDL  compared to prior done july 2022  ? total cholesterol 205 HDL 76  stopped medication  \par

## 2023-02-02 NOTE — PHYSICAL EXAM
[Obese] : obese [Normal Conjunctiva] : normal conjunctiva [Normal Venous Pressure] : normal venous pressure [Carotid Bruit] : carotid bruit [Normal Rate] : normal [Normal S1] : normal S1 [Normal S2] : normal S2 [II] : a grade 2 [No Pitting Edema] : no pitting edema present [Right Carotid Bruit] : right carotid bruit heard [Left Carotid Bruit] : left carotid bruit heard [2+] : left 2+ [No Abnormalities] : the abdominal aorta was not enlarged and no bruit was heard [Clear Lung Fields] : clear lung fields [Good Air Entry] : good air entry [No Respiratory Distress] : no respiratory distress  [Soft] : abdomen soft [Non Tender] : non-tender [Normal Bowel Sounds] : normal bowel sounds [Abnormal Gait] : abnormal gait [No Edema] : no edema [No Cyanosis] : no cyanosis [No Clubbing] : no clubbing [No Varicosities] : no varicosities [Normal Radial B/L] : normal radial B/L [No Rash] : no rash [No Skin Lesions] : no skin lesions [Moves all extremities] : moves all extremities [No Focal Deficits] : no focal deficits [Normal Speech] : normal speech [Alert and Oriented] : alert and oriented [Normal memory] : normal memory [S3] : no S3 [S4] : no S4 [Rt] : no varicose veins of the right leg [Lt] : no varicose veins of the left leg [Right Femoral Bruit] : no bruit heard over the right femoral artery [Left Femoral Bruit] : no bruit heard over the left femoral artery [de-identified] : bilateral  [de-identified] : arthritis  [de-identified] : ALYSSA

## 2023-02-02 NOTE — ASSESSMENT
[FreeTextEntry1] : Prior GI bleeding  , s/p colonoscopy, colonic polyps x 4 , , patient is in sinus rhythm \par \par \par Patient who was having recurrent epistaxis , had dizziness episodes while nasal balloon was removed probably vagal event without recurrence ,  as patient has recurrent epistaxis will decrease eliquis 2.5 mg po BID for now , follow up with  ENT \par \par Hx of PAF currently in sinus rhythm  tachy cem syndrome with resolved symptoms  after changing medication ,   avoid negative chronotropic drugs , continue losartan , hydralazine  eliquis   patient scheduled to have  ILR placement  with EP which she did not go for \par \par Hypertension ,elevated  ,  normal home BP continue low salt diet and continue  losartan to 50 mg po daily , blood work in 1 month \par \par Cardiac murmur/ caortid bruits :  mild   aortic stenosis ;  continue to monitor , mild carotid disease   will obtain follow up echocardiogram \par \par Hyperlipidemia   elevated compared to prior , with evidence of coronary calcification suggestive coronary atherosclerosis  ( no evidence of ischemia on stress test ) , target LDL <70,  hold  lipitor for 4 weeks and observe for symptom resolution , \par \par Body aches ? arthritis   improved aches on holding  lipitor, will repeat blood work on red yeast \par \par Obesity prior hx of sleep apnea  , patient was encouraged to loose more weight \par \par COPD pulmonary nodule stable continue current medication , follow up with pulmonary \par \par \par follow up after 3 months \par

## 2023-02-02 NOTE — CARDIOLOGY SUMMARY
[de-identified] : 2/2/23 normal sinus rhythm  [de-identified] : 4/16/19  no ischemia on chemical nuclear stress test  [de-identified] : 1/27/21  [facility]    EF 55% Mild DD MIld AS AI , JADE

## 2023-03-01 ENCOUNTER — APPOINTMENT (OUTPATIENT)
Dept: OTOLARYNGOLOGY | Facility: CLINIC | Age: 73
End: 2023-03-01

## 2023-03-08 ENCOUNTER — APPOINTMENT (OUTPATIENT)
Dept: OTOLARYNGOLOGY | Facility: CLINIC | Age: 73
End: 2023-03-08
Payer: MEDICARE

## 2023-03-08 VITALS
DIASTOLIC BLOOD PRESSURE: 72 MMHG | BODY MASS INDEX: 33.49 KG/M2 | SYSTOLIC BLOOD PRESSURE: 145 MMHG | WEIGHT: 182 LBS | HEART RATE: 59 BPM | HEIGHT: 62 IN

## 2023-03-08 DIAGNOSIS — R04.0 EPISTAXIS: ICD-10-CM

## 2023-03-08 DIAGNOSIS — D68.9 COAGULATION DEFECT, UNSPECIFIED: ICD-10-CM

## 2023-03-08 PROCEDURE — 99214 OFFICE O/P EST MOD 30 MIN: CPT

## 2023-03-08 NOTE — HISTORY OF PRESENT ILLNESS
[de-identified] : F/U EPISTAXIS\par NO FURTHER BLEED SINCE LAST VISIT\par MEDICAL HX REVIEWED\par

## 2023-03-08 NOTE — PHYSICAL EXAM
[de-identified] : NASAL MUCOSA HEALING WELL [Normal] : mucosa is normal [Midline] : trachea located in midline position

## 2023-03-09 ENCOUNTER — NON-APPOINTMENT (OUTPATIENT)
Age: 73
End: 2023-03-09

## 2023-03-09 DIAGNOSIS — U07.1 COVID-19: ICD-10-CM

## 2023-03-09 DIAGNOSIS — L40.9 PSORIASIS, UNSPECIFIED: ICD-10-CM

## 2023-03-09 DIAGNOSIS — Z86.39 PERSONAL HISTORY OF OTHER ENDOCRINE, NUTRITIONAL AND METABOLIC DISEASE: ICD-10-CM

## 2023-03-09 DIAGNOSIS — R00.1 BRADYCARDIA, UNSPECIFIED: ICD-10-CM

## 2023-03-09 DIAGNOSIS — N17.9 ACUTE KIDNEY FAILURE, UNSPECIFIED: ICD-10-CM

## 2023-04-05 NOTE — END OF VISIT
[Time Spent: ___ minutes] : I have spent [unfilled] minutes of time on the encounter. Carac Counseling:  I discussed with the patient the risks of Carac including but not limited to erythema, scaling, itching, weeping, crusting, and pain.

## 2023-04-07 ENCOUNTER — APPOINTMENT (OUTPATIENT)
Dept: CARDIOLOGY | Facility: CLINIC | Age: 73
End: 2023-04-07
Payer: MEDICARE

## 2023-04-07 ENCOUNTER — NON-APPOINTMENT (OUTPATIENT)
Age: 73
End: 2023-04-07

## 2023-04-07 VITALS
OXYGEN SATURATION: 96 % | HEART RATE: 64 BPM | DIASTOLIC BLOOD PRESSURE: 68 MMHG | BODY MASS INDEX: 33.29 KG/M2 | WEIGHT: 182 LBS | SYSTOLIC BLOOD PRESSURE: 142 MMHG

## 2023-04-07 PROCEDURE — 93000 ELECTROCARDIOGRAM COMPLETE: CPT

## 2023-04-07 PROCEDURE — 99214 OFFICE O/P EST MOD 30 MIN: CPT | Mod: 25

## 2023-04-07 PROCEDURE — 93306 TTE W/DOPPLER COMPLETE: CPT

## 2023-04-07 RX ORDER — LEVOTHYROXINE SODIUM 0.05 MG/1
50 TABLET ORAL
Refills: 0 | Status: ACTIVE | COMMUNITY

## 2023-04-07 NOTE — ASSESSMENT
[FreeTextEntry1] : Prior GI bleeding  , s/p colonoscopy, colonic polyps x 4 , , patient is in sinus rhythm \par \par \par Patient who was having recurrent epistaxis , had dizziness episodes while nasal balloon was removed probably vagal event without recurrence ,  as patient has recurrent epistaxis will continue ,eliquis 2.5 mg po BID for now , follow up with  ENT \par \par Hx of PAF currently in sinus rhythm  tachy cem syndrome with resolved symptoms  after changing medication ,   avoid negative chronotropic drugs , continue losartan , hydralazine  eliquis   patient scheduled to have  ILR placement  with EP which she did not go for \par \par Hypertension ,elevated  ,  normal home BP continue low salt diet and continue  losartan to 50 mg po daily   asymmetric BP  UE ?? right more than left \par \par Cardiac murmur/ caortid bruits :  mild   aortic stenosis ;  continue to monitor , mild carotid disease  \par \par Hyperlipidemia   elevated compared to prior , with evidence of coronary calcification suggestive coronary atherosclerosis  ( no evidence of ischemia on stress test ) , target LDL <70,  red yeast rice  , \par \par Body aches ? arthritis   improved aches on holding  lipitor, \par \par Obesity prior hx of sleep apnea  , patient was encouraged to loose more weight \par \par COPD pulmonary nodule stable continue current medication , follow up with pulmonary \par \par \par follow up after 3 months \par

## 2023-04-07 NOTE — CARDIOLOGY SUMMARY
[de-identified] : 4/7/23 sinus bradycardia  [de-identified] : 4/16/19  no ischemia on chemical nuclear stress test  [de-identified] : 1/27/21  [facility]    EF 55% Mild DD MIld AS AI , JADE

## 2023-04-07 NOTE — PHYSICAL EXAM
[Obese] : obese [Normal Conjunctiva] : normal conjunctiva [Normal Venous Pressure] : normal venous pressure [Carotid Bruit] : carotid bruit [Normal Rate] : normal [Normal S1] : normal S1 [Normal S2] : normal S2 [II] : a grade 2 [No Pitting Edema] : no pitting edema present [Right Carotid Bruit] : right carotid bruit heard [Left Carotid Bruit] : left carotid bruit heard [2+] : left 2+ [No Abnormalities] : the abdominal aorta was not enlarged and no bruit was heard [Clear Lung Fields] : clear lung fields [Good Air Entry] : good air entry [No Respiratory Distress] : no respiratory distress  [Soft] : abdomen soft [Non Tender] : non-tender [Normal Bowel Sounds] : normal bowel sounds [Abnormal Gait] : abnormal gait [No Edema] : no edema [No Cyanosis] : no cyanosis [No Clubbing] : no clubbing [No Varicosities] : no varicosities [Normal Radial B/L] : normal radial B/L [No Rash] : no rash [No Skin Lesions] : no skin lesions [Moves all extremities] : moves all extremities [No Focal Deficits] : no focal deficits [Normal Speech] : normal speech [Alert and Oriented] : alert and oriented [Normal memory] : normal memory [S3] : no S3 [S4] : no S4 [Rt] : no varicose veins of the right leg [Lt] : no varicose veins of the left leg [Right Femoral Bruit] : no bruit heard over the right femoral artery [Left Femoral Bruit] : no bruit heard over the left femoral artery [de-identified] : bilateral  [de-identified] : ALYSSA [de-identified] : arthritis

## 2023-04-11 ENCOUNTER — TRANSCRIPTION ENCOUNTER (OUTPATIENT)
Age: 73
End: 2023-04-11

## 2023-04-20 DIAGNOSIS — I99.8 OTHER DISORDER OF CIRCULATORY SYSTEM: ICD-10-CM

## 2023-05-10 ENCOUNTER — EMERGENCY (EMERGENCY)
Facility: HOSPITAL | Age: 73
LOS: 1 days | Discharge: HOME | End: 2023-05-10
Attending: EMERGENCY MEDICINE | Admitting: EMERGENCY MEDICINE
Payer: MEDICARE

## 2023-05-10 VITALS
RESPIRATION RATE: 18 BRPM | OXYGEN SATURATION: 91 % | DIASTOLIC BLOOD PRESSURE: 73 MMHG | SYSTOLIC BLOOD PRESSURE: 122 MMHG | HEART RATE: 83 BPM | TEMPERATURE: 98 F

## 2023-05-10 VITALS
DIASTOLIC BLOOD PRESSURE: 78 MMHG | TEMPERATURE: 98 F | SYSTOLIC BLOOD PRESSURE: 160 MMHG | RESPIRATION RATE: 16 BRPM | OXYGEN SATURATION: 97 % | WEIGHT: 182.1 LBS | HEART RATE: 65 BPM

## 2023-05-10 DIAGNOSIS — Z98.89 OTHER SPECIFIED POSTPROCEDURAL STATES: Chronic | ICD-10-CM

## 2023-05-10 PROCEDURE — 71046 X-RAY EXAM CHEST 2 VIEWS: CPT

## 2023-05-10 PROCEDURE — 99284 EMERGENCY DEPT VISIT MOD MDM: CPT | Mod: 25

## 2023-05-10 PROCEDURE — 99284 EMERGENCY DEPT VISIT MOD MDM: CPT

## 2023-05-10 PROCEDURE — 71046 X-RAY EXAM CHEST 2 VIEWS: CPT | Mod: 26

## 2023-05-10 PROCEDURE — 94640 AIRWAY INHALATION TREATMENT: CPT

## 2023-05-10 RX ORDER — ALBUTEROL 90 UG/1
2.5 AEROSOL, METERED ORAL
Refills: 0 | Status: COMPLETED | OUTPATIENT
Start: 2023-05-10 | End: 2023-05-10

## 2023-05-10 RX ADMIN — Medication 40 MILLIGRAM(S): at 16:00

## 2023-05-10 RX ADMIN — ALBUTEROL 2.5 MILLIGRAM(S): 90 AEROSOL, METERED ORAL at 15:57

## 2023-05-10 RX ADMIN — ALBUTEROL 2.5 MILLIGRAM(S): 90 AEROSOL, METERED ORAL at 16:00

## 2023-05-10 RX ADMIN — ALBUTEROL 2.5 MILLIGRAM(S): 90 AEROSOL, METERED ORAL at 16:20

## 2023-05-10 NOTE — ED PROVIDER NOTE - PROGRESS NOTE DETAILS
Patient was reassessed numerous times while in the emergency department and felt significantly better with marked decline in wheezing much better air movement and patient wants to go home.

## 2023-05-10 NOTE — ED PROVIDER NOTE - NSFOLLOWUPINSTRUCTIONS_ED_ALL_ED_FT
Rest.  Increase fluid intake.  Continue taking antibiotics prescribed for you.  Begin to take prednisone 40 mg a day for the next 5 days.  Use albuterol every 4-6 hours via inhaler.  Follow-up with your primary care physician for reevaluation in the next 24 to 48 hours.  Return here if needed.

## 2023-05-10 NOTE — ED ADULT TRIAGE NOTE - CHIEF COMPLAINT QUOTE
c/o Not feeling better, went to Urgent care last monday for cold symptoms,sorethroat and back pain and prescribed antibiotics with no relief, NAD , with spontaneous non labored breathing, afebrile

## 2023-05-10 NOTE — ED ADULT NURSE NOTE - NSFALLHARMRISKINTERV_ED_ALL_ED

## 2023-05-10 NOTE — ED PROVIDER NOTE - DIFFERENTIAL DIAGNOSIS
Differential diagnosis includes dissipation of COPD from viral infection/pneumonia/CHF doubt/arrhythmia doubt. Differential Diagnosis

## 2023-05-10 NOTE — ED PROVIDER NOTE - CLINICAL SUMMARY MEDICAL DECISION MAKING FREE TEXT BOX
Cough congestion shortness of breath and mild wheezing x3 days on antibiotics here now requiring evaluation imaging as well as medications and inhaled bronchodilators.

## 2023-05-10 NOTE — ED PROVIDER NOTE - PATIENT PORTAL LINK FT
You can access the FollowMyHealth Patient Portal offered by [facility] by registering at the following website: [email or web address]. By joining [employer]’s FollowMyHealth portal, you will also be able to view your health information using other applications (apps) compatible with our system.

## 2023-05-10 NOTE — ED ADULT TRIAGE NOTE - ARRIVAL FROM
Subjective:       Patient ID: Iwona Mchugh is a 66 y.o. female.    Chief Complaint: No chief complaint on file.    HPI     S/p Phaco w/IOL OD- 4/26/2022    65 y/o female is here for 1 day post-op of the RT eye. Pt reports s/x went   well. Pt has slight pain this morning. Reviewed post-op instructions in   office today.     Eyemeds  Ofloxacin/PF/Ket QID OD      Last edited by Leigh Hutchins on 4/27/2022  9:12 AM. (History)             Assessment:       1. Post-operative state        Plan:       S/p CE OU- Doing well.    CPM OD.  Taper gtts OS.  RTC 1 wk.     
Home

## 2023-05-10 NOTE — ED ADULT NURSE NOTE - NSFALLRISKFACTORS_ED_ALL_ED
No indicators present Coagulation: Bleeding disorder either through use of anticoagulants or underlying clinical condition(s)

## 2023-05-10 NOTE — ED PROVIDER NOTE - CARE PROVIDER_API CALL
Jero Walters)  Internal Medicine; Pulmonary Disease  [address] [place] [address]  Phone: [phone]  Fax: [phone]  Follow Up Time:

## 2023-05-10 NOTE — ED PROVIDER NOTE - CONSIDERATION OF ADMISSION OBSERVATION
Will consider escalation of care to include admission if patient fails to respond appropriately to ED management. Consideration of Admission/Observation

## 2023-05-10 NOTE — ED PROVIDER NOTE - OBJECTIVE STATEMENT
Patient is a 72-year-old white female with history of COPD hypertension hypothyroidism hyperlipidemia atrial fibrillation for which she is on Eliquis and psoriasis who presents to the emergency department at [facility] today complaining of cough shortness of breath congestion.  Patient states that at approximately 3-4 days ago she began to experience mild sore throat nasal congestion sneezing which was then followed by a slight cough.  Patient was seen at local urgent care center for which she was prescribed doxycycline and cefdinir.  She has been on these medications compliant with them for the past 2 days and then today she began noting slight increase in cough and her oxygen saturation which is normally 95% was 91% at home on room air.  No nausea vomiting diarrhea no fever no chills no chest pain no abdominal pain she does admit to slight mid back pain.

## 2023-05-10 NOTE — ED ADULT NURSE NOTE - OBJECTIVE STATEMENT
received pt c/o persistent cough with sore throat and "not feeling well".   Pt denies cp/sob/palpitations.    Respirations even and unlabored skin warm and dry.  Pt in no acute respiratory distress.  Pt reports she was seen at [facility] and is being treated for pna with po abx but states "I just don't think im getting better yet".   Pt previously assessed by ED MD.   Safety maintained.  Pt afebrile will continue frequent monitoring. BN

## 2023-05-15 ENCOUNTER — INPATIENT (INPATIENT)
Facility: HOSPITAL | Age: 73
LOS: 2 days | Discharge: HOME | DRG: 190 | End: 2023-05-18
Attending: INTERNAL MEDICINE | Admitting: INTERNAL MEDICINE
Payer: MEDICARE

## 2023-05-15 VITALS
DIASTOLIC BLOOD PRESSURE: 75 MMHG | OXYGEN SATURATION: 97 % | RESPIRATION RATE: 16 BRPM | SYSTOLIC BLOOD PRESSURE: 168 MMHG | HEART RATE: 72 BPM | HEIGHT: 60 IN | TEMPERATURE: 97 F | WEIGHT: 182.98 LBS

## 2023-05-15 DIAGNOSIS — Z98.89 OTHER SPECIFIED POSTPROCEDURAL STATES: Chronic | ICD-10-CM

## 2023-05-15 DIAGNOSIS — J44.1 CHRONIC OBSTRUCTIVE PULMONARY DISEASE WITH (ACUTE) EXACERBATION: ICD-10-CM

## 2023-05-15 LAB
ALBUMIN SERPL ELPH-MCNC: 3.3 G/DL — SIGNIFICANT CHANGE UP (ref 3.3–5)
ALP SERPL-CCNC: 87 U/L — SIGNIFICANT CHANGE UP (ref 40–120)
ALT FLD-CCNC: 22 U/L — SIGNIFICANT CHANGE UP (ref 12–78)
ANION GAP SERPL CALC-SCNC: 3 MMOL/L — LOW (ref 5–17)
AST SERPL-CCNC: 20 U/L — SIGNIFICANT CHANGE UP (ref 15–37)
BASOPHILS # BLD AUTO: 0.01 K/UL — SIGNIFICANT CHANGE UP (ref 0–0.2)
BASOPHILS NFR BLD AUTO: 0.1 % — SIGNIFICANT CHANGE UP (ref 0–2)
BILIRUB SERPL-MCNC: 0.4 MG/DL — SIGNIFICANT CHANGE UP (ref 0.2–1.2)
BUN SERPL-MCNC: 36 MG/DL — HIGH (ref 7–23)
CALCIUM SERPL-MCNC: 9.4 MG/DL — SIGNIFICANT CHANGE UP (ref 8.5–10.1)
CHLORIDE SERPL-SCNC: 113 MMOL/L — HIGH (ref 96–108)
CO2 SERPL-SCNC: 24 MMOL/L — SIGNIFICANT CHANGE UP (ref 22–31)
CREAT SERPL-MCNC: 1.3 MG/DL — SIGNIFICANT CHANGE UP (ref 0.5–1.3)
EGFR: 44 ML/MIN/1.73M2 — LOW
EOSINOPHIL # BLD AUTO: 0.03 K/UL — SIGNIFICANT CHANGE UP (ref 0–0.5)
EOSINOPHIL NFR BLD AUTO: 0.3 % — SIGNIFICANT CHANGE UP (ref 0–6)
GLUCOSE SERPL-MCNC: 79 MG/DL — SIGNIFICANT CHANGE UP (ref 70–99)
HCT VFR BLD CALC: 36.6 % — SIGNIFICANT CHANGE UP (ref 34.5–45)
HGB BLD-MCNC: 11.4 G/DL — LOW (ref 11.5–15.5)
IMM GRANULOCYTES NFR BLD AUTO: 1.7 % — HIGH (ref 0–0.9)
LYMPHOCYTES # BLD AUTO: 1.47 K/UL — SIGNIFICANT CHANGE UP (ref 1–3.3)
LYMPHOCYTES # BLD AUTO: 14.8 % — SIGNIFICANT CHANGE UP (ref 13–44)
MCHC RBC-ENTMCNC: 28.8 PG — SIGNIFICANT CHANGE UP (ref 27–34)
MCHC RBC-ENTMCNC: 31.1 GM/DL — LOW (ref 32–36)
MCV RBC AUTO: 92.4 FL — SIGNIFICANT CHANGE UP (ref 80–100)
MONOCYTES # BLD AUTO: 0.86 K/UL — SIGNIFICANT CHANGE UP (ref 0–0.9)
MONOCYTES NFR BLD AUTO: 8.6 % — SIGNIFICANT CHANGE UP (ref 2–14)
NEUTROPHILS # BLD AUTO: 7.42 K/UL — HIGH (ref 1.8–7.4)
NEUTROPHILS NFR BLD AUTO: 74.5 % — SIGNIFICANT CHANGE UP (ref 43–77)
NRBC # BLD: 0 /100 WBCS — SIGNIFICANT CHANGE UP (ref 0–0)
PLATELET # BLD AUTO: 193 K/UL — SIGNIFICANT CHANGE UP (ref 150–400)
POTASSIUM SERPL-MCNC: 4 MMOL/L — SIGNIFICANT CHANGE UP (ref 3.5–5.3)
POTASSIUM SERPL-SCNC: 4 MMOL/L — SIGNIFICANT CHANGE UP (ref 3.5–5.3)
PROT SERPL-MCNC: 6.4 G/DL — SIGNIFICANT CHANGE UP (ref 6–8.3)
RAPID RVP RESULT: DETECTED
RBC # BLD: 3.96 M/UL — SIGNIFICANT CHANGE UP (ref 3.8–5.2)
RBC # FLD: 15.3 % — HIGH (ref 10.3–14.5)
RV+EV RNA SPEC QL NAA+PROBE: DETECTED
SARS-COV-2 RNA SPEC QL NAA+PROBE: SIGNIFICANT CHANGE UP
SODIUM SERPL-SCNC: 140 MMOL/L — SIGNIFICANT CHANGE UP (ref 135–145)
WBC # BLD: 9.96 K/UL — SIGNIFICANT CHANGE UP (ref 3.8–10.5)
WBC # FLD AUTO: 9.96 K/UL — SIGNIFICANT CHANGE UP (ref 3.8–10.5)

## 2023-05-15 PROCEDURE — 71250 CT THORAX DX C-: CPT | Mod: 26,MA

## 2023-05-15 PROCEDURE — 99285 EMERGENCY DEPT VISIT HI MDM: CPT

## 2023-05-15 RX ORDER — LEVOTHYROXINE SODIUM 125 MCG
50 TABLET ORAL DAILY
Refills: 0 | Status: DISCONTINUED | OUTPATIENT
Start: 2023-05-15 | End: 2023-05-18

## 2023-05-15 RX ORDER — IPRATROPIUM/ALBUTEROL SULFATE 18-103MCG
3 AEROSOL WITH ADAPTER (GRAM) INHALATION EVERY 6 HOURS
Refills: 0 | Status: DISCONTINUED | OUTPATIENT
Start: 2023-05-15 | End: 2023-05-18

## 2023-05-15 RX ORDER — LOSARTAN POTASSIUM 100 MG/1
50 TABLET, FILM COATED ORAL DAILY
Refills: 0 | Status: DISCONTINUED | OUTPATIENT
Start: 2023-05-15 | End: 2023-05-18

## 2023-05-15 RX ORDER — APIXABAN 2.5 MG/1
2.5 TABLET, FILM COATED ORAL EVERY 12 HOURS
Refills: 0 | Status: DISCONTINUED | OUTPATIENT
Start: 2023-05-15 | End: 2023-05-18

## 2023-05-15 RX ORDER — TIOTROPIUM BROMIDE 18 UG/1
2 CAPSULE ORAL; RESPIRATORY (INHALATION) DAILY
Refills: 0 | Status: DISCONTINUED | OUTPATIENT
Start: 2023-05-15 | End: 2023-05-18

## 2023-05-15 RX ORDER — IPRATROPIUM/ALBUTEROL SULFATE 18-103MCG
3 AEROSOL WITH ADAPTER (GRAM) INHALATION
Refills: 0 | Status: COMPLETED | OUTPATIENT
Start: 2023-05-15 | End: 2023-05-15

## 2023-05-15 RX ORDER — HYDRALAZINE HCL 50 MG
50 TABLET ORAL THREE TIMES A DAY
Refills: 0 | Status: DISCONTINUED | OUTPATIENT
Start: 2023-05-15 | End: 2023-05-18

## 2023-05-15 RX ADMIN — Medication 50 MILLIGRAM(S): at 21:35

## 2023-05-15 RX ADMIN — Medication 3 MILLILITER(S): at 13:15

## 2023-05-15 RX ADMIN — Medication 3 MILLILITER(S): at 20:06

## 2023-05-15 RX ADMIN — Medication 60 MILLIGRAM(S): at 20:05

## 2023-05-15 RX ADMIN — Medication 125 MILLIGRAM(S): at 13:22

## 2023-05-15 RX ADMIN — APIXABAN 2.5 MILLIGRAM(S): 2.5 TABLET, FILM COATED ORAL at 20:05

## 2023-05-15 RX ADMIN — Medication 3 MILLILITER(S): at 13:23

## 2023-05-15 RX ADMIN — Medication 3 MILLILITER(S): at 13:55

## 2023-05-15 NOTE — H&P ADULT - NSHPLABSRESULTS_GEN_ALL_CORE
LABS:                        11.4   9.96  )-----------( 193      ( 15 May 2023 13:20 )             36.6     05-15    140  |  113<H>  |  36<H>  ----------------------------<  79  4.0   |  24  |  1.30    Ca    9.4      15 May 2023 13:20    TPro  6.4  /  Alb  3.3  /  TBili  0.4  /  DBili  x   /  AST  20  /  ALT  22  /  AlkPhos  87  05-15      CAPILLARY BLOOD GLUCOSE                RADIOLOGY & ADDITIONAL TESTS:  < from: CT Chest No Cont (05.15.23 @ 16:19) >    Bronchiectasis and mucoid impactions within bilateral lower lobe and   right middle lobe are slightly increased from prior exam. The lungs are   otherwise clear.      < end of copied text >

## 2023-05-15 NOTE — ED PROVIDER NOTE - PROGRESS NOTE DETAILS
NERI Manzanares: labs reviewed. RVP results reviewed +rhinovirus.  CT chest results reviewed. Spoke with hospitalist, will admit.  Walking Spo2 was 92% on RA

## 2023-05-15 NOTE — H&P ADULT - ASSESSMENT
72-year-old female with past medical history of COPD (not on home O2), hypertension, hypothyroidism, high cholesterol, A-fib on Eliquis presents to the ED with complaints shortness of breath x1 week with chest tightness, and cough.  RVP+rhino virus  CRT chest as above- bronchiectasis with mucoid impaction    #Hypoxic respiratory failure  dt COPD exac  +Rhino virus infection  o2 support, solumedrol, nebs atc  pulm cs  ct chest bronchiectasis  fu pulm and ID-?abx    #Afib hx paroxysmal  resume home eliquis 2.5 bid    #HTN- resume home meds- losartan, hydralazine    #hypothyroidism- resume synthroid    DVT ppx- home eliquis    [facility]   [phone]

## 2023-05-15 NOTE — ED ADULT NURSE NOTE - NSFALLUNIVINTERV_ED_ALL_ED
Bed/Stretcher in lowest position, wheels locked, appropriate side rails in place/Call bell, personal items and telephone in reach/Instruct patient to call for assistance before getting out of bed/chair/stretcher/Non-slip footwear applied when patient is off stretcher/[place] to call system/Physically safe environment - no spills, clutter or unnecessary equipment/Purposeful proactive rounding/Room/bathroom lighting operational, light cord in reach

## 2023-05-15 NOTE — ED ADULT NURSE NOTE - PAIN: PRESENCE, MLM
[de-identified] : Injection: Right knee joint.\par Indication: Degenerative arthritis\par \par A discussion was had with the patient regarding this procedure and all questions were answered. All risks, benefits and alternatives were discussed. These included but were not limited to bleeding, infection, and allergic reaction. Alcohol was used to clean the skin, and betadine was used to sterilize and prep the area in the lateral aspect of the right knee. Ethyl chloride spray was then used as a topical anesthetic. A 22-gauge needle was used to inject 4cc of 2% lidocaine and 1cc of 40 mg Depo-Medrol into the knee. A sterile bandage was then applied. The patient tolerated the procedure well and there were no complications.\par \par \par Injection: Left knee joint.\par Indication: Degenerative arthritis\par \par A discussion was had with the patient regarding this procedure and all questions were answered. All risks, benefits and alternatives were discussed. These included but were not limited to bleeding, infection, and allergic reaction. Alcohol was used to clean the skin, and betadine was used to sterilize and prep the area in the lateral aspect of the left knee. Ethyl chloride spray was then used as a topical anesthetic. A 22-gauge needle was used to inject 4cc of 2% lidocaine and 1cc of 40 mg Depo-Medrol into the knee. A sterile bandage was then applied. The patient tolerated the procedure well and there were no complications.\par 
denies pain/discomfort (Rating = 0)

## 2023-05-15 NOTE — ED ADULT TRIAGE NOTE - CHIEF COMPLAINT QUOTE
Patient is a 71yo female complaining of difficulty breathing and post nasal drip. Patient was seen here at [facility] 4 days ago with the same CC. Patient states that she has a pulse ox at home and had a o2 level of 86% Patient was not able to follow up with her Pulmonologist Patient had history of COPD

## 2023-05-15 NOTE — ED PROVIDER NOTE - OBJECTIVE STATEMENT
72-year-old female with past medical history of COPD (not on home O2), hypertension, hypothyroidism, high cholesterol, A-fib on Eliquis presents to the ED with complaints shortness of breath x1 week with chest tightness, and cough.  Patient was seen in the ED 4 days ago for similar symptoms she had an unremarkable chest x-ray, was diagnosed with COPD exacerbation and discharged with prednisone and albuterol.  Also reports she reports she was seen prior at the urgent care and was given doxycycline and cefdinir. Reports she got concerned because this morning prior to coming to the ER her SPO2 at home was 86% on room air. She denies any fever chills, nausea vomiting, abdominal pain, sick contacts or all other complaints

## 2023-05-15 NOTE — ED ADULT NURSE NOTE - OBJECTIVE STATEMENT
Patient comes in complaining of difficulty breathing and post nasal drip. Patient was seen here at [facility] 4 days ago with the same complaint. Patient states that she has a pulse ox at home and had a o2 level of 86% Patient was not able to follow up with her Pulmonologist. Patient had history of COPD.

## 2023-05-15 NOTE — ED ADULT NURSE REASSESSMENT NOTE - NS ED NURSE REASSESS COMMENT FT1
Pt received from MERRILL Judd. Pt is Aox4 denies feeling SOB lying down at this time. VSS, will reassess.

## 2023-05-15 NOTE — H&P ADULT - NSHPPHYSICALEXAM_GEN_ALL_CORE
Vitals last 24 hrs  T(C): 36.3 (05-15-23 @ 11:53), Max: 36.3 (05-15-23 @ 11:53)  HR: 72 (05-15-23 @ 11:53) (72 - 72)  BP: 168/75 (05-15-23 @ 11:53) (168/75 - 168/75)  RR: 16 (05-15-23 @ 11:53) (16 - 16)  SpO2: 92% (05-15-23 @ 15:01) (92% - 97%)    PHYSICAL EXAM:  GENERAL: NAD, well-groomed, well-developed  HEAD:  Atraumatic, Normocephalic  EYES: EOMI, PERRLA, conjunctiva and sclera clear  ENMT: No tonsillar erythema, exudates, or enlargement; Moist mucous membranes  NECK: Supple, No JVD, Normal thyroid  HEART: Regular rate and rhythm; No murmurs, rubs, or gallops  RESPIRATORY:b/l coarse BS and wheezing  ABDOMEN: Soft, Nontender, Nondistended; Bowel sounds present  NEUROLOGY: A&Ox3, nonfocal, moving all extremities  EXTREMITIES:  2+ Peripheral Pulses, No clubbing, cyanosis, or edema  SKIN: warm, dry, normal color, no rash or abnormal lesions

## 2023-05-15 NOTE — ED PROVIDER NOTE - ATTENDING APP SHARED VISIT CONTRIBUTION OF CARE
This was a shared visit with MOIZ. I reviewed and verified the documentation and independently performed the documented MDM.

## 2023-05-15 NOTE — ED PROVIDER NOTE - CLINICAL SUMMARY MEDICAL DECISION MAKING FREE TEXT BOX
72-year-old female with past medical history of COPD (not on home O2), hypertension, hypothyroidism, high cholesterol, A-fib on Eliquis presents to the ED with complaints shortness of breath x1 week with chest tightness, and cough.  Patient was seen in the ED 4 days ago for similar symptoms she had an unremarkable chest x-ray, was diagnosed with COPD exacerbation and discharged with prednisone and albuterol.  Also reports she reports she was seen prior at the urgent care and was given doxycycline and cefdinir. Reports she got concerned because this morning prior to coming to the ER her SPO2 at home was 86% on room air. She denies any fever chills, nausea vomiting, abdominal pain, sick contacts or all other complaints. PE as noted above. labs/imaging, RVP pending.  Will check walking Spo2, give nebs and solumedrol, reassess 72-year-old female with past medical history of COPD (not on home O2), hypertension, hypothyroidism, high cholesterol, A-fib on Eliquis presents to the ED with complaints shortness of breath x1 week with chest tightness, and cough.  Patient was seen in the ED 4 days ago for similar symptoms she had an unremarkable chest x-ray, was diagnosed with COPD exacerbation and discharged with prednisone and albuterol.  Also reports she reports she was seen prior at the urgent care and was given doxycycline and cefdinir. Reports she got concerned because this morning prior to coming to the ER her SPO2 at home was 86% on room air. She denies any fever chills, nausea vomiting, abdominal pain, sick contacts or all other complaints. PE as noted above. labs/imaging, RVP pending.  Will check walking Spo2, give nebs and solumedrol, reassess    KV: here with shortness of breath despite PO steroids. will treat as COPD exacerbation r/o PNA, admit.

## 2023-05-15 NOTE — ED PROVIDER NOTE - PHYSICAL EXAMINATION
Gen: Well appearing in NAD.   ENT: oral mucosa moist   Head: atraumatic  Heart: s1/s2, RRR  Lung: +diffuse rhonchi, No tachypnea or hypoxia. No signs of resp distress   Abd: soft, NT/ND, no rebound or guarding, NCVAT  Msk: no pedal edema or calf pain,  Neuro: AAO x3,   Skin: Normal for race.   Psych: Alert and oriented

## 2023-05-15 NOTE — ED ADULT NURSE NOTE - CHIEF COMPLAINT QUOTE
Patient is a 73yo female complaining of difficulty breathing and post nasal drip. Patient was seen here at [facility] 4 days ago with the same CC. Patient states that she has a pulse ox at home and had a o2 level of 86% Patient was not able to follow up with her Pulmonologist Patient had history of COPD

## 2023-05-15 NOTE — H&P ADULT - HISTORY OF PRESENT ILLNESS
72-year-old female with past medical history of COPD (not on home O2), hypertension, hypothyroidism, high cholesterol, A-fib on Eliquis presents to the ED with complaints shortness of breath x1 week with chest tightness, and cough.  Patient was seen in the ED 4 days ago for similar symptoms she had an unremarkable chest x-ray, was diagnosed with COPD exacerbation and discharged with prednisone and albuterol.  Also reports she reports she was seen prior at the urgent care and was given doxycycline and cefdinir. Reports she got concerned because this morning prior to coming to the ER her SPO2 at home was 86% on room air.  denies any fever/chills/chest pain/  no sick contacts

## 2023-05-16 DIAGNOSIS — I10 ESSENTIAL (PRIMARY) HYPERTENSION: ICD-10-CM

## 2023-05-16 DIAGNOSIS — J20.6 ACUTE BRONCHITIS DUE TO RHINOVIRUS: ICD-10-CM

## 2023-05-16 DIAGNOSIS — J44.1 CHRONIC OBSTRUCTIVE PULMONARY DISEASE WITH (ACUTE) EXACERBATION: ICD-10-CM

## 2023-05-16 LAB
ANION GAP SERPL CALC-SCNC: 4 MMOL/L — LOW (ref 5–17)
BUN SERPL-MCNC: 34 MG/DL — HIGH (ref 7–23)
CALCIUM SERPL-MCNC: 9.2 MG/DL — SIGNIFICANT CHANGE UP (ref 8.5–10.1)
CHLORIDE SERPL-SCNC: 112 MMOL/L — HIGH (ref 96–108)
CO2 SERPL-SCNC: 24 MMOL/L — SIGNIFICANT CHANGE UP (ref 22–31)
CREAT SERPL-MCNC: 1.3 MG/DL — SIGNIFICANT CHANGE UP (ref 0.5–1.3)
EGFR: 44 ML/MIN/1.73M2 — LOW
GLUCOSE SERPL-MCNC: 153 MG/DL — HIGH (ref 70–99)
HCT VFR BLD CALC: 37.1 % — SIGNIFICANT CHANGE UP (ref 34.5–45)
HGB BLD-MCNC: 11.3 G/DL — LOW (ref 11.5–15.5)
MCHC RBC-ENTMCNC: 28.2 PG — SIGNIFICANT CHANGE UP (ref 27–34)
MCHC RBC-ENTMCNC: 30.5 GM/DL — LOW (ref 32–36)
MCV RBC AUTO: 92.5 FL — SIGNIFICANT CHANGE UP (ref 80–100)
NRBC # BLD: 0 /100 WBCS — SIGNIFICANT CHANGE UP (ref 0–0)
PLATELET # BLD AUTO: 213 K/UL — SIGNIFICANT CHANGE UP (ref 150–400)
POTASSIUM SERPL-MCNC: 4.5 MMOL/L — SIGNIFICANT CHANGE UP (ref 3.5–5.3)
POTASSIUM SERPL-SCNC: 4.5 MMOL/L — SIGNIFICANT CHANGE UP (ref 3.5–5.3)
RBC # BLD: 4.01 M/UL — SIGNIFICANT CHANGE UP (ref 3.8–5.2)
RBC # FLD: 15.4 % — HIGH (ref 10.3–14.5)
SODIUM SERPL-SCNC: 140 MMOL/L — SIGNIFICANT CHANGE UP (ref 135–145)
WBC # BLD: 7.7 K/UL — SIGNIFICANT CHANGE UP (ref 3.8–10.5)
WBC # FLD AUTO: 7.7 K/UL — SIGNIFICANT CHANGE UP (ref 3.8–10.5)

## 2023-05-16 RX ORDER — CEFTRIAXONE 500 MG/1
1000 INJECTION, POWDER, FOR SOLUTION INTRAMUSCULAR; INTRAVENOUS EVERY 24 HOURS
Refills: 0 | Status: COMPLETED | OUTPATIENT
Start: 2023-05-16 | End: 2023-05-18

## 2023-05-16 RX ORDER — DEXTROSE 50 % IN WATER 50 %
12.5 SYRINGE (ML) INTRAVENOUS ONCE
Refills: 0 | Status: DISCONTINUED | OUTPATIENT
Start: 2023-05-16 | End: 2023-05-18

## 2023-05-16 RX ORDER — SODIUM CHLORIDE 9 MG/ML
1000 INJECTION, SOLUTION INTRAVENOUS
Refills: 0 | Status: DISCONTINUED | OUTPATIENT
Start: 2023-05-16 | End: 2023-05-18

## 2023-05-16 RX ORDER — DEXTROSE 50 % IN WATER 50 %
15 SYRINGE (ML) INTRAVENOUS ONCE
Refills: 0 | Status: DISCONTINUED | OUTPATIENT
Start: 2023-05-16 | End: 2023-05-18

## 2023-05-16 RX ORDER — DEXTROSE 50 % IN WATER 50 %
25 SYRINGE (ML) INTRAVENOUS ONCE
Refills: 0 | Status: DISCONTINUED | OUTPATIENT
Start: 2023-05-16 | End: 2023-05-18

## 2023-05-16 RX ORDER — GLUCAGON INJECTION, SOLUTION 0.5 MG/.1ML
1 INJECTION, SOLUTION SUBCUTANEOUS ONCE
Refills: 0 | Status: DISCONTINUED | OUTPATIENT
Start: 2023-05-16 | End: 2023-05-18

## 2023-05-16 RX ORDER — BUDESONIDE AND FORMOTEROL FUMARATE DIHYDRATE 160; 4.5 UG/1; UG/1
2 AEROSOL RESPIRATORY (INHALATION)
Refills: 0 | Status: DISCONTINUED | OUTPATIENT
Start: 2023-05-16 | End: 2023-05-18

## 2023-05-16 RX ORDER — INSULIN LISPRO 100/ML
VIAL (ML) SUBCUTANEOUS
Refills: 0 | Status: DISCONTINUED | OUTPATIENT
Start: 2023-05-16 | End: 2023-05-18

## 2023-05-16 RX ADMIN — BUDESONIDE AND FORMOTEROL FUMARATE DIHYDRATE 2 PUFF(S): 160; 4.5 AEROSOL RESPIRATORY (INHALATION) at 23:07

## 2023-05-16 RX ADMIN — Medication 3 MILLILITER(S): at 07:03

## 2023-05-16 RX ADMIN — Medication 50 MICROGRAM(S): at 06:37

## 2023-05-16 RX ADMIN — Medication 50 MILLIGRAM(S): at 19:19

## 2023-05-16 RX ADMIN — LOSARTAN POTASSIUM 50 MILLIGRAM(S): 100 TABLET, FILM COATED ORAL at 06:37

## 2023-05-16 RX ADMIN — CEFTRIAXONE 100 MILLIGRAM(S): 500 INJECTION, POWDER, FOR SOLUTION INTRAMUSCULAR; INTRAVENOUS at 14:56

## 2023-05-16 RX ADMIN — Medication 3 MILLILITER(S): at 21:12

## 2023-05-16 RX ADMIN — Medication 40 MILLIGRAM(S): at 18:35

## 2023-05-16 RX ADMIN — Medication 60 MILLIGRAM(S): at 06:37

## 2023-05-16 RX ADMIN — APIXABAN 2.5 MILLIGRAM(S): 2.5 TABLET, FILM COATED ORAL at 06:37

## 2023-05-16 RX ADMIN — Medication 3 MILLILITER(S): at 13:56

## 2023-05-16 RX ADMIN — Medication 50 MILLIGRAM(S): at 06:37

## 2023-05-16 RX ADMIN — APIXABAN 2.5 MILLIGRAM(S): 2.5 TABLET, FILM COATED ORAL at 18:36

## 2023-05-16 NOTE — PHARMACOTHERAPY INTERVENTION NOTE - COMMENTS
Patient is a 71 y/o F ordered for Eliquis 2.5 mg every 12 hrs for afib. Discussed with Dr. Gomes that patient does not meet 2/3 reduced dosing criteria (Scr >1.5, weight <60kg, age >80). MD aware and would like to keep patient on current since it is patient’s home dose and history of epistaxis.

## 2023-05-16 NOTE — PATIENT PROFILE ADULT - HEALTH LITERACY
Interventional Radiology    Evaluate for Procedure: Core biopsy of pelvic lymph node    HPI: 52y Male with PMHx TIA/CVA, found unresponsive. MRI showed leptomeningeal disease, differential including sarcoid and lymphoma. Consulted for pelvic lymph node core biopsy.    Allergies: NKA  Medications (Abx/Cardiac/Anticoagulation/Blood Products)    aspirin enteric coated: 81 milliGRAM(s) Oral (01-10 @ 12:08)  heparin  Infusion.: 1500 Unit(s)/Hr IV Continuous (01-10 @ 07:29)    Data:    T(C): 36.6  HR: 68  BP: 108/68  RR: 17  SpO2: 100%    -WBC 8.96 / HgB 11.8 / Hct 36.3 / Plt 135  -Na 138 / Cl 102 / BUN 12 / Glucose 90  -K 3.7 / CO2 26 / Cr 0.78  -ALT 35 / Alk Phos 73 / T.Bili 0.3  -INR -- / PTT 77.7      Radiology: Reviewed.    Assessment/Plan: 52y Male with PMHx TIA/CVA, found unresponsive. MRI showed leptomeningeal disease, differential including sarcoid and lymphoma. Consulted for pelvic lymph node core biopsy.    -- Imaging reviewed. Small window for biopsy of deep small 1.2 cm left external iliac lymph node next to iliac vessels, and which if biopsied would likely have low yield. Likely would only able to obtain FNA biopsy and not core biopsy given lymph node size and location.   -- Apparent high risks outweigh potential benefits. Do not recommend biopsy at this time.     --  Allison Soto MD  IR Pager [phone]

## 2023-05-16 NOTE — CONSULT NOTE ADULT - SUBJECTIVE AND OBJECTIVE BOX
[facility] DIV[facility] of INFECTIOUS DISEASE  Ayush Harris MD PhD, Esha Peralta MD, Blanca Corado MD, Tran Araujo MD, Alverto Ross MD  and providing coverage with Britt Peraza MD  Providing Infectious Disease Consultations at [facility], [facility]'s    Office# [phone] to schedule follow up appointments  Answering Service for urgent calls or New Consults [phone]  Cell# to text for urgent issues Ayush Harris [phone]     HPI:  72-year-old female with past medical history of COPD (not on home O2), hypertension, hypothyroidism, high cholesterol, A-fib on Eliquis presented to the ED with complaints shortness of breath x1 week with chest tightness, and cough.  Patient was seen in the ED 4 days ago for similar symptoms she had an unremarkable chest x-ray, was diagnosed with COPD exacerbation and discharged with prednisone and albuterol.  Also reports she reports she was seen prior at the urgent care and was given doxycycline and cefdinir. Reports she got concerned because prior to coming to the ER her SPO2 at home was 86% on room air.  denies any fever/chills/chest pain/  no sick contacts  (15 May 2023 17:47)      PAST MEDICAL & SURGICAL HISTORY:  COPD (chronic obstructive pulmonary disease)      Hypothyroid      HTN (hypertension)      Hyperlipidemia      Edema extremities      Afib  On Eliquis      Psoriasis      S/P eye surgery  age 5 unsure if right or left eye          Antimicrobials      Immunological      Other  albuterol/ipratropium for Nebulization 3 milliLiter(s) Nebulizer every 6 hours  apixaban 2.5 milliGRAM(s) Oral every 12 hours  budesonide 160 MICROgram(s)/formoterol 4.5 MICROgram(s) Inhaler 2 Puff(s) Inhalation two times a day  hydrALAZINE 50 milliGRAM(s) Oral three times a day  levothyroxine 50 MICROGram(s) Oral daily  losartan 50 milliGRAM(s) Oral daily  methylPREDNISolone sodium succinate Injectable 60 milliGRAM(s) IV Push two times a day  tiotropium 2.5 MICROgram(s) Inhaler 2 Puff(s) Inhalation daily      Allergies    Levaquin (Anaphylaxis)  sulfa drugs (Unknown)  [This allergen will not trigger allergy alert] artichokes (Unknown)  Sulfur (Unknown)    Intolerances        SOCIAL HISTORY:  no current toxic habits reported      FAMILY HISTORY:  FH: hypertension        ROS:    EYES:  Negative  blurry vision or double vision  GASTROINTESTINAL:  Negative for nausea, vomiting, diarrhea  -otherwise negative except for subjective    Vital Signs Last 24 Hrs  T(C): 36.6 (16 May 2023 06:25), Max: 36.7 (16 May 2023 01:27)  T(F): 97.9 (16 May 2023 06:25), Max: 98 (16 May 2023 01:27)  HR: 62 (16 May 2023 06:25) (62 - 72)  BP: 168/76 (16 May 2023 06:25) (142/74 - 168/76)  BP(mean): --  RR: 18 (16 May 2023 06:25) (16 - 20)  SpO2: 93% (16 May 2023 06:25) (92% - 97%)    Parameters below as of 16 May 2023 06:25  Patient On (Oxygen Delivery Method): room air        PE:  In no distress  HEENT:  NC, PERRL, sclerae anicteric, conjunctivae clear, EOMI.  Sinuses nontender, no nasal exudate.  No buccal or pharyngeal lesions, erythema or exudate  Neck:  Supple, no adenopathy  Lungs:  No accessory muscle use, bilaterally diffuse wheezing and ronchi  Cor:  distant  Abd:  Symmetric, normoactive BS.  Soft, nontender, no masses, guarding or rebound.  Liver and spleen not enlarged  Extrem:  No cyanosis or edema  Skin:  No rashes.  Neuro: grossly intact  Musc: moving all limbs freely, no focal deficits        LABS:                        11.3   7.70  )-----------( 213      ( 16 May 2023 04:14 )             37.1       WBC Count: 7.70 K/uL (05-16-23 @ 04:14)  WBC Count: 9.96 K/uL (05-15-23 @ 13:20)      05-16    140  |  112<H>  |  34<H>  ----------------------------<  153<H>  4.5   |  24  |  1.30    Ca    9.2      16 May 2023 04:14    TPro  6.4  /  Alb  3.3  /  TBili  0.4  /  DBili  x   /  AST  20  /  ALT  22  /  AlkPhos  87  05-15      Creatinine, Serum: 1.30 mg/dL (05-16-23 @ 04:14)  Creatinine, Serum: 1.30 mg/dL (05-15-23 @ 13:20)      MICROBIOLOGY:    Respiratory Viral Panel with COVID-19 by BROOKE (05.15.23 @ 13:49)    Rapid RVP Result: Detected    Entero/Rhinovirus (RapRVP): Detected (05.15.23 @ 13:49)        RADIOLOGY & ADDITIONAL STUDIES:    --< from: CT Chest No Cont (05.15.23 @ 16:19) >    ACC: [id number] EXAM:  CT CHEST   ORDERED BY: LAURA MARTINEZ     PROCEDURE DATE:  05/15/2023          INTERPRETATION:  INDICATION: Shortness of breath  TECHNIQUE: Unenhanced CT of the chest. Coronal, sagittal, and MIP images   were reconstructedand reviewed.  COMPARISON: 3/19/2020 chest CT.    FINDINGS:    AIRWAYS, LUNGS, PLEURA: Patent central airways. Bronchiectasis and mucoid   impactions within bilateral lower lobe and right middle lobe are slightly   increased from prior exam. The lungs are otherwise clear. No pleural   effusion.    LYMPH NODES, MEDIASTINUM: No lymphadenopathy.    HEART, VESSELS: Heart size is normal. No pericardial effusion. Coronary   and aortic valve leaflet calcifications. Dilated main pulmonary artery   measuring 4 cm suggestive of pulmonary hypertension.    VISUALIZED UPPER ABDOMEN: Small hiatal hernia.    CHEST WALL, BONES: No aggressive osseous lesion.    LOWER NECK: Within normal limits.    IMPRESSION:    Bronchiectasis and mucoid impactions within bilateral lower lobe and   right middle lobe are slightly increased from prior exam. The lungs are   otherwise clear.    
PULMONARY/CRITICAL CARE    Pt. well known to me from office.     Patient is a 72y old  Female who presents with a chief complaint of acute bronchitis.  BRIEF HOSPITAL COURSE: ***    Events last 24 hours: ***72-year-old female with past medical history of COPD (not on home O2), hypertension, hypothyroidism, high cholesterol, A-fib on Eliquis presents to the ED with complaints shortness of breath x1 week with chest tightness, and cough.  Patient was seen in the ED 4 days ago for similar symptoms she had an unremarkable chest x-ray, was diagnosed with COPD exacerbation and discharged with prednisone and albuterol.  Also reports she reports she was seen prior at the urgent care and was given doxycycline and cefdinir. Reports she got concerned because this morning prior to coming to the ER her SPO2 at home was 86% on room air.  denies any fever/chills/chest pain/  no sick contacts       PAST MEDICAL & SURGICAL HISTORY:  COPD (chronic obstructive pulmonary disease)      Hypothyroid      HTN (hypertension)      Hyperlipidemia      Edema extremities      Afib  On Eliquis      Psoriasis      S/P eye surgery  age 5 unsure if right or left eye        Allergies    Levaquin (Anaphylaxis)  sulfa drugs (Unknown)  [This allergen will not trigger allergy alert] artichokes (Unknown)  Sulfur (Unknown)    Intolerances      FAMILY HISTORY: distant smoker no etoh  FH: hypertension        Review of Systems:  CONSTITUTIONAL: had low grade fever, few days ago  EYES: No eye pain, visual disturbances, or discharge  ENMT:  No difficulty hearing, tinnitus, vertigo; No sinus or throat pain  NECK: No pain or stiffness  RESPIRATORY: has cough, wheezing, no  chills or hemoptysis; has shortness of breath  CARDIOVASCULAR: No chest pain, palpitations, dizziness, or leg swelling  GASTROINTESTINAL: No abdominal or epigastric pain. No nausea, vomiting, or hematemesis; No diarrhea or constipation. No melena or hematochezia.  GENITOURINARY: No dysuria, frequency, hematuria, or incontinence  NEUROLOGICAL: No headaches, memory loss, loss of strength, numbness, or tremors  SKIN: No itching, burning, rashes, or lesions   MUSCULOSKELETAL: No joint pain or swelling; No muscle, back, or extremity pain  PSYCHIATRIC: No depression, anxiety, mood swings, or difficulty sleeping      Medications:    hydrALAZINE 50 milliGRAM(s) Oral three times a day  losartan 50 milliGRAM(s) Oral daily    albuterol/ipratropium for Nebulization 3 milliLiter(s) Nebulizer every 6 hours  budesonide 160 MICROgram(s)/formoterol 4.5 MICROgram(s) Inhaler 2 Puff(s) Inhalation two times a day  tiotropium 2.5 MICROgram(s) Inhaler 2 Puff(s) Inhalation daily        apixaban 2.5 milliGRAM(s) Oral every 12 hours        levothyroxine 50 MICROGram(s) Oral daily  methylPREDNISolone sodium succinate Injectable 60 milliGRAM(s) IV Push two times a day                  ICU Vital Signs Last 24 Hrs  T(C): 36.6 (16 May 2023 06:25), Max: 36.7 (16 May 2023 01:27)  T(F): 97.9 (16 May 2023 06:25), Max: 98 (16 May 2023 01:27)  HR: 62 (16 May 2023 06:25) (62 - 72)  BP: 168/76 (16 May 2023 06:25) (142/74 - 168/76)  BP(mean): --  ABP: --  ABP(mean): --  RR: 18 (16 May 2023 06:25) (16 - 20)  SpO2: 93% (16 May 2023 06:25) (92% - 97%)    O2 Parameters below as of 16 May 2023 06:25  Patient On (Oxygen Delivery Method): room air          Vital Signs Last 24 Hrs  T(C): 36.6 (16 May 2023 06:25), Max: 36.7 (16 May 2023 01:27)  T(F): 97.9 (16 May 2023 06:25), Max: 98 (16 May 2023 01:27)  HR: 62 (16 May 2023 06:25) (62 - 72)  BP: 168/76 (16 May 2023 06:25) (142/74 - 168/76)  BP(mean): --  RR: 18 (16 May 2023 06:25) (16 - 20)  SpO2: 93% (16 May 2023 06:25) (92% - 97%)    Parameters below as of 16 May 2023 06:25  Patient On (Oxygen Delivery Method): room air            I&O's Detail        LABS:                        11.3   7.70  )-----------( 213      ( 16 May 2023 04:14 )             37.1     05-16    140  |  112<H>  |  34<H>  ----------------------------<  153<H>  4.5   |  24  |  1.30    Ca    9.2      16 May 2023 04:14    TPro  6.4  /  Alb  3.3  /  TBili  0.4  /  DBili  x   /  AST  20  /  ALT  22  /  AlkPhos  87  05-15          CAPILLARY BLOOD GLUCOSE            CULTURES:      Physical Examination:    General: No acute distress.  overweight female sitting comfortably in bed    HEENT: Pupils equal, reactive to light.  Symmetric.    PULM: few crackles right base, diffuse wheezing bilat no change percussion    CVS: Regular rate and rhythm, no murmurs, rubs, or gallops    ABD: Soft, nondistended, nontender, normoactive bowel sounds, no masses    EXT: No edema, nontender calves    SKIN: Warm and well perfused, no rashes noted.    NEURO: Alert, oriented, interactive, nonfocal    RADIOLOGY: ***rad< from: CT Chest No Cont (05.15.23 @ 16:19) >  ACC: [id number] EXAM:  CT CHEST   ORDERED BY: LAURA MARTINEZ     PROCEDURE DATE:  05/15/2023          INTERPRETATION:  INDICATION: Shortness of breath  TECHNIQUE: Unenhanced CT of the chest. Coronal, sagittal, and MIP images   were reconstructedand reviewed.  COMPARISON: 3/19/2020 chest CT.    FINDINGS:    AIRWAYS, LUNGS, PLEURA: Patent central airways. Bronchiectasis and mucoid   impactions within bilateral lower lobe and right middle lobe are slightly   increased from prior exam. The lungs are otherwise clear. No pleural   effusion.    LYMPH NODES, MEDIASTINUM: No lymphadenopathy.    HEART, VESSELS: Heart size is normal. No pericardial effusion. Coronary   and aortic valve leaflet calcifications. Dilated main pulmonary artery   measuring 4 cm suggestive of pulmonary hypertension.    VISUALIZED UPPER ABDOMEN: Small hiatal hernia.    CHEST WALL, BONES: No aggressive osseous lesion.    LOWER NECK: Within normal limits.    IMPRESSION:    Bronchiectasis and mucoid impactions within bilateral lower lobe and   right middle lobe are slightly increased from prior exam. The lungs are   otherwise clear.    --- End of Report ---            ARIELLA PRADO MD; Attending Radiologist  This document has been electronically signed. May 15 2023  4:29PM    < end of copied text >      CRITICAL CARE TIME SPENT: ***

## 2023-05-16 NOTE — CONSULT NOTE ADULT - ASSESSMENT
Pt. well known to me with hx COPD admitted for acute Rhinovirus Bronchitis/exacerbation.  Suggest steroids, inhalers.  Sputum c and s.  OOB.
72-year-old female with past medical history of COPD (not on home O2), hypertension, hypothyroidism, high cholesterol, A-fib on Eliquis presented to the ED with complaints shortness of breath noted SPO2 at home was 86% on room air. Pt is Entero/Rhinovirus+ on RVP    RECOMMENDATIONS  No compelling evidence of airspace disease on imaging or exam and most consistent with COPD exacerbation triggered by Entero/Rhinovirus viral infection thus the failure to improve with antibiotics and the improvement with steroids and inhalers. Empiric antibiotics for COPD exacerbation still recommended and noted sputum has been ordered  -will start ceftriaxone (5/16) with recs to follow    Thank you for consulting us and involving us in the management of this most interesting and challenging case.  We will follow along in the care of this patient. Please call us at [phone] or text me directly on my cell# at [phone] with any concerns.

## 2023-05-16 NOTE — PATIENT PROFILE ADULT - FALL HARM RISK - UNIVERSAL INTERVENTIONS
Bed in lowest position, wheels locked, appropriate side rails in place/Call bell, personal items and telephone in reach/Instruct patient to call for assistance before getting out of bed or chair/Non-slip footwear when patient is out of bed/[place] to call system/Physically safe environment - no spills, clutter or unnecessary equipment/Purposeful Proactive Rounding/Room/bathroom lighting operational, light cord in reach

## 2023-05-17 LAB
A1C WITH ESTIMATED AVERAGE GLUCOSE RESULT: 5.6 % — SIGNIFICANT CHANGE UP (ref 4–5.6)
ANION GAP SERPL CALC-SCNC: 5 MMOL/L — SIGNIFICANT CHANGE UP (ref 5–17)
BUN SERPL-MCNC: 37 MG/DL — HIGH (ref 7–23)
CALCIUM SERPL-MCNC: 9.5 MG/DL — SIGNIFICANT CHANGE UP (ref 8.5–10.1)
CHLORIDE SERPL-SCNC: 110 MMOL/L — HIGH (ref 96–108)
CO2 SERPL-SCNC: 25 MMOL/L — SIGNIFICANT CHANGE UP (ref 22–31)
CREAT SERPL-MCNC: 1.2 MG/DL — SIGNIFICANT CHANGE UP (ref 0.5–1.3)
EGFR: 48 ML/MIN/1.73M2 — LOW
ESTIMATED AVERAGE GLUCOSE: 114 MG/DL — SIGNIFICANT CHANGE UP (ref 68–114)
GLUCOSE SERPL-MCNC: 141 MG/DL — HIGH (ref 70–99)
HCT VFR BLD CALC: 35.8 % — SIGNIFICANT CHANGE UP (ref 34.5–45)
HGB BLD-MCNC: 11.1 G/DL — LOW (ref 11.5–15.5)
MCHC RBC-ENTMCNC: 28.5 PG — SIGNIFICANT CHANGE UP (ref 27–34)
MCHC RBC-ENTMCNC: 31 GM/DL — LOW (ref 32–36)
MCV RBC AUTO: 92 FL — SIGNIFICANT CHANGE UP (ref 80–100)
NRBC # BLD: 0 /100 WBCS — SIGNIFICANT CHANGE UP (ref 0–0)
PLATELET # BLD AUTO: 229 K/UL — SIGNIFICANT CHANGE UP (ref 150–400)
POTASSIUM SERPL-MCNC: 4.5 MMOL/L — SIGNIFICANT CHANGE UP (ref 3.5–5.3)
POTASSIUM SERPL-SCNC: 4.5 MMOL/L — SIGNIFICANT CHANGE UP (ref 3.5–5.3)
PROCALCITONIN SERPL-MCNC: 0.04 NG/ML — SIGNIFICANT CHANGE UP
RBC # BLD: 3.89 M/UL — SIGNIFICANT CHANGE UP (ref 3.8–5.2)
RBC # FLD: 15.7 % — HIGH (ref 10.3–14.5)
SODIUM SERPL-SCNC: 140 MMOL/L — SIGNIFICANT CHANGE UP (ref 135–145)
WBC # BLD: 19.58 K/UL — HIGH (ref 3.8–10.5)
WBC # FLD AUTO: 19.58 K/UL — HIGH (ref 3.8–10.5)

## 2023-05-17 RX ADMIN — Medication 3 MILLILITER(S): at 19:40

## 2023-05-17 RX ADMIN — BUDESONIDE AND FORMOTEROL FUMARATE DIHYDRATE 2 PUFF(S): 160; 4.5 AEROSOL RESPIRATORY (INHALATION) at 06:35

## 2023-05-17 RX ADMIN — Medication 50 MICROGRAM(S): at 05:08

## 2023-05-17 RX ADMIN — BUDESONIDE AND FORMOTEROL FUMARATE DIHYDRATE 2 PUFF(S): 160; 4.5 AEROSOL RESPIRATORY (INHALATION) at 18:34

## 2023-05-17 RX ADMIN — Medication 3 MILLILITER(S): at 13:56

## 2023-05-17 RX ADMIN — Medication 50 MILLIGRAM(S): at 13:05

## 2023-05-17 RX ADMIN — TIOTROPIUM BROMIDE 2 PUFF(S): 18 CAPSULE ORAL; RESPIRATORY (INHALATION) at 13:05

## 2023-05-17 RX ADMIN — APIXABAN 2.5 MILLIGRAM(S): 2.5 TABLET, FILM COATED ORAL at 05:08

## 2023-05-17 RX ADMIN — Medication 3 MILLILITER(S): at 01:33

## 2023-05-17 RX ADMIN — Medication 40 MILLIGRAM(S): at 17:48

## 2023-05-17 RX ADMIN — Medication 50 MILLIGRAM(S): at 21:23

## 2023-05-17 RX ADMIN — Medication 3 MILLILITER(S): at 07:41

## 2023-05-17 RX ADMIN — LOSARTAN POTASSIUM 50 MILLIGRAM(S): 100 TABLET, FILM COATED ORAL at 05:07

## 2023-05-17 RX ADMIN — CEFTRIAXONE 100 MILLIGRAM(S): 500 INJECTION, POWDER, FOR SOLUTION INTRAMUSCULAR; INTRAVENOUS at 13:07

## 2023-05-17 RX ADMIN — Medication 50 MILLIGRAM(S): at 03:00

## 2023-05-17 RX ADMIN — Medication 40 MILLIGRAM(S): at 05:08

## 2023-05-17 RX ADMIN — APIXABAN 2.5 MILLIGRAM(S): 2.5 TABLET, FILM COATED ORAL at 17:48

## 2023-05-17 NOTE — CARE COORDINATION ASSESSMENT. - NSCAREPROVIDERS_GEN_ALL_CORE_FT
CARE PROVIDERS:  Accepting Physician: Ursula Gomes  Administration: Gurjit Maldonado  Administration: Zak Carvalho  Administration: Fab Roth  Admitting: Ursula Gomes  Attending: Ursula Gomes  Clinical Doc. Improvement: Patricia Grigsby  Consultant: Ayush Harris  Consultant: Joe Pelaez  Consultant: Weil, Patricia  Covering Team: Chandler Wakefield  ED ACP: Shasha Manzanares  ED Attending: Greg Camarillo  ED Nurse: Binta Soto/Billing & Coding: Lauren Gil  Nurse: Chastity Arita  Nurse: Vicente Amezquita  Ordered: ADM, User  Ordered: Doctor, Unknown  Outpatient Provider: Joe Pelaez  Outpatient Provider: Willy Hanna  Outpatient Provider: Ursula Gomes  Outpatient Provider: Jero Jj  Outpatient Provider: Law Santos  Override: Luis Angel Watkins  PCA/Nursing Assistant: Fito Packer  Primary Team: Manny Bartholomew  Registered Dietitian: Kimi Nunez  Respiratory Therapy: Jero Chun  : Yamilka Hartley

## 2023-05-17 NOTE — CARE COORDINATION ASSESSMENT. - NSPASTMEDSURGHISTORY_GEN_ALL_CORE_FT
PAST MEDICAL & SURGICAL HISTORY:  Edema extremities      Hyperlipidemia      HTN (hypertension)      Hypothyroid      COPD (chronic obstructive pulmonary disease)      S/P eye surgery  age 5 unsure if right or left eye      Psoriasis      Afib  On Eliquis

## 2023-05-17 NOTE — CARE COORDINATION ASSESSMENT. - OTHER PERTINENT DISCHARGE PLANNING INFORMATION:
Patient from home lives alone admitted for COPD exacerbation. The patient provided yellow card for the [facility] program. The patient is undecided about HC. List provided for [facility]. Patient has 2 steps to enter home and has bedroom on the first floor. CM role explained DC planning discussed.

## 2023-05-18 ENCOUNTER — TRANSCRIPTION ENCOUNTER (OUTPATIENT)
Age: 73
End: 2023-05-18

## 2023-05-18 VITALS — OXYGEN SATURATION: 93 %

## 2023-05-18 LAB
ANION GAP SERPL CALC-SCNC: 0 MMOL/L — LOW (ref 5–17)
BUN SERPL-MCNC: 37 MG/DL — HIGH (ref 7–23)
CALCIUM SERPL-MCNC: 9 MG/DL — SIGNIFICANT CHANGE UP (ref 8.5–10.1)
CHLORIDE SERPL-SCNC: 109 MMOL/L — HIGH (ref 96–108)
CO2 SERPL-SCNC: 25 MMOL/L — SIGNIFICANT CHANGE UP (ref 22–31)
CREAT SERPL-MCNC: 1.1 MG/DL — SIGNIFICANT CHANGE UP (ref 0.5–1.3)
EGFR: 53 ML/MIN/1.73M2 — LOW
GLUCOSE SERPL-MCNC: 142 MG/DL — HIGH (ref 70–99)
HCT VFR BLD CALC: 36.1 % — SIGNIFICANT CHANGE UP (ref 34.5–45)
HGB BLD-MCNC: 11.5 G/DL — SIGNIFICANT CHANGE UP (ref 11.5–15.5)
MCHC RBC-ENTMCNC: 28.9 PG — SIGNIFICANT CHANGE UP (ref 27–34)
MCHC RBC-ENTMCNC: 31.9 GM/DL — LOW (ref 32–36)
MCV RBC AUTO: 90.7 FL — SIGNIFICANT CHANGE UP (ref 80–100)
NRBC # BLD: 0 /100 WBCS — SIGNIFICANT CHANGE UP (ref 0–0)
PLATELET # BLD AUTO: 233 K/UL — SIGNIFICANT CHANGE UP (ref 150–400)
POTASSIUM SERPL-MCNC: 4.6 MMOL/L — SIGNIFICANT CHANGE UP (ref 3.5–5.3)
POTASSIUM SERPL-SCNC: 4.6 MMOL/L — SIGNIFICANT CHANGE UP (ref 3.5–5.3)
RBC # BLD: 3.98 M/UL — SIGNIFICANT CHANGE UP (ref 3.8–5.2)
RBC # FLD: 15.7 % — HIGH (ref 10.3–14.5)
SODIUM SERPL-SCNC: 134 MMOL/L — LOW (ref 135–145)
WBC # BLD: 14.24 K/UL — HIGH (ref 3.8–10.5)
WBC # FLD AUTO: 14.24 K/UL — HIGH (ref 3.8–10.5)

## 2023-05-18 PROCEDURE — 93005 ELECTROCARDIOGRAM TRACING: CPT

## 2023-05-18 PROCEDURE — 71250 CT THORAX DX C-: CPT | Mod: MA

## 2023-05-18 PROCEDURE — 80048 BASIC METABOLIC PNL TOTAL CA: CPT

## 2023-05-18 PROCEDURE — 85027 COMPLETE CBC AUTOMATED: CPT

## 2023-05-18 PROCEDURE — 94640 AIRWAY INHALATION TREATMENT: CPT

## 2023-05-18 PROCEDURE — 96374 THER/PROPH/DIAG INJ IV PUSH: CPT

## 2023-05-18 PROCEDURE — 84145 PROCALCITONIN (PCT): CPT

## 2023-05-18 PROCEDURE — 36415 COLL VENOUS BLD VENIPUNCTURE: CPT

## 2023-05-18 PROCEDURE — 85025 COMPLETE CBC W/AUTO DIFF WBC: CPT

## 2023-05-18 PROCEDURE — 0225U NFCT DS DNA&RNA 21 SARSCOV2: CPT

## 2023-05-18 PROCEDURE — 80053 COMPREHEN METABOLIC PANEL: CPT

## 2023-05-18 PROCEDURE — 99285 EMERGENCY DEPT VISIT HI MDM: CPT

## 2023-05-18 PROCEDURE — 83036 HEMOGLOBIN GLYCOSYLATED A1C: CPT

## 2023-05-18 PROCEDURE — 82962 GLUCOSE BLOOD TEST: CPT

## 2023-05-18 RX ORDER — LOSARTAN POTASSIUM 100 MG/1
1 TABLET, FILM COATED ORAL
Qty: 0 | Refills: 0 | DISCHARGE

## 2023-05-18 RX ORDER — LOSARTAN POTASSIUM 100 MG/1
1 TABLET, FILM COATED ORAL
Qty: 0 | Refills: 0 | DISCHARGE
Start: 2023-05-18

## 2023-05-18 RX ORDER — CEPHALEXIN 500 MG
1 CAPSULE ORAL
Qty: 15 | Refills: 0
Start: 2023-05-18 | End: 2023-05-22

## 2023-05-18 RX ADMIN — Medication 3 MILLILITER(S): at 14:02

## 2023-05-18 RX ADMIN — Medication 50 MICROGRAM(S): at 05:41

## 2023-05-18 RX ADMIN — Medication 40 MILLIGRAM(S): at 05:41

## 2023-05-18 RX ADMIN — Medication 50 MILLIGRAM(S): at 05:40

## 2023-05-18 RX ADMIN — LOSARTAN POTASSIUM 50 MILLIGRAM(S): 100 TABLET, FILM COATED ORAL at 05:41

## 2023-05-18 RX ADMIN — Medication 3 MILLILITER(S): at 00:12

## 2023-05-18 RX ADMIN — APIXABAN 2.5 MILLIGRAM(S): 2.5 TABLET, FILM COATED ORAL at 05:40

## 2023-05-18 RX ADMIN — Medication 3 MILLILITER(S): at 07:24

## 2023-05-18 RX ADMIN — CEFTRIAXONE 100 MILLIGRAM(S): 500 INJECTION, POWDER, FOR SOLUTION INTRAMUSCULAR; INTRAVENOUS at 12:10

## 2023-05-18 NOTE — PROGRESS NOTE ADULT - ASSESSMENT
72-year-old female with past medical history of COPD (not on home O2), hypertension, hypothyroidism, high cholesterol, A-fib on Eliquis presented to the ED with complaints shortness of breath noted SPO2 at home was 86% on room air. Pt is Entero/Rhinovirus+ on RVP    RECOMMENDATIONS  No compelling evidence of airspace disease on imaging or exam and most consistent with COPD exacerbation triggered by Entero/Rhinovirus viral infection thus the failure to improve with antibiotics and the improvement with steroids and inhalers. Empiric antibiotics for COPD exacerbation still recommended and noted sputum has been ordered but not collected  -started ceftriaxone (5/16)  fine for dc on cefuroxime 500mg PO BID with last day 5/20    From an ID standpoint no further requirement for inpatient status for the management of ID issues. Fine with discharge from ID standpoint when other medical issues no longer require inpatient care and social issues allow for a safe discharge plan. To schedule an outpatient ID follow up appointment please call our office at [phone]    Thank you for consulting us and involving us in the management of this most interesting and challenging case.  We will follow along in the care of this patient. Please call us at [phone] or text me directly on my cell# at [phone] with any concerns.  
Pt. well known to me with hx COPD admitted for acute Rhinovirus Bronchitis/exacerbation.  Improved. Borderline sats.  Suggest steroids, inhalers.  Sputum c and s.  OOB.
72-year-old female with past medical history of COPD (not on home O2), hypertension, hypothyroidism, high cholesterol, A-fib on Eliquis presented to the ED with complaints shortness of breath noted SPO2 at home was 86% on room air. Pt is Entero/Rhinovirus+ on RVP    RECOMMENDATIONS  No compelling evidence of airspace disease on imaging or exam and most consistent with COPD exacerbation triggered by Entero/Rhinovirus viral infection thus the failure to improve with antibiotics and the improvement with steroids and inhalers. Empiric antibiotics for COPD exacerbation still recommended and noted sputum has been ordered but not collected  -started ceftriaxone (5/16)  anticipate we will be able to discharge as early as 5/18 on cefuroxime 500mg PO BID with last day 5/20    Thank you for consulting us and involving us in the management of this most interesting and challenging case.  We will follow along in the care of this patient. Please call us at [phone] or text me directly on my cell# at [phone] with any concerns.  
72-year-old female with past medical history of COPD (not on home O2), hypertension, hypothyroidism, high cholesterol, A-fib on Eliquis presents to the ED with complaints shortness of breath x1 week with chest tightness, and cough.  RVP+rhino virus  CRT chest as above- bronchiectasis with mucoid impaction    #Hypoxic respiratory failure  dt COPD exac  +Rhino virus infection  o2 support, solumedrol, nebs atc  pulm cs  ct chest bronchiectasis  fu pulm    ID cs appreciated-ceftriaxone    #Afib hx paroxysmal  resume home eliquis 2.5 bid    #HTN- resume home meds- losartan, hydralazine    #hypothyroidism- resume synthroid    DVT ppx- home eliquis    [facility]   [phone]      
72-year-old female with past medical history of COPD (not on home O2), hypertension, hypothyroidism, high cholesterol, A-fib on Eliquis presents to the ED with complaints shortness of breath x1 week with chest tightness, and cough.  RVP+rhino virus  CRT chest as above- bronchiectasis with mucoid impaction    #Hypoxic respiratory failure  dt COPD exac  +Rhino virus infection  o2 support, solumedrol, will start taper   nebs atc  pulm cs  ct chest bronchiectasis  fu pulm    ID cs appreciated-ceftriaxone to continue    #Afib hx paroxysmal  resume home eliquis 2.5 bid    #HTN- resume home meds- losartan, hydralazine    #hypothyroidism- resume synthroid    DVT ppx- home eliquis    [facility]   [phone]      
Pt. well known to me with hx COPD admitted for acute Rhinovirus Bronchitis/exacerbation.  Improved.  Suggest steroid taper,  inhalers.  Should be able to go home.  Fu with me in office.  OOB.

## 2023-05-18 NOTE — PROGRESS NOTE ADULT - SUBJECTIVE AND OBJECTIVE BOX
PULMONARY/CRITICAL CARE  DOS 5/18/23  Improved. Less sob. Ambulated.  Still some wheeze .  Pt. well known to me from office.     Patient is a 72y old  Female who presents with a chief complaint of acute bronchitis.  BRIEF HOSPITAL COURSE: ***    Events last 24 hours: ***72-year-old female with past medical history of COPD (not on home O2), hypertension, hypothyroidism, high cholesterol, A-fib on Eliquis presents to the ED with complaints shortness of breath x1 week with chest tightness, and cough.  Patient was seen in the ED 4 days ago for similar symptoms she had an unremarkable chest x-ray, was diagnosed with COPD exacerbation and discharged with prednisone and albuterol.  Also reports she reports she was seen prior at the urgent care and was given doxycycline and cefdinir. Reports she got concerned because this morning prior to coming to the ER her SPO2 at home was 86% on room air.  denies any fever/chills/chest pain/  no sick contacts       PAST MEDICAL & SURGICAL HISTORY:  COPD (chronic obstructive pulmonary disease)      Hypothyroid      HTN (hypertension)      Hyperlipidemia      Edema extremities      Afib  On Eliquis      Psoriasis      S/P eye surgery  age 5 unsure if right or left eye        Allergies    Levaquin (Anaphylaxis)  sulfa drugs (Unknown)  [This allergen will not trigger allergy alert] artichokes (Unknown)  Sulfur (Unknown)    Intolerances      FAMILY HISTORY: distant smoker no etoh  FH: hypertension          Medications:    hydrALAZINE 50 milliGRAM(s) Oral three times a day  losartan 50 milliGRAM(s) Oral daily    albuterol/ipratropium for Nebulization 3 milliLiter(s) Nebulizer every 6 hours  budesonide 160 MICROgram(s)/formoterol 4.5 MICROgram(s) Inhaler 2 Puff(s) Inhalation two times a day  tiotropium 2.5 MICROgram(s) Inhaler 2 Puff(s) Inhalation daily        apixaban 2.5 milliGRAM(s) Oral every 12 hours        levothyroxine 50 MICROGram(s) Oral daily  methylPREDNISolone sodium succinate Injectable 60 milliGRAM(s) IV Push two times a day                  ICU Vital Signs Last 24 Hrs  T(C): 36.6 (16 May 2023 06:25), Max: 36.7 (16 May 2023 01:27)  T(F): 97.9 (16 May 2023 06:25), Max: 98 (16 May 2023 01:27)  HR: 62 (16 May 2023 06:25) (62 - 72)  BP: 168/76 (16 May 2023 06:25) (142/74 - 168/76)  BP(mean): --  ABP: --  ABP(mean): --  RR: 18 (16 May 2023 06:25) (16 - 20)  SpO2: 93% (16 May 2023 06:25) (92% - 97%)    O2 Parameters below as of 16 May 2023 06:25  Patient On (Oxygen Delivery Method): room air          Vital Signs Last 24 Hrs  T(C): 36.6 (16 May 2023 06:25), Max: 36.7 (16 May 2023 01:27)  T(F): 97.9 (16 May 2023 06:25), Max: 98 (16 May 2023 01:27)  HR: 62 (16 May 2023 06:25) (62 - 72)  BP: 168/76 (16 May 2023 06:25) (142/74 - 168/76)  BP(mean): --  RR: 18 (16 May 2023 06:25) (16 - 20)  SpO2: 93% (16 May 2023 06:25) (92% - 97%)    Parameters below as of 16 May 2023 06:25  Patient On (Oxygen Delivery Method): room air            I&O's Detail        LABS:                        11.3   7.70  )-----------( 213      ( 16 May 2023 04:14 )             37.1     05-16    140  |  112<H>  |  34<H>  ----------------------------<  153<H>  4.5   |  24  |  1.30    Ca    9.2      16 May 2023 04:14    TPro  6.4  /  Alb  3.3  /  TBili  0.4  /  DBili  x   /  AST  20  /  ALT  22  /  AlkPhos  87  05-15          CAPILLARY BLOOD GLUCOSE            CULTURES:      Physical Examination:    General: No acute distress.  overweight female sitting comfortably in bed    HEENT: Pupils equal, reactive to light.  Symmetric.    PULM: no crackles , diffuse wheezing bilat no change percussion    CVS: Regular rate and rhythm, no murmurs, rubs, or gallops    ABD: Soft, nondistended, nontender, normoactive bowel sounds, no masses    EXT: No edema, nontender calves    SKIN: Warm and well perfused, no rashes noted.    NEURO: Alert, oriented, interactive, nonfocal    RADIOLOGY: ***rad< from: CT Chest No Cont (05.15.23 @ 16:19) >  ACC: [id number] EXAM:  CT CHEST   ORDERED BY: LAURA MARTINEZ     PROCEDURE DATE:  05/15/2023          INTERPRETATION:  INDICATION: Shortness of breath  TECHNIQUE: Unenhanced CT of the chest. Coronal, sagittal, and MIP images   were reconstructedand reviewed.  COMPARISON: 3/19/2020 chest CT.    FINDINGS:    AIRWAYS, LUNGS, PLEURA: Patent central airways. Bronchiectasis and mucoid   impactions within bilateral lower lobe and right middle lobe are slightly   increased from prior exam. The lungs are otherwise clear. No pleural   effusion.    LYMPH NODES, MEDIASTINUM: No lymphadenopathy.    HEART, VESSELS: Heart size is normal. No pericardial effusion. Coronary   and aortic valve leaflet calcifications. Dilated main pulmonary artery   measuring 4 cm suggestive of pulmonary hypertension.    VISUALIZED UPPER ABDOMEN: Small hiatal hernia.    CHEST WALL, BONES: No aggressive osseous lesion.    LOWER NECK: Within normal limits.    IMPRESSION:    Bronchiectasis and mucoid impactions within bilateral lower lobe and   right middle lobe are slightly increased from prior exam. The lungs are   otherwise clear.    --- End of Report ---            ARIELLA PRADO MD; Attending Radiologist  This document has been electronically signed. May 15 2023  4:29PM    < end of copied text >      CRITICAL CARE TIME SPENT: ***  
Patient is a 72y old  Female who presents with a chief complaint of dyspnea (17 May 2023 14:35)      INTERVAL HPI/OVERNIGHT EVENTS: noted  pt seen and examined this am   events noted  feels well      Vital Signs Last 24 Hrs  T(C): 36.8 (17 May 2023 11:17), Max: 36.8 (17 May 2023 11:17)  T(F): 98.3 (17 May 2023 11:17), Max: 98.3 (17 May 2023 11:17)  HR: 73 (17 May 2023 12:57) (71 - 107)  BP: 125/72 (17 May 2023 12:57) (109/69 - 137/77)  BP(mean): --  RR: 19 (17 May 2023 11:17) (18 - 19)  SpO2: 93% (17 May 2023 11:17) (91% - 94%)    Parameters below as of 17 May 2023 11:17  Patient On (Oxygen Delivery Method): room air        albuterol/ipratropium for Nebulization 3 milliLiter(s) Nebulizer every 6 hours  apixaban 2.5 milliGRAM(s) Oral every 12 hours  budesonide 160 MICROgram(s)/formoterol 4.5 MICROgram(s) Inhaler 2 Puff(s) Inhalation two times a day  cefTRIAXone   IVPB 1000 milliGRAM(s) IV Intermittent every 24 hours  dextrose 5%. 1000 milliLiter(s) IV Continuous <Continuous>  dextrose 5%. 1000 milliLiter(s) IV Continuous <Continuous>  dextrose 50% Injectable 25 Gram(s) IV Push once  dextrose 50% Injectable 25 Gram(s) IV Push once  dextrose 50% Injectable 12.5 Gram(s) IV Push once  dextrose Oral Gel 15 Gram(s) Oral once PRN  glucagon  Injectable 1 milliGRAM(s) IntraMuscular once  hydrALAZINE 50 milliGRAM(s) Oral three times a day  insulin lispro (ADMELOG) corrective regimen sliding scale   SubCutaneous three times a day before meals  levothyroxine 50 MICROGram(s) Oral daily  losartan 50 milliGRAM(s) Oral daily  methylPREDNISolone sodium succinate Injectable 40 milliGRAM(s) IV Push two times a day  tiotropium 2.5 MICROgram(s) Inhaler 2 Puff(s) Inhalation daily      PHYSICAL EXAM:  GENERAL: NAD,   EYES: conjunctiva and sclera clear  ENMT: Moist mucous membranes  NECK: Supple, No JVD, Normal thyroid  CHEST/LUNG: non labored, cta b/l  HEART: Regular rate and rhythm; No murmurs, rubs, or gallops  ABDOMEN: Soft, Nontender, Nondistended; Bowel sounds present  EXTREMITIES:  2+ Peripheral Pulses, No clubbing, cyanosis, or edema  LYMPH: No lymphadenopathy noted  SKIN: No rashes or lesions    Consultant(s) Notes Reviewed:  [x ] YES  [ ] NO  Care Discussed with Consultants/Other Providers [ x] YES  [ ] NO    LABS:                        11.1   19.58 )-----------( 229      ( 17 May 2023 05:45 )             35.8     05-17    140  |  110<H>  |  37<H>  ----------------------------<  141<H>  4.5   |  25  |  1.20    Ca    9.5      17 May 2023 05:45          CAPILLARY BLOOD GLUCOSE      POCT Blood Glucose.: 137 mg/dL (17 May 2023 11:37)  POCT Blood Glucose.: 124 mg/dL (17 May 2023 07:44)              RADIOLOGY & ADDITIONAL TESTS:    Imaging Personally Reviewed:  [x ] YES  [ ] NO
Patient is a 72y old  Female who presents with a chief complaint of resp distress (16 May 2023 11:35)      INTERVAL HPI/OVERNIGHT EVENTS: noted  pt seen and examined this am   events noted  feels well, improved sob and cough this am      Vital Signs Last 24 Hrs  T(C): 36.7 (16 May 2023 13:05), Max: 36.7 (16 May 2023 01:27)  T(F): 98 (16 May 2023 13:05), Max: 98 (16 May 2023 01:27)  HR: 73 (16 May 2023 13:56) (62 - 76)  BP: 147/79 (16 May 2023 13:05) (142/74 - 168/76)  BP(mean): --  RR: 18 (16 May 2023 13:05) (18 - 20)  SpO2: 93% (16 May 2023 13:56) (91% - 93%)    Parameters below as of 16 May 2023 13:56  Patient On (Oxygen Delivery Method): room air        albuterol/ipratropium for Nebulization 3 milliLiter(s) Nebulizer every 6 hours  apixaban 2.5 milliGRAM(s) Oral every 12 hours  budesonide 160 MICROgram(s)/formoterol 4.5 MICROgram(s) Inhaler 2 Puff(s) Inhalation two times a day  cefTRIAXone   IVPB 1000 milliGRAM(s) IV Intermittent every 24 hours  hydrALAZINE 50 milliGRAM(s) Oral three times a day  levothyroxine 50 MICROGram(s) Oral daily  losartan 50 milliGRAM(s) Oral daily  methylPREDNISolone sodium succinate Injectable 60 milliGRAM(s) IV Push two times a day  tiotropium 2.5 MICROgram(s) Inhaler 2 Puff(s) Inhalation daily      PHYSICAL EXAM:  GENERAL: NAD,   EYES: conjunctiva and sclera clear  ENMT: Moist mucous membranes  NECK: Supple, No JVD, Normal thyroid  CHEST/LUNG: non labored, occasional rhonchi  HEART: Regular rate and rhythm; No murmurs, rubs, or gallops  ABDOMEN: Soft, Nontender, Nondistended; Bowel sounds present  EXTREMITIES:  2+ Peripheral Pulses, No clubbing, cyanosis, or edema  LYMPH: No lymphadenopathy noted  SKIN: No rashes or lesions    Consultant(s) Notes Reviewed:  [x ] YES  [ ] NO  Care Discussed with Consultants/Other Providers [ x] YES  [ ] NO    LABS:                        11.3   7.70  )-----------( 213      ( 16 May 2023 04:14 )             37.1     05-16    140  |  112<H>  |  34<H>  ----------------------------<  153<H>  4.5   |  24  |  1.30    Ca    9.2      16 May 2023 04:14    TPro  6.4  /  Alb  3.3  /  TBili  0.4  /  DBili  x   /  AST  20  /  ALT  22  /  AlkPhos  87  05-15        CAPILLARY BLOOD GLUCOSE                  RADIOLOGY & ADDITIONAL TESTS:    Imaging Personally Reviewed:  [x ] YES  [ ] NO
[facility] INFECTIOUS DISE[facility]  Ayush Harris MD PhD, Esha Peralta MD, Blanca Corado MD, Tran Araujo MD, Alverto Ross MD  and providing coverage with Britt Peraza MD  Providing Infectious Disease Consultations at [facility], [facility], [facility]'s    Office# [phone] to schedule follow up appointments  Answering Service for urgent calls or New Consults [phone]  Cell# to text for urgent issues Ayush Harris [phone]     infectious diseases progress note:    BALBIR ANGEL is a 72y y. o. Female patient    Overnight and events of the last 24hrs reviewed    Allergies    Levaquin (Anaphylaxis)  sulfa drugs (Unknown)  [This allergen will not trigger allergy alert] artichokes (Unknown)  Sulfur (Unknown)    Intolerances        ANTIBIOTICS/RELEVANT:  antimicrobials  cefTRIAXone   IVPB 1000 milliGRAM(s) IV Intermittent every 24 hours    immunologic:    OTHER:  albuterol/ipratropium for Nebulization 3 milliLiter(s) Nebulizer every 6 hours  apixaban 2.5 milliGRAM(s) Oral every 12 hours  budesonide 160 MICROgram(s)/formoterol 4.5 MICROgram(s) Inhaler 2 Puff(s) Inhalation two times a day  dextrose 5%. 1000 milliLiter(s) IV Continuous <Continuous>  dextrose 5%. 1000 milliLiter(s) IV Continuous <Continuous>  dextrose 50% Injectable 25 Gram(s) IV Push once  dextrose 50% Injectable 25 Gram(s) IV Push once  dextrose 50% Injectable 12.5 Gram(s) IV Push once  dextrose Oral Gel 15 Gram(s) Oral once PRN  glucagon  Injectable 1 milliGRAM(s) IntraMuscular once  hydrALAZINE 50 milliGRAM(s) Oral three times a day  insulin lispro (ADMELOG) corrective regimen sliding scale   SubCutaneous three times a day before meals  levothyroxine 50 MICROGram(s) Oral daily  losartan 50 milliGRAM(s) Oral daily  methylPREDNISolone sodium succinate Injectable 40 milliGRAM(s) IV Push two times a day  tiotropium 2.5 MICROgram(s) Inhaler 2 Puff(s) Inhalation daily      Objective:  Vital Signs Last 24 Hrs  T(C): 36.8 (17 May 2023 11:17), Max: 36.8 (17 May 2023 11:17)  T(F): 98.3 (17 May 2023 11:17), Max: 98.3 (17 May 2023 11:17)  HR: 73 (17 May 2023 12:57) (71 - 107)  BP: 125/72 (17 May 2023 12:57) (109/69 - 137/77)  BP(mean): --  RR: 19 (17 May 2023 11:17) (18 - 19)  SpO2: 93% (17 May 2023 11:17) (91% - 94%)    Parameters below as of 17 May 2023 11:17  Patient On (Oxygen Delivery Method): room air        T(C): 36.8 (05-17-23 @ 11:17), Max: 36.8 (05-17-23 @ 11:17)  T(C): 36.8 (05-17-23 @ 11:17), Max: 36.8 (05-17-23 @ 11:17)  T(C): 36.8 (05-17-23 @ 11:17), Max: 36.8 (05-17-23 @ 11:17)    PHYSICAL EXAM:  HEENT: NC atraumatic  Neck: supple  Respiratory: no accessory muscle use, breathing comfortably, CTA  Cardiovascular: distant  Gastrointestinal: normal appearing, nondistended  Extremities: no clubbing, no cyanosis,        LABS:                          11.1   19.58 )-----------( 229      ( 17 May 2023 05:45 )             35.8       WBC  19.58 05-17 @ 05:45  7.70 05-16 @ 04:14  9.96 05-15 @ 13:20      05-17    140  |  110<H>  |  37<H>  ----------------------------<  141<H>  4.5   |  25  |  1.20    Ca    9.5      17 May 2023 05:45        Creatinine, Serum: 1.20 mg/dL (05-17-23 @ 05:45)  Creatinine, Serum: 1.30 mg/dL (05-16-23 @ 04:14)  Creatinine, Serum: 1.30 mg/dL (05-15-23 @ 13:20)                INFLAMMATORY MARKERS      MICROBIOLOGY:              RADIOLOGY & ADDITIONAL STUDIES:  
[facility]FECT[facility]OUS DISE[facility]  Ayush Harris MD PhD, Esha Peralta MD, Blanca Corado MD, Tran Araujo MD, Alverto Ross MD  and providing coverage with Britt Peraza MD  Providing Infectious Disease Consultations at [facility], [facility], [facility]'s    Office# [phone] to schedule follow up appointments  Answering Service for urgent calls or New Consults [phone]  Cell# to text for urgent issues Ayush Harris [phone]     infectious diseases progress note:    BALBIR ANGEL is a 72y y. o. Female patient    Overnight and events of the last 24hrs reviewed    Allergies    Levaquin (Anaphylaxis)  sulfa drugs (Unknown)  [This allergen will not trigger allergy alert] artichokes (Unknown)  Sulfur (Unknown)    Intolerances        ANTIBIOTICS/RELEVANT:  antimicrobials    immunologic:    OTHER:  albuterol/ipratropium for Nebulization 3 milliLiter(s) Nebulizer every 6 hours  apixaban 2.5 milliGRAM(s) Oral every 12 hours  budesonide 160 MICROgram(s)/formoterol 4.5 MICROgram(s) Inhaler 2 Puff(s) Inhalation two times a day  dextrose 5%. 1000 milliLiter(s) IV Continuous <Continuous>  dextrose 5%. 1000 milliLiter(s) IV Continuous <Continuous>  dextrose 50% Injectable 25 Gram(s) IV Push once  dextrose 50% Injectable 25 Gram(s) IV Push once  dextrose 50% Injectable 12.5 Gram(s) IV Push once  dextrose Oral Gel 15 Gram(s) Oral once PRN  glucagon  Injectable 1 milliGRAM(s) IntraMuscular once  hydrALAZINE 50 milliGRAM(s) Oral three times a day  insulin lispro (ADMELOG) corrective regimen sliding scale   SubCutaneous three times a day before meals  levothyroxine 50 MICROGram(s) Oral daily  losartan 50 milliGRAM(s) Oral daily  methylPREDNISolone sodium succinate Injectable 40 milliGRAM(s) IV Push two times a day  tiotropium 2.5 MICROgram(s) Inhaler 2 Puff(s) Inhalation daily      Objective:  Vital Signs Last 24 Hrs  T(C): 36.7 (18 May 2023 12:02), Max: 36.8 (17 May 2023 20:02)  T(F): 98.1 (18 May 2023 12:02), Max: 98.2 (17 May 2023 20:02)  HR: 72 (18 May 2023 12:02) (70 - 77)  BP: 146/81 (18 May 2023 12:02) (115/63 - 146/81)  BP(mean): --  RR: 17 (18 May 2023 12:02) (17 - 18)  SpO2: 93% (18 May 2023 12:02) (92% - 93%)    Parameters below as of 18 May 2023 12:02  Patient On (Oxygen Delivery Method): room air        T(C): 36.7 (05-18-23 @ 12:02), Max: 36.8 (05-17-23 @ 11:17)  T(C): 36.7 (05-18-23 @ 12:02), Max: 36.8 (05-17-23 @ 11:17)  T(C): 36.7 (05-18-23 @ 12:02), Max: 36.8 (05-17-23 @ 11:17)    PHYSICAL EXAM:  HEENT: NC atraumatic  Neck: supple  Respiratory: no accessory muscle use, breathing comfortably  Cardiovascular: distant  Gastrointestinal: normal appearing, nondistended  Extremities: no clubbing, no cyanosis,        LABS:                          11.5   14.24 )-----------( 233      ( 18 May 2023 11:25 )             36.1       WBC  14.24 05-18 @ 11:25  19.58 05-17 @ 05:45  7.70 05-16 @ 04:14  9.96 05-15 @ 13:20      05-18    134<L>  |  109<H>  |  37<H>  ----------------------------<  142<H>  4.6   |  25  |  1.10    Ca    9.0      18 May 2023 11:25        Creatinine, Serum: 1.10 mg/dL (05-18-23 @ 11:25)  Creatinine, Serum: 1.20 mg/dL (05-17-23 @ 05:45)  Creatinine, Serum: 1.30 mg/dL (05-16-23 @ 04:14)  Creatinine, Serum: 1.30 mg/dL (05-15-23 @ 13:20)                INFLAMMATORY MARKERS      MICROBIOLOGY:              RADIOLOGY & ADDITIONAL STUDIES:  
PULMONARY/CRITICAL CARE  DOS 5/17/23  Still some wheeze . Improved.  Pt. well known to me from office.     Patient is a 72y old  Female who presents with a chief complaint of acute bronchitis.  BRIEF HOSPITAL COURSE: ***    Events last 24 hours: ***72-year-old female with past medical history of COPD (not on home O2), hypertension, hypothyroidism, high cholesterol, A-fib on Eliquis presents to the ED with complaints shortness of breath x1 week with chest tightness, and cough.  Patient was seen in the ED 4 days ago for similar symptoms she had an unremarkable chest x-ray, was diagnosed with COPD exacerbation and discharged with prednisone and albuterol.  Also reports she reports she was seen prior at the urgent care and was given doxycycline and cefdinir. Reports she got concerned because this morning prior to coming to the ER her SPO2 at home was 86% on room air.  denies any fever/chills/chest pain/  no sick contacts       PAST MEDICAL & SURGICAL HISTORY:  COPD (chronic obstructive pulmonary disease)      Hypothyroid      HTN (hypertension)      Hyperlipidemia      Edema extremities      Afib  On Eliquis      Psoriasis      S/P eye surgery  age 5 unsure if right or left eye        Allergies    Levaquin (Anaphylaxis)  sulfa drugs (Unknown)  [This allergen will not trigger allergy alert] artichokes (Unknown)  Sulfur (Unknown)    Intolerances      FAMILY HISTORY: distant smoker no etoh  FH: hypertension          Medications:    hydrALAZINE 50 milliGRAM(s) Oral three times a day  losartan 50 milliGRAM(s) Oral daily    albuterol/ipratropium for Nebulization 3 milliLiter(s) Nebulizer every 6 hours  budesonide 160 MICROgram(s)/formoterol 4.5 MICROgram(s) Inhaler 2 Puff(s) Inhalation two times a day  tiotropium 2.5 MICROgram(s) Inhaler 2 Puff(s) Inhalation daily        apixaban 2.5 milliGRAM(s) Oral every 12 hours        levothyroxine 50 MICROGram(s) Oral daily  methylPREDNISolone sodium succinate Injectable 60 milliGRAM(s) IV Push two times a day                  ICU Vital Signs Last 24 Hrs  T(C): 36.6 (16 May 2023 06:25), Max: 36.7 (16 May 2023 01:27)  T(F): 97.9 (16 May 2023 06:25), Max: 98 (16 May 2023 01:27)  HR: 62 (16 May 2023 06:25) (62 - 72)  BP: 168/76 (16 May 2023 06:25) (142/74 - 168/76)  BP(mean): --  ABP: --  ABP(mean): --  RR: 18 (16 May 2023 06:25) (16 - 20)  SpO2: 93% (16 May 2023 06:25) (92% - 97%)    O2 Parameters below as of 16 May 2023 06:25  Patient On (Oxygen Delivery Method): room air          Vital Signs Last 24 Hrs  T(C): 36.6 (16 May 2023 06:25), Max: 36.7 (16 May 2023 01:27)  T(F): 97.9 (16 May 2023 06:25), Max: 98 (16 May 2023 01:27)  HR: 62 (16 May 2023 06:25) (62 - 72)  BP: 168/76 (16 May 2023 06:25) (142/74 - 168/76)  BP(mean): --  RR: 18 (16 May 2023 06:25) (16 - 20)  SpO2: 93% (16 May 2023 06:25) (92% - 97%)    Parameters below as of 16 May 2023 06:25  Patient On (Oxygen Delivery Method): room air            I&O's Detail        LABS:                        11.3   7.70  )-----------( 213      ( 16 May 2023 04:14 )             37.1     05-16    140  |  112<H>  |  34<H>  ----------------------------<  153<H>  4.5   |  24  |  1.30    Ca    9.2      16 May 2023 04:14    TPro  6.4  /  Alb  3.3  /  TBili  0.4  /  DBili  x   /  AST  20  /  ALT  22  /  AlkPhos  87  05-15          CAPILLARY BLOOD GLUCOSE            CULTURES:      Physical Examination:    General: No acute distress.  overweight female sitting comfortably in bed    HEENT: Pupils equal, reactive to light.  Symmetric.    PULM: no crackles , diffuse wheezing bilat no change percussion    CVS: Regular rate and rhythm, no murmurs, rubs, or gallops    ABD: Soft, nondistended, nontender, normoactive bowel sounds, no masses    EXT: No edema, nontender calves    SKIN: Warm and well perfused, no rashes noted.    NEURO: Alert, oriented, interactive, nonfocal    RADIOLOGY: ***rad< from: CT Chest No Cont (05.15.23 @ 16:19) >  ACC: [id number] EXAM:  CT CHEST   ORDERED BY: LAURA MARTINEZ     PROCEDURE DATE:  05/15/2023          INTERPRETATION:  INDICATION: Shortness of breath  TECHNIQUE: Unenhanced CT of the chest. Coronal, sagittal, and MIP images   were reconstructedand reviewed.  COMPARISON: 3/19/2020 chest CT.    FINDINGS:    AIRWAYS, LUNGS, PLEURA: Patent central airways. Bronchiectasis and mucoid   impactions within bilateral lower lobe and right middle lobe are slightly   increased from prior exam. The lungs are otherwise clear. No pleural   effusion.    LYMPH NODES, MEDIASTINUM: No lymphadenopathy.    HEART, VESSELS: Heart size is normal. No pericardial effusion. Coronary   and aortic valve leaflet calcifications. Dilated main pulmonary artery   measuring 4 cm suggestive of pulmonary hypertension.    VISUALIZED UPPER ABDOMEN: Small hiatal hernia.    CHEST WALL, BONES: No aggressive osseous lesion.    LOWER NECK: Within normal limits.    IMPRESSION:    Bronchiectasis and mucoid impactions within bilateral lower lobe and   right middle lobe are slightly increased from prior exam. The lungs are   otherwise clear.    --- End of Report ---            ARIELLA PRADO MD; Attending Radiologist  This document has been electronically signed. May 15 2023  4:29PM    < end of copied text >      CRITICAL CARE TIME SPENT: ***

## 2023-05-18 NOTE — DISCHARGE NOTE NURSING/CASE MANAGEMENT/SOCIAL WORK - NSPROEXTENSIONSOFSELF_GEN_A_NUR
NP contacted via secured messaging d/t K 3.5 and diet orders. HF recommends low Na, fluid restrictions, but currently reg diet ordered. Awaiting response/see new orders.   cane

## 2023-05-18 NOTE — DISCHARGE NOTE PROVIDER - CARE PROVIDER_API CALL
Joe Pelaez)  Critical Care Medicine; Internal Medicine; Pulmonary Disease  [address] [place] [address]  Phone: [phone]  Fax: [phone]  Follow Up Time:     Law Santos)  Internal Medicine  [address] [place] [address]  Phone: [phone]  Fax: [phone]  Follow Up Time:

## 2023-05-18 NOTE — DISCHARGE NOTE PROVIDER - NSDCMRMEDTOKEN_GEN_ALL_CORE_FT
Advair Diskus 500 mcg-50 mcg inhalation powder: 1 puff(s) inhaled 2 times a day  Artificial Tears ophthalmic solution: 1 drop(s) to each affected eye 3 times a day  cephalexin 500 mg oral tablet: 1 tab(s) orally 3 times a day  cholecalciferol oral tablet: 400 unit(s) orally once a day  Eliquis:   hydrALAZINE 50 mg oral tablet: 1 tab(s) orally 3 times a day  levothyroxine 25 mcg (0.025 mg) oral tablet: 1 tab(s) orally once a day  losartan 50 mg oral tablet: 1 tab(s) orally once a day  Lubricant Eye Drops ophthalmic solution: 1 drop(s) to each affected eye once a day (at bedtime)  prednisone 10mmg twice daily for 2days  then start prednisone 40 once daily for 3days,   then 30mg prednisone once daily for 3days,   then prednisone 20mg once daily for 3days,   then 10mg once daily for 3days and stop  Spiriva 18 mcg inhalation capsule: 1 each inhaled once a day  Vitamin B-12: 1 tab(s) orally once a day  Vitamin C 500 mg oral tablet: 1 tab(s) orally once a day   Advair Diskus 500 mcg-50 mcg inhalation powder: 1 puff(s) inhaled 2 times a day  Artificial Tears ophthalmic solution: 1 drop(s) to each affected eye 3 times a day  cephalexin 500 mg oral tablet: 1 tab(s) orally 3 times a day  cholecalciferol oral tablet: 400 unit(s) orally once a day  Eliquis:   hydrALAZINE 50 mg oral tablet: 1 tab(s) orally 3 times a day  levothyroxine 25 mcg (0.025 mg) oral tablet: 1 tab(s) orally once a day  losartan 50 mg oral tablet: 1 tab(s) orally once a day  Lubricant Eye Drops ophthalmic solution: 1 drop(s) to each affected eye once a day (at bedtime)  predniSONE 10 mg oral tablet: 4 tab(s) orally every 12 hours 40mg twice daily for 2days  then start prednisone 40 once daily for 3days,   then 30mg prednisone once daily for 3days,   then prednisone 20mg once daily for 3days,   then 10mg once daily for 3days and stop  Spiriva 18 mcg inhalation capsule: 1 each inhaled once a day  Vitamin B-12: 1 tab(s) orally once a day  Vitamin C 500 mg oral tablet: 1 tab(s) orally once a day   Advair Diskus 500 mcg-50 mcg inhalation powder: 1 puff(s) inhaled 2 times a day  Artificial Tears ophthalmic solution: 1 drop(s) to each affected eye 3 times a day  cephalexin 500 mg oral tablet: 1 tab(s) orally 3 times a day  cholecalciferol oral tablet: 400 unit(s) orally once a day  Eliquis:   hydrALAZINE 50 mg oral tablet: 1 tab(s) orally 3 times a day  levothyroxine 25 mcg (0.025 mg) oral tablet: 1 tab(s) orally once a day  losartan 50 mg oral tablet: 1 tab(s) orally once a day  Lubricant Eye Drops ophthalmic solution: 1 drop(s) to each affected eye once a day (at bedtime)  predniSONE 20 mg oral tablet: 40 orally 2 times a day  predniSONE 20 mg oral tablet: 2 tab(s) orally once a day  predniSONE 20 mg oral tablet: 1 tab(s) orally once a day  Spiriva 18 mcg inhalation capsule: 1 each inhaled once a day  Vitamin B-12: 1 tab(s) orally once a day  Vitamin C 500 mg oral tablet: 1 tab(s) orally once a day   Advair Diskus 500 mcg-50 mcg inhalation powder: 1 puff(s) inhaled 2 times a day  Artificial Tears ophthalmic solution: 1 drop(s) to each affected eye 3 times a day  cephalexin 500 mg oral tablet: 1 tab(s) orally 3 times a day  cholecalciferol oral tablet: 400 unit(s) orally once a day  Eliquis:   hydrALAZINE 50 mg oral tablet: 1 tab(s) orally 3 times a day  levothyroxine 25 mcg (0.025 mg) oral tablet: 1 tab(s) orally once a day  losartan 50 mg oral tablet: 1 tab(s) orally once a day  Lubricant Eye Drops ophthalmic solution: 1 drop(s) to each affected eye once a day (at bedtime)  predniSONE 20 mg oral tablet: 1 tab(s) orally 2 times a day  predniSONE 20 mg oral tablet: 1 tab(s) orally 2 times a day  Spiriva 18 mcg inhalation capsule: 1 each inhaled once a day  Vitamin B-12: 1 tab(s) orally once a day  Vitamin C 500 mg oral tablet: 1 tab(s) orally once a day   Advair Diskus 500 mcg-50 mcg inhalation powder: 1 puff(s) inhaled 2 times a day  Artificial Tears ophthalmic solution: 1 drop(s) to each affected eye 3 times a day  cephalexin 500 mg oral tablet: 1 tab(s) orally 3 times a day  cholecalciferol oral tablet: 400 unit(s) orally once a day  Eliquis: 2.5 orally 2 times a day  hydrALAZINE 50 mg oral tablet: 1 tab(s) orally 3 times a day  levothyroxine 25 mcg (0.025 mg) oral tablet: 1 tab(s) orally once a day  losartan 50 mg oral tablet: 1 tab(s) orally once a day  Lubricant Eye Drops ophthalmic solution: 1 drop(s) to each affected eye once a day (at bedtime)  predniSONE 20 mg oral tablet: 1 tab(s) orally 2 times a day  Spiriva 18 mcg inhalation capsule: 1 each inhaled once a day  Vitamin B-12: 1 tab(s) orally once a day  Vitamin C 500 mg oral tablet: 1 tab(s) orally once a day

## 2023-05-18 NOTE — DISCHARGE NOTE PROVIDER - HOSPITAL COURSE
72-year-old female with past medical history of COPD (not on home O2), hypertension, hypothyroidism, high cholesterol, A-fib on Eliquis presents to the ED with complaints shortness of breath x1 week with chest tightness, and cough.RVP+rhino virus,CT chest- bronchiectasis with mucoid impaction    #aw Hypoxic respiratory failure dt COPD exac+Rhino virus infection  improved with systemic steroids, dced with steroid taper nebs , abx switched to po upon dc  to complete 7d course  outpt pcpc and pulm fu

## 2023-05-18 NOTE — DISCHARGE NOTE PROVIDER - CARE PROVIDERS DIRECT ADDRESSES
,DirectAddress_Unknown,[email or web address]

## 2023-05-18 NOTE — CASE MANAGEMENT PROGRESS NOTE - NSCMPROGRESSNOTE_GEN_ALL_CORE
Patient ready for DC. Declined HC. The patient has a safe DC plan home. CM will continue to follow.

## 2023-05-18 NOTE — DISCHARGE NOTE NURSING/CASE MANAGEMENT/SOCIAL WORK - PATIENT PORTAL LINK FT
You can access the FollowMyHealth Patient Portal offered by [facility] by registering at the following website: [email or web address]. By joining [facility]’s FollowMyHealth portal, you will also be able to view your health information using other applications (apps) compatible with our system.

## 2023-05-18 NOTE — DISCHARGE NOTE PROVIDER - NSDCCPCAREPLAN_GEN_ALL_CORE_FT
PRINCIPAL DISCHARGE DIAGNOSIS  Diagnosis: COPD exacerbation  Assessment and Plan of Treatment:       SECONDARY DISCHARGE DIAGNOSES  Diagnosis: Viral syndrome  Assessment and Plan of Treatment:

## 2023-05-18 NOTE — DISCHARGE NOTE PROVIDER - NSDCFUSCHEDAPPT_GEN_ALL_CORE_FT
Yun, Suk-Hyeon  [facility]  [address]  Scheduled Appointment: 05/24/2023    Yun, Suk-Hyeon  [facility]  [address]  Scheduled Appointment: 07/14/2023    Palla, Venugopal R  [facility]  CARDIOLOGY [address] [place] [address]  Scheduled Appointment: 07/14/2023

## 2023-05-19 ENCOUNTER — APPOINTMENT (OUTPATIENT)
Dept: NEPHROLOGY | Facility: CLINIC | Age: 73
End: 2023-05-19

## 2023-05-24 ENCOUNTER — APPOINTMENT (OUTPATIENT)
Dept: NEPHROLOGY | Facility: CLINIC | Age: 73
End: 2023-05-24
Payer: MEDICARE

## 2023-05-24 VITALS
SYSTOLIC BLOOD PRESSURE: 122 MMHG | TEMPERATURE: 98 F | DIASTOLIC BLOOD PRESSURE: 84 MMHG | HEIGHT: 62 IN | WEIGHT: 186 LBS | BODY MASS INDEX: 34.23 KG/M2

## 2023-05-24 DIAGNOSIS — M25.473 EFFUSION, UNSPECIFIED ANKLE: ICD-10-CM

## 2023-05-24 DIAGNOSIS — R60.0 LOCALIZED EDEMA: ICD-10-CM

## 2023-05-24 PROCEDURE — 99214 OFFICE O/P EST MOD 30 MIN: CPT

## 2023-05-24 RX ORDER — MUPIROCIN 20 MG/G
2 OINTMENT TOPICAL
Qty: 1 | Refills: 1 | Status: DISCONTINUED | COMMUNITY
Start: 2022-04-06 | End: 2023-05-24

## 2023-05-24 NOTE — HISTORY OF PRESENT ILLNESS
[FreeTextEntry1] : Ms Contreras presents for follow up today.\par Had been well until last week.\par Admitted with resp distress due to viral URI to [facility] 5/15-18.\par \par Reviewed admission record.\par Creat remained stable at 1.1.\par \par Medication list reviewed and reconciled.\par

## 2023-05-24 NOTE — PHYSICAL EXAM
[General Appearance - Alert] : alert [General Appearance - In No Acute Distress] : in no acute distress [Neck Appearance] : the appearance of the neck was normal [] : the neck was supple [FreeTextEntry1] : bilateral LE edema--trace--stable. [Bowel Sounds] : normal bowel sounds [Abdomen Soft] : soft

## 2023-05-24 NOTE — ASSESSMENT
[FreeTextEntry1] : 71 yo woman with HTN, A FIB, COPD and CKD.\par \par --CKD : \par    stable function.\par    Bun/creat 22/1.03 (5/12/2023),  24/1.17(1/17/2023),  19/1.01(10/24/2022),   16/1.15 ( 7/20//2022),   22/1.12 (   4/1/2022),   21/0.92(3/10/2022),   21/1.01(1/18/2022),   24/1.24(9/3/2021),   25/1.1692/22/2021)   39/1.5(   12/5/2020)\par \par --UA : no protein/blood, no WBC\par --HTN : \par    BP control stable.\par

## 2023-05-24 NOTE — CONSULT LETTER
[Courtesy Letter:] : I had the pleasure of seeing your patient, [unfilled], in my office today. [Please see my note below.] : Please see my note below. [Consult Closing:] : Thank you very much for allowing me to participate in the care of this patient.  If you have any questions, please do not hesitate to contact me. [Sincerely,] : Sincerely, [DrKarsten  ___] : Dr. SANDOVAL

## 2023-06-06 NOTE — DISCHARGE NOTE NURSING/CASE MANAGEMENT/SOCIAL WORK - NSDCPEPTCOWAR_GEN_ALL_CORE
Warfarin/Coumadin - Compliance/Warfarin/Coumadin - Dietary Advice/Warfarin/Coumadin - Follow up monitoring/Warfarin/Coumadin - Potential for adverse drug reactions and interactions Abbe Flap (Upper To Lower Lip) Text: The defect of the lower lip was assessed and measured.  Given the location and size of the defect, an Abbe flap was deemed most appropriate.  Using a sterile surgical marker, an appropriate Abbe flap was measured and drawn on the upper lip. Local anesthesia was then infiltrated.  A scalpel was then used to incise the upper lip through and through the skin, vermilion, muscle and mucosa, leaving the flap pedicled on the opposite side.  The flap was then rotated and transferred to the lower lip defect.  The flap was then sutured into place with a three layer technique, closing the orbicularis oris muscle layer with subcutaneous buried sutures, followed by a mucosal layer and an epidermal layer.

## 2023-07-13 ENCOUNTER — NON-APPOINTMENT (OUTPATIENT)
Age: 73
End: 2023-07-13

## 2023-07-14 ENCOUNTER — APPOINTMENT (OUTPATIENT)
Dept: NEPHROLOGY | Facility: CLINIC | Age: 73
End: 2023-07-14

## 2023-07-14 ENCOUNTER — APPOINTMENT (OUTPATIENT)
Dept: CARDIOLOGY | Facility: CLINIC | Age: 73
End: 2023-07-14

## 2023-07-16 ENCOUNTER — INPATIENT (INPATIENT)
Facility: HOSPITAL | Age: 73
LOS: 6 days | Discharge: HOME | DRG: 190 | End: 2023-07-23
Attending: INTERNAL MEDICINE | Admitting: INTERNAL MEDICINE
Payer: MEDICARE

## 2023-07-16 VITALS
HEART RATE: 84 BPM | SYSTOLIC BLOOD PRESSURE: 154 MMHG | DIASTOLIC BLOOD PRESSURE: 74 MMHG | RESPIRATION RATE: 22 BRPM | OXYGEN SATURATION: 92 % | WEIGHT: 179.9 LBS

## 2023-07-16 DIAGNOSIS — R06.02 SHORTNESS OF BREATH: ICD-10-CM

## 2023-07-16 DIAGNOSIS — Z98.89 OTHER SPECIFIED POSTPROCEDURAL STATES: Chronic | ICD-10-CM

## 2023-07-16 LAB
ALBUMIN SERPL ELPH-MCNC: 3.5 G/DL — SIGNIFICANT CHANGE UP (ref 3.3–5)
ALP SERPL-CCNC: 85 U/L — SIGNIFICANT CHANGE UP (ref 40–120)
ALT FLD-CCNC: 8 U/L — LOW (ref 10–45)
ANION GAP SERPL CALC-SCNC: 13 MMOL/L — SIGNIFICANT CHANGE UP (ref 5–17)
APTT BLD: 35 SEC — SIGNIFICANT CHANGE UP (ref 27.5–35.5)
AST SERPL-CCNC: 17 U/L — SIGNIFICANT CHANGE UP (ref 10–40)
BASE EXCESS BLDV CALC-SCNC: -0.3 MMOL/L — SIGNIFICANT CHANGE UP (ref -2–3)
BASOPHILS # BLD AUTO: 0.03 K/UL — SIGNIFICANT CHANGE UP (ref 0–0.2)
BASOPHILS NFR BLD AUTO: 0.3 % — SIGNIFICANT CHANGE UP (ref 0–2)
BILIRUB SERPL-MCNC: 0.5 MG/DL — SIGNIFICANT CHANGE UP (ref 0.2–1.2)
BUN SERPL-MCNC: 9 MG/DL — SIGNIFICANT CHANGE UP (ref 7–23)
CA-I SERPL-SCNC: 1.24 MMOL/L — SIGNIFICANT CHANGE UP (ref 1.15–1.33)
CALCIUM SERPL-MCNC: 9.8 MG/DL — SIGNIFICANT CHANGE UP (ref 8.4–10.5)
CHLORIDE BLDV-SCNC: 100 MMOL/L — SIGNIFICANT CHANGE UP (ref 96–108)
CHLORIDE SERPL-SCNC: 100 MMOL/L — SIGNIFICANT CHANGE UP (ref 96–108)
CO2 BLDV-SCNC: 28 MMOL/L — HIGH (ref 22–26)
CO2 SERPL-SCNC: 20 MMOL/L — LOW (ref 22–31)
CREAT SERPL-MCNC: 0.92 MG/DL — SIGNIFICANT CHANGE UP (ref 0.5–1.3)
EGFR: 66 ML/MIN/1.73M2 — SIGNIFICANT CHANGE UP
EOSINOPHIL # BLD AUTO: 0.06 K/UL — SIGNIFICANT CHANGE UP (ref 0–0.5)
EOSINOPHIL NFR BLD AUTO: 0.7 % — SIGNIFICANT CHANGE UP (ref 0–6)
GAS PNL BLDV: 129 MMOL/L — LOW (ref 136–145)
GAS PNL BLDV: SIGNIFICANT CHANGE UP
GAS PNL BLDV: SIGNIFICANT CHANGE UP
GLUCOSE BLDV-MCNC: 85 MG/DL — SIGNIFICANT CHANGE UP (ref 70–99)
GLUCOSE SERPL-MCNC: 85 MG/DL — SIGNIFICANT CHANGE UP (ref 70–99)
HCO3 BLDV-SCNC: 26 MMOL/L — SIGNIFICANT CHANGE UP (ref 22–29)
HCT VFR BLD CALC: 34.1 % — LOW (ref 34.5–45)
HCT VFR BLDA CALC: 33 % — LOW (ref 34.5–46.5)
HGB BLD CALC-MCNC: 11 G/DL — LOW (ref 11.7–16.1)
HGB BLD-MCNC: 11 G/DL — LOW (ref 11.5–15.5)
IMM GRANULOCYTES NFR BLD AUTO: 0.6 % — SIGNIFICANT CHANGE UP (ref 0–0.9)
INR BLD: 1.41 RATIO — HIGH (ref 0.88–1.16)
LACTATE BLDV-MCNC: 0.9 MMOL/L — SIGNIFICANT CHANGE UP (ref 0.5–2)
LYMPHOCYTES # BLD AUTO: 0.65 K/UL — LOW (ref 1–3.3)
LYMPHOCYTES # BLD AUTO: 7.5 % — LOW (ref 13–44)
MAGNESIUM SERPL-MCNC: 1.8 MG/DL — SIGNIFICANT CHANGE UP (ref 1.6–2.6)
MCHC RBC-ENTMCNC: 30 PG — SIGNIFICANT CHANGE UP (ref 27–34)
MCHC RBC-ENTMCNC: 32.3 GM/DL — SIGNIFICANT CHANGE UP (ref 32–36)
MCV RBC AUTO: 92.9 FL — SIGNIFICANT CHANGE UP (ref 80–100)
MONOCYTES # BLD AUTO: 0.72 K/UL — SIGNIFICANT CHANGE UP (ref 0–0.9)
MONOCYTES NFR BLD AUTO: 8.3 % — SIGNIFICANT CHANGE UP (ref 2–14)
NEUTROPHILS # BLD AUTO: 7.12 K/UL — SIGNIFICANT CHANGE UP (ref 1.8–7.4)
NEUTROPHILS NFR BLD AUTO: 82.6 % — HIGH (ref 43–77)
NRBC # BLD: 0 /100 WBCS — SIGNIFICANT CHANGE UP (ref 0–0)
NT-PROBNP SERPL-SCNC: 913 PG/ML — HIGH (ref 0–300)
PCO2 BLDV: 51 MMHG — HIGH (ref 39–42)
PH BLDV: 7.32 — SIGNIFICANT CHANGE UP (ref 7.32–7.43)
PHOSPHATE SERPL-MCNC: 3.4 MG/DL — SIGNIFICANT CHANGE UP (ref 2.5–4.5)
PLATELET # BLD AUTO: 269 K/UL — SIGNIFICANT CHANGE UP (ref 150–400)
PO2 BLDV: 18 MMHG — LOW (ref 25–45)
POTASSIUM BLDV-SCNC: 4.4 MMOL/L — SIGNIFICANT CHANGE UP (ref 3.5–5.1)
POTASSIUM SERPL-MCNC: 4.7 MMOL/L — SIGNIFICANT CHANGE UP (ref 3.5–5.3)
POTASSIUM SERPL-SCNC: 4.7 MMOL/L — SIGNIFICANT CHANGE UP (ref 3.5–5.3)
PROT SERPL-MCNC: 6.6 G/DL — SIGNIFICANT CHANGE UP (ref 6–8.3)
PROTHROM AB SERPL-ACNC: 16.3 SEC — HIGH (ref 10.5–13.4)
RAPID RVP RESULT: SIGNIFICANT CHANGE UP
RBC # BLD: 3.67 M/UL — LOW (ref 3.8–5.2)
RBC # FLD: 14.8 % — HIGH (ref 10.3–14.5)
SAO2 % BLDV: 20.5 % — LOW (ref 67–88)
SARS-COV-2 RNA SPEC QL NAA+PROBE: SIGNIFICANT CHANGE UP
SODIUM SERPL-SCNC: 133 MMOL/L — LOW (ref 135–145)
TROPONIN T, HIGH SENSITIVITY RESULT: 20 NG/L — SIGNIFICANT CHANGE UP (ref 0–51)
TROPONIN T, HIGH SENSITIVITY RESULT: 20 NG/L — SIGNIFICANT CHANGE UP (ref 0–51)
WBC # BLD: 8.63 K/UL — SIGNIFICANT CHANGE UP (ref 3.8–10.5)
WBC # FLD AUTO: 8.63 K/UL — SIGNIFICANT CHANGE UP (ref 3.8–10.5)

## 2023-07-16 PROCEDURE — 99285 EMERGENCY DEPT VISIT HI MDM: CPT

## 2023-07-16 PROCEDURE — 71045 X-RAY EXAM CHEST 1 VIEW: CPT | Mod: 26

## 2023-07-16 PROCEDURE — 71250 CT THORAX DX C-: CPT | Mod: 26

## 2023-07-16 RX ORDER — PIPERACILLIN AND TAZOBACTAM 4; .5 G/20ML; G/20ML
3.38 INJECTION, POWDER, LYOPHILIZED, FOR SOLUTION INTRAVENOUS ONCE
Refills: 0 | Status: COMPLETED | OUTPATIENT
Start: 2023-07-16 | End: 2023-07-17

## 2023-07-16 RX ORDER — PIPERACILLIN AND TAZOBACTAM 4; .5 G/20ML; G/20ML
3.38 INJECTION, POWDER, LYOPHILIZED, FOR SOLUTION INTRAVENOUS EVERY 8 HOURS
Refills: 0 | Status: DISCONTINUED | OUTPATIENT
Start: 2023-07-17 | End: 2023-07-22

## 2023-07-16 RX ORDER — HYDRALAZINE HCL 50 MG
50 TABLET ORAL THREE TIMES A DAY
Refills: 0 | Status: DISCONTINUED | OUTPATIENT
Start: 2023-07-16 | End: 2023-07-22

## 2023-07-16 RX ORDER — LEVOTHYROXINE SODIUM 125 MCG
25 TABLET ORAL DAILY
Refills: 0 | Status: DISCONTINUED | OUTPATIENT
Start: 2023-07-16 | End: 2023-07-18

## 2023-07-16 RX ORDER — IPRATROPIUM/ALBUTEROL SULFATE 18-103MCG
3 AEROSOL WITH ADAPTER (GRAM) INHALATION
Refills: 0 | Status: COMPLETED | OUTPATIENT
Start: 2023-07-16 | End: 2023-07-16

## 2023-07-16 RX ORDER — IPRATROPIUM/ALBUTEROL SULFATE 18-103MCG
3 AEROSOL WITH ADAPTER (GRAM) INHALATION EVERY 6 HOURS
Refills: 0 | Status: DISCONTINUED | OUTPATIENT
Start: 2023-07-16 | End: 2023-07-18

## 2023-07-16 RX ORDER — LOSARTAN POTASSIUM 100 MG/1
50 TABLET, FILM COATED ORAL DAILY
Refills: 0 | Status: DISCONTINUED | OUTPATIENT
Start: 2023-07-16 | End: 2023-07-23

## 2023-07-16 RX ORDER — PIPERACILLIN AND TAZOBACTAM 4; .5 G/20ML; G/20ML
3.38 INJECTION, POWDER, LYOPHILIZED, FOR SOLUTION INTRAVENOUS ONCE
Refills: 0 | Status: COMPLETED | OUTPATIENT
Start: 2023-07-16 | End: 2023-07-16

## 2023-07-16 RX ORDER — APIXABAN 2.5 MG/1
2.5 TABLET, FILM COATED ORAL EVERY 12 HOURS
Refills: 0 | Status: DISCONTINUED | OUTPATIENT
Start: 2023-07-16 | End: 2023-07-23

## 2023-07-16 RX ADMIN — Medication 3 MILLILITER(S): at 17:09

## 2023-07-16 RX ADMIN — Medication 3 MILLILITER(S): at 17:39

## 2023-07-16 RX ADMIN — APIXABAN 2.5 MILLIGRAM(S): 2.5 TABLET, FILM COATED ORAL at 22:21

## 2023-07-16 RX ADMIN — Medication 40 MILLIGRAM(S): at 22:16

## 2023-07-16 RX ADMIN — PIPERACILLIN AND TAZOBACTAM 200 GRAM(S): 4; .5 INJECTION, POWDER, LYOPHILIZED, FOR SOLUTION INTRAVENOUS at 20:12

## 2023-07-16 RX ADMIN — Medication 50 MILLIGRAM(S): at 22:17

## 2023-07-16 RX ADMIN — Medication 125 MILLIGRAM(S): at 17:09

## 2023-07-16 RX ADMIN — Medication 3 MILLILITER(S): at 17:25

## 2023-07-16 NOTE — ED PROVIDER NOTE - CLINICAL SUMMARY MEDICAL DECISION MAKING FREE TEXT BOX
This is a 72 year old female with pmh of COPD not on home O2, HTN, a-fib on Eliquis presenting with increasing shortness of breath in setting of on and off subjective fever (highest temp of 98F), cough, and green sputum production. Denies any chest pain or abdominal pain. + nausea but no vomiting. Exertional shortness of breath. No exertional chest pain. Vitals, normal sinus, ekg normal. Saturating at 85% on room air, responded to 2L nasal cannula to 95-97%. Denies any chest pain. No lower extremity swelling. Will obtain labs, cxr, rvp. Will treat with nebs and steroids. anticipating admission for acute COPD exacerbation. This is a 72 year old female with pmh of COPD not on home O2, HTN, a-fib on Eliquis presenting with increasing shortness of breath in setting of on and off subjective fever (highest temp of 98F), cough, and green sputum production. Denies any chest pain or abdominal pain. + nausea but no vomiting. Exertional shortness of breath. No exertional chest pain. Vitals, normal sinus, ekg normal. Saturating at 85% on room air, responded to 2L nasal cannula to 95-97%. Denies any chest pain. No lower extremity swelling. Will obtain labs, cxr, rvp. Will treat with nebs and steroids. anticipating admission for acute COPD exacerbation.  Attending Felicia Robertson: 72 to female h/o COPD presenting with sob and difficulty breathing. upon arrival pt with increased wob and diffuse wheezing. pt reports a nonproductive cough. pt afebrile in the ed. no h/o heart failure and no orthopnea making pulmonary edema less likely. pt does have amurmur on exam which she has had. concern for possible copd exacerbation vs pna, vs viral illness. .will obtain labs, rvp, cxr, given duonebs steroids. low threshold for abx. plan to admit

## 2023-07-16 NOTE — ED PROVIDER NOTE - ATTENDING CONTRIBUTION TO CARE
Attending MD Felicia Robertson:  I personally have seen and examined this patient.  Resident note reviewed and agree on plan of care and except where noted.  See HPI, PE, and MDM for details.

## 2023-07-16 NOTE — ED PROVIDER NOTE - OBJECTIVE STATEMENT
This is a 72 year old female with pmh of COPD not on home O2, HTN, a-fib on Eliquis presenting with increasing shortness of breath in setting of on and off subjective fever (highest temp of 98F), cough, and green sputum production. Denies any chest pain or abdominal pain. + nausea but no vomiting. Exertional shortness of breath. No exertional chest pain. Denies any dysuria, urinary frequency, hematuria. Denies any recent surgery/travel. No lower extremity swelling. Dr. Law Santos.

## 2023-07-16 NOTE — ED PROVIDER NOTE - NSCAREINITIATED _GEN_ER
Left message for patient to return call.   reji sent to patient asking that she call the office Panchito Huang(Resident)

## 2023-07-16 NOTE — ED ADULT NURSE REASSESSMENT NOTE - NSFALLUNIVINTERV_ED_ALL_ED
Bed/Stretcher in lowest position, wheels locked, appropriate side rails in place/Call bell, personal items and telephone in reach/Instruct patient to call for assistance before getting out of bed/chair/stretcher/Non-slip footwear applied when patient is off stretcher/[place] to call system/Physically safe environment - no spills, clutter or unnecessary equipment/Purposeful proactive rounding/Room/bathroom lighting operational, light cord in reach

## 2023-07-16 NOTE — ED PROVIDER NOTE - PROGRESS NOTE DETAILS
Panchito Huang, PGY2 - patient reassessed s/p duoneb and steroids. Feeling much better subjectively and no crackles or wheezing at this time. Attending Felicia Robertson: pt afebrile. cxr with possible pna. ct chest ordered. pt started on abx

## 2023-07-16 NOTE — H&P ADULT - HISTORY OF PRESENT ILLNESS
This is a 72 year old female with pmh of COPD not on home O2, HTN, a-fib on Eliquis presenting with increasing shortness of breath in setting of on and off subjective fever (highest temp of 98F), cough, and green sputum production. Denies any chest pain or abdominal pain. + nausea but no vomiting. Exertional shortness of breath. No exertional chest pain. Denies any dysuria, urinary frequency, hematuria. Denies any recent surgery/travel. No lower extremity swelling.

## 2023-07-16 NOTE — ED ADULT NURSE NOTE - NSFALLRISKINTERV_ED_ALL_ED

## 2023-07-16 NOTE — ED PROVIDER NOTE - IV ALTEPLASE INCLUSION HIDDEN
[FreeTextEntry1] : Mrs. Boogie presents in follow up s/p left lower extremity angiogram, DCB PTA of the SFA at the adductor canal, and PTA of the AT and PT for PAD with tissue loss. Surgery was performed on 12/4/2020. She subsequently underwent left 5th toe amputation by podiatry. \par \par Since her last visit, her left 5th toe amputation site has healed completely. She is taking Lipitor, Aspirin and Plavix. She had an ingrown toe nail which was removed by Dr. Daily and there was some debridement performed on the distal aspect of the right 1st toe. She is currently on a one week course of antibiotics and scheduled to see Dr. Daily on 7/8/21. She is being dialyzed via right groin AVF. breastfeeding exclusively show

## 2023-07-16 NOTE — ED PROVIDER NOTE - NS ED ROS FT
GENERAL: + fever, + chills  	EYES: No change in vision  	HEENT: No trouble swallowing or speaking  	CARDIAC: No chest pain  	PULMONARY: + cough, + SOB  	GI: No abdominal pain, no nausea, no vomiting, no diarrhea, no constipation  	: No changes in urination  	SKIN: No rashes  	NEURO: No headache, no numbness  	MSK: No visible bony deformity   Otherwise as HPI or negative.

## 2023-07-16 NOTE — H&P ADULT - ASSESSMENT
The patient is a 72y Female complaining of shortness of breath.    COPD exa:    IV Zosyn/IV solumedrol  Pul eval in AM  O2 2 L NC  Duoneb  CT Chest    A Fib:    Cont Eliquis    HTN:    On Hydralazine/Losartan

## 2023-07-16 NOTE — ED ADULT NURSE NOTE - OBJECTIVE STATEMENT
71 y/o female presents to ED from home c/o SOB x 2 days. Pt 73 y/o female PMH COPD (not on home O2), HTN, a-fib on ELiquis presents to ED from home c/o SOB, cough with green sputum production x 2 days. Denies sick contacts or travel. Denying chest pain, abdominal pain, vomiting, urinary symptoms. Pt is A&O x 4. Breathing even, tachypneic  Arrived from triage on 3L O2. Gross motor and neuro intact. 73 y/o female PMH COPD (not on home O2), HTN, a-fib on ELiquis presents to ED from home c/o SOB, cough with green sputum production x 2 days. Denies sick contacts or travel. Denying chest pain, abdominal pain, vomiting, urinary symptoms. Pt is A&O x 4. Breathing even, slightly tachypneic  Arrived from triage on 3L O2. Gross motor and neuro intact. NSR on CM. Safety and comfort provided.

## 2023-07-16 NOTE — ED PROVIDER NOTE - PHYSICAL EXAMINATION
General: NAD  HEENT: NCAT.   Cardiac: RRR, 2+ radial pulses  Chest: slight bilateral crackles at the bases.   Abdomen: soft, non-distended, no ttp, no rebound or guarding  Extremities: no peripheral edema, + L calf tenderness, no leg size discrepancies  Skin: no rashes  Neuro: AAOx3, motor and sensory grossly intact  Psych: mood and affect appropriate General: NAD  HEENT: NCAT.   Cardiac: RRR, 2+ radial pulses  Chest: slight bilateral crackles at the bases.   Abdomen: soft, non-distended, no ttp, no rebound or guarding  Extremities: no peripheral edema, + L calf tenderness, no leg size discrepancies  Skin: no rashes  Neuro: AAOx3, motor and sensory grossly intact  Psych: mood and affect appropriate  Attending Felicia Robertson: Gen: NAD, heent: atrauamtic, eomi,  mmm, op pink, uvula midline, no trismus neck; nttp, no nuchal rigidity, chest: nttp, no crepitus, cv: rrr, +murmur, lungs:diffuse wheezing bl, abd: soft, nontender, nondistended, no peritoneal signs, +BS, no guarding, ext: wwp, no LE edema, skin: no rash, neuro: awake and alert, following commands, speech clear, sensation and strength intact, no focal deficits

## 2023-07-17 RX ORDER — ACETAMINOPHEN 500 MG
650 TABLET ORAL ONCE
Refills: 0 | Status: COMPLETED | OUTPATIENT
Start: 2023-07-17 | End: 2023-07-17

## 2023-07-17 RX ORDER — BUDESONIDE AND FORMOTEROL FUMARATE DIHYDRATE 160; 4.5 UG/1; UG/1
2 AEROSOL RESPIRATORY (INHALATION)
Refills: 0 | Status: DISCONTINUED | OUTPATIENT
Start: 2023-07-17 | End: 2023-07-23

## 2023-07-17 RX ADMIN — APIXABAN 2.5 MILLIGRAM(S): 2.5 TABLET, FILM COATED ORAL at 05:27

## 2023-07-17 RX ADMIN — Medication 3 MILLILITER(S): at 05:27

## 2023-07-17 RX ADMIN — PIPERACILLIN AND TAZOBACTAM 25 GRAM(S): 4; .5 INJECTION, POWDER, LYOPHILIZED, FOR SOLUTION INTRAVENOUS at 02:08

## 2023-07-17 RX ADMIN — LOSARTAN POTASSIUM 50 MILLIGRAM(S): 100 TABLET, FILM COATED ORAL at 05:27

## 2023-07-17 RX ADMIN — Medication 50 MILLIGRAM(S): at 05:27

## 2023-07-17 RX ADMIN — PIPERACILLIN AND TAZOBACTAM 25 GRAM(S): 4; .5 INJECTION, POWDER, LYOPHILIZED, FOR SOLUTION INTRAVENOUS at 12:55

## 2023-07-17 RX ADMIN — Medication 25 MICROGRAM(S): at 05:27

## 2023-07-17 RX ADMIN — Medication 40 MILLIGRAM(S): at 21:34

## 2023-07-17 RX ADMIN — Medication 1 DROP(S): at 05:26

## 2023-07-17 RX ADMIN — Medication 40 MILLIGRAM(S): at 05:27

## 2023-07-17 RX ADMIN — APIXABAN 2.5 MILLIGRAM(S): 2.5 TABLET, FILM COATED ORAL at 17:34

## 2023-07-17 RX ADMIN — PIPERACILLIN AND TAZOBACTAM 25 GRAM(S): 4; .5 INJECTION, POWDER, LYOPHILIZED, FOR SOLUTION INTRAVENOUS at 08:03

## 2023-07-17 RX ADMIN — Medication 50 MILLIGRAM(S): at 12:54

## 2023-07-17 RX ADMIN — Medication 50 MILLIGRAM(S): at 21:33

## 2023-07-17 RX ADMIN — BUDESONIDE AND FORMOTEROL FUMARATE DIHYDRATE 2 PUFF(S): 160; 4.5 AEROSOL RESPIRATORY (INHALATION) at 17:35

## 2023-07-17 RX ADMIN — Medication 3 MILLILITER(S): at 02:06

## 2023-07-17 RX ADMIN — Medication 1 DROP(S): at 21:33

## 2023-07-17 RX ADMIN — Medication 650 MILLIGRAM(S): at 02:09

## 2023-07-17 RX ADMIN — Medication 40 MILLIGRAM(S): at 12:54

## 2023-07-17 RX ADMIN — PIPERACILLIN AND TAZOBACTAM 25 GRAM(S): 4; .5 INJECTION, POWDER, LYOPHILIZED, FOR SOLUTION INTRAVENOUS at 21:35

## 2023-07-17 RX ADMIN — Medication 3 MILLILITER(S): at 12:55

## 2023-07-17 RX ADMIN — Medication 1 DROP(S): at 12:54

## 2023-07-17 RX ADMIN — Medication 3 MILLILITER(S): at 17:35

## 2023-07-17 NOTE — CONSULT NOTE ADULT - SUBJECTIVE AND OBJECTIVE BOX
DATE OF SERVICE: 07-17-23 @ 12:11    CHIEF COMPLAINT:Patient is a 72y old  Female who presents with a chief complaint of The patient is a 72y Female complaining of shortness of breath. (17 Jul 2023 11:32)      HISTORY OF PRESENT ILLNESS:  This is a 72 year old female with pmh of COPD not on home O2, HTN, a-fib on Eliquis presenting with increasing shortness of breath in setting of on and off subjective fever (highest temp of 98F), cough, and green sputum production. Denies any chest pain or abdominal pain. + nausea but no vomiting. Exertional shortness of breath. No exertional chest pain. Denies any dysuria, urinary frequency, hematuria. Denies any recent surgery/travel. No lower extremity swelling.   (16 Jul 2023 19:05)      PAST MEDICAL & SURGICAL HISTORY:  COPD (chronic obstructive pulmonary disease)      Hypothyroid      HTN (hypertension)      Hyperlipidemia      Edema extremities      Afib  On Eliquis      Psoriasis      S/P eye surgery  age 5 unsure if right or left eye              MEDICATIONS:  apixaban 2.5 milliGRAM(s) Oral every 12 hours  hydrALAZINE 50 milliGRAM(s) Oral three times a day  losartan 50 milliGRAM(s) Oral daily    piperacillin/tazobactam IVPB.. 3.375 Gram(s) IV Intermittent every 8 hours    albuterol/ipratropium for Nebulization 3 milliLiter(s) Nebulizer every 6 hours        levothyroxine 25 MICROGram(s) Oral daily  methylPREDNISolone sodium succinate Injectable 40 milliGRAM(s) IV Push every 8 hours    artificial  tears Solution 1 Drop(s) Both EYES three times a day      FAMILY HISTORY:  FH: hypertension        Non-contributory    SOCIAL HISTORY:    [ ] Tobacco  [ ] Drugs  [ ] Alcohol    Allergies    Sulfur (Unknown)  sulfa drugs (Unknown)  [This allergen will not trigger allergy alert] artichokes (Unknown)  Levaquin (Anaphylaxis)    Intolerances    	    REVIEW OF SYSTEMS:  CONSTITUTIONAL: No fever  EYES: No eye pain, visual disturbances, or discharge  ENMT:  No difficulty hearing, tinnitus  NECK: No pain or stiffness  RESPIRATORY: No cough, wheezing,  CARDIOVASCULAR: No chest pain, palpitations, passing out, dizziness, or leg swelling  GASTROINTESTINAL:  No nausea, vomiting, diarrhea or constipation. No melena.  GENITOURINARY: No dysuria, hematuria  NEUROLOGICAL: No stroke like symptoms  SKIN: No burning or lesions   ENDOCRINE: No heat or cold intolerance  MUSCULOSKELETAL: No joint pain or swelling  PSYCHIATRIC: No  anxiety, mood swings  HEME/LYMPH: No bleeding gums  ALLERGY AND IMMUNOLOGIC: No hives or eczema	    All other ROS negative    PHYSICAL EXAM:  T(C): 36.6 (07-17-23 @ 04:44), Max: 37.7 (07-16-23 @ 19:45)  HR: 83 (07-17-23 @ 04:44) (80 - 91)  BP: 102/61 (07-17-23 @ 04:44) (102/61 - 154/74)  RR: 21 (07-17-23 @ 04:44) (18 - 22)  SpO2: 93% (07-17-23 @ 04:44) (92% - 100%)  Wt(kg): --  I&O's Summary      Appearance: Normal	  HEENT:   Normal oral mucosa, EOMI	  Cardiovascular:  S1 S2, No JVD,    Respiratory: Lungs clear to auscultation	  Psychiatry: Alert  Gastrointestinal:  Soft, Non-tender, + BS	  Skin: No rashes   Neurologic: Non-focal  Extremities:  No edema  Vascular: Peripheral pulses palpable    	    	  	  CARDIAC MARKERS:  Labs personally reviewed by me                                  11.0   8.63  )-----------( 269      ( 16 Jul 2023 17:24 )             34.1     07-16    133<L>  |  100  |  9   ----------------------------<  85  4.7   |  20<L>  |  0.92    Ca    9.8      16 Jul 2023 17:24  Phos  3.4     07-16  Mg     1.8     07-16    TPro  6.6  /  Alb  3.5  /  TBili  0.5  /  DBili  x   /  AST  17  /  ALT  8<L>  /  AlkPhos  85  07-16          EKG: Personally reviewed by me - NSR  Radiology: Personally reviewed by me -     < from: Xray Chest 1 View- PORTABLE-Urgent (Xray Chest 1 View- PORTABLE-Urgent .) (07.16.23 @ 18:21) >  INTERPRETATION:  Left lower lung field opacity concerning for pneumonia.    < end of copied text >  < from: CT Chest No Cont (07.16.23 @ 22:57) >  Persistent bronchiectasis and mucoid impaction at the lower lobes.   Increased tree-in-bud opacities in both lungs with patchy opacity at the   left lower lobe, suspicious for pneumonia. Stable scattered pulmonary   nodules. Nonspecific mediastinal lymphadenopathy.    < end of copied text >  < from: Transthoracic Echocardiogram (05.20.21 @ 09:49) >  ------------------------------------------------------------------------  CONCLUSIONS:  1. Mitral annular calcification, otherwise normal mitral  valve. Minimal mitral regurgitation.  2. Calcified trileaflet aortic valve with decreased  opening.The valve is probably mild to moderately stenotic.  Peak transaortic valve gradient equals 23 mm Hg, mean  transaortic valve gradient equals 11 mm Hg.  Mild aortic  regurgitation.  3. Mildly dilated left atrium.  LA volume index = 36 cc/m2.  4. Normal left ventricular internal dimensions and wall  thicknesses.  5. Normal left ventricular systolic function. No segmental  wall motion abnormalities.  6. Mild diastolic dysfunction (Stage I).  7. Normal right ventricular size and function.  8. Normal pericardium with trace pericardial effusion.    < end of copied text >  < from: Transthoracic Echocardiogram (01.27.21 @ 02:49) >  ------------------------------------------------------------------------  CONCLUSIONS:  1. Mitral annular calcification, otherwise normal mitral  valve. Mild mitral regurgitation.  2. Calcified trileaflet aortic valve with decreased  opening. Peak transaortic valve gradient equals 22 mm Hg,  mean transaortic valve gradient equals 11 mm Hg, consistent  with mild aortic stenosis. Mild aortic regurgitation.  3. Mildly dilated left atrium.  LA volume index = 36 cc/m2.  4. Normal left ventricular internal dimensions and wall  thicknesses.  5. Normal left ventricular systolic function. No segmental  wall motion abnormalities.  6. Mild diastolic dysfunction (Stage I).  7. Mild right atrial enlargement.  8. Normal right ventricular size and function.  9. Normal pericardium with trace pericardial effusion.  ------------------------------------------------------------------------    < end of copied text >  < from: Nuclear Stress Test-Pharmacologic (02.27.15 @ 10:55) >  IMPRESSIONS:Normal Study  * Review of raw data shows: Breast attenuation artifact.,  Increased bowel uptake.  * There are medium sized, moderate defects in inferior and  apical  walls that are predominantly fixed, suggestive of  no clear evidence of ischemia or infarct consistent with  attenuation artifact.  * Perfusion defects are in part the result of breast  attenuation.  * Gated wall motion analysis is performed, and shows  normal wall motion with post stress LVEF of 61%.  * Normal LV size.  * Chest Pain: No chest pain with administration of  Dobutamine.  * Symptom: No Symptom.  * HR Response: Normal.  * BP Response: Appropriate.  * Heart Rhythm: Normal Sinus Rhythm - 74 BPM.  * ECG Abnormalities: There were no diagnostic changes.  * Arrhythmia: Frequent PVC's.    < end of copied text >    Assessment /Plan:     This is a 72 year old female with pmh of COPD not on home O2, HTN, a-fib on Eliquis presenting with increasing shortness of breath in setting of subjective fever (highest temp of 98F), cough and green sputum production. Denies any chest pain or abdominal pain. + nausea but no vomiting. Reports exertional shortness of breath. No exertional chest pain. Denies any dysuria, urinary frequency, hematuria. Denies any recent surgery/travel. No lower extremity swelling.    Problem/Plan #1: Shortness of Breath  - ECG NSR with no ischemic characteristics  - Trop 20--> 20  - proBNP 913  - CXR with LLL opacity concerning for PNA  - CT Chest shows bronchiectasis, mucoid impaction concerning for PNA, non-specific mediastinal lymphadenopathy  - Has been afebrile, no leukocytosis  - Hx of COPD but not on home oxygen. Now requiring 3L NC.  - c/w IV Abx (Zocyn), IV steroids (solu-cortef), duonebs, supplemental oxygen as needed; Pulm consult appreciated.  - TTE from 2021 shows mild MR, AS and mild diastolic dysfunction --> recommend repeat TTE    Problem/Plan #2: Atrial Fibrillation  - ECG reveals NSR  - Tele show NSR with occasional PVCs  - c/w Eliquis 2.5mg PO BID (Recommended dose is 5mg PO BID based on weight/age and renal function)  - Cont to monitor on tele    Problem/Plan #3: Hypertension  - c/w home meds including losartan 50mg PO daily and hydralazine 50mg PO BID    Problem/Plan #1: PPx Measures  - DVT: Eliquis  - Diet: Dash diet        Differential diagnosis and plan of care discussed with patient after the evaluation. Counseling on diet, nutritional counseling, weight management, exercise and medication compliance was done.   Advanced care planning/advanced directives discussed with patient/family. DNR status including forceful chest compressions to attempt to restart the heart, ventilator support/artificial breathing, electric shock, artificial nutrition, health care proxy, Molst form all discussed with pt. Pt wishes to consider. More than fifteen minutes spent on discussing advanced directives.     Latonia Holland DO [facility]  Cardiovascular Medicine  [address], [address]  Available for call or text via [employer]  Office [phone]   DATE OF SERVICE: 07-17-23 @ 12:11    CHIEF COMPLAINT:Patient is a 72y old  Female who presents with a chief complaint of The patient is a 72y Female complaining of shortness of breath. (17 Jul 2023 11:32)      HISTORY OF PRESENT ILLNESS:  This is a 72 year old female with pmh of COPD not on home O2, HTN, a-fib on Eliquis presenting with increasing shortness of breath in setting of on and off subjective fever (highest temp of 98F), cough, and green sputum production. Denies any chest pain or abdominal pain. + nausea but no vomiting. Exertional shortness of breath. No exertional chest pain. Denies any dysuria, urinary frequency, hematuria. Denies any recent surgery/travel. No lower extremity swelling.   (16 Jul 2023 19:05)      PAST MEDICAL & SURGICAL HISTORY:  COPD (chronic obstructive pulmonary disease)      Hypothyroid      HTN (hypertension)      Hyperlipidemia      Edema extremities      Afib  On Eliquis      Psoriasis      S/P eye surgery  age 5 unsure if right or left eye              MEDICATIONS:  apixaban 2.5 milliGRAM(s) Oral every 12 hours  hydrALAZINE 50 milliGRAM(s) Oral three times a day  losartan 50 milliGRAM(s) Oral daily    piperacillin/tazobactam IVPB.. 3.375 Gram(s) IV Intermittent every 8 hours    albuterol/ipratropium for Nebulization 3 milliLiter(s) Nebulizer every 6 hours        levothyroxine 25 MICROGram(s) Oral daily  methylPREDNISolone sodium succinate Injectable 40 milliGRAM(s) IV Push every 8 hours    artificial  tears Solution 1 Drop(s) Both EYES three times a day      FAMILY HISTORY:  FH: hypertension        Non-contributory    SOCIAL HISTORY:    [ ] Tobacco  [ ] Drugs  [ ] Alcohol    Allergies    Sulfur (Unknown)  sulfa drugs (Unknown)  [This allergen will not trigger allergy alert] artichokes (Unknown)  Levaquin (Anaphylaxis)    Intolerances    	    REVIEW OF SYSTEMS:  CONSTITUTIONAL: No fever  EYES: No eye pain, visual disturbances, or discharge  ENMT:  No difficulty hearing, tinnitus  NECK: No pain or stiffness  RESPIRATORY: No cough, wheezing,  CARDIOVASCULAR: No chest pain, palpitations, passing out, dizziness, or leg swelling  GASTROINTESTINAL:  No nausea, vomiting, diarrhea or constipation. No melena.  GENITOURINARY: No dysuria, hematuria  NEUROLOGICAL: No stroke like symptoms  SKIN: No burning or lesions   ENDOCRINE: No heat or cold intolerance  MUSCULOSKELETAL: No joint pain or swelling  PSYCHIATRIC: No  anxiety, mood swings  HEME/LYMPH: No bleeding gums  ALLERGY AND IMMUNOLOGIC: No hives or eczema	    All other ROS negative    PHYSICAL EXAM:  T(C): 36.6 (07-17-23 @ 04:44), Max: 37.7 (07-16-23 @ 19:45)  HR: 83 (07-17-23 @ 04:44) (80 - 91)  BP: 102/61 (07-17-23 @ 04:44) (102/61 - 154/74)  RR: 21 (07-17-23 @ 04:44) (18 - 22)  SpO2: 93% (07-17-23 @ 04:44) (92% - 100%)  Wt(kg): --  I&O's Summary      Appearance: Normal	  HEENT:   Normal oral mucosa, EOMI	  Cardiovascular:  S1 S2, No JVD,    Respiratory: Lungs clear to auscultation	  Psychiatry: Alert  Gastrointestinal:  Soft, Non-tender, + BS	  Skin: No rashes   Neurologic: Non-focal  Extremities:  No edema  Vascular: Peripheral pulses palpable    	    	  	  CARDIAC MARKERS:  Labs personally reviewed by me                                  11.0   8.63  )-----------( 269      ( 16 Jul 2023 17:24 )             34.1     07-16    133<L>  |  100  |  9   ----------------------------<  85  4.7   |  20<L>  |  0.92    Ca    9.8      16 Jul 2023 17:24  Phos  3.4     07-16  Mg     1.8     07-16    TPro  6.6  /  Alb  3.5  /  TBili  0.5  /  DBili  x   /  AST  17  /  ALT  8<L>  /  AlkPhos  85  07-16          EKG: Personally reviewed by me - NSR  Radiology: Personally reviewed by me -     < from: Xray Chest 1 View- PORTABLE-Urgent (Xray Chest 1 View- PORTABLE-Urgent .) (07.16.23 @ 18:21) >  INTERPRETATION:  Left lower lung field opacity concerning for pneumonia.    < end of copied text >  < from: CT Chest No Cont (07.16.23 @ 22:57) >  Persistent bronchiectasis and mucoid impaction at the lower lobes.   Increased tree-in-bud opacities in both lungs with patchy opacity at the   left lower lobe, suspicious for pneumonia. Stable scattered pulmonary   nodules. Nonspecific mediastinal lymphadenopathy.    < end of copied text >  < from: Transthoracic Echocardiogram (05.20.21 @ 09:49) >  ------------------------------------------------------------------------  CONCLUSIONS:  1. Mitral annular calcification, otherwise normal mitral  valve. Minimal mitral regurgitation.  2. Calcified trileaflet aortic valve with decreased  opening.The valve is probably mild to moderately stenotic.  Peak transaortic valve gradient equals 23 mm Hg, mean  transaortic valve gradient equals 11 mm Hg.  Mild aortic  regurgitation.  3. Mildly dilated left atrium.  LA volume index = 36 cc/m2.  4. Normal left ventricular internal dimensions and wall  thicknesses.  5. Normal left ventricular systolic function. No segmental  wall motion abnormalities.  6. Mild diastolic dysfunction (Stage I).  7. Normal right ventricular size and function.  8. Normal pericardium with trace pericardial effusion.    < end of copied text >  < from: Transthoracic Echocardiogram (01.27.21 @ 02:49) >  ------------------------------------------------------------------------  CONCLUSIONS:  1. Mitral annular calcification, otherwise normal mitral  valve. Mild mitral regurgitation.  2. Calcified trileaflet aortic valve with decreased  opening. Peak transaortic valve gradient equals 22 mm Hg,  mean transaortic valve gradient equals 11 mm Hg, consistent  with mild aortic stenosis. Mild aortic regurgitation.  3. Mildly dilated left atrium.  LA volume index = 36 cc/m2.  4. Normal left ventricular internal dimensions and wall  thicknesses.  5. Normal left ventricular systolic function. No segmental  wall motion abnormalities.  6. Mild diastolic dysfunction (Stage I).  7. Mild right atrial enlargement.  8. Normal right ventricular size and function.  9. Normal pericardium with trace pericardial effusion.  ------------------------------------------------------------------------    < end of copied text >  < from: Nuclear Stress Test-Pharmacologic (02.27.15 @ 10:55) >  IMPRESSIONS:Normal Study  * Review of raw data shows: Breast attenuation artifact.,  Increased bowel uptake.  * There are medium sized, moderate defects in inferior and  apical  walls that are predominantly fixed, suggestive of  no clear evidence of ischemia or infarct consistent with  attenuation artifact.  * Perfusion defects are in part the result of breast  attenuation.  * Gated wall motion analysis is performed, and shows  normal wall motion with post stress LVEF of 61%.  * Normal LV size.  * Chest Pain: No chest pain with administration of  Dobutamine.  * Symptom: No Symptom.  * HR Response: Normal.  * BP Response: Appropriate.  * Heart Rhythm: Normal Sinus Rhythm - 74 BPM.  * ECG Abnormalities: There were no diagnostic changes.  * Arrhythmia: Frequent PVC's.    < end of copied text >    Assessment /Plan:     This is a 72 year old female with pmh of COPD not on home O2, HTN, a-fib on Eliquis presenting with increasing shortness of breath in setting of subjective fever (highest temp of 98F), cough and green sputum production. Denies any chest pain or abdominal pain. + nausea but no vomiting. Reports exertional shortness of breath. No exertional chest pain. Denies any dysuria, urinary frequency, hematuria. Denies any recent surgery/travel. No lower extremity swelling.    Problem/Plan #1: Shortness of Breath  - ECG NSR with no ischemic characteristics  - Trop 20--> 20  - proBNP 913  - CXR with LLL opacity concerning for PNA  - CT Chest shows bronchiectasis, mucoid impaction concerning for PNA, non-specific mediastinal lymphadenopathy  - Has been afebrile, no leukocytosis  - Hx of COPD but not on home oxygen. Now requiring 3L NC.  - c/w IV Abx (Zocyn), IV steroids (solu-cortef), duonebs, supplemental oxygen as needed; Pulm consult appreciated.  - TTE from 2021 shows mild MR, AS and mild diastolic dysfunction --> recommend repeat TTE to assess EF, valvular function, r/o pericardial effusion and WMA (Patient recently had TTE at [facility] Cardiologist Dr. Palla's office. Will call to try and obtain.)  - Most recent ischemic eval >2 years ago with NST which was reportedly normal    Problem/Plan #2: Atrial Fibrillation  - ECG reveals NSR  - Tele show NSR with occasional PVCs  - c/w Eliquis 2.5mg PO BID (On lower dose d/t chronic severe epistaxis)   - Cont to monitor on tele    Problem/Plan #3: Hypertension  - c/w home meds including losartan 50mg PO daily and hydralazine 50mg PO BID    Problem/Plan #1: PPx Measures  - DVT: Eliquis  - Diet: Dash diet        Differential diagnosis and plan of care discussed with patient after the evaluation. Counseling on diet, nutritional counseling, weight management, exercise and medication compliance was done.   Advanced care planning/advanced directives discussed with patient/family. DNR status including forceful chest compressions to attempt to restart the heart, ventilator support/artificial breathing, electric shock, artificial nutrition, health care proxy, Molst form all discussed with pt. Pt wishes to consider. More than fifteen minutes spent on discussing advanced directives.     Latonia Holland DO [facility]  Cardiovascular Medicine  [address] [place] [address] Dr, [address]  Available for call or text via [employer]  Office [phone]   DATE OF SERVICE: 07-17-23 @ 12:11    CHIEF COMPLAINT:Patient is a 72y old  Female who presents with a chief complaint of The patient is a 72y Female complaining of shortness of breath. (17 Jul 2023 11:32)      HISTORY OF PRESENT ILLNESS:  This is a 72 year old female with pmh of COPD not on home O2, HTN, a-fib on Eliquis presenting with increasing shortness of breath in setting of on and off subjective fever (highest temp of 98F), cough, and green sputum production. Denies any chest pain or abdominal pain. + nausea but no vomiting. Exertional shortness of breath. No exertional chest pain. Denies any dysuria, urinary frequency, hematuria. Denies any recent surgery/travel. No lower extremity swelling.   (16 Jul 2023 19:05)      PAST MEDICAL & SURGICAL HISTORY:  COPD (chronic obstructive pulmonary disease)      Hypothyroid      HTN (hypertension)      Hyperlipidemia      Edema extremities      Afib  On Eliquis      Psoriasis      S/P eye surgery  age 5 unsure if right or left eye              MEDICATIONS:  apixaban 2.5 milliGRAM(s) Oral every 12 hours  hydrALAZINE 50 milliGRAM(s) Oral three times a day  losartan 50 milliGRAM(s) Oral daily    piperacillin/tazobactam IVPB.. 3.375 Gram(s) IV Intermittent every 8 hours    albuterol/ipratropium for Nebulization 3 milliLiter(s) Nebulizer every 6 hours        levothyroxine 25 MICROGram(s) Oral daily  methylPREDNISolone sodium succinate Injectable 40 milliGRAM(s) IV Push every 8 hours    artificial  tears Solution 1 Drop(s) Both EYES three times a day      FAMILY HISTORY:  FH: hypertension        Non-contributory    SOCIAL HISTORY:    [ ] Tobacco  [ ] Drugs  [ ] Alcohol    Allergies    Sulfur (Unknown)  sulfa drugs (Unknown)  [This allergen will not trigger allergy alert] artichokes (Unknown)  Levaquin (Anaphylaxis)    Intolerances    	    REVIEW OF SYSTEMS:  CONSTITUTIONAL: No fever  EYES: No eye pain, visual disturbances, or discharge  ENMT:  No difficulty hearing, tinnitus  NECK: No pain or stiffness  RESPIRATORY: No cough, wheezing,  CARDIOVASCULAR: No chest pain, palpitations, passing out, dizziness, or leg swelling  GASTROINTESTINAL:  No nausea, vomiting, diarrhea or constipation. No melena.  GENITOURINARY: No dysuria, hematuria  NEUROLOGICAL: No stroke like symptoms  SKIN: No burning or lesions   ENDOCRINE: No heat or cold intolerance  MUSCULOSKELETAL: No joint pain or swelling  PSYCHIATRIC: No  anxiety, mood swings  HEME/LYMPH: No bleeding gums  ALLERGY AND IMMUNOLOGIC: No hives or eczema	    All other ROS negative    PHYSICAL EXAM:  T(C): 36.6 (07-17-23 @ 04:44), Max: 37.7 (07-16-23 @ 19:45)  HR: 83 (07-17-23 @ 04:44) (80 - 91)  BP: 102/61 (07-17-23 @ 04:44) (102/61 - 154/74)  RR: 21 (07-17-23 @ 04:44) (18 - 22)  SpO2: 93% (07-17-23 @ 04:44) (92% - 100%)  Wt(kg): --  I&O's Summary      Appearance: Normal	  HEENT:   Normal oral mucosa, EOMI	  Cardiovascular:  S1 S2, No JVD,    Respiratory: Lungs clear to auscultation	  Psychiatry: Alert  Gastrointestinal:  Soft, Non-tender, + BS	  Skin: No rashes   Neurologic: Non-focal  Extremities:  No edema  Vascular: Peripheral pulses palpable    	    	  	  CARDIAC MARKERS:  Labs personally reviewed by me                                  11.0   8.63  )-----------( 269      ( 16 Jul 2023 17:24 )             34.1     07-16    133<L>  |  100  |  9   ----------------------------<  85  4.7   |  20<L>  |  0.92    Ca    9.8      16 Jul 2023 17:24  Phos  3.4     07-16  Mg     1.8     07-16    TPro  6.6  /  Alb  3.5  /  TBili  0.5  /  DBili  x   /  AST  17  /  ALT  8<L>  /  AlkPhos  85  07-16          EKG: Personally reviewed by me - NSR  Radiology: Personally reviewed by me -     < from: Xray Chest 1 View- PORTABLE-Urgent (Xray Chest 1 View- PORTABLE-Urgent .) (07.16.23 @ 18:21) >  INTERPRETATION:  Left lower lung field opacity concerning for pneumonia.    < end of copied text >  < from: CT Chest No Cont (07.16.23 @ 22:57) >  Persistent bronchiectasis and mucoid impaction at the lower lobes.   Increased tree-in-bud opacities in both lungs with patchy opacity at the   left lower lobe, suspicious for pneumonia. Stable scattered pulmonary   nodules. Nonspecific mediastinal lymphadenopathy.    < end of copied text >  < from: Transthoracic Echocardiogram (05.20.21 @ 09:49) >  ------------------------------------------------------------------------  CONCLUSIONS:  1. Mitral annular calcification, otherwise normal mitral  valve. Minimal mitral regurgitation.  2. Calcified trileaflet aortic valve with decreased  opening.The valve is probably mild to moderately stenotic.  Peak transaortic valve gradient equals 23 mm Hg, mean  transaortic valve gradient equals 11 mm Hg.  Mild aortic  regurgitation.  3. Mildly dilated left atrium.  LA volume index = 36 cc/m2.  4. Normal left ventricular internal dimensions and wall  thicknesses.  5. Normal left ventricular systolic function. No segmental  wall motion abnormalities.  6. Mild diastolic dysfunction (Stage I).  7. Normal right ventricular size and function.  8. Normal pericardium with trace pericardial effusion.    < end of copied text >  < from: Transthoracic Echocardiogram (01.27.21 @ 02:49) >  ------------------------------------------------------------------------  CONCLUSIONS:  1. Mitral annular calcification, otherwise normal mitral  valve. Mild mitral regurgitation.  2. Calcified trileaflet aortic valve with decreased  opening. Peak transaortic valve gradient equals 22 mm Hg,  mean transaortic valve gradient equals 11 mm Hg, consistent  with mild aortic stenosis. Mild aortic regurgitation.  3. Mildly dilated left atrium.  LA volume index = 36 cc/m2.  4. Normal left ventricular internal dimensions and wall  thicknesses.  5. Normal left ventricular systolic function. No segmental  wall motion abnormalities.  6. Mild diastolic dysfunction (Stage I).  7. Mild right atrial enlargement.  8. Normal right ventricular size and function.  9. Normal pericardium with trace pericardial effusion.  ------------------------------------------------------------------------    < end of copied text >  < from: Nuclear Stress Test-Pharmacologic (02.27.15 @ 10:55) >  IMPRESSIONS:Normal Study  * Review of raw data shows: Breast attenuation artifact.,  Increased bowel uptake.  * There are medium sized, moderate defects in inferior and  apical  walls that are predominantly fixed, suggestive of  no clear evidence of ischemia or infarct consistent with  attenuation artifact.  * Perfusion defects are in part the result of breast  attenuation.  * Gated wall motion analysis is performed, and shows  normal wall motion with post stress LVEF of 61%.  * Normal LV size.  * Chest Pain: No chest pain with administration of  Dobutamine.  * Symptom: No Symptom.  * HR Response: Normal.  * BP Response: Appropriate.  * Heart Rhythm: Normal Sinus Rhythm - 74 BPM.  * ECG Abnormalities: There were no diagnostic changes.  * Arrhythmia: Frequent PVC's.    < end of copied text >    Assessment /Plan:     This is a 72 year old female with pmh of COPD not on home O2, HTN, a-fib on Eliquis presenting with increasing shortness of breath in setting of subjective fever (highest temp of 98F), cough and green sputum production. Denies any chest pain or abdominal pain. + nausea but no vomiting. Reports exertional shortness of breath. No exertional chest pain. Denies any dysuria, urinary frequency, hematuria. Denies any recent surgery/travel. No lower extremity swelling.    Problem/Plan #1: Shortness of Breath  - ECG NSR with no ischemic characteristics  - Trop 20--> 20  - proBNP 913  - CXR with LLL opacity concerning for PNA  - CT Chest shows bronchiectasis, mucoid impaction concerning for PNA, non-specific mediastinal lymphadenopathy  - Has been afebrile, no leukocytosis  - Hx of COPD but not on home oxygen. Now requiring 3L NC.  - c/w IV Abx (Zocyn), IV steroids (solu-cortef), duonebs, supplemental oxygen as needed; Pulm consult appreciated.  - TTE from 2021 shows mild MR, AS and mild diastolic dysfunction --> recommend repeat TTE to assess EF, valvular function, r/o pericardial effusion and WMA (Patient recently had TTE at OP Cardiologist Dr. Palla's office)  - Most recent ischemic eval >2 years ago with NST which was reportedly normal    Problem/Plan #2: Atrial Fibrillation  - ECG reveals NSR  - Tele show NSR with occasional PVCs  - c/w Eliquis 2.5mg PO BID (On lower dose d/t chronic severe epistaxis as OP)   - Cont to monitor on tele    Problem/Plan #3: Hypertension  - c/w home meds including losartan 50mg PO daily and hydralazine 50mg PO BID    Problem/Plan #1: PPx Measures  - DVT: Eliquis  - Diet: Dash diet        Differential diagnosis and plan of care discussed with patient after the evaluation. Counseling on diet, nutritional counseling, weight management, exercise and medication compliance was done.   Advanced care planning/advanced directives discussed with patient/family. DNR status including forceful chest compressions to attempt to restart the heart, ventilator support/artificial breathing, electric shock, artificial nutrition, health care proxy, Molst form all discussed with pt. Pt wishes to consider. More than fifteen minutes spent on discussing advanced directives.     Latonia Holland DO [facility]  Cardiovascular Medicine  [address] [place] [address] Dr, [address]  Available for call or text via [employer]  Office [phone]

## 2023-07-17 NOTE — PATIENT PROFILE ADULT - FALL HARM RISK - UNIVERSAL INTERVENTIONS
Bed in lowest position, wheels locked, appropriate side rails in place/Call bell, personal items and telephone in reach/Instruct patient to call for assistance before getting out of bed or chair/Non-slip footwear when patient is out of bed/[place] to call system/Physically safe environment - no spills, clutter or unnecessary equipment/Purposeful Proactive Rounding/Room/bathroom lighting operational, light cord in reach

## 2023-07-17 NOTE — CONSULT NOTE ADULT - ASSESSMENT
This is a 72 year old female with pmh of COPD not on home O2, HTN, a-fib on Eliquis presenting with increasing shortness of breath in setting of on and off subjective fever (highest temp of 98F), cough, and green sputum production. Denies any chest pain or abdominal pain. + nausea but no vomiting. Exertional shortness of breath. No exertional chest pain. Denies any dysuria, urinary frequency, hematuria. Denies any recent surgery/travel. No lower extremity swelling.   (16 Jul 2023 19:05)  Tthe pt started getting sick from  January this year;  when she had visited [facility] and was treated with antibiotics  after that she improved  then she again became sick in April from which she recovered and now she presented with sob for 5-6 days with some cough but not really phlegmy/  she i son 2 L of oxygen at this time but does not use oxygen at home:     COPD exa:  A Fib:  HTN:      COPD exa:  -seems copd exacerbation:  RVP is negative:   -she is on steroids as well as BD: add Symbicort - she takes advair and Spiriva at home:   -VBG shows mild hypercarbic resp acidosis , but no reason to give bipap at this time given her clinical condition:   -her ct chest showed: Persistent bronchiectasis and mucoid impaction at the lower lobes.  Increased tree-in-bud opacities in both lungs with patchy opacity at the left lower lobe, suspicious for pneumonia. Stable scattered pulmonary  nodules. Nonspecific mediastinal lymphadenopathy.  -doubt vte : as she is already on elliquis:   -rpt abg in am    -check sputum cultures:  -already on zosyn ; check legionella   A Fib:  -cont eliquis  HTN:  -controlled   dw team

## 2023-07-17 NOTE — PROGRESS NOTE ADULT - SUBJECTIVE AND OBJECTIVE BOX
SUBJECTIVE / OVERNIGHT EVENTS:      Patient seen and examined at bedside. No events noted overnight. Resting comfortably in bed in [facility] [place]    --------------------------------------------------------------------------------------------  LABS:                        11.0   8.63  )-----------( 269      ( 16 Jul 2023 17:24 )             34.1     07-16    133<L>  |  100  |  9   ----------------------------<  85  4.7   |  20<L>  |  0.92    Ca    9.8      16 Jul 2023 17:24  Phos  3.4     07-16  Mg     1.8     07-16    TPro  6.6  /  Alb  3.5  /  TBili  0.5  /  DBili  x   /  AST  17  /  ALT  8<L>  /  AlkPhos  85  07-16    PT/INR - ( 16 Jul 2023 17:24 )   PT: 16.3 sec;   INR: 1.41 ratio         PTT - ( 16 Jul 2023 17:24 )  PTT:35.0 sec  CAPILLARY BLOOD GLUCOSE            Urinalysis Basic - ( 16 Jul 2023 17:24 )    Color: x / Appearance: x / SG: x / pH: x  Gluc: 85 mg/dL / Ketone: x  / Bili: x / Urobili: x   Blood: x / Protein: x / Nitrite: x   Leuk Esterase: x / RBC: x / WBC x   Sq Epi: x / Non Sq Epi: x / Bacteria: x        RADIOLOGY & ADDITIONAL TESTS:< from: Xray Chest 1 View- PORTABLE-Urgent (Xray Chest 1 View- PORTABLE-Urgent .) (07.16.23 @ 18:21) >  INTERPRETATION:  Left lower lung field opacity concerning for pneumonia.    < end of copied text >  < from: CT Chest No Cont (07.16.23 @ 22:57) >  IMPRESSION:    Persistent bronchiectasis and mucoid impaction at the lower lobes.   Increased tree-in-bud opacities in both lungs with patchy opacity at the   left lower lobe, suspicious for pneumonia. Stable scattered pulmonary   nodules. Nonspecific mediastinal lymphadenopathy.    Additional findings as described.    --- End of Report ---    < end of copied text >      Imaging Personally Reviewed:  [x] YES  [ ] NO    Consultant(s) Notes Reviewed:  [x] YES  [ ] NO    MEDICATIONS  (STANDING):  albuterol/ipratropium for Nebulization 3 milliLiter(s) Nebulizer every 6 hours  apixaban 2.5 milliGRAM(s) Oral every 12 hours  artificial  tears Solution 1 Drop(s) Both EYES three times a day  hydrALAZINE 50 milliGRAM(s) Oral three times a day  levothyroxine 25 MICROGram(s) Oral daily  losartan 50 milliGRAM(s) Oral daily  methylPREDNISolone sodium succinate Injectable 40 milliGRAM(s) IV Push every 8 hours  piperacillin/tazobactam IVPB.. 3.375 Gram(s) IV Intermittent every 8 hours    MEDICATIONS  (PRN):      Care Discussed with Consultants/Other Providers [x] YES  [ ] NO    Vital Signs Last 24 Hrs  T(C): 36.6 (17 Jul 2023 04:44), Max: 37.7 (16 Jul 2023 19:45)  T(F): 97.8 (17 Jul 2023 04:44), Max: 99.8 (16 Jul 2023 19:45)  HR: 83 (17 Jul 2023 04:44) (80 - 91)  BP: 102/61 (17 Jul 2023 04:44) (102/61 - 154/74)  BP(mean): 90 (17 Jul 2023 02:10) (79 - 90)  RR: 21 (17 Jul 2023 04:44) (18 - 22)  SpO2: 93% (17 Jul 2023 04:44) (92% - 100%)    Parameters below as of 17 Jul 2023 04:44  Patient On (Oxygen Delivery Method): nasal cannula  O2 Flow (L/min): 3    I&O's Summary      PHYSICAL EXAM:  GENERAL: NAD, well-developed, comfortable  HEAD:  Atraumatic, Normocephalic  EYES: EOMI, PERRLA, conjunctiva and sclera clear  NECK: Supple, No JVD  CHEST/LUNG: Rhonchi bilaterally; No wheeze  HEART: Regular rate and rhythm; No murmurs, rubs, or gallops  ABDOMEN: Soft, Nontender, Nondistended; Bowel sounds present  NEURO: AAOx3, no focal weakness, 5/5 b/l extremity strength, b/l knee no arthritis, no effusion   EXTREMITIES:  2+ Peripheral Pulses, No clubbing, cyanosis, or edema  SKIN: No rashes or lesions

## 2023-07-17 NOTE — PROGRESS NOTE ADULT - ASSESSMENT
This is a 72 year old female with pmh of COPD not on home O2, HTN, HLD, a-fib, Hypothyroidism, Psoriasis. Presents with SOB. Admitted with pneumonia    Plan:    # SOB r/t Pneumonia/ COPD exacerbation:  - No leukocytosis. Trops negative. . RVP negative  - CXR with Left lower lung field opacity concerning for pneumonia  - CT Chest with Persistent bronchiectasis and mucoid impaction at the lower lobes.   Increased tree-in-bud opacities in both lungs with patchy opacity at the   left lower lobe, suspicious for pneumonia  - Maintain FiO2>92%  - C/w IV abx  - C/w Nabs/inhalers/ steroids  - Pulm and ID consults pending, called 7/17 am    # HTN/ HLD/ Afib:  - Trend BP's  - C/w current meds    # Hypothyroidism:  - C/w Synthroid    # GI ppx:  - Bowel regimen prn    # DVT ppx:  - Eliquis    [facility]  [phone]

## 2023-07-17 NOTE — CONSULT NOTE ADULT - SUBJECTIVE AND OBJECTIVE BOX
07-17-23 @ 13:37    Patient is a 72y old  Female who presents with a chief complaint of The patient is a 72y Female complaining of shortness of breath. (17 Jul 2023 12:11)      HPI:  This is a 72 year old female with pmh of COPD not on home O2, HTN, a-fib on Eliquis presenting with increasing shortness of breath in setting of on and off subjective fever (highest temp of 98F), cough, and green sputum production. Denies any chest pain or abdominal pain. + nausea but no vomiting. Exertional shortness of breath. No exertional chest pain. Denies any dysuria, urinary frequency, hematuria. Denies any recent surgery/travel. No lower extremity swelling.   (16 Jul 2023 19:05)     the pt started getting sick from  January this year;  when she had visited [facility] and was treated with antibiotics  after that she improved  then she again became sick in April from which she recovered and now she presented with sob for 5-6 days with some cough but not really phlegmy/    she i son 2 L of oxygen at this time but does not use oxygen at home:     ?FOLLOWING PRESENT  [x ] Hx of PE/DVT, [ y] Hx COPD, [ x] Hx of Asthma, [y ] Hx of Hospitalization, [x ]  Hx of BiPAP/CPAP use, [x ] Hx of JUAN CARLOS    Allergies    Sulfur (Unknown)  sulfa drugs (Unknown)  [This allergen will not trigger allergy alert] artichokes (Unknown)  Levaquin (Anaphylaxis)    Intolerances        PAST MEDICAL & SURGICAL HISTORY:  COPD (chronic obstructive pulmonary disease)      Hypothyroid      HTN (hypertension)      Hyperlipidemia      Edema extremities      Afib  On Eliquis      Psoriasis      S/P eye surgery  age 5 unsure if right or left eye          FAMILY HISTORY:  FH: hypertension        Social History: [ 40 pk years ] TOBACCO                  [ x ] ETOH                                 [ x ] IVDA/DRUGS    REVIEW OF SYSTEMS      General:	x    Skin/Breast:x  	  Ophthalmologic:x  	  ENMT:	x    Respiratory and Thorax:  sob,  olson   	  Cardiovascular:	x    Gastrointestinal:	x    Genitourinary:	x    Musculoskeletal:	x    Neurological:	x    Psychiatric:	x    Hematology/Lymphatics:	x    Endocrine:	x    Allergic/Immunologic:	x    MEDICATIONS  (STANDING):  albuterol/ipratropium for Nebulization 3 milliLiter(s) Nebulizer every 6 hours  apixaban 2.5 milliGRAM(s) Oral every 12 hours  artificial  tears Solution 1 Drop(s) Both EYES three times a day  hydrALAZINE 50 milliGRAM(s) Oral three times a day  levothyroxine 25 MICROGram(s) Oral daily  losartan 50 milliGRAM(s) Oral daily  methylPREDNISolone sodium succinate Injectable 40 milliGRAM(s) IV Push every 8 hours  piperacillin/tazobactam IVPB.. 3.375 Gram(s) IV Intermittent every 8 hours    MEDICATIONS  (PRN):       Vital Signs Last 24 Hrs  T(C): 36.8 (17 Jul 2023 12:16), Max: 37.7 (16 Jul 2023 19:45)  T(F): 98.3 (17 Jul 2023 12:16), Max: 99.8 (16 Jul 2023 19:45)  HR: 72 (17 Jul 2023 12:16) (72 - 91)  BP: 127/67 (17 Jul 2023 12:16) (102/61 - 154/74)  BP(mean): 90 (17 Jul 2023 02:10) (79 - 90)  RR: 18 (17 Jul 2023 12:16) (18 - 22)  SpO2: 95% (17 Jul 2023 12:16) (92% - 100%)    Parameters below as of 17 Jul 2023 12:16  Patient On (Oxygen Delivery Method): nasal cannula  O2 Flow (L/min): 2  Orthostatic VS          I&O's Summary      Physical Exam:   GENERAL: NAD, well-groomed, well-developed  HEENT: FALL[place]/   Atraumatic, Normocephalic  ENMT: No tonsillar erythema, exudates, or enlargement; Moist mucous membranes, Good dentition, No lesions  NECK: Supple, No JVD, Normal thyroid  CHEST/LUNG: mild coarse breath sounds: not much of wheezing  CVS: Regular rate and rhythm; No murmurs, rubs, or gallops  GI: : Soft, Nontender, Nondistended; Bowel sounds present  NERVOUS SYSTEM:  Alert & Oriented X3  EXTREMITIES:  -edema  LYMPH: No lymphadenopathy noted  SKIN: No rashes or lesions  ENDOCRINOLOGY: No Thyromegaly  PSYCH: Appropriate    Labs:  Venous<51<4>>18<<7.325>>Venous<<3><<4><<5<<189>>SARS-CoV-2: NotDetec (16 Jul 2023 17:23)  SARS-CoV-2: NotDetec (15 May 2023 13:49)                              11.0   8.63  )-----------( 269      ( 16 Jul 2023 17:24 )             34.1     07-16    133<L>  |  100  |  9   ----------------------------<  85  4.7   |  20<L>  |  0.92    Ca    9.8      16 Jul 2023 17:24  Phos  3.4     07-16  Mg     1.8     07-16    TPro  6.6  /  Alb  3.5  /  TBili  0.5  /  DBili  x   /  AST  17  /  ALT  8<L>  /  AlkPhos  85  07-16    CAPILLARY BLOOD GLUCOSE        LIVER FUNCTIONS - ( 16 Jul 2023 17:24 )  Alb: 3.5 g/dL / Pro: 6.6 g/dL / ALK PHOS: 85 U/L / ALT: 8 U/L / AST: 17 U/L / GGT: x           PT/INR - ( 16 Jul 2023 17:24 )   PT: 16.3 sec;   INR: 1.41 ratio         PTT - ( 16 Jul 2023 17:24 )  PTT:35.0 sec  Urinalysis Basic - ( 16 Jul 2023 17:24 )    Color: x / Appearance: x / SG: x / pH: x  Gluc: 85 mg/dL / Ketone: x  / Bili: x / Urobili: x   Blood: x / Protein: x / Nitrite: x   Leuk Esterase: x / RBC: x / WBC x   Sq Epi: x / Non Sq Epi: x / Bacteria: x      D DImer      Studies  Chest X-RAY  CT SCAN Chest   CT Abdomen  Venous Dopplers: LE:   Others    ra< from: CT Chest No Cont (07.16.23 @ 22:57) >    HEART/VASCULATURE: Stable heart size. Small pericardial fluid,slightly   increased since prior study. Aortic valve, coronary artery and mitral   annular calcification. Stable borderline main pulmonary artery.    UPPER ABDOMEN: Small hiatal hernia. Colon diverticulosis. Bilateral   perinephric stranding.    BONES/SOFT TISSUES: Degenerative changes of the spine.    IMPRESSION:    Persistent bronchiectasis and mucoid impaction at the lower lobes.   Increased tree-in-bud opacities in both lungs with patchy opacity at the   left lower lobe, suspicious for pneumonia. Stable scattered pulmonary   nodules. Nonspecific mediastinal lymphadenopathy.    Additional findings as described.    --- End of Report ---           GASTON MARQUIS MD; Resident Radiologist  This document has been electronically signed.  ALVARO BRITT; Attending Radiologist  This document has been electronically signed. Jul 17 2023  1:23AM    < end of copied text >  < from: CT Chest No Cont (05.15.23 @ 16:19) >  VISUALIZED UPPER ABDOMEN: Small hiatal hernia.    CHEST WALL, BONES: No aggressive osseous lesion.    LOWER NECK: Within normal limits.    IMPRESSION:    Bronchiectasis and mucoid impactions within bilateral lower lobe and   right middle lobe are slightly increased from prior exam. The lungs are   otherwise clear.    --- End of Report ---            ARIELLA PRADO MD; Attending Radiologist  This document has been electronically signed. May 15 2023  4:29PM    < end of copied text >  < from: CT Chest No Cont (03.19.20 @ 17:44) >      PROCEDURE DATE:  03/19/2020          INTERPRETATION:  CLINICAL INFORMATION: Back pain.     COMPARISON: 11/24/2019    PROCEDURE:   CT of the Chest was performed without intravenous contrast.  Sagittal and coronal reformats were performed.      FINDINGS:    No evidence of mediastinal or hilar lymphadenopathy. No evidence of pleural  effusion. There is a small hiatus hernia.    No evidence of axillary or supraclavicular lymphadenopathy. Atherosclerotic changes of the abdominal aorta. No evidence of aortic aneurysm.    There is a small pericardial effusion. No evidence of cardiomegaly.    There is a 4 mm nodule in the left upper lobe, unchanged since 11/24/2019. There is a linear band of atelectasis inthe left lower lobe. The left lung is otherwise clear. Consolidation noted in the left lower lobe on 11/24/2019 has resolved since that time. There is a region of consolidation in the lateral segment of the right middle lobe which is new since 11/24/2019. No underlying endobronchial lesion. Linear atelectasis in the right lower lobe, unchanged.    No significant chest wall abnormality. Mild degenerative changes in the spine.        IMPRESSION:     Small pericardial effusion, smaller since 11/24/2019..    Region of consolidation in the lateral segment of the right middle lobe. This is new since 11/24/2019.    4 mm left upper lobe nodule, unchanged.      < end of copied text >  < from: NM Pulmonary Ventilation/Perfusion Scan (03.19.20 @ 14:19) >  d from prior study. Matched ventilation/perfusion defect involving the superior segments of the right and left lower lobes also are unchanged. There is heterogeneous tracer distribution in the remainder of the lungs on both the ventilation and the perfusion images. There are no mismatched defects.    IMPRESSION:     Unchanged matched ventilation/perfusion defects involving the lateral segment of the right middle lobe, and superior segments of the bilateral lower lobes.    Low probability of pulmonary embolus.      < end of copied text >

## 2023-07-17 NOTE — CONSULT NOTE ADULT - ASSESSMENT
Patient is a 72 year old female with PMH COPD not on home O2, former smoker - quit ~15 years ago after smoking for 40 years, HTN, Afib on Eliquis who presents with increasing shortness of breath in setting of on and off subjective fever (highest temp of 98F), cough, and green sputum production.    Acute COPD exacerbation  LLL pneumonia, bronchiectasis    - RVP negative on admission   - CXR with with LLL opacity   - CT chest with persistent bronchiectasis and mucoid impaction at the lower lobes, increased tree in bud opacities in both lungs with patchy opacity at LLL- suspicious for pneumonia    - Pulmonary following - on steroids, symbicort added    - afebrile here, no leukocytosis     Recommendations:  Send sputum culture, legionella and strep urine ags-ordered   Continue on pip-tazo   supportive care, supplemental O2 as needed  Monitor temps/WBC       Tran Araujo M.D.  [facility], Division of Infectious Diseases  [phone]  After 5pm on weekdays and all day on weekends - please call [phone] Patient is a 72 year old female with PMH COPD not on home O2, former smoker - quit ~15 years ago after smoking for 40 years, HTN, Afib on Eliquis who presents with increasing shortness of breath in setting of on and off subjective fever (highest temp of 98F), cough, and green sputum production.    Hypoxia, COPD exacerbation  LLL pneumonia, bronchiectasis    - RVP negative on admission   - CXR with with LLL opacity   - CT chest with persistent bronchiectasis and mucoid impaction at the lower lobes, increased tree in bud opacities in both lungs with patchy opacity at LLL- suspicious for pneumonia    - Pulmonary following - on steroids-IV solumedrol, symbicort added    - afebrile here, no leukocytosis     Recommendations:  Send sputum culture, legionella and strep urine ags-ordered   Continue on pip-tazo   supportive care, supplemental O2 as needed  Monitor temps/WBC       Tran Araujo M.D.  [facility], Division of Infectious Diseases  [phone]  After 5pm on weekdays and all day on weekends - please call [phone]

## 2023-07-17 NOTE — CONSULT NOTE ADULT - SUBJECTIVE AND OBJECTIVE BOX
[facility] DIV[facility] INFECTIOUS DISEASES  PRIYANK Daly, INDU Contreras [facility]. [facility]  [phone]  ([phone] - weekdays after 5pm and weekends)    BALBIR ANGEL  72y, Female  [id number]    HPI:  This is a 72 year old female with pmh of COPD not on home O2, HTN, Afib on Eliquis presenting with increasing shortness of breath in setting of on and off subjective fever (highest temp of 98F), cough, and green sputum production. Denies any chest pain or abdominal pain. + nausea but no vomiting. Exertional shortness of breath. No exertional chest pain. Denies any dysuria, urinary frequency, hematuria. Denies any recent surgery/travel. No lower extremity swelling.  (16 Jul 2023 19:05)  ROS: 14 point review of systems completed, pertinent positives and negatives as per HPI.    Allergies: Sulfur (Unknown)  sulfa drugs (Unknown)  [This allergen will not trigger allergy alert] artichokes (Unknown)  Levaquin (Anaphylaxis)    PMH -- COPD (chronic obstructive pulmonary disease)  JUAN CARLOS (obstructive sleep apnea)  HTN (hypertension)  Hypothyroid  COPD (chronic obstructive pulmonary disease)  Hypothyroid  HTN (hypertension)  Hyperlipidemia  Edema extremities  Afib  Psoriasis    PSH -- HTN (hypertension)  Hypothyroid  S/P eye surgery    FH -- FH: hypertension  Social History -- denies tobacco, alcohol or illicit drug use    Physical Exam--  Vital Signs Last 24 Hrs  T(F): 98.3 (17 Jul 2023 12:16), Max: 99.8 (16 Jul 2023 19:45)  HR: 72 (17 Jul 2023 12:16) (72 - 91)  BP: 127/67 (17 Jul 2023 12:16) (102/61 - 154/74)  RR: 18 (17 Jul 2023 12:16) (18 - 22)  SpO2: 95% (17 Jul 2023 12:16) (92% - 100%)  General: no acute distress  HEENT: NC/AT, EOMI, anicteric, neck supple  Lungs: decreased breath sounds b/l  Heart: S1, S2 present, RRR. No murmur, rub or gallop.  Abdomen: Soft. Nondistended. Nontender. BS present.   Neuro: AAOx3, no obvious focal deficits   Extremities: No cyanosis or clubbing. No edema.   Skin: Warm. Dry. Good turgor. No visible rash.   Psychiatric: Appropriate affect and mood for situation.   Lines: PIV    Laboratory & Imaging Data--  CBC:                       11.0   8.63  )-----------( 269      ( 16 Jul 2023 17:24 )             34.1     WBC Count: 8.63 K/uL (07-16-23 @ 17:24)    CMP: 07-16    133<L>  |  100  |  9   ----------------------------<  85  4.7   |  20<L>  |  0.92    Ca    9.8      16 Jul 2023 17:24  Phos  3.4     07-16  Mg     1.8     07-16    TPro  6.6  /  Alb  3.5  /  TBili  0.5  /  DBili  x   /  AST  17  /  ALT  8<L>  /  AlkPhos  85  07-16    LIVER FUNCTIONS - ( 16 Jul 2023 17:24 )  Alb: 3.5 g/dL / Pro: 6.6 g/dL / ALK PHOS: 85 U/L / ALT: 8 U/L / AST: 17 U/L / GGT: x           Microbiology: reviewed  Respiratory Viral Panel with COVID-19 by BROOKE (07.16.23 @ 17:23)    Rapid RVP Result: [facility]   SARS-CoV-2: [facility]: This Respiratory Panel uses polymerase chain reaction (PCR) to detect for  adenovirus; coronavirus (HKU1, NL63, 229E, OC43); human metapneumovirus  (hMPV); human enterovirus/rhinovirus (Entero/RV); influenza A; influenza  A/H1; influenza A/H3; influenza A/H1-2009; influenza B; parainfluenza  viruses 1, 2, 3, 4; respiratory syncytial virus; Mycoplasma pneumoniae;  Chlamydophila pneumoniae; and SARS-CoV-2.    Radiology--reviewed  < from: CT Chest No Cont (07.16.23 @ 22:57) >  IMPRESSION:    Persistent bronchiectasis and mucoid impaction at the lower lobes.   Increased tree-in-bud opacities in both lungs with patchy opacity at the   left lower lobe, suspicious for pneumonia. Stable scattered pulmonary   nodules. Nonspecific mediastinal lymphadenopathy.    Additional findings as described.    < end of copied text >    < from: Xray Chest 1 View- PORTABLE-Urgent (Xray Chest 1 View- PORTABLE-Urgent .) (07.16.23 @ 18:21) >  INTERPRETATION:  Left lower lung field opacity concerning for pneumonia.    < end of copied text >      Active Medications--  albuterol/ipratropium for Nebulization 3 milliLiter(s) Nebulizer every 6 hours  apixaban 2.5 milliGRAM(s) Oral every 12 hours  artificial  tears Solution 1 Drop(s) Both EYES three times a day  budesonide 160 MICROgram(s)/formoterol 4.5 MICROgram(s) Inhaler 2 Puff(s) Inhalation two times a day  hydrALAZINE 50 milliGRAM(s) Oral three times a day  levothyroxine 25 MICROGram(s) Oral daily  losartan 50 milliGRAM(s) Oral daily  methylPREDNISolone sodium succinate Injectable 40 milliGRAM(s) IV Push every 8 hours  piperacillin/tazobactam IVPB.. 3.375 Gram(s) IV Intermittent every 8 hours    Current Antimicrobials:   piperacillin/tazobactam IVPB.. 3.375 Gram(s) IV Intermittent every 8 hours    Prior/Completed Antimicrobials:  piperacillin/tazobactam IVPB.  piperacillin/tazobactam IVPB.-   [facility] INF[facility]OUS DISEASES  PRIYANK Daly S. Shah, Y. Patel, G. [facility]  [phone]  ([phone] - weekdays after 5pm and weekends)    BALBIR ANGEL  72y, Female  [id number]    HPI:  Patient is a 72 year old female with PMH COPD not on home O2, HTN, Afib on Eliquis who presents with increasing shortness of breath in setting of on and off subjective fever (highest temp of 98F), cough, and green sputum production. Denies any chest pain or abdominal pain. + nausea but no vomiting. Exertional shortness of breath. No exertional chest pain. Denies any dysuria, urinary frequency, hematuria. Denies any recent surgery/travel. No lower extremity swelling.  (16 Jul 2023 19:05)  Patient seen and examined at bedside this afternoon in the ER. Patient confirms above history, states she first started to note worsening dyspnea on Tuesday. States she has had pneumonia in the past and this does not feel like it. She has been taking her inhalers and nebulizers, states after her nebulizer treatment she brings up thick green sputum. She reports having similar symptoms multiple times and is not convinced that she has a pneumonia. She denies chest pain, abdominal pain, diarrhea, or any other complaints. Has been following with her Pulmonlogist Dr. Pelaez for about 30 years. She stopped smoking about 15 years ago.   ROS: 14 point review of systems completed, pertinent positives and negatives as per HPI.    Allergies: Sulfur (Unknown)  sulfa drugs (Unknown)  [This allergen will not trigger allergy alert] artichokes (Unknown)  Levaquin (Anaphylaxis)    PMH -- COPD (chronic obstructive pulmonary disease)  JUAN CARLOS (obstructive sleep apnea)  HTN (hypertension)  Hypothyroid  COPD (chronic obstructive pulmonary disease)  Hypothyroid  HTN (hypertension)  Hyperlipidemia  Edema extremities  Afib  Psoriasis    PSH -- HTN (hypertension)  Hypothyroid  S/P eye surgery    FH -- FH: hypertension  Social History --former smoker - 40 pack years and stopped smoking 15 years ago, denies alcohol or illicit drug use, lives alone     Physical Exam--  Vital Signs Last 24 Hrs  T(F): 98.3 (17 Jul 2023 12:16), Max: 99.8 (16 Jul 2023 19:45)  HR: 72 (17 Jul 2023 12:16) (72 - 91)  BP: 127/67 (17 Jul 2023 12:16) (102/61 - 154/74)  RR: 18 (17 Jul 2023 12:16) (18 - 22)  SpO2: 95% (17 Jul 2023 12:16) (92% - 100%)  General: no acute distress  HEENT: NC/AT, EOMI, anicteric, neck supple  Lungs: decreased breath sounds L>R  Heart: S1, S2 present, RRR. No murmur, rub or gallop.  Abdomen: Soft. Nondistended. Nontender. BS present.   Neuro: AAOx3, no obvious focal deficits   Extremities: No cyanosis or clubbing. No edema.   Skin: Warm. Dry. Good turgor. No visible rash.   Psychiatric: Appropriate affect and mood for situation.   Lines: PIV    Laboratory & Imaging Data--  CBC:                       11.0   8.63  )-----------( 269      ( 16 Jul 2023 17:24 )             34.1     WBC Count: 8.63 K/uL (07-16-23 @ 17:24)    CMP: 07-16    133<L>  |  100  |  9   ----------------------------<  85  4.7   |  20<L>  |  0.92    Ca    9.8      16 Jul 2023 17:24  Phos  3.4     07-16  Mg     1.8     07-16    TPro  6.6  /  Alb  3.5  /  TBili  0.5  /  DBili  x   /  AST  17  /  ALT  8<L>  /  AlkPhos  85  07-16    LIVER FUNCTIONS - ( 16 Jul 2023 17:24 )  Alb: 3.5 g/dL / Pro: 6.6 g/dL / ALK PHOS: 85 U/L / ALT: 8 U/L / AST: 17 U/L / GGT: x           Microbiology: reviewed  Respiratory Viral Panel with COVID-19 by BROOKE (07.16.23 @ 17:23)    Rapid RVP Result: [facility]   SARS-CoV-2: [facility]: This Respiratory Panel uses polymerase chain reaction (PCR) to detect for  adenovirus; coronavirus (HKU1, NL63, 229E, OC43); human metapneumovirus  (hMPV); human enterovirus/rhinovirus (Entero/RV); influenza A; influenza  A/H1; influenza A/H3; influenza A/H1-2009; influenza B; parainfluenza  viruses 1, 2, 3, 4; respiratory syncytial virus; Mycoplasma pneumoniae;  Chlamydophila pneumoniae; and SARS-CoV-2.    Radiology--reviewed  < from: CT Chest No Cont (07.16.23 @ 22:57) >  IMPRESSION:    Persistent bronchiectasis and mucoid impaction at the lower lobes.   Increased tree-in-bud opacities in both lungs with patchy opacity at the   left lower lobe, suspicious for pneumonia. Stable scattered pulmonary   nodules. Nonspecific mediastinal lymphadenopathy.    Additional findings as described.    < end of copied text >    < from: Xray Chest 1 View- PORTABLE-Urgent (Xray Chest 1 View- PORTABLE-Urgent .) (07.16.23 @ 18:21) >  INTERPRETATION:  Left lower lung field opacity concerning for pneumonia.    < end of copied text >      Active Medications--  albuterol/ipratropium for Nebulization 3 milliLiter(s) Nebulizer every 6 hours  apixaban 2.5 milliGRAM(s) Oral every 12 hours  artificial  tears Solution 1 Drop(s) Both EYES three times a day  budesonide 160 MICROgram(s)/formoterol 4.5 MICROgram(s) Inhaler 2 Puff(s) Inhalation two times a day  hydrALAZINE 50 milliGRAM(s) Oral three times a day  levothyroxine 25 MICROGram(s) Oral daily  losartan 50 milliGRAM(s) Oral daily  methylPREDNISolone sodium succinate Injectable 40 milliGRAM(s) IV Push every 8 hours  piperacillin/tazobactam IVPB.. 3.375 Gram(s) IV Intermittent every 8 hours    Current Antimicrobials:   piperacillin/tazobactam IVPB.. 3.375 Gram(s) IV Intermittent every 8 hours    Prior/Completed Antimicrobials:  piperacillin/tazobactam IVPB.  piperacillin/tazobactam IVPB.-

## 2023-07-18 LAB
ANION GAP SERPL CALC-SCNC: 15 MMOL/L — SIGNIFICANT CHANGE UP (ref 5–17)
BUN SERPL-MCNC: 25 MG/DL — HIGH (ref 7–23)
CALCIUM SERPL-MCNC: 9.9 MG/DL — SIGNIFICANT CHANGE UP (ref 8.4–10.5)
CHLORIDE SERPL-SCNC: 98 MMOL/L — SIGNIFICANT CHANGE UP (ref 96–108)
CO2 SERPL-SCNC: 21 MMOL/L — LOW (ref 22–31)
CREAT SERPL-MCNC: 1.28 MG/DL — SIGNIFICANT CHANGE UP (ref 0.5–1.3)
EGFR: 45 ML/MIN/1.73M2 — LOW
GLUCOSE SERPL-MCNC: 174 MG/DL — HIGH (ref 70–99)
HCT VFR BLD CALC: 38.6 % — SIGNIFICANT CHANGE UP (ref 34.5–45)
HGB BLD-MCNC: 12.3 G/DL — SIGNIFICANT CHANGE UP (ref 11.5–15.5)
LEGIONELLA AG UR QL: NEGATIVE — SIGNIFICANT CHANGE UP
MAGNESIUM SERPL-MCNC: 2.1 MG/DL — SIGNIFICANT CHANGE UP (ref 1.6–2.6)
MCHC RBC-ENTMCNC: 29.5 PG — SIGNIFICANT CHANGE UP (ref 27–34)
MCHC RBC-ENTMCNC: 31.9 GM/DL — LOW (ref 32–36)
MCV RBC AUTO: 92.6 FL — SIGNIFICANT CHANGE UP (ref 80–100)
NRBC # BLD: 0 /100 WBCS — SIGNIFICANT CHANGE UP (ref 0–0)
PLATELET # BLD AUTO: 420 K/UL — HIGH (ref 150–400)
POTASSIUM SERPL-MCNC: 4 MMOL/L — SIGNIFICANT CHANGE UP (ref 3.5–5.3)
POTASSIUM SERPL-SCNC: 4 MMOL/L — SIGNIFICANT CHANGE UP (ref 3.5–5.3)
RBC # BLD: 4.17 M/UL — SIGNIFICANT CHANGE UP (ref 3.8–5.2)
RBC # FLD: 14.8 % — HIGH (ref 10.3–14.5)
S PNEUM AG UR QL: NEGATIVE — SIGNIFICANT CHANGE UP
SODIUM SERPL-SCNC: 134 MMOL/L — LOW (ref 135–145)
WBC # BLD: 19.53 K/UL — HIGH (ref 3.8–10.5)
WBC # FLD AUTO: 19.53 K/UL — HIGH (ref 3.8–10.5)

## 2023-07-18 PROCEDURE — 93306 TTE W/DOPPLER COMPLETE: CPT | Mod: 26

## 2023-07-18 RX ORDER — CHOLECALCIFEROL (VITAMIN D3) 125 MCG
1 CAPSULE ORAL
Refills: 0 | DISCHARGE

## 2023-07-18 RX ORDER — ACYCLOVIR SODIUM 500 MG
400 VIAL (EA) INTRAVENOUS DAILY
Refills: 0 | Status: DISCONTINUED | OUTPATIENT
Start: 2023-07-18 | End: 2023-07-18

## 2023-07-18 RX ORDER — ASCORBIC ACID 60 MG
1 TABLET,CHEWABLE ORAL
Qty: 0 | Refills: 0 | DISCHARGE

## 2023-07-18 RX ORDER — LEVALBUTEROL 1.25 MG/.5ML
0.63 SOLUTION, CONCENTRATE RESPIRATORY (INHALATION) EVERY 6 HOURS
Refills: 0 | Status: COMPLETED | OUTPATIENT
Start: 2023-07-18 | End: 2023-07-20

## 2023-07-18 RX ORDER — ACYCLOVIR SODIUM 500 MG
400 VIAL (EA) INTRAVENOUS THREE TIMES A DAY
Refills: 0 | Status: COMPLETED | OUTPATIENT
Start: 2023-07-18 | End: 2023-07-23

## 2023-07-18 RX ORDER — LEVOTHYROXINE SODIUM 125 MCG
50 TABLET ORAL DAILY
Refills: 0 | Status: DISCONTINUED | OUTPATIENT
Start: 2023-07-19 | End: 2023-07-23

## 2023-07-18 RX ORDER — ASCORBIC ACID 60 MG
1 TABLET,CHEWABLE ORAL
Refills: 0 | DISCHARGE

## 2023-07-18 RX ORDER — CHLORHEXIDINE GLUCONATE 213 G/1000ML
1 SOLUTION TOPICAL
Refills: 0 | Status: DISCONTINUED | OUTPATIENT
Start: 2023-07-18 | End: 2023-07-23

## 2023-07-18 RX ORDER — ALBUTEROL 90 UG/1
3 AEROSOL, METERED ORAL
Refills: 0 | DISCHARGE

## 2023-07-18 RX ORDER — PREGABALIN 225 MG/1
1 CAPSULE ORAL
Qty: 0 | Refills: 0 | DISCHARGE

## 2023-07-18 RX ORDER — APIXABAN 2.5 MG/1
2.5 TABLET, FILM COATED ORAL
Qty: 0 | Refills: 0 | DISCHARGE

## 2023-07-18 RX ORDER — LEVOTHYROXINE SODIUM 125 MCG
1 TABLET ORAL
Qty: 0 | Refills: 0 | DISCHARGE

## 2023-07-18 RX ADMIN — BUDESONIDE AND FORMOTEROL FUMARATE DIHYDRATE 2 PUFF(S): 160; 4.5 AEROSOL RESPIRATORY (INHALATION) at 17:06

## 2023-07-18 RX ADMIN — APIXABAN 2.5 MILLIGRAM(S): 2.5 TABLET, FILM COATED ORAL at 05:25

## 2023-07-18 RX ADMIN — PIPERACILLIN AND TAZOBACTAM 25 GRAM(S): 4; .5 INJECTION, POWDER, LYOPHILIZED, FOR SOLUTION INTRAVENOUS at 05:24

## 2023-07-18 RX ADMIN — Medication 3 MILLILITER(S): at 01:06

## 2023-07-18 RX ADMIN — Medication 400 MILLIGRAM(S): at 17:05

## 2023-07-18 RX ADMIN — Medication 40 MILLIGRAM(S): at 14:07

## 2023-07-18 RX ADMIN — Medication 400 MILLIGRAM(S): at 21:51

## 2023-07-18 RX ADMIN — PIPERACILLIN AND TAZOBACTAM 25 GRAM(S): 4; .5 INJECTION, POWDER, LYOPHILIZED, FOR SOLUTION INTRAVENOUS at 21:51

## 2023-07-18 RX ADMIN — Medication 1 DROP(S): at 21:52

## 2023-07-18 RX ADMIN — Medication 1 DROP(S): at 05:27

## 2023-07-18 RX ADMIN — PIPERACILLIN AND TAZOBACTAM 25 GRAM(S): 4; .5 INJECTION, POWDER, LYOPHILIZED, FOR SOLUTION INTRAVENOUS at 14:08

## 2023-07-18 RX ADMIN — Medication 25 MICROGRAM(S): at 05:26

## 2023-07-18 RX ADMIN — Medication 1 DROP(S): at 14:21

## 2023-07-18 RX ADMIN — Medication 50 MILLIGRAM(S): at 14:07

## 2023-07-18 RX ADMIN — APIXABAN 2.5 MILLIGRAM(S): 2.5 TABLET, FILM COATED ORAL at 17:04

## 2023-07-18 RX ADMIN — Medication 40 MILLIGRAM(S): at 05:25

## 2023-07-18 RX ADMIN — BUDESONIDE AND FORMOTEROL FUMARATE DIHYDRATE 2 PUFF(S): 160; 4.5 AEROSOL RESPIRATORY (INHALATION) at 05:26

## 2023-07-18 RX ADMIN — LEVALBUTEROL 0.63 MILLIGRAM(S): 1.25 SOLUTION, CONCENTRATE RESPIRATORY (INHALATION) at 17:06

## 2023-07-18 RX ADMIN — Medication 50 MILLIGRAM(S): at 05:25

## 2023-07-18 RX ADMIN — Medication 50 MILLIGRAM(S): at 21:51

## 2023-07-18 RX ADMIN — Medication 20 MILLIGRAM(S): at 21:51

## 2023-07-18 RX ADMIN — Medication 3 MILLILITER(S): at 05:35

## 2023-07-18 RX ADMIN — LOSARTAN POTASSIUM 50 MILLIGRAM(S): 100 TABLET, FILM COATED ORAL at 05:26

## 2023-07-18 RX ADMIN — LEVALBUTEROL 0.63 MILLIGRAM(S): 1.25 SOLUTION, CONCENTRATE RESPIRATORY (INHALATION) at 11:20

## 2023-07-18 NOTE — PROGRESS NOTE ADULT - ASSESSMENT
This is a 72 year old female with pmh of COPD not on home O2, HTN, HLD, a-fib, Hypothyroidism, Psoriasis. Presents with SOB. Admitted with pneumonia    Plan:    # SOB r/t Pneumonia/ COPD exacerbation:  - No leukocytosis. Trops negative. . RVP negative  - CXR with Left lower lung field opacity concerning for pneumonia  - CT Chest with Persistent bronchiectasis and mucoid impaction at the lower lobes.   Increased tree-in-bud opacities in both lungs with patchy opacity at the   left lower lobe, suspicious for pneumonia  - Maintain FiO2>92%  - Sputum Cx pending  - C/w IV abx  - C/w Nabs/inhalers/ steroids  - TTE pending  - Pulm and ID following    # HTN/ HLD/ Afib:  - Trend BP's  - C/w current meds  - Cards following    # Hypothyroidism:  - C/w Synthroid    # GI ppx:  - Bowel regimen prn    # DVT ppx:  - Eliquis    [facility]

## 2023-07-18 NOTE — PROGRESS NOTE ADULT - ASSESSMENT
This is a 72 year old female with pmh of COPD not on home O2, HTN, a-fib on Eliquis presenting with increasing shortness of breath in setting of on and off subjective fever (highest temp of 98F), cough, and green sputum production. Denies any chest pain or abdominal pain. + nausea but no vomiting. Exertional shortness of breath. No exertional chest pain. Denies any dysuria, urinary frequency, hematuria. Denies any recent surgery/travel. No lower extremity swelling.   (16 Jul 2023 19:05)  Tthe pt started getting sick from  January this year;  when she had visited [facility] and was treated with antibiotics  after that she improved  then she again became sick in April from which she recovered and now she presented with sob for 5-6 days with some cough but not really phlegmy/  she i son 2 L of oxygen at this time but does not use oxygen at home:     COPD exa:  A Fib:  HTN:      COPD exa:  -seems copd exacerbation:  RVP is negative:   -she is on steroids as well as BD: add Symbicort - she takes advair and Spiriva at home:   -VBG shows mild hypercarbic resp acidosis , but no reason to give bipap at this time given her clinical condition:   -her ct chest showed: Persistent bronchiectasis and mucoid impaction at the lower lobes.  Increased tree-in-bud opacities in both lungs with patchy opacity at the left lower lobe, suspicious for pneumonia. Stable scattered pulmonary  nodules. Nonspecific mediastinal lymphadenopathy.  -doubt vte : as she is already on elliquis:   -rpt abg in am    -check sputum cultures:  -already on zosyn ; check legionella still pending:   -she is still wheezing : would decrease steroids to 20 mg tid today for two days   A Fib:  -cont eliquis  HTN:  -controlled   dw ACP

## 2023-07-18 NOTE — CHART NOTE - NSCHARTNOTEFT_GEN_A_CORE
Pt reported cold sore and is recurrent and take Acyclovir at home  Oral HSV - Acyclovir 400mg Q8h x 5 days   Dr. Catie babcock to the Acyclovir    [id number]
EVENT: ask to see pt for afib w/ RVR  Pt seen, chart reviewed  pt supine in bed, calm comfortable, acknowledge fast heart rate but "no palpitations"; she reports that "this happens all the time at home when I take spiriva and advair at  and it last for about 5 mins"; she reports feeling "ok" overall. Denies CP/palpitation/SOB/HA/dizziness, but feels jittery.  on admission to the unit from ED pt was in NSR    Brief Exam  General NAD  Resp: even and unlabored breathing with supplemental O2  CV: irregular, irregular 130's, no m/c/r  Neuro: A&Ox4, gait steady with ambulation to the [facility]  TELE: Afib 130's - 160's (s/p duoneb)    Vital Signs Last 24 Hrs  T(C): 36.4 (18 Jul 2023 04:32), Max: 36.8 (17 Jul 2023 12:16)  T(F): 97.5 (18 Jul 2023 04:32), Max: 98.3 (17 Jul 2023 12:16)  HR: 99 (18 Jul 2023 04:32) (66 - 99)  BP: 129/73 (18 Jul 2023 04:32) (111/67 - 142/75)  RR: 18 (18 Jul 2023 04:32) (17 - 18)  SpO2: 94% (18 Jul 2023 04:32) (94% - 96%)    Parameters below as of 18 Jul 2023 04:32  Patient On (Oxygen Delivery Method): nasal cannula      A/P   72 year old female with pmh of COPD not on home O2, HTN, a-fib on Eliquis presenting with increasing shortness of breath in setting of on and off subjective fever (highest temp of 98F), cough, and green sputum production. Denies any chest pain or abdominal pain. + nausea but no vomiting. Exertional shortness of breath. No exertional chest pain. Denies any dysuria, urinary frequency, hematuria. Denies any recent surgery/travel. No lower extremity swelling.   (16 Jul 2023 19:05)  On admission CT chest with persistent bronchiectasis and mucoid impaction at the lower lobes.  Increased tree-in-bud opacities in both lungs with patchy opacity at the left lower lobe, suspicious for pneumonia. Stable scattered pulmonary  nodules. Nonspecific mediastinal lymphadenopathy.   Pt admitted for :  Hypoxia, COPD exacerbation  LLL pneumonia, bronchiectasis   Chronic pAfib now w/RVR, likely medication induced (beta agonist)  she is not on a BB due to COPD  will dc duoneb and start zopenex 0.63mg Qhrs x 48hrs (to be re-evaluated in 2 days)  continue current management, will hold off on treating the RVR at this time given she verbalized it's usually a limiting episode  continue above meds  Pulm / cardio / ID are already on board  pt agreed with plan  RN made aware

## 2023-07-18 NOTE — PROGRESS NOTE ADULT - SUBJECTIVE AND OBJECTIVE BOX
[facility] INF[facility]IOUS DISEASES  PRIYANK Daly Y. Patel, S. Shah, G. [facility]  [phone]  ([phone] - weekdays after 5pm and weekends)    Name: BALBIR ANGEL  Age/Gender: 72y Female  MRN: [id number]    Interval History:  Patient seen and examined this morning.   Feels better with NC, has mostly dry cough.  Denies fever or any new complaints  Notes reviewed.   No concerning overnight events.  Afebrile.     Allergies: Sulfur (Unknown)  sulfa drugs (Unknown)  [This allergen will not trigger allergy alert] artichokes (Unknown)  Levaquin (Anaphylaxis)      Objective:  Vitals:   T(F): 97.5 (07-18-23 @ 04:32), Max: 98.3 (07-17-23 @ 12:16)  HR: 132 (07-18-23 @ 06:31) (66 - 132)  BP: 123/62 (07-18-23 @ 06:31) (111/67 - 142/75)  RR: 18 (07-18-23 @ 04:32) (17 - 18)  SpO2: 94% (07-18-23 @ 04:32) (94% - 96%)  Physical Examination:  General: no acute distress, NC  HEENT: NC/AT, EOMI, anicteric, neck supple  Cardio: S1, S2 present, normal rate  Resp: decreased breath sounds mildly on L  Abd: soft, NT, ND, + BS  Neuro: AAOx3, no obvious focal deficits  Ext: no edema, no cyanosis, moving extremities  Skin: warm, dry, no visible rash  Psych: appropriate affect and mood for situation  Lines: PIV    Laboratory Studies:  CBC:                       12.3   19.53 )-----------( 420      ( 18 Jul 2023 06:33 )             38.6     WBC Trend:  19.53 07-18-23 @ 06:33  8.63 07-16-23 @ 17:24    CMP: 07-18    134<L>  |  98  |  25<H>  ----------------------------<  174<H>  4.0   |  21<L>  |  1.28    Ca    9.9      18 Jul 2023 06:33  Phos  3.4     07-16  Mg     2.1     07-18    TPro  6.6  /  Alb  3.5  /  TBili  0.5  /  DBili  x   /  AST  17  /  ALT  8<L>  /  AlkPhos  85  07-16    Creatinine: 1.28 mg/dL (07-18-23 @ 06:33)  Creatinine: 0.92 mg/dL (07-16-23 @ 17:24)    LIVER FUNCTIONS - ( 16 Jul 2023 17:24 )  Alb: 3.5 g/dL / Pro: 6.6 g/dL / ALK PHOS: 85 U/L / ALT: 8 U/L / AST: 17 U/L / GGT: x           Microbiology: reviewed     Radiology: reviewed     Medications:  apixaban 2.5 milliGRAM(s) Oral every 12 hours  artificial  tears Solution 1 Drop(s) Both EYES three times a day  budesonide 160 MICROgram(s)/formoterol 4.5 MICROgram(s) Inhaler 2 Puff(s) Inhalation two times a day  hydrALAZINE 50 milliGRAM(s) Oral three times a day  levalbuterol Inhalation 0.63 milliGRAM(s) Inhalation every 6 hours  levothyroxine 25 MICROGram(s) Oral daily  losartan 50 milliGRAM(s) Oral daily  methylPREDNISolone sodium succinate Injectable 40 milliGRAM(s) IV Push every 8 hours  piperacillin/tazobactam IVPB.. 3.375 Gram(s) IV Intermittent every 8 hours    Current Antimicrobials:  piperacillin/tazobactam IVPB.. 3.375 Gram(s) IV Intermittent every 8 hours    Prior/Completed Antimicrobials:  piperacillin/tazobactam IVPB.  piperacillin/tazobactam IVPB.-

## 2023-07-18 NOTE — PROGRESS NOTE ADULT - SUBJECTIVE AND OBJECTIVE BOX
Date of Service: 07-18-23 @ 16:26    Patient is a 72y old  Female who presents with a chief complaint of The patient is a 72y Female complaining of shortness of breath. (18 Jul 2023 15:42)      Any change in ROS: seems OK: no sob:  no cugh : no phlegm : still wheezing    MEDICATIONS  (STANDING):  acyclovir   Oral Tab/Cap 400 milliGRAM(s) Oral three times a day  apixaban 2.5 milliGRAM(s) Oral every 12 hours  artificial  tears Solution 1 Drop(s) Both EYES three times a day  budesonide 160 MICROgram(s)/formoterol 4.5 MICROgram(s) Inhaler 2 Puff(s) Inhalation two times a day  chlorhexidine 2% Cloths 1 Application(s) Topical <User Schedule>  hydrALAZINE 50 milliGRAM(s) Oral three times a day  levalbuterol Inhalation 0.63 milliGRAM(s) Inhalation every 6 hours  losartan 50 milliGRAM(s) Oral daily  methylPREDNISolone sodium succinate Injectable 40 milliGRAM(s) IV Push every 8 hours  piperacillin/tazobactam IVPB.. 3.375 Gram(s) IV Intermittent every 8 hours    MEDICATIONS  (PRN):    Vital Signs Last 24 Hrs  T(C): 36.5 (18 Jul 2023 10:46), Max: 36.8 (17 Jul 2023 18:02)  T(F): 97.7 (18 Jul 2023 10:46), Max: 98.3 (17 Jul 2023 18:02)  HR: 100 (18 Jul 2023 10:46) (66 - 132)  BP: 110/72 (18 Jul 2023 10:46) (110/72 - 142/75)  BP(mean): --  RR: 18 (18 Jul 2023 10:46) (17 - 18)  SpO2: 92% (18 Jul 2023 10:46) (92% - 96%)    Parameters below as of 18 Jul 2023 10:46  Patient On (Oxygen Delivery Method): room air        I&O's Summary    18 Jul 2023 07:01  -  18 Jul 2023 16:26  --------------------------------------------------------  IN: 940 mL / OUT: 0 mL / NET: 940 mL          Physical Exam:   GENERAL: NAD, well-groomed, well-developed  HEENT: FALLON/   Atraumatic, Normocephalic  ENMT: No tonsillar erythema, exudates, or enlargement; Moist mucous membranes, Good dentition, No lesions  NECK: Supple, No JVD, Normal thyroid  CHEST/LUNG: wheezing+  CVS: Regular rate and rhythm; No murmurs, rubs, or gallops  GI: : Soft, Nontender, Nondistended; Bowel sounds present  NERVOUS SYSTEM:  Alert & Oriented X3-  EXTREMITIES:  2+ Peripheral Pulses, No clubbing, cyanosis, or edema  LYMPH: No lymphadenopathy noted  SKIN: No rashes or lesions  ENDOCRINOLOGY: No Thyromegaly  PSYCH: Appropriate    Labs:  26                            12.3   19.53 )-----------( 420      ( 18 Jul 2023 06:33 )             38.6                         11.0   8.63  )-----------( 269      ( 16 Jul 2023 17:24 )             34.1     07-18    134<L>  |  98  |  25<H>  ----------------------------<  174<H>  4.0   |  21<L>  |  1.28  07-16    133<L>  |  100  |  9   ----------------------------<  85  4.7   |  20<L>  |  0.92    Ca    9.9      18 Jul 2023 06:33  Ca    9.8      16 Jul 2023 17:24  Phos  3.4     07-16  Mg     2.1     07-18  Mg     1.8     07-16    TPro  6.6  /  Alb  3.5  /  TBili  0.5  /  DBili  x   /  AST  17  /  ALT  8<L>  /  AlkPhos  85  07-16    CAPILLARY BLOOD GLUCOSE          LIVER FUNCTIONS - ( 16 Jul 2023 17:24 )  Alb: 3.5 g/dL / Pro: 6.6 g/dL / ALK PHOS: 85 U/L / ALT: 8 U/L / AST: 17 U/L / GGT: x           PT/INR - ( 16 Jul 2023 17:24 )   PT: 16.3 sec;   INR: 1.41 ratio         PTT - ( 16 Jul 2023 17:24 )  PTT:35.0 sec  Urinalysis Basic - ( 18 Jul 2023 06:33 )    Color: x / Appearance: x / SG: x / pH: x  Gluc: 174 mg/dL / Ketone: x  / Bili: x / Urobili: x   Blood: x / Protein: x / Nitrite: x   Leuk Esterase: x / RBC: x / WBC x   Sq Epi: x / Non Sq Epi: x / Bacteria: x            RECENT CULTURES:        RESPIRATORY CULTURES:    radr< from: CT Chest No Cont (07.16.23 @ 22:57) >    UPPER ABDOMEN: Small hiatal hernia. Colon diverticulosis. Bilateral   perinephric stranding.    BONES/SOFT TISSUES: Degenerative changes of the spine.    IMPRESSION:    Persistent bronchiectasis and mucoid impaction at the lower lobes.   Increased tree-in-bud opacities in both lungs with patchy opacity at the   left lower lobe, suspicious for pneumonia. Stable scattered pulmonary   nodules. Nonspecific mediastinal lymphadenopathy.    Additional findings as described.    --- End of Report ---           GASTON MARQUIS MD; Resident Radiologist  This document has been electronically signed.  ALVARO BRITT; Attending Radiologist  This document has been electronically signed. Jul 17 2023  1:23AM    < end of copied text >        Studies  Chest X-RAY  CT SCAN Chest   Venous Dopplers: LE:   CT Abdomen  Others

## 2023-07-18 NOTE — PROGRESS NOTE ADULT - SUBJECTIVE AND OBJECTIVE BOX
DATE OF SERVICE: 07-18-23 @ 15:42    Patient is a 72y old  Female who presents with a chief complaint of The patient is a 72y Female complaining of shortness of breath. (18 Jul 2023 11:11)      INTERVAL HISTORY: Feels well, reports minimal SOB    REVIEW OF SYSTEMS:  CONSTITUTIONAL: No weakness  EYES/ENT: No visual changes;  No throat pain   NECK: No pain or stiffness  RESPIRATORY: No cough, wheezing; No shortness of breath  CARDIOVASCULAR: No chest pain or palpitations  GASTROINTESTINAL: No abdominal  pain. No nausea, vomiting, or hematemesis  GENITOURINARY: No dysuria, frequency or hematuria  NEUROLOGICAL: No stroke like symptoms  SKIN: No rashes    TELEMETRY Personally reviewed: AFIB   	  MEDICATIONS:  hydrALAZINE 50 milliGRAM(s) Oral three times a day  losartan 50 milliGRAM(s) Oral daily        PHYSICAL EXAM:  T(C): 36.5 (07-18-23 @ 10:46), Max: 36.8 (07-17-23 @ 18:02)  HR: 100 (07-18-23 @ 10:46) (66 - 132)  BP: 110/72 (07-18-23 @ 10:46) (110/72 - 142/75)  RR: 18 (07-18-23 @ 10:46) (17 - 18)  SpO2: 92% (07-18-23 @ 10:46) (92% - 96%)  Wt(kg): --  I&O's Summary    18 Jul 2023 07:01  -  18 Jul 2023 15:42  --------------------------------------------------------  IN: 940 mL / OUT: 0 mL / NET: 940 mL          Appearance: In no distress	  HEENT:    PERRL, EOMI	  Cardiovascular:  S1 S2, No JVD  Respiratory: Lungs clear to auscultation	  Gastrointestinal:  Soft, Non-tender, + BS	  Vascularature:  No edema of LE  Psychiatric: Appropriate affect   Neuro: no acute focal deficits                               12.3   19.53 )-----------( 420      ( 18 Jul 2023 06:33 )             38.6     07-18    134<L>  |  98  |  25<H>  ----------------------------<  174<H>  4.0   |  21<L>  |  1.28    Ca    9.9      18 Jul 2023 06:33  Phos  3.4     07-16  Mg     2.1     07-18    TPro  6.6  /  Alb  3.5  /  TBili  0.5  /  DBili  x   /  AST  17  /  ALT  8<L>  /  AlkPhos  85  07-16        Labs personally reviewed      ASSESSMENT/PLAN: 	  This is a 72 year old female with pmh of COPD not on home O2, HTN, a-fib on Eliquis presenting with increasing shortness of breath in setting of subjective fever (highest temp of 98F), cough and green sputum production. Denies any chest pain or abdominal pain. + nausea but no vomiting. Reports exertional shortness of breath. No exertional chest pain. Denies any dysuria, urinary frequency, hematuria. Denies any recent surgery/travel. No lower extremity swelling.    Problem/Plan #1: Shortness of Breath  - ECG NSR with no ischemic characteristics  - Trop 20--> 20  - proBNP 913  - CXR with LLL opacity concerning for PNA  - CT Chest shows bronchiectasis, mucoid impaction concerning for PNA, non-specific mediastinal lymphadenopathy  - Has been afebrile, no leukocytosis  - Hx of COPD but not on home oxygen. Now requiring 3L NC.  - c/w IV Abx (Zocyn), IV steroids (solu-cortef), duonebs, supplemental oxygen as needed; Pulm consult appreciated.  - TTE from 2021 shows mild MR, AS and mild diastolic dysfunction --> recommend repeat TTE to assess EF, valvular function, r/o pericardial effusion and WMA (Patient recently had TTE at OP Cardiologist Dr. Palla's office)  - Most recent ischemic eval >2 years ago with NST which was reportedly normal    Problem/Plan #2: Atrial Fibrillation  - ECG reveals NSR  - Tele show NSR with occasional PVCs  - c/w Eliquis 2.5mg PO BID (On lower dose d/t chronic severe epistaxis as OP)   - Cont to monitor on tele    Problem/Plan #3: Hypertension  - c/w home meds including losartan 50mg PO daily and hydralazine 50mg PO BID    Problem/Plan #1: PPx Measures  - DVT: Eliquis  - Diet: Dash diet            MARY Vásquez,  [facility]  Cardiovascular Medicine  [address] [place] [address], [address]  Available via call or text on [employer] TEAMs  Office: [phone]   DATE OF SERVICE: 07-18-23 @ 15:42    Patient is a 72y old  Female who presents with a chief complaint of The patient is a 72y Female complaining of shortness of breath. (18 Jul 2023 11:11)      INTERVAL HISTORY: Feels well, reports minimal SOB    REVIEW OF SYSTEMS:  CONSTITUTIONAL: No weakness  EYES/ENT: No visual changes;  No throat pain   NECK: No pain or stiffness  RESPIRATORY: No cough, wheezing; No shortness of breath  CARDIOVASCULAR: No chest pain or palpitations  GASTROINTESTINAL: No abdominal  pain. No nausea, vomiting, or hematemesis  GENITOURINARY: No dysuria, frequency or hematuria  NEUROLOGICAL: No stroke like symptoms  SKIN: No rashes    TELEMETRY Personally reviewed: AFIB   	  MEDICATIONS:  hydrALAZINE 50 milliGRAM(s) Oral three times a day  losartan 50 milliGRAM(s) Oral daily        PHYSICAL EXAM:  T(C): 36.5 (07-18-23 @ 10:46), Max: 36.8 (07-17-23 @ 18:02)  HR: 100 (07-18-23 @ 10:46) (66 - 132)  BP: 110/72 (07-18-23 @ 10:46) (110/72 - 142/75)  RR: 18 (07-18-23 @ 10:46) (17 - 18)  SpO2: 92% (07-18-23 @ 10:46) (92% - 96%)  Wt(kg): --  I&O's Summary    18 Jul 2023 07:01  -  18 Jul 2023 15:42  --------------------------------------------------------  IN: 940 mL / OUT: 0 mL / NET: 940 mL          Appearance: In no distress	  HEENT:    PERRL, EOMI	  Cardiovascular:  S1 S2, No JVD  Respiratory: Lungs clear to auscultation	  Gastrointestinal:  Soft, Non-tender, + BS	  Vascularature:  No edema of LE  Psychiatric: Appropriate affect   Neuro: no acute focal deficits                               12.3   19.53 )-----------( 420      ( 18 Jul 2023 06:33 )             38.6     07-18    134<L>  |  98  |  25<H>  ----------------------------<  174<H>  4.0   |  21<L>  |  1.28    Ca    9.9      18 Jul 2023 06:33  Phos  3.4     07-16  Mg     2.1     07-18    TPro  6.6  /  Alb  3.5  /  TBili  0.5  /  DBili  x   /  AST  17  /  ALT  8<L>  /  AlkPhos  85  07-16        Labs personally reviewed      < from: TTE W or WO Ultrasound Enhancing Agent (07.18.23 @ 15:22) >  CONCLUSIONS:      1. Normal left ventricular cavity size. The left ventricular wall thickness is normal. The left ventricular systolic function is normal with an ejection fraction of 59 % by Goldberg's method of disks. There are no regional wall motion abnormalities seen.   2. The left ventricular diastolic function is indeterminate.   3. Normal right ventricular cavity size, normal right ventricular wall thickness and normal right ventricular systolic function. The tricuspid annular plane systolic excursion (TAPSE) is 1.8 cm (normal >=1.7 cm).   4. The right atrium is normal in size.   5. There is moderate aortic stenosis. The peak transaortic velocity is 3.13 m/s, peak transaortic gradient is 39.2 mmHg and mean transaortic gradient is 25.3 mmHg with an LVOT/aortic valve VTI ratio of 0.36. The aortic valve area is estimated at 1.12 cm² by the continuity equation. There is mild aortic regurgitation.   6. No pericardial effusion seen.   7. There is trace tricuspid regurgitation. There is insufficient tricuspid regurgitation detected to calculate pulmonary artery systolic pressure.   8. Compared to the transthoracic echocardiogram performed on 4/7/2023 there have been no significant interval changes.    < end of copied text >        ASSESSMENT/PLAN: 	  This is a 72 year old female with pmh of COPD not on home O2, HTN, a-fib on Eliquis presenting with increasing shortness of breath in setting of subjective fever (highest temp of 98F), cough and green sputum production. Denies any chest pain or abdominal pain. + nausea but no vomiting. Reports exertional shortness of breath. No exertional chest pain. Denies any dysuria, urinary frequency, hematuria. Denies any recent surgery/travel. No lower extremity swelling.    Problem/Plan #1: Shortness of Breath  - ECG NSR with no ischemic characteristics  - Trop 20--> 20  - proBNP 913  - CXR with LLL opacity concerning for PNA  - CT Chest shows bronchiectasis, mucoid impaction concerning for PNA, non-specific mediastinal lymphadenopathy  - Has been afebrile, no leukocytosis  - Hx of COPD but not on home oxygen. Now requiring 3L NC.  - c/w IV Abx (Zocyn), IV steroids (solu-cortef), duonebs, supplemental oxygen as needed; Pulm consult appreciated.  - TTE from 2021 shows mild MR, AS and mild diastolic dysfunction --> repeat TTE unchanged from prior with preserved EF and moderate AS  - Most recent ischemic eval >2 years ago with NST which was reportedly normal    Problem/Plan #2: Atrial Fibrillation  - ECG reveals NSR  - Tele show NSR with occasional PVCs  - c/w Eliquis 2.5mg PO BID (On lower dose d/t chronic severe epistaxis as OP)   - Cont to monitor on tele    Problem/Plan #3: Hypertension  - c/w home meds including losartan 50mg PO daily and hydralazine 50mg PO BID    Problem/Plan #1: PPx Measures  - DVT: Eliquis  - Diet: Dash diet            MARY Vásquez DO [facility]  Cardiovascular Medicine  [address], [address]  Available via call or text on [employer] TEAMs  Office: [phone]

## 2023-07-18 NOTE — PHARMACOTHERAPY INTERVENTION NOTE - COMMENTS
Performed medication reconciliation and home medication list updated in prescription writer/ outpatient medication review. Medications verified with patient who has medication list at bed side.    Home Medications:  Advair Diskus 500 mcg-50 mcg inhalation powder: 1 puff(s) inhaled 2 times a day   albuterol 0.63 mg/3 mL (0.021%) inhalation solution: 3 by nebulizer 3 times a day as needed for  shortness of breath and/or wheezing   Artificial Tears ophthalmic solution: 1 drop(s) to each affected eye 3 times a day   budesonide 1 mg/2 mL inhalation suspension: 2 by nebulizer 3 times a day as needed for  shortness of breath and/or wheezing  cholecalciferol 25 mcg (1000 intl units) oral tablet: 1 orally Monday, Wednesday, and Friday   Eliquis: 2.5 orally 2 times a day   hydrALAZINE 50 mg oral tablet: 1 tab(s) orally 3 times a day   levothyroxine 50 mcg (0.05 mg) oral tablet: orally once a day -- brand name L-Thyroxine  losartan 50 mg oral tablet: 1 tab(s) orally once a day   mupirocin 2% nasal ointment: nasal use as needed for nose bleed   Spiriva 18 mcg inhalation capsule: 1 each inhaled once a day   Vitamin C 500 mg oral tablet, chewable: 1 chewed take 1 tablet po on sunday, tuesday, thursday     Recommendations:  1. Patient was on levothyroxine (brand name L thyroxine) 50 mcg once a day at home. Currently receiving 25 mcg of levothyroxine inpatient. Recommend resuming the 50mcg dose.  2. Consider reconciling Spiriva inhaler for COPD.     Katerina Stephen  PharmD Candidate of 2024  [facility]

## 2023-07-18 NOTE — PROGRESS NOTE ADULT - SUBJECTIVE AND OBJECTIVE BOX
SUBJECTIVE / OVERNIGHT EVENTS:    Patient seen and examined at bedside. No events noted overnight. Resting comfortably in bed. Endorses occasional SOB    --------------------------------------------------------------------------------------------  LABS:                        12.3   19.53 )-----------( 420      ( 18 Jul 2023 06:33 )             38.6     07-18    134<L>  |  98  |  25<H>  ----------------------------<  174<H>  4.0   |  21<L>  |  1.28    Ca    9.9      18 Jul 2023 06:33  Phos  3.4     07-16  Mg     2.1     07-18    TPro  6.6  /  Alb  3.5  /  TBili  0.5  /  DBili  x   /  AST  17  /  ALT  8<L>  /  AlkPhos  85  07-16    PT/INR - ( 16 Jul 2023 17:24 )   PT: 16.3 sec;   INR: 1.41 ratio         PTT - ( 16 Jul 2023 17:24 )  PTT:35.0 sec  CAPILLARY BLOOD GLUCOSE            Urinalysis Basic - ( 18 Jul 2023 06:33 )    Color: x / Appearance: x / SG: x / pH: x  Gluc: 174 mg/dL / Ketone: x  / Bili: x / Urobili: x   Blood: x / Protein: x / Nitrite: x   Leuk Esterase: x / RBC: x / WBC x   Sq Epi: x / Non Sq Epi: x / Bacteria: x        RADIOLOGY & ADDITIONAL TESTS:    Imaging Personally Reviewed:  [x] YES  [ ] NO    Consultant(s) Notes Reviewed:  [x] YES  [ ] NO    MEDICATIONS  (STANDING):  apixaban 2.5 milliGRAM(s) Oral every 12 hours  artificial  tears Solution 1 Drop(s) Both EYES three times a day  budesonide 160 MICROgram(s)/formoterol 4.5 MICROgram(s) Inhaler 2 Puff(s) Inhalation two times a day  hydrALAZINE 50 milliGRAM(s) Oral three times a day  levalbuterol Inhalation 0.63 milliGRAM(s) Inhalation every 6 hours  levothyroxine 25 MICROGram(s) Oral daily  losartan 50 milliGRAM(s) Oral daily  methylPREDNISolone sodium succinate Injectable 40 milliGRAM(s) IV Push every 8 hours  piperacillin/tazobactam IVPB.. 3.375 Gram(s) IV Intermittent every 8 hours    MEDICATIONS  (PRN):      Care Discussed with Consultants/Other Providers [x] YES  [ ] NO    Vital Signs Last 24 Hrs  T(C): 36.5 (18 Jul 2023 10:46), Max: 36.8 (17 Jul 2023 12:16)  T(F): 97.7 (18 Jul 2023 10:46), Max: 98.3 (17 Jul 2023 12:16)  HR: 100 (18 Jul 2023 10:46) (66 - 132)  BP: 110/72 (18 Jul 2023 10:46) (110/72 - 142/75)  BP(mean): --  RR: 18 (18 Jul 2023 10:46) (17 - 18)  SpO2: 92% (18 Jul 2023 10:46) (92% - 96%)    Parameters below as of 18 Jul 2023 10:46  Patient On (Oxygen Delivery Method): room air      I&O's Summary      PHYSICAL EXAM:  GENERAL: NAD, well-developed, comfortable  HEAD:  Atraumatic, Normocephalic  EYES: EOMI, PERRLA, conjunctiva and sclera clear  NECK: Supple, No JVD  CHEST/LUNG: Rhonchi bilaterally; No wheeze  HEART: Regular rate and rhythm; No murmurs, rubs, or gallops  ABDOMEN: Soft, Nontender, Nondistended; Bowel sounds present  NEURO: AAOx3, no focal weakness, 5/5 b/l extremity strength, b/l knee no arthritis, no effusion   EXTREMITIES:  2+ Peripheral Pulses, No clubbing, cyanosis, or edema  SKIN: No rashes or lesions

## 2023-07-19 LAB
ANION GAP SERPL CALC-SCNC: 13 MMOL/L — SIGNIFICANT CHANGE UP (ref 5–17)
BUN SERPL-MCNC: 29 MG/DL — HIGH (ref 7–23)
CALCIUM SERPL-MCNC: 9.7 MG/DL — SIGNIFICANT CHANGE UP (ref 8.4–10.5)
CHLORIDE SERPL-SCNC: 97 MMOL/L — SIGNIFICANT CHANGE UP (ref 96–108)
CO2 SERPL-SCNC: 24 MMOL/L — SIGNIFICANT CHANGE UP (ref 22–31)
CREAT SERPL-MCNC: 1.28 MG/DL — SIGNIFICANT CHANGE UP (ref 0.5–1.3)
EGFR: 45 ML/MIN/1.73M2 — LOW
GLUCOSE SERPL-MCNC: 138 MG/DL — HIGH (ref 70–99)
GRAM STN FLD: SIGNIFICANT CHANGE UP
HCT VFR BLD CALC: 36.4 % — SIGNIFICANT CHANGE UP (ref 34.5–45)
HGB BLD-MCNC: 11.5 G/DL — SIGNIFICANT CHANGE UP (ref 11.5–15.5)
MCHC RBC-ENTMCNC: 29.6 PG — SIGNIFICANT CHANGE UP (ref 27–34)
MCHC RBC-ENTMCNC: 31.6 GM/DL — LOW (ref 32–36)
MCV RBC AUTO: 93.8 FL — SIGNIFICANT CHANGE UP (ref 80–100)
MRSA PCR RESULT.: SIGNIFICANT CHANGE UP
NRBC # BLD: 0 /100 WBCS — SIGNIFICANT CHANGE UP (ref 0–0)
PLATELET # BLD AUTO: 401 K/UL — HIGH (ref 150–400)
POTASSIUM SERPL-MCNC: 5.1 MMOL/L — SIGNIFICANT CHANGE UP (ref 3.5–5.3)
POTASSIUM SERPL-SCNC: 5.1 MMOL/L — SIGNIFICANT CHANGE UP (ref 3.5–5.3)
RBC # BLD: 3.88 M/UL — SIGNIFICANT CHANGE UP (ref 3.8–5.2)
RBC # FLD: 14.9 % — HIGH (ref 10.3–14.5)
S AUREUS DNA NOSE QL NAA+PROBE: SIGNIFICANT CHANGE UP
SODIUM SERPL-SCNC: 134 MMOL/L — LOW (ref 135–145)
SPECIMEN SOURCE: SIGNIFICANT CHANGE UP
TSH SERPL-MCNC: 0.17 UIU/ML — LOW (ref 0.27–4.2)
WBC # BLD: 18.55 K/UL — HIGH (ref 3.8–10.5)
WBC # FLD AUTO: 18.55 K/UL — HIGH (ref 3.8–10.5)

## 2023-07-19 PROCEDURE — 93010 ELECTROCARDIOGRAM REPORT: CPT

## 2023-07-19 RX ADMIN — PIPERACILLIN AND TAZOBACTAM 25 GRAM(S): 4; .5 INJECTION, POWDER, LYOPHILIZED, FOR SOLUTION INTRAVENOUS at 05:11

## 2023-07-19 RX ADMIN — Medication 1 DROP(S): at 05:10

## 2023-07-19 RX ADMIN — Medication 20 MILLIGRAM(S): at 05:11

## 2023-07-19 RX ADMIN — Medication 400 MILLIGRAM(S): at 15:01

## 2023-07-19 RX ADMIN — LEVALBUTEROL 0.63 MILLIGRAM(S): 1.25 SOLUTION, CONCENTRATE RESPIRATORY (INHALATION) at 17:07

## 2023-07-19 RX ADMIN — Medication 1 DROP(S): at 15:01

## 2023-07-19 RX ADMIN — BUDESONIDE AND FORMOTEROL FUMARATE DIHYDRATE 2 PUFF(S): 160; 4.5 AEROSOL RESPIRATORY (INHALATION) at 17:07

## 2023-07-19 RX ADMIN — Medication 50 MILLIGRAM(S): at 05:10

## 2023-07-19 RX ADMIN — Medication 20 MILLIGRAM(S): at 15:01

## 2023-07-19 RX ADMIN — Medication 1 DROP(S): at 21:36

## 2023-07-19 RX ADMIN — Medication 400 MILLIGRAM(S): at 05:09

## 2023-07-19 RX ADMIN — Medication 20 MILLIGRAM(S): at 21:36

## 2023-07-19 RX ADMIN — Medication 50 MILLIGRAM(S): at 15:00

## 2023-07-19 RX ADMIN — PIPERACILLIN AND TAZOBACTAM 25 GRAM(S): 4; .5 INJECTION, POWDER, LYOPHILIZED, FOR SOLUTION INTRAVENOUS at 15:01

## 2023-07-19 RX ADMIN — APIXABAN 2.5 MILLIGRAM(S): 2.5 TABLET, FILM COATED ORAL at 05:10

## 2023-07-19 RX ADMIN — Medication 400 MILLIGRAM(S): at 21:34

## 2023-07-19 RX ADMIN — CHLORHEXIDINE GLUCONATE 1 APPLICATION(S): 213 SOLUTION TOPICAL at 05:17

## 2023-07-19 RX ADMIN — LOSARTAN POTASSIUM 50 MILLIGRAM(S): 100 TABLET, FILM COATED ORAL at 05:11

## 2023-07-19 RX ADMIN — LEVALBUTEROL 0.63 MILLIGRAM(S): 1.25 SOLUTION, CONCENTRATE RESPIRATORY (INHALATION) at 11:27

## 2023-07-19 RX ADMIN — Medication 50 MICROGRAM(S): at 05:10

## 2023-07-19 RX ADMIN — APIXABAN 2.5 MILLIGRAM(S): 2.5 TABLET, FILM COATED ORAL at 17:06

## 2023-07-19 RX ADMIN — Medication 50 MILLIGRAM(S): at 21:35

## 2023-07-19 RX ADMIN — PIPERACILLIN AND TAZOBACTAM 25 GRAM(S): 4; .5 INJECTION, POWDER, LYOPHILIZED, FOR SOLUTION INTRAVENOUS at 21:35

## 2023-07-19 NOTE — PROGRESS NOTE ADULT - SUBJECTIVE AND OBJECTIVE BOX
DATE OF SERVICE: 07-19-23 @ 15:33    Patient is a 72y old  Female who presents with a chief complaint of The patient is a 72y Female complaining of shortness of breath. (19 Jul 2023 14:46)      INTERVAL HISTORY: Feels well, reports improvement in SOB    REVIEW OF SYSTEMS:  CONSTITUTIONAL: No weakness  EYES/ENT: No visual changes;  No throat pain   NECK: No pain or stiffness  RESPIRATORY: No cough, wheezing; No shortness of breath  CARDIOVASCULAR: No chest pain or palpitations  GASTROINTESTINAL: No abdominal  pain. No nausea, vomiting, or hematemesis  GENITOURINARY: No dysuria, frequency or hematuria  NEUROLOGICAL: No stroke like symptoms  SKIN: No rashes    TELEMETRY Personally reviewed: AFIB   	  MEDICATIONS:  hydrALAZINE 50 milliGRAM(s) Oral three times a day  losartan 50 milliGRAM(s) Oral daily        PHYSICAL EXAM:  T(C): 36.6 (07-19-23 @ 11:10), Max: 36.7 (07-18-23 @ 21:10)  HR: 62 (07-19-23 @ 11:10) (61 - 68)  BP: 121/72 (07-19-23 @ 11:10) (120/73 - 130/74)  RR: 18 (07-19-23 @ 11:10) (18 - 18)  SpO2: 95% (07-19-23 @ 11:10) (88% - 97%)  Wt(kg): --  I&O's Summary    18 Jul 2023 07:01  -  19 Jul 2023 07:00  --------------------------------------------------------  IN: 1060 mL / OUT: 0 mL / NET: 1060 mL    19 Jul 2023 07:01  -  19 Jul 2023 15:33  --------------------------------------------------------  IN: 940 mL / OUT: 0 mL / NET: 940 mL          Appearance: In no distress	  HEENT:    PERRL, EOMI	  Cardiovascular:  S1 S2, No JVD  Respiratory: Lungs clear to auscultation	  Gastrointestinal:  Soft, Non-tender, + BS	  Vascularature:  No edema of LE  Psychiatric: Appropriate affect   Neuro: no acute focal deficits                               11.5   18.55 )-----------( 401      ( 19 Jul 2023 06:20 )             36.4     07-19    134<L>  |  97  |  29<H>  ----------------------------<  138<H>  5.1   |  24  |  1.28    Ca    9.7      19 Jul 2023 06:11  Mg     2.1     07-18          Labs personally reviewed      ASSESSMENT/PLAN: 	  This is a 72 year old female with pmh of COPD not on home O2, HTN, a-fib on Eliquis presenting with increasing shortness of breath in setting of subjective fever (highest temp of 98F), cough and green sputum production. Denies any chest pain or abdominal pain. + nausea but no vomiting. Reports exertional shortness of breath. No exertional chest pain. Denies any dysuria, urinary frequency, hematuria. Denies any recent surgery/travel. No lower extremity swelling.    Problem/Plan #1: Shortness of Breath  - ECG NSR with no ischemic characteristics  - Trop 20--> 20  - proBNP 913  - CXR with LLL opacity concerning for PNA  - CT Chest shows bronchiectasis, mucoid impaction concerning for PNA, non-specific mediastinal lymphadenopathy  - Has been afebrile, no leukocytosis  - Hx of COPD but not on home oxygen. Now requiring 3L NC.  - c/w IV Abx (Zocyn), IV steroids (solu-cortef), duonebs, supplemental oxygen as needed; Pulm consult appreciated.  - TTE from 2021 shows mild MR, AS and mild diastolic dysfunction --> repeat TTE unchanged from prior with preserved EF and moderate AS  - Most recent ischemic eval >2 years ago with NST which was reportedly normal    Problem/Plan #2: Atrial Fibrillation  - ECG reveals NSR  - Tele show NSR with occasional PVCs  - c/w Eliquis 2.5mg PO BID (On lower dose d/t chronic severe epistaxis as OP)   - Cont to monitor on tele    Problem/Plan #3: Hypertension  - c/w home meds including losartan 50mg PO daily and hydralazine 50mg PO BID    Problem/Plan #1: PPx Measures  - DVT: Eliquis  - Diet: Dash diet            MARY Vásquez,  [facility]  Cardiovascular Medicine  [address] [place] [address], [address]  Available via call or text on [employer] TE[employer]  Office: [phone]   DATE OF SERVICE: 07-19-23 @ 15:33    Patient is a 72y old  Female who presents with a chief complaint of The patient is a 72y Female complaining of shortness of breath. (19 Jul 2023 14:46)      INTERVAL HISTORY: Feels well, reports improvement in SOB    REVIEW OF SYSTEMS:  CONSTITUTIONAL: No weakness  EYES/ENT: No visual changes;  No throat pain   NECK: No pain or stiffness  RESPIRATORY: No cough, wheezing; No shortness of breath  CARDIOVASCULAR: No chest pain or palpitations  GASTROINTESTINAL: No abdominal  pain. No nausea, vomiting, or hematemesis  GENITOURINARY: No dysuria, frequency or hematuria  NEUROLOGICAL: No stroke like symptoms  SKIN: No rashes    TELEMETRY Personally reviewed: SB/SR   	  MEDICATIONS:  hydrALAZINE 50 milliGRAM(s) Oral three times a day  losartan 50 milliGRAM(s) Oral daily        PHYSICAL EXAM:  T(C): 36.6 (07-19-23 @ 11:10), Max: 36.7 (07-18-23 @ 21:10)  HR: 62 (07-19-23 @ 11:10) (61 - 68)  BP: 121/72 (07-19-23 @ 11:10) (120/73 - 130/74)  RR: 18 (07-19-23 @ 11:10) (18 - 18)  SpO2: 95% (07-19-23 @ 11:10) (88% - 97%)  Wt(kg): --  I&O's Summary    18 Jul 2023 07:01  -  19 Jul 2023 07:00  --------------------------------------------------------  IN: 1060 mL / OUT: 0 mL / NET: 1060 mL    19 Jul 2023 07:01  -  19 Jul 2023 15:33  --------------------------------------------------------  IN: 940 mL / OUT: 0 mL / NET: 940 mL          Appearance: In no distress	  HEENT:    PERRL, EOMI	  Cardiovascular:  S1 S2, No JVD  Respiratory: Lungs clear to auscultation	  Gastrointestinal:  Soft, Non-tender, + BS	  Vascularature:  No edema of LE  Psychiatric: Appropriate affect   Neuro: no acute focal deficits                               11.5   18.55 )-----------( 401      ( 19 Jul 2023 06:20 )             36.4     07-19    134<L>  |  97  |  29<H>  ----------------------------<  138<H>  5.1   |  24  |  1.28    Ca    9.7      19 Jul 2023 06:11  Mg     2.1     07-18          Labs personally reviewed      ASSESSMENT/PLAN: 	  This is a 72 year old female with pmh of COPD not on home O2, HTN, a-fib on Eliquis presenting with increasing shortness of breath in setting of subjective fever (highest temp of 98F), cough and green sputum production. Denies any chest pain or abdominal pain. + nausea but no vomiting. Reports exertional shortness of breath. No exertional chest pain. Denies any dysuria, urinary frequency, hematuria. Denies any recent surgery/travel. No lower extremity swelling.    Problem/Plan #1: Shortness of Breath  - ECG NSR with no ischemic characteristics  - Trop 20--> 20  - proBNP 913  - CXR with LLL opacity concerning for PNA  - CT Chest shows bronchiectasis, mucoid impaction concerning for PNA, non-specific mediastinal lymphadenopathy  - Has been afebrile, no leukocytosis  - Hx of COPD but not on home oxygen. Now requiring 3L NC.  - c/w IV Abx (Zocyn), IV steroids (solu-cortef), duonebs, supplemental oxygen as needed; Pulm consult appreciated.  - TTE from 2021 shows mild MR, AS and mild diastolic dysfunction --> repeat TTE unchanged from prior with preserved EF and moderate AS  - Most recent ischemic eval >2 years ago with NST which was reportedly normal    Problem/Plan #2: Atrial Fibrillation  - ECG reveals NSR  - Tele show NSR with occasional PVCs  - c/w Eliquis 2.5mg PO BID (On lower dose d/t chronic severe epistaxis as OP)   - Cont to monitor on tele    Problem/Plan #3: Hypertension  - c/w home meds including losartan 50mg PO daily and hydralazine 50mg PO BID    Problem/Plan #1: PPx Measures  - DVT: Eliquis  - Diet: Dash diet            MARY Vásquez DO [facility]  Cardiovascular Medicine  [address] [place] [address], [address]  Available via call or text on [employer] TEAM[employer]  Office: [phone]

## 2023-07-19 NOTE — PROGRESS NOTE ADULT - ASSESSMENT
This is a 72 year old female with pmh of COPD not on home O2, HTN, a-fib on Eliquis presenting with increasing shortness of breath in setting of on and off subjective fever (highest temp of 98F), cough, and green sputum production. Denies any chest pain or abdominal pain. + nausea but no vomiting. Exertional shortness of breath. No exertional chest pain. Denies any dysuria, urinary frequency, hematuria. Denies any recent surgery/travel. No lower extremity swelling.   (16 Jul 2023 19:05)  Tthe pt started getting sick from  January this year;  when she had visited [facility] and was treated with antibiotics  after that she improved  then she again became sick in April from which she recovered and now she presented with sob for 5-6 days with some cough but not really phlegmy/  she i son 2 L of oxygen at this time but does not use oxygen at home:     COPD exa:  A Fib:  HTN:      COPD exa:  -seems copd exacerbation:  RVP is negative:   -she is on steroids as well as BD: add Symbicort - she takes advair and Spiriva at home:   -VBG shows mild hypercarbic resp acidosis , but no reason to give bipap at this time given her clinical condition:   -her ct chest showed: Persistent bronchiectasis and mucoid impaction at the lower lobes.  Increased tree-in-bud opacities in both lungs with patchy opacity at the left lower lobe, suspicious for pneumonia. Stable scattered pulmonary  nodules. Nonspecific mediastinal lymphadenopathy.  -doubt vte : as she is already on elliquis:   -rpt abg in am    -check sputum cultures:  -already on zosyn ; check legionella still pending:   -she is still wheezing : would decrease steroids to 20 mg tid today for two days   -cont 20 mg tid today too and if she cont to do better : will decrease further   A Fib:  -cont eliquis  HTN:  -controlled   dw ACP

## 2023-07-19 NOTE — PROGRESS NOTE ADULT - ASSESSMENT
Patient is a 72 year old female with PMH COPD not on home O2, former smoker - quit ~15 years ago after smoking for 40 years, HTN, Afib on Eliquis who presents with increasing shortness of breath in setting of on and off subjective fever (highest temp of 98F), cough, and green sputum production.    Hypoxia, COPD exacerbation  LLL pneumonia, bronchiectasis    - RVP negative on admission   - CXR with with LLL opacity   - CT chest with persistent bronchiectasis and mucoid impaction at the lower lobes, increased tree in bud opacities in both lungs with patchy opacity at LLL- suspicious for pneumonia    - Pulmonary following - on steroids-IV solumedrol, symbicort    - Strep pneumoniae and Legionella urine ags negative    - TTE with moderate AS, no significant changes from 4/2023   - afebrile, noted with leukocytosis-likely reactive to steroids      Recommendations:  Send sputum culture if able to expectorate  Continue on pip-tazo - will plan for 5 day course - end on 7/21  supportive care, supplemental O2 as needed  Monitor temps/WBC       Tran Araujo M.D.  [facility], Division of Infectious Diseases  [phone]  After 5pm on weekdays and all day on weekends - please call [phone]

## 2023-07-19 NOTE — PROGRESS NOTE ADULT - SUBJECTIVE AND OBJECTIVE BOX
Date of Service: 07-19-23 @ 14:46    Patient is a 72y old  Female who presents with a chief complaint of The patient is a 72y Female complaining of shortness of breath. (19 Jul 2023 09:58)      Any change in ROS:  doing ok : no cough or phlegm      MEDICATIONS  (STANDING):  acyclovir   Oral Tab/Cap 400 milliGRAM(s) Oral three times a day  apixaban 2.5 milliGRAM(s) Oral every 12 hours  artificial  tears Solution 1 Drop(s) Both EYES three times a day  budesonide 160 MICROgram(s)/formoterol 4.5 MICROgram(s) Inhaler 2 Puff(s) Inhalation two times a day  chlorhexidine 2% Cloths 1 Application(s) Topical <User Schedule>  hydrALAZINE 50 milliGRAM(s) Oral three times a day  levalbuterol Inhalation 0.63 milliGRAM(s) Inhalation every 6 hours  levothyroxine 50 MICROGram(s) Oral daily  losartan 50 milliGRAM(s) Oral daily  methylPREDNISolone sodium succinate Injectable 20 milliGRAM(s) IV Push every 8 hours  piperacillin/tazobactam IVPB.. 3.375 Gram(s) IV Intermittent every 8 hours    MEDICATIONS  (PRN):    Vital Signs Last 24 Hrs  T(C): 36.6 (19 Jul 2023 11:10), Max: 36.7 (18 Jul 2023 21:10)  T(F): 97.9 (19 Jul 2023 11:10), Max: 98 (18 Jul 2023 21:10)  HR: 62 (19 Jul 2023 11:10) (61 - 68)  BP: 121/72 (19 Jul 2023 11:10) (120/73 - 130/74)  BP(mean): --  RR: 18 (19 Jul 2023 11:10) (18 - 18)  SpO2: 95% (19 Jul 2023 11:10) (88% - 97%)    Parameters below as of 19 Jul 2023 11:10  Patient On (Oxygen Delivery Method): nasal cannula  O2 Flow (L/min): 2      I&O's Summary    18 Jul 2023 07:01  -  19 Jul 2023 07:00  --------------------------------------------------------  IN: 1060 mL / OUT: 0 mL / NET: 1060 mL    19 Jul 2023 07:01  -  19 Jul 2023 14:46  --------------------------------------------------------  IN: 840 mL / OUT: 0 mL / NET: 840 mL          Physical Exam:   GENERAL: NAD, well-groomed, well-developed  HEENT: FALLON/   Atraumatic, Normocephalic  ENMT: No tonsillar erythema, exudates, or enlargement; Moist mucous membranes, Good dentition, No lesions  NECK: Supple, No JVD, Normal thyroid  CHEST/LUNG:still wheezing but better air entry    CVS: Regular rate and rhythm; No murmurs, rubs, or gallops  GI: : Soft, Nontender, Nondistended; Bowel sounds present  NERVOUS SYSTEM:  Alert & Oriented X3  EXTREMITIES:  2+ Peripheral Pulses, No clubbing, cyanosis, or edema  LYMPH: No lymphadenopathy noted  SKIN: No rashes or lesions  ENDOCRINOLOGY: No Thyromegaly  PSYCH: Appropriate    Labs:  26                            11.5   18.55 )-----------( 401      ( 19 Jul 2023 06:20 )             36.4                         12.3   19.53 )-----------( 420      ( 18 Jul 2023 06:33 )             38.6                         11.0   8.63  )-----------( 269      ( 16 Jul 2023 17:24 )             34.1     07-19    134<L>  |  97  |  29<H>  ----------------------------<  138<H>  5.1   |  24  |  1.28  07-18    134<L>  |  98  |  25<H>  ----------------------------<  174<H>  4.0   |  21<L>  |  1.28  07-16    133<L>  |  100  |  9   ----------------------------<  85  4.7   |  20<L>  |  0.92    Ca    9.7      19 Jul 2023 06:11  Ca    9.9      18 Jul 2023 06:33  Mg     2.1     07-18    TPro  6.6  /  Alb  3.5  /  TBili  0.5  /  DBili  x   /  AST  17  /  ALT  8<L>  /  AlkPhos  85  07-16    CAPILLARY BLOOD GLUCOSE              Urinalysis Basic - ( 19 Jul 2023 06:11 )    Color: x / Appearance: x / SG: x / pH: x  Gluc: 138 mg/dL / Ketone: x  / Bili: x / Urobili: x   Blood: x / Protein: x / Nitrite: x   Leuk Esterase: x / RBC: x / WBC x   Sq Epi: x / Non Sq Epi: x / Bacteria: x        < from: CT Chest No Cont (07.16.23 @ 22:57) >  perinephric stranding.    BONES/SOFT TISSUES: Degenerative changes of the spine.    IMPRESSION:    Persistent bronchiectasis and mucoid impaction at the lower lobes.   Increased tree-in-bud opacities in both lungs with patchy opacity at the   left lower lobe, suspicious for pneumonia. Stable scattered pulmonary   nodules. Nonspecific mediastinal lymphadenopathy.    Additional findings as described.    --- End of Report ---      < end of copied text >      RECENT CULTURES:        RESPIRATORY CULTURES:          Studies  Chest X-RAY  CT SCAN Chest   Venous Dopplers: LE:   CT Abdomen  Others

## 2023-07-19 NOTE — PROGRESS NOTE ADULT - ASSESSMENT
This is a 72 year old female with pmh of COPD not on home O2, HTN, HLD, a-fib, Hypothyroidism, Psoriasis. Presents with SOB. Admitted with pneumonia    Plan:    # SOB r/t Pneumonia/ COPD exacerbation:  - No leukocytosis. Trops negative. . RVP negative  - CXR with Left lower lung field opacity concerning for pneumonia  - CT Chest with Persistent bronchiectasis and mucoid impaction at the lower lobes.   Increased tree-in-bud opacities in both lungs with patchy opacity at the   left lower lobe, suspicious for pneumonia  - Maintain FiO2>92%  - Sputum Cx pending  - C/w IV abx until 7/21  - C/w Nebs/inhalers/ steroids  - TTE with moderate AS, no significant changes from 4/2023  - Pulm and ID following    # HTN/ HLD/ Afib/ 2 second pause:  - * with 2 second pause on tele-> asymptomatic  - Trend BP's  - C/w current meds  - Cards following    # ROBYN:  - Cr rising  - Monitor I/O's    # Hypothyroidism:  - C/w Synthroid    # HSV:  - C/w Acyclovir    # GI ppx:  - Bowel regimen prn    # DVT ppx:  - Eliquis    [facility]  [phone]

## 2023-07-19 NOTE — PROGRESS NOTE ADULT - SUBJECTIVE AND OBJECTIVE BOX
SUBJECTIVE / OVERNIGHT EVENTS:      Patient seen and examined at bedside. No events noted overnight. Resting comfortably in bed      --------------------------------------------------------------------------------------------  LABS:                        11.5   18.55 )-----------( 401      ( 19 Jul 2023 06:20 )             36.4     07-19    134<L>  |  97  |  29<H>  ----------------------------<  138<H>  5.1   |  24  |  1.28    Ca    9.7      19 Jul 2023 06:11  Mg     2.1     07-18        CAPILLARY BLOOD GLUCOSE            Urinalysis Basic - ( 19 Jul 2023 06:11 )    Color: x / Appearance: x / SG: x / pH: x  Gluc: 138 mg/dL / Ketone: x  / Bili: x / Urobili: x   Blood: x / Protein: x / Nitrite: x   Leuk Esterase: x / RBC: x / WBC x   Sq Epi: x / Non Sq Epi: x / Bacteria: x        RADIOLOGY & ADDITIONAL TESTS:< from: TTE W or WO Ultrasound Enhancing Agent (07.18.23 @ 15:22) >  CONCLUSIONS:      1. Normal left ventricular cavity size. The left ventricular wall thickness is normal. The left ventricular systolic function is normal with an ejection fraction of 59 % by Goldberg's method of disks. There are no regional wall motion abnormalities seen.   2. The left ventricular diastolic function is indeterminate.   3. Normal right ventricular cavity size, normal right ventricular wall thickness and normal right ventricular systolic function. The tricuspid annular plane systolic excursion (TAPSE) is 1.8 cm (normal >=1.7 cm).   4. The right atrium is normal in size.   5. There is moderate aortic stenosis. The peak transaortic velocity is 3.13 m/s, peak transaortic gradient is 39.2 mmHg and mean transaortic gradient is 25.3 mmHg with an LVOT/aortic valve VTI ratio of 0.36. The aortic valve area is estimated at 1.12 cm² by the continuity equation. There is mild aortic regurgitation.   6. No pericardial effusion seen.   7. There is trace tricuspid regurgitation. There is insufficient tricuspid regurgitation detected to calculate pulmonary artery systolic pressure.   8. Compared to the transthoracic echocardiogram performed on 4/7/2023 there have been no significant interval changes.    < end of copied text >      Imaging Personally Reviewed:  [x] YES  [ ] NO    Consultant(s) Notes Reviewed:  [x] YES  [ ] NO    MEDICATIONS  (STANDING):  acyclovir   Oral Tab/Cap 400 milliGRAM(s) Oral three times a day  apixaban 2.5 milliGRAM(s) Oral every 12 hours  artificial  tears Solution 1 Drop(s) Both EYES three times a day  budesonide 160 MICROgram(s)/formoterol 4.5 MICROgram(s) Inhaler 2 Puff(s) Inhalation two times a day  chlorhexidine 2% Cloths 1 Application(s) Topical <User Schedule>  hydrALAZINE 50 milliGRAM(s) Oral three times a day  levalbuterol Inhalation 0.63 milliGRAM(s) Inhalation every 6 hours  levothyroxine 50 MICROGram(s) Oral daily  losartan 50 milliGRAM(s) Oral daily  methylPREDNISolone sodium succinate Injectable 20 milliGRAM(s) IV Push every 8 hours  piperacillin/tazobactam IVPB.. 3.375 Gram(s) IV Intermittent every 8 hours    MEDICATIONS  (PRN):      Care Discussed with Consultants/Other Providers [x] YES  [ ] NO    Vital Signs Last 24 Hrs  T(C): 36.4 (19 Jul 2023 04:27), Max: 36.7 (18 Jul 2023 21:10)  T(F): 97.6 (19 Jul 2023 04:27), Max: 98 (18 Jul 2023 21:10)  HR: 61 (19 Jul 2023 04:27) (61 - 100)  BP: 130/74 (19 Jul 2023 04:27) (110/72 - 130/74)  BP(mean): --  RR: 18 (19 Jul 2023 04:27) (18 - 18)  SpO2: 96% (19 Jul 2023 04:27) (92% - 97%)    Parameters below as of 19 Jul 2023 04:27  Patient On (Oxygen Delivery Method): nasal cannula  O2 Flow (L/min): 2    I&O's Summary    18 Jul 2023 07:01  -  19 Jul 2023 07:00  --------------------------------------------------------  IN: 1060 mL / OUT: 0 mL / NET: 1060 mL    PHYSICAL EXAM:  GENERAL: NAD, well-developed, comfortable  HEAD:  Atraumatic, Normocephalic  EYES: EOMI, PERRLA, conjunctiva and sclera clear  NECK: Supple, No JVD  CHEST/LUNG: Rhonchi bilaterally; slight wheeze  HEART: Regular rate and rhythm; No murmurs, rubs, or gallops  ABDOMEN: Soft, Nontender, Nondistended; Bowel sounds present  NEURO: AAOx3, no focal weakness, 5/5 b/l extremity strength, b/l knee no arthritis, no effusion   EXTREMITIES:  2+ Peripheral Pulses, No clubbing, cyanosis, or edema  SKIN: No rashes or lesions

## 2023-07-19 NOTE — PROGRESS NOTE ADULT - SUBJECTIVE AND OBJECTIVE BOX
[facility] DIV[facility] OF INFECTIOUS DISEASES  PRIYANK Daly Y. Patel, S. Shah, G. Cody  [phone]  ([phone] - weekdays after 5pm and weekends)    Name: BALBIR ANGEL  Age/Gender: 72y Female  MRN: [id number]    Interval History:  Patient seen and examined this morning.   Feels better overall, no new complaints  Notes reviewed.   No concerning overnight events.  Afebrile.   Allergies: Sulfur (Unknown)  sulfa drugs (Unknown)  [This allergen will not trigger allergy alert] artichokes (Unknown)  Levaquin (Anaphylaxis)      Objective:  Vitals:   T(F): 97.6 (07-19-23 @ 04:27), Max: 98 (07-18-23 @ 21:10)  HR: 61 (07-19-23 @ 04:27) (61 - 100)  BP: 130/74 (07-19-23 @ 04:27) (110/72 - 130/74)  RR: 18 (07-19-23 @ 04:27) (18 - 18)  SpO2: 96% (07-19-23 @ 04:27) (92% - 97%)  Physical Examination:  General: no acute distress, NC  HEENT: NC/AT, EOMI, anicteric, neck supple  Cardio: S1, S2 present, normal rate  Resp: breath sounds heard b/l, +wheezing  Abd: soft, NT, ND, + BS  Neuro: AAOx3, no obvious focal deficits  Ext: no edema, no cyanosis, moving extremities  Skin: warm, dry, no visible rash  Psych: appropriate affect and mood for situation  Lines: PIV    Laboratory Studies:  CBC:                       11.5   18.55 )-----------( 401      ( 19 Jul 2023 06:20 )             36.4     WBC Trend:  18.55 07-19-23 @ 06:20  19.53 07-18-23 @ 06:33  8.63 07-16-23 @ 17:24    CMP: 07-19    134<L>  |  97  |  29<H>  ----------------------------<  138<H>  5.1   |  24  |  1.28    Ca    9.7      19 Jul 2023 06:11  Mg     2.1     07-18      Creatinine: 1.28 mg/dL (07-19-23 @ 06:11)  Creatinine: 1.28 mg/dL (07-18-23 @ 06:33)  Creatinine: 0.92 mg/dL (07-16-23 @ 17:24)    Microbiology: reviewed  MRSA/MSSA PCR (07.18.23 @ 18:47)    MRSA PCR Result.: NotDetec: The results of this test should be interpreted with consideration of  clinical context.  Not Detected result indicates the absence of organisms or that the number  of organisms is below the assay limit of detection.  Detected result indicates the presence of organism nucleic acid.  Indeterminate result may indicate the presence of amplification  inhibitors in specimen; presence or absence of organisms cannot be  determined. Consider collecting new specimen if further testing is still  needed.  This qualitative PCR assay is FDA-approved, and its performance was  established by [facility] [place], [place].   Staph aureus PCR Result: NotDetec      Radiology: reviewed     Medications:  acyclovir   Oral Tab/Cap 400 milliGRAM(s) Oral three times a day  apixaban 2.5 milliGRAM(s) Oral every 12 hours  artificial  tears Solution 1 Drop(s) Both EYES three times a day  budesonide 160 MICROgram(s)/formoterol 4.5 MICROgram(s) Inhaler 2 Puff(s) Inhalation two times a day  chlorhexidine 2% Cloths 1 Application(s) Topical <User Schedule>  hydrALAZINE 50 milliGRAM(s) Oral three times a day  levalbuterol Inhalation 0.63 milliGRAM(s) Inhalation every 6 hours  levothyroxine 50 MICROGram(s) Oral daily  losartan 50 milliGRAM(s) Oral daily  methylPREDNISolone sodium succinate Injectable 20 milliGRAM(s) IV Push every 8 hours  piperacillin/tazobactam IVPB.. 3.375 Gram(s) IV Intermittent every 8 hours    Current Antimicrobials:  acyclovir   Oral Tab/Cap 400 milliGRAM(s) Oral three times a day  piperacillin/tazobactam IVPB.. 3.375 Gram(s) IV Intermittent every 8 hours    Prior/Completed Antimicrobials:  piperacillin/tazobactam IVPB.  piperacillin/tazobactam IVPB.-

## 2023-07-20 LAB
ANION GAP SERPL CALC-SCNC: 10 MMOL/L — SIGNIFICANT CHANGE UP (ref 5–17)
ANION GAP SERPL CALC-SCNC: 12 MMOL/L — SIGNIFICANT CHANGE UP (ref 5–17)
APPEARANCE UR: CLEAR — SIGNIFICANT CHANGE UP
BACTERIA # UR AUTO: NEGATIVE — SIGNIFICANT CHANGE UP
BILIRUB UR-MCNC: NEGATIVE — SIGNIFICANT CHANGE UP
BUN SERPL-MCNC: 42 MG/DL — HIGH (ref 7–23)
BUN SERPL-MCNC: 47 MG/DL — HIGH (ref 7–23)
CALCIUM SERPL-MCNC: 9.7 MG/DL — SIGNIFICANT CHANGE UP (ref 8.4–10.5)
CALCIUM SERPL-MCNC: 9.9 MG/DL — SIGNIFICANT CHANGE UP (ref 8.4–10.5)
CHLORIDE SERPL-SCNC: 100 MMOL/L — SIGNIFICANT CHANGE UP (ref 96–108)
CHLORIDE SERPL-SCNC: 102 MMOL/L — SIGNIFICANT CHANGE UP (ref 96–108)
CK SERPL-CCNC: 46 U/L — SIGNIFICANT CHANGE UP (ref 25–170)
CO2 SERPL-SCNC: 24 MMOL/L — SIGNIFICANT CHANGE UP (ref 22–31)
CO2 SERPL-SCNC: 24 MMOL/L — SIGNIFICANT CHANGE UP (ref 22–31)
COLOR SPEC: COLORLESS — SIGNIFICANT CHANGE UP
CREAT SERPL-MCNC: 1.49 MG/DL — HIGH (ref 0.5–1.3)
CREAT SERPL-MCNC: 1.55 MG/DL — HIGH (ref 0.5–1.3)
DIFF PNL FLD: NEGATIVE — SIGNIFICANT CHANGE UP
EGFR: 35 ML/MIN/1.73M2 — LOW
EGFR: 37 ML/MIN/1.73M2 — LOW
EPI CELLS # UR: 0 /HPF — SIGNIFICANT CHANGE UP
GLUCOSE SERPL-MCNC: 115 MG/DL — HIGH (ref 70–99)
GLUCOSE SERPL-MCNC: 145 MG/DL — HIGH (ref 70–99)
GLUCOSE UR QL: NEGATIVE — SIGNIFICANT CHANGE UP
HCT VFR BLD CALC: 35.2 % — SIGNIFICANT CHANGE UP (ref 34.5–45)
HGB BLD-MCNC: 11.1 G/DL — LOW (ref 11.5–15.5)
HYALINE CASTS # UR AUTO: 1 /LPF — SIGNIFICANT CHANGE UP (ref 0–2)
KETONES UR-MCNC: NEGATIVE — SIGNIFICANT CHANGE UP
LEUKOCYTE ESTERASE UR-ACNC: ABNORMAL
MCHC RBC-ENTMCNC: 29.7 PG — SIGNIFICANT CHANGE UP (ref 27–34)
MCHC RBC-ENTMCNC: 31.5 GM/DL — LOW (ref 32–36)
MCV RBC AUTO: 94.1 FL — SIGNIFICANT CHANGE UP (ref 80–100)
NITRITE UR-MCNC: NEGATIVE — SIGNIFICANT CHANGE UP
NRBC # BLD: 0 /100 WBCS — SIGNIFICANT CHANGE UP (ref 0–0)
PH UR: 6 — SIGNIFICANT CHANGE UP (ref 5–8)
PLATELET # BLD AUTO: 371 K/UL — SIGNIFICANT CHANGE UP (ref 150–400)
POTASSIUM SERPL-MCNC: 5.4 MMOL/L — HIGH (ref 3.5–5.3)
POTASSIUM SERPL-MCNC: 5.9 MMOL/L — HIGH (ref 3.5–5.3)
POTASSIUM SERPL-SCNC: 5.4 MMOL/L — HIGH (ref 3.5–5.3)
POTASSIUM SERPL-SCNC: 5.9 MMOL/L — HIGH (ref 3.5–5.3)
PROT UR-MCNC: NEGATIVE — SIGNIFICANT CHANGE UP
RBC # BLD: 3.74 M/UL — LOW (ref 3.8–5.2)
RBC # FLD: 14.6 % — HIGH (ref 10.3–14.5)
RBC CASTS # UR COMP ASSIST: 0 /HPF — SIGNIFICANT CHANGE UP (ref 0–4)
SODIUM SERPL-SCNC: 136 MMOL/L — SIGNIFICANT CHANGE UP (ref 135–145)
SODIUM SERPL-SCNC: 136 MMOL/L — SIGNIFICANT CHANGE UP (ref 135–145)
SP GR SPEC: 1.01 — LOW (ref 1.01–1.02)
UROBILINOGEN FLD QL: NEGATIVE — SIGNIFICANT CHANGE UP
WBC # BLD: 15.7 K/UL — HIGH (ref 3.8–10.5)
WBC # FLD AUTO: 15.7 K/UL — HIGH (ref 3.8–10.5)
WBC UR QL: 13 /HPF — HIGH (ref 0–5)

## 2023-07-20 RX ORDER — SODIUM ZIRCONIUM CYCLOSILICATE 10 G/10G
10 POWDER, FOR SUSPENSION ORAL ONCE
Refills: 0 | Status: COMPLETED | OUTPATIENT
Start: 2023-07-20 | End: 2023-07-20

## 2023-07-20 RX ORDER — LEVALBUTEROL 1.25 MG/.5ML
0.63 SOLUTION, CONCENTRATE RESPIRATORY (INHALATION) EVERY 6 HOURS
Refills: 0 | Status: DISCONTINUED | OUTPATIENT
Start: 2023-07-20 | End: 2023-07-23

## 2023-07-20 RX ORDER — IPRATROPIUM/ALBUTEROL SULFATE 18-103MCG
3 AEROSOL WITH ADAPTER (GRAM) INHALATION EVERY 6 HOURS
Refills: 0 | Status: DISCONTINUED | OUTPATIENT
Start: 2023-07-20 | End: 2023-07-20

## 2023-07-20 RX ADMIN — Medication 400 MILLIGRAM(S): at 14:11

## 2023-07-20 RX ADMIN — Medication 1 DROP(S): at 14:11

## 2023-07-20 RX ADMIN — APIXABAN 2.5 MILLIGRAM(S): 2.5 TABLET, FILM COATED ORAL at 06:34

## 2023-07-20 RX ADMIN — CHLORHEXIDINE GLUCONATE 1 APPLICATION(S): 213 SOLUTION TOPICAL at 06:41

## 2023-07-20 RX ADMIN — SODIUM ZIRCONIUM CYCLOSILICATE 10 GRAM(S): 10 POWDER, FOR SUSPENSION ORAL at 20:03

## 2023-07-20 RX ADMIN — Medication 50 MICROGRAM(S): at 06:35

## 2023-07-20 RX ADMIN — LOSARTAN POTASSIUM 50 MILLIGRAM(S): 100 TABLET, FILM COATED ORAL at 06:35

## 2023-07-20 RX ADMIN — Medication 20 MILLIGRAM(S): at 21:38

## 2023-07-20 RX ADMIN — Medication 20 MILLIGRAM(S): at 06:36

## 2023-07-20 RX ADMIN — BUDESONIDE AND FORMOTEROL FUMARATE DIHYDRATE 2 PUFF(S): 160; 4.5 AEROSOL RESPIRATORY (INHALATION) at 06:35

## 2023-07-20 RX ADMIN — SODIUM ZIRCONIUM CYCLOSILICATE 10 GRAM(S): 10 POWDER, FOR SUSPENSION ORAL at 14:15

## 2023-07-20 RX ADMIN — Medication 50 MILLIGRAM(S): at 21:37

## 2023-07-20 RX ADMIN — Medication 20 MILLIGRAM(S): at 14:11

## 2023-07-20 RX ADMIN — Medication 1 DROP(S): at 06:40

## 2023-07-20 RX ADMIN — PIPERACILLIN AND TAZOBACTAM 25 GRAM(S): 4; .5 INJECTION, POWDER, LYOPHILIZED, FOR SOLUTION INTRAVENOUS at 22:50

## 2023-07-20 RX ADMIN — Medication 50 MILLIGRAM(S): at 06:34

## 2023-07-20 RX ADMIN — Medication 400 MILLIGRAM(S): at 21:37

## 2023-07-20 RX ADMIN — LEVALBUTEROL 0.63 MILLIGRAM(S): 1.25 SOLUTION, CONCENTRATE RESPIRATORY (INHALATION) at 18:01

## 2023-07-20 RX ADMIN — BUDESONIDE AND FORMOTEROL FUMARATE DIHYDRATE 2 PUFF(S): 160; 4.5 AEROSOL RESPIRATORY (INHALATION) at 18:01

## 2023-07-20 RX ADMIN — PIPERACILLIN AND TAZOBACTAM 25 GRAM(S): 4; .5 INJECTION, POWDER, LYOPHILIZED, FOR SOLUTION INTRAVENOUS at 06:33

## 2023-07-20 RX ADMIN — APIXABAN 2.5 MILLIGRAM(S): 2.5 TABLET, FILM COATED ORAL at 18:01

## 2023-07-20 RX ADMIN — Medication 1200 MILLIGRAM(S): at 18:06

## 2023-07-20 RX ADMIN — PIPERACILLIN AND TAZOBACTAM 25 GRAM(S): 4; .5 INJECTION, POWDER, LYOPHILIZED, FOR SOLUTION INTRAVENOUS at 14:11

## 2023-07-20 RX ADMIN — Medication 400 MILLIGRAM(S): at 06:34

## 2023-07-20 RX ADMIN — Medication 1 DROP(S): at 21:37

## 2023-07-20 RX ADMIN — Medication 50 MILLIGRAM(S): at 14:15

## 2023-07-20 NOTE — PROGRESS NOTE ADULT - ASSESSMENT
This is a 72 year old female with pmh of COPD not on home O2, HTN, HLD, a-fib, Hypothyroidism, Psoriasis. Presents with SOB. Admitted with pneumonia    Plan:    # SOB r/t Pneumonia/ COPD exacerbation:  - No leukocytosis. Trops negative. . RVP negative  - CXR with Left lower lung field opacity concerning for pneumonia  - CT Chest with Persistent bronchiectasis and mucoid impaction at the lower lobes.   Increased tree-in-bud opacities in both lungs with patchy opacity at the   left lower lobe, suspicious for pneumonia  - Maintain FiO2>92%  - Sputum Cx pending  - C/w IV abx until 7/21  - C/w Nebs/inhalers/ steroids  - TTE with moderate AS, no significant changes from 4/2023  - Pulm and ID following    # HTN/ HLD/ Afib/ 2 second pause:  - * with 2 second pause on tele-> asymptomatic  - Trend BP's  - C/w current meds  - Cards following    # ROBYN:  - Cr rising  - Monitor I/O's  - Renal consult called 7/20 am    # Hypothyroidism:  - C/w Synthroid    # HSV:  - C/w Acyclovir    # GI ppx:  - Bowel regimen prn    # DVT ppx:  - Eliquis    [facility]

## 2023-07-20 NOTE — PROGRESS NOTE ADULT - SUBJECTIVE AND OBJECTIVE BOX
[facility] DISEASES  PRIYANK Daly Y. Patel, S. Shah, G. Cody  [phone]  ([phone] - weekdays after 5pm and weekends)    Name: BALBIR ANGEL  Age/Gender: 72y Female  MRN: [id number]    Interval History:  Patient seen and examined this morning.   Continues to feel better, still with some whezing.   No new complaints. Notes reviewed.   No concerning overnight events.  Afebrile.     Allergies: Sulfur (Unknown)  sulfa drugs (Unknown)  [This allergen will not trigger allergy alert] artichokes (Unknown)  Levaquin (Anaphylaxis)      Objective:  Vitals:   T(F): 97.6 (07-20-23 @ 11:13), Max: 98.1 (07-19-23 @ 21:23)  HR: 82 (07-20-23 @ 11:13) (61 - 82)  BP: 121/56 (07-20-23 @ 11:13) (120/73 - 145/81)  RR: 18 (07-20-23 @ 11:13) (18 - 18)  SpO2: 93% (07-20-23 @ 11:13) (93% - 96%)  Physical Examination:  General: no acute distress, NC  HEENT: NC/AT, EOMI, anicteric, neck supple  Cardio: S1, S2 present, normal rate  Resp: breath sound heard b/l, few wheezes  Abd: soft, NT, ND, + BS  Neuro: AAOx3, no obvious focal deficits  Ext: no edema, no cyanosis, moving extremities  Skin: warm, dry, no visible rash  Psych: appropriate affect and mood for situation  Lines: PIV    Laboratory Studies:  CBC:                       11.1   15.70 )-----------( 371      ( 20 Jul 2023 05:57 )             35.2     WBC Trend:  15.70 07-20-23 @ 05:57  18.55 07-19-23 @ 06:20  19.53 07-18-23 @ 06:33  8.63 07-16-23 @ 17:24    CMP: 07-20    136  |  102  |  42<H>  ----------------------------<  145<H>  5.9<H>   |  24  |  1.55<H>    Ca    9.7      20 Jul 2023 05:58    Creatinine: 1.55 mg/dL (07-20-23 @ 05:58)  Creatinine: 1.28 mg/dL (07-19-23 @ 06:11)  Creatinine: 1.28 mg/dL (07-18-23 @ 06:33)  Creatinine: 0.92 mg/dL (07-16-23 @ 17:24)    Microbiology: reviewed   Culture - Sputum (collected 07-19-23 @ 11:34)  Source: .Sputum Sputum  Gram Stain (07-19-23 @ 23:20):    Few polymorphonuclear leukocytes per low power field    No Squamous epithelial cells per low power field    Few Gram positive cocci in pairs and clusters per oil power field    Rare Gram Negative Rods per oil power field    Radiology: reviewed     Medications:  acyclovir   Oral Tab/Cap 400 milliGRAM(s) Oral three times a day  apixaban 2.5 milliGRAM(s) Oral every 12 hours  artificial  tears Solution 1 Drop(s) Both EYES three times a day  budesonide 160 MICROgram(s)/formoterol 4.5 MICROgram(s) Inhaler 2 Puff(s) Inhalation two times a day  chlorhexidine 2% Cloths 1 Application(s) Topical <User Schedule>  guaiFENesin ER 1200 milliGRAM(s) Oral every 12 hours  hydrALAZINE 50 milliGRAM(s) Oral three times a day  levalbuterol Inhalation 0.63 milliGRAM(s) Inhalation every 6 hours  levothyroxine 50 MICROGram(s) Oral daily  losartan 50 milliGRAM(s) Oral daily  methylPREDNISolone sodium succinate Injectable 20 milliGRAM(s) IV Push every 8 hours  piperacillin/tazobactam IVPB.. 3.375 Gram(s) IV Intermittent every 8 hours    Current Antimicrobials:  acyclovir   Oral Tab/Cap 400 milliGRAM(s) Oral three times a day  piperacillin/tazobactam IVPB.. 3.375 Gram(s) IV Intermittent every 8 hours    Prior/Completed Antimicrobials:  piperacillin/tazobactam IVPB.  piperacillin/tazobactam IVPB.-

## 2023-07-20 NOTE — PROGRESS NOTE ADULT - SUBJECTIVE AND OBJECTIVE BOX
SUBJECTIVE / OVERNIGHT EVENTS:    Patient seen and examined at bedside. No events noted overnight. Resting comfortably in bed      --------------------------------------------------------------------------------------------  LABS:                        11.1   15.70 )-----------( 371      ( 20 Jul 2023 05:57 )             35.2     07-20    136  |  102  |  42<H>  ----------------------------<  145<H>  5.9<H>   |  24  |  1.55<H>    Ca    9.7      20 Jul 2023 05:58        CAPILLARY BLOOD GLUCOSE            Urinalysis Basic - ( 20 Jul 2023 05:58 )    Color: x / Appearance: x / SG: x / pH: x  Gluc: 145 mg/dL / Ketone: x  / Bili: x / Urobili: x   Blood: x / Protein: x / Nitrite: x   Leuk Esterase: x / RBC: x / WBC x   Sq Epi: x / Non Sq Epi: x / Bacteria: x        RADIOLOGY & ADDITIONAL TESTS:    Imaging Personally Reviewed:  [x] YES  [ ] NO    Consultant(s) Notes Reviewed:  [x] YES  [ ] NO    MEDICATIONS  (STANDING):  acyclovir   Oral Tab/Cap 400 milliGRAM(s) Oral three times a day  apixaban 2.5 milliGRAM(s) Oral every 12 hours  artificial  tears Solution 1 Drop(s) Both EYES three times a day  budesonide 160 MICROgram(s)/formoterol 4.5 MICROgram(s) Inhaler 2 Puff(s) Inhalation two times a day  chlorhexidine 2% Cloths 1 Application(s) Topical <User Schedule>  hydrALAZINE 50 milliGRAM(s) Oral three times a day  levothyroxine 50 MICROGram(s) Oral daily  losartan 50 milliGRAM(s) Oral daily  methylPREDNISolone sodium succinate Injectable 20 milliGRAM(s) IV Push every 8 hours  piperacillin/tazobactam IVPB.. 3.375 Gram(s) IV Intermittent every 8 hours    MEDICATIONS  (PRN):      Care Discussed with Consultants/Other Providers [x] YES  [ ] NO    Vital Signs Last 24 Hrs  T(C): 36.5 (20 Jul 2023 04:39), Max: 36.7 (19 Jul 2023 21:23)  T(F): 97.7 (20 Jul 2023 04:39), Max: 98.1 (19 Jul 2023 21:23)  HR: 61 (20 Jul 2023 06:29) (61 - 70)  BP: 145/81 (20 Jul 2023 06:29) (120/73 - 145/81)  BP(mean): --  RR: 18 (20 Jul 2023 04:39) (18 - 18)  SpO2: 96% (20 Jul 2023 04:39) (88% - 96%)    Parameters below as of 20 Jul 2023 04:39  Patient On (Oxygen Delivery Method): nasal cannula      I&O's Summary    19 Jul 2023 07:01  -  20 Jul 2023 07:00  --------------------------------------------------------  IN: 940 mL / OUT: 0 mL / NET: 940 mL      PHYSICAL EXAM:  GENERAL: NAD, well-developed, comfortable  HEAD:  Atraumatic, Normocephalic  EYES: EOMI, PERRLA, conjunctiva and sclera clear  NECK: Supple, No JVD  CHEST/LUNG: Rhonchi bilaterally; slight wheeze  HEART: Regular rate and rhythm; No murmurs, rubs, or gallops  ABDOMEN: Soft, Nontender, Nondistended; Bowel sounds present  NEURO: AAOx3, no focal weakness, 5/5 b/l extremity strength, b/l knee no arthritis, no effusion   EXTREMITIES:  2+ Peripheral Pulses, No clubbing, cyanosis, or edema  SKIN: No rashes or lesions

## 2023-07-20 NOTE — PROGRESS NOTE ADULT - SUBJECTIVE AND OBJECTIVE BOX
DATE OF SERVICE: 07-20-23 @ 13:06    Patient is a 72y old  Female who presents with a chief complaint of The patient is a 72y Female complaining of shortness of breath. (20 Jul 2023 08:56)      INTERVAL HISTORY: Feels ok.     REVIEW OF SYSTEMS:  CONSTITUTIONAL: No weakness  EYES/ENT: No visual changes;  No throat pain   NECK: No pain or stiffness  RESPIRATORY: No cough, wheezing; No shortness of breath  CARDIOVASCULAR: No chest pain or palpitations  GASTROINTESTINAL: No abdominal  pain. No nausea, vomiting, or hematemesis  GENITOURINARY: No dysuria, frequency or hematuria  NEUROLOGICAL: No stroke like symptoms  SKIN: No rashes    TELEMETRY Personally reviewed: SB/SR 50-90  	  MEDICATIONS:  hydrALAZINE 50 milliGRAM(s) Oral three times a day  losartan 50 milliGRAM(s) Oral daily        PHYSICAL EXAM:  T(C): 36.4 (07-20-23 @ 11:13), Max: 36.7 (07-19-23 @ 21:23)  HR: 82 (07-20-23 @ 11:13) (61 - 82)  BP: 121/56 (07-20-23 @ 11:13) (120/73 - 145/81)  RR: 18 (07-20-23 @ 11:13) (18 - 18)  SpO2: 93% (07-20-23 @ 11:13) (93% - 96%)  Wt(kg): --  I&O's Summary    19 Jul 2023 07:01  -  20 Jul 2023 07:00  --------------------------------------------------------  IN: 940 mL / OUT: 0 mL / NET: 940 mL          Appearance: In no distress	  HEENT:    PERRL, EOMI	  Cardiovascular:  S1 S2, No JVD  Respiratory: Lungs clear to auscultation	  Gastrointestinal:  Soft, Non-tender, + BS	  Vascularature:  No edema of LE  Psychiatric: Appropriate affect   Neuro: no acute focal deficits                               11.1   15.70 )-----------( 371      ( 20 Jul 2023 05:57 )             35.2     07-20    136  |  102  |  42<H>  ----------------------------<  145<H>  5.9<H>   |  24  |  1.55<H>    Ca    9.7      20 Jul 2023 05:58          Labs personally reviewed      ASSESSMENT/PLAN: 	    This is a 72 year old female with pmh of COPD not on home O2, HTN, a-fib on Eliquis presenting with increasing shortness of breath in setting of subjective fever (highest temp of 98F), cough and green sputum production. Denies any chest pain or abdominal pain. + nausea but no vomiting. Reports exertional shortness of breath. No exertional chest pain. Denies any dysuria, urinary frequency, hematuria. Denies any recent surgery/travel. No lower extremity swelling.    Problem/Plan #1: Shortness of Breath  - ECG NSR with no ischemic characteristics  - Trop 20--> 20  - proBNP 913  - CXR with LLL opacity concerning for PNA  - CT Chest shows bronchiectasis, mucoid impaction concerning for PNA, non-specific mediastinal lymphadenopathy  - Has been afebrile, no leukocytosis  - Hx of COPD but not on home oxygen. Now requiring 3L NC.  - c/w IV Abx (Zocyn), IV steroids (solu-cortef), duonebs, supplemental oxygen as needed; Pulm consult appreciated.  - TTE from 2021 shows mild MR, AS and mild diastolic dysfunction --> repeat TTE unchanged from prior with preserved EF and moderate AS  - Most recent ischemic eval >2 years ago with NST which was reportedly normal    Problem/Plan #2: Atrial Fibrillation  - ECG reveals NSR  - Tele show NSR with occasional PVCs  - c/w Eliquis 2.5mg PO BID (On lower dose d/t chronic severe epistaxis as OP)   - Cont to monitor on tele    Problem/Plan #3: Hypertension  - c/w home meds including losartan 50mg PO daily and hydralazine 50mg PO TID    Problem/Plan #1: PPx Measures  - DVT: Eliquis  - Diet: Dash diet            Latonia March, AG-NP   Rashi Holland DO [facility]  Cardiovascular Medicine  [address], [address]  Available through call or text on [employer]  Office: [phone]

## 2023-07-20 NOTE — CONSULT NOTE ADULT - ASSESSMENT
72 year old female with pmhx of COPD not on home O2, HTN, a-fib on Eliquis presenting with increasing shortness of breath in setting of on and off subjective fever (highest temp of 98F), cough, and green sputum production. She states her sob started around January 2023, she went to urgent care x 2, and hospitalized at [facility]. she has been treated for PNA and recovered. Now noticed worsening sob again on Tuesday PTA. Hospital course treated for PNA and COPD exacerbation. Now noticed with hyperkalemia and abnormal creatinine today      1- ROBYN  2- hyperkalemia  3- HTN  4- PNA  5- COPD      ROBYN/hyperkalemia unclear etiology, possibly due to ARB  received lokelma 10G po x 1, repeat k in 4 hours  hold ARB for now  bladder scan x 1  EKG  low k diet  check U/A and urine lytes  add cpk to blood work  she does not appear hypovolemic, would avoid IVF given mod AS on echo  continue zosyn per ID recommendations  iv steroids per pulm  hydralazine 50 mg TID, trend bp  low TSH, recommend decrease synthroid  trend creatinine and electrolytes daily

## 2023-07-20 NOTE — PROGRESS NOTE ADULT - SUBJECTIVE AND OBJECTIVE BOX
Date of Service: 07-20-23 @ 13:05    Patient is a 72y old  Female who presents with a chief complaint of The patient is a 72y Female complaining of shortness of breath. (20 Jul 2023 08:56)      Any change in ROS: Sheis still wheezing:     MEDICATIONS  (STANDING):  acyclovir   Oral Tab/Cap 400 milliGRAM(s) Oral three times a day  apixaban 2.5 milliGRAM(s) Oral every 12 hours  artificial  tears Solution 1 Drop(s) Both EYES three times a day  budesonide 160 MICROgram(s)/formoterol 4.5 MICROgram(s) Inhaler 2 Puff(s) Inhalation two times a day  chlorhexidine 2% Cloths 1 Application(s) Topical <User Schedule>  hydrALAZINE 50 milliGRAM(s) Oral three times a day  levothyroxine 50 MICROGram(s) Oral daily  losartan 50 milliGRAM(s) Oral daily  methylPREDNISolone sodium succinate Injectable 20 milliGRAM(s) IV Push every 8 hours  piperacillin/tazobactam IVPB.. 3.375 Gram(s) IV Intermittent every 8 hours  sodium zirconium cyclosilicate 10 Gram(s) Oral once    MEDICATIONS  (PRN):    Vital Signs Last 24 Hrs  T(C): 36.4 (20 Jul 2023 11:13), Max: 36.7 (19 Jul 2023 21:23)  T(F): 97.6 (20 Jul 2023 11:13), Max: 98.1 (19 Jul 2023 21:23)  HR: 82 (20 Jul 2023 11:13) (61 - 82)  BP: 121/56 (20 Jul 2023 11:13) (120/73 - 145/81)  BP(mean): --  RR: 18 (20 Jul 2023 11:13) (18 - 18)  SpO2: 93% (20 Jul 2023 11:13) (93% - 96%)    Parameters below as of 20 Jul 2023 11:13  Patient On (Oxygen Delivery Method): room air        I&O's Summary    19 Jul 2023 07:01  -  20 Jul 2023 07:00  --------------------------------------------------------  IN: 940 mL / OUT: 0 mL / NET: 940 mL          Physical Exam:   GENERAL: NAD, well-groomed, well-developed  HEENT: FALLON/   Atraumatic, Normocephalic  ENMT: No tonsillar erythema, exudates, or enlargement; Moist mucous membranes, Good dentition, No lesions  NECK: Supple, No JVD, Normal thyroid  CHEST/LUNG: wheezing+  CVS: Regular rate and rhythm; No murmurs, rubs, or gallops  GI: : Soft, Nontender, Nondistended; Bowel sounds present  NERVOUS SYSTEM:  Alert & Oriented X3  EXTREMITIES: - edema  LYMPH: No lymphadenopathy noted  SKIN: No rashes or lesions  ENDOCRINOLOGY: No Thyromegaly  PSYCH: calm     Labs:  26                            11.1   15.70 )-----------( 371      ( 20 Jul 2023 05:57 )             35.2                         11.5   18.55 )-----------( 401      ( 19 Jul 2023 06:20 )             36.4                         12.3   19.53 )-----------( 420      ( 18 Jul 2023 06:33 )             38.6                         11.0   8.63  )-----------( 269      ( 16 Jul 2023 17:24 )             34.1     07-20    136  |  102  |  42<H>  ----------------------------<  145<H>  5.9<H>   |  24  |  1.55<H>  07-19    134<L>  |  97  |  29<H>  ----------------------------<  138<H>  5.1   |  24  |  1.28  07-18    134<L>  |  98  |  25<H>  ----------------------------<  174<H>  4.0   |  21<L>  |  1.28  07-16    133<L>  |  100  |  9   ----------------------------<  85  4.7   |  20<L>  |  0.92    Ca    9.7      20 Jul 2023 05:58  Ca    9.7      19 Jul 2023 06:11    TPro  6.6  /  Alb  3.5  /  TBili  0.5  /  DBili  x   /  AST  17  /  ALT  8<L>  /  AlkPhos  85  07-16    CAPILLARY BLOOD GLUCOSE      rad< from: CT Chest No Cont (07.16.23 @ 22:57) >  BONES/SOFT TISSUES: Degenerative changes of the spine.    IMPRESSION:    Persistent bronchiectasis and mucoid impaction at the lower lobes.   Increased tree-in-bud opacities in both lungs with patchy opacity at the   left lower lobe, suspicious for pneumonia. Stable scattered pulmonary   nodules. Nonspecific mediastinal lymphadenopathy.    Additional findings as described.    --- End of Report ---    < end of copied text >          Urinalysis Basic - ( 20 Jul 2023 05:58 )    Color: x / Appearance: x / SG: x / pH: x  Gluc: 145 mg/dL / Ketone: x  / Bili: x / Urobili: x   Blood: x / Protein: x / Nitrite: x   Leuk Esterase: x / RBC: x / WBC x   Sq Epi: x / Non Sq Epi: x / Bacteria: x            RECENT CULTURES:  07-19 @ 11:34 .Sputum Sputum       Few polymorphonuclear leukocytes per low power field  No Squamous epithelial cells per low power field  Few Gram positive cocci in pairs and clusters per oil power field  Rare Gram Negative Rods per oil power field                   RESPIRATORY CULTURES:          Studies  Chest X-RAY  CT SCAN Chest   Venous Dopplers: LE:   CT Abdomen  Others

## 2023-07-20 NOTE — CONSULT NOTE ADULT - SUBJECTIVE AND OBJECTIVE BOX
[facility]  [phone]  NEPHROLOGY      INITIAL CONSULT NOTE  --------------------------------------------------------------------------------  HPI:    72 year old female with pmhx of COPD not on home O2, HTN, a-fib on Eliquis presenting with increasing shortness of breath in setting of on and off subjective fever (highest temp of 98F), cough, and green sputum production. She states her sob started around January 2023, she went to urgent care x 2, and hospitalized at [facility]. she has been treated for PNA and recovered. Now noticed worsening sob again on Tuesday PTA. Hospital course treated for PNA and COPD exacerbation. Now noticed with hyperkalemia and abnormal creatinine today. Renal consult called.     PAST HISTORY  --------------------------------------------------------------------------------  PAST MEDICAL & SURGICAL HISTORY:  COPD (chronic obstructive pulmonary disease)      Hypothyroid      HTN (hypertension)      Hyperlipidemia      Edema extremities      Afib  On Eliquis      Psoriasis      S/P eye surgery  age 5 unsure if right or left eye        FAMILY HISTORY:  FH: hypertension      PAST SOCIAL HISTORY:  Former smoker    ALLERGIES & MEDICATIONS  --------------------------------------------------------------------------------  Allergies    Sulfur (Unknown)  sulfa drugs (Unknown)  [This allergen will not trigger allergy alert] artichokes (Unknown)  Levaquin (Anaphylaxis)    Intolerances      Standing Inpatient Medications  acyclovir   Oral Tab/Cap 400 milliGRAM(s) Oral three times a day  apixaban 2.5 milliGRAM(s) Oral every 12 hours  artificial  tears Solution 1 Drop(s) Both EYES three times a day  budesonide 160 MICROgram(s)/formoterol 4.5 MICROgram(s) Inhaler 2 Puff(s) Inhalation two times a day  chlorhexidine 2% Cloths 1 Application(s) Topical <User Schedule>  guaiFENesin ER 1200 milliGRAM(s) Oral every 12 hours  hydrALAZINE 50 milliGRAM(s) Oral three times a day  levalbuterol Inhalation 0.63 milliGRAM(s) Inhalation every 6 hours  levothyroxine 50 MICROGram(s) Oral daily  losartan 50 milliGRAM(s) Oral daily  methylPREDNISolone sodium succinate Injectable 20 milliGRAM(s) IV Push every 8 hours  piperacillin/tazobactam IVPB.. 3.375 Gram(s) IV Intermittent every 8 hours    PRN Inpatient Medications      REVIEW OF SYSTEMS  --------------------------------------------------------------------------------  Gen: No fevers/chills   Head/Eyes/Ears/Mouth: No headache; Normal hearing;   Respiratory: states breathing is better. No dyspnea at rest, +cough, +wheezing,   CV: No chest pain, orthopnea  GI: No abdominal pain, diarrhea, nausea, vomiting,  : No dysuria, decrease urination   MSK: No joint pain/swelling; no back pain  Neuro: No dizziness/lightheadedness, weakness,  also with no edema     VITALS/PHYSICAL EXAM  --------------------------------------------------------------------------------  T(C): 36.4 (07-20-23 @ 11:13), Max: 36.7 (07-19-23 @ 21:23)  HR: 82 (07-20-23 @ 11:13) (61 - 82)  BP: 121/56 (07-20-23 @ 11:13) (120/73 - 145/81)  RR: 16 (07-20-23 @ 12:00) (16 - 18)  SpO2: 96% (07-20-23 @ 12:00) (93% - 96%)  Wt(kg): --        07-19-23 @ 07:01  -  07-20-23 @ 07:00  --------------------------------------------------------  IN: 940 mL / OUT: 0 mL / NET: 940 mL    07-20-23 @ 07:01  -  07-20-23 @ 15:24  --------------------------------------------------------  IN: 780 mL / OUT: 0 mL / NET: 780 mL      Physical Exam:  	Gen: Non toxic comfortable appearing   	Pulm: decrease bs  no rales or ronchi or wheezing  	CV: No JVD. RRR, S1S2; no rub  	Back: No CVA tenderness; no sacral edema  	Abd: +BS, soft, nontender/nondistended  	: No suprapubic distention  	UE: Warm, no cyanosis  no clubbing,  no edema  	LE: Warm, no cyanosis  no clubbing, no edema  	Neuro: alert and oriented. speech coherent   	Skin: Warm, no decrease skin turgor     LABS/STUDIES  --------------------------------------------------------------------------------              11.1   15.70 >-----------<  371      [07-20-23 @ 05:57]              35.2     136  |  102  |  42  ----------------------------<  145      [07-20-23 @ 05:58]  5.9   |  24  |  1.55        Ca     9.7     [07-20-23 @ 05:58]            Creatinine Trend:  SCr 1.55 [07-20 @ 05:58]  SCr 1.28 [07-19 @ 06:11]  SCr 1.28 [07-18 @ 06:33]  SCr 0.92 [07-16 @ 17:24]    Urinalysis - [07-20-23 @ 05:58]      Color  / Appearance  / SG  / pH       Gluc 145 / Ketone   / Bili  / Urobili        Blood  / Protein  / Leuk Est  / Nitrite       RBC  / WBC  / Hyaline  / Gran  / Sq Epi  / Non Sq Epi  / Bacteria       Iron 63, TIBC 329, %sat 19      [11-01-22 @ 06:30]  Ferritin 55      [11-01-22 @ 06:30]  TSH 0.17      [07-19-23 @ 10:00]    HCV 0.11, Nonreact      [11-25-19 @ 11:53]    < from: TTE W or WO Ultrasound Enhancing Agent (07.18.23 @ 15:22) >  TRANSTHORACIC ECHOCARDIOGRAM REPORT  ________________________________________________________________________________                                      _______       Pt. Name:       BALBIR ANGEL Study Date:    7/18/2023  MRN:            [id number]     YOB: 1950  Accession #:    [id number]      Age:           72 years  Account#:       [id number]   Gender:        F  Heart Rate:     77 bpm         Height:        60.00 in (152.40 cm)  Rhythm:         sinus rhythm   Weight:   180.00 lb (81.65 kg)  Blood Pressure: 110/72 mmHg    BSA/BMI:       1.78 m² / 35.15 kg/m²  ________________________________________________________________________________________  Referring Physician:    [id number] Cedric Gonzalez  Interpreting Physician: Shannon Velasco  Primary Sonographer:    Adams Olson ROMY    CPT:               ECHO TTE WO CON COMP W DOPP - 69379.m  Indication(s):     Abnormal electrocardiogram ECG/EKG - R94.31  Procedure:         Transthoracic echocardiogram with 2-D, M-mode and complete                     spectral and color flow Doppler.  Ordering Location: [facility]  Admission Status:  Inpatient  Study Information: Image quality for this study is fair.    _______________________________________________________________________________________  CONCLUSIONS:      1. Normal left ventricular cavity size. The left ventricular wall thickness is normal. The left ventricular systolic function is normal with an ejection fraction of 59 % by Goldberg's method of disks. There are no regional wall motion abnormalities seen.   2. The left ventricular diastolic function is indeterminate.   3. Normal right ventricular cavity size, normal right ventricular wall thickness and normal right ventricular systolic function. The tricuspid annular plane systolic excursion (TAPSE) is 1.8 cm (normal >=1.7 cm).   4. The right atrium is normal in size.   5. There is moderate aortic stenosis. The peak transaortic velocity is 3.13 m/s, peak transaortic gradient is 39.2 mmHg and mean transaortic gradient is 25.3 mmHg with an LVOT/aortic valve VTI ratio of 0.36. The aortic valve area is estimated at 1.12 cm² by the continuity equation. There is mild aortic regurgitation.   6. No pericardial effusion seen.   7. There is trace tricuspid regurgitation. There is insufficient tricuspid regurgitation detected to calculate pulmonary artery systolic pressure.   8. Compared to the transthoracic echocardiogram performed on 4/7/2023 there have been no significant interval changes.    ________________________________________________________________________________________  FINDINGS:     Left Ventricle:  Normal left ventricular cavity size. The left ventricular wall thickness is normal. The left ventricular systolic function is normal with acalculated ejection fraction of 59 % by the Goldberg's biplane method of disks. There are no regional wall motion abnormalities seen. The left ventricular diastolic function is indeterminate, with indeterminant filling pressure. Unable to perform strain due to heart rate variability.     Right Ventricle:  Normal right ventricular cavity size, normal wall thickness and normal right ventricular systolic function. Tricuspid annular plane systolic excursion (TAPSE) is 1.8 cm (normal >=1.7 cm). Tricuspid annular tissue Doppler S' is 10.7 cm/s (normal >10 cm/s).     Left Atrium:  The left atrium is mildly dilated in size with an indexed volume of 33.11 ml/m².     Right Atrium:  The right atrium is normal in size.     Aortic Valve:  The aortic valve is tricuspid with normal structure without stenosis. There is moderate calcification of the aortic valve leaflets. There is moderate aortic stenosis. The peak transaortic velocity is 3.13 m/s, peak transaortic gradient is 39.2 mmHg and mean transaortic gradient is 25.3 mmHg with an LVOT/aortic valve VTI ratio of 0.36. The aortic valve area is estimated at 1.12 cm² by the continuity equation. There is mild aortic regurgitation. AI VMax is 4.16 m/s. AI pressure half time is 391 msec. AI slope is 3.05 m/s².     Mitral Valve:  There is calcification of the mitral valve annulus. There is mild leaflet calcification. There is mild mitral regurgitation.     Tricuspid Valve:  Structurally normal tricuspid valve with normal leaflet excursion. There is trace tricuspid regurgitation. There is insufficient tricuspid regurgitation detected to calculate pulmonary artery systolic pressure.     Pulmonic Valve:  Structurally normal pulmonic valve with normal leaflet excursion. There is no evidence of pulmonic regurgitation.     Aorta:  The ascending aorta is not well visualized. The aortic annulus and aortic root appear normal in size.     Pericardium:  No pericardial effusion seen.     Systemic Veins:  The inferior vena cava is normal in size measuring1.70 cm in diameter, (normal <2.1cm) with normal inspiratory collapse (normal >50%) consistent with normal right atrial pressure (~3, range 0-5mmHg).  ____________________________________________________________________  Quantitative Data:  Left Ventricle Measurements: (Indexed to BSA)     IVSd (2D):   1.0 cm  LVPWd (2D):  0.9 cm                           Strain Measurements:  LVIDd (2D):  4.5 cm                           Global Pk Long Strain:  -14.3 %  LVIDs (2D):  3.2 cm   Endo Pk Strain A4C:     -13.0 %  LV Mass:     143 g  80.1 g/m²                 Endo Pk Strain A2C:     -15.9 %  BiPlane LV EF%: 59 %                          Endo Pk Strain A3C:     -14.1 %     MV E Vmax:    0.87 m/s  MV A Vmax:    1.17 m/s  MV E/A:       0.75  e' lateral:   10.10 cm/s  e' medial:    5.66 cm/s  E/e' lateral: 8.64  E/e' medial:  15.42  E/e' Average: 11.08    Aorta Measurements:     Ao Root:       3.0 cm  Ao Root s, 2D: 3.0 cm       Left Atrium Measurements: (Indexed to BSA)  LADiam 2D: 4.20 cm    Right Ventricle Measurements:     TAPSE:           1.8 cm  RV Base (RVID1): 4.5 cm  RV Mid (RVID2):  2.2 cm       LVOT / RVOT/ Qp/Qs Data: (Indexed to BSA)  LVOT Diameter: 2.00 cm  LVOT Vmax:     1.16 m/s  LVOT VTI:      26.60 cm  LVOT SV:       83.6 ml  46.82 ml/m²    Aortic Valve Measurements:  AV Vmax:           3.1 m/s  AV Peak Gradient:  39.2 mmHg  AV Mean Gradient:  25.3 mmHg  AV VTI:            74.8 cm  AV VTI Ratio:      0.36  AoV EOA, Contin:   1.12 cm²  AoV EOA, Contin i: 0.63 cm²/m²    Mitral Valve Measurements:     MV E Vmax: 0.9 m/s  MV A Vmax: 1.2 m/s  MV E/A:    0.7       Tricuspid Valve Measurements:     RA Pressure: 3 mmHg    ________________________________________________________________________________________  Electronically signed on 7/18/2023 at 5:11:17 PM by Shannon Velasco         *** Final ***    < end of copied text >   [facility]  [phone]  NEPHROLOGY      INITIAL CONSULT NOTE  --------------------------------------------------------------------------------  HPI:    72 year old female with pmhx of COPD not on home O2, HTN, a-fib on Eliquis presenting with increasing shortness of breath in setting of on and off subjective fever (highest temp of 98F), cough, and green sputum production. She states her sob started around January 2023, she went to urgent care x 2, and hospitalized at [facility]. she has been treated for PNA and recovered. Now noticed worsening sob again on Tuesday PTA. Hospital course treated for PNA and COPD exacerbation. Now noticed with hyperkalemia and abnormal creatinine today. Renal consult called.     PAST HISTORY  --------------------------------------------------------------------------------  PAST MEDICAL & SURGICAL HISTORY:  COPD (chronic obstructive pulmonary disease)      Hypothyroid      HTN (hypertension)      Hyperlipidemia      Edema extremities      Afib  On Eliquis      Psoriasis      S/P eye surgery  age 5 unsure if right or left eye        FAMILY HISTORY:  FH: hypertension      PAST SOCIAL HISTORY:  Former smoker    ALLERGIES & MEDICATIONS  --------------------------------------------------------------------------------  Allergies    Sulfur (Unknown)  sulfa drugs (Unknown)  [This allergen will not trigger allergy alert] artichokes (Unknown)  Levaquin (Anaphylaxis)    Intolerances      Standing Inpatient Medications  acyclovir   Oral Tab/Cap 400 milliGRAM(s) Oral three times a day  apixaban 2.5 milliGRAM(s) Oral every 12 hours  artificial  tears Solution 1 Drop(s) Both EYES three times a day  budesonide 160 MICROgram(s)/formoterol 4.5 MICROgram(s) Inhaler 2 Puff(s) Inhalation two times a day  chlorhexidine 2% Cloths 1 Application(s) Topical <User Schedule>  guaiFENesin ER 1200 milliGRAM(s) Oral every 12 hours  hydrALAZINE 50 milliGRAM(s) Oral three times a day  levalbuterol Inhalation 0.63 milliGRAM(s) Inhalation every 6 hours  levothyroxine 50 MICROGram(s) Oral daily  losartan 50 milliGRAM(s) Oral daily  methylPREDNISolone sodium succinate Injectable 20 milliGRAM(s) IV Push every 8 hours  piperacillin/tazobactam IVPB.. 3.375 Gram(s) IV Intermittent every 8 hours    PRN Inpatient Medications      REVIEW OF SYSTEMS  --------------------------------------------------------------------------------  Gen: No fevers/chills   Head/Eyes/Ears/Mouth: No headache; Normal hearing;   Respiratory: states breathing is better. No dyspnea at rest, +cough, +wheezing,   CV: No chest pain, orthopnea  GI: No abdominal pain, diarrhea, nausea, vomiting,  : No dysuria, decrease urination   MSK: No joint pain/swelling; no back pain  Neuro: No dizziness/lightheadedness, weakness,  also with no edema     VITALS/PHYSICAL EXAM  --------------------------------------------------------------------------------  T(C): 36.4 (07-20-23 @ 11:13), Max: 36.7 (07-19-23 @ 21:23)  HR: 82 (07-20-23 @ 11:13) (61 - 82)  BP: 121/56 (07-20-23 @ 11:13) (120/73 - 145/81)  RR: 16 (07-20-23 @ 12:00) (16 - 18)  SpO2: 96% (07-20-23 @ 12:00) (93% - 96%)  Wt(kg): --        07-19-23 @ 07:01  -  07-20-23 @ 07:00  --------------------------------------------------------  IN: 940 mL / OUT: 0 mL / NET: 940 mL    07-20-23 @ 07:01  -  07-20-23 @ 15:24  --------------------------------------------------------  IN: 780 mL / OUT: 0 mL / NET: 780 mL      Physical Exam:  	Gen: Non toxic comfortable appearing   	Pulm: decrease bs, +coarse, +wheeze    	CV: No JVD. RRR, S1S2; no rub  	Back: No CVA tenderness; no sacral edema  	Abd: +BS, soft, nontender/nondistended  	: No suprapubic distention  	UE: Warm, no cyanosis  no clubbing,  no edema  	LE: Warm, no cyanosis  no clubbing, no edema  	Neuro: alert and oriented. speech coherent   	Skin: Warm, no decrease skin turgor     LABS/STUDIES  --------------------------------------------------------------------------------              11.1   15.70 >-----------<  371      [07-20-23 @ 05:57]              35.2     136  |  102  |  42  ----------------------------<  145      [07-20-23 @ 05:58]  5.9   |  24  |  1.55        Ca     9.7     [07-20-23 @ 05:58]            Creatinine Trend:  SCr 1.55 [07-20 @ 05:58]  SCr 1.28 [07-19 @ 06:11]  SCr 1.28 [07-18 @ 06:33]  SCr 0.92 [07-16 @ 17:24]    Urinalysis - [07-20-23 @ 05:58]      Color  / Appearance  / SG  / pH       Gluc 145 / Ketone   / Bili  / Urobili        Blood  / Protein  / Leuk Est  / Nitrite       RBC  / WBC  / Hyaline  / Gran  / Sq Epi  / Non Sq Epi  / Bacteria       Iron 63, TIBC 329, %sat 19      [11-01-22 @ 06:30]  Ferritin 55      [11-01-22 @ 06:30]  TSH 0.17      [07-19-23 @ 10:00]    HCV 0.11, Nonreact      [11-25-19 @ 11:53]    < from: TTE W or WO Ultrasound Enhancing Agent (07.18.23 @ 15:22) >  TRANSTHORACIC ECHOCARDIOGRAM REPORT  ________________________________________________________________________________                                      _______       Pt. Name:       BALBIR ANGEL Study Date:    7/18/2023  MRN:            [id number]     YOB: 1950  Accession #:    [id number]      Age:           72 years  Account#:       [id number]   Gender:        F  Heart Rate:     77 bpm         Height:        60.00 in (152.40 cm)  Rhythm:         sinus rhythm   Weight:   180.00 lb (81.65 kg)  Blood Pressure: 110/72 mmHg    BSA/BMI:       1.78 m² / 35.15 kg/m²  ________________________________________________________________________________________  Referring Physician:    [id number] Cedric Gonzalez  Interpreting Physician: Shannon Velasco  Primary Sonographer:    Adams Olson ROMY    CPT:               ECHO TTE WO CON COMP W DOPP - 65043.m  Indication(s):     Abnormal electrocardiogram ECG/EKG - R94.31  Procedure:         Transthoracic echocardiogram with 2-D, M-mode and complete                     spectral and color flow Doppler.  Ordering Location: [facility]  Admission Status:  Inpatient  Study Information: Image quality for this study is fair.    _______________________________________________________________________________________  CONCLUSIONS:      1. Normal left ventricular cavity size. The left ventricular wall thickness is normal. The left ventricular systolic function is normal with an ejection fraction of 59 % by Goldberg's method of disks. There are no regional wall motion abnormalities seen.   2. The left ventricular diastolic function is indeterminate.   3. Normal right ventricular cavity size, normal right ventricular wall thickness and normal right ventricular systolic function. The tricuspid annular plane systolic excursion (TAPSE) is 1.8 cm (normal >=1.7 cm).   4. The right atrium is normal in size.   5. There is moderate aortic stenosis. The peak transaortic velocity is 3.13 m/s, peak transaortic gradient is 39.2 mmHg and mean transaortic gradient is 25.3 mmHg with an LVOT/aortic valve VTI ratio of 0.36. The aortic valve area is estimated at 1.12 cm² by the continuity equation. There is mild aortic regurgitation.   6. No pericardial effusion seen.   7. There is trace tricuspid regurgitation. There is insufficient tricuspid regurgitation detected to calculate pulmonary artery systolic pressure.   8. Compared to the transthoracic echocardiogram performed on 4/7/2023 there have been no significant interval changes.    ________________________________________________________________________________________  FINDINGS:     Left Ventricle:  Normal left ventricular cavity size. The left ventricular wall thickness is normal. The left ventricular systolic function is normal with acalculated ejection fraction of 59 % by the Goldberg's biplane method of disks. There are no regional wall motion abnormalities seen. The left ventricular diastolic function is indeterminate, with indeterminant filling pressure. Unable to perform strain due to heart rate variability.     Right Ventricle:  Normal right ventricular cavity size, normal wall thickness and normal right ventricular systolic function. Tricuspid annular plane systolic excursion (TAPSE) is 1.8 cm (normal >=1.7 cm). Tricuspid annular tissue Doppler S' is 10.7 cm/s (normal >10 cm/s).     Left Atrium:  The left atrium is mildly dilated in size with an indexed volume of 33.11 ml/m².     Right Atrium:  The right atrium is normal in size.     Aortic Valve:  The aortic valve is tricuspid with normal structure without stenosis. There is moderate calcification of the aortic valve leaflets. There is moderate aortic stenosis. The peak transaortic velocity is 3.13 m/s, peak transaortic gradient is 39.2 mmHg and mean transaortic gradient is 25.3 mmHg with an LVOT/aortic valve VTI ratio of 0.36. The aortic valve area is estimated at 1.12 cm² by the continuity equation. There is mild aortic regurgitation. AI VMax is 4.16 m/s. AI pressure half time is 391 msec. AI slope is 3.05 m/s².     Mitral Valve:  There is calcification of the mitral valve annulus. There is mild leaflet calcification. There is mild mitral regurgitation.     Tricuspid Valve:  Structurally normal tricuspid valve with normal leaflet excursion. There is trace tricuspid regurgitation. There is insufficient tricuspid regurgitation detected to calculate pulmonary artery systolic pressure.     Pulmonic Valve:  Structurally normal pulmonic valve with normal leaflet excursion. There is no evidence of pulmonic regurgitation.     Aorta:  The ascending aorta is not well visualized. The aortic annulus and aortic root appear normal in size.     Pericardium:  No pericardial effusion seen.     Systemic Veins:  The inferior vena cava is normal in size measuring1.70 cm in diameter, (normal <2.1cm) with normal inspiratory collapse (normal >50%) consistent with normal right atrial pressure (~3, range 0-5mmHg).  ____________________________________________________________________  Quantitative Data:  Left Ventricle Measurements: (Indexed to BSA)     IVSd (2D):   1.0 cm  LVPWd (2D):  0.9 cm                           Strain Measurements:  LVIDd (2D):  4.5 cm                           Global Pk Long Strain:  -14.3 %  LVIDs (2D):  3.2 cm   Endo Pk Strain A4C:     -13.0 %  LV Mass:     143 g  80.1 g/m²                 Endo Pk Strain A2C:     -15.9 %  BiPlane LV EF%: 59 %                          Endo Pk Strain A3C:     -14.1 %     MV E Vmax:    0.87 m/s  MV A Vmax:    1.17 m/s  MV E/A:       0.75  e' lateral:   10.10 cm/s  e' medial:    5.66 cm/s  E/e' lateral: 8.64  E/e' medial:  15.42  E/e' Average: 11.08    Aorta Measurements:     Ao Root:       3.0 cm  Ao Root s, 2D: 3.0 cm       Left Atrium Measurements: (Indexed to BSA)  LADiam 2D: 4.20 cm    Right Ventricle Measurements:     TAPSE:           1.8 cm  RV Base (RVID1): 4.5 cm  RV Mid (RVID2):  2.2 cm       LVOT / RVOT/ Qp/Qs Data: (Indexed to BSA)  LVOT Diameter: 2.00 cm  LVOT Vmax:     1.16 m/s  LVOT VTI:      26.60 cm  LVOT SV:       83.6 ml  46.82 ml/m²    Aortic Valve Measurements:  AV Vmax:           3.1 m/s  AV Peak Gradient:  39.2 mmHg  AV Mean Gradient:  25.3 mmHg  AV VTI:            74.8 cm  AV VTI Ratio:      0.36  AoV EOA, Contin:   1.12 cm²  AoV EOA, Contin i: 0.63 cm²/m²    Mitral Valve Measurements:     MV E Vmax: 0.9 m/s  MV A Vmax: 1.2 m/s  MV E/A:    0.7       Tricuspid Valve Measurements:     RA Pressure: 3 mmHg    ________________________________________________________________________________________  Electronically signed on 7/18/2023 at 5:11:17 PM by Shannon Velasco         *** Final ***    < end of copied text >

## 2023-07-20 NOTE — PROGRESS NOTE ADULT - ASSESSMENT
Patient is a 72 year old female with PMH COPD not on home O2, former smoker - quit ~15 years ago after smoking for 40 years, HTN, Afib on Eliquis who presents with increasing shortness of breath in setting of on and off subjective fever (highest temp of 98F), cough, and green sputum production.    Hypoxia, COPD exacerbation  LLL pneumonia, bronchiectasis    - RVP negative on admission   - CXR with with LLL opacity   - CT chest with persistent bronchiectasis and mucoid impaction at the lower lobes, increased tree in bud opacities in both lungs with patchy opacity at LLL- suspicious for pneumonia    - Pulmonary following - on steroids-IV solumedrol, symbicort    - Strep pneumoniae and Legionella urine ags negative    - TTE with moderate AS, no significant changes from 4/2023   - afebrile, noted with leukocytosis-likely reactive to steroids -downtrended      Recommendations:  Follow sputum culture - gram stain noted   Continue on pip-tazo - will plan for 5 day course - end on 7/21  supportive care, supplemental O2 as needed  Monitor temps/WBC       Tran Araujo M.D.  [facility], Division of Infectious Diseases  [phone]  After 5pm on weekdays and all day on weekends - please call [phone]

## 2023-07-20 NOTE — PROGRESS NOTE ADULT - ASSESSMENT
This is a 72 year old female with pmh of COPD not on home O2, HTN, a-fib on Eliquis presenting with increasing shortness of breath in setting of on and off subjective fever (highest temp of 98F), cough, and green sputum production. Denies any chest pain or abdominal pain. + nausea but no vomiting. Exertional shortness of breath. No exertional chest pain. Denies any dysuria, urinary frequency, hematuria. Denies any recent surgery/travel. No lower extremity swelling.   (16 Jul 2023 19:05)  Tthe pt started getting sick from  January this year;  when she had visited [facility] and was treated with antibiotics  after that she improved  then she again became sick in April from which she recovered and now she presented with sob for 5-6 days with some cough but not really phlegmy/  she i son 2 L of oxygen at this time but does not use oxygen at home:     COPD exa:  A Fib:  HTN:      COPD exa:  -seems copd exacerbation:  RVP is negative:   -she is on steroids as well as BD: add Symbicort - she takes advair and Spiriva at home:   -VBG shows mild hypercarbic resp acidosis , but no reason to give bipap at this time given her clinical condition:   -her ct chest showed: Persistent bronchiectasis and mucoid impaction at the lower lobes.  Increased tree-in-bud opacities in both lungs with patchy opacity at the left lower lobe, suspicious for pneumonia. Stable scattered pulmonary  nodules. Nonspecific mediastinal lymphadenopathy.  -doubt vte : as she is already on elliquis:   -rpt abg in am    -check sputum cultures:  -already on zosyn ; check legionella still pending:   -she is still wheezing : would decrease steroids to 20 mg tid today for two days   -cont 20 mg tid today too and if she cont to do better : will decrease further :  -she is still wheezing today on 20th : cant decrease steroids : add mucinex:   -finishing antibiotics to-blood sputum cultures noted:  few gram  neg rods ad grm post jimmy cocci in pairs: identity pending:   A Fib:  -cont eliquis  HTN:  -controlled   dw ACP

## 2023-07-21 LAB
ANION GAP SERPL CALC-SCNC: 9 MMOL/L — SIGNIFICANT CHANGE UP (ref 5–17)
BUN SERPL-MCNC: 47 MG/DL — HIGH (ref 7–23)
CALCIUM SERPL-MCNC: 9.4 MG/DL — SIGNIFICANT CHANGE UP (ref 8.4–10.5)
CHLORIDE SERPL-SCNC: 103 MMOL/L — SIGNIFICANT CHANGE UP (ref 96–108)
CO2 SERPL-SCNC: 25 MMOL/L — SIGNIFICANT CHANGE UP (ref 22–31)
CREAT SERPL-MCNC: 1.48 MG/DL — HIGH (ref 0.5–1.3)
EGFR: 37 ML/MIN/1.73M2 — LOW
GLUCOSE SERPL-MCNC: 153 MG/DL — HIGH (ref 70–99)
HCT VFR BLD CALC: 34.2 % — LOW (ref 34.5–45)
HGB BLD-MCNC: 10.9 G/DL — LOW (ref 11.5–15.5)
MCHC RBC-ENTMCNC: 29.8 PG — SIGNIFICANT CHANGE UP (ref 27–34)
MCHC RBC-ENTMCNC: 31.9 GM/DL — LOW (ref 32–36)
MCV RBC AUTO: 93.4 FL — SIGNIFICANT CHANGE UP (ref 80–100)
NRBC # BLD: 0 /100 WBCS — SIGNIFICANT CHANGE UP (ref 0–0)
OSMOLALITY UR: 170 MOS/KG — LOW (ref 300–900)
PLATELET # BLD AUTO: 355 K/UL — SIGNIFICANT CHANGE UP (ref 150–400)
POTASSIUM SERPL-MCNC: 4.8 MMOL/L — SIGNIFICANT CHANGE UP (ref 3.5–5.3)
POTASSIUM SERPL-SCNC: 4.8 MMOL/L — SIGNIFICANT CHANGE UP (ref 3.5–5.3)
RBC # BLD: 3.66 M/UL — LOW (ref 3.8–5.2)
RBC # FLD: 14.7 % — HIGH (ref 10.3–14.5)
SODIUM SERPL-SCNC: 137 MMOL/L — SIGNIFICANT CHANGE UP (ref 135–145)
WBC # BLD: 15.9 K/UL — HIGH (ref 3.8–10.5)
WBC # FLD AUTO: 15.9 K/UL — HIGH (ref 3.8–10.5)

## 2023-07-21 PROCEDURE — 93010 ELECTROCARDIOGRAM REPORT: CPT

## 2023-07-21 RX ADMIN — Medication 20 MILLIGRAM(S): at 05:26

## 2023-07-21 RX ADMIN — Medication 1 DROP(S): at 05:26

## 2023-07-21 RX ADMIN — Medication 50 MILLIGRAM(S): at 22:06

## 2023-07-21 RX ADMIN — BUDESONIDE AND FORMOTEROL FUMARATE DIHYDRATE 2 PUFF(S): 160; 4.5 AEROSOL RESPIRATORY (INHALATION) at 17:57

## 2023-07-21 RX ADMIN — LEVALBUTEROL 0.63 MILLIGRAM(S): 1.25 SOLUTION, CONCENTRATE RESPIRATORY (INHALATION) at 05:28

## 2023-07-21 RX ADMIN — Medication 50 MILLIGRAM(S): at 05:27

## 2023-07-21 RX ADMIN — Medication 1200 MILLIGRAM(S): at 17:57

## 2023-07-21 RX ADMIN — Medication 400 MILLIGRAM(S): at 22:06

## 2023-07-21 RX ADMIN — Medication 50 MILLIGRAM(S): at 13:23

## 2023-07-21 RX ADMIN — APIXABAN 2.5 MILLIGRAM(S): 2.5 TABLET, FILM COATED ORAL at 05:27

## 2023-07-21 RX ADMIN — PIPERACILLIN AND TAZOBACTAM 25 GRAM(S): 4; .5 INJECTION, POWDER, LYOPHILIZED, FOR SOLUTION INTRAVENOUS at 05:26

## 2023-07-21 RX ADMIN — Medication 400 MILLIGRAM(S): at 05:27

## 2023-07-21 RX ADMIN — Medication 400 MILLIGRAM(S): at 13:24

## 2023-07-21 RX ADMIN — Medication 1 DROP(S): at 22:06

## 2023-07-21 RX ADMIN — LEVALBUTEROL 0.63 MILLIGRAM(S): 1.25 SOLUTION, CONCENTRATE RESPIRATORY (INHALATION) at 17:57

## 2023-07-21 RX ADMIN — Medication 1200 MILLIGRAM(S): at 05:32

## 2023-07-21 RX ADMIN — PIPERACILLIN AND TAZOBACTAM 25 GRAM(S): 4; .5 INJECTION, POWDER, LYOPHILIZED, FOR SOLUTION INTRAVENOUS at 22:08

## 2023-07-21 RX ADMIN — LOSARTAN POTASSIUM 50 MILLIGRAM(S): 100 TABLET, FILM COATED ORAL at 05:28

## 2023-07-21 RX ADMIN — APIXABAN 2.5 MILLIGRAM(S): 2.5 TABLET, FILM COATED ORAL at 17:57

## 2023-07-21 RX ADMIN — PIPERACILLIN AND TAZOBACTAM 25 GRAM(S): 4; .5 INJECTION, POWDER, LYOPHILIZED, FOR SOLUTION INTRAVENOUS at 13:23

## 2023-07-21 RX ADMIN — LEVALBUTEROL 0.63 MILLIGRAM(S): 1.25 SOLUTION, CONCENTRATE RESPIRATORY (INHALATION) at 11:52

## 2023-07-21 RX ADMIN — Medication 20 MILLIGRAM(S): at 13:24

## 2023-07-21 RX ADMIN — BUDESONIDE AND FORMOTEROL FUMARATE DIHYDRATE 2 PUFF(S): 160; 4.5 AEROSOL RESPIRATORY (INHALATION) at 05:27

## 2023-07-21 RX ADMIN — Medication 1 DROP(S): at 13:23

## 2023-07-21 RX ADMIN — Medication 50 MICROGRAM(S): at 05:28

## 2023-07-21 NOTE — PROGRESS NOTE ADULT - SUBJECTIVE AND OBJECTIVE BOX
Date of Service: 07-21-23 @ 14:44    Patient is a 72y old  Female who presents with a chief complaint of The patient is a 72y Female complaining of shortness of breath. (21 Jul 2023 11:14)      Any change in ROS: she feels better today : no sob:  wheezing is less:     MEDICATIONS  (STANDING):  acyclovir   Oral Tab/Cap 400 milliGRAM(s) Oral three times a day  apixaban 2.5 milliGRAM(s) Oral every 12 hours  artificial  tears Solution 1 Drop(s) Both EYES three times a day  budesonide 160 MICROgram(s)/formoterol 4.5 MICROgram(s) Inhaler 2 Puff(s) Inhalation two times a day  chlorhexidine 2% Cloths 1 Application(s) Topical <User Schedule>  guaiFENesin ER 1200 milliGRAM(s) Oral every 12 hours  hydrALAZINE 50 milliGRAM(s) Oral three times a day  levalbuterol Inhalation 0.63 milliGRAM(s) Inhalation every 6 hours  levothyroxine 50 MICROGram(s) Oral daily  losartan 50 milliGRAM(s) Oral daily  methylPREDNISolone sodium succinate Injectable 20 milliGRAM(s) IV Push every 8 hours  piperacillin/tazobactam IVPB.. 3.375 Gram(s) IV Intermittent every 8 hours    MEDICATIONS  (PRN):    Vital Signs Last 24 Hrs  T(C): 36.7 (21 Jul 2023 11:21), Max: 36.7 (20 Jul 2023 17:48)  T(F): 98.1 (21 Jul 2023 11:21), Max: 98.1 (20 Jul 2023 17:48)  HR: 73 (21 Jul 2023 11:21) (65 - 75)  BP: 126/78 (21 Jul 2023 11:21) (126/78 - 166/85)  BP(mean): --  RR: 18 (21 Jul 2023 11:21) (17 - 18)  SpO2: 92% (21 Jul 2023 11:21) (92% - 96%)    Parameters below as of 21 Jul 2023 11:21  Patient On (Oxygen Delivery Method): room air        I&O's Summary    20 Jul 2023 07:01  -  21 Jul 2023 07:00  --------------------------------------------------------  IN: 1240 mL / OUT: 1175 mL / NET: 65 mL    21 Jul 2023 07:01  -  21 Jul 2023 14:44  --------------------------------------------------------  IN: 580 mL / OUT: 0 mL / NET: 580 mL          Physical Exam:   GENERAL: NAD, well-groomed, well-developed  HEENT: FALLON/   Atraumatic, Normocephalic  ENMT: No tonsillar erythema, exudates, or enlargement; Moist mucous membranes, Good dentition, No lesions  NECK: Supple, No JVD, Normal thyroid  CHEST/LUNG: mild wheezing  CVS: Regular rate and rhythm; No murmurs, rubs, or gallops  GI: : Soft, Nontender, Nondistended; Bowel sounds present  NERVOUS SYSTEM:  Alert & Oriented X3  EXTREMITIES:  2+ Peripheral Pulses, No clubbing, cyanosis, or edema  LYMPH: No lymphadenopathy noted  SKIN: No rashes or lesions  ENDOCRINOLOGY: No Thyromegaly  PSYCH: Appropriate    Labs:  26  CARDIAC MARKERS ( 20 Jul 2023 17:18 )  x     / x     / 46 U/L / x     / x                                10.9   15.90 )-----------( 355      ( 21 Jul 2023 05:38 )             34.2                         11.1   15.70 )-----------( 371      ( 20 Jul 2023 05:57 )             35.2                         11.5   18.55 )-----------( 401      ( 19 Jul 2023 06:20 )             36.4                         12.3   19.53 )-----------( 420      ( 18 Jul 2023 06:33 )             38.6     07-21    137  |  103  |  47<H>  ----------------------------<  153<H>  4.8   |  25  |  1.48<H>  07-20    136  |  100  |  47<H>  ----------------------------<  115<H>  5.4<H>   |  24  |  1.49<H>  07-20    136  |  102  |  42<H>  ----------------------------<  145<H>  5.9<H>   |  24  |  1.55<H>  07-19    134<L>  |  97  |  29<H>  ----------------------------<  138<H>  5.1   |  24  |  1.28  07-18    134<L>  |  98  |  25<H>  ----------------------------<  174<H>  4.0   |  21<L>  |  1.28    Ca    9.4      21 Jul 2023 05:38  Ca    9.9      20 Jul 2023 17:18  Ca    9.7      20 Jul 2023 05:58      CAPILLARY BLOOD GLUCOSE              Urinalysis Basic - ( 21 Jul 2023 05:38 )    Color: x / Appearance: x / SG: x / pH: x  Gluc: 153 mg/dL / Ketone: x  / Bili: x / Urobili: x   Blood: x / Protein: x / Nitrite: x   Leuk Esterase: x / RBC: x / WBC x   Sq Epi: x / Non Sq Epi: x / Bacteria: x            RECENT CULTURES:  07-19 @ 11:34 .Sputum Sputum       Few polymorphonuclear leukocytes per low power field  No Squamous epithelial cells per low power field  Few Gram positive cocci in pairs and clusters per oil power field  Rare Gram Negative Rods per oil power field       rad< from: CT Chest No Cont (07.16.23 @ 22:57) >    HEART/VASCULATURE: Stable heart size. Small pericardial fluid,slightly   increased since prior study. Aortic valve, coronary artery and mitral   annular calcification. Stable borderline main pulmonary artery.    UPPER ABDOMEN: Small hiatal hernia. Colon diverticulosis. Bilateral   perinephric stranding.    BONES/SOFT TISSUES: Degenerative changes of the spine.    IMPRESSION:    Persistent bronchiectasis and mucoid impaction at the lower lobes.   Increased tree-in-bud opacities in both lungs with patchy opacity at the   left lower lobe, suspicious for pneumonia. Stable scattered pulmonary   nodules. Nonspecific mediastinal lymphadenopathy.    Additional findings as described.    --- End of Report ---    < end of copied text >      Normal Respiratory Jessie present          RESPIRATORY CULTURES:          Studies  Chest X-RAY  CT SCAN Chest   Venous Dopplers: LE:   CT Abdomen  Others

## 2023-07-21 NOTE — PROGRESS NOTE ADULT - SUBJECTIVE AND OBJECTIVE BOX
[facility] DIVISION OF INFECTIOUS DISEASES  PRIYANK Daly Y. Patel, S. Shah, G. Casimir  [phone]  ([phone] - weekdays after 5pm and weekends)    Name: BALBIR ANGEL  Age/Gender: 72y Female  MRN: [id number]    Interval History:  Patient seen and examined this morning.   Feels better, still with some wheezing, on RA now.   Notes reviewed. No concerning overnight events.  Afebrile.     Allergies: Sulfur (Unknown)  sulfa drugs (Unknown)  [This allergen will not trigger allergy alert] artichokes (Unknown)  Levaquin (Anaphylaxis)      Objective:  Vitals:   T(F): 98.1 (07-21-23 @ 11:21), Max: 98.1 (07-20-23 @ 17:48)  HR: 73 (07-21-23 @ 11:21) (65 - 75)  BP: 126/78 (07-21-23 @ 11:21) (118/79 - 166/85)  RR: 18 (07-21-23 @ 11:21) (16 - 18)  SpO2: 92% (07-21-23 @ 11:21) (92% - 96%)  Physical Examination:  General: no acute distress, RA  HEENT: NC/AT, EOMI, anicteric, neck supple  Cardio: S1, S2 present, normal rate  Resp: breath sounds heard b/l, +wheezes  Abd: soft, NT, ND, + BS  Neuro: AAOx3, no obvious focal deficits  Ext: no edema, no cyanosis, moving extremities  Skin: warm, dry, no visible rash  Psych: appropriate affect and mood for situation  Lines: PIV    Laboratory Studies:  CBC:                       10.9   15.90 )-----------( 355      ( 21 Jul 2023 05:38 )             34.2     WBC Trend:  15.90 07-21-23 @ 05:38  15.70 07-20-23 @ 05:57  18.55 07-19-23 @ 06:20  19.53 07-18-23 @ 06:33  8.63 07-16-23 @ 17:24    CMP: 07-21    137  |  103  |  47<H>  ----------------------------<  153<H>  4.8   |  25  |  1.48<H>    Ca    9.4      21 Jul 2023 05:38      Creatinine: 1.48 mg/dL (07-21-23 @ 05:38)  Creatinine: 1.49 mg/dL (07-20-23 @ 17:18)  Creatinine: 1.55 mg/dL (07-20-23 @ 05:58)  Creatinine: 1.28 mg/dL (07-19-23 @ 06:11)  Creatinine: 1.28 mg/dL (07-18-23 @ 06:33)  Creatinine: 0.92 mg/dL (07-16-23 @ 17:24)    Microbiology: reviewed     Culture - Sputum (collected 07-19-23 @ 11:34)  Source: .Sputum Sputum  Gram Stain (07-19-23 @ 23:20):    Few polymorphonuclear leukocytes per low power field    No Squamous epithelial cells per low power field    Few Gram positive cocci in pairs and clusters per oil power field    Rare Gram Negative Rods per oil power field  Preliminary Report (07-20-23 @ 16:35):    Normal Respiratory Jessie present    Radiology: reviewed     Medications:  acyclovir   Oral Tab/Cap 400 milliGRAM(s) Oral three times a day  apixaban 2.5 milliGRAM(s) Oral every 12 hours  artificial  tears Solution 1 Drop(s) Both EYES three times a day  budesonide 160 MICROgram(s)/formoterol 4.5 MICROgram(s) Inhaler 2 Puff(s) Inhalation two times a day  chlorhexidine 2% Cloths 1 Application(s) Topical <User Schedule>  guaiFENesin ER 1200 milliGRAM(s) Oral every 12 hours  hydrALAZINE 50 milliGRAM(s) Oral three times a day  levalbuterol Inhalation 0.63 milliGRAM(s) Inhalation every 6 hours  levothyroxine 50 MICROGram(s) Oral daily  losartan 50 milliGRAM(s) Oral daily  methylPREDNISolone sodium succinate Injectable 20 milliGRAM(s) IV Push every 8 hours  piperacillin/tazobactam IVPB.. 3.375 Gram(s) IV Intermittent every 8 hours    Current Antimicrobials:  acyclovir   Oral Tab/Cap 400 milliGRAM(s) Oral three times a day  piperacillin/tazobactam IVPB.. 3.375 Gram(s) IV Intermittent every 8 hours    Prior/Completed Antimicrobials:  piperacillin/tazobactam IVPB.  piperacillin/tazobactam IVPB.-

## 2023-07-21 NOTE — PROGRESS NOTE ADULT - ASSESSMENT
This is a 72 year old female with pmh of COPD not on home O2, HTN, a-fib on Eliquis presenting with increasing shortness of breath in setting of on and off subjective fever (highest temp of 98F), cough, and green sputum production. Denies any chest pain or abdominal pain. + nausea but no vomiting. Exertional shortness of breath. No exertional chest pain. Denies any dysuria, urinary frequency, hematuria. Denies any recent surgery/travel. No lower extremity swelling.   (16 Jul 2023 19:05)  Tthe pt started getting sick from  January this year;  when she had visited [facility] and was treated with antibiotics  after that she improved  then she again became sick in April from which she recovered and now she presented with sob for 5-6 days with some cough but not really phlegmy/  she i son 2 L of oxygen at this time but does not use oxygen at home:     COPD exa:  A Fib:  HTN:      COPD exa:  -seems copd exacerbation:  RVP is negative:   -she is on steroids as well as BD: add Symbicort - she takes advair and Spiriva at home:   -VBG shows mild hypercarbic resp acidosis , but no reason to give bipap at this time given her clinical condition:   -her ct chest showed: Persistent bronchiectasis and mucoid impaction at the lower lobes.  Increased tree-in-bud opacities in both lungs with patchy opacity at the left lower lobe, suspicious for pneumonia. Stable scattered pulmonary  nodules. Nonspecific mediastinal lymphadenopathy.  -doubt vte : as she is already on elliquis:   -rpt abg in am    -check sputum cultures:  -already on zosyn ; check legionella still pending:   -she is still wheezing : would decrease steroids to 20 mg tid today for two days   -cont 20 mg tid today too and if she cont to do better : will decrease further :  -she is still wheezing today on 20th : cant decrease steroids : add Mucinex   -finishing antibiotics to-blood sputum cultures noted:  few gram  neg rods ad gram post jimmy cocci in pairs: identity pending:   feels better today on 21st:  ruslan change to po steroids for 4-5 days:    A Fib:  -cont Eliquis  HTN:  -controlled   dw ACP

## 2023-07-21 NOTE — PROGRESS NOTE ADULT - ASSESSMENT
This is a 72 year old female with pmh of COPD not on home O2, HTN, HLD, a-fib, Hypothyroidism, Psoriasis. Presents with SOB. Admitted with pneumonia    Plan:    # SOB r/t Pneumonia/ COPD exacerbation:  - No leukocytosis. Trops negative. . RVP negative  - CXR with Left lower lung field opacity concerning for pneumonia  - CT Chest with Persistent bronchiectasis and mucoid impaction at the lower lobes.   Increased tree-in-bud opacities in both lungs with patchy opacity at the   left lower lobe, suspicious for pneumonia  - Maintain FiO2>92%  - Sputum Cx pending  - IV abx to be completed today 7/21  - C/w Nebs/inhalers/ steroids  - TTE with moderate AS, no significant changes from 4/2023  - Pulm and ID following    # HTN/ HLD/ Afib/ 2 second pause:  - * with 2 second pause on tele-> asymptomatic  - Trend BP's  - C/w current meds  - Cards following    # ROBYN:  - Cr rising  - Monitor I/O's  - Renal following    # Hypothyroidism:  - C/w Synthroid    # HSV:  - C/w Acyclovir    # GI ppx:  - Bowel regimen prn    # DVT ppx:  - Eliquis    [facility]  [phone]   This is a 72 year old female with pmh of COPD not on home O2, HTN, HLD, a-fib, Hypothyroidism, Psoriasis. Presents with SOB. Admitted with pneumonia    Plan:    # SOB r/t Pneumonia/ COPD exacerbation:  - No leukocytosis. Trops negative. . RVP negative  - CXR with Left lower lung field opacity concerning for pneumonia  - CT Chest with Persistent bronchiectasis and mucoid impaction at the lower lobes.   Increased tree-in-bud opacities in both lungs with patchy opacity at the   left lower lobe, suspicious for pneumonia  - Maintain FiO2>92%  - Sputum Cx with normal sheba  - IV abx to be completed today 7/21  - C/w Nebs/inhalers/ steroids  - TTE with moderate AS, no significant changes from 4/2023  - Pulm and ID following    # HTN/ HLD/ Afib/ 2 second pause:  - * with 2 second pause on tele-> asymptomatic  - Trend BP's  - C/w current meds  - Cards following    # ROBYN:  - Cr rising  - Monitor I/O's  - Renal following    # Hypothyroidism:  - C/w Synthroid    # HSV:  - C/w Acyclovir    # GI ppx:  - Bowel regimen prn    # DVT ppx:  - Eliquis    [facility]  [phone]

## 2023-07-21 NOTE — PROGRESS NOTE ADULT - ASSESSMENT
Patient is a 72 year old female with PMH COPD not on home O2, former smoker - quit ~15 years ago after smoking for 40 years, HTN, Afib on Eliquis who presents with increasing shortness of breath in setting of on and off subjective fever (highest temp of 98F), cough, and green sputum production.    Hypoxia, COPD exacerbation  LLL pneumonia, bronchiectasis    - RVP negative on admission   - CXR with with LLL opacity   - CT chest with persistent bronchiectasis and mucoid impaction at the lower lobes, increased tree in bud opacities in both lungs with patchy opacity at LLL- suspicious for pneumonia    - Pulmonary following - on steroids-IV solumedrol, symbicort    - Strep pneumoniae and Legionella urine ags negative    - Sputum culture with normal resp sheba   - TTE with moderate AS, no significant changes from 4/2023   - afebrile, noted with leukocytosis-likely reactive to steroids-stable     Recommendations:  Continue on pip-tazo - will plan for 5 day course - end today 7/21  supportive care, supplemental O2 as needed  Monitor temps/WBC     Over the weekend Dr. Alverto Ross will be covering for our group.  If you have any questions, concerns or new micro data, please reach out to them at [phone].    Tran Araujo M.D.  [facility], Division of Infectious Diseases  [phone]  After 5pm on weekdays and all day on weekends - please call [phone]

## 2023-07-21 NOTE — PROGRESS NOTE ADULT - SUBJECTIVE AND OBJECTIVE BOX
SUBJECTIVE / OVERNIGHT EVENTS:    patient seen and examined  resting comfortably sitting up in bed  K and Cr improving     --------------------------------------------------------------------------------------------  LABS:                        10.9   15.90 )-----------( 355      ( 21 Jul 2023 05:38 )             34.2     07-21    137  |  103  |  47<H>  ----------------------------<  153<H>  4.8   |  25  |  1.48<H>    Ca    9.4      21 Jul 2023 05:38        CAPILLARY BLOOD GLUCOSE        CARDIAC MARKERS ( 20 Jul 2023 17:18 )  x     / x     / 46 U/L / x     / x          Urinalysis Basic - ( 21 Jul 2023 05:38 )    Color: x / Appearance: x / SG: x / pH: x  Gluc: 153 mg/dL / Ketone: x  / Bili: x / Urobili: x   Blood: x / Protein: x / Nitrite: x   Leuk Esterase: x / RBC: x / WBC x   Sq Epi: x / Non Sq Epi: x / Bacteria: x        RADIOLOGY & ADDITIONAL TESTS:    Imaging Personally Reviewed:  [x] YES  [ ] NO    Consultant(s) Notes Reviewed:  [x] YES  [ ] NO    MEDICATIONS  (STANDING):  acyclovir   Oral Tab/Cap 400 milliGRAM(s) Oral three times a day  apixaban 2.5 milliGRAM(s) Oral every 12 hours  artificial  tears Solution 1 Drop(s) Both EYES three times a day  budesonide 160 MICROgram(s)/formoterol 4.5 MICROgram(s) Inhaler 2 Puff(s) Inhalation two times a day  chlorhexidine 2% Cloths 1 Application(s) Topical <User Schedule>  guaiFENesin ER 1200 milliGRAM(s) Oral every 12 hours  hydrALAZINE 50 milliGRAM(s) Oral three times a day  levalbuterol Inhalation 0.63 milliGRAM(s) Inhalation every 6 hours  levothyroxine 50 MICROGram(s) Oral daily  losartan 50 milliGRAM(s) Oral daily  methylPREDNISolone sodium succinate Injectable 20 milliGRAM(s) IV Push every 8 hours  piperacillin/tazobactam IVPB.. 3.375 Gram(s) IV Intermittent every 8 hours    MEDICATIONS  (PRN):      Care Discussed with Consultants/Other Providers [x] YES  [ ] NO    Vital Signs Last 24 Hrs  T(C): 36.3 (21 Jul 2023 04:31), Max: 36.7 (20 Jul 2023 17:48)  T(F): 97.4 (21 Jul 2023 04:31), Max: 98.1 (20 Jul 2023 17:48)  HR: 65 (21 Jul 2023 04:31) (65 - 75)  BP: 137/68 (21 Jul 2023 04:31) (118/79 - 166/85)  BP(mean): --  RR: 17 (21 Jul 2023 04:31) (16 - 17)  SpO2: 92% (21 Jul 2023 04:31) (92% - 96%)    Parameters below as of 21 Jul 2023 04:31  Patient On (Oxygen Delivery Method): room air      I&O's Summary    20 Jul 2023 07:01  -  21 Jul 2023 07:00  --------------------------------------------------------  IN: 1240 mL / OUT: 1175 mL / NET: 65 mL    PHYSICAL EXAM:  GENERAL: NAD, well-developed, comfortable  HEAD:  Atraumatic, Normocephalic  EYES: EOMI, PERRLA, conjunctiva and sclera clear  NECK: Supple, No JVD  CHEST/LUNG: Rhonchi bilaterally; slight wheeze  HEART: Regular rate and rhythm; No murmurs, rubs, or gallops  ABDOMEN: Soft, Nontender, Nondistended; Bowel sounds present  NEURO: AAOx3, no focal weakness, 5/5 b/l extremity strength, b/l knee no arthritis, no effusion   EXTREMITIES:  2+ Peripheral Pulses, No clubbing, cyanosis, or edema  SKIN: No rashes or lesions

## 2023-07-21 NOTE — PROGRESS NOTE ADULT - ASSESSMENT
72 year old female with pmhx of COPD not on home O2, HTN, a-fib on Eliquis presenting with increasing shortness of breath in setting of on and off subjective fever (highest temp of 98F), cough, and green sputum production. She states her sob started around January 2023, she went to urgent care x 2, and hospitalized at [facility]. she has been treated for PNA and recovered. Now noticed worsening sob again on Tuesday PTA. Hospital course treated for PNA and COPD exacerbation. Now noticed with hyperkalemia and abnormal creatinine today      1- ROBYN, suspect CKDIII GFR 37  2- hyperkalemia  3- HTN  4- PNA  5- COPD      creatinine steady  received lokelma x 2 doses.  k in range  if hyperkalemic again, then hold arb  bladder scan without retention  low k diet  cpk in range  continue zosyn per ID recommendations  iv steroids per pulm  hydralazine 50 mg TID, trend bp  low TSH, recommend decrease synthroid  trend creatinine and electrolytes daily

## 2023-07-21 NOTE — PROGRESS NOTE ADULT - SUBJECTIVE AND OBJECTIVE BOX
DATE OF SERVICE: 07-21-23 @ 15:05    Patient is a 72y old  Female who presents with a chief complaint of The patient is a 72y Female complaining of shortness of breath. (21 Jul 2023 14:44)      INTERVAL HISTORY: Feels well on RA, denies chest pan and palpitations     REVIEW OF SYSTEMS:  CONSTITUTIONAL: No weakness  EYES/ENT: No visual changes;  No throat pain   NECK: No pain or stiffness  RESPIRATORY: No cough, wheezing; No shortness of breath  CARDIOVASCULAR: No chest pain or palpitations  GASTROINTESTINAL: No abdominal  pain. No nausea, vomiting, or hematemesis  GENITOURINARY: No dysuria, frequency or hematuria  NEUROLOGICAL: No stroke like symptoms  SKIN: No rashes    TELEMETRY Personally reviewed: SR 60-70 PACs   	  MEDICATIONS:  hydrALAZINE 50 milliGRAM(s) Oral three times a day  losartan 50 milliGRAM(s) Oral daily        PHYSICAL EXAM:  T(C): 36.7 (07-21-23 @ 11:21), Max: 36.7 (07-20-23 @ 17:48)  HR: 73 (07-21-23 @ 11:21) (65 - 75)  BP: 126/78 (07-21-23 @ 11:21) (126/78 - 166/85)  RR: 18 (07-21-23 @ 11:21) (17 - 18)  SpO2: 92% (07-21-23 @ 11:21) (92% - 96%)  Wt(kg): --  I&O's Summary    20 Jul 2023 07:01  -  21 Jul 2023 07:00  --------------------------------------------------------  IN: 1240 mL / OUT: 1175 mL / NET: 65 mL    21 Jul 2023 07:01  -  21 Jul 2023 15:05  --------------------------------------------------------  IN: 580 mL / OUT: 0 mL / NET: 580 mL          Appearance: In no distress	  HEENT:    PERRL, EOMI	  Cardiovascular:  S1 S2, No JVD  Respiratory: Lungs clear to auscultation	  Gastrointestinal:  Soft, Non-tender, + BS	  Vascularature:  No edema of LE  Psychiatric: Appropriate affect   Neuro: no acute focal deficits                               10.9   15.90 )-----------( 355      ( 21 Jul 2023 05:38 )             34.2     07-21    137  |  103  |  47<H>  ----------------------------<  153<H>  4.8   |  25  |  1.48<H>    Ca    9.4      21 Jul 2023 05:38          Labs personally reviewed      ASSESSMENT/PLAN: 	  This is a 72 year old female with pmh of COPD not on home O2, HTN, a-fib on Eliquis presenting with increasing shortness of breath in setting of subjective fever (highest temp of 98F), cough and green sputum production. Denies any chest pain or abdominal pain. + nausea but no vomiting. Reports exertional shortness of breath. No exertional chest pain. Denies any dysuria, urinary frequency, hematuria. Denies any recent surgery/travel. No lower extremity swelling.    Problem/Plan #1: Shortness of Breath  - ECG NSR with no ischemic characteristics  - Trop 20--> 20  - proBNP 913  - CXR with LLL opacity concerning for PNA  - CT Chest shows bronchiectasis, mucoid impaction concerning for PNA, non-specific mediastinal lymphadenopathy  - Has been afebrile, no leukocytosis  - Hx of COPD but not on home oxygen. Now requiring 3L NC.  - c/w IV Abx (Zocyn), IV steroids (solu-cortef), duonebs, supplemental oxygen as needed; Pulm consult appreciated.  - TTE from 2021 shows mild MR, AS and mild diastolic dysfunction --> repeat TTE unchanged from prior with preserved EF and moderate AS  - Most recent ischemic eval >2 years ago with NST which was reportedly normal    Problem/Plan #2: Atrial Fibrillation  - ECG reveals NSR  - Tele show NSR with occasional PVCs  - c/w Eliquis 2.5mg PO BID (On lower dose d/t chronic severe epistaxis as OP)   - Cont to monitor on tele    Problem/Plan #3: Hypertension  - c/w home meds including losartan 50mg PO daily and hydralazine 50mg PO TID    Problem/Plan #1: PPx Measures  - DVT: Eliquis  - Diet: Dash diet        MARY Vásquez,  [facility]  Cardiovascular Medicine  [address] [place] [address], [address]  Available via call or text on [employer] TE[employer]  Office: [phone]

## 2023-07-21 NOTE — PROGRESS NOTE ADULT - SUBJECTIVE AND OBJECTIVE BOX
[facility]   [phone]  RENAL FOLLOW UP NOTE  --------------------------------------------------------------------------------  Chief Complaint:    24 hour events/subjective:    patient seen and examined.   states breathing is improving    PAST HISTORY  --------------------------------------------------------------------------------  No significant changes to PMH, PSH, FHx, SHx, unless otherwise noted    ALLERGIES & MEDICATIONS  --------------------------------------------------------------------------------  Allergies    Sulfur (Unknown)  sulfa drugs (Unknown)  [This allergen will not trigger allergy alert] artichokes (Unknown)  Levaquin (Anaphylaxis)    Intolerances      Standing Inpatient Medications  acyclovir   Oral Tab/Cap 400 milliGRAM(s) Oral three times a day  apixaban 2.5 milliGRAM(s) Oral every 12 hours  artificial  tears Solution 1 Drop(s) Both EYES three times a day  budesonide 160 MICROgram(s)/formoterol 4.5 MICROgram(s) Inhaler 2 Puff(s) Inhalation two times a day  chlorhexidine 2% Cloths 1 Application(s) Topical <User Schedule>  guaiFENesin ER 1200 milliGRAM(s) Oral every 12 hours  hydrALAZINE 50 milliGRAM(s) Oral three times a day  levalbuterol Inhalation 0.63 milliGRAM(s) Inhalation every 6 hours  levothyroxine 50 MICROGram(s) Oral daily  losartan 50 milliGRAM(s) Oral daily  methylPREDNISolone sodium succinate Injectable 20 milliGRAM(s) IV Push every 8 hours  piperacillin/tazobactam IVPB.. 3.375 Gram(s) IV Intermittent every 8 hours    PRN Inpatient Medications      REVIEW OF SYSTEMS  --------------------------------------------------------------------------------    Gen: denies fevers/chills,  CVS: denies chest pain/palpitations  Resp: denies SOB/Cough  GI: Denies N/V/Abd pain  : Denies dysuria/oliguria/hematuria    VITALS/PHYSICAL EXAM  --------------------------------------------------------------------------------  T(C): 36.3 (07-21-23 @ 04:31), Max: 36.7 (07-20-23 @ 17:48)  HR: 65 (07-21-23 @ 04:31) (65 - 82)  BP: 137/68 (07-21-23 @ 04:31) (118/79 - 166/85)  RR: 17 (07-21-23 @ 04:31) (16 - 18)  SpO2: 92% (07-21-23 @ 04:31) (92% - 96%)  Wt(kg): --        07-20-23 @ 07:01  -  07-21-23 @ 07:00  --------------------------------------------------------  IN: 1240 mL / OUT: 1175 mL / NET: 65 mL      Physical Exam:  	  	Gen: Non toxic comfortable appearing   	Pulm: decrease bs, +coarse, +wheeze    	CV: No JVD. RRR, S1S2; no rub  	Abd: +BS, soft, nontender/nondistended  	: No suprapubic distention  	UE: Warm, no cyanosis  no clubbing,  no edema  	LE: Warm, no cyanosis  no clubbing, no edema     LABS/STUDIES  --------------------------------------------------------------------------------              10.9   15.90 >-----------<  355      [07-21-23 @ 05:38]              34.2     137  |  103  |  47  ----------------------------<  153      [07-21-23 @ 05:38]  4.8   |  25  |  1.48        Ca     9.4     [07-21-23 @ 05:38]          CK 46      [07-20-23 @ 17:18]    Creatinine Trend:  SCr 1.48 [07-21 @ 05:38]  SCr 1.49 [07-20 @ 17:18]  SCr 1.55 [07-20 @ 05:58]  SCr 1.28 [07-19 @ 06:11]  SCr 1.28 [07-18 @ 06:33]              Urinalysis - [07-21-23 @ 05:38]      Color  / Appearance  / SG  / pH       Gluc 153 / Ketone   / Bili  / Urobili        Blood  / Protein  / Leuk Est  / Nitrite       RBC  / WBC  / Hyaline  / Gran  / Sq Epi  / Non Sq Epi  / Bacteria     Urine Osmolality 170      [07-21-23 @ 00:27]    Iron 63, TIBC 329, %sat 19      [11-01-22 @ 06:30]  Ferritin 55      [11-01-22 @ 06:30]  TSH 0.17      [07-19-23 @ 10:00]

## 2023-07-22 LAB
-  AMIKACIN: SIGNIFICANT CHANGE UP
-  AZTREONAM: SIGNIFICANT CHANGE UP
-  CEFEPIME: SIGNIFICANT CHANGE UP
-  CEFTAZIDIME: SIGNIFICANT CHANGE UP
-  CIPROFLOXACIN: SIGNIFICANT CHANGE UP
-  GENTAMICIN: SIGNIFICANT CHANGE UP
-  IMIPENEM: SIGNIFICANT CHANGE UP
-  LEVOFLOXACIN: SIGNIFICANT CHANGE UP
-  MEROPENEM: SIGNIFICANT CHANGE UP
-  PIPERACILLIN/TAZOBACTAM: SIGNIFICANT CHANGE UP
-  TOBRAMYCIN: SIGNIFICANT CHANGE UP
ANION GAP SERPL CALC-SCNC: 10 MMOL/L — SIGNIFICANT CHANGE UP (ref 5–17)
BUN SERPL-MCNC: 44 MG/DL — HIGH (ref 7–23)
CALCIUM SERPL-MCNC: 9.4 MG/DL — SIGNIFICANT CHANGE UP (ref 8.4–10.5)
CHLORIDE SERPL-SCNC: 102 MMOL/L — SIGNIFICANT CHANGE UP (ref 96–108)
CO2 SERPL-SCNC: 26 MMOL/L — SIGNIFICANT CHANGE UP (ref 22–31)
CREAT SERPL-MCNC: 1.48 MG/DL — HIGH (ref 0.5–1.3)
CULTURE RESULTS: SIGNIFICANT CHANGE UP
EGFR: 37 ML/MIN/1.73M2 — LOW
GLUCOSE SERPL-MCNC: 97 MG/DL — SIGNIFICANT CHANGE UP (ref 70–99)
HCT VFR BLD CALC: 37.1 % — SIGNIFICANT CHANGE UP (ref 34.5–45)
HGB BLD-MCNC: 11.5 G/DL — SIGNIFICANT CHANGE UP (ref 11.5–15.5)
MAGNESIUM SERPL-MCNC: 2.3 MG/DL — SIGNIFICANT CHANGE UP (ref 1.6–2.6)
MCHC RBC-ENTMCNC: 29.3 PG — SIGNIFICANT CHANGE UP (ref 27–34)
MCHC RBC-ENTMCNC: 31 GM/DL — LOW (ref 32–36)
MCV RBC AUTO: 94.4 FL — SIGNIFICANT CHANGE UP (ref 80–100)
METHOD TYPE: SIGNIFICANT CHANGE UP
NRBC # BLD: 0 /100 WBCS — SIGNIFICANT CHANGE UP (ref 0–0)
ORGANISM # SPEC MICROSCOPIC CNT: SIGNIFICANT CHANGE UP
ORGANISM # SPEC MICROSCOPIC CNT: SIGNIFICANT CHANGE UP
PHOSPHATE SERPL-MCNC: 3.2 MG/DL — SIGNIFICANT CHANGE UP (ref 2.5–4.5)
PLATELET # BLD AUTO: 359 K/UL — SIGNIFICANT CHANGE UP (ref 150–400)
POTASSIUM SERPL-MCNC: 4.8 MMOL/L — SIGNIFICANT CHANGE UP (ref 3.5–5.3)
POTASSIUM SERPL-SCNC: 4.8 MMOL/L — SIGNIFICANT CHANGE UP (ref 3.5–5.3)
RBC # BLD: 3.93 M/UL — SIGNIFICANT CHANGE UP (ref 3.8–5.2)
RBC # FLD: 14.9 % — HIGH (ref 10.3–14.5)
SODIUM SERPL-SCNC: 138 MMOL/L — SIGNIFICANT CHANGE UP (ref 135–145)
SPECIMEN SOURCE: SIGNIFICANT CHANGE UP
T3 SERPL-MCNC: 50 NG/DL — LOW (ref 80–200)
T4 AB SER-ACNC: 8.3 UG/DL — SIGNIFICANT CHANGE UP (ref 4.6–12)
WBC # BLD: 13.1 K/UL — HIGH (ref 3.8–10.5)
WBC # FLD AUTO: 13.1 K/UL — HIGH (ref 3.8–10.5)

## 2023-07-22 RX ORDER — HYDRALAZINE HCL 50 MG
25 TABLET ORAL
Refills: 0 | Status: DISCONTINUED | OUTPATIENT
Start: 2023-07-22 | End: 2023-07-23

## 2023-07-22 RX ORDER — METOPROLOL TARTRATE 50 MG
25 TABLET ORAL DAILY
Refills: 0 | Status: DISCONTINUED | OUTPATIENT
Start: 2023-07-22 | End: 2023-07-23

## 2023-07-22 RX ADMIN — Medication 1 DROP(S): at 06:01

## 2023-07-22 RX ADMIN — LEVALBUTEROL 0.63 MILLIGRAM(S): 1.25 SOLUTION, CONCENTRATE RESPIRATORY (INHALATION) at 17:55

## 2023-07-22 RX ADMIN — LOSARTAN POTASSIUM 50 MILLIGRAM(S): 100 TABLET, FILM COATED ORAL at 06:01

## 2023-07-22 RX ADMIN — LEVALBUTEROL 0.63 MILLIGRAM(S): 1.25 SOLUTION, CONCENTRATE RESPIRATORY (INHALATION) at 11:05

## 2023-07-22 RX ADMIN — LEVALBUTEROL 0.63 MILLIGRAM(S): 1.25 SOLUTION, CONCENTRATE RESPIRATORY (INHALATION) at 06:00

## 2023-07-22 RX ADMIN — APIXABAN 2.5 MILLIGRAM(S): 2.5 TABLET, FILM COATED ORAL at 17:55

## 2023-07-22 RX ADMIN — Medication 400 MILLIGRAM(S): at 22:14

## 2023-07-22 RX ADMIN — Medication 1200 MILLIGRAM(S): at 17:54

## 2023-07-22 RX ADMIN — Medication 400 MILLIGRAM(S): at 15:20

## 2023-07-22 RX ADMIN — Medication 1 DROP(S): at 22:14

## 2023-07-22 RX ADMIN — BUDESONIDE AND FORMOTEROL FUMARATE DIHYDRATE 2 PUFF(S): 160; 4.5 AEROSOL RESPIRATORY (INHALATION) at 17:55

## 2023-07-22 RX ADMIN — Medication 400 MILLIGRAM(S): at 06:03

## 2023-07-22 RX ADMIN — Medication 1 DROP(S): at 15:20

## 2023-07-22 RX ADMIN — BUDESONIDE AND FORMOTEROL FUMARATE DIHYDRATE 2 PUFF(S): 160; 4.5 AEROSOL RESPIRATORY (INHALATION) at 06:02

## 2023-07-22 RX ADMIN — Medication 40 MILLIGRAM(S): at 06:03

## 2023-07-22 RX ADMIN — Medication 50 MILLIGRAM(S): at 06:00

## 2023-07-22 RX ADMIN — Medication 50 MICROGRAM(S): at 06:03

## 2023-07-22 RX ADMIN — PIPERACILLIN AND TAZOBACTAM 25 GRAM(S): 4; .5 INJECTION, POWDER, LYOPHILIZED, FOR SOLUTION INTRAVENOUS at 06:04

## 2023-07-22 RX ADMIN — Medication 25 MILLIGRAM(S): at 17:54

## 2023-07-22 RX ADMIN — Medication 1200 MILLIGRAM(S): at 06:02

## 2023-07-22 RX ADMIN — APIXABAN 2.5 MILLIGRAM(S): 2.5 TABLET, FILM COATED ORAL at 06:01

## 2023-07-22 NOTE — PROGRESS NOTE ADULT - SUBJECTIVE AND OBJECTIVE BOX
DATE OF SERVICE: 07-22-23 @ 11:44    Patient is a 72y old  Female who presents with a chief complaint of The patient is a 72y Female complaining of shortness of breath. (21 Jul 2023 15:05)      INTERVAL HISTORY: Feels much better.     REVIEW OF SYSTEMS:  CONSTITUTIONAL: No weakness  EYES/ENT: No visual changes;  No throat pain   NECK: No pain or stiffness  RESPIRATORY: No cough, wheezing; No shortness of breath  CARDIOVASCULAR: No chest pain or palpitations  GASTROINTESTINAL: No abdominal  pain. No nausea, vomiting, or hematemesis  GENITOURINARY: No dysuria, frequency or hematuria  NEUROLOGICAL: No stroke like symptoms  SKIN: No rashes    TELEMETRY Personally reviewed: SR  12 beats of WCT  	  MEDICATIONS:  hydrALAZINE 50 milliGRAM(s) Oral three times a day  losartan 50 milliGRAM(s) Oral daily        PHYSICAL EXAM:  T(C): 37 (07-22-23 @ 11:17), Max: 37 (07-22-23 @ 11:17)  HR: 73 (07-22-23 @ 11:17) (66 - 81)  BP: 129/76 (07-22-23 @ 11:17) (114/79 - 129/76)  RR: 18 (07-22-23 @ 11:17) (18 - 18)  SpO2: 92% (07-22-23 @ 11:17) (91% - 92%)  Wt(kg): --  I&O's Summary    21 Jul 2023 07:01  -  22 Jul 2023 07:00  --------------------------------------------------------  IN: 880 mL / OUT: 0 mL / NET: 880 mL          Appearance: In no distress	  HEENT:    PERRL, EOMI	  Cardiovascular:  S1 S2, No JVD  Respiratory: Lungs clear to auscultation	  Gastrointestinal:  Soft, Non-tender, + BS	  Vascularature:  No edema of LE  Psychiatric: Appropriate affect   Neuro: no acute focal deficits                               11.5   13.10 )-----------( 359      ( 22 Jul 2023 07:13 )             37.1     07-22    138  |  102  |  44<H>  ----------------------------<  97  4.8   |  26  |  1.48<H>    Ca    9.4      22 Jul 2023 07:12  Phos  3.2     07-22  Mg     2.3     07-22          Labs personally reviewed      ASSESSMENT/PLAN: 	  This is a 72 year old female with pmh of COPD not on home O2, HTN, a-fib on Eliquis presenting with increasing shortness of breath in setting of subjective fever (highest temp of 98F), cough and green sputum production. Denies any chest pain or abdominal pain. + nausea but no vomiting. Reports exertional shortness of breath. No exertional chest pain. Denies any dysuria, urinary frequency, hematuria. Denies any recent surgery/travel. No lower extremity swelling.    Problem/Plan #1: Shortness of Breath  - ECG NSR with no ischemic characteristics  - Trop 20--> 20  - proBNP 913  - CXR with LLL opacity concerning for PNA  - CT Chest shows bronchiectasis, mucoid impaction concerning for PNA, non-specific mediastinal lymphadenopathy  - Has been afebrile, no leukocytosis  - Hx of COPD but not on home oxygen. Now saturating wnl on RA  - s/p IV Abx (Zocyn), IV steroids now transitioned to prednisone taper, duonebs  - TTE from 2021 shows mild MR, AS and mild diastolic dysfunction --> repeat TTE unchanged from prior with preserved EF and moderate AS  - Most recent ischemic eval >2 years ago with NST which was reportedly normal    Problem/Plan #2: Atrial Fibrillation  - ECG reveals NSR  - Tele show NSR with occasional PVCs  - c/w Eliquis 2.5mg PO BID (On lower dose d/t chronic severe epistaxis as OP)   - Cont to monitor on tele    Problem/Plan #3: Hypertension  - c/w home meds including losartan 50mg PO daily and hydralazine 50mg PO TID    Problem/Plan #4: PPx Measures  - DVT: Eliquis  - Diet: Dash diet    Problem/Plan #5: NSVT  - Patient with 12 beats of NSVT on tele  - Asymptomatic  - TTE with preserved EF, no WMA, moderate AS  - electrolytes wnl  - If she continues to have additional ventricular ectopy, can consider low dose BB.  - Will cont to trend on tele        DARLENE Long,  [facility]  Cardiovascular Medicine  [address] [facility], [address]  Available through call or text on [employer] TEAM[employer]  Office: [phone]   DATE OF SERVICE: 07-22-23 @ 11:44    Patient is a 72y old  Female who presents with a chief complaint of The patient is a 72y Female complaining of shortness of breath. (21 Jul 2023 15:05)      INTERVAL HISTORY: Feels much better.     REVIEW OF SYSTEMS:  CONSTITUTIONAL: No weakness  EYES/ENT: No visual changes;  No throat pain   NECK: No pain or stiffness  RESPIRATORY: No cough, wheezing; No shortness of breath  CARDIOVASCULAR: No chest pain or palpitations  GASTROINTESTINAL: No abdominal  pain. No nausea, vomiting, or hematemesis  GENITOURINARY: No dysuria, frequency or hematuria  NEUROLOGICAL: No stroke like symptoms  SKIN: No rashes    TELEMETRY Personally reviewed: SR  12 beats of WCT  	  MEDICATIONS:  hydrALAZINE 50 milliGRAM(s) Oral three times a day  losartan 50 milliGRAM(s) Oral daily        PHYSICAL EXAM:  T(C): 37 (07-22-23 @ 11:17), Max: 37 (07-22-23 @ 11:17)  HR: 73 (07-22-23 @ 11:17) (66 - 81)  BP: 129/76 (07-22-23 @ 11:17) (114/79 - 129/76)  RR: 18 (07-22-23 @ 11:17) (18 - 18)  SpO2: 92% (07-22-23 @ 11:17) (91% - 92%)  Wt(kg): --  I&O's Summary    21 Jul 2023 07:01  -  22 Jul 2023 07:00  --------------------------------------------------------  IN: 880 mL / OUT: 0 mL / NET: 880 mL          Appearance: In no distress	  HEENT:    PERRL, EOMI	  Cardiovascular:  S1 S2, No JVD  Respiratory: Lungs clear to auscultation	  Gastrointestinal:  Soft, Non-tender, + BS	  Vascularature:  No edema of LE  Psychiatric: Appropriate affect   Neuro: no acute focal deficits                               11.5   13.10 )-----------( 359      ( 22 Jul 2023 07:13 )             37.1     07-22    138  |  102  |  44<H>  ----------------------------<  97  4.8   |  26  |  1.48<H>    Ca    9.4      22 Jul 2023 07:12  Phos  3.2     07-22  Mg     2.3     07-22          Labs personally reviewed      ASSESSMENT/PLAN: 	  This is a 72 year old female with pmh of COPD not on home O2, HTN, a-fib on Eliquis presenting with increasing shortness of breath in setting of subjective fever (highest temp of 98F), cough and green sputum production. Denies any chest pain or abdominal pain. + nausea but no vomiting. Reports exertional shortness of breath. No exertional chest pain. Denies any dysuria, urinary frequency, hematuria. Denies any recent surgery/travel. No lower extremity swelling.    Problem/Plan #1: Shortness of Breath  - ECG NSR with no ischemic characteristics  - Trop 20--> 20  - proBNP 913  - CXR with LLL opacity concerning for PNA  - CT Chest shows bronchiectasis, mucoid impaction concerning for PNA, non-specific mediastinal lymphadenopathy  - Has been afebrile, no leukocytosis  - Hx of COPD but not on home oxygen. Now saturating wnl on RA  - s/p IV Abx (Zocyn), IV steroids now transitioned to prednisone taper, duonebs  - TTE from 2021 shows mild MR, AS and mild diastolic dysfunction --> repeat TTE unchanged from prior with preserved EF and moderate AS  - Most recent ischemic eval >2 years ago with NST which was reportedly normal    Problem/Plan #2: Atrial Fibrillation  - ECG reveals NSR  - Tele show NSR with occasional PVCs  - c/w Eliquis 2.5mg PO BID (On lower dose d/t chronic severe epistaxis as OP)   - Cont to monitor on tele    Problem/Plan #3: Hypertension  - c/w home meds including losartan 50mg PO daily and hydralazine 50mg PO TID    Problem/Plan #4: PPx Measures  - DVT: Eliquis  - Diet: Dash diet    Problem/Plan #5: NSVT  - Patient with 12 beats of NSVT on tele  - Asymptomatic  - TTE with preserved EF, no WMA, moderate AS  - electrolytes wnl  - Will start Toprol 25mg PO daily.       DARLENE Long DO [facility]  Cardiovascular Medicine  [address] [facility], [address]  Available through call or text on [employer] TEAM[employer]  Office: [phone]   DATE OF SERVICE: 07-22-23 @ 11:44    Patient is a 72y old  Female who presents with a chief complaint of The patient is a 72y Female complaining of shortness of breath. (21 Jul 2023 15:05)      INTERVAL HISTORY: Feels much better.     REVIEW OF SYSTEMS:  CONSTITUTIONAL: No weakness  EYES/ENT: No visual changes;  No throat pain   NECK: No pain or stiffness  RESPIRATORY: No cough, wheezing; No shortness of breath  CARDIOVASCULAR: No chest pain or palpitations  GASTROINTESTINAL: No abdominal  pain. No nausea, vomiting, or hematemesis  GENITOURINARY: No dysuria, frequency or hematuria  NEUROLOGICAL: No stroke like symptoms  SKIN: No rashes    TELEMETRY Personally reviewed: SR  12 beats of WCT  	  MEDICATIONS:  hydrALAZINE 50 milliGRAM(s) Oral three times a day  losartan 50 milliGRAM(s) Oral daily        PHYSICAL EXAM:  T(C): 37 (07-22-23 @ 11:17), Max: 37 (07-22-23 @ 11:17)  HR: 73 (07-22-23 @ 11:17) (66 - 81)  BP: 129/76 (07-22-23 @ 11:17) (114/79 - 129/76)  RR: 18 (07-22-23 @ 11:17) (18 - 18)  SpO2: 92% (07-22-23 @ 11:17) (91% - 92%)  Wt(kg): --  I&O's Summary    21 Jul 2023 07:01  -  22 Jul 2023 07:00  --------------------------------------------------------  IN: 880 mL / OUT: 0 mL / NET: 880 mL          Appearance: In no distress	  HEENT:    PERRL, EOMI	  Cardiovascular:  S1 S2, No JVD  Respiratory: Lungs clear to auscultation	  Gastrointestinal:  Soft, Non-tender, + BS	  Vascularature:  No edema of LE  Psychiatric: Appropriate affect   Neuro: no acute focal deficits                               11.5   13.10 )-----------( 359      ( 22 Jul 2023 07:13 )             37.1     07-22    138  |  102  |  44<H>  ----------------------------<  97  4.8   |  26  |  1.48<H>    Ca    9.4      22 Jul 2023 07:12  Phos  3.2     07-22  Mg     2.3     07-22          Labs personally reviewed      ASSESSMENT/PLAN: 	  This is a 72 year old female with pmh of COPD not on home O2, HTN, a-fib on Eliquis presenting with increasing shortness of breath in setting of subjective fever (highest temp of 98F), cough and green sputum production. Denies any chest pain or abdominal pain. + nausea but no vomiting. Reports exertional shortness of breath. No exertional chest pain. Denies any dysuria, urinary frequency, hematuria. Denies any recent surgery/travel. No lower extremity swelling.    Problem/Plan #1: Shortness of Breath  - ECG NSR with no ischemic characteristics  - Trop 20--> 20  - proBNP 913  - CXR with LLL opacity concerning for PNA  - CT Chest shows bronchiectasis, mucoid impaction concerning for PNA, non-specific mediastinal lymphadenopathy  - Has been afebrile, no leukocytosis  - Hx of COPD but not on home oxygen. Now saturating wnl on RA  - s/p IV Abx (Zocyn), IV steroids now transitioned to prednisone taper, duonebs  - TTE from 2021 shows mild MR, AS and mild diastolic dysfunction --> repeat TTE unchanged from prior with preserved EF and moderate AS  - Most recent ischemic eval >2 years ago with NST which was reportedly normal    Problem/Plan #2: Atrial Fibrillation  - ECG reveals NSR  - Tele show NSR with occasional PVCs  - c/w Eliquis 2.5mg PO BID (On lower dose d/t chronic severe epistaxis as OP)   - Cont to monitor on tele    Problem/Plan #3: Hypertension  - c/w home meds including losartan 50mg PO daily   - Will reduce Hydralazine to 25mg PO BID to create room in BP for BB d/t NSVT    Problem/Plan #4: PPx Measures  - DVT: Eliquis  - Diet: Dash diet    Problem/Plan #5: NSVT  - Patient with 12 beats of NSVT on tele  - Asymptomatic  - TTE with preserved EF, no WMA, moderate AS  - electrolytes wnl  - Will start Toprol 25mg PO daily.       YAMILETH Long-DAYO Holland,  [facility]  Cardiovascular Medicine  [address] [place] [address], [address]  Available through call or text on [employer] TE[employer]  Office: [phone]

## 2023-07-22 NOTE — PROGRESS NOTE ADULT - SUBJECTIVE AND OBJECTIVE BOX
[facility]   [phone]  RENAL FOLLOW UP NOTE  --------------------------------------------------------------------------------  Chief Complaint:    24 hour events/subjective:    seen earlier states still wheezing and intermittent sob     PAST HISTORY  --------------------------------------------------------------------------------  No significant changes to PMH, PSH, FHx, SHx, unless otherwise noted    ALLERGIES & MEDICATIONS  --------------------------------------------------------------------------------  Allergies    Sulfur (Unknown)  sulfa drugs (Unknown)  [This allergen will not trigger allergy alert] artichokes (Unknown)  Levaquin (Anaphylaxis)    Intolerances      Standing Inpatient Medications  acyclovir   Oral Tab/Cap 400 milliGRAM(s) Oral three times a day  apixaban 2.5 milliGRAM(s) Oral every 12 hours  artificial  tears Solution 1 Drop(s) Both EYES three times a day  budesonide 160 MICROgram(s)/formoterol 4.5 MICROgram(s) Inhaler 2 Puff(s) Inhalation two times a day  chlorhexidine 2% Cloths 1 Application(s) Topical <User Schedule>  guaiFENesin ER 1200 milliGRAM(s) Oral every 12 hours  hydrALAZINE 25 milliGRAM(s) Oral two times a day  levalbuterol Inhalation 0.63 milliGRAM(s) Inhalation every 6 hours  levothyroxine 50 MICROGram(s) Oral daily  losartan 50 milliGRAM(s) Oral daily  metoprolol succinate ER 25 milliGRAM(s) Oral daily  predniSONE   Tablet 40 milliGRAM(s) Oral daily    PRN Inpatient Medications      REVIEW OF SYSTEMS  --------------------------------------------------------------------------------    Gen: denies  fevers/chills,  CVS: denies chest pain/palpitations  Resp:  + SOB/Cough  GI: Denies N/V/Abd pain  : Denies dysuria/oliguria/hematuria    VITALS/PHYSICAL EXAM  --------------------------------------------------------------------------------  T(C): 36.8 (07-22-23 @ 20:55), Max: 37 (07-22-23 @ 11:17)  HR: 68 (07-22-23 @ 20:55) (66 - 73)  BP: 131/74 (07-22-23 @ 20:55) (122/72 - 131/74)  RR: 18 (07-22-23 @ 20:55) (18 - 18)  SpO2: 93% (07-22-23 @ 20:55) (92% - 93%)  Wt(kg): --        07-21-23 @ 07:01  -  07-22-23 @ 07:00  --------------------------------------------------------  IN: 880 mL / OUT: 0 mL / NET: 880 mL    07-22-23 @ 07:01  -  07-22-23 @ 22:18  --------------------------------------------------------  IN: 480 mL / OUT: 0 mL / NET: 480 mL      Physical Exam:  	    Gen: Non toxic comfortable appearing   	Pulm: decrease bs, +coarse, +wheeze    	CV: No JVD. RRR, S1S2; no rub  	Abd: +BS, soft, nontender/nondistended  	: No suprapubic distention  	UE: Warm, no cyanosis  no clubbing,  no edema  	LE: Warm, no cyanosis  no clubbing, no edema     LABS/STUDIES  --------------------------------------------------------------------------------              11.5   13.10 >-----------<  359      [07-22-23 @ 07:13]              37.1     138  |  102  |  44  ----------------------------<  97      [07-22-23 @ 07:12]  4.8   |  26  |  1.48        Ca     9.4     [07-22-23 @ 07:12]      Mg     2.3     [07-22-23 @ 07:12]      Phos  3.2     [07-22-23 @ 07:12]            Creatinine Trend:  SCr 1.48 [07-22 @ 07:12]  SCr 1.48 [07-21 @ 05:38]  SCr 1.49 [07-20 @ 17:18]  SCr 1.55 [07-20 @ 05:58]  SCr 1.28 [07-19 @ 06:11]              Urinalysis - [07-22-23 @ 07:12]      Color  / Appearance  / SG  / pH       Gluc 97 / Ketone   / Bili  / Urobili        Blood  / Protein  / Leuk Est  / Nitrite       RBC  / WBC  / Hyaline  / Gran  / Sq Epi  / Non Sq Epi  / Bacteria     Urine Osmolality 170      [07-21-23 @ 00:27]    Iron 63, TIBC 329, %sat 19      [11-01-22 @ 06:30]  Ferritin 55      [11-01-22 @ 06:30]  TSH 0.17      [07-19-23 @ 10:00]

## 2023-07-22 NOTE — PROVIDER CONTACT NOTE (OTHER) - BACKGROUND
pt admitted for COPD exacerbation. pmhx of AF w/ RVR. pt reverts back & forth from NSR & AF.
Pt. admitted with COPD exacerbation and PNA. Pt. has history of Afib.

## 2023-07-22 NOTE — PROGRESS NOTE ADULT - SUBJECTIVE AND OBJECTIVE BOX
SUBJECTIVE / OVERNIGHT EVENTS:    pt feeling better still wheezing but has improved     --------------------------------------------------------------------------------------------  LABS:                        10.9   15.90 )-----------( 355      ( 21 Jul 2023 05:38 )             34.2     07-21    137  |  103  |  47<H>  ----------------------------<  153<H>  4.8   |  25  |  1.48<H>    Ca    9.4      21 Jul 2023 05:38        CAPILLARY BLOOD GLUCOSE        CARDIAC MARKERS ( 20 Jul 2023 17:18 )  x     / x     / 46 U/L / x     / x          Urinalysis Basic - ( 21 Jul 2023 05:38 )    Color: x / Appearance: x / SG: x / pH: x  Gluc: 153 mg/dL / Ketone: x  / Bili: x / Urobili: x   Blood: x / Protein: x / Nitrite: x   Leuk Esterase: x / RBC: x / WBC x   Sq Epi: x / Non Sq Epi: x / Bacteria: x        RADIOLOGY & ADDITIONAL TESTS:    Imaging Personally Reviewed:  [x] YES  [ ] NO    Consultant(s) Notes Reviewed:  [x] YES  [ ] NO    MEDICATIONS  (STANDING):  acyclovir   Oral Tab/Cap 400 milliGRAM(s) Oral three times a day  apixaban 2.5 milliGRAM(s) Oral every 12 hours  artificial  tears Solution 1 Drop(s) Both EYES three times a day  budesonide 160 MICROgram(s)/formoterol 4.5 MICROgram(s) Inhaler 2 Puff(s) Inhalation two times a day  chlorhexidine 2% Cloths 1 Application(s) Topical <User Schedule>  guaiFENesin ER 1200 milliGRAM(s) Oral every 12 hours  hydrALAZINE 50 milliGRAM(s) Oral three times a day  levalbuterol Inhalation 0.63 milliGRAM(s) Inhalation every 6 hours  levothyroxine 50 MICROGram(s) Oral daily  losartan 50 milliGRAM(s) Oral daily  methylPREDNISolone sodium succinate Injectable 20 milliGRAM(s) IV Push every 8 hours  piperacillin/tazobactam IVPB.. 3.375 Gram(s) IV Intermittent every 8 hours    MEDICATIONS  (PRN):      Care Discussed with Consultants/Other Providers [x] YES  [ ] NO    Vital Signs Last 24 Hrs  T(C): 36.3 (21 Jul 2023 04:31), Max: 36.7 (20 Jul 2023 17:48)  T(F): 97.4 (21 Jul 2023 04:31), Max: 98.1 (20 Jul 2023 17:48)  HR: 65 (21 Jul 2023 04:31) (65 - 75)  BP: 137/68 (21 Jul 2023 04:31) (118/79 - 166/85)  BP(mean): --  RR: 17 (21 Jul 2023 04:31) (16 - 17)  SpO2: 92% (21 Jul 2023 04:31) (92% - 96%)    Parameters below as of 21 Jul 2023 04:31  Patient On (Oxygen Delivery Method): room air      I&O's Summary    20 Jul 2023 07:01  -  21 Jul 2023 07:00  --------------------------------------------------------  IN: 1240 mL / OUT: 1175 mL / NET: 65 mL    PHYSICAL EXAM:  GENERAL: NAD, well-developed, comfortable  HEAD:  Atraumatic, Normocephalic  EYES: EOMI, PERRLA, conjunctiva and sclera clear  NECK: Supple, No JVD  CHEST/LUNG: Rhonchi bilaterally; slight wheeze  HEART: Regular rate and rhythm; No murmurs, rubs, or gallops  ABDOMEN: Soft, Nontender, Nondistended; Bowel sounds present  NEURO: AAOx3, no focal weakness, 5/5 b/l extremity strength, b/l knee no arthritis, no effusion   EXTREMITIES:  2+ Peripheral Pulses, No clubbing, cyanosis, or edema  SKIN: No rashes or lesions

## 2023-07-22 NOTE — PROGRESS NOTE ADULT - ASSESSMENT
This is a 72 year old female with pmh of COPD not on home O2, HTN, HLD, a-fib, Hypothyroidism, Psoriasis. Presents with SOB. Admitted with pneumonia    Plan:    PNA - completed abx     COPD Exacerbation -c/w steroids improving slowly      # HTN/ HLD/ Afib cardio fu noted c/w current meds as per cardio     # ROBYN nephro eval called avoid nephro toxic meds renal dose meds     # Hypothyroidism:  - C/w Synthroid    # HSV:  - C/w Acyclovir    # GI ppx:  - Bowel regimen prn    # DVT ppx:  - Eliquis    [facility]  [phone]

## 2023-07-22 NOTE — PROGRESS NOTE ADULT - SUBJECTIVE AND OBJECTIVE BOX
Date of Service: 07-22-23 @ 12:49    Patient is a 72y old  Female who presents with a chief complaint of The patient is a 72y Female complaining of shortness of breath. (22 Jul 2023 11:44)      Any change in ROS: she looks much better to me; no sob: no wheezing:     MEDICATIONS  (STANDING):  acyclovir   Oral Tab/Cap 400 milliGRAM(s) Oral three times a day  apixaban 2.5 milliGRAM(s) Oral every 12 hours  artificial  tears Solution 1 Drop(s) Both EYES three times a day  budesonide 160 MICROgram(s)/formoterol 4.5 MICROgram(s) Inhaler 2 Puff(s) Inhalation two times a day  chlorhexidine 2% Cloths 1 Application(s) Topical <User Schedule>  guaiFENesin ER 1200 milliGRAM(s) Oral every 12 hours  hydrALAZINE 50 milliGRAM(s) Oral three times a day  levalbuterol Inhalation 0.63 milliGRAM(s) Inhalation every 6 hours  levothyroxine 50 MICROGram(s) Oral daily  losartan 50 milliGRAM(s) Oral daily  predniSONE   Tablet 40 milliGRAM(s) Oral daily    MEDICATIONS  (PRN):    Vital Signs Last 24 Hrs  T(C): 37 (22 Jul 2023 11:17), Max: 37 (22 Jul 2023 11:17)  T(F): 98.6 (22 Jul 2023 11:17), Max: 98.6 (22 Jul 2023 11:17)  HR: 73 (22 Jul 2023 11:17) (66 - 81)  BP: 129/76 (22 Jul 2023 11:17) (114/79 - 129/76)  BP(mean): --  RR: 18 (22 Jul 2023 11:17) (18 - 18)  SpO2: 92% (22 Jul 2023 11:17) (91% - 92%)    Parameters below as of 22 Jul 2023 11:17  Patient On (Oxygen Delivery Method): room air        I&O's Summary    21 Jul 2023 07:01  -  22 Jul 2023 07:00  --------------------------------------------------------  IN: 880 mL / OUT: 0 mL / NET: 880 mL          Physical Exam:   GENERAL: NAD, well-groomed, well-developed  HEENT: FALLON/   Atraumatic, Normocephalic  ENMT: No tonsillar erythema, exudates, or enlargement; Moist mucous membranes, Good dentition, No lesions  NECK: Supple, No JVD, Normal thyroid  CHEST/LUNG: Clear to auscultaion- no wheezing  CVS: Regular rate and rhythm; No murmurs, rubs, or gallops  GI: : Soft, Nontender, Nondistended; Bowel sounds present  NERVOUS SYSTEM:  Alert & Oriented X3  EXTREMITIES:  2+ Peripheral Pulses, No clubbing, cyanosis, or edema  LYMPH: No lymphadenopathy noted  SKIN: No rashes or lesions  ENDOCRINOLOGY: No Thyromegaly  PSYCH: Appropriate    Labs:  26  CARDIAC MARKERS ( 20 Jul 2023 17:18 )  x     / x     / 46 U/L / x     / x                                11.5   13.10 )-----------( 359      ( 22 Jul 2023 07:13 )             37.1                         10.9   15.90 )-----------( 355      ( 21 Jul 2023 05:38 )             34.2                         11.1   15.70 )-----------( 371      ( 20 Jul 2023 05:57 )             35.2                         11.5   18.55 )-----------( 401      ( 19 Jul 2023 06:20 )             36.4     07-22    138  |  102  |  44<H>  ----------------------------<  97  4.8   |  26  |  1.48<H>  07-21    137  |  103  |  47<H>  ----------------------------<  153<H>  4.8   |  25  |  1.48<H>  07-20    136  |  100  |  47<H>  ----------------------------<  115<H>  5.4<H>   |  24  |  1.49<H>  07-20    136  |  102  |  42<H>  ----------------------------<  145<H>  5.9<H>   |  24  |  1.55<H>  07-19    134<L>  |  97  |  29<H>  ----------------------------<  138<H>  5.1   |  24  |  1.28    Ca    9.4      22 Jul 2023 07:12  Ca    9.4      21 Jul 2023 05:38  Ca    9.9      20 Jul 2023 17:18  Phos  3.2     07-22  Mg     2.3     07-22      CAPILLARY BLOOD GLUCOSE              Urinalysis Basic - ( 22 Jul 2023 07:12 )    Color: x / Appearance: x / SG: x / pH: x  Gluc: 97 mg/dL / Ketone: x  / Bili: x / Urobili: x   Blood: x / Protein: x / Nitrite: x   Leuk Esterase: x / RBC: x / WBC x   Sq Epi: x / Non Sq Epi: x / Bacteria: x    rad< from: CT Chest No Cont (07.16.23 @ 22:57) >  PROCEDURE DATE:  07/16/2023          INTERPRETATION:  CLINICAL INFORMATION: Shortness of breath    COMPARISON: Noncontrast CT chest 5/15/2023, CT chest 11/24/2019    CONTRAST/COMPLICATIONS:  IV Contrast: NONE  Oral Contrast: NONE  Complications: None reported at time of study completion    PROCEDURE:  CT scan of the chest was obtained without intravenous contrast.    FINDINGS: Evaluation of the thoracic organs/vasculature is limited   without intravenous contrast.    AIRWAYS/LUNGS/PLEURA: Patent central airways. Again noted, bronchiectasis   and mucoid impaction at the lower lobes. Increase tree-in-bud opacities   in both lungs. Patchy opacity at the left lower lobe. Stable scattered   pulmonary nodules. No pleural effusion or pneumothorax.    LYMPH NODES: Increased size of a few mediastinal lymph nodes, for   example, 2.0 x 1.2 cm subcarinal lymph node (2:60).    HEART/VASCULATURE: Stable heart size. Small pericardial fluid,slightly   increased since prior study. Aortic valve, coronary artery and mitral   annular calcification. Stable borderline main pulmonary artery.    UPPER ABDOMEN: Small hiatal hernia. Colon diverticulosis. Bilateral   perinephric stranding.    BONES/SOFT TISSUES: Degenerative changes of the spine.    IMPRESSION:    Persistent bronchiectasis and mucoid impaction at the lower lobes.   Increased tree-in-bud opacities in both lungs with patchy opacity at the   left lower lobe, suspicious for pneumonia. Stable scattered pulmonary   nodules. Nonspecific mediastinal lymphadenopathy.    Additional findings as described.    --- End of Report ---      < end of copied text >          RECENT CULTURES:  07-19 @ 11:34 .Sputum Sputum       Few polymorphonuclear leukocytes per low power field  No Squamous epithelial cells per low power field  Few Gram positive cocci in pairs and clusters per oil power field  Rare Gram Negative Rods per oil power field           Rare Pseudomonas aeruginosa  Normal Respiratory Jessie present          RESPIRATORY CULTURES:          Studies  Chest X-RAY  CT SCAN Chest   Venous Dopplers: LE:   CT Abdomen  Others

## 2023-07-22 NOTE — PROGRESS NOTE ADULT - ASSESSMENT
This is a 72 year old female with pmh of COPD not on home O2, HTN, a-fib on Eliquis presenting with increasing shortness of breath in setting of on and off subjective fever (highest temp of 98F), cough, and green sputum production. Denies any chest pain or abdominal pain. + nausea but no vomiting. Exertional shortness of breath. No exertional chest pain. Denies any dysuria, urinary frequency, hematuria. Denies any recent surgery/travel. No lower extremity swelling.   (16 Jul 2023 19:05)  Tthe pt started getting sick from  January this year;  when she had visited [facility] and was treated with antibiotics  after that she improved  then she again became sick in April from which she recovered and now she presented with sob for 5-6 days with some cough but not really phlegmy/  she i son 2 L of oxygen at this time but does not use oxygen at home:     COPD exa:  A Fib:  HTN:      COPD exa:  -seems copd exacerbation:  RVP is negative:   -she is on steroids as well as BD: add Symbicort - she takes advair and Spiriva at home:   -VBG shows mild hypercarbic resp acidosis , but no reason to give bipap at this time given her clinical condition:   -her ct chest showed: Persistent bronchiectasis and mucoid impaction at the lower lobes.  Increased tree-in-bud opacities in both lungs with patchy opacity at the left lower lobe, suspicious for pneumonia. Stable scattered pulmonary  nodules. Nonspecific mediastinal lymphadenopathy.  -doubt vte : as she is already on elliquis:   -rpt abg in am    -check sputum cultures:  -already on zosyn ; check legionella still pending:   -she is still wheezing : would decrease steroids to 20 mg tid today for two days   -cont 20 mg tid today too and if she cont to do better : will decrease further :  -she is still wheezing today on 20th : cant decrease steroids : add Mucinex   -finishing antibiotics to-blood sputum cultures noted:  few gram  neg rods ad gram post jimmy cocci in pairs: identity pending:   feels better today on 21st:  ruslan change to po steroids for 4-5 days:    -she has rare Pseudomonas Aeruginosa in sputum : likely colonization:  given bronchiectasis:  she finsihed zosyn rx:   A Fib:  -cont Eliquis  HTN:  -controlled   dw ACP

## 2023-07-22 NOTE — PROGRESS NOTE ADULT - ASSESSMENT
72 year old female with pmhx of COPD not on home O2, HTN, a-fib on Eliquis presenting with increasing shortness of breath in setting of on and off subjective fever (highest temp of 98F), cough, and green sputum production. She states her sob started around January 2023, she went to urgent care x 2, and hospitalized at [facility]. she has been treated for PNA and recovered. Now noticed worsening sob again on Tuesday PTA. Hospital course treated for PNA and COPD exacerbation. Now noticed with hyperkalemia and abnormal creatinine today      1- ROBYN, suspect CKDIII GFR 37  2- hyperkalemia  3- HTN  4- PNA  5- COPD      creatinine steady  k in range  if hyperkalemic again, then hold arb  low k diet  cpk in range  continue zosyn per ID recommendations  iv steroids per pulm  hydralazine 50 mg TID, trend bp  low TSH, recommend decrease synthroid  trend creatinine and electrolytes daily

## 2023-07-23 ENCOUNTER — TRANSCRIPTION ENCOUNTER (OUTPATIENT)
Age: 73
End: 2023-07-23

## 2023-07-23 VITALS — OXYGEN SATURATION: 91 % | RESPIRATION RATE: 18 BRPM

## 2023-07-23 DIAGNOSIS — E03.9 HYPOTHYROIDISM, UNSPECIFIED: ICD-10-CM

## 2023-07-23 LAB — T4 FREE SERPL-MCNC: 1.6 NG/DL — SIGNIFICANT CHANGE UP (ref 0.9–1.8)

## 2023-07-23 PROCEDURE — 87077 CULTURE AEROBIC IDENTIFY: CPT

## 2023-07-23 PROCEDURE — 81001 URINALYSIS AUTO W/SCOPE: CPT

## 2023-07-23 PROCEDURE — 96374 THER/PROPH/DIAG INJ IV PUSH: CPT

## 2023-07-23 PROCEDURE — 84443 ASSAY THYROID STIM HORMONE: CPT

## 2023-07-23 PROCEDURE — 84132 ASSAY OF SERUM POTASSIUM: CPT

## 2023-07-23 PROCEDURE — 85730 THROMBOPLASTIN TIME PARTIAL: CPT

## 2023-07-23 PROCEDURE — 84484 ASSAY OF TROPONIN QUANT: CPT

## 2023-07-23 PROCEDURE — 80053 COMPREHEN METABOLIC PANEL: CPT

## 2023-07-23 PROCEDURE — 71045 X-RAY EXAM CHEST 1 VIEW: CPT

## 2023-07-23 PROCEDURE — 93005 ELECTROCARDIOGRAM TRACING: CPT

## 2023-07-23 PROCEDURE — 84436 ASSAY OF TOTAL THYROXINE: CPT

## 2023-07-23 PROCEDURE — 82330 ASSAY OF CALCIUM: CPT

## 2023-07-23 PROCEDURE — 71250 CT THORAX DX C-: CPT | Mod: MA

## 2023-07-23 PROCEDURE — 85014 HEMATOCRIT: CPT

## 2023-07-23 PROCEDURE — 87899 AGENT NOS ASSAY W/OPTIC: CPT

## 2023-07-23 PROCEDURE — 87641 MR-STAPH DNA AMP PROBE: CPT

## 2023-07-23 PROCEDURE — 82550 ASSAY OF CK (CPK): CPT

## 2023-07-23 PROCEDURE — 83880 ASSAY OF NATRIURETIC PEPTIDE: CPT

## 2023-07-23 PROCEDURE — 87640 STAPH A DNA AMP PROBE: CPT

## 2023-07-23 PROCEDURE — 85025 COMPLETE CBC W/AUTO DIFF WBC: CPT

## 2023-07-23 PROCEDURE — 82803 BLOOD GASES ANY COMBINATION: CPT

## 2023-07-23 PROCEDURE — 87186 SC STD MICRODIL/AGAR DIL: CPT

## 2023-07-23 PROCEDURE — 82947 ASSAY GLUCOSE BLOOD QUANT: CPT

## 2023-07-23 PROCEDURE — 82435 ASSAY OF BLOOD CHLORIDE: CPT

## 2023-07-23 PROCEDURE — 85027 COMPLETE CBC AUTOMATED: CPT

## 2023-07-23 PROCEDURE — 87449 NOS EACH ORGANISM AG IA: CPT

## 2023-07-23 PROCEDURE — 99285 EMERGENCY DEPT VISIT HI MDM: CPT | Mod: 25

## 2023-07-23 PROCEDURE — 80048 BASIC METABOLIC PNL TOTAL CA: CPT

## 2023-07-23 PROCEDURE — 94640 AIRWAY INHALATION TREATMENT: CPT

## 2023-07-23 PROCEDURE — 36415 COLL VENOUS BLD VENIPUNCTURE: CPT

## 2023-07-23 PROCEDURE — 87070 CULTURE OTHR SPECIMN AEROBIC: CPT

## 2023-07-23 PROCEDURE — 84295 ASSAY OF SERUM SODIUM: CPT

## 2023-07-23 PROCEDURE — 0225U NFCT DS DNA&RNA 21 SARSCOV2: CPT

## 2023-07-23 PROCEDURE — 84439 ASSAY OF FREE THYROXINE: CPT

## 2023-07-23 PROCEDURE — 83605 ASSAY OF LACTIC ACID: CPT

## 2023-07-23 PROCEDURE — 84100 ASSAY OF PHOSPHORUS: CPT

## 2023-07-23 PROCEDURE — 84480 ASSAY TRIIODOTHYRONINE (T3): CPT

## 2023-07-23 PROCEDURE — 93306 TTE W/DOPPLER COMPLETE: CPT

## 2023-07-23 PROCEDURE — 83735 ASSAY OF MAGNESIUM: CPT

## 2023-07-23 PROCEDURE — 85018 HEMOGLOBIN: CPT

## 2023-07-23 PROCEDURE — 83935 ASSAY OF URINE OSMOLALITY: CPT

## 2023-07-23 PROCEDURE — 85610 PROTHROMBIN TIME: CPT

## 2023-07-23 RX ORDER — FLUTICASONE PROPIONATE AND SALMETEROL 50; 250 UG/1; UG/1
1 POWDER ORAL; RESPIRATORY (INHALATION)
Qty: 0 | Refills: 0 | DISCHARGE

## 2023-07-23 RX ORDER — LEVOTHYROXINE SODIUM 125 MCG
37.5 TABLET ORAL DAILY
Refills: 0 | Status: DISCONTINUED | OUTPATIENT
Start: 2023-07-24 | End: 2023-07-23

## 2023-07-23 RX ORDER — LEVOTHYROXINE SODIUM 125 MCG
1 TABLET ORAL
Refills: 0 | DISCHARGE

## 2023-07-23 RX ORDER — BUDESONIDE AND FORMOTEROL FUMARATE DIHYDRATE 160; 4.5 UG/1; UG/1
2 AEROSOL RESPIRATORY (INHALATION)
Qty: 1 | Refills: 0
Start: 2023-07-23 | End: 2023-08-21

## 2023-07-23 RX ORDER — MUPIROCIN 20 MG/G
0 OINTMENT TOPICAL
Refills: 0 | DISCHARGE

## 2023-07-23 RX ORDER — METOPROLOL TARTRATE 50 MG
1 TABLET ORAL
Qty: 0 | Refills: 0 | DISCHARGE
Start: 2023-07-23

## 2023-07-23 RX ORDER — HYDRALAZINE HCL 50 MG
1 TABLET ORAL
Qty: 0 | Refills: 0 | DISCHARGE
Start: 2023-07-23

## 2023-07-23 RX ORDER — HYDRALAZINE HCL 50 MG
1 TABLET ORAL
Qty: 0 | Refills: 0 | DISCHARGE

## 2023-07-23 RX ORDER — BUDESONIDE, MICRONIZED 100 %
2 POWDER (GRAM) MISCELLANEOUS
Refills: 0 | DISCHARGE

## 2023-07-23 RX ADMIN — Medication 50 MICROGRAM(S): at 06:26

## 2023-07-23 RX ADMIN — Medication 1 DROP(S): at 06:27

## 2023-07-23 RX ADMIN — Medication 25 MILLIGRAM(S): at 06:27

## 2023-07-23 RX ADMIN — Medication 25 MILLIGRAM(S): at 06:26

## 2023-07-23 RX ADMIN — APIXABAN 2.5 MILLIGRAM(S): 2.5 TABLET, FILM COATED ORAL at 06:26

## 2023-07-23 RX ADMIN — LEVALBUTEROL 0.63 MILLIGRAM(S): 1.25 SOLUTION, CONCENTRATE RESPIRATORY (INHALATION) at 18:37

## 2023-07-23 RX ADMIN — Medication 1 DROP(S): at 11:38

## 2023-07-23 RX ADMIN — BUDESONIDE AND FORMOTEROL FUMARATE DIHYDRATE 2 PUFF(S): 160; 4.5 AEROSOL RESPIRATORY (INHALATION) at 18:37

## 2023-07-23 RX ADMIN — APIXABAN 2.5 MILLIGRAM(S): 2.5 TABLET, FILM COATED ORAL at 18:37

## 2023-07-23 RX ADMIN — Medication 1200 MILLIGRAM(S): at 18:37

## 2023-07-23 RX ADMIN — Medication 400 MILLIGRAM(S): at 11:38

## 2023-07-23 RX ADMIN — LOSARTAN POTASSIUM 50 MILLIGRAM(S): 100 TABLET, FILM COATED ORAL at 06:26

## 2023-07-23 RX ADMIN — BUDESONIDE AND FORMOTEROL FUMARATE DIHYDRATE 2 PUFF(S): 160; 4.5 AEROSOL RESPIRATORY (INHALATION) at 06:27

## 2023-07-23 RX ADMIN — Medication 40 MILLIGRAM(S): at 06:25

## 2023-07-23 RX ADMIN — LEVALBUTEROL 0.63 MILLIGRAM(S): 1.25 SOLUTION, CONCENTRATE RESPIRATORY (INHALATION) at 11:39

## 2023-07-23 RX ADMIN — Medication 25 MILLIGRAM(S): at 18:37

## 2023-07-23 RX ADMIN — Medication 400 MILLIGRAM(S): at 06:26

## 2023-07-23 RX ADMIN — Medication 1200 MILLIGRAM(S): at 06:26

## 2023-07-23 RX ADMIN — LEVALBUTEROL 0.63 MILLIGRAM(S): 1.25 SOLUTION, CONCENTRATE RESPIRATORY (INHALATION) at 06:27

## 2023-07-23 RX ADMIN — CHLORHEXIDINE GLUCONATE 1 APPLICATION(S): 213 SOLUTION TOPICAL at 06:04

## 2023-07-23 NOTE — PROGRESS NOTE ADULT - PROVIDER SPECIALTY LIST ADULT
Cardiology
Internal Medicine
Internal Medicine
Pulmonology
Cardiology
Infectious Disease
Internal Medicine
Internal Medicine
Pulmonology
Pulmonology
Cardiology
Internal Medicine
Internal Medicine
Nephrology
Pulmonology
Infectious Disease
Internal Medicine
Nephrology
Nephrology
Pulmonology
Pulmonology

## 2023-07-23 NOTE — DISCHARGE NOTE PROVIDER - NSFOLLOWUPCLINICS_GEN_ALL_ED_FT
[facility]  Endocrinology  [address] [place] [address]  Phone: [phone]  Fax:   Follow Up Time: 1 week

## 2023-07-23 NOTE — PROGRESS NOTE ADULT - REASON FOR ADMISSION
The patient is a 72y Female complaining of shortness of breath.

## 2023-07-23 NOTE — CONSULT NOTE ADULT - ASSESSMENT
72 year old female with pmhx of COPD not on home O2, HTN, HLD, a-fib, Hypothyroidism, on home synthroid supplementation    Assessment  Hypothyroidism: On Synthroid 50  mcg po daily, compliant with Synthroid intake, TSH surprised 0.17 and FT4 1.6, high normal, subclinical hyperthyroid. s/p steroids, which can cause central TSH suppression. Still, will adjust synthroid since FT4 high normal. Aim for 1.2 range.     HTN: on antihypertensive medications, monitored, asymptomatic.  HLD: on statin, stable.       Discussed plan and management with Attending   Bucky Wiggins NP - TEAMS  Dr Severiano Sweeney  [phone]

## 2023-07-23 NOTE — PROGRESS NOTE ADULT - ASSESSMENT
This is a 72 year old female with pmh of COPD not on home O2, HTN, HLD, a-fib, Hypothyroidism, Psoriasis. Presents with SOB. Admitted with pneumonia    Plan:    DC planning home today on po steroids as per pulm     PNA - completed abx     COPD Exacerbation -resolving switch to PO       # HTN/ HLD/ Afib  c/w current meds as per cardio     # ROBYN improved     # Hypothyroidism:  - C/w Synthroid    # HSV:  - C/w Acyclovir    # GI ppx:  - Bowel regimen prn    # DVT ppx:  - Eliquis    [facility]  [phone]

## 2023-07-23 NOTE — DISCHARGE NOTE PROVIDER - PROVIDER TOKENS
PROVIDER:[TOKEN:[[id number]],FOLLOWUP:[1 week]],PROVIDER:[TOKEN:[[id number]],FOLLOWUP:[1 week]],PROVIDER:[TOKEN:[[id number]],FOLLOWUP:[1-3 days]]

## 2023-07-23 NOTE — PROGRESS NOTE ADULT - NS ATTEND AMEND GEN_ALL_CORE FT
Patient care and plan discussed and reviewed with Advanced Care Provider. Plan as outlined above edited by me to reflect our discussion.
Seen, examined with, formulated plan with and  agree with above as scribed by NP Inessa [Fransico]       lungs decrease bs no rales  heart RRR  ext no edema       CKD III   hyperkalemia   PNA  HTN     cr steady   k is better after lokelma  cont zosyn as above   hydralazine 50 mg tid  if k high again will need to dc losartan
Patient care and plan discussed and reviewed with Advanced Care Provider. Plan as outlined above edited by me to reflect our discussion.
Patient care and plan discussed and reviewed with Advanced Care Provider. Plan as outlined above edited by me to reflect our discussion.

## 2023-07-23 NOTE — PROGRESS NOTE ADULT - SUBJECTIVE AND OBJECTIVE BOX
SUBJECTIVE / OVERNIGHT EVENTS:    pt feeling well this am no cp sob     --------------------------------------------------------------------------------------------  LABS:                        10.9   15.90 )-----------( 355      ( 21 Jul 2023 05:38 )             34.2     07-21    137  |  103  |  47<H>  ----------------------------<  153<H>  4.8   |  25  |  1.48<H>    Ca    9.4      21 Jul 2023 05:38        CAPILLARY BLOOD GLUCOSE        CARDIAC MARKERS ( 20 Jul 2023 17:18 )  x     / x     / 46 U/L / x     / x          Urinalysis Basic - ( 21 Jul 2023 05:38 )    Color: x / Appearance: x / SG: x / pH: x  Gluc: 153 mg/dL / Ketone: x  / Bili: x / Urobili: x   Blood: x / Protein: x / Nitrite: x   Leuk Esterase: x / RBC: x / WBC x   Sq Epi: x / Non Sq Epi: x / Bacteria: x        RADIOLOGY & ADDITIONAL TESTS:    Imaging Personally Reviewed:  [x] YES  [ ] NO    Consultant(s) Notes Reviewed:  [x] YES  [ ] NO    MEDICATIONS  (STANDING):  acyclovir   Oral Tab/Cap 400 milliGRAM(s) Oral three times a day  apixaban 2.5 milliGRAM(s) Oral every 12 hours  artificial  tears Solution 1 Drop(s) Both EYES three times a day  budesonide 160 MICROgram(s)/formoterol 4.5 MICROgram(s) Inhaler 2 Puff(s) Inhalation two times a day  chlorhexidine 2% Cloths 1 Application(s) Topical <User Schedule>  guaiFENesin ER 1200 milliGRAM(s) Oral every 12 hours  hydrALAZINE 50 milliGRAM(s) Oral three times a day  levalbuterol Inhalation 0.63 milliGRAM(s) Inhalation every 6 hours  levothyroxine 50 MICROGram(s) Oral daily  losartan 50 milliGRAM(s) Oral daily  methylPREDNISolone sodium succinate Injectable 20 milliGRAM(s) IV Push every 8 hours  piperacillin/tazobactam IVPB.. 3.375 Gram(s) IV Intermittent every 8 hours    MEDICATIONS  (PRN):      Care Discussed with Consultants/Other Providers [x] YES  [ ] NO    Vital Signs Last 24 Hrs  T(C): 36.3 (21 Jul 2023 04:31), Max: 36.7 (20 Jul 2023 17:48)  T(F): 97.4 (21 Jul 2023 04:31), Max: 98.1 (20 Jul 2023 17:48)  HR: 65 (21 Jul 2023 04:31) (65 - 75)  BP: 137/68 (21 Jul 2023 04:31) (118/79 - 166/85)  BP(mean): --  RR: 17 (21 Jul 2023 04:31) (16 - 17)  SpO2: 92% (21 Jul 2023 04:31) (92% - 96%)    Parameters below as of 21 Jul 2023 04:31  Patient On (Oxygen Delivery Method): room air      I&O's Summary    20 Jul 2023 07:01  -  21 Jul 2023 07:00  --------------------------------------------------------  IN: 1240 mL / OUT: 1175 mL / NET: 65 mL    PHYSICAL EXAM:  GENERAL: NAD, well-developed, comfortable  HEAD:  Atraumatic, Normocephalic  EYES: EOMI, PERRLA, conjunctiva and sclera clear  NECK: Supple, No JVD  CHEST/LUNG: Rhonchi bilaterally; slight wheeze  HEART: Regular rate and rhythm; No murmurs, rubs, or gallops  ABDOMEN: Soft, Nontender, Nondistended; Bowel sounds present  NEURO: AAOx3, no focal weakness, 5/5 b/l extremity strength, b/l knee no arthritis, no effusion   EXTREMITIES:  2+ Peripheral Pulses, No clubbing, cyanosis, or edema  SKIN: No rashes or lesions

## 2023-07-23 NOTE — PROGRESS NOTE ADULT - ASSESSMENT
This is a 72 year old female with pmh of COPD not on home O2, HTN, a-fib on Eliquis presenting with increasing shortness of breath in setting of on and off subjective fever (highest temp of 98F), cough, and green sputum production. Denies any chest pain or abdominal pain. + nausea but no vomiting. Exertional shortness of breath. No exertional chest pain. Denies any dysuria, urinary frequency, hematuria. Denies any recent surgery/travel. No lower extremity swelling.   (16 Jul 2023 19:05)  Tthe pt started getting sick from  January this year;  when she had visited [facility] and was treated with antibiotics  after that she improved  then she again became sick in April from which she recovered and now she presented with sob for 5-6 days with some cough but not really phlegmy/  she i son 2 L of oxygen at this time but does not use oxygen at home:     COPD exa:  A Fib:  HTN:      COPD exa:  -seems copd exacerbation:  RVP is negative:   -she is on steroids as well as BD: add Symbicort - she takes advair and Spiriva at home:   -VBG shows mild hypercarbic resp acidosis , but no reason to give bipap at this time given her clinical condition:   -her ct chest showed: Persistent bronchiectasis and mucoid impaction at the lower lobes.  Increased tree-in-bud opacities in both lungs with patchy opacity at the left lower lobe, suspicious for pneumonia. Stable scattered pulmonary  nodules. Nonspecific mediastinal lymphadenopathy.  -doubt vte : as she is already on elliquis:   -rpt abg in am    -check sputum cultures:  -already on zosyn ; check legionella still pending:   -she is still wheezing : would decrease steroids to 20 mg tid today for two days   -cont 20 mg tid today too and if she cont to do better : will decrease further :  -she is still wheezing today on 20th : cant decrease steroids : add Mucinex   -finishing antibiotics to-blood sputum cultures noted:  few gram  neg rods ad gram post jimmy cocci in pairs: identity pending:   feels better today on 21st:  ruslan change to po steroids for 4-5 days:    -she has rare Pseudomonas Aeruginosa in sputum : likely colonization:  given bronchiectasis:  she finished zosyn rx:   -she looks pretty good now o n 23rd:  check her ambulatory oxygen prior to dc:   A Fib:  -cont Eliquis  HTN:  -controlled   dw ACP

## 2023-07-23 NOTE — DISCHARGE NOTE PROVIDER - HOSPITAL COURSE
This is a 72 year old female with a past medical history of COPD not on home O2, HTN, a-fib on Eliquis presenting with increasing shortness of breath in setting of on and off subjective fever (highest temp of 98F), cough, and green sputum production. Denies any chest pain or abdominal pain. + nausea but no vomiting. Exertional shortness of breath. No exertional chest pain. Denies any dysuria, urinary frequency, hematuria. Denies any recent surgery/travel. No lower extremity swelling  Tthe pt started getting sick from  January this year;  when she had visited urgent care center and was treated with antibiotics  after that she improved  then she again became sick in April from which she recovered and now she presented with sob for 5-6 days with some cough but not really phlegmy/  she i son 2 L of oxygen at this time but does not use oxygen at home:     Patient is admitted with COPD Exacerbation     #COPD Exacerbation   RVP is negative. She is on steroids as well as BD, symbicort was added, she takes advair and Spiriva at home. VBG showed mild hypercarbic resp acidosis , but no reason to give bipap at this time given her clinical condition.  Her ct chest showed: Persistent bronchiectasis and mucoid impaction at the lower lobes.  Increased tree-in-bud opacities in both lungs with patchy opacity at the left lower lobe, suspicious for pneumonia. Stable scattered pulmonary  nodules. Nonspecific mediastinal lymphadenopathy.  Patient was started on Zosyn and decreased steroids to 20 mg tid today for two days.   Continued 20 mg tid today, was going on to decrease further, but she is still wheezing today. Mucinex was added.  Antibiotic course was completed    #Afib  c/w current meds as per cardio   - ECG reveals NSR  - Tele show NSR with occasional PVCs  - c/w Eliquis 2.5mg PO BID (On lower dose d/t chronic severe epistaxis as OP)   - Cont to monitor on tele    #Hypertension  - c/w home meds including losartan 50mg PO daily   -c/w Hydralazine to 25mg PO BID to create room in BP for BB d/t NSVT  - c/w Toprol 25mg PO daily.     # Hypothyroidism:  - C/w Synthroid           This is a 72 year old female with a past medical history of COPD not on home O2, HTN, a-fib on Eliquis presenting with increasing shortness of breath in setting of on and off subjective fever (highest temp of 98F), cough, and green sputum production. Denies any chest pain or abdominal pain. + nausea but no vomiting. Exertional shortness of breath. No exertional chest pain. Denies any dysuria, urinary frequency, hematuria. Denies any recent surgery/travel. No lower extremity swelling  Tthe pt started getting sick from  January this year;  when she had visited urgent care center and was treated with antibiotics  after that she improved  then she again became sick in April from which she recovered and now she presented with sob for 5-6 days with some cough but not really phlegmy/  she i son 2 L of oxygen at this time but does not use oxygen at home:     Patient is admitted with COPD Exacerbation     #COPD Exacerbation   RVP is negative. She is on steroids as well as BD, symbicort was added, she takes advair and Spiriva at home. VBG showed mild hypercarbic resp acidosis , but no reason to give bipap at this time given her clinical condition.  Her ct chest showed: Persistent bronchiectasis and mucoid impaction at the lower lobes.  Increased tree-in-bud opacities in both lungs with patchy opacity at the left lower lobe, suspicious for pneumonia. Stable scattered pulmonary  nodules. Nonspecific mediastinal lymphadenopathy.  Patient was treated with Zosyn and steroids.    Continued 20 mg tid today, was going on to decrease further, but she is still wheezing today. Mucinex was added.  Antibiotic course was completed    #Afib  c/w current meds as per cardio   - ECG reveals NSR  - Tele show NSR with occasional PVCs  - c/w Eliquis 2.5mg PO BID (On lower dose d/t chronic severe epistaxis as OP)   - Cont to monitor on tele    #Hypertension  - c/w home meds including losartan 50mg PO daily   -c/w Hydralazine to 25mg PO BID to create room in BP for BB d/t NSVT  - c/w Toprol 25mg PO daily.     # Hypothyroidism:  - C/w Synthroid, Decreased Synthroid to 37.5 mcg daily    Discharge home as per Dr. Vigil.

## 2023-07-23 NOTE — DISCHARGE NOTE PROVIDER - CARE PROVIDER_API CALL
Jaleel Vigil [place]  Internal Medicine  [address] [place] [address]  Phone: [phone]  Fax: [phone]  Follow Up Time: 1 week    Polly Dewey  Nephrology  [address] [facility] [address]  [place] [address]  Phone: [phone]  Fax: [phone]  Follow Up Time: 1 week    Tono Lui  Pulmonary Disease  [address] [place] [address]  Phone: [phone]  Fax: [phone]  Follow Up Time: 1-3 days

## 2023-07-23 NOTE — PROGRESS NOTE ADULT - SUBJECTIVE AND OBJECTIVE BOX
[facility]   [phone]  RENAL FOLLOW UP NOTE  --------------------------------------------------------------------------------  Chief Complaint:    24 hour events/subjective:    seen earlier   states her breathing is much better     PAST HISTORY  --------------------------------------------------------------------------------  No significant changes to PMH, PSH, FHx, SHx, unless otherwise noted    ALLERGIES & MEDICATIONS  --------------------------------------------------------------------------------  Allergies    Sulfur (Unknown)  sulfa drugs (Unknown)  [This allergen will not trigger allergy alert] artichokes (Unknown)  Levaquin (Anaphylaxis)    Intolerances      Standing Inpatient Medications  apixaban 2.5 milliGRAM(s) Oral every 12 hours  artificial  tears Solution 1 Drop(s) Both EYES three times a day  budesonide 160 MICROgram(s)/formoterol 4.5 MICROgram(s) Inhaler 2 Puff(s) Inhalation two times a day  chlorhexidine 2% Cloths 1 Application(s) Topical <User Schedule>  guaiFENesin ER 1200 milliGRAM(s) Oral every 12 hours  hydrALAZINE 25 milliGRAM(s) Oral two times a day  levalbuterol Inhalation 0.63 milliGRAM(s) Inhalation every 6 hours  losartan 50 milliGRAM(s) Oral daily  metoprolol succinate ER 25 milliGRAM(s) Oral daily  predniSONE   Tablet 40 milliGRAM(s) Oral daily    PRN Inpatient Medications      REVIEW OF SYSTEMS  --------------------------------------------------------------------------------    Gen: denies  fevers/chills,  CVS: denies chest pain/palpitations  Resp: denies SOB/Cough  GI: Denies N/V/Abd pain  : Denies dysuria/oliguria/hematuria      VITALS/PHYSICAL EXAM  --------------------------------------------------------------------------------  T(C): 36.7 (07-23-23 @ 11:18), Max: 36.7 (07-23-23 @ 04:30)  HR: 73 (07-23-23 @ 11:18) (69 - 73)  BP: 117/74 (07-23-23 @ 11:18) (117/74 - 150/68)  RR: 18 (07-23-23 @ 15:09) (18 - 18)  SpO2: 91% (07-23-23 @ 15:09) (91% - 93%)  Wt(kg): --        07-22-23 @ 07:01  -  07-23-23 @ 07:00  --------------------------------------------------------  IN: 480 mL / OUT: 0 mL / NET: 480 mL    07-23-23 @ 07:01  -  07-23-23 @ 21:37  --------------------------------------------------------  IN: 240 mL / OUT: 0 mL / NET: 240 mL      Physical Exam:  	    Gen: Non toxic comfortable appearing   	Pulm: decrease bs, no wheezing no rales    	CV: No JVD. RRR, S1S2; no rub  	Abd: +BS, soft, nontender/nondistended  	: No suprapubic distention  	UE: Warm, no cyanosis  no clubbing,  no edema  	LE: Warm, no cyanosis  no clubbing, no edema       LABS/STUDIES  --------------------------------------------------------------------------------              11.5   13.10 >-----------<  359      [07-22-23 @ 07:13]              37.1     138  |  102  |  44  ----------------------------<  97      [07-22-23 @ 07:12]  4.8   |  26  |  1.48        Ca     9.4     [07-22-23 @ 07:12]      Mg     2.3     [07-22-23 @ 07:12]      Phos  3.2     [07-22-23 @ 07:12]            Creatinine Trend:  SCr 1.48 [07-22 @ 07:12]  SCr 1.48 [07-21 @ 05:38]  SCr 1.49 [07-20 @ 17:18]  SCr 1.55 [07-20 @ 05:58]  SCr 1.28 [07-19 @ 06:11]              Urinalysis - [07-22-23 @ 07:12]      Color  / Appearance  / SG  / pH       Gluc 97 / Ketone   / Bili  / Urobili        Blood  / Protein  / Leuk Est  / Nitrite       RBC  / WBC  / Hyaline  / Gran  / Sq Epi  / Non Sq Epi  / Bacteria     Urine Osmolality 170      [07-21-23 @ 00:27]    Iron 63, TIBC 329, %sat 19      [11-01-22 @ 06:30]  Ferritin 55      [11-01-22 @ 06:30]  TSH 0.17      [07-19-23 @ 10:00]

## 2023-07-23 NOTE — DISCHARGE NOTE NURSING/CASE MANAGEMENT/SOCIAL WORK - PATIENT PORTAL LINK FT
You can access the FollowMyHealth Patient Portal offered by [facility] by registering at the following website: [email or web address]. By joining [employer]’s FollowMyHealth portal, you will also be able to view your health information using other applications (apps) compatible with our system.

## 2023-07-23 NOTE — PROGRESS NOTE ADULT - SUBJECTIVE AND OBJECTIVE BOX
Date of Service: 07-23-23 @ 12:46    Patient is a 72y old  Female who presents with a chief complaint of The patient is a 72y Female complaining of shortness of breath. (23 Jul 2023 12:40)      Any change in ROS: She feels much better:     MEDICATIONS  (STANDING):  apixaban 2.5 milliGRAM(s) Oral every 12 hours  artificial  tears Solution 1 Drop(s) Both EYES three times a day  budesonide 160 MICROgram(s)/formoterol 4.5 MICROgram(s) Inhaler 2 Puff(s) Inhalation two times a day  chlorhexidine 2% Cloths 1 Application(s) Topical <User Schedule>  guaiFENesin ER 1200 milliGRAM(s) Oral every 12 hours  hydrALAZINE 25 milliGRAM(s) Oral two times a day  levalbuterol Inhalation 0.63 milliGRAM(s) Inhalation every 6 hours  losartan 50 milliGRAM(s) Oral daily  metoprolol succinate ER 25 milliGRAM(s) Oral daily  predniSONE   Tablet 40 milliGRAM(s) Oral daily    MEDICATIONS  (PRN):    Vital Signs Last 24 Hrs  T(C): 36.7 (23 Jul 2023 11:18), Max: 36.8 (22 Jul 2023 20:55)  T(F): 98 (23 Jul 2023 11:18), Max: 98.3 (22 Jul 2023 20:55)  HR: 73 (23 Jul 2023 11:18) (68 - 73)  BP: 117/74 (23 Jul 2023 11:18) (117/74 - 150/68)  BP(mean): --  RR: 18 (23 Jul 2023 11:18) (18 - 18)  SpO2: 91% (23 Jul 2023 11:18) (91% - 93%)    Parameters below as of 23 Jul 2023 11:18  Patient On (Oxygen Delivery Method): room air        I&O's Summary    22 Jul 2023 07:01  -  23 Jul 2023 07:00  --------------------------------------------------------  IN: 480 mL / OUT: 0 mL / NET: 480 mL          Physical Exam:   GENERAL: NAD, well-groomed, well-developed  HEENT: FALLON/   Atraumatic, Normocephalic  ENMT: No tonsillar erythema, exudates, or enlargement; Moist mucous membranes, Good dentition, No lesions  NECK: Supple, No JVD, Normal thyroid  CHEST/LUNG: Clear to auscultaion- no wheezing  CVS: Regular rate and rhythm; No murmurs, rubs, or gallops  GI: : Soft, Nontender, Nondistended; Bowel sounds present  NERVOUS SYSTEM:  Alert & Oriented X3  EXTREMITIES:  2+ Peripheral Pulses, No clubbing, cyanosis, or edema  LYMPH: No lymphadenopathy noted  SKIN: No rashes or lesions  ENDOCRINOLOGY: No Thyromegaly  PSYCH: Appropriate    Labs:  26                            11.5   13.10 )-----------( 359      ( 22 Jul 2023 07:13 )             37.1                         10.9   15.90 )-----------( 355      ( 21 Jul 2023 05:38 )             34.2                         11.1   15.70 )-----------( 371      ( 20 Jul 2023 05:57 )             35.2     07-22    138  |  102  |  44<H>  ----------------------------<  97  4.8   |  26  |  1.48<H>  07-21    137  |  103  |  47<H>  ----------------------------<  153<H>  4.8   |  25  |  1.48<H>  07-20    136  |  100  |  47<H>  ----------------------------<  115<H>  5.4<H>   |  24  |  1.49<H>  07-20    136  |  102  |  42<H>  ----------------------------<  145<H>  5.9<H>   |  24  |  1.55<H>    Ca    9.4      22 Jul 2023 07:12  Phos  3.2     07-22  Mg     2.3     07-22      CAPILLARY BLOOD GLUCOSE              Urinalysis Basic - ( 22 Jul 2023 07:12 )    Color: x / Appearance: x / SG: x / pH: x  Gluc: 97 mg/dL / Ketone: x  / Bili: x / Urobili: x   Blood: x / Protein: x / Nitrite: x   Leuk Esterase: x / RBC: x / WBC x   Sq Epi: x / Non Sq Epi: x / Bacteria: x    rad< from: CT Chest No Cont (07.16.23 @ 22:57) >    UPPER ABDOMEN: Small hiatal hernia. Colon diverticulosis. Bilateral   perinephric stranding.    BONES/SOFT TISSUES: Degenerative changes of the spine.    IMPRESSION:    Persistent bronchiectasis and mucoid impaction at the lower lobes.   Increased tree-in-bud opacities in both lungs with patchy opacity at the   left lower lobe, suspicious for pneumonia. Stable scattered pulmonary   nodules. Nonspecific mediastinal lymphadenopathy.    Additional findi    < end of copied text >          RECENT CULTURES:  07-19 @ 11:34 .Sputum Sputum   HARMONY    Few polymorphonuclear leukocytes per low power field  No Squamous epithelial cells per low power field  Few Gram positive cocci in pairs and clusters per oil power field  Rare Gram Negative Rods per oil power field    Pseudomonas aeruginosa  Pseudomonas aeruginosa     Rare Pseudomonas aeruginosa  Normal Respiratory Jessie present          RESPIRATORY CULTURES:          Studies  Chest X-RAY  CT SCAN Chest   Venous Dopplers: LE:   CT Abdomen  Others

## 2023-07-23 NOTE — PROGRESS NOTE ADULT - SUBJECTIVE AND OBJECTIVE BOX
DATE OF SERVICE: 07-23-23 @ 12:39    Patient is a 72y old  Female who presents with a chief complaint of The patient is a 72y Female complaining of shortness of breath. (23 Jul 2023 11:24)      INTERVAL HISTORY: Feels ok.     REVIEW OF SYSTEMS:  CONSTITUTIONAL: No weakness  EYES/ENT: No visual changes;  No throat pain   NECK: No pain or stiffness  RESPIRATORY: No cough, wheezing; No shortness of breath  CARDIOVASCULAR: No chest pain or palpitations  GASTROINTESTINAL: No abdominal  pain. No nausea, vomiting, or hematemesis  GENITOURINARY: No dysuria, frequency or hematuria  NEUROLOGICAL: No stroke like symptoms  SKIN: No rashes    TELEMETRY Personally reviewed: SR 60-90  	  MEDICATIONS:  hydrALAZINE 25 milliGRAM(s) Oral two times a day  losartan 50 milliGRAM(s) Oral daily  metoprolol succinate ER 25 milliGRAM(s) Oral daily        PHYSICAL EXAM:  T(C): 36.7 (07-23-23 @ 11:18), Max: 36.8 (07-22-23 @ 20:55)  HR: 73 (07-23-23 @ 11:18) (68 - 73)  BP: 117/74 (07-23-23 @ 11:18) (117/74 - 150/68)  RR: 18 (07-23-23 @ 11:18) (18 - 18)  SpO2: 91% (07-23-23 @ 11:18) (91% - 93%)  Wt(kg): --  I&O's Summary    22 Jul 2023 07:01  -  23 Jul 2023 07:00  --------------------------------------------------------  IN: 480 mL / OUT: 0 mL / NET: 480 mL          Appearance: In no distress	  HEENT:    PERRL, EOMI	  Cardiovascular:  S1 S2, No JVD  Respiratory: Lungs clear to auscultation	  Gastrointestinal:  Soft, Non-tender, + BS	  Vascularature:  No edema of LE  Psychiatric: Appropriate affect   Neuro: no acute focal deficits                               11.5   13.10 )-----------( 359      ( 22 Jul 2023 07:13 )             37.1     07-22    138  |  102  |  44<H>  ----------------------------<  97  4.8   |  26  |  1.48<H>    Ca    9.4      22 Jul 2023 07:12  Phos  3.2     07-22  Mg     2.3     07-22          Labs personally reviewed      ASSESSMENT/PLAN: 	    This is a 72 year old female with pmh of COPD not on home O2, HTN, a-fib on Eliquis presenting with increasing shortness of breath in setting of subjective fever (highest temp of 98F), cough and green sputum production. Denies any chest pain or abdominal pain. + nausea but no vomiting. Reports exertional shortness of breath. No exertional chest pain. Denies any dysuria, urinary frequency, hematuria. Denies any recent surgery/travel. No lower extremity swelling.    Problem/Plan #1: Shortness of Breath  - ECG NSR with no ischemic characteristics  - Trop 20--> 20  - proBNP 913  - CXR with LLL opacity concerning for PNA  - CT Chest shows bronchiectasis, mucoid impaction concerning for PNA, non-specific mediastinal lymphadenopathy  - Has been afebrile, no leukocytosis  - Hx of COPD but not on home oxygen. Now saturating wnl on RA  - s/p IV Abx (Zocyn), IV steroids now transitioned to prednisone taper, duonebs  - TTE from 2021 shows mild MR, AS and mild diastolic dysfunction --> repeat TTE unchanged from prior with preserved EF and moderate AS  - Most recent ischemic eval >2 years ago with NST which was reportedly normal    Problem/Plan #2: Atrial Fibrillation  - ECG reveals NSR  - Tele show NSR with occasional PVCs  - c/w Eliquis 2.5mg PO BID (On lower dose d/t chronic severe epistaxis as OP)   - Cont to monitor on tele    Problem/Plan #3: Hypertension  - c/w home meds including losartan 50mg PO daily   -c/w Hydralazine to 25mg PO BID to create room in BP for BB d/t NSVT    Problem/Plan #4: PPx Measures  - DVT: Eliquis  - Diet: Dash diet    Problem/Plan #5: NSVT  - Patient with 12 beats of NSVT on tele  - Asymptomatic  - TTE with preserved EF, no WMA, moderate AS  - electrolytes wnl  - c/w Toprol 25mg PO daily.         YAMILETH Long-DAYO [place], [facility]  Cardiovascular Medicine  [address] [place] [address], [address]  Available through call or text on [employer]  Office: [phone]   DATE OF SERVICE: 07-23-23 @ 12:39    Patient is a 72y old  Female who presents with a chief complaint of The patient is a 72y Female complaining of shortness of breath. (23 Jul 2023 11:24)      INTERVAL HISTORY: Feels ok.     REVIEW OF SYSTEMS:  CONSTITUTIONAL: No weakness  EYES/ENT: No visual changes;  No throat pain   NECK: No pain or stiffness  RESPIRATORY: No cough, wheezing; No shortness of breath  CARDIOVASCULAR: No chest pain or palpitations  GASTROINTESTINAL: No abdominal  pain. No nausea, vomiting, or hematemesis  GENITOURINARY: No dysuria, frequency or hematuria  NEUROLOGICAL: No stroke like symptoms  SKIN: No rashes    TELEMETRY Personally reviewed: SR 60-90  	  MEDICATIONS:  hydrALAZINE 25 milliGRAM(s) Oral two times a day  losartan 50 milliGRAM(s) Oral daily  metoprolol succinate ER 25 milliGRAM(s) Oral daily        PHYSICAL EXAM:  T(C): 36.7 (07-23-23 @ 11:18), Max: 36.8 (07-22-23 @ 20:55)  HR: 73 (07-23-23 @ 11:18) (68 - 73)  BP: 117/74 (07-23-23 @ 11:18) (117/74 - 150/68)  RR: 18 (07-23-23 @ 11:18) (18 - 18)  SpO2: 91% (07-23-23 @ 11:18) (91% - 93%)  Wt(kg): --  I&O's Summary    22 Jul 2023 07:01  -  23 Jul 2023 07:00  --------------------------------------------------------  IN: 480 mL / OUT: 0 mL / NET: 480 mL          Appearance: In no distress	  HEENT:    PERRL, EOMI	  Cardiovascular:  S1 S2, No JVD  Respiratory: Lungs clear to auscultation	  Gastrointestinal:  Soft, Non-tender, + BS	  Vascularature:  No edema of LE  Psychiatric: Appropriate affect   Neuro: no acute focal deficits                               11.5   13.10 )-----------( 359      ( 22 Jul 2023 07:13 )             37.1     07-22    138  |  102  |  44<H>  ----------------------------<  97  4.8   |  26  |  1.48<H>    Ca    9.4      22 Jul 2023 07:12  Phos  3.2     07-22  Mg     2.3     07-22          Labs personally reviewed      ASSESSMENT/PLAN: 	    This is a 72 year old female with pmh of COPD not on home O2, HTN, a-fib on Eliquis presenting with increasing shortness of breath in setting of subjective fever (highest temp of 98F), cough and green sputum production. Denies any chest pain or abdominal pain. + nausea but no vomiting. Reports exertional shortness of breath. No exertional chest pain. Denies any dysuria, urinary frequency, hematuria. Denies any recent surgery/travel. No lower extremity swelling.    Problem/Plan #1: Shortness of Breath  - ECG NSR with no ischemic characteristics  - Trop 20--> 20  - proBNP 913  - CXR with LLL opacity concerning for PNA  - CT Chest shows bronchiectasis, mucoid impaction concerning for PNA, non-specific mediastinal lymphadenopathy  - Has been afebrile, no leukocytosis  - Hx of COPD but not on home oxygen. Now saturating wnl on RA  - s/p IV Abx (Zocyn), IV steroids now transitioned to prednisone taper, duonebs  - TTE from 2021 shows mild MR, AS and mild diastolic dysfunction --> repeat TTE unchanged from prior with preserved EF and moderate AS  - Most recent ischemic eval >2 years ago with NST which was reportedly normal    Problem/Plan #2: Atrial Fibrillation  - ECG reveals NSR  - Tele show NSR with occasional PVCs  - c/w Eliquis 2.5mg PO BID (On lower dose d/t chronic severe epistaxis as OP)   - Cont to monitor on tele    Problem/Plan #3: Hypertension  - c/w home meds including losartan 50mg PO daily    decreased Hydralazine to 25mg PO BID to create room in BP for BB d/t NSVT    Problem/Plan #4: PPx Measures  - DVT: Eliquis  - Diet: Dash diet    Problem/Plan #5: NSVT  - Patient with 12 beats of NSVT on tele  - Asymptomatic  - TTE with preserved EF, no WMA, moderate AS  - electrolytes wnl  - c/w Toprol 25mg PO daily.         DARLENE Long, [facility]  Cardiovascular Medicine  [address] [place] [address], [address]  Available through call or text on [employer]  Office: [phone]

## 2023-07-23 NOTE — CONSULT NOTE ADULT - NS ATTEND AMEND GEN_ALL_CORE FT
Seen, examined with, formulated plan with and  agree with above as scribed by NP Inessa [Fransico]     c/o sob still   noticed with hyperkalemia and ROBYN with cr 1.5     lungs coarse bs wheezing insp  heart RRR  ext no edema    ROBYN   hyperkalemia with hx hyperkalemia  pneumonia     cr higher suspected in setting of infection likely   low k diet   lokelma 10 g given repeat K   htn cont hydralazine as above  zosyn to cont for pna
Patient care and plan discussed and reviewed with Advanced Care Provider. Plan as outlined above edited by me to reflect our discussion.
patient seen , lab reviewed, plan of care discussed with the NP

## 2023-07-23 NOTE — DISCHARGE NOTE PROVIDER - NSDCMRMEDTOKEN_GEN_ALL_CORE_FT
Advair Diskus 500 mcg-50 mcg inhalation powder: 1 puff(s) inhaled 2 times a day  albuterol 0.63 mg/3 mL (0.021%) inhalation solution: 3 by nebulizer 3 times a day as needed for  shortness of breath and/or wheezing  Artificial Tears ophthalmic solution: 1 drop(s) to each affected eye 3 times a day  budesonide 1 mg/2 mL inhalation suspension: 2 by nebulizer 3 times a day as needed for  shortness of breath and/or wheezing  cholecalciferol 25 mcg (1000 intl units) oral tablet: 1 orally Monday, Wednesday, and Friday  Eliquis: 2.5 orally 2 times a day  hydrALAZINE 25 mg oral tablet: 1 tab(s) orally 2 times a day  hydrALAZINE 50 mg oral tablet: 1 tab(s) orally 3 times a day  levothyroxine 50 mcg (0.05 mg) oral tablet: orally once a day brand name L-Thyroxine  losartan 50 mg oral tablet: 1 tab(s) orally once a day  Metoprolol Succinate ER 25 mg oral tablet, extended release: 1 tab(s) orally once a day  mupirocin 2% nasal ointment: nasal use as needed for nose bleed  Spiriva 18 mcg inhalation capsule: 1 each inhaled once a day  Vitamin C 500 mg oral tablet, chewable: 1 chewed take 1 tablet po on sunday, tuesday, thursday   albuterol 0.63 mg/3 mL (0.021%) inhalation solution: 3 by nebulizer 3 times a day as needed for  shortness of breath and/or wheezing  Artificial Tears ophthalmic solution: 1 drop(s) to each affected eye 3 times a day  budesonide-formoterol 160 mcg-4.5 mcg/inh inhalation aerosol: 2 puff(s) inhaled 2 times a day  cholecalciferol 25 mcg (1000 intl units) oral tablet: 1 orally Monday, Wednesday, and Friday  Eliquis: 2.5 orally 2 times a day  hydrALAZINE 25 mg oral tablet: 1 tab(s) orally 2 times a day  levothyroxine 25 mcg (0.025 mg) oral tablet: 1.5 tab(s) orally once a day  losartan 50 mg oral tablet: 1 tab(s) orally once a day  Metoprolol Succinate ER 25 mg oral tablet, extended release: 1 tab(s) orally once a day  predniSONE 20 mg oral tablet: 2 tab(s) orally once a day  Vitamin C 500 mg oral tablet, chewable: 1 chewed take 1 tablet po on sunday, tuesday, thursday

## 2023-07-23 NOTE — PROGRESS NOTE ADULT - ASSESSMENT
72 year old female with pmhx of COPD not on home O2, HTN, a-fib on Eliquis presenting with increasing shortness of breath in setting of on and off subjective fever (highest temp of 98F), cough, and green sputum production. She states her sob started around January 2023, she went to urgent care x 2, and hospitalized at [facility]. she has been treated for PNA and recovered. Now noticed worsening sob again on Tuesday PTA. Hospital course treated for PNA and COPD exacerbation. Now noticed with hyperkalemia and abnormal creatinine today      1- ROBYN, suspect CKDIII GFR 37  2- hyperkalemia  3- HTN  4- PNA  5- COPD      creatinine steady  k in range  if hyperkalemic again, then hold arb  low k diet  cpk in range  continue zosyn per ID recommendations  iv steroids per pulm  hydralazine 50 mg TID, trend bp

## 2023-07-23 NOTE — CONSULT NOTE ADULT - PROBLEM SELECTOR RECOMMENDATION 9
Decrease Synthroid to 37.5 mcg daily, DC on this dose  Aim for FT4 1.2 range  Outpatient follow up for further adjustments  Suggest to repeat thyroid function test in 4-6 weeks. Outpatient follow up.

## 2023-07-23 NOTE — CONSULT NOTE ADULT - REASON FOR ADMISSION
The patient is a 72y Female complaining of shortness of breath.

## 2023-07-23 NOTE — DISCHARGE NOTE PROVIDER - NSDCCPCAREPLAN_GEN_ALL_CORE_FT
PRINCIPAL DISCHARGE DIAGNOSIS  Diagnosis: COPD exacerbation  Assessment and Plan of Treatment: Continue taking current medications. Avoid environmental allergens and asthma triggers.  Participate in physical activity as tolerated.  Seek immediate medical attention if the shortness of breath does not improve with asthma treatments, or if you experience chest pain or palpitations.  Call an ambulance if your lips or nail beds become a bluish color.        SECONDARY DISCHARGE DIAGNOSES  Diagnosis: Afib  Assessment and Plan of Treatment: Atrial fibrillation is a common heart rhythm problem which increases the risk of stroke and heat attack.  It helps if you control your blood pressure, avoid alcohol, cut down on caffeine, get treatment for your thyroid if it is overactive, and perform moderate exercise in consultation with your Primary Care Provider.  Call your doctor if you experience chest tightness/pain, lightheadedness, loss of consciousness, shortness of breath (especially with exercise), feel your heart racing or beating unusually, frequent or abnormal bleeding.  It is important to take all your heart medications as prescribed.      Diagnosis: Hypothyroid  Assessment and Plan of Treatment: Continue taking synthroid    Diagnosis: Hypertension  Assessment and Plan of Treatment: Low salt diet.  Activity as tolerated.  Take all medication as prescribed.  Follow up with your medical doctor for routine blood pressure monitoring at your next visit.  Notify your doctor if you have any of the following symptoms:   Dizziness, Lightheadedness, Blurry vision, Headache, Chest pain, Shortness of breath.       PRINCIPAL DISCHARGE DIAGNOSIS  Diagnosis: COPD exacerbation  Assessment and Plan of Treatment: Follow up [facility] ASAP for continued mangment and discussion of continuing Steroids.   Continue taking current medications. Avoid environmental allergens and asthma triggers.  Participate in physical activity as tolerated.  Seek immediate medical attention if the shortness of breath does not improve with asthma treatments, or if you experience chest pain or palpitations.  Call an ambulance if your lips or nail beds become a bluish color.        SECONDARY DISCHARGE DIAGNOSES  Diagnosis: Afib  Assessment and Plan of Treatment: Atrial fibrillation is a common heart rhythm problem which increases the risk of stroke and heat attack.  It helps if you control your blood pressure, avoid alcohol, cut down on caffeine, get treatment for your thyroid if it is overactive, and perform moderate exercise in consultation with your Primary Care Provider.  Call your doctor if you experience chest tightness/pain, lightheadedness, loss of consciousness, shortness of breath (especially with exercise), feel your heart racing or beating unusually, frequent or abnormal bleeding.  It is important to take all your heart medications as prescribed.      Diagnosis: Hypothyroid  Assessment and Plan of Treatment: Continue taking synthroid but at DECREASED dose.    Diagnosis: Hypertension  Assessment and Plan of Treatment: Low salt diet.  Activity as tolerated.  Take all medication as prescribed.  Follow up with your medical doctor for routine blood pressure monitoring at your next visit.  Notify your doctor if you have any of the following symptoms:   Dizziness, Lightheadedness, Blurry vision, Headache, Chest pain, Shortness of breath.      Diagnosis: Chronic kidney disease, unspecified CKD stage  Assessment and Plan of Treatment: Follow up [facility] for continued management.  Eat a Low Potassium diet.

## 2023-07-23 NOTE — CONSULT NOTE ADULT - SUBJECTIVE AND OBJECTIVE BOX
HPI:  This is a 72 year old female with pmh of COPD not on home O2, HTN, a-fib on Eliquis presenting with increasing shortness of breath in setting of on and off subjective fever (highest temp of 98F), cough, and green sputum production. Denies any chest pain or abdominal pain. + nausea but no vomiting. Exertional shortness of breath. No exertional chest pain. Denies any dysuria, urinary frequency, hematuria. Denies any recent surgery/travel. No lower extremity swelling.   (16 Jul 2023 19:05)        PAST MEDICAL & SURGICAL HISTORY:  COPD (chronic obstructive pulmonary disease)      Hypothyroid      HTN (hypertension)      Hyperlipidemia      Edema extremities      Afib  On Eliquis      Psoriasis      S/P eye surgery  age 5 unsure if right or left eye          FAMILY HISTORY:  FH: hypertension        Social History:            HOME MEDICATIONS:  Home Medications:  Advair Diskus 500 mcg-50 mcg inhalation powder: 1 puff(s) inhaled 2 times a day (18 Jul 2023 11:22)  albuterol 0.63 mg/3 mL (0.021%) inhalation solution: 3 by nebulizer 3 times a day as needed for  shortness of breath and/or wheezing (18 Jul 2023 11:30)  Artificial Tears ophthalmic solution: 1 drop(s) to each affected eye 3 times a day (18 Jul 2023 11:22)  budesonide 1 mg/2 mL inhalation suspension: 2 by nebulizer 3 times a day as needed for  shortness of breath and/or wheezing (18 Jul 2023 11:30)  cholecalciferol 25 mcg (1000 intl units) oral tablet: 1 orally Monday, Wednesday, and Friday (18 Jul 2023 11:30)  Eliquis: 2.5 orally 2 times a day (18 Jul 2023 11:22)  hydrALAZINE 50 mg oral tablet: 1 tab(s) orally 3 times a day (18 Jul 2023 11:22)  levothyroxine 50 mcg (0.05 mg) oral tablet: orally once a day brand name L-Thyroxine (18 Jul 2023 11:30)  losartan 50 mg oral tablet: 1 tab(s) orally once a day (18 Jul 2023 11:22)  mupirocin 2% nasal ointment: nasal use as needed for nose bleed (18 Jul 2023 11:30)  Spiriva 18 mcg inhalation capsule: 1 each inhaled once a day (18 Jul 2023 11:22)  Vitamin C 500 mg oral tablet, chewable: 1 chewed take 1 tablet po on sunday, tuesday, thursday (18 Jul 2023 11:30)            MEDICATIONS  (STANDING):  acyclovir   Oral Tab/Cap 400 milliGRAM(s) Oral three times a day  apixaban 2.5 milliGRAM(s) Oral every 12 hours  artificial  tears Solution 1 Drop(s) Both EYES three times a day  budesonide 160 MICROgram(s)/formoterol 4.5 MICROgram(s) Inhaler 2 Puff(s) Inhalation two times a day  chlorhexidine 2% Cloths 1 Application(s) Topical <User Schedule>  guaiFENesin ER 1200 milliGRAM(s) Oral every 12 hours  hydrALAZINE 25 milliGRAM(s) Oral two times a day  levalbuterol Inhalation 0.63 milliGRAM(s) Inhalation every 6 hours  levothyroxine 50 MICROGram(s) Oral daily  losartan 50 milliGRAM(s) Oral daily  metoprolol succinate ER 25 milliGRAM(s) Oral daily  predniSONE   Tablet 40 milliGRAM(s) Oral daily    MEDICATIONS  (PRN):      Allergies    Sulfur (Unknown)  sulfa drugs (Unknown)  [This allergen will not trigger allergy alert] artichokes (Unknown)  Levaquin (Anaphylaxis)    Intolerances        Review of Systems:  Neuro: No HA, no dizziness  Cardiovascular: No chest pain, no palpitations  Respiratory: no SOB, no cough  GI: No nausea, vomiting, abdominal pain  MSK: Denies joint/muscle pain      ALL OTHER SYSTEMS REVIEWED AND NEGATIVE      PHYSICAL EXAM:  VITALS: T(C): 36.7 (07-23-23 @ 11:18)  T(F): 98 (07-23-23 @ 11:18), Max: 98.3 (07-22-23 @ 20:55)  HR: 73 (07-23-23 @ 11:18) (68 - 73)  BP: 117/74 (07-23-23 @ 11:18) (117/74 - 150/68)  RR:  (18 - 18)  SpO2:  (91% - 93%)  Wt(kg): --  GENERAL: NAD, well-groomed, well-developed  NEURO:  alert and oriented  RESPIRATORY: Clear to auscultation bilaterally; No rales, rhonchi, wheezing  CARDIOVASCULAR: Si S2  GI: Soft, non distended, normal bowel sounds  MUSCULOSKELETAL: Moves all extremities equally                                 11.5   13.10 )-----------( 359      ( 22 Jul 2023 07:13 )             37.1       07-22    138  |  102  |  44<H>  ----------------------------<  97  4.8   |  26  |  1.48<H>    eGFR: 37<L>    Ca    9.4      07-22  Mg     2.3     07-22  Phos  3.2     07-22        Thyroid Function Tests:  07-23 @ 06:42 TSH -- FreeT4 1.6 T3 -- Anti TPO -- Anti Thyroglobulin Ab -- TSI --  07-22 @ 09:03 TSH -- FreeT4 -- T3 50 Anti TPO -- Anti Thyroglobulin Ab -- TSI --    Diet, DASH/TLC:   Sodium & Cholesterol Restricted  No Concentrated Potassium (07-20-23 @ 10:49) [Active]          A1C with Estimated Average Glucose Result: 5.6 % (05-17-23 @ 05:45)

## 2023-07-24 RX ORDER — LEVOTHYROXINE SODIUM 125 MCG
1.5 TABLET ORAL
Qty: 45 | Refills: 0
Start: 2023-07-24 | End: 2023-08-22

## 2023-07-24 RX ORDER — METOPROLOL TARTRATE 50 MG
1 TABLET ORAL
Qty: 30 | Refills: 0
Start: 2023-07-24 | End: 2023-08-22

## 2023-07-24 RX ORDER — HYDRALAZINE HCL 50 MG
1 TABLET ORAL
Qty: 60 | Refills: 0
Start: 2023-07-24 | End: 2023-08-22

## 2023-07-26 RX ORDER — APIXABAN 2.5 MG/1
2.5 TABLET, FILM COATED ORAL
Qty: 180 | Refills: 1 | Status: ACTIVE | COMMUNITY
Start: 1900-01-01 | End: 1900-01-01

## 2023-08-18 ENCOUNTER — APPOINTMENT (OUTPATIENT)
Dept: CARDIOLOGY | Facility: CLINIC | Age: 73
End: 2023-08-18
Payer: MEDICARE

## 2023-08-18 VITALS
BODY MASS INDEX: 32.94 KG/M2 | WEIGHT: 179 LBS | SYSTOLIC BLOOD PRESSURE: 136 MMHG | DIASTOLIC BLOOD PRESSURE: 74 MMHG | HEIGHT: 62 IN | OXYGEN SATURATION: 96 % | HEART RATE: 70 BPM

## 2023-08-18 PROCEDURE — 99215 OFFICE O/P EST HI 40 MIN: CPT | Mod: 25

## 2023-08-18 PROCEDURE — 93000 ELECTROCARDIOGRAM COMPLETE: CPT

## 2023-08-18 NOTE — PHYSICAL EXAM
[S3] : no S3 [S4] : no S4 [Rt] : no varicose veins of the right leg [Lt] : no varicose veins of the left leg [Right Femoral Bruit] : no bruit heard over the right femoral artery [Left Femoral Bruit] : no bruit heard over the left femoral artery [de-identified] : ALYSSA [de-identified] : bilateral  [de-identified] : arthritis

## 2023-08-18 NOTE — REVIEW OF SYSTEMS
[Fever] : no fever [Chills] : no chills [Blurry Vision] : no blurred vision [SOB] : no shortness of breath [Chest Discomfort] : no chest discomfort [Leg Claudication] : no intermittent leg claudication [Syncope] : no syncope [Joint Pain] : no joint pain

## 2023-08-18 NOTE — ASSESSMENT
[FreeTextEntry1] : Prior GI bleeding  , s/p colonoscopy, colonic polyps x 4 , , patient is in sinus rhythm   Patient who was having recurrent epistaxis , had dizziness episodes while nasal balloon was removed probably vagal event without recurrence ,  as patient has recurrent epistaxis will continue ,eliquis 2.5 mg po BID for now , follow up with  ENT   Hx of PAF currently in sinus rhythm  tachy cem syndrome with resolved symptoms  after changing medication ,   avoid negative chronotropic drugs , continue losartan , hydralazine  eliquis   patient scheduled to have  ILR placement  with EP which she did not go for   Hypertension  improved  normal home BP continue low salt diet and continue  losartan to 50 mg po daily   asymmetric BP  UE ?? right more than left    Moderate Aortic stenosis  with  murmur/ caortid bruits :  worsened AS gradient compared to prior study ,  continue to monitor , mild carotid disease    Hyperlipidemia   elevated compared to prior , with evidence of coronary calcification suggestive coronary atherosclerosis  ( no evidence of ischemia on stress test ) , target LDL <70,  will give nextel 180 mg po daily , blood work ,   Body aches ? arthritis   improved aches on holding  lipitor,   Obesity prior hx of sleep apnea  , patient was encouraged to loose more weight   COPD pulmonary nodule stable continue current medication ,recently treated pneumonia ,  follow up with pulmonary    follow up after 3 months

## 2023-08-18 NOTE — HISTORY OF PRESENT ILLNESS
[FreeTextEntry1] : 72 year old female with hx of morbid obesity  improved sleep apnea with weight loss ,  Monoclonal gammopathy HTN  hyperlipidemia  PAF , bradycardia with normal chronotropic response  ,was evaluated by EP  placed on hydralazine , patients palpitations resolved , on eliquis , came for follow up   after hospitalization with Pneumonia  from July 17 to 23 rd 2023. patient was medications were changed in hospital without proper input from medication , Patient went back on her home medication , patient is feeling feeling better and better   says she is feeling fine ,  denies any chest pain or palpitation or dizziness   patient  has echo showed worsened aortic stenosis than before , now moderate AS  hospital echo did not show any  change from previous echo   Patient blood pressure  is optimal today , patient says her home BP readings are normal range   right arm BP more than left arm ??   says she is having side effects with lipitor ,multiple joint pains , unable to sleep longer at night , can not lay on left side due to hip pain stopped taking medication then she felt better   Patient used to be very obese , did loose some weight , with some improvement in sleep apnea , now she does not use cpap , patient had long standing hx of copd , now she is not using oxygen    her blood work showed  improved lipid profile  TC  193 HDl 69  LDL 99   2023  taking red yeast [facility] records reviewed

## 2023-08-18 NOTE — CARDIOLOGY SUMMARY
[de-identified] : 8/18/23 sinus rhythm PACS  [de-identified] : 4/16/19  no ischemia on chemical nuclear stress test  [de-identified] : 4/7/23   Moderate LVH   EF 55-60% Mild DD Moderate  AS  , mild AI , JADE  worsened AS  gradiant  7/18/23 Normal EF  Moderate AI , JADE  PG39.2, MG25.3 DANII 1.12.sqcm, mild AI ,  VTI ratio 0.36

## 2023-09-05 NOTE — PROGRESS NOTE ADULT - TIME BILLING
INFECTIOUS DISEASE PROGRESS NOTE    Patient: Marilyn Wilder Date: 2023   : 1949 Attending: Siegrist, Jeremy F, MD   73 Raul Webster, DO   Infectious Diseases   A/P

## 2023-10-16 RX ORDER — HYDRALAZINE HYDROCHLORIDE 50 MG/1
50 TABLET ORAL
Qty: 270 | Refills: 2 | Status: ACTIVE | COMMUNITY
Start: 1900-01-01 | End: 1900-01-01

## 2023-11-16 ENCOUNTER — NON-APPOINTMENT (OUTPATIENT)
Age: 73
End: 2023-11-16

## 2023-11-16 ENCOUNTER — APPOINTMENT (OUTPATIENT)
Dept: CARDIOLOGY | Facility: CLINIC | Age: 73
End: 2023-11-16
Payer: MEDICARE

## 2023-11-16 VITALS — RESPIRATION RATE: 14 BRPM | DIASTOLIC BLOOD PRESSURE: 70 MMHG | SYSTOLIC BLOOD PRESSURE: 156 MMHG

## 2023-11-16 VITALS
SYSTOLIC BLOOD PRESSURE: 164 MMHG | DIASTOLIC BLOOD PRESSURE: 60 MMHG | OXYGEN SATURATION: 95 % | BODY MASS INDEX: 31.83 KG/M2 | HEART RATE: 68 BPM | WEIGHT: 173 LBS | HEIGHT: 62 IN

## 2023-11-16 DIAGNOSIS — E66.9 OBESITY, UNSPECIFIED: ICD-10-CM

## 2023-11-16 PROCEDURE — 93000 ELECTROCARDIOGRAM COMPLETE: CPT

## 2023-11-16 PROCEDURE — 99214 OFFICE O/P EST MOD 30 MIN: CPT | Mod: 25

## 2023-11-16 RX ORDER — MULTIVIT-MIN/IRON/FOLIC ACID/K 18-600-40
500 CAPSULE ORAL DAILY
Refills: 0 | Status: ACTIVE | COMMUNITY

## 2023-11-17 ENCOUNTER — APPOINTMENT (OUTPATIENT)
Dept: NEPHROLOGY | Facility: CLINIC | Age: 73
End: 2023-11-17
Payer: MEDICARE

## 2023-11-17 VITALS
SYSTOLIC BLOOD PRESSURE: 108 MMHG | BODY MASS INDEX: 32.39 KG/M2 | DIASTOLIC BLOOD PRESSURE: 54 MMHG | HEART RATE: 66 BPM | TEMPERATURE: 96.3 F | HEIGHT: 62 IN | OXYGEN SATURATION: 94 % | WEIGHT: 176 LBS

## 2023-11-17 DIAGNOSIS — N18.9 CHRONIC KIDNEY DISEASE, UNSPECIFIED: ICD-10-CM

## 2023-11-17 PROCEDURE — 99214 OFFICE O/P EST MOD 30 MIN: CPT

## 2023-11-17 RX ORDER — BEMPEDOIC ACID 180 MG/1
180 TABLET, FILM COATED ORAL
Qty: 90 | Refills: 1 | Status: DISCONTINUED | COMMUNITY
Start: 2023-08-18 | End: 2023-11-17

## 2024-01-03 NOTE — ED ADULT NURSE NOTE - CAS TRG GEN SKIN CONDITION
Detail Level: Detailed
Patient Specific Counseling (Will Not Stick From Patient To Patient): Patient says he doesn’t want to try Dupixent because it is too expensive. He is managing okay with triamcinolone
Detail Level: Simple
Warm/Dry

## 2024-02-09 NOTE — H&P ADULT - NSHPPOAPULMEMBOLUS_GEN_A_CORE
Procedure Date:  2024    Patient: Eric Segundo  : 1971    Sedation: MAC    Outpatient History & Physical Review for Colonoscopy     Indications:  Screening Colonoscopy      Current Facility-Administered Medications   Medication Dose Route Frequency Provider Last Rate Last Admin    sodium chloride 0.9 % flush bag 25 mL  25 mL Intravenous PRN Tulio Sampson MD 10 mL/hr at 24 0703 1,000 mL at 24 0703    sodium chloride 0.9 % injection 2 mL  2 mL Intracatheter 2 times per day Tulio Sampson MD        lidocaine 1 % injection 5-10 mg  5-10 mg Subcutaneous PRN Carlos Vickers MD        metoPROLOL tartrate (LOPRESSOR) tablet 25 mg  25 mg Oral Once PRN Carlos Vickers MD        dextrose (GLUTOSE) 40 % gel 30 g  30 g Oral Once PRN Carlos Vickers MD        dextrose 50 % injection 25 g  25 g Intravenous PRN Carlos Vickers MD        insulin regular (human) (HumuLIN R, NovoLIN R) sliding scale injection   Subcutaneous Once PRN Carlos Vickers MD        lactated ringers infusion   Intravenous Continuous Carlos Vickers MD        sodium chloride 0.9% infusion   Intravenous Continuous Carlos Vickers MD        dextrose 5 % infusion   Intravenous Continuous PRN Carlos Vickers MD           ALLERGIES:  Patient has no known allergies.            History reviewed. No pertinent past medical history.  Past Surgical History:   Procedure Laterality Date    Forearm/wrist surgery unlisted Left     fracture repair    Knee scope,diagnostic Bilateral     miniscus    Vasectomy           PHYSICAL EXAM:  HEENT: Within normal limits  Heart/LUNGS: Per Anesthesiat.    The risks of the procedure including the possibility of bleeding, perforation (possibly resulting in laparotomy/colostomy) aspiration, and the risk of a polypectomy were discussed with the patient who accepts these risks.             no

## 2024-02-15 ENCOUNTER — NON-APPOINTMENT (OUTPATIENT)
Age: 74
End: 2024-02-15

## 2024-02-15 ENCOUNTER — APPOINTMENT (OUTPATIENT)
Dept: CARDIOLOGY | Facility: CLINIC | Age: 74
End: 2024-02-15
Payer: MEDICARE

## 2024-02-15 VITALS
OXYGEN SATURATION: 95 % | HEIGHT: 62 IN | SYSTOLIC BLOOD PRESSURE: 160 MMHG | HEART RATE: 60 BPM | DIASTOLIC BLOOD PRESSURE: 64 MMHG

## 2024-02-15 VITALS — WEIGHT: 176 LBS | BODY MASS INDEX: 32.19 KG/M2

## 2024-02-15 DIAGNOSIS — I25.84 ATHEROSCLEROTIC HEART DISEASE OF NATIVE CORONARY ARTERY W/OUT ANGINA PECTORIS: ICD-10-CM

## 2024-02-15 DIAGNOSIS — I48.91 UNSPECIFIED ATRIAL FIBRILLATION: ICD-10-CM

## 2024-02-15 DIAGNOSIS — I25.10 ATHEROSCLEROTIC HEART DISEASE OF NATIVE CORONARY ARTERY W/OUT ANGINA PECTORIS: ICD-10-CM

## 2024-02-15 DIAGNOSIS — R01.1 CARDIAC MURMUR, UNSPECIFIED: ICD-10-CM

## 2024-02-15 PROCEDURE — G2211 COMPLEX E/M VISIT ADD ON: CPT

## 2024-02-15 PROCEDURE — 93000 ELECTROCARDIOGRAM COMPLETE: CPT

## 2024-02-15 PROCEDURE — 99214 OFFICE O/P EST MOD 30 MIN: CPT

## 2024-02-15 NOTE — HISTORY OF PRESENT ILLNESS
[FreeTextEntry1] : 72 year old female with hx of morbid obesity  improved sleep apnea with weight loss ,  Monoclonal gammopathy HTN  hyperlipidemia  PAF , bradycardia with normal chronotropic response  ,was evaluated by EP  placed on hydralazine , patients palpitations resolved , on eliquis , came for follow up says she is feeling fine , did have covid infection in jan 2024 , had cataract surgery ,    he medication nexeltel was not approved , her lipids are well controlled on diet restriction and on red yeast rice   Nov 2023  LDL 93 Tc 178 HDL 71   denies any chest pain or palpitation or dizziness   patient  has echo showed worsened aortic stenosis than before , now moderate AS  hospital echo did not show any  change from previous echo   Patient blood pressure  is elevated today ,as she was not adherent to low salt diet , had salty meal yesterday , her home BP readings are in normal range   says she is having side effects with lipitor ,multiple joint pains , unable to sleep longer at night , can not lay on left side due to hip pain stopped taking medication then she felt better   Patient used to be very obese , did loose some weight , with some improvement in sleep apnea , now she does not use cpap , patient had long standing hx of copd , now she is not using oxygen

## 2024-02-15 NOTE — PHYSICAL EXAM
[Obese] : obese [Normal Conjunctiva] : normal conjunctiva [Normal Venous Pressure] : normal venous pressure [Carotid Bruit] : carotid bruit [Normal Rate] : normal [Normal S1] : normal S1 [Normal S2] : normal S2 [II] : a grade 2 [No Pitting Edema] : no pitting edema present [Right Carotid Bruit] : right carotid bruit heard [Left Carotid Bruit] : left carotid bruit heard [2+] : left 2+ [No Abnormalities] : the abdominal aorta was not enlarged and no bruit was heard [Clear Lung Fields] : clear lung fields [Good Air Entry] : good air entry [No Respiratory Distress] : no respiratory distress  [Soft] : abdomen soft [Non Tender] : non-tender [Normal Bowel Sounds] : normal bowel sounds [Abnormal Gait] : abnormal gait [No Edema] : no edema [No Cyanosis] : no cyanosis [No Clubbing] : no clubbing [No Varicosities] : no varicosities [Normal Radial B/L] : normal radial B/L [No Rash] : no rash [No Skin Lesions] : no skin lesions [Moves all extremities] : moves all extremities [No Focal Deficits] : no focal deficits [Normal Speech] : normal speech [Alert and Oriented] : alert and oriented [Normal memory] : normal memory [S3] : no S3 [S4] : no S4 [Rt] : no varicose veins of the right leg [Lt] : no varicose veins of the left leg [Right Femoral Bruit] : no bruit heard over the right femoral artery [Left Femoral Bruit] : no bruit heard over the left femoral artery [de-identified] : bilateral  [de-identified] : ALYSSA [de-identified] : arthritis

## 2024-02-15 NOTE — DISCUSSION/SUMMARY
[EKG obtained to assist in diagnosis and management of assessed problem(s)] : EKG obtained to assist in diagnosis and management of assessed problem(s) [FreeTextEntry1] : Patient who was having recurrent epistaxis , controlled on low dose eliquis  as patient has recurrent epistaxis will continue ,eliquis 2.5 mg po BID for now , follow up with ENT  Hx of PAF currently in sinus rhythm tachy cem syndrome with resolved symptoms after changing medication , avoid negative chronotropic drugs , continue losartan , hydralazine eliquis  Hypertension uncontrolled ,normal home BP continue low salt diet and continue losartan 50 mg po daily asymmetric BP UE ?? right more than left. home BP monitor    Moderate Aortic stenosis with murmur/ caortid bruits : worsened AS gradient compared to prior study , continue to monitor , mild carotid disease  Hyperlipidemia improved compared to prior , with evidence of coronary calcification suggestive coronary atherosclerosis ( no evidence of ischemia on stress test ) , target LDL <70, continue red yeast rice ,   Body aches ? arthritis improved aches on holding lipitor,  Obesity prior hx of sleep apnea , patient was encouraged to loose more weight  COPD pulmonary nodule stable continue current medication ,recently treated pneumonia , follow up with pulmonary     follow up after 3 months.

## 2024-02-15 NOTE — CARDIOLOGY SUMMARY
[de-identified] : 2/15/24 sinus rhythm  [de-identified] : 4/16/19  no ischemia on chemical nuclear stress test  [de-identified] : 4/7/23   Moderate LVH   EF 55-60% Mild DD Moderate  AS  , mild AI , JADE  worsened AS  gradiant  7/18/23 Normal EF  Moderate AI , JADE  PG39.2, MG25.3 DANII 1.12.sqcm, mild AI ,  VTI ratio 0.36

## 2024-02-15 NOTE — REVIEW OF SYSTEMS
[Dyspnea on exertion] : dyspnea during exertion [Negative] : Heme/Lymph [Fever] : no fever [Chills] : no chills [Blurry Vision] : no blurred vision [SOB] : no shortness of breath [Chest Discomfort] : no chest discomfort [Syncope] : no syncope [Joint Pain] : no joint pain [Itching] : no itching [Tremor] : no tremor was seen

## 2024-02-20 NOTE — ED PROVIDER NOTE - CHPI ED SYMPTOMS NEG
"PSYCHIATRY INPATIENT ADMISSION NOTE - H & P      2/20/2024 2:02 PM   Ismael Gordon Jr.   1989   [id number]         DATE OF ADMISSION: 2/19/2024 12:57 PM    SITE: [facility]    CURRENT LEGAL STATUS: PEC and/or CEC      HISTORY    CHIEF COMPLAINT   Ismael Gordon Jr. is a 34 y.o. male with a past psychiatric history of Substance Abuse and bipolar  currently admitted to the inpatient unit with the following chief complaint:  depression, anxiety and substance abuse    HPI   The patient was seen and examined. The chart was reviewed.    The patient presented to the ER on 2/19/2024 .    The patient was medically cleared and admitted to the U.    Per ED MD:    Pt arrives to the ed with c/o "I'm coming off heroin I did this morning and I want to detox from it. Yall always help me when I need to detox." Denies any withdrawal symptoms. Last use of heroin was this morning. Denies si/hi and hallucinations.      History of Present Illness  Ismael Gordon Jr. is a 34 y.o. male that presents with anxiety & depression  Patient was an active heroin abuser, last use was this morning on ER interview  Patient has been to the psychiatric unit several times with depression & anxiety  Patient is also asking for evaluation for drug addiction treatment this morning  Patient denies any suicidal ideation but clearly has chronic drug addiction HX  Additionally, the patient has an unmedicated bipolar patient, not psychotic now     Per RN:  Pt calm and cooperative during admission.  States he is here to detox off of heroin.  States he wishes to get on the vivitrol shot, either here or once he leaves here at his follow-up appointment.  Pt reports he last used heroin this morning.  States he was using meth and benzos as supplementary drugs when he would be out of heroin.  Pt does not want inpatient rehab, just to get on vivitrol shot.  Pt also request STD bloodwork while he is here.  Pt denies any S/I or H/I at this time.  " "Mood is good.  Pt states he feels good, doesn't expect to start having withdrawal symptoms until probably tomorrow.       Pt agitated saying he needs to get out of here. Ran in the nurses station behind a med student, trying to get out pulling on doors. Security called and pt ran back to his room. Talked with pt, he said he was sorry he just felt like he needed to leave. He is detoxing. Pt given IM zyprexa at 1045, pt tolerated well. Pt met with , will start benzo taper. Pt restless, will give new orders when available. Pt repeatedly apologized for his behavior.       Psychiatric interview:  Patient states he has been using methamphetamine and heroin. He uses IV. He states he typically uses meth once a week and heroin daily. He has been doing about 1 gram of heroin per day. He states he has been suffering from addiction for "years." He states his addiction makes him depressed and reports depression has been ongoing and worsening over the last year. Last use of heroin was yesterday. Now currently exhibiting and endorsing w/d symptoms.      Symptoms of Depression: diminished mood - Yes, loss of interest/anhedonia - Yes;  recurrent - Yes, >14 days - Yes, diminished energy - Yes, change in sleep - No, change in appetite - Yes, diminished concentration or cognition or indecisiveness - Yes, PMA/R -  Yes, excessive guilt or hopelessness or worthlessness - Yes, suicidal ideations - No    Changes in Sleep: trouble with initiation- No, maintenance, - No early morning awakening with inability to return to sleep - No, hypersomnolence - No    Suicidal- active/passive ideations - No, organized plans, future intentions - No    Homicidal ideations: active/passive ideations - No, organized plans, future intentions - No    Symptoms of psychosis: hallucinations - No, delusions - No, disorganized speech - No, disorganized behavior or abnormal motor behavior - No, or negative symptoms (diminshed emotional expression, avolition, " "anhedonia, alogia, asociality) - No, active phase symptoms >1 month - No, continuous signs of illness > 6 months - No, since onset of illness decreased level of functioning present - No    Symptoms of sonia or hypomania: elevated, expansive, or irritable mood with increased energy or activity - No; > 4 days - No,  >7 days - No; with inflated self-esteem or grandiosity - No, decreased need for sleep - No, increased rate of speech - No, FOI or racing thoughts - No, distractibility - No, increased goal directed activity or PMA - No, risky/disinhibited behavior - No    Symptoms of KAILYN: excessive anxiety/worry/fear, more days than not, about numerous issues - No, ongoing for >6 months - No, difficult to control - No, with restlessness - No, fatigue - No, poor concentration - No, irritability - No, muscle tension - No, sleep disturbance - No; causes functionally impairing distress - No    Symptoms of Panic Disorder: recurrent panic attacks (palpitations/heart racing, sweating, shakiness, dyspnea, choking, chest pain/discomfort, Gi symptoms, dizzy/lightheadedness, hot/col flashes, paresthesias, derealization, fear of losing control or fear of dying or fear of "going crazy") - No, precipitated - No, un-precipitated - No, source of worry and/or behavioral changes secondary for 1 month or longer- No, agoraphobia - No    Symptoms of PTSD: h/o trauma exposure - No; re-experiencing/intrusive symptoms - No, avoidant behavior - No, 2 or more negative alterations in cognition or mood - No, 2 or more hyperarousal symptoms - No; with dissociative symptoms - No, ongoing for 1 or more  months - No    Symptoms of OCD: obsessions (recurrent thoughts/urges/images; intrusive and/or unwanted; uses other thoughts/actions to suppress) - No; compulsions (repetitive behaviors used to lower distress/anxiety/obsessions) - No, time-consuming (over 1 hour per day) or cause significant distress/impairment - - No    Symptoms of Anorexia: restriction " "of caloric intake leading to significantly low body weight - No, intense fear of gaining weight or persistent behavior that interferes with weight gain even thought at a significantly low weight - No, disturbance in the way in which one's body weight or shape is experienced, undue influence of body weight or shape on self evaluation, or persistent lack of recognition of the seriousness of the current low body weight - No    Symptoms of Bulimia: recurrent episodes of binge eating (definitely larger amount  than what others would eat and lack of a sense of control over eating during episode) - No, recurrent inappropriate compensatory behaviors in order to prevent weight gain (fasting, medications, exercise, vomiting) - No, binges and compensatory behaviors both occur on average at least once a week for 3 months - No, self evaluations is unduly influenced by body shape/weight- - No    Symptoms of Binge eating: recurrent episodes of binge eating (definitely larger amount than what others would eat and lack of a sense of control over eating during episode) - No, 3 or more of following (eating much more rapidly, eating until uncomfortably full, large amounts when not hungry, eating alone because of embarrassed by how much,  feeling disgusted with oneself, depressed or very guilty afterward) - No, distress regarding binges - No, binges occur on average at least once a week for 3 months - No      PAST PSYCHIATRIC HISTORY  Previous Psychiatric Hospitalizations: Yes  Previous SI/HI: Yes,  Previous Suicide Attempts: No,   Previous Medication Trials: Yes,  Psychiatric Care (current & past): Yes,  History of Psychotherapy: Yes,  History of Violence: No,  History of sexual/physical abuse: No,    PAST MEDICAL & SURGICAL HISTORY   Past Medical History:   Diagnosis Date    Bipolar 1 disorder     Depression     Hepatitis C     History of psychiatric hospitalization     "It has all been to get to previous 28 day programs."    Hx of " "psychiatric care     Psychiatric exam requested by authority     Psychiatric problem     Suicide attempt 01/16/2017    'I did an overdose on heroin so you could say passive suicide in my opinion."    Therapy      Past Surgical History:   Procedure Laterality Date    CHOLECYSTECTOMY           Home Meds:   Prior to Admission medications    Medication Sig Start Date End Date Taking? Authorizing Provider   hydrOXYzine pamoate (VISTARIL) 50 MG Cap Take 1 capsule (50 mg total) by mouth every evening. 8/27/21   Manolo Mendoza III, MD   mirtazapine (REMERON) 15 MG tablet Take 1 tablet (15 mg total) by mouth every evening. 5/3/22 5/3/23  Anshul Abrahma MD   nicotine (NICODERM CQ) 14 mg/24 hr Place 1 patch onto the skin once daily. 5/3/22   Anshul Abraham MD         Scheduled Meds:    folic acid  1 mg Oral Daily    multivitamin  1 tablet Oral Daily    nicotine  1 patch Transdermal Daily    thiamine  100 mg Oral Daily      PRN Meds: acetaminophen, aluminum-magnesium hydroxide-simethicone, hydrOXYzine HCL, ibuprofen, loperamide, melatonin, OLANZapine **AND** OLANZapine, ondansetron   Psychotherapeutics (From admission, onward)      Start     Stop Route Frequency Ordered    02/19/24 1305  OLANZapine tablet 10 mg  (Olanzapine PRN (</= 66 yo))        See Hyperspace for full Linked Orders Report.    -- Oral Every 8 hours PRN 02/19/24 1305    02/19/24 1305  OLANZapine injection 10 mg  (Olanzapine PRN (</= 66 yo))        See Hyperspace for full Linked Orders Report.    -- IM Every 8 hours PRN 02/19/24 1305            ALLERGIES   Review of patient's allergies indicates:  No Known Allergies    NEUROLOGIC HISTORY  Seizures: No  Head trauma: No    SOCIAL HISTORY:  Developmental/Childhood:Achieved all developmental milestones timely  Education: some college  Employment Status/Finances:Employed as    Relationship Status/Sexual Orientation: single  Children: 0  Housing Status: Home    history:  NO   Access " to Firearms: NO ;  Locked up? n/a  Islam:Non-practicing  Recreational activities: none    SUBSTANCE ABUSE HISTORY   Recreational Drugs: methamphetamines and narcotics   Use of Alcohol: occasional, social use  Rehab History:yes   Tobacco Use:yes    LEGAL HISTORY:   Past charges/incarcerations: YES:     Pending charges:NO    FAMILY PSYCHIATRIC HISTORY   Family History   Problem Relation Age of Onset    Alcohol abuse Mother     No Known Problems Father     No Known Problems Sister     Drug abuse Brother     Anxiety disorder Maternal Aunt     Drug abuse Maternal Aunt     Drug abuse Paternal Aunt     Drug abuse Maternal Uncle     Anxiety disorder Maternal Uncle     Drug abuse Paternal Uncle     No Known Problems Maternal Grandfather     No Known Problems Maternal Grandmother     No Known Problems Paternal Grandfather     No Known Problems Paternal Grandmother     Drug abuse Cousin     Anxiety disorder Cousin     Alcohol abuse Cousin     No Known Problems Maternal Aunt     Drug abuse Maternal Aunt     No Known Problems Paternal Cousin            ROS  Review of Systems   Constitutional:  Negative for chills and fever.   HENT:  Negative for hearing loss.    Eyes:  Negative for blurred vision and double vision.   Respiratory:  Negative for shortness of breath.    Cardiovascular:  Negative for chest pain and palpitations.   Gastrointestinal:  Positive for nausea. Negative for constipation, diarrhea and vomiting.   Genitourinary:  Negative for dysuria.   Musculoskeletal:  Positive for myalgias. Negative for back pain and neck pain.   Skin:  Negative for rash.   Neurological:  Negative for dizziness and headaches.   Endo/Heme/Allergies:  Negative for environmental allergies.         EXAMINATION    PHYSICAL EXAM  Reviewed note/exam by Darin Pacheco MD from 02/19/24 0819    VITALS   Vitals:    02/20/24 0738   BP: (!) 114/58   Pulse: 71   Resp: 18   Temp: 97.9 °F (36.6 °C)        Body mass index is 23.79  kg/m².        PAIN  0/10  Subjective report of pain matches objective signs and symptoms: Yes    LABORATORY DATA   Recent Results (from the past 72 hour(s))   CBC Auto Differential    Collection Time: 02/19/24  8:37 AM   Result Value Ref Range    WBC 7.56 3.90 - 12.70 K/uL    RBC 5.13 4.60 - 6.20 M/uL    Hemoglobin 14.2 14.0 - 18.0 g/dL    Hematocrit 43.8 40.0 - 54.0 %    MCV 85 82 - 98 fL    MCH 27.7 27.0 - 31.0 pg    MCHC 32.4 32.0 - 36.0 g/dL    RDW 13.2 11.5 - 14.5 %    Platelets 279 150 - 450 K/uL    MPV 9.3 9.2 - 12.9 fL    Immature Granulocytes 0.5 0.0 - 0.5 %    Gran # (ANC) 5.5 1.8 - 7.7 K/uL    Immature Grans (Abs) 0.04 0.00 - 0.04 K/uL    Lymph # 1.3 1.0 - 4.8 K/uL    Mono # 0.5 0.3 - 1.0 K/uL    Eos # 0.2 0.0 - 0.5 K/uL    Baso # 0.05 0.00 - 0.20 K/uL    nRBC 0 0 /100 WBC    Gran % 72.9 38.0 - 73.0 %    Lymph % 16.8 (L) 18.0 - 48.0 %    Mono % 7.1 4.0 - 15.0 %    Eosinophil % 2.0 0.0 - 8.0 %    Basophil % 0.7 0.0 - 1.9 %    Differential Method Automated    TSH    Collection Time: 02/19/24  8:37 AM   Result Value Ref Range    TSH 2.738 0.400 - 4.000 uIU/mL   Salicylate Level    Collection Time: 02/19/24  8:37 AM   Result Value Ref Range    Salicylate Lvl <5.0 (L) 15.0 - 30.0 mg/dL   Acetaminophen Level    Collection Time: 02/19/24  8:37 AM   Result Value Ref Range    Acetaminophen (Tylenol), Serum <3.0 (L) 10.0 - 20.0 ug/mL   Ethanol    Collection Time: 02/19/24  8:37 AM   Result Value Ref Range    Alcohol, Serum <10 <10 mg/dL   Comprehensive Metabolic Panel    Collection Time: 02/19/24  8:37 AM   Result Value Ref Range    Sodium 140 136 - 145 mmol/L    Potassium 3.6 3.5 - 5.1 mmol/L    Chloride 103 95 - 110 mmol/L    CO2 28 23 - 29 mmol/L    Glucose 126 (H) 70 - 110 mg/dL    BUN 24 (H) 6 - 20 mg/dL    Creatinine 0.9 0.5 - 1.4 mg/dL    Calcium 9.7 8.7 - 10.5 mg/dL    Total Protein 7.7 6.0 - 8.4 g/dL    Albumin 4.1 3.5 - 5.2 g/dL    Total Bilirubin 0.5 0.1 - 1.0 mg/dL    Alkaline Phosphatase 68 55 - 135  "U/L    AST 17 10 - 40 U/L    ALT 22 10 - 44 U/L    eGFR >60 >60 mL/min/1.73 m^2    Anion Gap 9 8 - 16 mmol/L   Drug screen panel, in-house    Collection Time: 02/19/24  8:38 AM   Result Value Ref Range    Benzodiazepines Negative Negative    Methadone metabolites Negative Negative    Cocaine (Metab.) Negative Negative    Opiate Scrn, Ur Presumptive Positive (A) Negative    Barbiturate Screen, Ur Negative Negative    Amphetamine Screen, Ur Presumptive Positive (A) Negative    THC Presumptive Positive (A) Negative    Phencyclidine Negative Negative    Creatinine, Urine 354.8 23.0 - 375.0 mg/dL    Toxicology Information SEE COMMENT    Urinalysis, Reflex to Urine Culture Urine, Clean Catch    Collection Time: 02/19/24  8:38 AM    Specimen: Urine   Result Value Ref Range    Specimen UA Urine, Clean Catch     Color, UA Yellow Yellow, Straw, Kelly    Appearance, UA Clear Clear    pH, UA 6.0 5.0 - 8.0    Specific Gravity, UA >=1.030 (A) 1.005 - 1.030    Protein, UA Negative Negative    Glucose, UA Trace (A) Negative    Ketones, UA Trace (A) Negative    Bilirubin (UA) 1+ (A) Negative    Occult Blood UA Negative Negative    Nitrite, UA Negative Negative    Urobilinogen, UA Negative <2.0 EU/dL    Leukocytes, UA Negative Negative      No results found for: "PHENYTOIN", "PHENOBARB", "VALPROATE", "CBMZ"        CONSTITUTIONAL  General Appearance: unremarkable, age appropriate    MUSCULOSKELETAL  Muscle Strength and Tone:no tremor, no tic  Abnormal Involuntary Movements: No  Gait and Station: non-ataxic    PSYCHIATRIC   Level of Consciousness: awake and alert   Orientation: person, place, and situation  Grooming: Disheveled and Hospital garb  Psychomotor Behavior: reluctant to participate, restless and fidgety   Speech: increased latency of response, non-spontaneous, monotone, rapid  Language: grossly intact  Mood: depressed  Affect: Consistent with mood  Thought Process: linear  Associations: intact   Thought Content: denies SI, " denies HI, and no delusions  Perceptions: denies AH and denies  VH  Memory: Able to recall past events, Remote intact, and Recent intact  Attention:Attends to interview without distraction  Fund of Knowledge: Aware of current events and Vocabulary appropriate   Estimate if Intelligence:  Average based on work/education history, vocabulary and mental status exam  Insight: has awareness of illness  Judgment: limited      PSYCHOSOCIAL    PSYCHOSOCIAL STRESSORS   drug abuse  Housing (has to live with his mother)      FUNCTIONING RELATIONSHIPS   good support system    STRENGTHS AND LIABILITIES   Strength: Patient accepts guidance/feedback, Strength: Patient is expressive/articulate., Liability: Patient is impulsive., Liability: Patient lacks coping skills.    Is the patient aware of the biomedical complications associated with substance abuse and mental illness? yes    Does the patient have an Advance Directive for Mental Health treatment? no  (If yes, inform patient to bring copy.)        MEDICAL DECISION MAKING        ASSESSMENT       MDD, recurrent, severe  Unspecified anxiety disorder  Amphetamine abuse  Opiate abuse  Nicotine dependence        PROBLEM LIST AND MANAGEMENT PLANS    MDD, recurrent, severe  - start remeron 7.5 mg PO qhs  - pt counseled  - follow up with outpatient mental health providers after discharge for medication management and psychotherapy    Unspecified anxiety disorder  - start hydroxyzine 50 mg PO q 6 hours PRN  - pt counseled  - follow up with outpatient mental health providers after discharge for medication management and psychotherapy    Amphetamine abuse  - start valium 10 mg PO TID for detox to assist with agitation  - pt counseled  - SW consulted for assistance with additional resources   - consider rehab    Opiate abuse  - pt requesting vivitrol, will plan to administer prior to discharge once detox controlled if PO challenge successful  - prns for sx w/d  - pt counseled  - SW consulted  for assistance with additional resources   - consider rehab    Nicotine dependence  - start nicotine patch 14 mg PO qd PRN        PRESCRIPTION DRUG MANAGEMENT  Compliance: unknown  Side Effects: unknown  Regimen Adjustments: see above    Discussed diagnosis, risks and benefits of proposed treatment vs alternative treatments vs no treatment, potential side effects of these treatments and the inherent unpredictability of treatment. The patient expresses understanding of the above and displays the capacity to agree with this treatment given said understanding. Patient also agrees that, currently, the benefits outweigh the risks and would like to pursue/continue treatment at this time.    Any medications being used off-label were discussed with the patient inclusive of the evidence base for the use of the medications and consent was obtained for the off-label use of the medication.         DIAGNOSTIC TESTING  Labs reviewed with patient; follow up pending labs    Disposition:  -Will attempt to obtain outside psychiatric records if available  -SW to assist with aftercare planning and collateral  -Once stable discharge home with outpatient follow up care and/or rehab  -Continue inpatient treatment under a PEC and/or CEC for danger to self/ danger to others/grave disability as evident by gravely disabled        Manolo Mendoza III, MD  Psychiatry       no loss of consciousness/no numbness/no weakness

## 2024-02-24 ENCOUNTER — EMERGENCY (EMERGENCY)
Facility: HOSPITAL | Age: 74
LOS: 1 days | Discharge: HOME | End: 2024-02-24
Attending: EMERGENCY MEDICINE
Payer: MEDICARE

## 2024-02-24 VITALS
WEIGHT: 179.02 LBS | DIASTOLIC BLOOD PRESSURE: 92 MMHG | OXYGEN SATURATION: 95 % | RESPIRATION RATE: 18 BRPM | SYSTOLIC BLOOD PRESSURE: 186 MMHG | TEMPERATURE: 97 F | HEART RATE: 77 BPM | HEIGHT: 60 IN

## 2024-02-24 VITALS
SYSTOLIC BLOOD PRESSURE: 128 MMHG | OXYGEN SATURATION: 98 % | RESPIRATION RATE: 18 BRPM | TEMPERATURE: 98 F | DIASTOLIC BLOOD PRESSURE: 70 MMHG | HEART RATE: 73 BPM

## 2024-02-24 DIAGNOSIS — Z98.89 OTHER SPECIFIED POSTPROCEDURAL STATES: Chronic | ICD-10-CM

## 2024-02-24 LAB
ALBUMIN SERPL ELPH-MCNC: 3.4 G/DL — SIGNIFICANT CHANGE UP (ref 3.3–5)
ALP SERPL-CCNC: 79 U/L — SIGNIFICANT CHANGE UP (ref 40–120)
ALT FLD-CCNC: 18 U/L — SIGNIFICANT CHANGE UP (ref 12–78)
ANION GAP SERPL CALC-SCNC: 4 MMOL/L — LOW (ref 5–17)
APPEARANCE UR: CLEAR — SIGNIFICANT CHANGE UP
AST SERPL-CCNC: 23 U/L — SIGNIFICANT CHANGE UP (ref 15–37)
BASOPHILS # BLD AUTO: 0.04 K/UL — SIGNIFICANT CHANGE UP (ref 0–0.2)
BASOPHILS NFR BLD AUTO: 0.8 % — SIGNIFICANT CHANGE UP (ref 0–2)
BILIRUB SERPL-MCNC: 0.4 MG/DL — SIGNIFICANT CHANGE UP (ref 0.2–1.2)
BILIRUB UR-MCNC: NEGATIVE — SIGNIFICANT CHANGE UP
BUN SERPL-MCNC: 25 MG/DL — HIGH (ref 7–23)
CALCIUM SERPL-MCNC: 9.3 MG/DL — SIGNIFICANT CHANGE UP (ref 8.5–10.1)
CHLORIDE SERPL-SCNC: 112 MMOL/L — HIGH (ref 96–108)
CO2 SERPL-SCNC: 29 MMOL/L — SIGNIFICANT CHANGE UP (ref 22–31)
COLOR SPEC: YELLOW — SIGNIFICANT CHANGE UP
CREAT SERPL-MCNC: 0.96 MG/DL — SIGNIFICANT CHANGE UP (ref 0.5–1.3)
DIFF PNL FLD: NEGATIVE — SIGNIFICANT CHANGE UP
EGFR: 62 ML/MIN/1.73M2 — SIGNIFICANT CHANGE UP
EOSINOPHIL # BLD AUTO: 0.12 K/UL — SIGNIFICANT CHANGE UP (ref 0–0.5)
EOSINOPHIL NFR BLD AUTO: 2.4 % — SIGNIFICANT CHANGE UP (ref 0–6)
GLUCOSE SERPL-MCNC: 89 MG/DL — SIGNIFICANT CHANGE UP (ref 70–99)
GLUCOSE UR QL: NEGATIVE MG/DL — SIGNIFICANT CHANGE UP
HCT VFR BLD CALC: 35.8 % — SIGNIFICANT CHANGE UP (ref 34.5–45)
HGB BLD-MCNC: 11.5 G/DL — SIGNIFICANT CHANGE UP (ref 11.5–15.5)
IMM GRANULOCYTES NFR BLD AUTO: 0.4 % — SIGNIFICANT CHANGE UP (ref 0–0.9)
KETONES UR-MCNC: NEGATIVE MG/DL — SIGNIFICANT CHANGE UP
LACTATE SERPL-SCNC: 0.8 MMOL/L — SIGNIFICANT CHANGE UP (ref 0.7–2)
LEUKOCYTE ESTERASE UR-ACNC: NEGATIVE — SIGNIFICANT CHANGE UP
LIDOCAIN IGE QN: 39 U/L — SIGNIFICANT CHANGE UP (ref 13–75)
LYMPHOCYTES # BLD AUTO: 1.02 K/UL — SIGNIFICANT CHANGE UP (ref 1–3.3)
LYMPHOCYTES # BLD AUTO: 20.6 % — SIGNIFICANT CHANGE UP (ref 13–44)
MCHC RBC-ENTMCNC: 30.8 PG — SIGNIFICANT CHANGE UP (ref 27–34)
MCHC RBC-ENTMCNC: 32.1 GM/DL — SIGNIFICANT CHANGE UP (ref 32–36)
MCV RBC AUTO: 96 FL — SIGNIFICANT CHANGE UP (ref 80–100)
MONOCYTES # BLD AUTO: 0.38 K/UL — SIGNIFICANT CHANGE UP (ref 0–0.9)
MONOCYTES NFR BLD AUTO: 7.7 % — SIGNIFICANT CHANGE UP (ref 2–14)
NEUTROPHILS # BLD AUTO: 3.36 K/UL — SIGNIFICANT CHANGE UP (ref 1.8–7.4)
NEUTROPHILS NFR BLD AUTO: 68.1 % — SIGNIFICANT CHANGE UP (ref 43–77)
NITRITE UR-MCNC: NEGATIVE — SIGNIFICANT CHANGE UP
NRBC # BLD: 0 /100 WBCS — SIGNIFICANT CHANGE UP (ref 0–0)
PH UR: 6.5 — SIGNIFICANT CHANGE UP (ref 5–8)
PLATELET # BLD AUTO: 138 K/UL — LOW (ref 150–400)
POTASSIUM SERPL-MCNC: 4.4 MMOL/L — SIGNIFICANT CHANGE UP (ref 3.5–5.3)
POTASSIUM SERPL-SCNC: 4.4 MMOL/L — SIGNIFICANT CHANGE UP (ref 3.5–5.3)
PROT SERPL-MCNC: 6.4 G/DL — SIGNIFICANT CHANGE UP (ref 6–8.3)
PROT UR-MCNC: NEGATIVE MG/DL — SIGNIFICANT CHANGE UP
RBC # BLD: 3.73 M/UL — LOW (ref 3.8–5.2)
RBC # FLD: 14.4 % — SIGNIFICANT CHANGE UP (ref 10.3–14.5)
SODIUM SERPL-SCNC: 145 MMOL/L — SIGNIFICANT CHANGE UP (ref 135–145)
SP GR SPEC: 1 — SIGNIFICANT CHANGE UP (ref 1–1.03)
UROBILINOGEN FLD QL: 0.2 MG/DL — SIGNIFICANT CHANGE UP (ref 0.2–1)
WBC # BLD: 4.94 K/UL — SIGNIFICANT CHANGE UP (ref 3.8–10.5)
WBC # FLD AUTO: 4.94 K/UL — SIGNIFICANT CHANGE UP (ref 3.8–10.5)

## 2024-02-24 PROCEDURE — 80053 COMPREHEN METABOLIC PANEL: CPT

## 2024-02-24 PROCEDURE — 87086 URINE CULTURE/COLONY COUNT: CPT

## 2024-02-24 PROCEDURE — 74176 CT ABD & PELVIS W/O CONTRAST: CPT | Mod: MC

## 2024-02-24 PROCEDURE — 74176 CT ABD & PELVIS W/O CONTRAST: CPT | Mod: 26,MC

## 2024-02-24 PROCEDURE — 81003 URINALYSIS AUTO W/O SCOPE: CPT

## 2024-02-24 PROCEDURE — 99284 EMERGENCY DEPT VISIT MOD MDM: CPT

## 2024-02-24 PROCEDURE — 96374 THER/PROPH/DIAG INJ IV PUSH: CPT

## 2024-02-24 PROCEDURE — 36415 COLL VENOUS BLD VENIPUNCTURE: CPT

## 2024-02-24 PROCEDURE — 83605 ASSAY OF LACTIC ACID: CPT

## 2024-02-24 PROCEDURE — 99284 EMERGENCY DEPT VISIT MOD MDM: CPT | Mod: 25

## 2024-02-24 PROCEDURE — 85025 COMPLETE CBC W/AUTO DIFF WBC: CPT

## 2024-02-24 PROCEDURE — 83690 ASSAY OF LIPASE: CPT

## 2024-02-24 RX ORDER — CEFUROXIME AXETIL 250 MG
1 TABLET ORAL
Qty: 14 | Refills: 0
Start: 2024-02-24 | End: 2024-03-01

## 2024-02-24 RX ORDER — CEFUROXIME AXETIL 250 MG
500 TABLET ORAL ONCE
Refills: 0 | Status: COMPLETED | OUTPATIENT
Start: 2024-02-24 | End: 2024-02-24

## 2024-02-24 RX ORDER — ACETAMINOPHEN 500 MG
1000 TABLET ORAL ONCE
Refills: 0 | Status: COMPLETED | OUTPATIENT
Start: 2024-02-24 | End: 2024-02-24

## 2024-02-24 RX ORDER — SODIUM CHLORIDE 9 MG/ML
1000 INJECTION INTRAMUSCULAR; INTRAVENOUS; SUBCUTANEOUS ONCE
Refills: 0 | Status: COMPLETED | OUTPATIENT
Start: 2024-02-24 | End: 2024-02-24

## 2024-02-24 RX ADMIN — SODIUM CHLORIDE 1000 MILLILITER(S): 9 INJECTION INTRAMUSCULAR; INTRAVENOUS; SUBCUTANEOUS at 09:14

## 2024-02-24 RX ADMIN — Medication 400 MILLIGRAM(S): at 11:17

## 2024-02-24 RX ADMIN — Medication 500 MILLIGRAM(S): at 11:30

## 2024-02-24 NOTE — ED PROVIDER NOTE - CARE PROVIDERS DIRECT ADDRESSES
[email or web address]

## 2024-02-24 NOTE — ED PROVIDER NOTE - PATIENT PORTAL LINK FT
You can access the FollowMyHealth Patient Portal offered by [facility] by registering at the following website: [email or web address]. By joining [employer]’s FollowMyHealth portal, you will also be able to view your health information using other applications (apps) compatible with our system.

## 2024-02-24 NOTE — ED PROVIDER NOTE - OBJECTIVE STATEMENT
73-year-old female with history of hypertension, high cholesterol, atrial fibrillation on Eliquis, COPD, hypothyroidism presents with having some left-sided flank pain since yesterday.  No acute nausea or vomiting.  No acute diarrhea.  No chest pain or shortness of breath.  No prior episodes of this pain.  No history of kidney stones.  No neck pain or stiffness.  No photophobia.  No recent fall or trauma.  No heavy lifting.  Patient had some slight hematuria yesterday.  No other dysuria.  No aggravating or alleviating factors otherwise noted.  No other acute injury or complaints.  Patient previously vaccinated for COVID, no recent exposures.

## 2024-02-24 NOTE — ED PROVIDER NOTE - CARE PROVIDER_API CALL
Law Santos  Internal Medicine  [address] [place] [address]  Phone: [phone]  Fax: [phone]  Follow Up Time:     Venkat Ocampo  Urology  [address] [place] [address], [facility] [address]  [place] [address]  Phone: [phone]  Fax: [phone]  Follow Up Time:

## 2024-02-24 NOTE — ED PROVIDER NOTE - PROGRESS NOTE DETAILS
Patient doing well, no acute complaints.  Patient still with some discomfort, will give Ofirmev.  Since patient had some urinary symptoms with some hematuria, will start a course of Ceftin.  Patient will follow-up with primary care, and will also follow-up with urology as outpatient.  Discussed with patient regarding abdominal pain/flank pain precautions and instructions, importance of close prompt follow-up, and to return with any acute changes or concerns, worsening pain, or any other issues.

## 2024-02-24 NOTE — ED PROVIDER NOTE - CLINICAL SUMMARY MEDICAL DECISION MAKING FREE TEXT BOX
Left-sided flank pain with a soft nontender abdomen.  Small hematuria.  Will check labs, CT, UA, IV fluids, reeval.

## 2024-02-24 NOTE — ED PROVIDER NOTE - DIFFERENTIAL DIAGNOSIS
Rule out kidney stone, electrolyte abnormality, leukocytosis, UTI, pyelonephritis Differential Diagnosis

## 2024-02-24 NOTE — ED ADULT NURSE NOTE - OBJECTIVE STATEMENT
Pt received in bed alert and oriented and resting in bed with the c/c left flank pain since yesterday that has now radiated to left side abd. As per Md's orders IV luma placed blood specimen obtained and sent to the lab. Pt stable with IVF infusing well.

## 2024-02-25 LAB
CULTURE RESULTS: SIGNIFICANT CHANGE UP
SPECIMEN SOURCE: SIGNIFICANT CHANGE UP

## 2024-02-26 ENCOUNTER — EMERGENCY (EMERGENCY)
Facility: HOSPITAL | Age: 74
LOS: 1 days | Discharge: HOME | End: 2024-02-26
Attending: STUDENT IN AN ORGANIZED HEALTH CARE EDUCATION/TRAINING PROGRAM
Payer: MEDICARE

## 2024-02-26 VITALS
RESPIRATION RATE: 17 BRPM | SYSTOLIC BLOOD PRESSURE: 189 MMHG | TEMPERATURE: 98 F | DIASTOLIC BLOOD PRESSURE: 81 MMHG | OXYGEN SATURATION: 95 % | HEIGHT: 60 IN | HEART RATE: 73 BPM | WEIGHT: 184.97 LBS

## 2024-02-26 DIAGNOSIS — Z98.89 OTHER SPECIFIED POSTPROCEDURAL STATES: Chronic | ICD-10-CM

## 2024-02-26 PROCEDURE — 99285 EMERGENCY DEPT VISIT HI MDM: CPT

## 2024-02-26 NOTE — ED ADULT TRIAGE NOTE - CHIEF COMPLAINT QUOTE
left sided abdominal pain since Friday- had negative CT scan on Saturday   pt reports pain is worse and pt now notes a lump on the left side  endorses nausea

## 2024-02-27 VITALS
TEMPERATURE: 98 F | RESPIRATION RATE: 16 BRPM | OXYGEN SATURATION: 97 % | HEART RATE: 73 BPM | SYSTOLIC BLOOD PRESSURE: 160 MMHG | DIASTOLIC BLOOD PRESSURE: 82 MMHG

## 2024-02-27 LAB
ALBUMIN SERPL ELPH-MCNC: 4.2 G/DL — SIGNIFICANT CHANGE UP (ref 3.3–5)
ALP SERPL-CCNC: 77 U/L — SIGNIFICANT CHANGE UP (ref 40–120)
ALT FLD-CCNC: 14 U/L — SIGNIFICANT CHANGE UP (ref 10–45)
ANION GAP SERPL CALC-SCNC: 10 MMOL/L — SIGNIFICANT CHANGE UP (ref 5–17)
APPEARANCE UR: CLEAR — SIGNIFICANT CHANGE UP
AST SERPL-CCNC: 24 U/L — SIGNIFICANT CHANGE UP (ref 10–40)
BACTERIA # UR AUTO: NEGATIVE /HPF — SIGNIFICANT CHANGE UP
BASE EXCESS BLDV CALC-SCNC: 1.1 MMOL/L — SIGNIFICANT CHANGE UP (ref -2–3)
BASOPHILS # BLD AUTO: 0.04 K/UL — SIGNIFICANT CHANGE UP (ref 0–0.2)
BASOPHILS NFR BLD AUTO: 0.7 % — SIGNIFICANT CHANGE UP (ref 0–2)
BILIRUB SERPL-MCNC: 0.4 MG/DL — SIGNIFICANT CHANGE UP (ref 0.2–1.2)
BILIRUB UR-MCNC: NEGATIVE — SIGNIFICANT CHANGE UP
BUN SERPL-MCNC: 16 MG/DL — SIGNIFICANT CHANGE UP (ref 7–23)
CA-I SERPL-SCNC: 1.25 MMOL/L — SIGNIFICANT CHANGE UP (ref 1.15–1.33)
CALCIUM SERPL-MCNC: 9.9 MG/DL — SIGNIFICANT CHANGE UP (ref 8.4–10.5)
CAST: 0 /LPF — SIGNIFICANT CHANGE UP (ref 0–4)
CHLORIDE BLDV-SCNC: 106 MMOL/L — SIGNIFICANT CHANGE UP (ref 96–108)
CHLORIDE SERPL-SCNC: 106 MMOL/L — SIGNIFICANT CHANGE UP (ref 96–108)
CO2 BLDV-SCNC: 29 MMOL/L — HIGH (ref 22–26)
CO2 SERPL-SCNC: 25 MMOL/L — SIGNIFICANT CHANGE UP (ref 22–31)
COLOR SPEC: YELLOW — SIGNIFICANT CHANGE UP
CREAT SERPL-MCNC: 0.94 MG/DL — SIGNIFICANT CHANGE UP (ref 0.5–1.3)
DIFF PNL FLD: NEGATIVE — SIGNIFICANT CHANGE UP
EGFR: 64 ML/MIN/1.73M2 — SIGNIFICANT CHANGE UP
EOSINOPHIL # BLD AUTO: 0.14 K/UL — SIGNIFICANT CHANGE UP (ref 0–0.5)
EOSINOPHIL NFR BLD AUTO: 2.6 % — SIGNIFICANT CHANGE UP (ref 0–6)
GAS PNL BLDV: 137 MMOL/L — SIGNIFICANT CHANGE UP (ref 136–145)
GAS PNL BLDV: SIGNIFICANT CHANGE UP
GLUCOSE BLDV-MCNC: 83 MG/DL — SIGNIFICANT CHANGE UP (ref 70–99)
GLUCOSE SERPL-MCNC: 88 MG/DL — SIGNIFICANT CHANGE UP (ref 70–99)
GLUCOSE UR QL: NEGATIVE MG/DL — SIGNIFICANT CHANGE UP
HCO3 BLDV-SCNC: 27 MMOL/L — SIGNIFICANT CHANGE UP (ref 22–29)
HCT VFR BLD CALC: 36.4 % — SIGNIFICANT CHANGE UP (ref 34.5–45)
HCT VFR BLDA CALC: 35 % — SIGNIFICANT CHANGE UP (ref 34.5–46.5)
HGB BLD CALC-MCNC: 11.8 G/DL — SIGNIFICANT CHANGE UP (ref 11.7–16.1)
HGB BLD-MCNC: 11.8 G/DL — SIGNIFICANT CHANGE UP (ref 11.5–15.5)
IMM GRANULOCYTES NFR BLD AUTO: 0.2 % — SIGNIFICANT CHANGE UP (ref 0–0.9)
KETONES UR-MCNC: NEGATIVE MG/DL — SIGNIFICANT CHANGE UP
LACTATE BLDV-MCNC: 0.6 MMOL/L — SIGNIFICANT CHANGE UP (ref 0.5–2)
LEUKOCYTE ESTERASE UR-ACNC: ABNORMAL
LYMPHOCYTES # BLD AUTO: 1.22 K/UL — SIGNIFICANT CHANGE UP (ref 1–3.3)
LYMPHOCYTES # BLD AUTO: 22.3 % — SIGNIFICANT CHANGE UP (ref 13–44)
MAGNESIUM SERPL-MCNC: 2 MG/DL — SIGNIFICANT CHANGE UP (ref 1.6–2.6)
MCHC RBC-ENTMCNC: 30.4 PG — SIGNIFICANT CHANGE UP (ref 27–34)
MCHC RBC-ENTMCNC: 32.4 GM/DL — SIGNIFICANT CHANGE UP (ref 32–36)
MCV RBC AUTO: 93.8 FL — SIGNIFICANT CHANGE UP (ref 80–100)
MONOCYTES # BLD AUTO: 0.44 K/UL — SIGNIFICANT CHANGE UP (ref 0–0.9)
MONOCYTES NFR BLD AUTO: 8 % — SIGNIFICANT CHANGE UP (ref 2–14)
NEUTROPHILS # BLD AUTO: 3.63 K/UL — SIGNIFICANT CHANGE UP (ref 1.8–7.4)
NEUTROPHILS NFR BLD AUTO: 66.2 % — SIGNIFICANT CHANGE UP (ref 43–77)
NITRITE UR-MCNC: NEGATIVE — SIGNIFICANT CHANGE UP
NRBC # BLD: 0 /100 WBCS — SIGNIFICANT CHANGE UP (ref 0–0)
PCO2 BLDV: 48 MMHG — HIGH (ref 39–42)
PH BLDV: 7.36 — SIGNIFICANT CHANGE UP (ref 7.32–7.43)
PH UR: 6 — SIGNIFICANT CHANGE UP (ref 5–8)
PHOSPHATE SERPL-MCNC: 3.3 MG/DL — SIGNIFICANT CHANGE UP (ref 2.5–4.5)
PLATELET # BLD AUTO: 144 K/UL — LOW (ref 150–400)
PO2 BLDV: 24 MMHG — LOW (ref 25–45)
POTASSIUM BLDV-SCNC: 4.6 MMOL/L — SIGNIFICANT CHANGE UP (ref 3.5–5.1)
POTASSIUM SERPL-MCNC: 4.7 MMOL/L — SIGNIFICANT CHANGE UP (ref 3.5–5.3)
POTASSIUM SERPL-SCNC: 4.7 MMOL/L — SIGNIFICANT CHANGE UP (ref 3.5–5.3)
PROT SERPL-MCNC: 6.6 G/DL — SIGNIFICANT CHANGE UP (ref 6–8.3)
PROT UR-MCNC: NEGATIVE MG/DL — SIGNIFICANT CHANGE UP
RBC # BLD: 3.88 M/UL — SIGNIFICANT CHANGE UP (ref 3.8–5.2)
RBC # FLD: 14.3 % — SIGNIFICANT CHANGE UP (ref 10.3–14.5)
RBC CASTS # UR COMP ASSIST: 0 /HPF — SIGNIFICANT CHANGE UP (ref 0–4)
SAO2 % BLDV: 35.1 % — LOW (ref 67–88)
SODIUM SERPL-SCNC: 141 MMOL/L — SIGNIFICANT CHANGE UP (ref 135–145)
SP GR SPEC: 1.01 — SIGNIFICANT CHANGE UP (ref 1–1.03)
SQUAMOUS # UR AUTO: 0 /HPF — SIGNIFICANT CHANGE UP (ref 0–5)
UROBILINOGEN FLD QL: 0.2 MG/DL — SIGNIFICANT CHANGE UP (ref 0.2–1)
WBC # BLD: 5.48 K/UL — SIGNIFICANT CHANGE UP (ref 3.8–10.5)
WBC # FLD AUTO: 5.48 K/UL — SIGNIFICANT CHANGE UP (ref 3.8–10.5)
WBC UR QL: 1 /HPF — SIGNIFICANT CHANGE UP (ref 0–5)

## 2024-02-27 PROCEDURE — 76830 TRANSVAGINAL US NON-OB: CPT | Mod: 26

## 2024-02-27 PROCEDURE — 85025 COMPLETE CBC W/AUTO DIFF WBC: CPT

## 2024-02-27 PROCEDURE — 96374 THER/PROPH/DIAG INJ IV PUSH: CPT

## 2024-02-27 PROCEDURE — 82803 BLOOD GASES ANY COMBINATION: CPT

## 2024-02-27 PROCEDURE — 76830 TRANSVAGINAL US NON-OB: CPT

## 2024-02-27 PROCEDURE — 82947 ASSAY GLUCOSE BLOOD QUANT: CPT

## 2024-02-27 PROCEDURE — 81001 URINALYSIS AUTO W/SCOPE: CPT

## 2024-02-27 PROCEDURE — 83735 ASSAY OF MAGNESIUM: CPT

## 2024-02-27 PROCEDURE — 99285 EMERGENCY DEPT VISIT HI MDM: CPT | Mod: 25

## 2024-02-27 PROCEDURE — 93975 VASCULAR STUDY: CPT

## 2024-02-27 PROCEDURE — 82435 ASSAY OF BLOOD CHLORIDE: CPT

## 2024-02-27 PROCEDURE — 84295 ASSAY OF SERUM SODIUM: CPT

## 2024-02-27 PROCEDURE — 80053 COMPREHEN METABOLIC PANEL: CPT

## 2024-02-27 PROCEDURE — 84100 ASSAY OF PHOSPHORUS: CPT

## 2024-02-27 PROCEDURE — 82330 ASSAY OF CALCIUM: CPT

## 2024-02-27 PROCEDURE — 84132 ASSAY OF SERUM POTASSIUM: CPT

## 2024-02-27 PROCEDURE — 83605 ASSAY OF LACTIC ACID: CPT

## 2024-02-27 PROCEDURE — 93975 VASCULAR STUDY: CPT | Mod: 26

## 2024-02-27 PROCEDURE — 96375 TX/PRO/DX INJ NEW DRUG ADDON: CPT

## 2024-02-27 PROCEDURE — 85014 HEMATOCRIT: CPT

## 2024-02-27 PROCEDURE — 74177 CT ABD & PELVIS W/CONTRAST: CPT | Mod: 26,MC

## 2024-02-27 PROCEDURE — 74177 CT ABD & PELVIS W/CONTRAST: CPT | Mod: MC

## 2024-02-27 PROCEDURE — 85018 HEMOGLOBIN: CPT

## 2024-02-27 RX ORDER — ACETAMINOPHEN 500 MG
1000 TABLET ORAL ONCE
Refills: 0 | Status: COMPLETED | OUTPATIENT
Start: 2024-02-27 | End: 2024-02-27

## 2024-02-27 RX ORDER — DIPHENHYDRAMINE HCL 50 MG
50 CAPSULE ORAL ONCE
Refills: 0 | Status: COMPLETED | OUTPATIENT
Start: 2024-02-27 | End: 2024-02-27

## 2024-02-27 RX ADMIN — Medication 400 MILLIGRAM(S): at 09:00

## 2024-02-27 RX ADMIN — Medication 40 MILLIGRAM(S): at 02:14

## 2024-02-27 RX ADMIN — Medication 50 MILLIGRAM(S): at 05:34

## 2024-02-27 NOTE — ED PROVIDER NOTE - PROGRESS NOTE DETAILS
Chauncey Landaverde MD (PGY3): s/o received from Dr. Araujo. Patient pending CT A/P, required pre-medication. CT read pending. US with thickened endometrium, will provide gyn referral. Chauncey Landaverde MD (PGY3): CT w/o acute findings. Discussed results w/ patient as well as GI, GYN and PMD f/u. discussed pain control as well.

## 2024-02-27 NOTE — ED PROVIDER NOTE - PATIENT PORTAL LINK FT
You can access the FollowMyHealth Patient Portal offered by [facility] by registering at the following website: [email or web address]. By joining [facility]’s FollowMyHealth portal, you will also be able to view your health information using other applications (apps) compatible with our system.

## 2024-02-27 NOTE — ED ADULT NURSE NOTE - OBJECTIVE STATEMENT
74 yo FM AOx4 from home with PMH of Afib (on Eliquis), HDL, HTN, Hypothyroid, and COPD c/o LLQ abd pain radiating to left back. Pt states onset of abd pain started 1 week ago. Pt endorses hematuria. Pt reports she went to [facility] and the CT results of abdomen and urine results were negative. Pt was then prescribed Ceftin but then had skin rash so did not complete Ceftin course. Pt also reports nausea- no vomiting. Pt initially presents to [facility] ED in no acute distress with unlabored breathing. Pt abdomen soft and nondistended. Call bell in reach. Stretcher in lowest position locked with blanket given. Comfort care and safety measures provided. Pt denies c/p, sob, fevers, chills, headache, dizziness, dysuria, blood in urine and stool.

## 2024-02-27 NOTE — ED PROVIDER NOTE - OBJECTIVE STATEMENT
72 year old female with pmhx of COPD not on home O2, HTN, HLD, hypothyroid, a-fib (on Eliquis), diverticulosis, moderate AS presenting to [facility] w/ persistent LLQ abdominal pain that radiates to her L back. Patient states her pain started last week and was associated w/ hematuria. Symptoms persisted and decided to go to [place] ED 2/24, where she had negative CT abd/pelvis and negative urinalysis. She was d/c on Ceftin 7 day course. She states she had a skin rash and stopped taking Ceftin 2/26. 73 year old female with pmhx of COPD not on home O2, HTN, HLD, hypothyroid, a-fib (on Eliquis), diverticulosis, moderate AS presenting to [facility] w/ persistent LLQ abdominal pain that radiates to her L back. Patient states her pain started last week and was associated w/ hematuria. Symptoms persisted and decided to go to [place] ED 2/24, where she had negative CT abd/pelvis and negative urinalysis. She was d/c on Ceftin 7 day course. She states she had a skin rash and stopped taking Ceftin 2/26.

## 2024-02-27 NOTE — ED PROVIDER NOTE - NSFOLLOWUPCLINICS_GEN_ALL_ED_FT
[facility] Gynecology and Obstetrics  Gynceology/OB  [address] [place] [address]  Phone: [phone]  Fax:     [facility]  OB-GYN  [address] [place] [address]  Phone: [phone]  Fax:     [facility]  Gynecology Oncology  [address] [place] [address]  Phone: [phone]  Fax: [phone]

## 2024-02-27 NOTE — ED PROVIDER NOTE - CLINICAL SUMMARY MEDICAL DECISION MAKING FREE TEXT BOX
I was the supervising attending. I have independently seen face-to-face and examined the patient. I have reviewed the history and physical and discussed the MDM with the resident, fellow, MOIZ and/or student. I agree with the assessment and plan as presented unless otherwise documented as follows:    73F hx COPD not on home O2, HTN, HLD, hypothyroidism, A-fib on Eliquis, AS, diverticulosis, presenting with LLQ pain x1w. Pain occasionally radiates to L lower back. Patient was seen in [facility] on 2/24, had reassuring labs/UA and non-contrast CT and was discharged home with antibiotics. Took for one day but stopped due to rash. Presenting today due to persistent pain. Appears well, AOx3, not in acute distress. Exam notable for LLQ/L pelvic tenderness. Possible diverticulitis/colitis vs. missed stone/ureteral spasm if recently passed. Will repeat labs/UA, obtain IV contrast CT imaging. -Lynette James MD (Attending)

## 2024-02-27 NOTE — ED PROVIDER NOTE - NS ED ROS FT
REVIEW OF SYSTEMS:    CONSTITUTIONAL: No weakness, fevers or chills  EYES/ENT: No visual changes;  No vertigo or throat pain   NECK: No pain or stiffness  RESPIRATORY: No cough, wheezing, hemoptysis; No shortness of breath  CARDIOVASCULAR: No chest pain or palpitations  GASTROINTESTINAL: +abdominal pain LLQ. +nausea, No vomiting, or hematemesis; No diarrhea or constipation. No melena or hematochezia.  GENITOURINARY: +Hematuria. No dysuria, frequency or urgency   NEUROLOGICAL: No numbness or weakness  SKIN: No itching, rashes

## 2024-02-27 NOTE — ED PROVIDER NOTE - NSPTACCESSSVCSAPPTDETAILS_ED_ALL_ED_FT
Patient Education    BRIGHT UrvewS HANDOUT- PARENT  18 MONTH VISIT  Here are some suggestions from [employer] experts that may be of value to your family.     YOUR CHILD S BEHAVIOR  Expect your child to cling to you in new situations or to be anxious around strangers.  Play with your child each day by doing things she likes.  Be consistent in discipline and setting limits for your child.  Plan ahead for difficult situations and try things that can make them easier. Think about your day and your child s energy and mood.  Wait until your child is ready for toilet training. Signs of being ready for toilet training include  Staying dry for 2 hours  Knowing if she is wet or dry  Can pull pants down and up  Wanting to learn  Can tell you if she is going to have a bowel movement  Read books about toilet training with your child.  Praise sitting on the potty or toilet.  If you are expecting a new baby, you can read books about being a big brother or sister.  Recognize what your child is able to do. Don t ask her to do things she is not ready to do at this age.    YOUR CHILD AND TV  Do activities with your child such as reading, playing games, and singing.  Be active together as a family. Make sure your child is active at home, in , and with sitters.  If you choose to introduce media now,  Choose high-quality programs and apps.  Use them together.  Limit viewing to 1 hour or less each day.  Avoid using TV, tablets, or smartphones to keep your child busy.  Be aware of how much media you use.    TALKING AND HEARING  Read and sing to your child often.  Talk about and describe pictures in books.  Use simple words with your child.  Suggest words that describe emotions to help your child learn the language of feelings.  Ask your child simple questions, offer praise for answers, and explain simply.  Use simple, clear words to tell your child what you want him to do.    HEALTHY EATING  Offer your child a variety of  healthy foods and snacks, especially vegetables, fruits, and lean protein.  Give one bigger meal and a few smaller snacks or meals each day.  Let your child decide how much to eat.  Give your child 16 to 24 oz of milk each day.  Know that you don t need to give your child juice. If you do, don t give more than 4 oz a day of 100% juice and serve it with meals.  Give your toddler many chances to try a new food. Allow her to touch and put new food into her mouth so she can learn about them.    SAFETY  Make sure your child s car safety seat is rear facing until he reaches the highest weight or height allowed by the car safety seat s . This will probably be after the second birthday.  Never put your child in the front seat of a vehicle that has a passenger airbag. The back seat is the safest.  Everyone should wear a seat belt in the car.  Keep poisons, medicines, and lawn and cleaning supplies in locked cabinets, out of your child s sight and reach.  Put the Poison Help number into all phones, including cell phones. Call if you are worried your child has swallowed something harmful. Do not make your child vomit.  When you go out, put a hat on your child, have him wear sun protection clothing, and apply sunscreen with SPF of 15 or higher on his exposed skin. Limit time outside when the sun is strongest (11:00 am-3:00 pm).  If it is necessary to keep a gun in your home, store it unloaded and locked with the ammunition locked separately.    WHAT TO EXPECT AT YOUR CHILD S 2 YEAR VISIT  We will talk about  Caring for your child, your family, and yourself  Handling your child s behavior  Supporting your talking child  Starting toilet training  Keeping your child safe at home, outside, and in the car        Helpful Resources: Poison Help Line:  183.589.5149  Information About Car Safety Seats: www.safercar.gov/parents  Toll-free Auto Safety Hotline: 174.675.2360  Consistent with Bright Futures: Guidelines for  Health Supervision of Infants, Children, and Adolescents, 4th Edition  For more information, go to https://brightfutures.aap.org.           Patient Education               ============================================================    1.  Make an appointment with ophthalmology:  [phone]      They would like to see Rosina this month.    2.  Make an appointment with Ms. Bonner in endocrinology:  [phone]      She would like to see Rosina this month.    3.  Make an appointment with Dr. Brock or Ms. Correa in ENT:  [phone]    They would like to see Rosina this month.      [place] let's plan to have Rosina be seen by [facility] for an evaluation and to discuss how best to help him use his right hand.    ============================================================     thickened endometrium, within the week pls

## 2024-02-27 NOTE — ED PROVIDER NOTE - PHYSICAL EXAMINATION
Vital Signs Last 24 Hrs  T(C): 36.6 (26 Feb 2024 22:16), Max: 36.6 (26 Feb 2024 22:16)  T(F): 97.9 (26 Feb 2024 22:16), Max: 97.9 (26 Feb 2024 22:16)  HR: 73 (26 Feb 2024 22:16) (73 - 73)  BP: 189/81 (26 Feb 2024 22:16) (189/81 - 189/81)  BP(mean): --  RR: 17 (26 Feb 2024 22:16) (17 - 17)  SpO2: 95% (26 Feb 2024 22:16) (95% - 95%)    Parameters below as of 26 Feb 2024 22:16  Patient On (Oxygen Delivery Method): room air      PHYSICAL EXAM:  GENERAL: NAD, well-groomed, well-developed  HEAD:  Atraumatic, Normocephalic  EYES: EOMI, PERRLA, conjunctiva and sclera clear  ENMT: No tonsillar erythema, exudates, or enlargement; Moist mucous membranes  NECK: Supple, No JVD, Normal thyroid  HEART: Regular rate and rhythm; No murmurs, rubs, or gallops  RESPIRATORY: Mild b/l crackles. No wheeze  ABDOMEN: Soft, +tender to palpation in LLQ; no guarding/rebound, Nondistended; Bowel sounds present  NEUROLOGY: A&Ox3, nonfocal, moving all extremities  EXTREMITIES:  2+ Peripheral Pulses, No clubbing, cyanosis, or edema  SKIN: warm, dry, normal color, no rash or abnormal lesions

## 2024-02-27 NOTE — ED PROVIDER NOTE - NSFOLLOWUPINSTRUCTIONS_ED_ALL_ED_FT
You were seen in the Emergency Department for left lower quadrant pain. Lab and imaging results, if performed, were discussed with you along with your discharge diagnosis.    You will receive a call to make an appointment with Gynecology for findings of a thickened endometrium on ultrasound. List of providers and their information has been given. Follow up with your doctor in 1 week - bring copies of your results if you were given. If you do not have a primary doctor, please call [phone] to find one convenient for you.    Follow with your Gastroenterologists.     Continue all prescribed medications.     To control your pain at home, you should take Tylenol 650mg-1000mg every 6 to 8 hours. Limit your maximum daily Tylenol from all sources to 4000mg. Be aware that many other medications contain acetaminophen which is also known as Tylenol. Taking Tylenol and Ibuprofen together has been shown to be more effective at relieving pain than taking them separately. These are both over the counter medications that you can  at your local pharmacy without a prescription. You need to respect all of the warnings on the bottles. You shouldn’t take these medications for more than a week without following up with your doctor. Both medications come with certain risks and side effects that you need to discuss with your doctor, especially if you are taking them for a prolonged period.    Return to ED for any new or worsening symptoms including but not limited to: development of vaginal bleeding, chest pain, shortness of breath, fever, vomiting, focal numbness, weakness or tingling, any severe CP, headache, abdominal pain, back pain.      Rest and keep yourself hydrated with fluids.

## 2024-03-01 ENCOUNTER — NON-APPOINTMENT (OUTPATIENT)
Age: 74
End: 2024-03-01

## 2024-03-25 ENCOUNTER — OUTPATIENT (OUTPATIENT)
Dept: OUTPATIENT SERVICES | Facility: HOSPITAL | Age: 74
LOS: 1 days | End: 2024-03-25
Payer: MEDICARE

## 2024-03-25 ENCOUNTER — TRANSCRIPTION ENCOUNTER (OUTPATIENT)
Age: 74
End: 2024-03-25

## 2024-03-25 DIAGNOSIS — Z98.89 OTHER SPECIFIED POSTPROCEDURAL STATES: Chronic | ICD-10-CM

## 2024-03-25 DIAGNOSIS — K92.2 GASTROINTESTINAL HEMORRHAGE, UNSPECIFIED: ICD-10-CM

## 2024-03-25 PROCEDURE — 45378 DIAGNOSTIC COLONOSCOPY: CPT

## 2024-03-25 DEVICE — HEMOSPRAY HEMOSTAT ENDO 7F: Type: IMPLANTABLE DEVICE | Status: FUNCTIONAL

## 2024-03-25 DEVICE — CLIP RESOLUTION 360 235CM: Type: IMPLANTABLE DEVICE | Status: FUNCTIONAL

## 2024-04-10 NOTE — PATIENT PROFILE ADULT - TRANSPORTATION
Performing Laboratory: 0
Bill For Surgical Tray: no
Expected Date Of Service: 04/10/2024
Billing Type: Third-Party Bill
no

## 2024-04-15 NOTE — ED PROVIDER NOTE - CROS ED RESP ALL NEG
I would appreciate it if you could please complete survey in MyChart about your experience today and my service!   
- - -

## 2024-05-07 NOTE — ED ADULT NURSE NOTE - CAS DISCH ACCOMP BY
HCC coding opportunities       Chart reviewed, no opportunity found: CHART REVIEWED, NO OPPORTUNITY FOUND        Patients Insurance     Medicare Insurance: [employer]vant[employer]           Transport

## 2024-05-15 NOTE — PROVIDER CONTACT NOTE (OTHER) - ASSESSMENT
----- Message from Ayorito Kimball sent at 5/15/2024  1:04 PM CDT -----  Regarding: ENT APPT  Patient has referral on file for ENT. Patient is establish and was last seen 3/6/24 by Dr. Trey Tomlinson. Please assist with scheduling. Patient can be reached @ [phone].    Thanks  
VSS. pt denies cp, sob, palpitations, discomfort. pt reverted back to SR 60s
Pt. asleep at the time this happened and reports no symptoms. Pt. A&O x4, VSS. BP: 120/73, HR: 67, O2: 92%, Temp: 97.5.

## 2024-05-16 ENCOUNTER — NON-APPOINTMENT (OUTPATIENT)
Age: 74
End: 2024-05-16

## 2024-05-16 ENCOUNTER — APPOINTMENT (OUTPATIENT)
Dept: CARDIOLOGY | Facility: CLINIC | Age: 74
End: 2024-05-16
Payer: MEDICARE

## 2024-05-16 VITALS
SYSTOLIC BLOOD PRESSURE: 148 MMHG | HEIGHT: 62 IN | BODY MASS INDEX: 32.39 KG/M2 | WEIGHT: 176 LBS | DIASTOLIC BLOOD PRESSURE: 70 MMHG | OXYGEN SATURATION: 95 % | HEART RATE: 63 BPM

## 2024-05-16 VITALS — SYSTOLIC BLOOD PRESSURE: 140 MMHG | DIASTOLIC BLOOD PRESSURE: 70 MMHG

## 2024-05-16 VITALS — DIASTOLIC BLOOD PRESSURE: 68 MMHG | SYSTOLIC BLOOD PRESSURE: 138 MMHG

## 2024-05-16 DIAGNOSIS — I48.0 PAROXYSMAL ATRIAL FIBRILLATION: ICD-10-CM

## 2024-05-16 DIAGNOSIS — G47.33 OBSTRUCTIVE SLEEP APNEA (ADULT) (PEDIATRIC): ICD-10-CM

## 2024-05-16 PROCEDURE — 99215 OFFICE O/P EST HI 40 MIN: CPT

## 2024-05-16 PROCEDURE — 93000 ELECTROCARDIOGRAM COMPLETE: CPT

## 2024-05-16 PROCEDURE — G2211 COMPLEX E/M VISIT ADD ON: CPT

## 2024-05-16 NOTE — HISTORY OF PRESENT ILLNESS
[FreeTextEntry1] : 72 year old female with hx of morbid obesity  improved sleep apnea with weight loss ,  Monoclonal gammopathy HTN  hyperlipidemia  PAF , bradycardia with normal chronotropic response  ,was evaluated by EP  placed on hydralazine , patients palpitations resolved , on eliquis , came for follow up says she felt dizziness  while she was cleaning the house ,3 days ago and yesterday while standing during cooking , patient had to check her BP  90s , patient did not take yesterday evening dose , patient blood work showed prerenal azotemia , does not drink enough , patient felt better after drinking water ,  no evidence of immediate orthostasis .patient denies any chest pain or shortness of breath    he medication nexeltel was not approved , her lipids are well controlled on diet restriction and on red yeast rice   4/30/24   Tc 190 HDL 78   Patient has chronic minimal anemia     patient  has echo showed worsened aortic stenosis than before , now moderate AS    Patient blood pressure  is elevated today ,as she was not adherent to low salt diet , had salty meal yesterday , her home BP readings are in normal range   says she is having side effects with lipitor ,multiple joint pains , unable to sleep longer at night , can not lay on left side due to hip pain stopped taking medication then she felt better   Patient used to be very obese , did loose some weight , with some improvement in sleep apnea , now she does not use cpap , patient had long standing hx of copd , now she is not using oxygen

## 2024-05-16 NOTE — PHYSICAL EXAM
[Obese] : obese [Normal Conjunctiva] : normal conjunctiva [Normal Venous Pressure] : normal venous pressure [Carotid Bruit] : carotid bruit [Normal Rate] : normal [Normal S1] : normal S1 [Normal S2] : normal S2 [II] : a grade 2 [No Pitting Edema] : no pitting edema present [Right Carotid Bruit] : right carotid bruit heard [Left Carotid Bruit] : left carotid bruit heard [2+] : left 2+ [No Abnormalities] : the abdominal aorta was not enlarged and no bruit was heard [Clear Lung Fields] : clear lung fields [Good Air Entry] : good air entry [No Respiratory Distress] : no respiratory distress  [Soft] : abdomen soft [Non Tender] : non-tender [Normal Bowel Sounds] : normal bowel sounds [Abnormal Gait] : abnormal gait [No Edema] : no edema [No Cyanosis] : no cyanosis [No Clubbing] : no clubbing [No Varicosities] : no varicosities [Normal Radial B/L] : normal radial B/L [No Rash] : no rash [No Skin Lesions] : no skin lesions [Moves all extremities] : moves all extremities [No Focal Deficits] : no focal deficits [Normal Speech] : normal speech [Alert and Oriented] : alert and oriented [Normal memory] : normal memory [S3] : no S3 [S4] : no S4 [Rt] : no varicose veins of the right leg [Lt] : no varicose veins of the left leg [Right Femoral Bruit] : no bruit heard over the right femoral artery [Left Femoral Bruit] : no bruit heard over the left femoral artery [de-identified] : bilateral  [de-identified] : ALYSSA [de-identified] : arthritis

## 2024-05-16 NOTE — DISCUSSION/SUMMARY
[FreeTextEntry1] : Dizziness  likely dehydration , no evidence of immediate orthostasis , patient pre renal azotemia , encourage patient to hydrate , will obtain follow up echo , home BP monitor   Patient who was having recurrent epistaxis , controlled on low dose eliquis  as patient has recurrent epistaxis will continue ,eliquis 2.5 mg po BID for now , follow up with ENT  Hx of PAF currently in sinus rhythm tachy cem syndrome with resolved symptoms after changing medication , avoid negative chronotropic drugs , continue losartan , hydralazine eliquis  Hypertension uncontrolled ,normal home BP continue low salt diet and continue losartan 50 mg po daily asymmetric BP UE ?? right more than left. home BP monitor    Moderate Aortic stenosis with murmur/ caortid bruits : worsened AS gradient compared to prior study , continue to monitor , mild carotid disease  will obtain follow up echo   Hyperlipidemia improved compared to prior , with evidence of coronary calcification suggestive coronary atherosclerosis ( no evidence of ischemia on stress test ) , target LDL <70, continue red yeast rice ,   Body aches ? arthritis improved aches on holding lipitor,  Obesity prior hx of sleep apnea , patient was encouraged to loose more weight  COPD pulmonary nodule stable continue current medication ,recently treated pneumonia , follow up with pulmonary     follow up after 3 months.   [EKG obtained to assist in diagnosis and management of assessed problem(s)] : EKG obtained to assist in diagnosis and management of assessed problem(s)

## 2024-05-16 NOTE — CARDIOLOGY SUMMARY
[de-identified] : 5/16/24 sinus bradycardia 51  [de-identified] : 4/16/19  no ischemia on chemical nuclear stress test  [de-identified] : 4/7/23   Moderate LVH   EF 55-60% Mild DD Moderate  AS  , mild AI , JADE  worsened AS  gradiant  7/18/23 Normal EF  Moderate AI , JADE  PG39.2, MG25.3 DANII 1.12.sqcm, mild AI ,  VTI ratio 0.36

## 2024-06-28 ENCOUNTER — APPOINTMENT (OUTPATIENT)
Dept: CARDIOLOGY | Facility: CLINIC | Age: 74
End: 2024-06-28
Payer: MEDICARE

## 2024-06-28 VITALS
SYSTOLIC BLOOD PRESSURE: 160 MMHG | HEIGHT: 62 IN | DIASTOLIC BLOOD PRESSURE: 64 MMHG | HEART RATE: 61 BPM | BODY MASS INDEX: 32.94 KG/M2 | WEIGHT: 179 LBS | OXYGEN SATURATION: 96 %

## 2024-06-28 DIAGNOSIS — I47.29 OTHER VENTRICULAR TACHYCARDIA: ICD-10-CM

## 2024-06-28 DIAGNOSIS — J44.9 CHRONIC OBSTRUCTIVE PULMONARY DISEASE, UNSPECIFIED: ICD-10-CM

## 2024-06-28 DIAGNOSIS — I10 ESSENTIAL (PRIMARY) HYPERTENSION: ICD-10-CM

## 2024-06-28 DIAGNOSIS — I35.0 NONRHEUMATIC AORTIC (VALVE) STENOSIS: ICD-10-CM

## 2024-06-28 DIAGNOSIS — E78.00 PURE HYPERCHOLESTEROLEMIA, UNSPECIFIED: ICD-10-CM

## 2024-06-28 DIAGNOSIS — R42 DIZZINESS AND GIDDINESS: ICD-10-CM

## 2024-06-28 DIAGNOSIS — R09.89 OTHER SPECIFIED SYMPTOMS AND SIGNS INVOLVING THE CIRCULATORY AND RESPIRATORY SYSTEMS: ICD-10-CM

## 2024-06-28 PROCEDURE — 93306 TTE W/DOPPLER COMPLETE: CPT

## 2024-06-28 PROCEDURE — 99214 OFFICE O/P EST MOD 30 MIN: CPT

## 2024-06-28 PROCEDURE — G2211 COMPLEX E/M VISIT ADD ON: CPT

## 2024-07-19 ENCOUNTER — APPOINTMENT (OUTPATIENT)
Dept: NEPHROLOGY | Facility: CLINIC | Age: 74
End: 2024-07-19
Payer: MEDICARE

## 2024-07-19 VITALS
OXYGEN SATURATION: 96 % | DIASTOLIC BLOOD PRESSURE: 62 MMHG | BODY MASS INDEX: 32.39 KG/M2 | HEIGHT: 62 IN | TEMPERATURE: 96.4 F | HEART RATE: 49 BPM | WEIGHT: 176 LBS | SYSTOLIC BLOOD PRESSURE: 130 MMHG

## 2024-07-19 DIAGNOSIS — I10 ESSENTIAL (PRIMARY) HYPERTENSION: ICD-10-CM

## 2024-07-19 DIAGNOSIS — N18.31 CHRONIC KIDNEY DISEASE, STAGE 3A: ICD-10-CM

## 2024-07-19 PROCEDURE — 99214 OFFICE O/P EST MOD 30 MIN: CPT

## 2024-08-24 NOTE — ED ADULT TRIAGE NOTE - PAIN RATING/NUMBER SCALE (0-10): ACTIVITY
CHIEF COMPLAINT: Patient is a 71y old  Female who presents with a chief complaint of trach exchange (18 Jul 2024 07:07)      INTERVAL EVENTS:  - No interval events overnight   - Attempting to obtain back up tracheostomy    REVIEW OF SYSTEMS: Seen by bedside during AM rounds and unable to assess ROS second to nonverbal with ALS baseline    Mode: AC/ CMV (Assist Control/ Continuous Mandatory Ventilation), RR (machine): 16, TV (machine): 400, FiO2: 30, PEEP: 5, ITime: 0.95, MAP: 9, PIP: 26      OBJECTIVE:  ICU Vital Signs Last 24 Hrs  T(C): 36.4 (24 Aug 2024 04:00), Max: 36.7 (23 Aug 2024 20:00)  T(F): 97.6 (24 Aug 2024 04:00), Max: 98 (23 Aug 2024 20:00)  HR: 111 (24 Aug 2024 06:33) (96 - 114)  BP: 133/83 (24 Aug 2024 04:00) (104/73 - 146/94)  BP(mean): 106 (23 Aug 2024 16:00) (83 - 106)  ABP: --  ABP(mean): --  RR: 16 (24 Aug 2024 04:00) (16 - 16)  SpO2: 97% (24 Aug 2024 06:33) (94% - 98%)    O2 Parameters below as of 24 Aug 2024 04:00  Patient On (Oxygen Delivery Method): ventilator    O2 Concentration (%): 30      Mode: AC/ CMV (Assist Control/ Continuous Mandatory Ventilation), RR (machine): 16, TV (machine): 400, FiO2: 30, PEEP: 5, ITime: 0.95, MAP: 9, PIP: 26    08-23 @ 07:01  -  08-24 @ 07:00  --------------------------------------------------------  IN: 1300 mL / OUT: 900 mL / NET: 400 mL      CAPILLARY BLOOD GLUCOSE          HOSPITAL MEDICATIONS:  MEDICATIONS  (STANDING):  artificial  tears Solution 1 Drop(s) Both EYES every 1 hour  chlorhexidine 0.12% Liquid 15 milliLiter(s) Oral Mucosa every 12 hours  chlorhexidine 2% Cloths 1 Application(s) Topical daily  diazepam    Tablet 2 milliGRAM(s) Oral every 12 hours  erythromycin   Ointment 1 Application(s) Both EYES <User Schedule>  multivitamin/minerals/iron Oral Solution (CENTRUM) 15 milliLiter(s) Oral daily  ofloxacin 0.3% Solution 1 Drop(s) Both EYES two times a day  riluzole 50 milliGRAM(s) Oral two times a day  rivaroxaban 10 milliGRAM(s) Oral daily  rivaroxaban      sertraline 75 milliGRAM(s) Oral daily    MEDICATIONS  (PRN):  acetaminophen   Oral Liquid .. 650 milliGRAM(s) Oral every 6 hours PRN Temp greater or equal to 38C (100.4F), Mild Pain (1 - 3)  diazepam    Tablet 2 milliGRAM(s) Oral at bedtime PRN for dysautonomia/ hypertension/discomfort      PHYSICAL EXAMINATION    LABS:                        13.0   18.11 )-----------( 380      ( 24 Aug 2024 03:01 )             42.5     08-24    140  |  101  |  22  ----------------------------<  146<H>  4.2   |  26  |  <0.20<L>    Ca    9.4      24 Aug 2024 03:01  Phos  3.8     08-24  Mg     2.10     08-24        Urinalysis Basic - ( 24 Aug 2024 03:01 )  Color: x / Appearance: x / SG: x / pH: x  Gluc: 146 mg/dL / Ketone: x  / Bili: x / Urobili: x   Blood: x / Protein: x / Nitrite: x   Leuk Esterase: x / RBC: x / WBC x   Sq Epi: x / Non Sq Epi: x / Bacteria: x            PAST MEDICAL & SURGICAL HISTORY:  ALS (amyotrophic lateral sclerosis)      HLD (hyperlipidemia)      Ventilator dependent  Since 2015      Aspiration into airway      S/P gastrostomy  10/14 for dysphagia  receives jevity 2 cans TID      Dependence on tracheostomy      Encounter for PEG (percutaneous endoscopic gastrostomy)  peg placed          FAMILY HISTORY:  No pertinent family history in first degree relatives        Social History:      RADIOLOGY:  [ ] Reviewed and interpreted by me    PULMONARY FUNCTION TESTS:    EKG: CHIEF COMPLAINT: Patient is a 71y old  Female who presents with a chief complaint of trach exchange (18 Jul 2024 07:07)      INTERVAL EVENTS:  - No interval events overnight  - Leukocytosis rising and with intermittent cuff leak and concern for aspiration with oral secretions. CXR with RLL opacity, however no fever spike and will hold ABX and monitor leukocytosis for now.   - IF continues to rise will empirically start zosyn.   - Attempting to obtain back up tracheostomy    REVIEW OF SYSTEMS: Seen by bedside during AM rounds and unable to assess ROS second to nonverbal with ALS baseline    Mode: AC/ CMV (Assist Control/ Continuous Mandatory Ventilation), RR (machine): 16, TV (machine): 400, FiO2: 30, PEEP: 5, ITime: 0.95, MAP: 9, PIP: 26      OBJECTIVE:  ICU Vital Signs Last 24 Hrs  T(C): 36.4 (24 Aug 2024 04:00), Max: 36.7 (23 Aug 2024 20:00)  T(F): 97.6 (24 Aug 2024 04:00), Max: 98 (23 Aug 2024 20:00)  HR: 111 (24 Aug 2024 06:33) (96 - 114)  BP: 133/83 (24 Aug 2024 04:00) (104/73 - 146/94)  BP(mean): 106 (23 Aug 2024 16:00) (83 - 106)  ABP: --  ABP(mean): --  RR: 16 (24 Aug 2024 04:00) (16 - 16)  SpO2: 97% (24 Aug 2024 06:33) (94% - 98%)    O2 Parameters below as of 24 Aug 2024 04:00  Patient On (Oxygen Delivery Method): ventilator    O2 Concentration (%): 30      Mode: AC/ CMV (Assist Control/ Continuous Mandatory Ventilation), RR (machine): 16, TV (machine): 400, FiO2: 30, PEEP: 5, ITime: 0.95, MAP: 9, PIP: 26    08-23 @ 07:01  -  08-24 @ 07:00  --------------------------------------------------------  IN: 1300 mL / OUT: 900 mL / NET: 400 mL      CAPILLARY BLOOD GLUCOSE          HOSPITAL MEDICATIONS:  MEDICATIONS  (STANDING):  artificial  tears Solution 1 Drop(s) Both EYES every 1 hour  chlorhexidine 0.12% Liquid 15 milliLiter(s) Oral Mucosa every 12 hours  chlorhexidine 2% Cloths 1 Application(s) Topical daily  diazepam    Tablet 2 milliGRAM(s) Oral every 12 hours  erythromycin   Ointment 1 Application(s) Both EYES <User Schedule>  multivitamin/minerals/iron Oral Solution (CENTRUM) 15 milliLiter(s) Oral daily  ofloxacin 0.3% Solution 1 Drop(s) Both EYES two times a day  riluzole 50 milliGRAM(s) Oral two times a day  rivaroxaban 10 milliGRAM(s) Oral daily  rivaroxaban      sertraline 75 milliGRAM(s) Oral daily    MEDICATIONS  (PRN):  acetaminophen   Oral Liquid .. 650 milliGRAM(s) Oral every 6 hours PRN Temp greater or equal to 38C (100.4F), Mild Pain (1 - 3)  diazepam    Tablet 2 milliGRAM(s) Oral at bedtime PRN for dysautonomia/ hypertension/discomfort      PHYSICAL EXAMINATION  General: NAD   HEENT: Exposure keratopathy appears to be improving. Tracheostomy presented with tiny air leak. Balloon inflated with improvement.   Cards: S1/S2, no murmurs   Pulm: Course vent sounds bilaterally. No wheezes.   Abdomen: Soft, nondistended and nontender. BS (+) PEG present.   Extremities: No pedal edema. No active ROM x 4 extremities with baseline ALS  Neurology: Awake with eyes open, however does not follow commands with baseline ALS with no acute focal neurological deficits     LABS:                        13.0   18.11 )-----------( 380      ( 24 Aug 2024 03:01 )             42.5     08-24    140  |  101  |  22  ----------------------------<  146<H>  4.2   |  26  |  <0.20<L>    Ca    9.4      24 Aug 2024 03:01  Phos  3.8     08-24  Mg     2.10     08-24        Urinalysis Basic - ( 24 Aug 2024 03:01 )  Color: x / Appearance: x / SG: x / pH: x  Gluc: 146 mg/dL / Ketone: x  / Bili: x / Urobili: x   Blood: x / Protein: x / Nitrite: x   Leuk Esterase: x / RBC: x / WBC x   Sq Epi: x / Non Sq Epi: x / Bacteria: x            PAST MEDICAL & SURGICAL HISTORY:  ALS (amyotrophic lateral sclerosis)      HLD (hyperlipidemia)      Ventilator dependent  Since 2015      Aspiration into airway      S/P gastrostomy  10/14 for dysphagia  receives jevity 2 cans TID      Dependence on tracheostomy      Encounter for PEG (percutaneous endoscopic gastrostomy)  peg placed          FAMILY HISTORY:  No pertinent family history in first degree relatives        Social History:      RADIOLOGY:  [ ] Reviewed and interpreted by me    PULMONARY FUNCTION TESTS:    EKG: 2 (mild pain)

## 2024-08-25 ENCOUNTER — NON-APPOINTMENT (OUTPATIENT)
Age: 74
End: 2024-08-25

## 2024-08-26 ENCOUNTER — APPOINTMENT (OUTPATIENT)
Dept: ELECTROPHYSIOLOGY | Facility: CLINIC | Age: 74
End: 2024-08-26
Payer: MEDICARE

## 2024-08-26 ENCOUNTER — NON-APPOINTMENT (OUTPATIENT)
Age: 74
End: 2024-08-26

## 2024-08-26 VITALS
BODY MASS INDEX: 31.83 KG/M2 | HEIGHT: 62 IN | SYSTOLIC BLOOD PRESSURE: 179 MMHG | HEART RATE: 69 BPM | WEIGHT: 173 LBS | OXYGEN SATURATION: 96 % | DIASTOLIC BLOOD PRESSURE: 71 MMHG

## 2024-08-26 VITALS — SYSTOLIC BLOOD PRESSURE: 173 MMHG | DIASTOLIC BLOOD PRESSURE: 62 MMHG

## 2024-08-26 DIAGNOSIS — I10 ESSENTIAL (PRIMARY) HYPERTENSION: ICD-10-CM

## 2024-08-26 DIAGNOSIS — I48.0 PAROXYSMAL ATRIAL FIBRILLATION: ICD-10-CM

## 2024-08-26 DIAGNOSIS — I49.5 SICK SINUS SYNDROME: ICD-10-CM

## 2024-08-26 DIAGNOSIS — E78.00 PURE HYPERCHOLESTEROLEMIA, UNSPECIFIED: ICD-10-CM

## 2024-08-26 PROCEDURE — 93000 ELECTROCARDIOGRAM COMPLETE: CPT

## 2024-08-26 PROCEDURE — 99214 OFFICE O/P EST MOD 30 MIN: CPT | Mod: 25

## 2024-08-26 NOTE — DISCUSSION/SUMMARY
[EKG obtained to assist in diagnosis and management of assessed problem(s)] : EKG obtained to assist in diagnosis and management of assessed problem(s) [FreeTextEntry1] : Impression:  1. Dizziness/TBS: Hx of TBS and now recurrent dizziness. EKG performed today to assess for presence of conduction disease and reveals NSR. Recommend 4 week Holter to assess for presence of cem and tachyarrhythmias associated with symptoms. Holter placed in office. Remains off AV nodals.   2. Paroxysmal afib: Hx of afib without recent knowledge of recurrence. Will assess via Holter. Remains on Eliquis.   3. HTN: resume oral antihypertensives as prescribed. Encouraged heart healthy diet, sodium restriction, and weight loss. Continue regular f/u with Cardiologist for further HTN management.  4. HLD: resume statin therapy as prescribed and regular f/u with Cardiologist for routine lipid monitoring and management.  Plan for 4 week Holter.

## 2024-08-26 NOTE — HISTORY OF PRESENT ILLNESS
[FreeTextEntry1] : Melissa Contreras is a 74y/o woman with Hx of Atrial fibrillation (On Eliquis, CHADVASC score of 3), HTN, HLD, hypothyroidism, COPD, and concern in the past for symptomatic bradycardia who presents today for initial evaluation. Was last seen in 2021 with concern for need for PPM. More recently, she has been experiencing transient feeling of dizziness and lightheaded. This occurs when sitting and is very brief and quickly resolves once she stands up. Denies chest pain, palpitations, SOB, syncope or near syncope. Remains off AV nodals.

## 2024-08-26 NOTE — CARDIOLOGY SUMMARY
[de-identified] : 6/28/2024: CONCLUSIONS:  1. Left ventricular cavity is normal in size. Left ventricular wall thickness is mildly increased. Left ventricular systolic function is normal with an ejection fraction visually estimated at 60 to 65 %. 2. Mild left ventricular hypertrophy. 3. There is mild (grade 1) left ventricular diastolic dysfunction. 4. Normal right ventricular cavity size and normal right ventricular systolic function. 5. The left atrium is mildly dilated. 6. The right atrium is mildly dilated. 7. Mild mitral regurgitation. 8. Severe aortic stenosis. 9. Mild aortic regurgitation. 10. Trace pulmonic regurgitation. 11. Mild tricuspid regurgitation. 12. Estimated pulmonary artery systolic pressure is 45 mmHg, consistent with mild to moderate pulmonary hypertension. 13. The peak transaortic velocity is 3.43 m/s, peak transaortic gradient is 46.9 mmHg and mean transaortic gradient is 24.6 mmHg with an LVOT/aortic valve VTI ratio of 0.38. The effective orifice area is estimated at 1.14 cm by the continuity equation. 14. Compared to the transthoracic echocardiogram performed on 4/3/2024, there have been no significant interval changes.

## 2024-08-27 ENCOUNTER — NON-APPOINTMENT (OUTPATIENT)
Age: 74
End: 2024-08-27

## 2024-08-27 DIAGNOSIS — Z87.09 PERSONAL HISTORY OF OTHER DISEASES OF THE RESPIRATORY SYSTEM: ICD-10-CM

## 2024-08-27 DIAGNOSIS — Z86.010 PERSONAL HISTORY OF COLONIC POLYPS: ICD-10-CM

## 2024-08-27 DIAGNOSIS — R91.1 SOLITARY PULMONARY NODULE: ICD-10-CM

## 2024-08-27 DIAGNOSIS — Z86.2 PERSONAL HISTORY OF DISEASES OF THE BLOOD AND BLOOD-FORMING ORGANS AND CERTAIN DISORDERS INVOLVING THE IMMUNE MECHANISM: ICD-10-CM

## 2024-09-10 ENCOUNTER — APPOINTMENT (OUTPATIENT)
Dept: OTOLARYNGOLOGY | Facility: CLINIC | Age: 74
End: 2024-09-10
Payer: MEDICARE

## 2024-09-10 VITALS
HEART RATE: 59 BPM | SYSTOLIC BLOOD PRESSURE: 133 MMHG | DIASTOLIC BLOOD PRESSURE: 77 MMHG | BODY MASS INDEX: 31.91 KG/M2 | WEIGHT: 169 LBS | HEIGHT: 61 IN

## 2024-09-10 PROCEDURE — 99213 OFFICE O/P EST LOW 20 MIN: CPT

## 2024-09-10 PROCEDURE — 92557 COMPREHENSIVE HEARING TEST: CPT

## 2024-09-10 PROCEDURE — 92567 TYMPANOMETRY: CPT

## 2024-09-10 NOTE — HISTORY OF PRESENT ILLNESS
[de-identified] : POSITIONAL DIZZINESS VERY BRIEF STARTED 9 DAYS AGO [facility] ED WORK UP INCLUDING IMAGING WNL MEDICAL HX REVIEWED NO MORE EPISTAXIS

## 2024-09-10 NOTE — ASSESSMENT
[FreeTextEntry1] :  MILD TO MODERATE SN[facility] BPPV PERCAUTIONS MEDICATION SIDE EFFECTS DISCUSSED HEARING AIDS DISCUSSED LABS, DOPPLER, RECENT LABS AT [facility] REVIEWED F/U PMD F/U 1 MONTH PRN WILL CONSIDER STARS REFERRAL

## 2024-09-19 ENCOUNTER — NON-APPOINTMENT (OUTPATIENT)
Age: 74
End: 2024-09-19

## 2024-09-23 ENCOUNTER — APPOINTMENT (OUTPATIENT)
Facility: CLINIC | Age: 74
End: 2024-09-23
Payer: MEDICARE

## 2024-09-23 VITALS
TEMPERATURE: 98.5 F | RESPIRATION RATE: 17 BRPM | WEIGHT: 170 LBS | OXYGEN SATURATION: 99 % | DIASTOLIC BLOOD PRESSURE: 98 MMHG | HEART RATE: 57 BPM | HEIGHT: 61 IN | BODY MASS INDEX: 32.1 KG/M2 | SYSTOLIC BLOOD PRESSURE: 182 MMHG

## 2024-09-23 DIAGNOSIS — J31.0 CHRONIC RHINITIS: ICD-10-CM

## 2024-09-23 DIAGNOSIS — J44.9 CHRONIC OBSTRUCTIVE PULMONARY DISEASE, UNSPECIFIED: ICD-10-CM

## 2024-09-23 DIAGNOSIS — R91.8 OTHER NONSPECIFIC ABNORMAL FINDING OF LUNG FIELD: ICD-10-CM

## 2024-09-23 DIAGNOSIS — E03.9 HYPOTHYROIDISM, UNSPECIFIED: ICD-10-CM

## 2024-09-23 DIAGNOSIS — N18.9 CHRONIC KIDNEY DISEASE, UNSPECIFIED: ICD-10-CM

## 2024-09-23 PROCEDURE — ZZZZZ: CPT

## 2024-09-23 PROCEDURE — 99204 OFFICE O/P NEW MOD 45 MIN: CPT | Mod: 25

## 2024-09-23 PROCEDURE — 94727 GAS DIL/WSHOT DETER LNG VOL: CPT

## 2024-09-23 PROCEDURE — 99214 OFFICE O/P EST MOD 30 MIN: CPT | Mod: 25

## 2024-09-23 PROCEDURE — 94729 DIFFUSING CAPACITY: CPT

## 2024-09-23 PROCEDURE — 94060 EVALUATION OF WHEEZING: CPT

## 2024-09-23 NOTE — PHYSICAL EXAM
[No Acute Distress] : no acute distress [Normal Oropharynx] : normal oropharynx [Normal Appearance] : normal appearance [No Neck Mass] : no neck mass [Normal Rate/Rhythm] : normal rate/rhythm [Normal S1, S2] : normal s1, s2 [No Murmurs] : no murmurs [No Resp Distress] : no resp distress [Clear to Auscultation Bilaterally] : clear to auscultation bilaterally [No Abnormalities] : no abnormalities [Benign] : benign [No Clubbing] : no clubbing [No Cyanosis] : no cyanosis [No Edema] : no edema [Normal Color/ Pigmentation] : normal color/ pigmentation [Normal Affect] : normal affect

## 2024-09-23 NOTE — DISCUSSION/SUMMARY
[FreeTextEntry1] : Mrs. Contrersa has  stable COPD at this time.  She has dizziness of unclear etiology.  She might have vertebrobasilar disease and therefore I have referred her to neurologist Dr. James.  She will have a CT of the chest.  Continue Advair and Spiriva.  Return in 4 months.

## 2024-09-23 NOTE — HISTORY OF PRESENT ILLNESS
[TextBox_4] : 73-year-old female with COPD.  She was hospitalized for dizziness and bradycardia for 4 days at [facility].  She claims her workup was negative.  She also saw an ENT specialist.  She has dizziness primarily when she is sitting up but not ambulating or laying down.  She has occasional wheeze at night.  She is using Advair and Spiriva.  PFT showed mild obstructive impairment with slight improvement postbronchodilator.

## 2024-09-23 NOTE — CONSULT LETTER
[Dear  ___] : Dear  [unfilled], [Consult Letter:] : I had the pleasure of evaluating your patient, [unfilled]. [Please see my note below.] : Please see my note below. [Consult Closing:] : Thank you very much for allowing me to participate in the care of this patient.  If you have any questions, please do not hesitate to contact me. [Sincerely,] : Sincerely, [FreeTextEntry2] : Law Santos [FreeTextEntry3] :  Joe Pelaez MD FACP FCCP

## 2024-09-27 ENCOUNTER — APPOINTMENT (OUTPATIENT)
Dept: CARDIOLOGY | Facility: CLINIC | Age: 74
End: 2024-09-27
Payer: MEDICARE

## 2024-09-27 VITALS
OXYGEN SATURATION: 96 % | HEIGHT: 61 IN | SYSTOLIC BLOOD PRESSURE: 162 MMHG | WEIGHT: 173 LBS | HEART RATE: 64 BPM | BODY MASS INDEX: 32.66 KG/M2 | DIASTOLIC BLOOD PRESSURE: 60 MMHG

## 2024-09-27 VITALS
TEMPERATURE: 98.5 F | SYSTOLIC BLOOD PRESSURE: 150 MMHG | HEIGHT: 61 IN | DIASTOLIC BLOOD PRESSURE: 60 MMHG | WEIGHT: 173 LBS | RESPIRATION RATE: 17 BRPM | OXYGEN SATURATION: 96 % | HEART RATE: 58 BPM | BODY MASS INDEX: 32.66 KG/M2

## 2024-09-27 DIAGNOSIS — I47.29 OTHER VENTRICULAR TACHYCARDIA: ICD-10-CM

## 2024-09-27 DIAGNOSIS — I35.0 NONRHEUMATIC AORTIC (VALVE) STENOSIS: ICD-10-CM

## 2024-09-27 DIAGNOSIS — R42 DIZZINESS AND GIDDINESS: ICD-10-CM

## 2024-09-27 DIAGNOSIS — I48.91 UNSPECIFIED ATRIAL FIBRILLATION: ICD-10-CM

## 2024-09-27 DIAGNOSIS — I10 ESSENTIAL (PRIMARY) HYPERTENSION: ICD-10-CM

## 2024-09-27 DIAGNOSIS — I25.10 ATHEROSCLEROTIC HEART DISEASE OF NATIVE CORONARY ARTERY W/OUT ANGINA PECTORIS: ICD-10-CM

## 2024-09-27 PROCEDURE — G2211 COMPLEX E/M VISIT ADD ON: CPT

## 2024-09-27 PROCEDURE — 99214 OFFICE O/P EST MOD 30 MIN: CPT

## 2024-09-27 NOTE — HISTORY OF PRESENT ILLNESS
[FreeTextEntry1] : 72 year old female with hx of morbid obesity  improved sleep apnea with weight loss ,  Monoclonal gammopathy HTN  hyperlipidemia  PAF , bradycardia with normal chronotropic response  ,was evaluated by EP  placed on hydralazine , patients palpitations resolved , on eliquis , came for follow up says she had two episodes of dizziness while resting  transiently , patient was initially episode was noted to have 41 BPM , went to [facility] 9/1/24 , was admitted , did have  CT scan head no acute stroke , mild to moderate carotid disease , patient did have EP evaluation including EPS , was  told  that she does not need pacemaker , patient was discharged , 9/4/24 and  had another episode last friday while she was sitting , lasted for few seconds got better by getting up ,  no other associated symptoms ,   , patient  say she tend to get dehydrated , patient  blood pressure is elevated  today ,as she was not adherent to salt restriction ,  patient denies any headache . Patient had echo without  significant change , severe AS    he medication nexeltel was not approved , her lipids are well controlled on diet restriction and on red yeast rice   4/30/24  LDL 93 Tc 190 HDL 78   Patient has chronic minimal anemia   patient  has echo showed worsened aortic stenosis than before , now severe  AS   says she is having side effects with lipitor ,multiple joint pains , unable to sleep longer at night , can not lay on left side due to hip pain stopped taking medication then she felt better   Patient used to be very obese , did loose some weight , with some improvement in sleep apnea , now she does not use cpap , patient had long standing hx of copd , now she is not using oxygen

## 2024-09-27 NOTE — PHYSICAL EXAM
[Obese] : obese [Normal Conjunctiva] : normal conjunctiva [Normal Venous Pressure] : normal venous pressure [Carotid Bruit] : carotid bruit [Normal Rate] : normal [Normal S1] : normal S1 [Normal S2] : normal S2 [II] : a grade 2 [No Pitting Edema] : no pitting edema present [Right Carotid Bruit] : right carotid bruit heard [Left Carotid Bruit] : left carotid bruit heard [2+] : left 2+ [No Abnormalities] : the abdominal aorta was not enlarged and no bruit was heard [Clear Lung Fields] : clear lung fields [Good Air Entry] : good air entry [No Respiratory Distress] : no respiratory distress  [Soft] : abdomen soft [Non Tender] : non-tender [Normal Bowel Sounds] : normal bowel sounds [Abnormal Gait] : abnormal gait [No Edema] : no edema [No Cyanosis] : no cyanosis [No Clubbing] : no clubbing [No Varicosities] : no varicosities [Normal Radial B/L] : normal radial B/L [No Rash] : no rash [No Skin Lesions] : no skin lesions [Moves all extremities] : moves all extremities [No Focal Deficits] : no focal deficits [Normal Speech] : normal speech [Alert and Oriented] : alert and oriented [Normal memory] : normal memory [S3] : no S3 [S4] : no S4 [Rt] : no varicose veins of the right leg [Lt] : no varicose veins of the left leg [Right Femoral Bruit] : no bruit heard over the right femoral artery [Left Femoral Bruit] : no bruit heard over the left femoral artery [de-identified] : bilateral  [de-identified] : ALYSSA [de-identified] : arthritis

## 2024-09-27 NOTE — CARDIOLOGY SUMMARY
[de-identified] : 5/16/24 sinus bradycardia 51  [de-identified] : 4/16/19  no ischemia on chemical nuclear stress test  [de-identified] : 4/7/23   Moderate LVH   EF 55-60% Mild DD Moderate  AS  , mild AI , JADE  worsened AS  gradiant  7/18/23 Normal EF Moderate AI , JADE  PG39.2, MG25.3 DANII 1.12.sqcm, mild AI ,  VTI ratio 0.36 6/28/24 Mild LVH normal EF mild DD  , mild MR severe AS 1.14 Sq CM ,  mild AS ,

## 2024-10-10 NOTE — ED ADULT TRIAGE NOTE - DOMESTIC TRAVEL HIGH RISK QUESTION
[employer] [facility] [phone]    QUICK CARE VIDEO VISIT PROGRESS NOTE    CHIEF COMPLAINT  Chief Complaint   Patient presents with    Video Visit    Congestion    Headache    Sinus Problem       SUBJECTIVE  HISTORY OF PRESENT ILLNESS:   Natasha Hanson is a 47 year old female who consents to a two-way Video Visit (V-Visit) evaluation for upper respiratory illness concerns.    \"I believe I have a sinus infection.  I had allergy symptoms starting weekend before last. Congestion did not clear up\".      Per chart review, patient with a hx of RA, KARELY, depression. Patient noted to have had a bacterial sinusitis 5/27/2024 tx'd with augmentin at that time.     Patient with symptoms starting 1.5 weeks ago. Patient with current symptoms: runny nose, congestion, PND, sinus pain and pressure, headache, and fatigue. Patient has been using  nasal steroid, sinus rinse and ibuprofen  for symptoms thus far.     Patient denies being pregnant and/or breastfeeding at this time.     Denies chest pain, shortness of breath, palpitations, uncontrolled fever, inability to tolerate fluids, abdominal pain.    ALLERGIES  ALLERGIES:  No Known Allergies     MEDICATIONS  Current Outpatient Medications   Medication Sig    escitalopram (LEXAPRO) 20 MG tablet TAKE 1 TABLET BY MOUTH DAILY    electrolyte/PEG 3350 (Nulytely with Flavor Packs) 420 g solution Use as directed for colonoscopy prep    semaglutide-Weight Management (WEGOVY) 1 MG/0.5ML injection Inject 1 mg into the skin every 7 days. Indications: OBESITY    ALPRAZolam (XANAX) 0.25 MG tablet Take one tablet by mouth every night at bedtime as needed for sleep.    etanercept (Enbrel SureClick) 50 MG/ML auto-injector injection Inject 50 mg into the skin.    Multiple Vitamin (MULTI-DAY VITAMINS PO) Take 1 tablet by mouth daily.     No current facility-administered medications for this visit.        OBJECTIVE    Information acquired with patient assistance, demonstration, and  feedback due to two-way video visit method of visit. Portions of assessment may be difficult to visualize precisely given nature of technology and limitations of assessment with virtual platform.     Vitals:    10/10/24 1557   Temp: 99.5 °F (37.5 °C)     GENERAL: Awake, alert, oriented and in no acute distress.  SKIN: Appears pink and dry.   HENT: Normocephalic, atraumatic. Pupils are equal, round. Conjunctivae are not injected. Patient notably congested during visit. Patient with both frontal and maxillary sinus pain and pressure on palpation.  Mouth/Throat: Lips appear moist. no trismus, no \"hot potato voice\", and no drooling or inability to control secretions .     RESPIRATORY:  Breathing effort is normal. Able to speak in full sentences. No evidence of respiratory distress.  PSYCHIATRIC: The patient was able to demonstrate good judgement and reason without abnormal affect or abnormal behaviors during the examination.     ASSESSMENT/PLAN   Acute bacterial sinusitis  - amoxicillin-clavulanate (AUGMENTIN) 875-125 MG per tablet; Take 1 tablet by mouth in the morning and 1 tablet in the evening. Do all this for 7 days.  Dispense: 14 tablet; Refill: 0  - guaiFENesin (Mucinex) 600 MG 12 hr tablet; Take 1 tablet by mouth in the morning and 1 tablet in the evening. Do all this for 10 days.  - pseudoephedrine (SUDAFED) 60 MG tablet; Take 1 tablet by mouth every 6 hours as needed for Congestion.  - fluticasone (FLONASE) 50 MCG/ACT nasal spray; Spray 2 sprays in each nostril daily for 10 days.  Dispense: 1 each; Refill: 1      Given the patient's symptomatology, duration of symptoms, and exam findings today, this is most likely bacterial at this point. Discussed guidelines for antibiotic indication which they meet, therefore an antibiotic was initiated.  Other symptomatic treatment was also advised.      Symptom Management Recommended:   - Mucinex to thin mucus PRN  - Sudafed (PSE) to decongest PRN (if they do not have HTN,  breastfeeding).  - Coricidin HBP OTC PRN if they have HTN was recommended.   - Flonase and/or saline nasal spray as directed.   - Advil/Tylenol as directed on package for fever, pain and body aches PRN. (Safe and effective approaches discussed in detail)  - Humidifier in room at night.   - Increase fluid intake and rest.   - Encouraged to drink warm fluids such as hot water with honey and lemon.   - Salt water gargles and throat lozenges for sore throat.   - Wash hands frequently.   - If patient was prescribed an antibiotic, it was encouraged to start on a probiotic daily. Explained that the probiotic should be spaced out from the antibiotic by 2-3 hours.    Discussed guidelines for antibiotic indication of which they do meet.  Encouraged symptomatic control as outlined above.   If antibiotic was prescribed, consumption of antibiotic advised until completion, despite clinical improvement.   Reviewed the guidelines published in 2015 by the American Academy of Otolaryngology-Head and Neck Surgery which constitutes the following:  \"Acute rhinosinusitis that is caused by, or is presumed to be by bacterial infection.  A clinician should diagnose ABRS when:  Symptoms or signs of acute rhinosinusitis fail to improve within 10 days or more beyond onset of upper respiratory symptoms OR symptoms or signs of acute rhinosinusitis worsen within 10 days after an initial improvement (double worsening).\"    FOLLOW-UP   Return for As needed .  If symptoms are no better after 10 days, they can return to [facility] for a follow up visit and discussion for further treatment. If symptoms worsen, they should be seen in Urgent Care, Immediate Care, or the Emergency Department.      PATIENT INSTRUCTIONS  Attached in After Visit Summary  The patient verbalizes understanding of the diagnosis and plan of care. There were no further questions or concerns.   They were advised to contact the [facility] RN with any questions at  [phone].    CONSENT:  This visit is being performed virtually via This visit is being performed virtually via Video visit using [employer].     Clinical Location: [facility] Tele[facility] Visit - Home office  Patient is located at home in the [facility].     CARMENCITA Ferguson  10/10/2024  3:51 PM   No

## 2024-11-25 NOTE — H&P ADULT - PROBLEM SELECTOR PLAN 2
----- Message from Yanely Handy PA-C sent at 11/25/2024  9:34 AM CST -----  PSA undetectable. Recheck in 6 months with OV at that time.   
Patient informed of NADIRA Handy's PSA result note. PSA ordered to be obtained 6 mo from now and patient transferred to  to make 6 mo follow up appt.   
Patient with known history of afib on Eliquis  - States that her previous EKGs has been normal  - NSR 75bpm on admission  - PT 16.8, INR 1.43, PTT 4.18  - Hold Eliquis in setting of GIB  - Monitor lytes and replete as needed

## 2024-12-06 ENCOUNTER — NON-APPOINTMENT (OUTPATIENT)
Age: 74
End: 2024-12-06

## 2024-12-06 ENCOUNTER — APPOINTMENT (OUTPATIENT)
Dept: CARDIOLOGY | Facility: CLINIC | Age: 74
End: 2024-12-06
Payer: MEDICARE

## 2024-12-06 VITALS
DIASTOLIC BLOOD PRESSURE: 80 MMHG | WEIGHT: 167 LBS | HEIGHT: 61 IN | OXYGEN SATURATION: 96 % | SYSTOLIC BLOOD PRESSURE: 152 MMHG | HEART RATE: 70 BPM | BODY MASS INDEX: 31.53 KG/M2

## 2024-12-06 DIAGNOSIS — I10 ESSENTIAL (PRIMARY) HYPERTENSION: ICD-10-CM

## 2024-12-06 DIAGNOSIS — E78.00 PURE HYPERCHOLESTEROLEMIA, UNSPECIFIED: ICD-10-CM

## 2024-12-06 DIAGNOSIS — I48.0 PAROXYSMAL ATRIAL FIBRILLATION: ICD-10-CM

## 2024-12-06 DIAGNOSIS — I35.0 NONRHEUMATIC AORTIC (VALVE) STENOSIS: ICD-10-CM

## 2024-12-06 PROCEDURE — G2211 COMPLEX E/M VISIT ADD ON: CPT

## 2024-12-06 PROCEDURE — 93000 ELECTROCARDIOGRAM COMPLETE: CPT

## 2024-12-06 PROCEDURE — 99214 OFFICE O/P EST MOD 30 MIN: CPT

## 2025-01-03 NOTE — ED PROVIDER NOTE - CADM POA URETHRAL CATHETER
The pharmacy advised that they will need an alternative for Diclofenac sodium as it is not available for order any longer.   
English
No

## 2025-01-08 ENCOUNTER — APPOINTMENT (OUTPATIENT)
Facility: CLINIC | Age: 75
End: 2025-01-08

## 2025-01-08 VITALS
SYSTOLIC BLOOD PRESSURE: 173 MMHG | BODY MASS INDEX: 32.59 KG/M2 | RESPIRATION RATE: 18 BRPM | DIASTOLIC BLOOD PRESSURE: 73 MMHG | HEIGHT: 60 IN | TEMPERATURE: 97.8 F | OXYGEN SATURATION: 97 % | HEART RATE: 57 BPM | WEIGHT: 166 LBS

## 2025-01-08 DIAGNOSIS — J20.9 CHRONIC OBSTRUCTIVE PULMONARY DISEASE W/ (ACUTE) LWR RESPIRATORY INFECTION: ICD-10-CM

## 2025-01-08 DIAGNOSIS — J44.0 CHRONIC OBSTRUCTIVE PULMONARY DISEASE W/ (ACUTE) LWR RESPIRATORY INFECTION: ICD-10-CM

## 2025-01-08 PROCEDURE — 99213 OFFICE O/P EST LOW 20 MIN: CPT

## 2025-01-08 RX ORDER — ALBUTEROL SULFATE 90 UG/1
108 (90 BASE) INHALANT RESPIRATORY (INHALATION)
Qty: 2 | Refills: 5 | Status: ACTIVE | COMMUNITY
Start: 2025-01-08 | End: 1900-01-01

## 2025-01-08 RX ORDER — DOXYCYCLINE HYCLATE 100 MG/1
100 TABLET ORAL
Qty: 20 | Refills: 0 | Status: ACTIVE | COMMUNITY
Start: 2025-01-08 | End: 1900-01-01

## 2025-01-08 RX ORDER — PREDNISONE 20 MG/1
20 TABLET ORAL DAILY
Qty: 10 | Refills: 1 | Status: ACTIVE | COMMUNITY
Start: 2025-01-08 | End: 1900-01-01

## 2025-01-27 ENCOUNTER — APPOINTMENT (OUTPATIENT)
Facility: CLINIC | Age: 75
End: 2025-01-27
Payer: MEDICARE

## 2025-01-27 VITALS
BODY MASS INDEX: 33.57 KG/M2 | RESPIRATION RATE: 18 BRPM | HEART RATE: 67 BPM | OXYGEN SATURATION: 94 % | HEIGHT: 60 IN | DIASTOLIC BLOOD PRESSURE: 80 MMHG | WEIGHT: 171 LBS | TEMPERATURE: 98.2 F | SYSTOLIC BLOOD PRESSURE: 182 MMHG

## 2025-01-27 DIAGNOSIS — Z87.891 PERSONAL HISTORY OF NICOTINE DEPENDENCE: ICD-10-CM

## 2025-01-27 DIAGNOSIS — I10 ESSENTIAL (PRIMARY) HYPERTENSION: ICD-10-CM

## 2025-01-27 DIAGNOSIS — J44.1 CHRONIC OBSTRUCTIVE PULMONARY DISEASE WITH (ACUTE) EXACERBATION: ICD-10-CM

## 2025-01-27 DIAGNOSIS — J20.9 CHRONIC OBSTRUCTIVE PULMONARY DISEASE W/ (ACUTE) LWR RESPIRATORY INFECTION: ICD-10-CM

## 2025-01-27 DIAGNOSIS — Z99.81 DEPENDENCE ON SUPPLEMENTAL OXYGEN: ICD-10-CM

## 2025-01-27 DIAGNOSIS — R91.1 SOLITARY PULMONARY NODULE: ICD-10-CM

## 2025-01-27 DIAGNOSIS — J44.0 CHRONIC OBSTRUCTIVE PULMONARY DISEASE W/ (ACUTE) LWR RESPIRATORY INFECTION: ICD-10-CM

## 2025-01-27 PROCEDURE — 99213 OFFICE O/P EST LOW 20 MIN: CPT | Mod: 25

## 2025-01-27 PROCEDURE — 96372 THER/PROPH/DIAG INJ SC/IM: CPT

## 2025-01-27 RX ADMIN — TRIAMCINOLONE ACETONIDE 1.5 MG/ML: 40 INJECTION, SUSPENSION INTRA-ARTICULAR; INTRAMUSCULAR at 00:00

## 2025-01-27 NOTE — ED ADULT NURSE NOTE - NSFALLRSKINDICATORS_ED_ALL_ED
Apixaban/Eliquis - Compliance/Apixaban/Eliquis - Dietary Advice/Apixaban/Eliquis - Follow up monitoring/Apixaban/Eliquis - Potential for adverse drug reactions and interactions
no
FAMILY HISTORY:  FH: breast cancer, mother

## 2025-01-28 PROBLEM — I10 WHITE COAT SYNDROME WITH HYPERTENSION: Status: ACTIVE | Noted: 2025-01-28

## 2025-01-28 PROBLEM — J44.0 ACUTE BRONCHITIS WITH COPD: Status: RESOLVED | Noted: 2025-01-08 | Resolved: 2025-01-28

## 2025-01-28 RX ORDER — TRIAMCINOLONE ACETONIDE 40 MG/ML
40 SUSPENSION INTRA-ARTERIAL; INTRAMUSCULAR
Qty: 1 | Refills: 0 | Status: COMPLETED | OUTPATIENT
Start: 2025-01-27

## 2025-01-28 RX ORDER — PREDNISONE 10 MG/1
10 TABLET ORAL
Qty: 10 | Refills: 1 | Status: ACTIVE | COMMUNITY
Start: 2025-01-27

## 2025-02-01 ENCOUNTER — INPATIENT (INPATIENT)
Facility: HOSPITAL | Age: 75
LOS: 2 days | Discharge: HOME | DRG: 293 | End: 2025-02-04
Attending: HOSPITALIST | Admitting: HOSPITALIST
Payer: MEDICARE

## 2025-02-01 VITALS
DIASTOLIC BLOOD PRESSURE: 82 MMHG | HEART RATE: 96 BPM | TEMPERATURE: 97 F | OXYGEN SATURATION: 98 % | RESPIRATION RATE: 19 BRPM | WEIGHT: 177.03 LBS | SYSTOLIC BLOOD PRESSURE: 174 MMHG

## 2025-02-01 DIAGNOSIS — E03.9 HYPOTHYROIDISM, UNSPECIFIED: ICD-10-CM

## 2025-02-01 DIAGNOSIS — Z98.89 OTHER SPECIFIED POSTPROCEDURAL STATES: Chronic | ICD-10-CM

## 2025-02-01 DIAGNOSIS — I35.0 NONRHEUMATIC AORTIC (VALVE) STENOSIS: ICD-10-CM

## 2025-02-01 DIAGNOSIS — I50.9 HEART FAILURE, UNSPECIFIED: ICD-10-CM

## 2025-02-01 DIAGNOSIS — N17.9 ACUTE KIDNEY FAILURE, UNSPECIFIED: ICD-10-CM

## 2025-02-01 DIAGNOSIS — I48.91 UNSPECIFIED ATRIAL FIBRILLATION: ICD-10-CM

## 2025-02-01 DIAGNOSIS — E78.5 HYPERLIPIDEMIA, UNSPECIFIED: ICD-10-CM

## 2025-02-01 DIAGNOSIS — Z29.9 ENCOUNTER FOR PROPHYLACTIC MEASURES, UNSPECIFIED: ICD-10-CM

## 2025-02-01 DIAGNOSIS — Z87.09 PERSONAL HISTORY OF OTHER DISEASES OF THE RESPIRATORY SYSTEM: ICD-10-CM

## 2025-02-01 DIAGNOSIS — I10 ESSENTIAL (PRIMARY) HYPERTENSION: ICD-10-CM

## 2025-02-01 LAB
ALBUMIN SERPL ELPH-MCNC: 3.3 G/DL — SIGNIFICANT CHANGE UP (ref 3.3–5)
ALP SERPL-CCNC: 67 U/L — SIGNIFICANT CHANGE UP (ref 40–120)
ALT FLD-CCNC: 25 U/L — SIGNIFICANT CHANGE UP (ref 12–78)
ANION GAP SERPL CALC-SCNC: 9 MMOL/L — SIGNIFICANT CHANGE UP (ref 5–17)
APTT BLD: 32.2 SEC — SIGNIFICANT CHANGE UP (ref 24.5–35.6)
AST SERPL-CCNC: 20 U/L — SIGNIFICANT CHANGE UP (ref 15–37)
BASOPHILS # BLD AUTO: 0.03 K/UL — SIGNIFICANT CHANGE UP (ref 0–0.2)
BASOPHILS NFR BLD AUTO: 0.3 % — SIGNIFICANT CHANGE UP (ref 0–2)
BILIRUB SERPL-MCNC: 0.4 MG/DL — SIGNIFICANT CHANGE UP (ref 0.2–1.2)
BUN SERPL-MCNC: 25 MG/DL — HIGH (ref 7–23)
CALCIUM SERPL-MCNC: 9 MG/DL — SIGNIFICANT CHANGE UP (ref 8.5–10.1)
CHLORIDE SERPL-SCNC: 109 MMOL/L — HIGH (ref 96–108)
CO2 SERPL-SCNC: 24 MMOL/L — SIGNIFICANT CHANGE UP (ref 22–31)
CREAT SERPL-MCNC: 1.3 MG/DL — SIGNIFICANT CHANGE UP (ref 0.5–1.3)
EGFR: 43 ML/MIN/1.73M2 — LOW
EOSINOPHIL # BLD AUTO: 0.09 K/UL — SIGNIFICANT CHANGE UP (ref 0–0.5)
EOSINOPHIL NFR BLD AUTO: 1 % — SIGNIFICANT CHANGE UP (ref 0–6)
FLUAV AG NPH QL: SIGNIFICANT CHANGE UP
FLUBV AG NPH QL: SIGNIFICANT CHANGE UP
GLUCOSE SERPL-MCNC: 144 MG/DL — HIGH (ref 70–99)
HCT VFR BLD CALC: 37.4 % — SIGNIFICANT CHANGE UP (ref 34.5–45)
HGB BLD-MCNC: 12 G/DL — SIGNIFICANT CHANGE UP (ref 11.5–15.5)
IMM GRANULOCYTES NFR BLD AUTO: 0.4 % — SIGNIFICANT CHANGE UP (ref 0–0.9)
INR BLD: 0.97 RATIO — SIGNIFICANT CHANGE UP (ref 0.85–1.16)
LYMPHOCYTES # BLD AUTO: 1.02 K/UL — SIGNIFICANT CHANGE UP (ref 1–3.3)
LYMPHOCYTES # BLD AUTO: 11.5 % — LOW (ref 13–44)
MCHC RBC-ENTMCNC: 31.3 PG — SIGNIFICANT CHANGE UP (ref 27–34)
MCHC RBC-ENTMCNC: 32.1 G/DL — SIGNIFICANT CHANGE UP (ref 32–36)
MCV RBC AUTO: 97.4 FL — SIGNIFICANT CHANGE UP (ref 80–100)
MONOCYTES # BLD AUTO: 0.38 K/UL — SIGNIFICANT CHANGE UP (ref 0–0.9)
MONOCYTES NFR BLD AUTO: 4.3 % — SIGNIFICANT CHANGE UP (ref 2–14)
NEUTROPHILS # BLD AUTO: 7.33 K/UL — SIGNIFICANT CHANGE UP (ref 1.8–7.4)
NEUTROPHILS NFR BLD AUTO: 82.5 % — HIGH (ref 43–77)
NRBC # BLD: 0 /100 WBCS — SIGNIFICANT CHANGE UP (ref 0–0)
NRBC BLD-RTO: 0 /100 WBCS — SIGNIFICANT CHANGE UP (ref 0–0)
NT-PROBNP SERPL-SCNC: 940 PG/ML — HIGH (ref 0–125)
PLATELET # BLD AUTO: 146 K/UL — LOW (ref 150–400)
POTASSIUM SERPL-MCNC: 4.1 MMOL/L — SIGNIFICANT CHANGE UP (ref 3.5–5.3)
POTASSIUM SERPL-SCNC: 4.1 MMOL/L — SIGNIFICANT CHANGE UP (ref 3.5–5.3)
PROT SERPL-MCNC: 6.5 G/DL — SIGNIFICANT CHANGE UP (ref 6–8.3)
PROTHROM AB SERPL-ACNC: 11.5 SEC — SIGNIFICANT CHANGE UP (ref 9.9–13.4)
RBC # BLD: 3.84 M/UL — SIGNIFICANT CHANGE UP (ref 3.8–5.2)
RBC # FLD: 14.5 % — SIGNIFICANT CHANGE UP (ref 10.3–14.5)
RSV RNA NPH QL NAA+NON-PROBE: SIGNIFICANT CHANGE UP
SARS-COV-2 RNA SPEC QL NAA+PROBE: SIGNIFICANT CHANGE UP
SODIUM SERPL-SCNC: 142 MMOL/L — SIGNIFICANT CHANGE UP (ref 135–145)
TROPONIN I, HIGH SENSITIVITY RESULT: 23.3 NG/L — SIGNIFICANT CHANGE UP
WBC # BLD: 8.89 K/UL — SIGNIFICANT CHANGE UP (ref 3.8–10.5)
WBC # FLD AUTO: 8.89 K/UL — SIGNIFICANT CHANGE UP (ref 3.8–10.5)

## 2025-02-01 PROCEDURE — 93010 ELECTROCARDIOGRAM REPORT: CPT

## 2025-02-01 PROCEDURE — 71046 X-RAY EXAM CHEST 2 VIEWS: CPT | Mod: 26

## 2025-02-01 PROCEDURE — 99223 1ST HOSP IP/OBS HIGH 75: CPT | Mod: GC

## 2025-02-01 PROCEDURE — 99285 EMERGENCY DEPT VISIT HI MDM: CPT

## 2025-02-01 RX ORDER — ACETAMINOPHEN, DIPHENHYDRAMINE HCL, PHENYLEPHRINE HCL 325; 25; 5 MG/1; MG/1; MG/1
3 TABLET ORAL AT BEDTIME
Refills: 0 | Status: DISCONTINUED | OUTPATIENT
Start: 2025-02-01 | End: 2025-02-04

## 2025-02-01 RX ORDER — BUDESONIDE 9 MG/1
1 TABLET, EXTENDED RELEASE ORAL
Refills: 0 | Status: DISCONTINUED | OUTPATIENT
Start: 2025-02-01 | End: 2025-02-04

## 2025-02-01 RX ORDER — LEVOTHYROXINE SODIUM 25 UG/1
50 TABLET ORAL DAILY
Refills: 0 | Status: DISCONTINUED | OUTPATIENT
Start: 2025-02-02 | End: 2025-02-04

## 2025-02-01 RX ORDER — LEVOTHYROXINE SODIUM 25 UG/1
1 TABLET ORAL
Refills: 0 | DISCHARGE

## 2025-02-01 RX ORDER — TIOTROPIUM BROMIDE MONOHYDRATE 18 UG/1
1 CAPSULE ORAL; RESPIRATORY (INHALATION)
Refills: 0 | DISCHARGE

## 2025-02-01 RX ORDER — PREDNISONE 5 MG/1
1 TABLET ORAL
Refills: 0 | DISCHARGE

## 2025-02-01 RX ORDER — APIXABAN 5 MG/1
2.5 TABLET, FILM COATED ORAL
Refills: 0 | Status: DISCONTINUED | OUTPATIENT
Start: 2025-02-01 | End: 2025-02-04

## 2025-02-01 RX ORDER — TIOTROPIUM BROMIDE MONOHYDRATE 18 UG/1
2 CAPSULE ORAL; RESPIRATORY (INHALATION) DAILY
Refills: 0 | Status: DISCONTINUED | OUTPATIENT
Start: 2025-02-02 | End: 2025-02-04

## 2025-02-01 RX ORDER — FLUTICASONE PROPIONATE AND SALMETEROL 113; 14 UG/1; UG/1
1 POWDER, METERED RESPIRATORY (INHALATION)
Refills: 0 | DISCHARGE

## 2025-02-01 RX ORDER — HYDRALAZINE HCL 100 MG
50 TABLET ORAL THREE TIMES A DAY
Refills: 0 | Status: DISCONTINUED | OUTPATIENT
Start: 2025-02-01 | End: 2025-02-04

## 2025-02-01 RX ORDER — FLUTICASONE PROPIONATE AND SALMETEROL 113; 14 UG/1; UG/1
1 POWDER, METERED RESPIRATORY (INHALATION)
Refills: 0 | Status: DISCONTINUED | OUTPATIENT
Start: 2025-02-01 | End: 2025-02-04

## 2025-02-01 RX ORDER — PREDNISONE 5 MG/1
10 TABLET ORAL EVERY OTHER DAY
Refills: 0 | Status: DISCONTINUED | OUTPATIENT
Start: 2025-02-01 | End: 2025-02-04

## 2025-02-01 RX ORDER — HYDRALAZINE HCL 100 MG
1 TABLET ORAL
Refills: 0 | DISCHARGE

## 2025-02-01 RX ADMIN — Medication 40 MILLIGRAM(S): at 19:12

## 2025-02-01 RX ADMIN — Medication 50 MILLIGRAM(S): at 22:12

## 2025-02-01 RX ADMIN — APIXABAN 2.5 MILLIGRAM(S): 5 TABLET, FILM COATED ORAL at 22:13

## 2025-02-01 NOTE — H&P ADULT - PROBLEM SELECTOR PLAN 4
chronic chronic, currently on outpatient course of steroids for ?bronchitis vs exacerbation  - c/w Spiriva 2 puffs qd   - c/w prednisone 10 mg every other day (ordered x 3 doses) chronic  - /87 on admission   - monitor routine hemodynamics  - c/w home hydralazine 50 mg TID  - hold home losartan in setting of ROBYN

## 2025-02-01 NOTE — H&P ADULT - PROBLEM SELECTOR PLAN 7
chronic, presently off meds (pt w/ statin intolerance, was prescribed new med but not covered by insurance, pt unsure of med name)  - AM lipid panel ordered chronic  - AM TSH ordered  - c/w home synthroid 50 mcg qd

## 2025-02-01 NOTE — H&P ADULT - NSICDXFAMILYHX_GEN_ALL_CORE_FT
FAMILY HISTORY:  FH: hypertension     FAMILY HISTORY:  FH: hypertension    Father  Still living? Unknown  FH: hyperlipidemia, Age at diagnosis: Age Unknown  FH: prostate cancer, Age at diagnosis: Age Unknown    Mother  Still living? Unknown  FH: emphysema, Age at diagnosis: Age Unknown

## 2025-02-01 NOTE — H&P ADULT - NSHPPHYSICALEXAM_GEN_ALL_CORE
T(C): 36.1 (02-01-25 @ 17:02), Max: 36.1 (02-01-25 @ 17:02)  HR: 90 (02-01-25 @ 17:02) (90 - 96)  BP: 175/84 (02-01-25 @ 17:02) (174/82 - 175/84)  RR: 19 (02-01-25 @ 17:02) (19 - 19)  SpO2: 97% (02-01-25 @ 17:02) (97% - 98%)    GENERAL: patient appears well, no acute distress, appropriately interactive  EYES: sclera clear, no exudates  ENMT: oropharynx clear without erythema, no exudates, moist mucous membranes  NECK: supple, soft  LUNGS: good air entry bilaterally, clear to auscultation, symmetric breath sounds, no wheezing or rhonchi appreciated  HEART: soft S1/S2, regular rate and rhythm, no murmurs noted, no lower extremity edema  GASTROINTESTINAL: abdomen is soft, nontender, nondistended, normoactive bowel sounds  INTEGUMENT: good skin turgor, warm skin, appears well perfused  MUSCULOSKELETAL: no clubbing or cyanosis, no obvious deformity  NEUROLOGIC: awake, alert, oriented x3, good muscle tone in all 4 extremities  HEME/LYMPH: no obvious ecchymosis or petechiae T(C): 36.1 (02-01-25 @ 17:02), Max: 36.1 (02-01-25 @ 17:02)  HR: 90 (02-01-25 @ 17:02) (90 - 96)  BP: 175/84 (02-01-25 @ 17:02) (174/82 - 175/84)  RR: 19 (02-01-25 @ 17:02) (19 - 19)  SpO2: 97% (02-01-25 @ 17:02) (97% - 98%)    GENERAL: patient appears well, no acute distress, appropriately interactive  EYES: sclera clear, no exudates  ENMT: moist mucous membranes  LUNGS:  symmetric breath sounds, crackles at the bases b/l. no wheezing  HEART: soft S1/S2, regular rate and rhythm, +systolic murmur noted, 1+ lower extremity edema b/l  GASTROINTESTINAL: abdomen is soft, nontender, nondistended, normoactive bowel sounds  INTEGUMENT:  warm skin, appears well perfused  MUSCULOSKELETAL: no obvious deformity  NEUROLOGIC: awake, alert, oriented x3  HEME/LYMPH: no obvious ecchymosis or petechiae  PSYCH: normal affect T(C): 36.1 (02-01-25 @ 17:02), Max: 36.1 (02-01-25 @ 17:02)  HR: 90 (02-01-25 @ 17:02) (90 - 96)  BP: 175/84 (02-01-25 @ 17:02) (174/82 - 175/84)  RR: 19 (02-01-25 @ 17:02) (19 - 19)  SpO2: 97% (02-01-25 @ 17:02) (97% - 98%)    GENERAL: patient appears well, no acute distress, appropriately interactive  EYES: sclera clear, no exudates  ENMT: moist mucous membranes  LUNGS:  symmetric breath sounds, crackles at the bases b/l. no wheezing  HEART: soft S1/S2, regular rate and rhythm, +systolic murmur noted, 1+ lower extremity edema b/l  GASTROINTESTINAL: abdomen is soft, nontender, nondistended, normoactive bowel sounds  INTEGUMENT:  warm skin, appears well perfused, hyperpigmented areas on shins BL  MUSCULOSKELETAL: no obvious deformity  NEUROLOGIC: awake, alert, oriented x3  HEME/LYMPH: no obvious ecchymosis or petechiae  PSYCH: normal affect

## 2025-02-01 NOTE — ED ADULT NURSE NOTE - NSFALLUNIVINTERV_ED_ALL_ED
Bed/Stretcher in lowest position, wheels locked, appropriate side rails in place/Call bell, personal items and telephone in reach/Instruct patient to call for assistance before getting out of bed/chair/stretcher/Non-slip footwear applied when patient is off stretcher/[place] to call system/Physically safe environment - no spills, clutter or unnecessary equipment/Purposeful proactive rounding/Room/bathroom lighting operational, light cord in reach

## 2025-02-01 NOTE — PATIENT PROFILE ADULT - FALL HARM RISK - UNIVERSAL INTERVENTIONS
Bed in lowest position, wheels locked, appropriate side rails in place/Call bell, personal items and telephone in reach/Instruct patient to call for assistance before getting out of bed or chair/Non-slip footwear when patient is out of bed/[place] to call system/Physically safe environment - no spills, clutter or unnecessary equipment/Purposeful Proactive Rounding/Room/bathroom lighting operational, light cord in reach

## 2025-02-01 NOTE — H&P ADULT - PROBLEM SELECTOR PLAN 2
chronic  - monitor routine hemodynamics chronic CKD stage 3A, baseline Cr ~1.0  - Cr 1.3 on admission  - avoid nephrotoxic agents  - hold home losartan in setting of ROBYN  - monitor daily Cr chronic, severe AS on prior echo from 9/24  - repeat TTE ordered  - cardio consulted

## 2025-02-01 NOTE — H&P ADULT - PROBLEM SELECTOR PLAN 6
chronic  - AM TSH ordered  - c/w home synthroid 50 mcg qd chronic  - c/w home eliquis 2.5 mg bid   - tele monitoring chronic  - c/w home eliquis 2.5 mg bid (per chart review pt on low dose eliquis as patient 2/2 recurrent epistaxis)  - tele monitoring

## 2025-02-01 NOTE — H&P ADULT - PROBLEM SELECTOR PLAN 3
chronic chronic  - /87 on admission   - monitor routine hemodynamics  - c/w home hydralazine 50 mg TID  - hold home losartan in setting of ROBYN chronic CKD stage 3A, baseline Cr ~1.0  - Cr 1.3 on admission  - avoid nephrotoxic agents  - hold home losartan in setting of ROBYN  - monitor daily Cr

## 2025-02-01 NOTE — H&P ADULT - ATTENDING COMMENTS
74-year-old female with PMH of COPD, A-fib on Eliquis, HTN, HLD, hypothyroidism, diverticulosis, severe AS, CKD stage 3A, Monoclonal gammopathy, bradycardia (with normal chronotropic response) who presents to the ED with a chief complaint of SOB.    agree with resident note  obtain ECHO to eval EF and aortic valve  f/u cardio recs  continue lasix  continue pulmicort nebs PRN and Prednisone     time spent 90 min excluding resident teaching and other services

## 2025-02-01 NOTE — H&P ADULT - PROBLEM SELECTOR PLAN 8
DVT ppx: c/w home eliquis 2.5 mg bid chronic, presently off meds (pt w/ statin intolerance, was prescribed new med but not covered by insurance, pt unsure of med name)  - AM lipid panel ordered

## 2025-02-01 NOTE — ED PROVIDER NOTE - CLINICAL SUMMARY MEDICAL DECISION MAKING FREE TEXT BOX
patient is a 74-year-old female history of COPD, not on beta agonist, former smoker quit 15 years ago, A-fib on Eliquis, HTN, HLD, hypothyroidism presents emergency department for shortness of breath palpitations left arm pain.  Patient states that she woke up this morning at 5:30 AM was feeling short breath while lying flat was okay after sitting up and walking around.  Patient then was at the store and began having left arm pain rating down to her left hand as well as down to her left-sided toes.  Patient has been dealing with bronchitis over the last 6 weeks has been on budesonide doxycycline prednisone and Mucinex.  Patient states she cannot take albuterol because it gave her a poor taste in her mouth.  Patient denies fever, abdominal pain, nausea, vomiting, numbness, weakness.  Does endorse chills and diaphoresis during this episode of the arm pain.      Will obtain labs, imaging, serial EKG troponin and reevaluate.

## 2025-02-01 NOTE — PATIENT PROFILE ADULT - TRANSPORTATION
no
atorvastatin 40 mg oral tablet: 1 tab(s) orally once a day  Eliquis 5 mg oral tablet: 1 tab(s) orally 2 times a day  Lexapro 10 mg oral tablet: 1 tab(s) orally once a day  Wellbutrin  mg/24 hours oral tablet, extended release: 1 tab(s) orally every 24 hours

## 2025-02-01 NOTE — H&P ADULT - NSHPOUTPATIENTPROVIDERS_GEN_ALL_CORE
PCP NERI Velasquez  Cards- DR PALLA  Nephviky- SAMARA-HYEON YUN, MD PCP Dr Danielle Pelaez  Cards- DR PALLA  Nephro- SAMARA-HYEON YUN, MD  Endo- Dr Matias SALGADO- Dr CEDRIC Walden

## 2025-02-01 NOTE — PATIENT PROFILE ADULT - NSPROPASSIVESMOKEEXPOSURE_GEN_A_NUR
FYI - Status Update    Who is Calling: patient    Update:  pt have a future appt on  8/26/2024 6:45 am    Please refill medication until appt     Does caller want a call/response back: Yes     Could we send this information to you in [employer] or would you prefer to receive a phone call?:   Patient would prefer a phone call   Okay to leave a detailed message?: No at Cell number on file:    Telephone Information:   Mobile [phone]       
LIAM.  Valentina Barreto.  Thank you!    
Needs appt with PCP and fasting labs for further refills    
Unknown

## 2025-02-01 NOTE — ED ADULT NURSE NOTE - OBJECTIVE STATEMENT
pt is AOx4, came to the ED with c/o palpitations radiating to L arm pain, L leg numbness. Pt denies dizziness when ambulating. pt states she felt palpitations yesterday and they went away. Today while doing errands, felt palpitations, L arm pain L leg numbness. Pt denies n/v/dizziness at this time. Denies jaw pain. Denies change in vision. Denies ABD pain. Denies SOB/HA. pt ambulates independently. Able to follow all commands, speech is clear. Pt has hx COPD, HTN, Afib. EKG completed. pending lab and radiology results. care ongoing. call bell placed within reach.

## 2025-02-01 NOTE — H&P ADULT - NSHPSOCIALHISTORY_GEN_ALL_CORE
Tobacco use- former smoker, quit 15 years ago Tobacco use- former 80 pack year smoker, quit 15 years ago  Alcohol- rarely (10 drinks per year)  ADLs and ambulation - independent  Lives alone  Rec drugs - denies

## 2025-02-01 NOTE — ED ADULT TRIAGE NOTE - CHIEF COMPLAINT QUOTE
pt was wheeled into triage C/O left arm pain that radiates to the chest states she was feeling palpations earlier today.

## 2025-02-01 NOTE — ED PROVIDER NOTE - PROGRESS NOTE DETAILS
BNP elevated. discussed with cards, Dr. Jeffrey. patient has "allergy" to sulfa drugs. states she was 5 and was found in [address] and was "stiff". Discussed with hospitalist, will admit patient BNP elevated. discussed with cards, Dr. Jeffrey. patient has "allergy" to sulfa drugs. states she was 5 and was found in street and was "stiff".    will trial lasix and order echo

## 2025-02-01 NOTE — H&P ADULT - HISTORY OF PRESENT ILLNESS
74-year-old female with MH of COPD (not on beta agonist or home O2), A-fib on Eliquis, HTN, HLD, hypothyroidism, diverticulosis, moderate AS, presents emergency department for shortness of breath palpitations left arm pain.  Patient states that she woke up this morning at 5:30 AM was feeling short breath while lying flat was okay after sitting up and walking around.  Patient then was at the store and began having left arm pain rating down to her left hand as well as down to her left-sided toes.  Patient has been dealing with bronchitis over the last 6 weeks has been on budesonide doxycycline prednisone and Mucinex.    Admits to SOB.  Denies fever, chills, chest pain, palpitations, cough, abdominal pain, nausea, vomiting, diarrhea, constipation, urinary frequency, urgency, or dysuria, headaches, changes in vision, dizziness, numbness, tingling.  Denies recent travel, recent antibiotic use, or sick contacts.    ED Course:   Vitals: BP: 174/87, HR: 96, Temp: 96.8F, RR: 19 , SpO2: 98% on RA   Labs:  gluc 144 trop 23 proBNP 940 Cr 1.3  SARS COV-2, flu A, flu B, RSV: not detected  CXR: Slight scar at the right base at this time. No suggestion of acute finding.  EKG:   Received in the ED: Lasix 40 mg IV x 1   74-year-old female with PMH of COPD (not on beta agonist or home O2), A-fib on Eliquis, HTN, HLD, hypothyroidism, diverticulosis, moderate AS, who presents to the ED with a chief complaint of SOB. Also reports palpitations left arm pain.  Patient states that she woke up this morning at 5:30 AM was feeling short breath while lying flat was okay after sitting up and walking around.  Patient then was at the store and began having left arm pain rating down to her left hand as well as down to her left-sided toes.  Patient has been dealing with bronchitis over the last 6 weeks has been on budesonide doxycycline prednisone and Mucinex.    Admits to SOB.  Denies fever, chills, chest pain, palpitations, cough, abdominal pain, nausea, vomiting, diarrhea, constipation, urinary frequency, urgency, or dysuria, headaches, changes in vision, dizziness, numbness, tingling.  Denies recent travel, recent antibiotic use, or sick contacts.    ED Course:   Vitals: BP: 174/87, HR: 96, Temp: 96.8F, RR: 19 , SpO2: 98% on RA   Labs:  gluc 144 trop 23 proBNP 940 Cr 1.3  SARS COV-2, flu A, flu B, RSV: not detected  CXR: Slight scar at the right base at this time. No suggestion of acute finding.  EKG: a flutter with 2:1 AV conduction, QTc 413  Received in the ED: Lasix 40 mg IV x 1   74-year-old female with PMH of COPD (not on beta agonist or home O2), A-fib on Eliquis, HTN, HLD, hypothyroidism, diverticulosis, moderate AS, CKD stage 3A, Monoclonal gammopathy, bradycardia (with normal chronotropic response) who presents to the ED with a chief complaint of SOB. Also reports palpitations left arm pain.  Patient states that she woke up this morning at 5:30 AM was feeling short breath while lying flat was okay after sitting up and walking around.  Patient then was at the store and began having left arm pain rating down to her left hand as well as down to her left-sided toes.  Patient has been dealing with bronchitis over the last 6 weeks has been on budesonide doxycycline prednisone and Mucinex.  Recently had COPD exacerbation and was started on a 10 day course of prednisone on 1/28.     Admits to SOB.  Denies fever, chills, chest pain, palpitations, cough, abdominal pain, nausea, vomiting, diarrhea, constipation, urinary frequency, urgency, or dysuria, headaches, changes in vision, dizziness, numbness, tingling.  Denies recent travel, recent antibiotic use, or sick contacts.    ED Course:   Vitals: BP: 174/87, HR: 96, Temp: 96.8F, RR: 19 , SpO2: 98% on RA   Labs:  gluc 144 trop 23 proBNP 940 Cr 1.3  SARS COV-2, flu A, flu B, RSV: not detected  CXR: Slight scar at the right base at this time. No suggestion of acute finding.  EKG: a flutter with 2:1 AV conduction, QTc 413  Received in the ED: Lasix 40 mg IV x 1   74-year-old female with PMH of COPD, A-fib on Eliquis, HTN, HLD, hypothyroidism, diverticulosis, moderate AS, CKD stage 3A, Monoclonal gammopathy, bradycardia (with normal chronotropic response) who presents to the ED with a chief complaint of SOB. This AM around 5:30 AM pt awoke from sleep with SOB and hoarseness. Her SOB improved with sitting up. Later in the day she was in the bank when she had an episode of sudden pain in her left arm, tingling down her left arm/toes, and palpitations, which resolved spontaneously.  Patient has been dealing with bronchitis/COPD exacerbation since late december. She has completed 2 courses of doxycycline and 10 day course of prednisone 20 mg qd.. Saw pulm on 1/28 and was started on an additional course of prednisone 10 mg every other day, which she has taken for 2 doses so far, was also started on budesonide nebulizer solution . over the last 6 weeks has been on budesonide doxycycline prednisone and Mucinex.  Recently had COPD exacerbation and was started on a 10 day course of prednisone on 1/28.     Admits to SOB and left eye pain. One episode of left arm pain/tingling and palpitations that resolved spontaneously.   Denies fever, chest pain, cough, abdominal pain, nausea, vomiting, diarrhea, constipation, dysuria, headaches    Denies recent travel or sick contacts. Pt was recently on a course of doxycycline (started 1/8) for bronchitis, which she has since finished.    ED Course:   Vitals: BP: 174/87, HR: 96, Temp: 96.8F, RR: 19 , SpO2: 98% on RA   Labs:  gluc 144 trop 23 proBNP 940 Cr 1.3  SARS COV-2, flu A, flu B, RSV: not detected  CXR: Slight scar at the right base at this time. No suggestion of acute finding.  EKG: a flutter with 2:1 AV conduction, QTc 413  Received in the ED: Lasix 40 mg IV x 1   74-year-old female with PMH of COPD, A-fib on Eliquis, HTN, HLD, hypothyroidism, diverticulosis, moderate AS, CKD stage 3A, Monoclonal gammopathy, bradycardia (with normal chronotropic response) who presents to the ED with a chief complaint of SOB. This AM around 5:30 AM pt awoke from sleep with SOB and hoarseness. Her SOB improved with sitting up. Later in the day she was in the bank when she had an episode of sudden pain in her left arm, tingling down her left arm/toes, and palpitations, which resolved spontaneously. No chest pain. Admits to eating more salty foods than usual this past week. Also notes pain behind her left eye for the past 2 days. Patient has been dealing with bronchitis/COPD exacerbation since late december. She has completed 2 courses of doxycycline and 10 day course of prednisone 20 mg qd. Saw pulm on 1/28 and was started on an additional course of prednisone 10 mg every other day, which she has taken for 2 doses so far, was also started on budesonide nebulizer solution  TID and mucinex    Admits to SOB and left eye pain. One episode of left arm pain/tingling and palpitations that resolved spontaneously.   Denies fever, chest pain, cough, abdominal pain, nausea, vomiting, diarrhea, constipation, dysuria, headaches    Denies recent travel or sick contacts. Pt was recently on a course of doxycycline (started 1/8) for bronchitis, which she has since finished. Of note patient previously admitted to [facility] and underwent cardiac workup, which she reports was normal and was deemed to be anxiety 2/2 acute stressors in her life.     ED Course:   Vitals: BP: 174/87, HR: 96, Temp: 96.8F, RR: 19 , SpO2: 98% on RA   Labs:  gluc 144 trop 23 proBNP 940 Cr 1.3  SARS COV-2, flu A, flu B, RSV: not detected  CXR: Slight scar at the right base at this time. No suggestion of acute finding.  EKG: a flutter with 2:1 AV conduction, QTc 413  Received in the ED: Lasix 40 mg IV x 1   74-year-old female with PMH of COPD, A-fib on Eliquis, HTN, HLD, hypothyroidism, diverticulosis, moderate AS, CKD stage 3A, Monoclonal gammopathy, bradycardia (with normal chronotropic response) who presents to the ED with a chief complaint of SOB. This AM around 5:30 AM pt awoke from sleep with SOB and hoarseness. Her SOB improved with sitting up. Later in the day she was in the bank when she had an episode of sudden pain in her left arm, tingling down her left arm/toes, and palpitations, which resolved spontaneously. No chest pain. Admits to eating more salty foods than usual this past week. Also notes pain behind her left eye for the past 2 days. Patient has been dealing with bronchitis/COPD exacerbation since late december. She has completed 2 courses of doxycycline and 10 day course of prednisone 20 mg qd. Saw pulm on 1/28 and was started on an additional course of prednisone 10 mg every other day, which she has taken for 2 doses so far, was also started on budesonide nebulizer solution  TID and mucinex    Admits to SOB and left eye pain. One episode of left arm pain/tingling and palpitations that resolved spontaneously.   Denies fever, chest pain, cough, abdominal pain, nausea, vomiting, diarrhea, constipation, dysuria, headaches    Denies recent travel or sick contacts. Pt was recently on a course of doxycycline (started 1/8) for bronchitis, which she has since finished. Of note patient previously admitted to [facility] and underwent cardiac workup, which she reports was normal and was deemed to be anxiety 2/2 acute stressors in her life. Pt has worsened AS gradient compared to prior study per cardio note. Pt says she is being considered for valve repair surgery and was supposed to follow up with cards.     ED Course:   Vitals: BP: 174/87, HR: 96, Temp: 96.8F, RR: 19 , SpO2: 98% on RA   Labs:  gluc 144 trop 23 proBNP 940 Cr 1.3  SARS COV-2, flu A, flu B, RSV: not detected  CXR: Slight scar at the right base at this time. No suggestion of acute finding.  EKG: a flutter with 2:1 AV conduction, QTc 413  Received in the ED: Lasix 40 mg IV x 1   74-year-old female with PMH of COPD, A-fib on Eliquis, HTN, HLD, hypothyroidism, diverticulosis, Severe AS, CKD stage 3A, Monoclonal gammopathy, bradycardia (with normal chronotropic response) who presents to the ED with a chief complaint of SOB. This AM around 5:30 AM pt awoke from sleep with SOB and hoarseness. Her SOB improved with sitting up. Later in the day she was in the bank when she had an episode of sudden pain in her left arm, tingling down her left arm/toes, and palpitations, which resolved spontaneously. No chest pain. Admits to eating more salty foods than usual this past week. Also notes pain behind her left eye for the past 2 days. Patient has been dealing with bronchitis/COPD exacerbation since late december. She has completed 2 courses of doxycycline and 10 day course of prednisone 20 mg qd. Saw pulm on 1/28 and was started on an additional course of prednisone 10 mg every other day, which she has taken for 2 doses so far, was also started on budesonide nebulizer solution  TID and mucinex    Admits to SOB and left eye pain. One episode of left arm pain/tingling and palpitations that resolved spontaneously.   Denies fever, chest pain, cough, abdominal pain, nausea, vomiting, diarrhea, constipation, dysuria, headaches    Denies recent travel or sick contacts. Pt was recently on a course of doxycycline (started 1/8) for bronchitis, which she has since finished. Of note patient previously admitted to [facility] and underwent cardiac workup, which she reports was normal and was deemed to be anxiety 2/2 acute stressors in her life. Pt has worsened AS gradient compared to prior study per cardio note. Pt says she is being considered for valve repair surgery and was supposed to follow up with cards.     ED Course:   Vitals: BP: 174/87, HR: 96, Temp: 96.8F, RR: 19 , SpO2: 98% on RA   Labs:  gluc 144 trop 23 proBNP 940 Cr 1.3  SARS COV-2, flu A, flu B, RSV: not detected  CXR: Slight scar at the right base at this time. No suggestion of acute finding.  EKG: a flutter with 2:1 AV conduction, QTc 413  Received in the ED: Lasix 40 mg IV x 1

## 2025-02-01 NOTE — ED PROVIDER NOTE - PHYSICAL EXAMINATION
General: well appearing, no acute distress, overweight  Head: normocephalic, atraumatic.   Cardiac: heart RRR, no murmurs, rubs, gallops, pulses 2+ x4. chest has no TTP. no LE edema  Pulm: lungs CTA  GI: abdomen soft, nontender, negative rebound, not distended  Skin: warm, dry, no rash, laceration, abrasion, contusion General: well appearing, no acute distress, overweight  Head: normocephalic, atraumatic.   Cardiac: heart RRR, no murmurs, rubs, gallops, pulses 2+ x4. chest has no TTP. BL LE edema, nonpitting  Pulm: lungs mild crackles BL  GI: abdomen soft, nontender, negative rebound, not distended  Skin: warm, dry, no rash, laceration, abrasion, contusion

## 2025-02-01 NOTE — H&P ADULT - PROBLEM SELECTOR PLAN 1
prior echo from 7/18/23 LVEF 59%, moderate AS pt with SOB, leg edema, crackles on exam  - Given Lasix 40 mg IV x 1 in ED  - proBNP 940  - prior echo from 7/18/23 LVEF 59%, moderate AS  - monitor daily weight   - TTE ordered  - Lasix 40 mg IV qd  - cardio consulted ([facility]), f/u recs pt with SOB, leg edema, crackles on exam  - Given Lasix 40 mg IV x 1 in ED  - proBNP 940  - prior echo from 7/18/23 LVEF 59%, moderate AS  - monitor daily weight  and I+O's  - TTE ordered  - c/w Lasix 40 mg IV bid  - cardio consulted ([facility]), f/u recs pt with SOB, leg edema, crackles on exam  - Given Lasix 40 mg IV x 1 in ED  - proBNP 940  - prior echo from 9/2/24 LVEF 60-65%, mildly dilated LA, moderately dilated RA, mild AR, severe AS  - monitor daily weight  and I+O's  - TTE ordered  - c/w Lasix 40 mg IV bid  - cardio consulted ([facility]), f/u recs

## 2025-02-01 NOTE — H&P ADULT - ASSESSMENT
74-year-old female with PMH of COPD, A-fib on Eliquis, HTN, HLD, hypothyroidism, diverticulosis, moderate AS, CKD stage 3A, Monoclonal gammopathy, bradycardia (with normal chronotropic response) who presents to the ED with a chief complaint of SOB. 74-year-old female with PMH of COPD, A-fib on Eliquis, HTN, HLD, hypothyroidism, diverticulosis, severe AS, CKD stage 3A, Monoclonal gammopathy, bradycardia (with normal chronotropic response) who presents to the ED with a chief complaint of SOB.

## 2025-02-01 NOTE — H&P ADULT - PROBLEM SELECTOR PLAN 5
DVT ppx: chronic  - c/w home eliquis 2.5 mg bid   - tele monitoring chronic, currently on outpatient course of steroids for ?bronchitis vs exacerbation  - c/w Spiriva 2 puffs qd   - c/w prednisone 10 mg every other day (ordered x 3 doses) chronic, currently on outpatient course of steroids for ?bronchitis vs exacerbation  - c/w Spiriva 2 puffs qd   - c/w Advair  - c/w prednisone 10 mg every other day (ordered x 3 doses)  - c/w pulmicort nebs PRN

## 2025-02-01 NOTE — ED PROVIDER NOTE - DIFFERENTIAL DIAGNOSIS
Differential Diagnosis DDx includes but not limited to: Arrhythmia VS COPD exacerbation VS less likely ACS

## 2025-02-01 NOTE — ED ADULT NURSE NOTE - NS ED NURSE TRANSPORT WITH
----- Message from Mayelin Guevara sent at 12/13/2021  2:58 PM EST -----  Subject: Medication Problem    QUESTIONS  Name of Medication? amphetamine-dextroamphetamine (ADDERALL XR) 30 MG   extended release capsule  Patient-reported dosage and instructions? 2 daily  What question or problem do you have with the medication? Pt got a letter   from [email or web address] [email or web address] saying they are requiring a prior auth. Ins Prior Auth #   [id number]; Please call pt  Preferred Pharmacy? [id number] - [place], [facility] [address]  Pharmacy phone number (if available)? [phone]  Additional Information for Provider?   ---------------------------------------------------------------------------  --------------  [address]  What is the best way for the office to contact you? OK to leave message on   voicemail  Preferred Call Back Phone Number? [phone]  ---------------------------------------------------------------------------  --------------  SCRIPT ANSWERS  Relationship to Patient?  Self
Spoke with patient and he states as of 1/1/2022 medication will require PA. He is aware pharmacy will contact us when he is due for refill of medication and PA will be initiated at that time. Patient aware to make sure pharmacy and our office has new insurance card for PA to be done. Patient voices understanding.
Cardiac Monitor/Defib/ACLS/Rescue Kit/O2/BVM

## 2025-02-02 LAB
ALBUMIN SERPL ELPH-MCNC: 3.2 G/DL — LOW (ref 3.3–5)
ALP SERPL-CCNC: 63 U/L — SIGNIFICANT CHANGE UP (ref 40–120)
ALT FLD-CCNC: 21 U/L — SIGNIFICANT CHANGE UP (ref 12–78)
ANION GAP SERPL CALC-SCNC: 9 MMOL/L — SIGNIFICANT CHANGE UP (ref 5–17)
AST SERPL-CCNC: 16 U/L — SIGNIFICANT CHANGE UP (ref 15–37)
BASOPHILS # BLD AUTO: 0.03 K/UL — SIGNIFICANT CHANGE UP (ref 0–0.2)
BASOPHILS NFR BLD AUTO: 0.4 % — SIGNIFICANT CHANGE UP (ref 0–2)
BILIRUB SERPL-MCNC: 0.5 MG/DL — SIGNIFICANT CHANGE UP (ref 0.2–1.2)
BUN SERPL-MCNC: 26 MG/DL — HIGH (ref 7–23)
CALCIUM SERPL-MCNC: 9.3 MG/DL — SIGNIFICANT CHANGE UP (ref 8.5–10.1)
CHLORIDE SERPL-SCNC: 103 MMOL/L — SIGNIFICANT CHANGE UP (ref 96–108)
CHOLEST SERPL-MCNC: 231 MG/DL — HIGH
CO2 SERPL-SCNC: 30 MMOL/L — SIGNIFICANT CHANGE UP (ref 22–31)
CREAT SERPL-MCNC: 1.2 MG/DL — SIGNIFICANT CHANGE UP (ref 0.5–1.3)
EGFR: 48 ML/MIN/1.73M2 — LOW
EOSINOPHIL # BLD AUTO: 0.11 K/UL — SIGNIFICANT CHANGE UP (ref 0–0.5)
EOSINOPHIL NFR BLD AUTO: 1.5 % — SIGNIFICANT CHANGE UP (ref 0–6)
GLUCOSE SERPL-MCNC: 84 MG/DL — SIGNIFICANT CHANGE UP (ref 70–99)
HCT VFR BLD CALC: 37.6 % — SIGNIFICANT CHANGE UP (ref 34.5–45)
HDLC SERPL-MCNC: 110 MG/DL — SIGNIFICANT CHANGE UP
HGB BLD-MCNC: 12.1 G/DL — SIGNIFICANT CHANGE UP (ref 11.5–15.5)
IMM GRANULOCYTES NFR BLD AUTO: 0.3 % — SIGNIFICANT CHANGE UP (ref 0–0.9)
LIPID PNL WITH DIRECT LDL SERPL: 113 MG/DL — HIGH
LYMPHOCYTES # BLD AUTO: 1.25 K/UL — SIGNIFICANT CHANGE UP (ref 1–3.3)
LYMPHOCYTES # BLD AUTO: 17.5 % — SIGNIFICANT CHANGE UP (ref 13–44)
MAGNESIUM SERPL-MCNC: 2 MG/DL — SIGNIFICANT CHANGE UP (ref 1.6–2.6)
MCHC RBC-ENTMCNC: 30.8 PG — SIGNIFICANT CHANGE UP (ref 27–34)
MCHC RBC-ENTMCNC: 32.2 G/DL — SIGNIFICANT CHANGE UP (ref 32–36)
MCV RBC AUTO: 95.7 FL — SIGNIFICANT CHANGE UP (ref 80–100)
MONOCYTES # BLD AUTO: 0.42 K/UL — SIGNIFICANT CHANGE UP (ref 0–0.9)
MONOCYTES NFR BLD AUTO: 5.9 % — SIGNIFICANT CHANGE UP (ref 2–14)
NEUTROPHILS # BLD AUTO: 5.33 K/UL — SIGNIFICANT CHANGE UP (ref 1.8–7.4)
NEUTROPHILS NFR BLD AUTO: 74.4 % — SIGNIFICANT CHANGE UP (ref 43–77)
NON HDL CHOLESTEROL: 121 MG/DL — SIGNIFICANT CHANGE UP
NRBC # BLD: 0 /100 WBCS — SIGNIFICANT CHANGE UP (ref 0–0)
NRBC BLD-RTO: 0 /100 WBCS — SIGNIFICANT CHANGE UP (ref 0–0)
PHOSPHATE SERPL-MCNC: 3.5 MG/DL — SIGNIFICANT CHANGE UP (ref 2.5–4.5)
PLATELET # BLD AUTO: 144 K/UL — LOW (ref 150–400)
POTASSIUM SERPL-MCNC: 4.4 MMOL/L — SIGNIFICANT CHANGE UP (ref 3.5–5.3)
POTASSIUM SERPL-SCNC: 4.4 MMOL/L — SIGNIFICANT CHANGE UP (ref 3.5–5.3)
PROT SERPL-MCNC: 6.4 G/DL — SIGNIFICANT CHANGE UP (ref 6–8.3)
RBC # BLD: 3.93 M/UL — SIGNIFICANT CHANGE UP (ref 3.8–5.2)
RBC # FLD: 14.4 % — SIGNIFICANT CHANGE UP (ref 10.3–14.5)
SODIUM SERPL-SCNC: 142 MMOL/L — SIGNIFICANT CHANGE UP (ref 135–145)
TRIGL SERPL-MCNC: 48 MG/DL — SIGNIFICANT CHANGE UP
TSH SERPL-MCNC: 4.06 UIU/ML — HIGH (ref 0.36–3.74)
WBC # BLD: 7.16 K/UL — SIGNIFICANT CHANGE UP (ref 3.8–10.5)
WBC # FLD AUTO: 7.16 K/UL — SIGNIFICANT CHANGE UP (ref 3.8–10.5)

## 2025-02-02 PROCEDURE — 99223 1ST HOSP IP/OBS HIGH 75: CPT

## 2025-02-02 PROCEDURE — 93306 TTE W/DOPPLER COMPLETE: CPT | Mod: 26

## 2025-02-02 RX ADMIN — Medication 50 MILLIGRAM(S): at 21:17

## 2025-02-02 RX ADMIN — Medication 1 DROP(S): at 21:16

## 2025-02-02 RX ADMIN — TIOTROPIUM BROMIDE MONOHYDRATE 2 PUFF(S): 18 CAPSULE ORAL; RESPIRATORY (INHALATION) at 06:40

## 2025-02-02 RX ADMIN — FLUTICASONE PROPIONATE AND SALMETEROL 1 DOSE(S): 113; 14 POWDER, METERED RESPIRATORY (INHALATION) at 06:40

## 2025-02-02 RX ADMIN — LEVOTHYROXINE SODIUM 50 MICROGRAM(S): 25 TABLET ORAL at 05:22

## 2025-02-02 RX ADMIN — APIXABAN 2.5 MILLIGRAM(S): 5 TABLET, FILM COATED ORAL at 05:22

## 2025-02-02 RX ADMIN — FLUTICASONE PROPIONATE AND SALMETEROL 1 DOSE(S): 113; 14 POWDER, METERED RESPIRATORY (INHALATION) at 17:18

## 2025-02-02 RX ADMIN — Medication 50 MILLIGRAM(S): at 14:00

## 2025-02-02 RX ADMIN — Medication 40 MILLIGRAM(S): at 14:00

## 2025-02-02 RX ADMIN — PREDNISONE 10 MILLIGRAM(S): 5 TABLET ORAL at 12:06

## 2025-02-02 RX ADMIN — APIXABAN 2.5 MILLIGRAM(S): 5 TABLET, FILM COATED ORAL at 17:19

## 2025-02-02 RX ADMIN — Medication 40 MILLIGRAM(S): at 05:22

## 2025-02-02 RX ADMIN — Medication 50 MILLIGRAM(S): at 05:22

## 2025-02-02 NOTE — PROGRESS NOTE ADULT - ASSESSMENT
74F with COPD, AF, HTN, HLD, hypothyroid, AS, CKD3, p/w SOB  Acute HFpEF  cardio following  Is/Os  daily weights  low sodium  diuresis    COPD  bronchodilators  steroids    AF/HTN  continue eliquis and hydralazine    hypothyroid  continue synthroid

## 2025-02-03 LAB
ANION GAP SERPL CALC-SCNC: 12 MMOL/L — SIGNIFICANT CHANGE UP (ref 5–17)
BASE EXCESS BLDV CALC-SCNC: 13.1 MMOL/L — HIGH (ref -2–3)
BLOOD GAS COMMENTS, VENOUS: SIGNIFICANT CHANGE UP
BUN SERPL-MCNC: 39 MG/DL — HIGH (ref 7–23)
CALCIUM SERPL-MCNC: 9.5 MG/DL — SIGNIFICANT CHANGE UP (ref 8.5–10.1)
CHLORIDE SERPL-SCNC: 99 MMOL/L — SIGNIFICANT CHANGE UP (ref 96–108)
CO2 SERPL-SCNC: 30 MMOL/L — SIGNIFICANT CHANGE UP (ref 22–31)
CREAT SERPL-MCNC: 1.5 MG/DL — HIGH (ref 0.5–1.3)
EGFR: 36 ML/MIN/1.73M2 — LOW
GLUCOSE SERPL-MCNC: 95 MG/DL — SIGNIFICANT CHANGE UP (ref 70–99)
HCO3 BLDV-SCNC: 38 MMOL/L — HIGH (ref 22–29)
HCT VFR BLD CALC: 40.2 % — SIGNIFICANT CHANGE UP (ref 34.5–45)
HGB BLD-MCNC: 12.9 G/DL — SIGNIFICANT CHANGE UP (ref 11.5–15.5)
MAGNESIUM SERPL-MCNC: 2 MG/DL — SIGNIFICANT CHANGE UP (ref 1.6–2.6)
MCHC RBC-ENTMCNC: 30.5 PG — SIGNIFICANT CHANGE UP (ref 27–34)
MCHC RBC-ENTMCNC: 32.1 G/DL — SIGNIFICANT CHANGE UP (ref 32–36)
MCV RBC AUTO: 95 FL — SIGNIFICANT CHANGE UP (ref 80–100)
NRBC # BLD: 0 /100 WBCS — SIGNIFICANT CHANGE UP (ref 0–0)
NRBC BLD-RTO: 0 /100 WBCS — SIGNIFICANT CHANGE UP (ref 0–0)
PCO2 BLDV: 58 MMHG — HIGH (ref 39–42)
PH BLDV: 7.42 — SIGNIFICANT CHANGE UP (ref 7.32–7.43)
PLATELET # BLD AUTO: 158 K/UL — SIGNIFICANT CHANGE UP (ref 150–400)
PO2 BLDV: 37 MMHG — SIGNIFICANT CHANGE UP (ref 25–45)
POTASSIUM SERPL-MCNC: 3.6 MMOL/L — SIGNIFICANT CHANGE UP (ref 3.5–5.3)
POTASSIUM SERPL-SCNC: 3.6 MMOL/L — SIGNIFICANT CHANGE UP (ref 3.5–5.3)
RBC # BLD: 4.23 M/UL — SIGNIFICANT CHANGE UP (ref 3.8–5.2)
RBC # FLD: 14.4 % — SIGNIFICANT CHANGE UP (ref 10.3–14.5)
SAO2 % BLDV: 59.1 % — LOW (ref 67–88)
SODIUM SERPL-SCNC: 141 MMOL/L — SIGNIFICANT CHANGE UP (ref 135–145)
WBC # BLD: 8.49 K/UL — SIGNIFICANT CHANGE UP (ref 3.8–10.5)
WBC # FLD AUTO: 8.49 K/UL — SIGNIFICANT CHANGE UP (ref 3.8–10.5)

## 2025-02-03 PROCEDURE — 99233 SBSQ HOSP IP/OBS HIGH 50: CPT

## 2025-02-03 PROCEDURE — 71250 CT THORAX DX C-: CPT | Mod: 26

## 2025-02-03 RX ORDER — POTASSIUM CHLORIDE 750 MG/1
20 TABLET, EXTENDED RELEASE ORAL ONCE
Refills: 0 | Status: COMPLETED | OUTPATIENT
Start: 2025-02-03 | End: 2025-02-03

## 2025-02-03 RX ADMIN — Medication 40 MILLIGRAM(S): at 05:05

## 2025-02-03 RX ADMIN — Medication 50 MILLIGRAM(S): at 22:02

## 2025-02-03 RX ADMIN — APIXABAN 2.5 MILLIGRAM(S): 5 TABLET, FILM COATED ORAL at 05:06

## 2025-02-03 RX ADMIN — Medication 50 MILLIGRAM(S): at 05:06

## 2025-02-03 RX ADMIN — APIXABAN 2.5 MILLIGRAM(S): 5 TABLET, FILM COATED ORAL at 17:12

## 2025-02-03 RX ADMIN — FLUTICASONE PROPIONATE AND SALMETEROL 1 DOSE(S): 113; 14 POWDER, METERED RESPIRATORY (INHALATION) at 22:02

## 2025-02-03 RX ADMIN — TIOTROPIUM BROMIDE MONOHYDRATE 2 PUFF(S): 18 CAPSULE ORAL; RESPIRATORY (INHALATION) at 05:07

## 2025-02-03 RX ADMIN — LEVOTHYROXINE SODIUM 50 MICROGRAM(S): 25 TABLET ORAL at 05:06

## 2025-02-03 RX ADMIN — POTASSIUM CHLORIDE 20 MILLIEQUIVALENT(S): 750 TABLET, EXTENDED RELEASE ORAL at 17:12

## 2025-02-03 RX ADMIN — Medication 40 MILLIGRAM(S): at 11:40

## 2025-02-03 RX ADMIN — FLUTICASONE PROPIONATE AND SALMETEROL 1 DOSE(S): 113; 14 POWDER, METERED RESPIRATORY (INHALATION) at 05:07

## 2025-02-03 RX ADMIN — Medication 50 MILLIGRAM(S): at 11:40

## 2025-02-03 RX ADMIN — Medication 1 DROP(S): at 05:06

## 2025-02-03 NOTE — CARE COORDINATION ASSESSMENT. - OTHER PERTINENT DISCHARGE PLANNING INFORMATION:
Met with patient at bedside to discuss the role of case management with verbalized understanding.  Patient admitted with CHF exab.  Patient resides alone, denies DME or [facility] services PTA.  WIll monitor for transition needs and remain available.  PCP Dr Law Santos

## 2025-02-03 NOTE — PROGRESS NOTE ADULT - ASSESSMENT
74-year-old female with PMH of A-fib on Eliquis, sev AS (follows with Dr Palla and mentions that she was told she will likely need intervention in the upcoming year), HTN, HLD, COPD, hypothyroidism, CKD stage 3A, Monoclonal gammopathy, bradycardia (with normal chronotropic response) who presents to the ED with a chief complaint of SOB.    HFpEF:  - ECG NSR with no ischemic characteristics  - Trop 23  - proBNP 940  - CXR without acute pathology  - Hx of COPD but not on home oxygen. Now saturating wnl on RA  - TTE 09/2024 EF 60-65%, sev AS  - repeat TTE nL LV & RV size and function moderate AS EF 67%  - euvolemic on exam  -creat bump will D/C lasix and reassess for daily dosing  - strict I&O's, daily weights    Atrial Fibrillation  - ECG likely NSR  - Tele show NSR with occasional PACs  - c/w Eliquis 2.5mg PO BID (On lower dose d/t chronic severe epistaxis as OP)   - will D/C cardiac monitor  - assuming not on BB due to COPD, rates appear well controlled    HTN:  - c/w hydralazine    - Monitor and replete lytes, keep K>4 and Mg >2  - Further cardiac workup will depend on clinical course.   - All other workup per primary team  - Thank you for this consult!   Vineet Oates DNP, AGACNP-BC  Cardiology   Call Teams  74-year-old female with PMH of A-fib on Eliquis, sev AS (follows with Dr Palla and mentions that she was told she will likely need intervention in the upcoming year), HTN, HLD, COPD, hypothyroidism, CKD stage 3A, Monoclonal gammopathy, bradycardia (with normal chronotropic response) who presents to the ED with a chief complaint of SOB.    HFpEF:  - ECG NSR with no ischemic characteristics  - Trop 23  - proBNP 940  - CXR without acute pathology  - Hx of COPD but not on home oxygen. Now saturating wnl on RA  - TTE 09/2024 EF 60-65%, sev AS  - repeat TTE nL LV & RV size and function moderate AS EF 67%  - euvolemic on exam  -creat bump will D/C lasix and reassess for daily dosing  - strict I&O's, daily weights    Atrial Fibrillation  - ECG likely NSR  - Tele show NSR with occasional PACs  - c/w Eliquis 2.5mg PO BID (On lower dose d/t chronic severe epistaxis as OP)   - will D/C cardiac monitor  - assuming not on BB due to COPD, rates appear well controlled    HTN:  - c/w hydralazine    - Monitor and replete lytes, keep K>4 and Mg >2  - Further cardiac workup will depend on clinical course.   - All other workup per primary team     Vineet Oates DNP, AGACNP-BC  Cardiology   Call Teams

## 2025-02-03 NOTE — DIETITIAN INITIAL EVALUATION ADULT - NS FNS DIET ORDER
Diet, DASH/TLC:   Sodium & Cholesterol Restricted  1500mL Fluid Restriction ([id number])     Special Instructions for Nursin milliLiter(s) to 2000 milliLiter(s) fluid restriction (25 @ 20:04) [Active]

## 2025-02-03 NOTE — DIETITIAN INITIAL EVALUATION ADULT - PERTINENT MEDS FT
MEDICATIONS  (STANDING):  apixaban 2.5 milliGRAM(s) Oral two times a day  fluticasone propionate/ salmeterol 500-50 MICROgram(s) Diskus 1 Dose(s) Inhalation two times a day  furosemide   Injectable 40 milliGRAM(s) IV Push two times a day  hydrALAZINE 50 milliGRAM(s) Oral three times a day  influenza  Vaccine (HIGH DOSE) 0.5 milliLiter(s) IntraMuscular once  levothyroxine 50 MICROGram(s) Oral daily  predniSONE   Tablet 10 milliGRAM(s) Oral every other day  tiotropium 2.5 MICROgram(s) Inhaler 2 Puff(s) Inhalation daily    MEDICATIONS  (PRN):  artificial tears (preservative free) Ophthalmic Solution 1 Drop(s) Both EYES three times a day PRN Dry Eyes  buDESOnide    Inhalation Suspension 1 milliGRAM(s) Nebulizer two times a day PRN SOB/WHEEZING  melatonin 3 milliGRAM(s) Oral at bedtime PRN Insomnia

## 2025-02-03 NOTE — DIETITIAN INITIAL EVALUATION ADULT - PROBLEM SELECTOR PLAN 3
chronic CKD stage 3A, baseline Cr ~1.0  - Cr 1.3 on admission  - avoid nephrotoxic agents  - hold home losartan in setting of ROBYN  - monitor daily Cr

## 2025-02-03 NOTE — PROGRESS NOTE ADULT - ASSESSMENT
74F with COPD, AF, HTN, HLD, hypothyroid, AS, CKD3, p/w SOB  Acute HFpEF  cardio following  Is/Os  daily weights  low sodium  diuresis held due to bump in creatinine  still wheezing    COPD  bronchodilators  steroids  pulm consulted    AF/HTN  continue eliquis and hydralazine    hypothyroid  continue synthroid

## 2025-02-03 NOTE — CONSULT NOTE ADULT - SUBJECTIVE AND OBJECTIVE BOX
Date/Time Patient Seen:  		  Referring MD:   Data Reviewed	       Patient is a 74y old  Female who presents with a chief complaint of acute CHF (03 Feb 2025 15:18)      Subjective/HPI   istory of Present Illness:  Reason for Admission: acute CHF  History of Present Illness:   74-year-old female with PMH of COPD, A-fib on Eliquis, HTN, HLD, hypothyroidism, diverticulosis, Severe AS, CKD stage 3A, Monoclonal gammopathy, bradycardia (with normal chronotropic response) who presents to the ED with a chief complaint of SOB. This AM around 5:30 AM pt awoke from sleep with SOB and hoarseness. Her SOB improved with sitting up. Later in the day she was in the bank when she had an episode of sudden pain in her left arm, tingling down her left arm/toes, and palpitations, which resolved spontaneously. No chest pain. Admits to eating more salty foods than usual this past week. Also notes pain behind her left eye for the past 2 days. Patient has been dealing with bronchitis/COPD exacerbation since late december. She has completed 2 courses of doxycycline and 10 day course of prednisone 20 mg qd. Saw pulm on 1/28 and was started on an additional course of prednisone 10 mg every other day, which she has taken for 2 doses so far, was also started on budesonide nebulizer solution  TID and mucinex    Admits to SOB and left eye pain. One episode of left arm pain/tingling and palpitations that resolved spontaneously.   Denies fever, chest pain, cough, abdominal pain, nausea, vomiting, diarrhea, constipation, dysuria, headaches    Denies recent travel or sick contacts. Pt was recently on a course of doxycycline (started 1/8) for bronchitis, which she has since finished. Of note patient previously admitted to [facility] and underwent cardiac workup, which she reports was normal and was deemed to be anxiety 2/2 acute stressors in her life. Pt has worsened AS gradient compared to prior study per cardio note. Pt says she is being considered for valve repair surgery and was supposed to follow up with cards.   PAST MEDICAL & SURGICAL HISTORY:  COPD (chronic obstructive pulmonary disease)    JUAN CARLOS (obstructive sleep apnea)    HTN (hypertension)    Hypothyroid    COPD (chronic obstructive pulmonary disease)    Hypothyroid    HTN (hypertension)    Hyperlipidemia    Edema extremities    Afib  On Eliquis    Psoriasis    HTN (hypertension)    Hypothyroid    No significant past surgical history    S/P eye surgery  age 5 unsure if right or left eye    ED Course:   Vitals: BP: 174/87, HR: 96, Temp: 96.8F, RR: 19 , SpO2: 98% on RA   Labs:  gluc 144 trop 23 proBNP 940 Cr 1.3  SARS COV-2, flu A, flu B, RSV: not detected  CXR: Slight scar at the right base at this time. No suggestion of acute finding.  EKG: a flutter with 2:1 AV conduction, QTc 413  Received in the ED: Lasix 40 mg IV x 1         Review of Systems:  Other Review of Systems: All other review of systems negative, except as noted in HPI      Allergies and Intolerances:        Allergies:  	Levaquin: Drug, Anaphylaxis  	Levaquin: Drug, Anaphylaxis  	Albuterol (Eqv-ProAir HFA): Drug, Other, throat irritation  	Sulfur: Drug, Unknown  	cefuroxime: Drug, Unknown, tingling  	sulfa drugs: Drug Category, Unknown  	[This allergen will not trigger allergy alert] artichokes [freetext allergy; no alerts]: Food, Unknown, [This allergen will not trigger allergy alert] artichokes       Intolerances:  	statins: Drug Category, Muscle Pain, Joint Pain    Home Medications:   * Patient Currently Takes Medications as of 01-Feb-2025 19:57 documented in Structured Notes  · 	losartan 50 mg oral tablet: Last Dose Taken:  , 1 tab(s) orally once a day  · 	Artificial Tears ophthalmic solution: Last Dose Taken:  , 1 drop(s) to each affected eye 3 times a day  · 	budesonide 1 mg/2 mL inhalation suspension: Last Dose Taken:  , 2 milliliter(s) by nebulizer 3 times a day  · 	cholecalciferol 25 mcg (1000 intl units) oral tablet: Last Dose Taken:  , 1 tablet orally 4 times a week (S/T/T/F)  · 	Eliquis: Last Dose Taken:  , 2.5 orally 2 times a day  · 	albuterol 0.63 mg/3 mL (0.021%) inhalation solution: Last Dose Taken:  , 3 by nebulizer 3 times a day as needed for  shortness of breath and/or wheezing  · 	Vitamin C 500 mg oral tablet, chewable: 2 tablet, chewable chewed take 1 tablet po on sunday, tuesday, thursday  · 	Spiriva 18 mcg inhalation capsule: 1 cap(s) inhaled once a day  · 	Advair Diskus 500 mcg-50 mcg inhalation powder: Last Dose Taken:  , 1 inhaled 2 times a day  · 	predniSONE 10 mg oral tablet: Last Dose Taken:  , 1 tab(s) orally every other day  · 	levothyroxine 50 mcg (0.05 mg) oral tablet: Last Dose Taken:  , 1 tab(s) orally once a day  · 	hydrALAZINE 50 mg oral tablet: 1 tab(s) orally 3 times a day    Patient History:    Past Medical, Past Surgical, and Family History:  PAST MEDICAL HISTORY:  Afib On Eliquis    COPD (chronic obstructive pulmonary disease)     Edema extremities     HTN (hypertension)     Hyperlipidemia     Hypothyroid     Psoriasis.     PAST SURGICAL HISTORY:  S/P eye surgery age 5 unsure if right or left eye.     FAMILY HISTORY:  FH: hypertension    Father  Still living? Unknown  FH: hyperlipidemia, Age at diagnosis: Age Unknown  FH: prostate cancer, Age at diagnosis: Age Unknown    Mother  Still living? Unknown  FH: emphysema, Age at diagnosis: Age Unknown.     Social History:  · Substance use	No  · Social History (marital status, living situation, occupation, and sexual history)	Tobacco use- former 80 pack year smoker, quit 15 years ago  Alcohol- rarely (10 drinks per year)  ADLs and ambulation - independent  Lives alone  Rec drugs - denies     Tobacco Screening:  · Core Measure Site	Yes  · Has the patient used tobacco in the past 30 days?	No    Risk Assessment:    Present on Admission:  Deep Venous Thrombosis	no  Pulmonary Embolus	no     HIV Screening:  · In accordance with [place] State law, we offer every patient who comes to our ED an HIV test. Would you like to be tested today?	Opt out     Hepatitis C Test Questions:  · In accordance with [place] State Law, we offer every patient a Hepatitis C test. Would you like to be tested today?	Opt out        Medication list         MEDICATIONS  (STANDING):  apixaban 2.5 milliGRAM(s) Oral two times a day  fluticasone propionate/ salmeterol 500-50 MICROgram(s) Diskus 1 Dose(s) Inhalation two times a day  hydrALAZINE 50 milliGRAM(s) Oral three times a day  influenza  Vaccine (HIGH DOSE) 0.5 milliLiter(s) IntraMuscular once  levothyroxine 50 MICROGram(s) Oral daily  potassium chloride    Tablet ER 20 milliEquivalent(s) Oral once  predniSONE   Tablet 10 milliGRAM(s) Oral every other day  tiotropium 2.5 MICROgram(s) Inhaler 2 Puff(s) Inhalation daily    MEDICATIONS  (PRN):  artificial tears (preservative free) Ophthalmic Solution 1 Drop(s) Both EYES three times a day PRN Dry Eyes  buDESOnide    Inhalation Suspension 1 milliGRAM(s) Nebulizer two times a day PRN SOB/WHEEZING  melatonin 3 milliGRAM(s) Oral at bedtime PRN Insomnia         Vitals log        ICU Vital Signs Last 24 Hrs  T(C): 36.9 (03 Feb 2025 10:55), Max: 36.9 (02 Feb 2025 21:07)  T(F): 98.4 (03 Feb 2025 10:55), Max: 98.5 (02 Feb 2025 21:07)  HR: 81 (03 Feb 2025 10:55) (67 - 81)  BP: 118/74 (03 Feb 2025 10:55) (118/74 - 151/71)  BP(mean): --  ABP: --  ABP(mean): --  RR: 16 (03 Feb 2025 10:55) (16 - 18)  SpO2: 91% (03 Feb 2025 10:55) (91% - 95%)    O2 Parameters below as of 03 Feb 2025 10:55  Patient On (Oxygen Delivery Method): room air                 Input and Output:  I&O's Detail    02 Feb 2025 07:01  -  03 Feb 2025 07:00  --------------------------------------------------------  IN:    Oral Fluid: 240 mL  Total IN: 240 mL    OUT:    Voided (mL): 1400 mL  Total OUT: 1400 mL    Total NET: -1160 mL      03 Feb 2025 07:01  -  03 Feb 2025 16:22  --------------------------------------------------------  IN:    Oral Fluid: 440 mL  Total IN: 440 mL    OUT:    Voided (mL): 1150 mL  Total OUT: 1150 mL    Total NET: -710 mL          Lab Data                        12.9   8.49  )-----------( 158      ( 03 Feb 2025 11:05 )             40.2     02-03    141  |  99  |  39[H]  ----------------------------<  95  3.6   |  30  |  1.50[H]    Ca    9.5      03 Feb 2025 11:05  Phos  3.5     02-02  Mg     2.0     02-03    TPro  6.4  /  Alb  3.2[L]  /  TBili  0.5  /  DBili  x   /  AST  16  /  ALT  21  /  AlkPhos  63  02-02            Review of Systems	  sob  olson  weakness  occ cough  on RA  alert  verbal  all systems reviewed      Objective     Physical Examination    heart s1s2  lung dec BS  head nc  head at  abd soft  cn grossly int      Pertinent Lab findings & Imaging      Jere:  NO   Adequate UO     I&O's Detail    02 Feb 2025 07:01  -  03 Feb 2025 07:00  --------------------------------------------------------  IN:    Oral Fluid: 240 mL  Total IN: 240 mL    OUT:    Voided (mL): 1400 mL  Total OUT: 1400 mL    Total NET: -1160 mL      03 Feb 2025 07:01  -  03 Feb 2025 16:22  --------------------------------------------------------  IN:    Oral Fluid: 440 mL  Total IN: 440 mL    OUT:    Voided (mL): 1150 mL  Total OUT: 1150 mL    Total NET: -710 mL               Discussed with:     Cultures:	        Radiology       CONCLUSIONS:      1. Left ventricular systolic function is normal with an ejection fraction of 67 % by Goldberg's method of disks.   2. Normal right ventricular cavity size, with normal wall thickness, and normal right ventricular systolic function.   3. Left atrium is mildly dilated.   4. Moderate aortic stenosis.   5. The peak transaortic velocity is 3.62 m/s, peak transaortic gradient is 52.4 mmHg and mean transaortic gradient is 34.0 mmHg with an LVOT/aortic valve VTI ratio of 0.42. The effective orifice area is estimated at 1.18 cm² by the continuity equation.   6. Trace aortic regurgitation.   7. Trace mitral regurgitation.   8. Trace tricuspid regurgitation.   9. The inferior vena cava is normal in size measuring 1.30 cm in diameter, (normal <2.1cm) with normal inspiratory collapse (normal >50%) consistent with normal right atrial pressure (~3, range 0-5mmHg).          ACC: [id number] EXAM:  XR CHEST PA LAT 2V   ORDERED BY:  BELÉN ASHTON     PROCEDURE DATE:  02/01/2025          INTERPRETATION:  PA and lateral chest on February 1, 2025 at 2:50 PM. 2   frontal images. Patient is short of breath.    Heart size is normal.    Elevated right hemidiaphragm again noted.    On July 16, 2023 there were mild perihilar infiltrates which are no   longer evident.    Present film shows a slight scar at the right base.    IMPRESSION: Slight scar at the right base at this time. No suggestion of   acute finding.    --- End of Report ---            SHILO TRUJILLO MD; Attending Radiologist  This document has been electronically signed. Feb 1 2025  2:48PM                
[facility] - Ana Maria Naranjo, Van Yañez, Mima, Marcelo Jeffrey  Office Number: [phone]    Initial Consult Note    CHIEF COMPLAINT: Patient is a 74y old  Female who presents with a chief complaint of acute CHF (01 Feb 2025 18:48)      HPI:  74-year-old female with PMH of A-fib on Eliquis, sev AS (follows with Dr Palla and mentions that she was told she will likely need intervention in the upcoming year), HTN, HLD, COPD, hypothyroidism, CKD stage 3A, Monoclonal gammopathy, bradycardia (with normal chronotropic response) who presents to the ED with a chief complaint of SOB. Yesterday around 5:30 AM pt awoke from sleep with SOB and hoarseness. Her SOB improved with sitting up. Later in the day she was in the bank when she had an episode of sudden pain in her left arm, tingling down her left arm/toes, and palpitations, which resolved spontaneously. No chest pain. Admits to eating more salty foods than usual this past week. Also notes pain behind her left eye for the past 2 days. Patient has been dealing with bronchitis/COPD exacerbation since late december. She has completed 2 courses of doxycycline and 10 day course of prednisone 20 mg qd. Saw pulm on 1/28 and was started on an additional course of prednisone 10 mg every other day, which she has taken for 2 doses so far, was also started on budesonide nebulizer solution  TID and mucinex  Denies fever, chest pain, cough, abdominal pain, nausea, vomiting, diarrhea, constipation, dysuria, headaches    Denies recent travel or sick contacts. Pt was recently on a course of doxycycline (started 1/8) for bronchitis, which she has since finished. Of note patient previously admitted to [facility] and underwent cardiac workup, which she reports was normal and was deemed to be anxiety 2/2 acute stressors in her life. Pt has worsened AS gradient compared to prior study per cardio note. Pt says she is being considered for valve repair surgery and was supposed to follow up with cards.     ED Course:   Vitals: BP: 174/87, HR: 96, Temp: 96.8F, RR: 19 , SpO2: 98% on RA   Labs:  gluc 144 trop 23 proBNP 940 Cr 1.3  SARS COV-2, flu A, flu B, RSV: not detected  CXR: Slight scar at the right base at this time. No suggestion of acute finding.  EKG: a flutter with 2:1 AV conduction, QTc 413  Received in the ED: Lasix 40 mg IV x 1   (01 Feb 2025 18:48)      PAST MEDICAL & SURGICAL HISTORY:  COPD (chronic obstructive pulmonary disease)      Hypothyroid      HTN (hypertension)      Hyperlipidemia      Edema extremities      Afib  On Eliquis      Psoriasis      S/P eye surgery  age 5 unsure if right or left eye          SOCIAL HISTORY:  No tobacco, ethanol, or drug abuse.    FAMILY HISTORY:  FH: hypertension    FH: emphysema (Mother)    FH: prostate cancer (Father)    FH: hyperlipidemia (Father)      No family history of acute MI or sudden cardiac death.    MEDICATIONS  (STANDING):  apixaban 2.5 milliGRAM(s) Oral two times a day  fluticasone propionate/ salmeterol 500-50 MICROgram(s) Diskus 1 Dose(s) Inhalation two times a day  furosemide   Injectable 40 milliGRAM(s) IV Push two times a day  hydrALAZINE 50 milliGRAM(s) Oral three times a day  influenza  Vaccine (HIGH DOSE) 0.5 milliLiter(s) IntraMuscular once  levothyroxine 50 MICROGram(s) Oral daily  predniSONE   Tablet 10 milliGRAM(s) Oral every other day  tiotropium 2.5 MICROgram(s) Inhaler 2 Puff(s) Inhalation daily    MEDICATIONS  (PRN):  artificial tears (preservative free) Ophthalmic Solution 1 Drop(s) Both EYES three times a day PRN Dry Eyes  buDESOnide    Inhalation Suspension 1 milliGRAM(s) Nebulizer two times a day PRN SOB/WHEEZING  melatonin 3 milliGRAM(s) Oral at bedtime PRN Insomnia      Allergies    cefuroxime (Unknown)  Levaquin (Anaphylaxis)  sulfa drugs (Unknown)  Albuterol (Eqv-ProAir HFA) (Other)  Sulfur (Unknown)  [This allergen will not trigger allergy alert] artichokes (Unknown)    Intolerances    statins (Muscle Pain; Joint Pain)      REVIEW OF SYSTEMS:    CONSTITUTIONAL: No weakness, fevers or chills  EYES/ENT: No visual changes;  No vertigo or throat pain   NECK: No pain or stiffness  RESPIRATORY: No cough, wheezing, hemoptysis; + shortness of breath  CARDIOVASCULAR: No chest pain or palpitations  GASTROINTESTINAL: No abdominal pain. No nausea, vomiting, or hematemesis; No diarrhea or constipation. No melena or hematochezia.  GENITOURINARY: No dysuria, frequency or hematuria  NEUROLOGICAL: No numbness or weakness  SKIN: No itching or rash  All other review of systems is negative unless indicated above    VITAL SIGNS:   Vital Signs Last 24 Hrs  T(C): 36.5 (02 Feb 2025 05:01), Max: 37.2 (01 Feb 2025 21:31)  T(F): 97.7 (02 Feb 2025 05:01), Max: 98.9 (01 Feb 2025 21:31)  HR: 71 (02 Feb 2025 05:01) (65 - 96)  BP: 126/73 (02 Feb 2025 05:01) (126/73 - 175/84)  BP(mean): --  RR: 18 (02 Feb 2025 05:01) (18 - 19)  SpO2: 94% (02 Feb 2025 05:01) (94% - 99%)    Parameters below as of 02 Feb 2025 05:01  Patient On (Oxygen Delivery Method): room air        I&O's Summary      On Exam:    Constitutional: NAD, alert and oriented x 3  Lungs:  Non-labored, breath sounds are clear bilaterally, No wheezing, rales or rhonchi  Cardiovascular: RRR.  S1 and S2 positive.  + sys murmurs, rubs, gallops or clicks  Gastrointestinal: Bowel Sounds present, soft, nontender.   Lymph: No peripheral edema. No cervical lymphadenopathy.  Neurological: Alert, no focal deficits  Skin: No rashes or ulcers   Psych:  Mood & affect appropriate.    LABS: All Labs Reviewed:                        12.1   7.16  )-----------( 144      ( 02 Feb 2025 07:10 )             37.6                         12.0   8.89  )-----------( 146      ( 01 Feb 2025 14:29 )             37.4     02 Feb 2025 07:10    142    |  103    |  26     ----------------------------<  84     4.4     |  30     |  1.20   01 Feb 2025 14:29    142    |  109    |  25     ----------------------------<  144    4.1     |  24     |  1.30     Ca    9.3        02 Feb 2025 07:10  Ca    9.0        01 Feb 2025 14:29  Phos  3.5       02 Feb 2025 07:10  Mg     2.0       02 Feb 2025 07:10    TPro  6.4    /  Alb  3.2    /  TBili  0.5    /  DBili  x      /  AST  16     /  ALT  21     /  AlkPhos  63     02 Feb 2025 07:10  TPro  6.5    /  Alb  3.3    /  TBili  0.4    /  DBili  x      /  AST  20     /  ALT  25     /  AlkPhos  67     01 Feb 2025 14:29    PT/INR - ( 01 Feb 2025 14:29 )   PT: 11.5 sec;   INR: 0.97 ratio         PTT - ( 01 Feb 2025 14:29 )  PTT:32.2 sec      Blood Culture:     02-02 @ 07:10  TSH: 4.06        TRANSTHORACIC ECHOCARDIOGRAM REPORT  ________________________________________________________________________________                                      _______       Pt. Name:       BALBIR ANEGL Study Date:    7/18/2023  MRN:            [id number]     YOB: 1950  Accession #:    [id number]      Age:           72 years  Account#:       [id number]   Gender:        F  Heart Rate:     77 bpm         Height:        60.00 in (152.40 cm)  Rhythm:         sinus rhythm   Weight:   180.00 lb (81.65 kg)  Blood Pressure: 110/72 mmHg    BSA/BMI:       1.78 m² / 35.15 kg/m²  ________________________________________________________________________________________  Referring Physician:    [id number] Cedric Gonzalez  Interpreting Physician: Shannon Velasco  Primary Sonographer:    Adams Olson [facility]    CPT:               ECHO TTE WO CON COMP W DOPP - 43430.m  Indication(s):     Abnormal electrocardiogram ECG/EKG - R94.31  Procedure:         Transthoracic echocardiogram with 2-D, M-mode and complete                     spectral and color flow Doppler.  Ordering Location: [facility]  Admission Status:  Inpatient  Study Information: Image quality for this study is fair.    _______________________________________________________________________________________  CONCLUSIONS:      1. Normal left ventricular cavity size. The left ventricular wall thickness is normal. The left ventricular systolic function is normal with an ejection fraction of 59 % by Goldberg's method of disks. There are no regional wall motion abnormalities seen.   2. The left ventricular diastolic function is indeterminate.   3. Normal right ventricular cavity size, normal right ventricular wall thickness and normal right ventricular systolic function. The tricuspid annular plane systolic excursion (TAPSE) is 1.8 cm (normal >=1.7 cm).   4. The right atrium is normal in size.   5. There is moderate aortic stenosis. The peak transaortic velocity is 3.13 m/s, peak transaortic gradient is 39.2 mmHg and mean transaortic gradient is 25.3 mmHg with an LVOT/aortic valve VTI ratio of 0.36. The aortic valve area is estimated at 1.12 cm² by the continuity equation. There is mild aortic regurgitation.   6. No pericardial effusion seen.   7. There is trace tricuspid regurgitation. There is insufficient tricuspid regurgitation detected to calculate pulmonary artery systolic pressure.   8. Compared to the transthoracic echocardiogram performed on 4/7/2023 there have been no significant interval changes.    ________________________________________________________________________________________  FINDINGS:     Left Ventricle:  Normal left ventricular cavity size. The left ventricular wall thickness is normal. The left ventricular systolic function is normal with acalculated ejection fraction of 59 % by the Goldberg's biplane method of disks. There are no regional wall motion abnormalities seen. The left ventricular diastolic function is indeterminate, with indeterminant filling pressure. Unable to perform strain due to heart rate variability.     Right Ventricle:  Normal right ventricular cavity size, normal wall thickness and normal right ventricular systolic function. Tricuspid annular plane systolic excursion (TAPSE) is 1.8 cm (normal >=1.7 cm). Tricuspid annular tissue Doppler S' is 10.7 cm/s (normal >10 cm/s).     Left Atrium:  The left atrium is mildly dilated in size with an indexed volume of 33.11 ml/m².     Right Atrium:  The right atrium is normal in size.     Aortic Valve:  The aortic valve is tricuspid with normal structure without stenosis. There is moderate calcification of the aortic valve leaflets. There is moderate aortic stenosis. The peak transaortic velocity is 3.13 m/s, peak transaortic gradient is 39.2 mmHg and mean transaortic gradient is 25.3 mmHg with an LVOT/aortic valve VTI ratio of 0.36. The aortic valve area is estimated at 1.12 cm² by the continuity equation. There is mild aortic regurgitation. AI VMax is 4.16 m/s. AI pressure half time is 391 msec. AI slope is 3.05 m/s².     Mitral Valve:  There is calcification of the mitral valve annulus. There is mild leaflet calcification. There is mild mitral regurgitation.     Tricuspid Valve:  Structurally normal tricuspid valve with normal leaflet excursion. There is trace tricuspid regurgitation. There is insufficient tricuspid regurgitation detected to calculate pulmonary artery systolic pressure.     Pulmonic Valve:  Structurally normal pulmonic valve with normal leaflet excursion. There is no evidence of pulmonic regurgitation.     Aorta:  The ascending aorta is not well visualized. The aortic annulus and aortic root appear normal in size.     Pericardium:  No pericardial effusion seen.     Systemic Veins:  The inferior vena cava is normal in size measuring1.70 cm in diameter, (normal <2.1cm) with normal inspiratory collapse (normal >50%) consistent with normal right atrial pressure (~3, range 0-5mmHg).  ____________________________________________________________________  Quantitative Data:  Left Ventricle Measurements: (Indexed to BSA)     IVSd (2D):   1.0 cm  LVPWd (2D):  0.9 cm                           Strain Measurements:  LVIDd (2D):  4.5 cm                           Global Pk Long Strain:  -14.3 %  LVIDs (2D):  3.2 cm   Endo Pk Strain A4C:     -13.0 %  LV Mass:     143 g  80.1 g/m²                 Endo Pk Strain A2C:     -15.9 %  BiPlane LV EF%: 59 %                          Endo Pk Strain A3C:     -14.1 %     MV E Vmax:    0.87 m/s  MV A Vmax:    1.17 m/s  MV E/A:       0.75  e' lateral:   10.10 cm/s  e' medial:    5.66 cm/s  E/e' lateral: 8.64  E/e' medial:  15.42  E/e' Average: 11.08    Aorta Measurements:     Ao Root:       3.0 cm  Ao Root s, 2D: 3.0 cm       Left Atrium Measurements: (Indexed to BSA)  LADiam 2D: 4.20 cm    Right Ventricle Measurements:     TAPSE:           1.8 cm  RV Base (RVID1): 4.5 cm  RV Mid (RVID2):  2.2 cm       LVOT / RVOT/ Qp/Qs Data: (Indexed to BSA)  LVOT Diameter: 2.00 cm  LVOT Vmax:     1.16 m/s  LVOT VTI:      26.60 cm  LVOT SV:       83.6 ml  46.82 ml/m²    Aortic Valve Measurements:  AV Vmax:           3.1 m/s  AV Peak Gradient:  39.2 mmHg  AV Mean Gradient:  25.3 mmHg  AV VTI:            74.8 cm  AV VTI Ratio:      0.36  AoV EOA, Contin:   1.12 cm²  AoV EOA, Contin i: 0.63 cm²/m²    Mitral Valve Measurements:     MV E Vmax: 0.9 m/s  MV A Vmax: 1.2 m/s  MV E/A:    0.7       Tricuspid Valve Measurements:     RA Pressure: 3 mmHg    ________________________________________________________________________________________  Electronically signed on 7/18/2023 at 5:11:17 PM by Shannon Velasco         *** Final ***

## 2025-02-03 NOTE — DIETITIAN INITIAL EVALUATION ADULT - PROBLEM SELECTOR PLAN 4
chronic  - /87 on admission   - monitor routine hemodynamics  - c/w home hydralazine 50 mg TID  - hold home losartan in setting of ROBYN

## 2025-02-03 NOTE — DIETITIAN INITIAL EVALUATION ADULT - PROBLEM SELECTOR PLAN 6
chronic  - c/w home eliquis 2.5 mg bid (per chart review pt on low dose eliquis as patient 2/2 recurrent epistaxis)  - tele monitoring

## 2025-02-03 NOTE — DIETITIAN INITIAL EVALUATION ADULT - OTHER INFO
Pt is a "74-year-old female with PMH of A-fib on Eliquis, sev AS (follows with Dr Palla and mentions that she was told she will likely need intervention in the upcoming year), HTN, HLD, COPD, hypothyroidism, CKD stage 3A, Monoclonal gammopathy, bradycardia (with normal chronotropic response) who presents to the ED with a chief complaint of SOB."    Visited pt at bedside this am. Pt reports good appetite/intake. PO intakes % per nursing documentation. Tolerating diet well. Artichoke allergy noted; confirmed by patient. Pt also states no chicken (preference- diet office made aware). No chewing/swallowing difficulties. Denies N/V. +BM 2/1. CBW on admission 177#. Pt reports UBW of ~175#. 2+ BL leg edema noted. Skin: ecchymotic. Pt currently on DASH/TLC diet with 1500ml FR. Discussed current diet order. Provided verbal and written diet education during visit. RD remains available and will continue to follow-up.

## 2025-02-03 NOTE — DIETITIAN INITIAL EVALUATION ADULT - PROBLEM SELECTOR PLAN 1
pt with SOB, leg edema, crackles on exam  - Given Lasix 40 mg IV x 1 in ED  - proBNP 940  - prior echo from 9/2/24 LVEF 60-65%, mildly dilated LA, moderately dilated RA, mild AR, severe AS  - monitor daily weight  and I+O's  - TTE ordered  - c/w Lasix 40 mg IV bid  - cardio consulted ([facility]), f/u recs

## 2025-02-03 NOTE — CONSULT NOTE ADULT - ASSESSMENT
74-year-old female with PMH of A-fib on Eliquis, sev AS (follows with Dr Palla and mentions that she was told she will likely need intervention in the upcoming year), HTN, HLD, COPD, hypothyroidism, CKD stage 3A, Monoclonal gammopathy, bradycardia (with normal chronotropic response) who presents to the ED with a chief complaint of SOB.    HFpEF:  - ECG NSR with no ischemic characteristics  - Trop 23  - proBNP 940  - CXR without acute pathology  - Hx of COPD but not on home oxygen. Now saturating wnl on RA  - TTE 09/2024 EF 60-65%, sev AS  - repeat TTE   - lasix 40mg bid  - strict I&O's, daily weights    Atrial Fibrillation  - ECG likely NSR  - Tele show NSR with occasional PACs  - c/w Eliquis 2.5mg PO BID (On lower dose d/t chronic severe epistaxis as OP)   - Cont to monitor on tele  - assuming not on BB due to COPD, rates appear well controlled    HTN:  - c/w hydralazine    - Monitor and replete lytes, keep K>4 and Mg >2  - Further cardiac workup will depend on clinical course.   - All other workup per primary team  - Thank you for this consult! 
74-year-old female with PMH of COPD, A-fib on Eliquis, HTN, HLD, hypothyroidism, diverticulosis, Severe AS, CKD stage 3A, Monoclonal gammopathy, bradycardia (with normal chronotropic response) who presents to the ED with a chief complaint of SOB. This AM around 5:30 AM pt awoke from sleep with SOB and hoarseness.    dyspnea  copd  AFIB  OP  OA  Hypothyroidism  HTN  HLD  AS  valv heart disease  CKD  Gammopathy    - TTE 09/2024 EF 60-65%, sev AS  - repeat TTE nL LV & RV size and function moderate AS EF 67%  cvs rx regimen optimization and diuresis as per cardio  trend renal indices  I and O  replete lytes  bronchodilators and systemic steroids for COPD - Bronchiectasis  I emerson  cough rx regimen  OLD record reviewed - with Blood gas - and Chest Imaging  check Sat on exertion  monitor VS and HD and Sat  on DOAC for AF  AFIB - rate and rhythm control

## 2025-02-03 NOTE — DIETITIAN INITIAL EVALUATION ADULT - PROBLEM SELECTOR PLAN 5
chronic, currently on outpatient course of steroids for ?bronchitis vs exacerbation  - c/w Spiriva 2 puffs qd   - c/w Advair  - c/w prednisone 10 mg every other day (ordered x 3 doses)  - c/w pulmicort nebs PRN

## 2025-02-03 NOTE — DIETITIAN INITIAL EVALUATION ADULT - PERTINENT LABORATORY DATA
02-03    141  |  99  |  39[H]  ----------------------------<  95  3.6   |  30  |  1.50[H]    Ca    9.5      03 Feb 2025 11:05  Phos  3.5     02-02  Mg     2.0     02-03    TPro  6.4  /  Alb  3.2[L]  /  TBili  0.5  /  DBili  x   /  AST  16  /  ALT  21  /  AlkPhos  63  02-02

## 2025-02-03 NOTE — CARE COORDINATION ASSESSMENT. - NSCAREPROVIDERS_GEN_ALL_CORE_FT
CARE PROVIDERS:  Accepting Physician: Norman Brand  Admitting: Norman Brand  Attending: Norman Brand  Case Management: Stephane Mix  Consultant: Shahrzad Jeffrey  Consultant: Chase Gomez  Consultant: Kassidy Saavedra  Covering Team: Daniel Betancur ED Attending: Ino Baker ED Nurse: Leonor Ayala  Nurse: Marilou Coleman  Nurse: Jeri Bland  Nurse: Jose Morales  Outpatient Provider: Hon Stu  Outpatient Provider: Joe Pelaez  Outpatient Provider: Willy Hanna  Outpatient Provider: Ursula Gomes  Outpatient Provider: Jero Jj  Outpatient Provider: Law Santos  Override: Luis Angel Watkins  Override: Rachel Jones  Primary Team: Jayro Pitts  Primary Team: Susan Keating  Primary Team: Yelena Waterman  Registered Dietitian: Kimi Nunez  : Tania Adamson

## 2025-02-03 NOTE — DIETITIAN INITIAL EVALUATION ADULT - PROBLEM SELECTOR PLAN 8
chronic, presently off meds (pt w/ statin intolerance, was prescribed new med but not covered by insurance, pt unsure of med name)  - AM lipid panel ordered

## 2025-02-03 NOTE — CARE COORDINATION ASSESSMENT. - NSPROPTRIGHTCAREGIVER_GEN_A_NUR
Called by RN to report pt c/o SOB and sweating. Pt seen and assessed, appears nervous, lungs clear, SpO2 98%, vital signs stable, blood glucose 70. Pt given juice and crackers, reports feeling much better. EKG with no acute changes.  A/P:  -symptoms likely related to hypoglycemia  -resume diet  -Xanax 0.25mg now and prn yes

## 2025-02-03 NOTE — PROGRESS NOTE ADULT - NS ATTEND AMEND GEN_ALL_CORE FT
74-year-old female with PMH of A-fib on Eliquis, sev AS (follows with Dr Palla and mentions that she was told she will likely need intervention in the upcoming year), HTN, HLD, COPD, hypothyroidism, CKD stage 3A, Monoclonal gammopathy, bradycardia (with normal chronotropic response) who presents to the ED with a chief complaint of SOB.    - ECG NSR with no ischemic characteristics  - No signs of significant ischemia    - Hx of COPD but not on home oxygen. Now saturating wnl on RA  - TTE 09/2024 EF 60-65%, sev AS  - repeat TTE nL LV & RV size and function moderate AS EF 67%  - got lasix and felt better. now with cr bump. would stop lasix and reassess braxton  - my exam still with wheezing.   - fu pulm recs       - ECG likely NSR  - Tele show NSR with occasional PACs  - c/w Eliquis 2.5mg PO BID (On lower dose d/t chronic severe epistaxis as OP)   - assuming not on BB due to COPD, rates appear well controlled  - c/w hydralazine

## 2025-02-04 ENCOUNTER — TRANSCRIPTION ENCOUNTER (OUTPATIENT)
Age: 75
End: 2025-02-04

## 2025-02-04 ENCOUNTER — APPOINTMENT (OUTPATIENT)
Facility: CLINIC | Age: 75
End: 2025-02-04
Payer: MEDICARE

## 2025-02-04 VITALS — TEMPERATURE: 98 F | OXYGEN SATURATION: 90 % | RESPIRATION RATE: 18 BRPM | HEART RATE: 66 BPM

## 2025-02-04 DIAGNOSIS — J44.9 CHRONIC OBSTRUCTIVE PULMONARY DISEASE, UNSPECIFIED: ICD-10-CM

## 2025-02-04 LAB
CRP SERPL-MCNC: <3 MG/L — SIGNIFICANT CHANGE UP
IGG FLD-MCNC: 453 MG/DL — LOW (ref 610–1660)
IGG1 SER-MCNC: 271 MG/DL — SIGNIFICANT CHANGE UP (ref 240–1118)
IGG2 SER-MCNC: 78 MG/DL — LOW (ref 124–549)
IGG3 SER-MCNC: 70 MG/DL — SIGNIFICANT CHANGE UP (ref 15–102)
IGG4 SER-MCNC: 21.1 MG/DL — SIGNIFICANT CHANGE UP (ref 1–123)

## 2025-02-04 PROCEDURE — 93005 ELECTROCARDIOGRAM TRACING: CPT

## 2025-02-04 PROCEDURE — 0241U: CPT

## 2025-02-04 PROCEDURE — 80053 COMPREHEN METABOLIC PANEL: CPT

## 2025-02-04 PROCEDURE — 83735 ASSAY OF MAGNESIUM: CPT

## 2025-02-04 PROCEDURE — 83880 ASSAY OF NATRIURETIC PEPTIDE: CPT

## 2025-02-04 PROCEDURE — 86140 C-REACTIVE PROTEIN: CPT

## 2025-02-04 PROCEDURE — 99214 OFFICE O/P EST MOD 30 MIN: CPT | Mod: 93

## 2025-02-04 PROCEDURE — 71250 CT THORAX DX C-: CPT | Mod: MC

## 2025-02-04 PROCEDURE — 82787 IGG 1 2 3 OR 4 EACH: CPT

## 2025-02-04 PROCEDURE — 99285 EMERGENCY DEPT VISIT HI MDM: CPT

## 2025-02-04 PROCEDURE — 99232 SBSQ HOSP IP/OBS MODERATE 35: CPT

## 2025-02-04 PROCEDURE — 80061 LIPID PANEL: CPT

## 2025-02-04 PROCEDURE — 71046 X-RAY EXAM CHEST 2 VIEWS: CPT

## 2025-02-04 PROCEDURE — 82784 ASSAY IGA/IGD/IGG/IGM EACH: CPT

## 2025-02-04 PROCEDURE — 87637 SARSCOV2&INF A&B&RSV AMP PRB: CPT

## 2025-02-04 PROCEDURE — 94640 AIRWAY INHALATION TREATMENT: CPT

## 2025-02-04 PROCEDURE — 85025 COMPLETE CBC W/AUTO DIFF WBC: CPT

## 2025-02-04 PROCEDURE — 93306 TTE W/DOPPLER COMPLETE: CPT

## 2025-02-04 PROCEDURE — 85610 PROTHROMBIN TIME: CPT

## 2025-02-04 PROCEDURE — 80048 BASIC METABOLIC PNL TOTAL CA: CPT

## 2025-02-04 PROCEDURE — 84100 ASSAY OF PHOSPHORUS: CPT

## 2025-02-04 PROCEDURE — 84484 ASSAY OF TROPONIN QUANT: CPT

## 2025-02-04 PROCEDURE — 82803 BLOOD GASES ANY COMBINATION: CPT

## 2025-02-04 PROCEDURE — 36415 COLL VENOUS BLD VENIPUNCTURE: CPT

## 2025-02-04 PROCEDURE — 85730 THROMBOPLASTIN TIME PARTIAL: CPT

## 2025-02-04 PROCEDURE — 84443 ASSAY THYROID STIM HORMONE: CPT

## 2025-02-04 PROCEDURE — 82785 ASSAY OF IGE: CPT

## 2025-02-04 PROCEDURE — 85027 COMPLETE CBC AUTOMATED: CPT

## 2025-02-04 RX ORDER — BUDESONIDE 9 MG/1
2 TABLET, EXTENDED RELEASE ORAL
Refills: 0 | DISCHARGE

## 2025-02-04 RX ADMIN — PREDNISONE 10 MILLIGRAM(S): 5 TABLET ORAL at 13:04

## 2025-02-04 RX ADMIN — LEVOTHYROXINE SODIUM 50 MICROGRAM(S): 25 TABLET ORAL at 06:19

## 2025-02-04 RX ADMIN — FLUTICASONE PROPIONATE AND SALMETEROL 1 DOSE(S): 113; 14 POWDER, METERED RESPIRATORY (INHALATION) at 06:21

## 2025-02-04 RX ADMIN — Medication 50 MILLIGRAM(S): at 06:19

## 2025-02-04 RX ADMIN — TIOTROPIUM BROMIDE MONOHYDRATE 2 PUFF(S): 18 CAPSULE ORAL; RESPIRATORY (INHALATION) at 06:21

## 2025-02-04 RX ADMIN — APIXABAN 2.5 MILLIGRAM(S): 5 TABLET, FILM COATED ORAL at 17:11

## 2025-02-04 RX ADMIN — APIXABAN 2.5 MILLIGRAM(S): 5 TABLET, FILM COATED ORAL at 06:19

## 2025-02-04 NOTE — PROGRESS NOTE ADULT - SUBJECTIVE AND OBJECTIVE BOX
Date/Time Patient Seen:  		  Referring MD:   Data Reviewed	       Patient is a 74y old  Female who presents with a chief complaint of acute CHF (03 Feb 2025 17:52)      Subjective/HPI     PAST MEDICAL & SURGICAL HISTORY:  COPD (chronic obstructive pulmonary disease)    JUAN CARLOS (obstructive sleep apnea)    HTN (hypertension)    Hypothyroid    COPD (chronic obstructive pulmonary disease)    Hypothyroid    HTN (hypertension)    Hyperlipidemia    Edema extremities    Afib  On Eliquis    Psoriasis    HTN (hypertension)    Hypothyroid    No significant past surgical history    S/P eye surgery  age 5 unsure if right or left eye          Medication list         MEDICATIONS  (STANDING):  apixaban 2.5 milliGRAM(s) Oral two times a day  fluticasone propionate/ salmeterol 500-50 MICROgram(s) Diskus 1 Dose(s) Inhalation two times a day  hydrALAZINE 50 milliGRAM(s) Oral three times a day  influenza  Vaccine (HIGH DOSE) 0.5 milliLiter(s) IntraMuscular once  levothyroxine 50 MICROGram(s) Oral daily  predniSONE   Tablet 10 milliGRAM(s) Oral every other day  tiotropium 2.5 MICROgram(s) Inhaler 2 Puff(s) Inhalation daily    MEDICATIONS  (PRN):  artificial tears (preservative free) Ophthalmic Solution 1 Drop(s) Both EYES three times a day PRN Dry Eyes  buDESOnide    Inhalation Suspension 1 milliGRAM(s) Nebulizer two times a day PRN SOB/WHEEZING  melatonin 3 milliGRAM(s) Oral at bedtime PRN Insomnia         Vitals log        ICU Vital Signs Last 24 Hrs  T(C): 36.4 (04 Feb 2025 04:30), Max: 36.9 (03 Feb 2025 10:55)  T(F): 97.5 (04 Feb 2025 04:30), Max: 98.4 (03 Feb 2025 10:55)  HR: 62 (04 Feb 2025 04:30) (62 - 81)  BP: 115/69 (04 Feb 2025 04:30) (104/66 - 118/74)  BP(mean): --  ABP: --  ABP(mean): --  RR: 18 (04 Feb 2025 04:30) (16 - 18)  SpO2: 93% (04 Feb 2025 04:30) (91% - 96%)    O2 Parameters below as of 04 Feb 2025 04:30  Patient On (Oxygen Delivery Method): room air                 Input and Output:  I&O's Detail    02 Feb 2025 07:01  -  03 Feb 2025 07:00  --------------------------------------------------------  IN:    Oral Fluid: 240 mL  Total IN: 240 mL    OUT:    Voided (mL): 1400 mL  Total OUT: 1400 mL    Total NET: -1160 mL      03 Feb 2025 07:01  -  04 Feb 2025 05:15  --------------------------------------------------------  IN:    Oral Fluid: 440 mL  Total IN: 440 mL    OUT:    Voided (mL): 1150 mL  Total OUT: 1150 mL    Total NET: -710 mL          Lab Data                        12.9   8.49  )-----------( 158      ( 03 Feb 2025 11:05 )             40.2     02-03    141  |  99  |  39[H]  ----------------------------<  95  3.6   |  30  |  1.50[H]    Ca    9.5      03 Feb 2025 11:05  Phos  3.5     02-02  Mg     2.0     02-03    TPro  6.4  /  Alb  3.2[L]  /  TBili  0.5  /  DBili  x   /  AST  16  /  ALT  21  /  AlkPhos  63  02-02            Review of Systems	      Objective     Physical Examination    heart s1s2  lung dc bS  head nc  head at      Pertinent Lab findings & Imaging      Jere:  NO   Adequate UO     I&O's Detail    02 Feb 2025 07:01  -  03 Feb 2025 07:00  --------------------------------------------------------  IN:    Oral Fluid: 240 mL  Total IN: 240 mL    OUT:    Voided (mL): 1400 mL  Total OUT: 1400 mL    Total NET: -1160 mL      03 Feb 2025 07:01  -  04 Feb 2025 05:15  --------------------------------------------------------  IN:    Oral Fluid: 440 mL  Total IN: 440 mL    OUT:    Voided (mL): 1150 mL  Total OUT: 1150 mL    Total NET: -710 mL               Discussed with:     Cultures:	        Radiology                            
Patient is a 74y old  Female who presents with a chief complaint of acute CHF (02 Feb 2025 09:57)        INTERVAL HPI/OVERNIGHT EVENTS:   no complaints  pt seen and examined         Vital Signs Last 24 Hrs  T(C): 36.9 (02 Feb 2025 21:07), Max: 37.2 (01 Feb 2025 21:31)  T(F): 98.5 (02 Feb 2025 21:07), Max: 98.9 (01 Feb 2025 21:31)  HR: 70 (02 Feb 2025 21:07) (65 - 85)  BP: 123/71 (02 Feb 2025 21:07) (123/71 - 152/80)  BP(mean): --  RR: 17 (02 Feb 2025 21:07) (17 - 18)  SpO2: 94% (02 Feb 2025 21:07) (94% - 99%)    Parameters below as of 02 Feb 2025 21:07  Patient On (Oxygen Delivery Method): room air        apixaban 2.5 milliGRAM(s) Oral two times a day  artificial tears (preservative free) Ophthalmic Solution 1 Drop(s) Both EYES three times a day PRN  buDESOnide    Inhalation Suspension 1 milliGRAM(s) Nebulizer two times a day PRN  fluticasone propionate/ salmeterol 500-50 MICROgram(s) Diskus 1 Dose(s) Inhalation two times a day  furosemide   Injectable 40 milliGRAM(s) IV Push two times a day  hydrALAZINE 50 milliGRAM(s) Oral three times a day  influenza  Vaccine (HIGH DOSE) 0.5 milliLiter(s) IntraMuscular once  levothyroxine 50 MICROGram(s) Oral daily  melatonin 3 milliGRAM(s) Oral at bedtime PRN  predniSONE   Tablet 10 milliGRAM(s) Oral every other day  tiotropium 2.5 MICROgram(s) Inhaler 2 Puff(s) Inhalation daily      PHYSICAL EXAM:  GENERAL: NAD   EYES: conjunctiva and sclera clear  ENMT: Moist mucous membranes  NECK: Supple, No JVD, Normal thyroid  CHEST/LUNG: non labored, cta b/l  HEART: Regular rate and rhythm; No murmurs, rubs, or gallops  ABDOMEN: Soft, Nontender, Nondistended; Bowel sounds present  EXTREMITIES:  No clubbing, no cyanosis, no edema  LYMPH: No lymphadenopathy noted  SKIN: No rashes or lesions  NEURO: no new focal deficits    Consultant(s) Notes Reviewed:  [x ] YES  [ ] NO  Care Discussed with Consultants/Other Providers [ x] YES  [ ] NO    LABS:                        12.1   7.16  )-----------( 144      ( 02 Feb 2025 07:10 )             37.6     02-02    142  |  103  |  26[H]  ----------------------------<  84  4.4   |  30  |  1.20    Ca    9.3      02 Feb 2025 07:10  Phos  3.5     02-02  Mg     2.0     02-02    TPro  6.4  /  Alb  3.2[L]  /  TBili  0.5  /  DBili  x   /  AST  16  /  ALT  21  /  AlkPhos  63  02-02    PT/INR - ( 01 Feb 2025 14:29 )   PT: 11.5 sec;   INR: 0.97 ratio         PTT - ( 01 Feb 2025 14:29 )  PTT:32.2 sec  Urinalysis Basic - ( 02 Feb 2025 07:10 )    Color: x / Appearance: x / SG: x / pH: x  Gluc: 84 mg/dL / Ketone: x  / Bili: x / Urobili: x   Blood: x / Protein: x / Nitrite: x   Leuk Esterase: x / RBC: x / WBC x   Sq Epi: x / Non Sq Epi: x / Bacteria: x      CAPILLARY BLOOD GLUCOSE            Urinalysis Basic - ( 02 Feb 2025 07:10 )    Color: x / Appearance: x / SG: x / pH: x  Gluc: 84 mg/dL / Ketone: x  / Bili: x / Urobili: x   Blood: x / Protein: x / Nitrite: x   Leuk Esterase: x / RBC: x / WBC x   Sq Epi: x / Non Sq Epi: x / Bacteria: x          RADIOLOGY & ADDITIONAL TESTS:    Imaging Personally Reviewed  Reviewed consultants input
Patient is a 74y old  Female who presents with a chief complaint of acute CHF (03 Feb 2025 16:21)        INTERVAL HPI/OVERNIGHT EVENTS:   no complaints  pt seen and examined         Vital Signs Last 24 Hrs  T(C): 36.9 (03 Feb 2025 10:55), Max: 36.9 (02 Feb 2025 21:07)  T(F): 98.4 (03 Feb 2025 10:55), Max: 98.5 (02 Feb 2025 21:07)  HR: 81 (03 Feb 2025 10:55) (67 - 81)  BP: 118/74 (03 Feb 2025 10:55) (118/74 - 151/71)  BP(mean): --  RR: 16 (03 Feb 2025 10:55) (16 - 18)  SpO2: 91% (03 Feb 2025 10:55) (91% - 95%)    Parameters below as of 03 Feb 2025 10:55  Patient On (Oxygen Delivery Method): room air        apixaban 2.5 milliGRAM(s) Oral two times a day  artificial tears (preservative free) Ophthalmic Solution 1 Drop(s) Both EYES three times a day PRN  buDESOnide    Inhalation Suspension 1 milliGRAM(s) Nebulizer two times a day PRN  fluticasone propionate/ salmeterol 500-50 MICROgram(s) Diskus 1 Dose(s) Inhalation two times a day  hydrALAZINE 50 milliGRAM(s) Oral three times a day  influenza  Vaccine (HIGH DOSE) 0.5 milliLiter(s) IntraMuscular once  levothyroxine 50 MICROGram(s) Oral daily  melatonin 3 milliGRAM(s) Oral at bedtime PRN  predniSONE   Tablet 10 milliGRAM(s) Oral every other day  tiotropium 2.5 MICROgram(s) Inhaler 2 Puff(s) Inhalation daily      PHYSICAL EXAM:  GENERAL: NAD   EYES: conjunctiva and sclera clear  ENMT: Moist mucous membranes  NECK: Supple, No JVD, Normal thyroid  CHEST/LUNG: non labored, wheezing b/l  HEART: Regular rate and rhythm; No murmurs, rubs, or gallops  ABDOMEN: Soft, Nontender, Nondistended; Bowel sounds present  EXTREMITIES:  No clubbing, no cyanosis, no edema  LYMPH: No lymphadenopathy noted  SKIN: No rashes or lesions  NEURO: no new focal deficits    Consultant(s) Notes Reviewed:  [x ] YES  [ ] NO  Care Discussed with Consultants/Other Providers [ x] YES  [ ] NO    LABS:                        12.9   8.49  )-----------( 158      ( 03 Feb 2025 11:05 )             40.2     02-03    141  |  99  |  39[H]  ----------------------------<  95  3.6   |  30  |  1.50[H]    Ca    9.5      03 Feb 2025 11:05  Phos  3.5     02-02  Mg     2.0     02-03    TPro  6.4  /  Alb  3.2[L]  /  TBili  0.5  /  DBili  x   /  AST  16  /  ALT  21  /  AlkPhos  63  02-02      Urinalysis Basic - ( 03 Feb 2025 11:05 )    Color: x / Appearance: x / SG: x / pH: x  Gluc: 95 mg/dL / Ketone: x  / Bili: x / Urobili: x   Blood: x / Protein: x / Nitrite: x   Leuk Esterase: x / RBC: x / WBC x   Sq Epi: x / Non Sq Epi: x / Bacteria: x      CAPILLARY BLOOD GLUCOSE            Urinalysis Basic - ( 03 Feb 2025 11:05 )    Color: x / Appearance: x / SG: x / pH: x  Gluc: 95 mg/dL / Ketone: x  / Bili: x / Urobili: x   Blood: x / Protein: x / Nitrite: x   Leuk Esterase: x / RBC: x / WBC x   Sq Epi: x / Non Sq Epi: x / Bacteria: x          RADIOLOGY & ADDITIONAL TESTS:    Imaging Personally Reviewed  Reviewed consultants input
[facility] -- Ana Maria Naranjo Pannella, Patel, Savella, Goodger, Cohen: Office # [phone]    Follow Up:  ADHF    Subjective/Observations: No events overnight resting comfortably in bed.  No complaints of chest pain, dyspnea, or palpitations reported. No signs of orthopnea or PND. Tolerating room air     REVIEW OF SYSTEMS: All other review of systems are negative unless indicated above    PAST MEDICAL & SURGICAL HISTORY:  COPD (chronic obstructive pulmonary disease)      Hypothyroid      HTN (hypertension)      Hyperlipidemia      Edema extremities      Afib  On Eliquis      Psoriasis      S/P eye surgery  age 5 unsure if right or left eye          MEDICATIONS  (STANDING):  apixaban 2.5 milliGRAM(s) Oral two times a day  fluticasone propionate/ salmeterol 500-50 MICROgram(s) Diskus 1 Dose(s) Inhalation two times a day  furosemide   Injectable 40 milliGRAM(s) IV Push two times a day  hydrALAZINE 50 milliGRAM(s) Oral three times a day  influenza  Vaccine (HIGH DOSE) 0.5 milliLiter(s) IntraMuscular once  levothyroxine 50 MICROGram(s) Oral daily  potassium chloride    Tablet ER 20 milliEquivalent(s) Oral once  predniSONE   Tablet 10 milliGRAM(s) Oral every other day  tiotropium 2.5 MICROgram(s) Inhaler 2 Puff(s) Inhalation daily    MEDICATIONS  (PRN):  artificial tears (preservative free) Ophthalmic Solution 1 Drop(s) Both EYES three times a day PRN Dry Eyes  buDESOnide    Inhalation Suspension 1 milliGRAM(s) Nebulizer two times a day PRN SOB/WHEEZING  melatonin 3 milliGRAM(s) Oral at bedtime PRN Insomnia    Allergies    cefuroxime (Unknown)  Levaquin (Anaphylaxis)  sulfa drugs (Unknown)  Albuterol (Eqv-ProAir HFA) (Other)  Sulfur (Unknown)  [This allergen will not trigger allergy alert] artichokes (Unknown)    Intolerances    statins (Muscle Pain; Joint Pain)    Vital Signs Last 24 Hrs  T(C): 36.9 (03 Feb 2025 10:55), Max: 36.9 (02 Feb 2025 21:07)  T(F): 98.4 (03 Feb 2025 10:55), Max: 98.5 (02 Feb 2025 21:07)  HR: 81 (03 Feb 2025 10:55) (67 - 81)  BP: 118/74 (03 Feb 2025 10:55) (118/74 - 151/71)  BP(mean): --  RR: 16 (03 Feb 2025 10:55) (16 - 18)  SpO2: 91% (03 Feb 2025 10:55) (91% - 95%)    Parameters below as of 03 Feb 2025 10:55  Patient On (Oxygen Delivery Method): room air      I&O's Summary    02 Feb 2025 07:01  -  03 Feb 2025 07:00  --------------------------------------------------------  IN: 240 mL / OUT: 1400 mL / NET: -1160 mL    03 Feb 2025 07:01  -  03 Feb 2025 15:19  --------------------------------------------------------  IN: 440 mL / OUT: 1150 mL / NET: -710 mL          TELE: SR  PHYSICAL EXAM:  Constitutional: NAD, awake and alert  HEENT: Moist Mucous Membranes, Anicteric  Pulmonary: Non-labored, breath sounds are clear bilaterally, No wheezing, rales or rhonchi  Cardiovascular: Regular, S1 and S2, + murmur No rubs, gallops or clicks   Gastrointestinal:  soft, nontender, nondistended   Lymph: No peripheral edema. No lymphadenopathy.   Skin: No visible rashes or ulcers.  Psych:  Mood & affect appropriate      LABS: All Labs Reviewed:                        12.9   8.49  )-----------( 158      ( 03 Feb 2025 11:05 )             40.2                         12.1   7.16  )-----------( 144      ( 02 Feb 2025 07:10 )             37.6                         12.0   8.89  )-----------( 146      ( 01 Feb 2025 14:29 )             37.4     03 Feb 2025 11:05    141    |  99     |  39     ----------------------------<  95     3.6     |  30     |  1.50   02 Feb 2025 07:10    142    |  103    |  26     ----------------------------<  84     4.4     |  30     |  1.20   01 Feb 2025 14:29    142    |  109    |  25     ----------------------------<  144    4.1     |  24     |  1.30     Ca    9.5        03 Feb 2025 11:05  Ca    9.3        02 Feb 2025 07:10  Ca    9.0        01 Feb 2025 14:29  Phos  3.5       02 Feb 2025 07:10  Mg     2.0       03 Feb 2025 11:05  Mg     2.0       02 Feb 2025 07:10    TPro  6.4    /  Alb  3.2    /  TBili  0.5    /  DBili  x      /  AST  16     /  ALT  21     /  AlkPhos  63     02 Feb 2025 07:10  TPro  6.5    /  Alb  3.3    /  TBili  0.4    /  DBili  x      /  AST  20     /  ALT  25     /  AlkPhos  67     01 Feb 2025 14:29   LIVER FUNCTIONS - ( 02 Feb 2025 07:10 )  Alb: 3.2 g/dL / Pro: 6.4 g/dL / ALK PHOS: 63 U/L / ALT: 21 U/L / AST: 16 U/L / GGT: x           Troponin I, High Sensitivity Result: 23.3 ng/L (02-01-25 @ 14:29)  Cholesterol: 231 mg/dL (02-02-25 @ 07:10)  HDL Cholesterol: 110 mg/dL (02-02-25 @ 07:10)  Triglycerides, Serum: 48 mg/dL (02-02-25 @ 07:10)    12 Lead ECG:   Ventricular Rate 98 BPM    Atrial Rate 196 BPM    QRS Duration 88 ms    Q-T Interval 324 ms    QTC Calculation(Bazett) 413 ms    P Axis -61 degrees    R Axis -27 degrees    T Axis 47 degrees    Diagnosis Line likely NSR    When compared with ECG of15-MAY-2023 13:55,  Atrial flutter has replaced Sinus rhythm  Confirmed by Shahrzad Jeffrey ([id number]) on 2/2/2025 9:11:10 AM (02-01-25 @ 12:26)      TRANSTHORACIC ECHOCARDIOGRAM REPORT  ________________________________________________________________________________                                      _______       Pt. Name:       BALBIR ANGEL Study Date:    2/2/2025  MRN:            [id number]       YOB: 1950  Accession #:    [id number]      Age:           74 years  Account#:       [id number]     Gender:        F  Heart Rate:                    Height:        59.84 in (152.00 cm)  Rhythm:                        Weight:  176.37 lb (80.00 kg)  Blood Pressure: 126/73 mmHg    BSA/BMI:       1.77 m² / 34.63 kg/m²  ________________________________________________________________________________________  Referring Physician:    Norman Brand  Interpreting Physician: Shahrzad Jeffrey MD  Primary Sonographer:    Haydee Hess    CPT:               ECHO TTE WO CON COMP W DOPP - 91384.m  Indication(s):     Dyspnea, unspecified - R06.00  Procedure:         Transthoracic echocardiogram with 2-D, M-mode and complete                  spectral and color flow Doppler.  Ordering Location: [facility]  Admission Status:  Inpatient    _______________________________________________________________________________________     CONCLUSIONS:      1. Left ventricular systolic function is normal with an ejection fraction of 67 % by Goldberg's method of disks.   2. Normal right ventricular cavity size, with normal wall thickness, and normal right ventricular systolic function.   3. Left atrium is mildly dilated.   4. Moderate aortic stenosis.   5. The peak transaortic velocity is 3.62 m/s, peak transaortic gradient is 52.4 mmHg and mean transaortic gradient is 34.0 mmHg with an LVOT/aortic valve VTI ratio of 0.42. The effective orifice area is estimated at 1.18 cm² by the continuity equation.   6. Trace aortic regurgitation.   7. Trace mitral regurgitation.   8. Trace tricuspid regurgitation.   9. The inferior vena cava is normal in size measuring 1.30 cm in diameter, (normal <2.1cm) with normal inspiratory collapse (normal >50%) consistent with normal right atrial pressure (~3, range 0-5mmHg).    ________________________________________________________________________________________  FINDINGS:     Left Ventricle:  Left ventricular systolic function is normal with acalculated ejection fraction of 67 % by the Goldberg's biplane method of disks. There is mild (grade 1) left ventricular diastolic dysfunction.     Right Ventricle:  The right ventricular cavity is normal in size, with normal wall thickness and right ventricular systolic function is normal.     Left Atrium:  The left atrium is mildly dilated with an indexed volume of 37.43 ml/m².     Right Atrium:  The right atrium is normal in size with an indexed area of 26.50 cm²/m².     Aortic Valve:  The aortic valve appears trileaflet. There is moderate aortic stenosis. The peak transaortic velocity is 3.62 m/s, peak transaortic gradient is 52.4 mmHg and mean transaortic gradient is 34.0 mmHg with an LVOT/aortic valve VTI ratio of 0.42. The aortic valve area is estimated at 1.18 cm² by the continuity equation. There is trace aortic regurgitation.     Mitral Valve:  There is mitral valve thickening of the anterior and posterior leaflets. There is mild leaflet calcification. There is trace mitral regurgitation.     Tricuspid Valve:  Structurally normal tricuspid valve with normal leaflet excursion. There is trace tricuspid regurgitation. Estimated pulmonary artery systolic pressure is 30 mmHg.     Pulmonic Valve:  Structurally normal pulmonic valvewith normal leaflet excursion. There is trace pulmonic regurgitation.     Aorta:  The aortic root at the sinuses of Valsalva is normal in size.     Pericardium:  There is a trace pericardial effusion.     Systemic Veins:  The inferior vena cava is normal in size measuring 1.30 cm in diameter, (normal <2.1cm) with normal inspiratory collapse (normal >50%) consistent with normal right atrial pressure (~3, range 0-5mmHg).  ____________________________________________________________________  QUANTITATIVE DATA:  Left Ventricle Measurements: (Indexed to BSA)     IVSd (2D):   1.0 cm  LVPWd (2D):  1.0 cm  LVIDd (2D):  4.1 cm  LVIDs (2D):  2.7 cm  LV Mass:     136 g  77.3 g/m²  LV Vol d, MOD A2C: 88.6 ml 50.17 ml/m²  LV Vol d, MOD A4C: 95.3 ml 53.96 ml/m²  LV Vol d, MOD BP:  95.1 ml 53.86 ml/m²  LV Vol s, MOD A2C: 29.7 ml 16.82 ml/m²  LV Vol s, MOD A4C: 33.4 ml 18.91 ml/m²  LV Vol s, MOD BP:  31.4 ml 17.78 ml/m²  LVOT SV MOD BP:    63.7 ml  LV EF% MOD BP:     67 %     MV E Vmax:    0.91 m/s  MV AVmax:    1.45 m/s  MV E/A:       0.63  e' lateral:   8.16 cm/s  e' medial:    7.51 cm/s  E/e' lateral: 11.20  E/e' medial:  12.17  E/e' Average: 11.67  MV DT:        215 msec    Aorta Measurements: (Normal range) (Indexed to BSA)     Ao Root d 2.60 cm (2.7 - 3.3 cm) 1.47 cm/m²            Left Atrium Measurements: (Indexed to BSA)  LA Diam 2D: 3.70 cm         Right Ventricle Measurements: Right Atrial Measurements:     RV Base (RVID1):  3.9 cm      RA Vol s, MOD A4C  46.8 ml  RV Mid (RVID2):   2.9cm      RA Area s, MOD A4C 17.5 cm²  RV Major (RVID3): 7.9 cm       LVOT / RVOT/ Qp/Qs Data: (Indexed to BSA)  LVOT Diameter:  1.90 cm  LVOT Area:      2.84 cm²  LVOT Vmax:      1.58 m/s  LVOT Vmn:       1.060 m/s  LVOT VTI:       33.00 cm  LVOT peakgrad: 10 mmHg  LVOT mean grad: 4.0 mmHg  LVOT SV:        93.6 ml   52.98 ml/m²    Aortic Valve Measurements:  AV Vmax:                3.6 m/s  AV Peak Gradient:       52.4 mmHg  AV Mean Gradient:       34.0 mmHg  AV VTI:                 79.1 cm  AV VTI Ratio:           0.42  AoV EOA, Contin:        1.18 cm²  AoV EOA, Contin i:      0.67 cm²/m²  AoV Dimensionless Index 0.42  AR Vmax                 3.46 m/s  AR PHT                  425 msec  AR Moca                2.38 m/s²    Mitral Valve Measurements:     MV E Vmax: 0.9 m/s  MV A Vmax: 1.4 m/s  MV E/A:    0.6       Tricuspid Valve Measurements:     TR Vmax:          2.6 m/s  TR Peak Gradient: 26.6 mmHg  RA Pressure:      3 mmHg  PASP:             30 mmHg    ________________________________________________________________________________________  Electronically signed on 2/2/2025 at 3:12:36 PM by Shahrzad Jeffrey MD         *** Final ***  
[facility] -- Ana Maria Naranjo Pannella, Patel, Savella, Goodger, Cohen: Office # [phone]    Follow Up:  ADHF    Subjective/Observations: No events overnight resting comfortably in bed.  No complaints of chest pain, dyspnea, or palpitations reported. No signs of orthopnea or PND. Tolerating room air     REVIEW OF SYSTEMS: All other review of systems are negative unless indicated above    PAST MEDICAL & SURGICAL HISTORY:  COPD (chronic obstructive pulmonary disease)      Hypothyroid      HTN (hypertension)      Hyperlipidemia      Edema extremities      Afib  On Eliquis      Psoriasis      S/P eye surgery  age 5 unsure if right or left eye          MEDICATIONS  (STANDING):  apixaban 2.5 milliGRAM(s) Oral two times a day  fluticasone propionate/ salmeterol 500-50 MICROgram(s) Diskus 1 Dose(s) Inhalation two times a day  hydrALAZINE 50 milliGRAM(s) Oral three times a day  influenza  Vaccine (HIGH DOSE) 0.5 milliLiter(s) IntraMuscular once  levothyroxine 50 MICROGram(s) Oral daily  predniSONE   Tablet 10 milliGRAM(s) Oral every other day  tiotropium 2.5 MICROgram(s) Inhaler 2 Puff(s) Inhalation daily    MEDICATIONS  (PRN):  artificial tears (preservative free) Ophthalmic Solution 1 Drop(s) Both EYES three times a day PRN Dry Eyes  buDESOnide    Inhalation Suspension 1 milliGRAM(s) Nebulizer two times a day PRN SOB/WHEEZING  melatonin 3 milliGRAM(s) Oral at bedtime PRN Insomnia    Allergies    cefuroxime (Unknown)  Levaquin (Anaphylaxis)  sulfa drugs (Unknown)  Albuterol (Eqv-ProAir HFA) (Other)  Sulfur (Unknown)  [This allergen will not trigger allergy alert] artichokes (Unknown)    Intolerances    statins (Muscle Pain; Joint Pain)    Vital Signs Last 24 Hrs  T(C): 36.8 (04 Feb 2025 11:47), Max: 36.9 (03 Feb 2025 20:40)  T(F): 98.2 (04 Feb 2025 11:47), Max: 98.4 (03 Feb 2025 20:40)  HR: 72 (04 Feb 2025 11:47) (62 - 72)  BP: 109/78 (04 Feb 2025 11:47) (104/66 - 115/69)  BP(mean): --  RR: 18 (04 Feb 2025 11:47) (17 - 18)  SpO2: 90% (04 Feb 2025 11:47) (90% - 96%)    Parameters below as of 04 Feb 2025 11:47  Patient On (Oxygen Delivery Method): room air      I&O's Summary    03 Feb 2025 07:01  -  04 Feb 2025 07:00  --------------------------------------------------------  IN: 440 mL / OUT: 1150 mL / NET: -710 mL          TELE: Off cardiac monitor   PHYSICAL EXAM:  Constitutional: NAD, awake and alert  HEENT: Moist Mucous Membranes, Anicteric  Pulmonary: Non-labored, breath sounds are clear bilaterally, No wheezing, rales or rhonchi  Cardiovascular: Regular, S1 and S2, No murmurs, No rubs, gallops or clicks  Gastrointestinal:  soft, nontender, nondistended   Lymph: No peripheral edema. No lymphadenopathy.   Skin: No visible rashes or ulcers.  Psych:  Mood & affect appropriate      LABS: All Labs Reviewed:                        12.9   8.49  )-----------( 158      ( 03 Feb 2025 11:05 )             40.2                         12.1   7.16  )-----------( 144      ( 02 Feb 2025 07:10 )             37.6                         12.0   8.89  )-----------( 146      ( 01 Feb 2025 14:29 )             37.4     03 Feb 2025 11:05    141    |  99     |  39     ----------------------------<  95     3.6     |  30     |  1.50   02 Feb 2025 07:10    142    |  103    |  26     ----------------------------<  84     4.4     |  30     |  1.20   01 Feb 2025 14:29    142    |  109    |  25     ----------------------------<  144    4.1     |  24     |  1.30     Ca    9.5        03 Feb 2025 11:05  Ca    9.3        02 Feb 2025 07:10  Ca    9.0        01 Feb 2025 14:29  Phos  3.5       02 Feb 2025 07:10  Mg     2.0       03 Feb 2025 11:05  Mg     2.0       02 Feb 2025 07:10    TPro  6.4    /  Alb  3.2    /  TBili  0.5    /  DBili  x      /  AST  16     /  ALT  21     /  AlkPhos  63     02 Feb 2025 07:10  TPro  6.5    /  Alb  3.3    /  TBili  0.4    /  DBili  x      /  AST  20     /  ALT  25     /  AlkPhos  67     01 Feb 2025 14:29     Troponin I, High Sensitivity Result: 23.3 ng/L (02-01-25 @ 14:29)  Cholesterol: 231 mg/dL (02-02-25 @ 07:10)  HDL Cholesterol: 110 mg/dL (02-02-25 @ 07:10)  Triglycerides, Serum: 48 mg/dL (02-02-25 @ 07:10)    12 Lead ECG:   Ventricular Rate 98 BPM    Atrial Rate 196 BPM    QRS Duration 88 ms    Q-T Interval 324 ms    QTC Calculation(Bazett) 413 ms    P Axis -61 degrees    R Axis -27 degrees    T Axis 47 degrees    Diagnosis Line likely NSR    When compared with ECG of15-MAY-2023 13:55,  Atrial flutter has replaced Sinus rhythm  Confirmed by Shahrzad Jeffrey ([id number]) on 2/2/2025 9:11:10 AM (02-01-25 @ 12:26)      TRANSTHORACIC ECHOCARDIOGRAM REPORT  ________________________________________________________________________________                                      _______       Pt. Name:       BALBIR ANGEL Study Date:    2/2/2025  MRN:            [id number]       YOB: 1950  Accession #:    [id number]      Age:           74 years  Account#:       [id number]     Gender:        F  Heart Rate:                    Height:        59.84 in (152.00 cm)  Rhythm:                        Weight:  176.37 lb (80.00 kg)  Blood Pressure: 126/73 mmHg    BSA/BMI:       1.77 m² / 34.63 kg/m²  ________________________________________________________________________________________  Referring Physician:    Norman Brand  Interpreting Physician: Shahrzad Jeffrey MD  Primary Sonographer:    Haydee Hess    CPT:               ECHO TTE WO CON COMP W DOPP - 24843.m  Indication(s):     Dyspnea, unspecified - R06.00  Procedure:         Transthoracic echocardiogram with 2-D, M-mode and complete                  spectral and color flow Doppler.  Ordering Location: [facility]  Admission Status:  Inpatient    _______________________________________________________________________________________     CONCLUSIONS:      1. Left ventricular systolic function is normal with an ejection fraction of 67 % by Goldberg's method of disks.   2. Normal right ventricular cavity size, with normal wall thickness, and normal right ventricular systolic function.   3. Left atrium is mildly dilated.   4. Moderate aortic stenosis.   5. The peak transaortic velocity is 3.62 m/s, peak transaortic gradient is 52.4 mmHg and mean transaortic gradient is 34.0 mmHg with an LVOT/aortic valve VTI ratio of 0.42. The effective orifice area is estimated at 1.18 cm² by the continuity equation.   6. Trace aortic regurgitation.   7. Trace mitral regurgitation.   8. Trace tricuspid regurgitation.   9. The inferior vena cava is normal in size measuring 1.30 cm in diameter, (normal <2.1cm) with normal inspiratory collapse (normal >50%) consistent with normal right atrial pressure (~3, range 0-5mmHg).    ________________________________________________________________________________________  FINDINGS:     Left Ventricle:  Left ventricular systolic function is normal with acalculated ejection fraction of 67 % by the Goldberg's biplane method of disks. There is mild (grade 1) left ventricular diastolic dysfunction.     Right Ventricle:  The right ventricular cavity is normal in size, with normal wall thickness and right ventricular systolic function is normal.     Left Atrium:  The left atrium is mildly dilated with an indexed volume of 37.43 ml/m².     Right Atrium:  The right atrium is normal in size with an indexed area of 26.50 cm²/m².     Aortic Valve:  The aortic valve appears trileaflet. There is moderate aortic stenosis. The peak transaortic velocity is 3.62 m/s, peak transaortic gradient is 52.4 mmHg and mean transaortic gradient is 34.0 mmHg with an LVOT/aortic valve VTI ratio of 0.42. The aortic valve area is estimated at 1.18 cm² by the continuity equation. There is trace aortic regurgitation.     Mitral Valve:  There is mitral valve thickening of the anterior and posterior leaflets. There is mild leaflet calcification. There is trace mitral regurgitation.     Tricuspid Valve:  Structurally normal tricuspid valve with normal leaflet excursion. There is trace tricuspid regurgitation. Estimated pulmonary artery systolic pressure is 30 mmHg.     Pulmonic Valve:  Structurally normal pulmonic valvewith normal leaflet excursion. There is trace pulmonic regurgitation.     Aorta:  The aortic root at the sinuses of Valsalva is normal in size.     Pericardium:  There is a trace pericardial effusion.     Systemic Veins:  The inferior vena cava is normal in size measuring 1.30 cm in diameter, (normal <2.1cm) with normal inspiratory collapse (normal >50%) consistent with normal right atrial pressure (~3, range 0-5mmHg).  ____________________________________________________________________  QUANTITATIVE DATA:  Left Ventricle Measurements: (Indexed to BSA)     IVSd (2D):   1.0 cm  LVPWd (2D):  1.0 cm  LVIDd (2D):  4.1 cm  LVIDs (2D):  2.7 cm  LV Mass:     136 g  77.3 g/m²  LV Vol d, MOD A2C: 88.6 ml 50.17 ml/m²  LV Vol d, MOD A4C: 95.3 ml 53.96 ml/m²  LV Vol d, MOD BP:  95.1 ml 53.86 ml/m²  LV Vol s, MOD A2C: 29.7 ml 16.82 ml/m²  LV Vol s, MOD A4C: 33.4 ml 18.91 ml/m²  LV Vol s, MOD BP:  31.4 ml 17.78 ml/m²  LVOT SV MOD BP:    63.7 ml  LV EF% MOD BP:     67 %     MV E Vmax:    0.91 m/s  MV AVmax:    1.45 m/s  MV E/A:       0.63  e' lateral:   8.16 cm/s  e' medial:    7.51 cm/s  E/e' lateral: 11.20  E/e' medial:  12.17  E/e' Average: 11.67  MV DT:        215 msec    Aorta Measurements: (Normal range) (Indexed to BSA)     Ao Root d 2.60 cm (2.7 - 3.3 cm) 1.47 cm/m²            Left Atrium Measurements: (Indexed to BSA)  LA Diam 2D: 3.70 cm         Right Ventricle Measurements: Right Atrial Measurements:     RV Base (RVID1):  3.9 cm      RA Vol s, MOD A4C  46.8 ml  RV Mid (RVID2):   2.9cm      RA Area s, MOD A4C 17.5 cm²  RV Major (RVID3): 7.9 cm       LVOT / RVOT/ Qp/Qs Data: (Indexed to BSA)  LVOT Diameter:  1.90 cm  LVOT Area:      2.84 cm²  LVOT Vmax:      1.58 m/s  LVOT Vmn:       1.060 m/s  LVOT VTI:       33.00 cm  LVOT peakgrad: 10 mmHg  LVOT mean grad: 4.0 mmHg  LVOT SV:        93.6 ml   52.98 ml/m²    Aortic Valve Measurements:  AV Vmax:                3.6 m/s  AV Peak Gradient:       52.4 mmHg  AV Mean Gradient:       34.0 mmHg  AV VTI:                 79.1 cm  AV VTI Ratio:           0.42  AoV EOA, Contin:        1.18 cm²  AoV EOA, Contin i:      0.67 cm²/m²  AoV Dimensionless Index 0.42  AR Vmax                 3.46 m/s  AR PHT                  425 msec  AR [place]                2.38 m/s²    Mitral Valve Measurements:     MV E Vmax: 0.9 m/s  MV A Vmax: 1.4 m/s  MV E/A:    0.6       Tricuspid Valve Measurements:     TR Vmax:          2.6 m/s  TR Peak Gradient: 26.6 mmHg  RA Pressure:      3 mmHg  PASP:             30 mmHg    ________________________________________________________________________________________  Electronically signed on 2/2/2025 at 3:12:36 PM by Shahrzad Jeffrey MD         *** Final ***

## 2025-02-04 NOTE — CASE MANAGEMENT PROGRESS NOTE - NSCMPROGRESSNOTE_GEN_ALL_CORE
Per MD patient is stable for transition home today. CM again offered home care visiting nurse services to assist in managing her CHF, pt declined despite much encouragement. Discharge Notice reviewed, copy given to patient. Patient verbalized understanding and is in agreement with transitioning home today. Per patient her brother in law will be coming to transport her home.

## 2025-02-04 NOTE — DISCHARGE NOTE PROVIDER - CARE PROVIDER_API CALL
Law Santos  Internal Medicine  [address] [place] [address]  Phone: [phone]  Fax: [phone]  Follow Up Time:

## 2025-02-04 NOTE — PROGRESS NOTE ADULT - ASSESSMENT
74-year-old female with PMH of A-fib on Eliquis, sev AS (follows with Dr Palla and mentions that she was told she will likely need intervention in the upcoming year), HTN, HLD, COPD, hypothyroidism, CKD stage 3A, Monoclonal gammopathy, bradycardia (with normal chronotropic response) who presents to the ED with a chief complaint of SOB.    HFpEF:  - ECG NSR with no ischemic characteristics  - Trop 23  - proBNP 940  - CXR without acute pathology  - Hx of COPD but not on home oxygen. Now saturating wnl on RA  - TTE 09/2024 EF 60-65%, sev AS  - repeat TTE nL LV & RV size and function moderate AS EF 67%  - euvolemic on exam  -creat bump will D/C lasix and reassess for daily dosing  - strict I&O's, daily weights    Atrial Fibrillation  - ECG likely NSR  - Tele show NSR with occasional PACs  - c/w Eliquis 2.5mg PO BID (On lower dose d/t chronic severe epistaxis as OP)   -rate controlled by flowsheet  - assuming not on BB due to COPD, rates appear well controlled    HTN:  - c/w hydralazine    - Monitor and replete lytes, keep K>4 and Mg >2  - Further cardiac workup will depend on clinical course.   - All other workup per primary team     Vineet Oates DNP, AGACNP-BC  Cardiology   Call Teams

## 2025-02-04 NOTE — DISCHARGE NOTE PROVIDER - NSDCFUSCHEDAPPT_GEN_ALL_CORE_FT
[facility] Physician [facility]  PULMMED [address]  Scheduled Appointment: 03/19/2025    Palla, Venugopal R  [facility]  CARDIOLOGY [address] [facility] [address]  Scheduled Appointment: 03/20/2025    Yun, Suk-Hyeon  [facility]  NEPHRO [address]  Scheduled Appointment: 05/02/2025

## 2025-02-04 NOTE — PROGRESS NOTE ADULT - NS ATTEND AMEND GEN_ALL_CORE FT
74-year-old female with PMH of A-fib on Eliquis, sev AS (follows with Dr Palla and mentions that she was told she will likely need intervention in the upcoming year), HTN, HLD, COPD, hypothyroidism, CKD stage 3A, Monoclonal gammopathy, bradycardia (with normal chronotropic response) who presents to the ED with a chief complaint of SOB.    HFpEF:  - ECG NSR with no ischemic characteristics  - Trop 23  - proBNP 940  - CXR without acute pathology  - Hx of COPD but not on home oxygen. Now saturating wnl on RA  - TTE 09/2024 EF 60-65%, sev AS  - repeat TTE nL LV & RV size and function moderate AS EF 67%  - euvolemic on exam  -creat bump will D/C lasix and reassess for daily dosing  - strict I&O's, daily weights    Atrial Fibrillation  - ECG likely NSR  - Tele show NSR with occasional PACs  - c/w Eliquis 2.5mg PO BID (On lower dose d/t chronic severe epistaxis as OP)   -rate controlled by flowsheet  - assuming not on BB due to COPD, rates appear well controlled    HTN:  - c/w hydralazine

## 2025-02-04 NOTE — DISCHARGE NOTE NURSING/CASE MANAGEMENT/SOCIAL WORK - PATIENT PORTAL LINK FT
You can access the FollowMyHealth Patient Portal offered by [facility] by registering at the following website: [email or web address]. By joining [facility]’s FollowMyHealth portal, you will also be able to view your health information using other applications (apps) compatible with our system.

## 2025-02-04 NOTE — DISCHARGE NOTE PROVIDER - NSDCMRMEDTOKEN_GEN_ALL_CORE_FT
Advair Diskus 500 mcg-50 mcg inhalation powder: 1 inhaled 2 times a day  albuterol 0.63 mg/3 mL (0.021%) inhalation solution: 3 by nebulizer 3 times a day as needed for  shortness of breath and/or wheezing  Artificial Tears ophthalmic solution: 1 drop(s) to each affected eye 3 times a day  budesonide 1 mg/2 mL inhalation suspension: 2 milliliter(s) by nebulizer 3 times a day  cholecalciferol 25 mcg (1000 intl units) oral tablet: 1 tablet orally 4 times a week (S/T/T/F)  Eliquis: 2.5 orally 2 times a day  hydrALAZINE 50 mg oral tablet: 1 tab(s) orally 3 times a day  levothyroxine 50 mcg (0.05 mg) oral tablet: 1 tab(s) orally once a day  losartan 50 mg oral tablet: 1 tab(s) orally once a day  predniSONE 10 mg oral tablet: 1 tab(s) orally every other day  Spiriva 18 mcg inhalation capsule: 1 cap(s) inhaled once a day  Vitamin C 500 mg oral tablet, chewable: 2 tablet, chewable chewed take 1 tablet po on sunday, tuesday, thursday   Advair Diskus 500 mcg-50 mcg inhalation powder: 1 inhaled 2 times a day  Artificial Tears ophthalmic solution: 1 drop(s) to each affected eye 3 times a day  cholecalciferol 25 mcg (1000 intl units) oral tablet: 1 tablet orally 4 times a week (S/T/T/F)  Eliquis: 2.5 orally 2 times a day  hydrALAZINE 50 mg oral tablet: 1 tab(s) orally 3 times a day  levothyroxine 50 mcg (0.05 mg) oral tablet: 1 tab(s) orally once a day  losartan 50 mg oral tablet: 1 tab(s) orally once a day  predniSONE 10 mg oral tablet: 1 tab(s) orally every other day  Spiriva 18 mcg inhalation capsule: 1 cap(s) inhaled once a day  Vitamin C 500 mg oral tablet, chewable: 2 tablet, chewable chewed take 1 tablet po on sunday, tuesday, thursday

## 2025-02-04 NOTE — CASE MANAGEMENT PROGRESS NOTE - NSCMPROGRESSNOTE_GEN_ALL_CORE
Pt admitted with SOB, CHF, on room air. CM met with patient to discuss home care visiting nurse. Pt declined stated that she can manage her diagnosis and does not need a visiting nurse.

## 2025-02-04 NOTE — DISCHARGE NOTE PROVIDER - HOSPITAL COURSE
74-year-old female with PMH of COPD, A-fib on Eliquis, HTN, HLD, hypothyroidism, diverticulosis, Severe AS, CKD stage 3A, Monoclonal gammopathy, bradycardia (with normal chronotropic response) who presents to the ED with a chief complaint of SOB. This AM around 5:30 AM pt awoke from sleep with SOB and hoarseness. Her SOB improved with sitting up. Later in the day she was in the bank when she had an episode of sudden pain in her left arm, tingling down her left arm/toes, and palpitations, which resolved spontaneously. No chest pain. Admits to eating more salty foods than usual this past week. Also notes pain behind her left eye for the past 2 days. Patient has been dealing with bronchitis/COPD exacerbation since late december. She has completed 2 courses of doxycycline and 10 day course of prednisone 20 mg qd. Saw pulm on 1/28 and was started on an additional course of prednisone 10 mg every other day, which she has taken for 2 doses so far, was also started on budesonide nebulizer solution  TID and mucinex. Pt was admitted and seen by pulm and cardio. She was diuresed with improvement in breathing.   LABS:                        12.9   8.49  )-----------( 158      ( 03 Feb 2025 11:05 )             40.2     02-03    141  |  99  |  39[H]  ----------------------------<  95  3.6   |  30  |  1.50[H]    Ca    9.5      03 Feb 2025 11:05  Mg     2.0     02-03    RADIOLOGY & ADDITIONAL TESTS:  < from: CT Chest No Cont (02.03.25 @ 17:49) >    AIRWAYS AND LUNGS: The central tracheobronchial tree is patent.  Motion   limited exam. Bronchiectasis and scattered mucoid impaction. Calcified   granulomas. A few lung nodules measure up to 2 mm.    MEDIASTINUM AND PLEURA: There are no enlarged mediastinal, hilar or   axillary lymph nodes. The visualized portion of the thyroid gland is   unremarkable. There is no pleural effusion. There is no pneumothorax.    HEART AND VESSELS: There is mild cardiomegaly.  There are atherosclerotic   calcifications of the aorta and coronary arteries.  There is a small   pericardial effusion.  Aortic valve calcification.    UPPER ABDOMEN: Images of the upper abdomen demonstrate hiatal hernia.    BONES AND SOFT TISSUES: The bones are unremarkable.  The soft tissues are   unremarkable.    IMPRESSION:  Motion limited exam. Bronchiectasis and scattered mucoid impaction.  A   few lung nodules measure up to 2 mm. Continued follow-up CT recommended.    < end of copied text >    
Follow the DASH Diet: Dietary Approaches to Stop Hypertension) is a dietary pattern promoted by the U[place].-based National Heart, Lung, and [employer] [place] [part of the National Institutes of Health ("NIH"), an agency of the United [place] Department of Health and Human Services] to prevent and control hypertension. The DASH diet is rich in fruits, vegetables, whole grains, and low-fat dairy foods; includes meat, fish, poultry, nuts, and beans; and is limited in sugar-sweetened foods and beverages, red meat, and added fats. In addition to its effect on blood pressure, it is designed to be a well-balanced approach to eating for the general public. DASH is recommended by the [place] Department of Agriculture ("USDA") as one of its ideal eating plans for all Americans.   Healthy carbohydrates. During digestion, sugars (simple carbohydrates) and starches (complex carbohydrates) break down into blood glucose. Focus on the healthiest carbohydrates, such as fruits, vegetables, whole grains, legumes (beans, peas and lentils) and low-fat dairy products.  •Fiber-rich foods. Dietary fiber includes all parts of plant foods that your body can't digest or absorb. Fiber moderates how your body digests and helps control blood sugar levels. Foods high in fiber include vegetables, fruits, nuts, legumes (beans, peas and lentils), whole-wheat flour and wheat bran.

## 2025-02-04 NOTE — DISCHARGE NOTE NURSING/CASE MANAGEMENT/SOCIAL WORK - FINANCIAL ASSISTANCE
[facility] provides services at a reduced cost to those who are determined to be eligible through [facility]’s financial assistance program. Information regarding [facility]’s financial assistance program can be found by going to [email or web address] or by calling [phone].

## 2025-02-04 NOTE — PROGRESS NOTE ADULT - ASSESSMENT
74-year-old female with PMH of COPD, A-fib on Eliquis, HTN, HLD, hypothyroidism, diverticulosis, Severe AS, CKD stage 3A, Monoclonal gammopathy, bradycardia (with normal chronotropic response) who presents to the ED with a chief complaint of SOB. This AM around 5:30 AM pt awoke from sleep with SOB and hoarseness.    dyspnea  copd  AFIB  OP  OA  Hypothyroidism  HTN  HLD  AS  valv heart disease  CKD  Gammopathy    VBG c/w COPD  CT chest report pending  vs noted  meds reviewed  on RA    - TTE 09/2024 EF 60-65%, sev AS  - repeat TTE nL LV & RV size and function moderate AS EF 67%  cvs rx regimen optimization and diuresis as per cardio  trend renal indices  I and O  replete lytes  bronchodilators and systemic steroids for COPD - Bronchiectasis  I emerson  cough rx regimen  OLD record reviewed - with Blood gas - and Chest Imaging  check Sat on exertion  monitor VS and HD and Sat  on DOAC for AF  AFIB - rate and rhythm control

## 2025-02-04 NOTE — DISCHARGE NOTE PROVIDER - NSDCCPCAREPLAN_GEN_ALL_CORE_FT
PRINCIPAL DISCHARGE DIAGNOSIS  Diagnosis: CHF exacerbation  Assessment and Plan of Treatment:     
(2) more than 100 beats/min

## 2025-02-05 LAB — IGE SERPL-ACNC: 8 KU/L — SIGNIFICANT CHANGE UP

## 2025-02-05 RX ORDER — IPRATROPIUM BROMIDE AND ALBUTEROL SULFATE 2.5; .5 MG/3ML; MG/3ML
0.5-2.5 (3) SOLUTION RESPIRATORY (INHALATION)
Qty: 1 | Refills: 5 | Status: ACTIVE | COMMUNITY
Start: 2025-01-27

## 2025-02-05 RX ORDER — FLUTICASONE PROPIONATE AND SALMETEROL 500; 50 UG/1; UG/1
500-50 POWDER RESPIRATORY (INHALATION) TWICE DAILY
Qty: 3 | Refills: 3 | Status: ACTIVE | COMMUNITY
Start: 2025-01-27

## 2025-02-05 RX ORDER — TIOTROPIUM BROMIDE 18 UG/1
18 CAPSULE ORAL; RESPIRATORY (INHALATION) DAILY
Qty: 1 | Refills: 3 | Status: ACTIVE | COMMUNITY

## 2025-02-05 NOTE — ED PROVIDER NOTE - NSTIMEPROVIDERCAREINITIATE_GEN_ER
No further episodes of syncope.  Workup was fairly unrevealing.  Some mild ventricular tachycardia noted on most recent monitor.  However this was not sustained in EP lab and we have gone up on beta-blocker recently.  Will continue to watch this.       
06-Mar-2022 15:07

## 2025-02-12 NOTE — ED PROVIDER NOTE - EMPLOYMENT
N/A

## 2025-03-14 ENCOUNTER — EMERGENCY (EMERGENCY)
Facility: HOSPITAL | Age: 75
LOS: 1 days | Discharge: HOME | End: 2025-03-14
Attending: EMERGENCY MEDICINE | Admitting: EMERGENCY MEDICINE
Payer: MEDICARE

## 2025-03-14 VITALS
DIASTOLIC BLOOD PRESSURE: 70 MMHG | HEIGHT: 60 IN | RESPIRATION RATE: 18 BRPM | OXYGEN SATURATION: 93 % | WEIGHT: 171.08 LBS | SYSTOLIC BLOOD PRESSURE: 127 MMHG | HEART RATE: 73 BPM | TEMPERATURE: 98 F

## 2025-03-14 VITALS
SYSTOLIC BLOOD PRESSURE: 120 MMHG | OXYGEN SATURATION: 96 % | DIASTOLIC BLOOD PRESSURE: 70 MMHG | TEMPERATURE: 98 F | HEART RATE: 72 BPM | RESPIRATION RATE: 16 BRPM

## 2025-03-14 DIAGNOSIS — Z98.89 OTHER SPECIFIED POSTPROCEDURAL STATES: Chronic | ICD-10-CM

## 2025-03-14 LAB
ALBUMIN SERPL ELPH-MCNC: 2.9 G/DL — LOW (ref 3.3–5)
ALP SERPL-CCNC: 78 U/L — SIGNIFICANT CHANGE UP (ref 40–120)
ALT FLD-CCNC: 17 U/L — SIGNIFICANT CHANGE UP (ref 12–78)
ANION GAP SERPL CALC-SCNC: 7 MMOL/L — SIGNIFICANT CHANGE UP (ref 5–17)
APTT BLD: 33 SEC — SIGNIFICANT CHANGE UP (ref 24.5–35.6)
AST SERPL-CCNC: 18 U/L — SIGNIFICANT CHANGE UP (ref 15–37)
BASOPHILS # BLD AUTO: 0.03 K/UL — SIGNIFICANT CHANGE UP (ref 0–0.2)
BASOPHILS NFR BLD AUTO: 0.3 % — SIGNIFICANT CHANGE UP (ref 0–2)
BILIRUB SERPL-MCNC: 0.6 MG/DL — SIGNIFICANT CHANGE UP (ref 0.2–1.2)
BUN SERPL-MCNC: 21 MG/DL — SIGNIFICANT CHANGE UP (ref 7–23)
CALCIUM SERPL-MCNC: 9.2 MG/DL — SIGNIFICANT CHANGE UP (ref 8.5–10.1)
CHLORIDE SERPL-SCNC: 108 MMOL/L — SIGNIFICANT CHANGE UP (ref 96–108)
CK MB CFR SERPL CALC: 1.1 NG/ML — SIGNIFICANT CHANGE UP (ref 0–3.6)
CO2 SERPL-SCNC: 26 MMOL/L — SIGNIFICANT CHANGE UP (ref 22–31)
CREAT SERPL-MCNC: 1 MG/DL — SIGNIFICANT CHANGE UP (ref 0.5–1.3)
EGFR: 59 ML/MIN/1.73M2 — LOW
EGFR: 59 ML/MIN/1.73M2 — LOW
EOSINOPHIL # BLD AUTO: 0.09 K/UL — SIGNIFICANT CHANGE UP (ref 0–0.5)
EOSINOPHIL NFR BLD AUTO: 1 % — SIGNIFICANT CHANGE UP (ref 0–6)
GLUCOSE SERPL-MCNC: 110 MG/DL — HIGH (ref 70–99)
HCT VFR BLD CALC: 33.6 % — LOW (ref 34.5–45)
HGB BLD-MCNC: 10.9 G/DL — LOW (ref 11.5–15.5)
IMM GRANULOCYTES NFR BLD AUTO: 0.5 % — SIGNIFICANT CHANGE UP (ref 0–0.9)
INR BLD: 1.09 RATIO — SIGNIFICANT CHANGE UP (ref 0.85–1.16)
LYMPHOCYTES # BLD AUTO: 0.7 K/UL — LOW (ref 1–3.3)
LYMPHOCYTES # BLD AUTO: 8 % — LOW (ref 13–44)
MAGNESIUM SERPL-MCNC: 2.1 MG/DL — SIGNIFICANT CHANGE UP (ref 1.6–2.6)
MCHC RBC-ENTMCNC: 31.6 PG — SIGNIFICANT CHANGE UP (ref 27–34)
MCHC RBC-ENTMCNC: 32.4 G/DL — SIGNIFICANT CHANGE UP (ref 32–36)
MCV RBC AUTO: 97.4 FL — SIGNIFICANT CHANGE UP (ref 80–100)
MONOCYTES # BLD AUTO: 0.7 K/UL — SIGNIFICANT CHANGE UP (ref 0–0.9)
MONOCYTES NFR BLD AUTO: 8 % — SIGNIFICANT CHANGE UP (ref 2–14)
NEUTROPHILS # BLD AUTO: 7.24 K/UL — SIGNIFICANT CHANGE UP (ref 1.8–7.4)
NEUTROPHILS NFR BLD AUTO: 82.2 % — HIGH (ref 43–77)
NRBC BLD AUTO-RTO: 0 /100 WBCS — SIGNIFICANT CHANGE UP (ref 0–0)
NT-PROBNP SERPL-SCNC: 989 PG/ML — HIGH (ref 0–125)
PLATELET # BLD AUTO: 207 K/UL — SIGNIFICANT CHANGE UP (ref 150–400)
POTASSIUM SERPL-MCNC: 4.7 MMOL/L — SIGNIFICANT CHANGE UP (ref 3.5–5.3)
POTASSIUM SERPL-SCNC: 4.7 MMOL/L — SIGNIFICANT CHANGE UP (ref 3.5–5.3)
PROT SERPL-MCNC: 6.4 G/DL — SIGNIFICANT CHANGE UP (ref 6–8.3)
PROTHROM AB SERPL-ACNC: 12.8 SEC — SIGNIFICANT CHANGE UP (ref 9.9–13.4)
RBC # BLD: 3.45 M/UL — LOW (ref 3.8–5.2)
RBC # FLD: 15.9 % — HIGH (ref 10.3–14.5)
SODIUM SERPL-SCNC: 141 MMOL/L — SIGNIFICANT CHANGE UP (ref 135–145)
TROPONIN I, HIGH SENSITIVITY RESULT: 15.1 NG/L — SIGNIFICANT CHANGE UP
WBC # BLD: 8.8 K/UL — SIGNIFICANT CHANGE UP (ref 3.8–10.5)
WBC # FLD AUTO: 8.8 K/UL — SIGNIFICANT CHANGE UP (ref 3.8–10.5)

## 2025-03-14 PROCEDURE — 93010 ELECTROCARDIOGRAM REPORT: CPT

## 2025-03-14 PROCEDURE — 80053 COMPREHEN METABOLIC PANEL: CPT

## 2025-03-14 PROCEDURE — 96360 HYDRATION IV INFUSION INIT: CPT

## 2025-03-14 PROCEDURE — 85025 COMPLETE CBC W/AUTO DIFF WBC: CPT

## 2025-03-14 PROCEDURE — 82553 CREATINE MB FRACTION: CPT

## 2025-03-14 PROCEDURE — 96361 HYDRATE IV INFUSION ADD-ON: CPT

## 2025-03-14 PROCEDURE — 84484 ASSAY OF TROPONIN QUANT: CPT

## 2025-03-14 PROCEDURE — 70450 CT HEAD/BRAIN W/O DYE: CPT | Mod: MC

## 2025-03-14 PROCEDURE — 85730 THROMBOPLASTIN TIME PARTIAL: CPT

## 2025-03-14 PROCEDURE — 70450 CT HEAD/BRAIN W/O DYE: CPT | Mod: 26

## 2025-03-14 PROCEDURE — 99285 EMERGENCY DEPT VISIT HI MDM: CPT

## 2025-03-14 PROCEDURE — 36415 COLL VENOUS BLD VENIPUNCTURE: CPT

## 2025-03-14 PROCEDURE — 99285 EMERGENCY DEPT VISIT HI MDM: CPT | Mod: 25

## 2025-03-14 PROCEDURE — 83880 ASSAY OF NATRIURETIC PEPTIDE: CPT

## 2025-03-14 PROCEDURE — 71045 X-RAY EXAM CHEST 1 VIEW: CPT | Mod: 26

## 2025-03-14 PROCEDURE — 71045 X-RAY EXAM CHEST 1 VIEW: CPT

## 2025-03-14 PROCEDURE — 83735 ASSAY OF MAGNESIUM: CPT

## 2025-03-14 PROCEDURE — 85610 PROTHROMBIN TIME: CPT

## 2025-03-14 PROCEDURE — 93005 ELECTROCARDIOGRAM TRACING: CPT

## 2025-03-14 RX ADMIN — Medication 1000 MILLILITER(S): at 22:15

## 2025-03-14 RX ADMIN — Medication 1000 MILLILITER(S): at 19:15

## 2025-03-14 NOTE — ED PROVIDER NOTE - BIRTH SEX
Received request for a PA to be completed for pt's Farxiga 5mg tabs. Called to pt for general information.   Pt stated that she has been on [place] for around 2 years with good results and that she had tried Jardiance in the past.  Form completed, document Female

## 2025-03-14 NOTE — ED PROVIDER NOTE - WR ORDER DATE AND TIME 1
14-Mar-2025 19:33 Isotretinoin Counseling: Patient should get monthly blood tests, not donate blood, not drive at night if vision affected, not share medication, and not undergo elective surgery for 6 months after tx completed. Side effects reviewed, pt to contact office should one occur.

## 2025-03-14 NOTE — ED PROVIDER NOTE - IV ALTEPLASE DOOR HIDDEN
Post Op Note    Juan Mike is a 76 y.o. male s/p Left - CYSTOSCOPY URETEROSCOPY WITH LITHOTRIPSY HOLMIUM LASER. RETROGRADE PYELOGRAM. BASKETING AND INSERTION STENT URETERAL. URINE CULTURE performed 10/24/2024.  Juan Mike is a patient of Dr. Banks and is seen at the [place] office.     How would you rate your pain on a scale from 1 to 10, 10 being the worst pain ever? 0  Have you had a fever? No  Have your bowel movements been regular? Yes  Do you have any difficulty urinating? No  Do you have any other questions or concerns that I can address at this time? Patient has cysto on 11/4, questioning if he is to keep it.     Called and spoke with patient. He is doing well overall with some burning with urination. Reviewed stent expectations and supportive measures. He is aware to remove stent Sunday and did not want RN to review it with him. He will call after to confirm removal. Informed on KUB in 8-12 weeks. Will call with results. He has question about keeping upcoming cysto appt. Informed that it looks like it was for worsening nocturia and that he should keep it but would clarify with provider.       show

## 2025-03-14 NOTE — ED PROVIDER NOTE - OBJECTIVE STATEMENT
74 female PMH of A-fib on Eliquis, HTN, HLD, hypothyroid, COPD presents to the emergency department by ambulance states that she was shopping, then felt lightheaded, tingling of her lower extremities, was helped to sit down, felt like she was going to pass out, bystander called ambulance.  Patient currently feeling well, denies chest pain no difficulty breathing.

## 2025-03-14 NOTE — ED ADULT NURSE REASSESSMENT NOTE - NS ED NURSE REASSESS COMMENT FT1
pt reavaluated and vital signs stable  dc home to follow up  and verbalizes understanding to follow up

## 2025-03-14 NOTE — ED PROVIDER NOTE - CLINICAL SUMMARY MEDICAL DECISION MAKING FREE TEXT BOX
74 female with near syncopal episode, now feeling well, follow-up CT head, chest x-ray, CBC, CMP, troponin, EKG and reevaluate.

## 2025-03-14 NOTE — ED PROVIDER NOTE - PROGRESS NOTE DETAILS
Patient feeling well, no chest pain, no shortness of breath labs, EKG, CT head, chest x-ray reviewed with the patient, agrees to follow-up with primary care doctor and cardiologist.

## 2025-03-14 NOTE — ED ADULT NURSE NOTE - OBJECTIVE STATEMENT
Received patient  via EMS. Alert and orientated times 4  Reporting weakness in bilateral lower extremities. Patient reported the weakness descended up the legs , Denied LOC, Chest pain, moves all extremities.   Currently patient has no complaint.

## 2025-03-14 NOTE — ED PROVIDER NOTE - PATIENT PORTAL LINK FT
You can access the FollowMyHealth Patient Portal offered by [facility] by registering at the following website: [email or web address]. By joining [facility]’s FollowMyHealth portal, you will also be able to view your health information using other applications (apps) compatible with our system.

## 2025-03-14 NOTE — ED PROVIDER NOTE - NSICDXFAMILYHX_GEN_ALL_CORE_FT
FAMILY HISTORY:  FH: hypertension    Father  Still living? Unknown  FH: hyperlipidemia, Age at diagnosis: Age Unknown  FH: prostate cancer, Age at diagnosis: Age Unknown    Mother  Still living? Unknown  FH: emphysema, Age at diagnosis: Age Unknown

## 2025-03-14 NOTE — ED PROVIDER NOTE - CARE PROVIDER_API CALL
Palla, Venugopal Reddy  Cardiovascular Disease  [address], [address]  [place] [address]  Phone: [phone]  Fax: [phone]  Follow Up Time:     Estefania Lynch  Family Medicine  Follow Up Time:

## 2025-03-14 NOTE — ED PROVIDER NOTE - CARE PROVIDERS DIRECT ADDRESSES
[email or web address]

## 2025-03-14 NOTE — ED ADULT TRIAGE NOTE - CHIEF COMPLAINT QUOTE
increased thirst, lightheadedness, and tingling to her lower extremities before having to sit down. Patient became dizzy and had to sit down. Patient now feels back to baseline

## 2025-03-14 NOTE — ED PROVIDER NOTE - NSFOLLOWUPINSTRUCTIONS_ED_ALL_ED_FT
Follow-up with your primary care doctor and your cardiologist.  Continue taking medications as prescribed by your physicians.  Return to the emergency department if having chest pain, difficulty breathing and or worsening of symptoms.

## 2025-03-14 NOTE — ED PROVIDER NOTE - HOW PATIENT ADDRESSED, PROFILE
Anesthesia Pre-Procedure Evaluation    Patient: Maurice Jon   MRN: [id number] : 1960          Preoperative Diagnosis: ATRIAL FIBRILLATION    Procedure(s):  ANESTHESIA FOR CARDIOVERSION  DR. MURGUIA)    Past Medical History:   Diagnosis Date     Acute pulmonary edema (H)      Atrial fibrillation (H)      Atrial fibrillation with rapid ventricular response (H) 2013     CAD (coronary artery disease)     Cath 18- mild nonobstructive disease     Calculus of ureter ,     history of     Cardiomyopathy (H)     18 Echo- EF 25-30%     Chronic systolic CHF (congestive heart failure) (H)      Cirrhosis of liver without mention of alcohol 2005    Cirrhosis, idiopathic     Edema 2013     Esophageal varices with bleeding(456.0)      Gallstones      Genital herpes, unspecified 3/20/1999    resolving herpes progenitalis     GERD (gastroesophageal reflux disease)      Hematuria      Hypertension      Hypochondriasis     problems in past     Obesity 2010     BRUCE (obstructive sleep apnea) 2009    Mild AHI 8.4  RDI 31 - Pt refuses to wear his CPAP     Pericarditis      Portal hypertension (H) 2010     Unspecified thrombocytopenia 2005    Thrombocytopenia associated with cirrhosis and portal hypertension     Past Surgical History:   Procedure Laterality Date     ANESTHESIA CARDIOVERSION N/A 2015    Procedure: ANESTHESIA CARDIOVERSION;  Surgeon: Generic Anesthesia Provider;  Location: [facility]     ANESTHESIA CARDIOVERSION N/A 2019    Procedure: ANESTHESIA CARDIOVERSION;  Surgeon: GENERIC ANESTHESIA PROVIDER;  Location:  OR     COLONOSCOPY N/A 2017    Procedure: COLONOSCOPY;  Colonoscopy Called will arrive appx 12:30 Per phone call;  Surgeon: Vincent Whittington MD;  [facility]: [facility]     CV HEART CATHETERIZATION WITH POSSIBLE INTERVENTION N/A 2018    Procedure: Heart Catheterization with possible Intervention;  Surgeon: Brandon Arroyo MD;  Location:   HEART CARDIAC CATH LAB     EP ABLATION FOCAL AFIB N/A 12/21/2018    Procedure: EP Ablation Focal AFIB;  Surgeon: Kristofer Evans MD;  Location:  HEART CARDIAC CATH LAB     ESOPHAGOSCOPY, GASTROSCOPY, DUODENOSCOPY (EGD), COMBINED  7/11/2011    Procedure:COMBINED ESOPHAGOSCOPY, GASTROSCOPY, DUODENOSCOPY (EGD); Surgeon:LAURA LAMAS; Location:[facility]     ESOPHAGOSCOPY, GASTROSCOPY, DUODENOSCOPY (EGD), COMBINED  9/10/2012    Procedure: COMBINED ESOPHAGOSCOPY, GASTROSCOPY, DUODENOSCOPY (EGD);;  Surgeon: Hi Roque MD;  Location:  GI     ESOPHAGOSCOPY, GASTROSCOPY, DUODENOSCOPY (EGD), COMBINED  9/9/2013    Procedure: COMBINED ESOPHAGOSCOPY, GASTROSCOPY, DUODENOSCOPY (EGD);;  Surgeon: Hi Roque MD;  Location:  GI     ESOPHAGOSCOPY, GASTROSCOPY, DUODENOSCOPY (EGD), COMBINED N/A 9/15/2014    Procedure: COMBINED ESOPHAGOSCOPY, GASTROSCOPY, DUODENOSCOPY (EGD);  Surgeon: Hi Roque MD;  Location:  GI     ESOPHAGOSCOPY, GASTROSCOPY, DUODENOSCOPY (EGD), COMBINED N/A 10/30/2015    Procedure: COMBINED ESOPHAGOSCOPY, GASTROSCOPY, DUODENOSCOPY (EGD);  Surgeon: Feliberto Fox MD;  Location:  GI     ESOPHAGOSCOPY, GASTROSCOPY, DUODENOSCOPY (EGD), COMBINED N/A 10/10/2016    Procedure: COMBINED ESOPHAGOSCOPY, GASTROSCOPY, DUODENOSCOPY (EGD);  Surgeon: Jose Nesbitt MD;  Location:  GI     H ABLATION FOCAL AFIB  12/21/2018     HERNIA REPAIR       IRRIGATION AND DEBRIDEMENT ABSCESS SCROTUM, COMBINED  10/4/2012    Procedure: COMBINED IRRIGATION AND DEBRIDEMENT ABSCESS SCROTUM;  Irrigation and Debridement of Groin Abscess;  Surgeon: Benny Shoemaker MD;  Location: [facility] OR     SOFT TISSUE SURGERY       SURGICAL HISTORY OF -   1993    rt elbow     SURGICAL HISTORY OF -   1/15/98    repair of ventral and umbilical hernia     SURGICAL HISTORY OF -   2001    endoscopy       Anesthesia Evaluation     . Pt has had prior anesthetic.     No history of anesthetic complications          ROS/MED  HX    ENT/Pulmonary:     (+)sleep apnea (mild), uses CPAP , . .    Neurologic:       Cardiovascular:     (+) hypertension-range: controlled, ---. : . CHF etiology: post PVI complication . . :. dysrhythmias a-fib, . Previous cardiac testing Echodate:2019results:Left ventricular systolic function is low normal. The visual ejection fraction  is estimated at 55% (biplane 54%).  There is mild concentric left ventricular hypertrophy.  There is mod-severe biatrial enlargement.  Mild (35-45mmHg) pulmonary hypertension is present.  The rhythm was rapid atrial fibrillation.  Compared to the prior echo, LV function has improved significantly.date: results: date: results: date: results:          METS/Exercise Tolerance:     Hematologic:     (+) Other Hematologic Disorder-thrombocytopenia      Musculoskeletal:         GI/Hepatic:     (+) GERD Asymptomatic on medication, cholecystitis/cholelithiasis, liver disease (cirrhosis), Other GI/Hepatic portal HTN and ho portal vein thrombosis.   ascites.  Does have varices      Renal/Genitourinary:     (+) Nephrolithiasis ,       Endo:     (+) Obesity (BMI 40), .   (-) Type II DM   Psychiatric:         Infectious Disease:         Malignancy:         Other:                                 Lab Results   Component Value Date    WBC 3.6 (L) 03/15/2019    HGB 13.2 (L) 03/15/2019    HCT 39.7 (L) 03/15/2019    PLT 72 (L) 03/15/2019    CRP 3.6 03/15/2019    SED 9 03/15/2019     03/15/2019    POTASSIUM 3.7 03/15/2019    CHLORIDE 105 03/15/2019    CO2 27 03/15/2019    BUN 19 03/15/2019    CR 0.74 03/15/2019    GLC 78 03/15/2019    HALEY 8.8 03/15/2019    MAG 1.9 02/06/2019    ALBUMIN 3.3 (L) 03/15/2019    PROTTOTAL 6.3 (L) 03/15/2019    ALT 44 03/15/2019    AST 48 (H) 03/15/2019    GGT 44 08/16/2005    ALKPHOS 82 03/15/2019    BILITOTAL 1.4 (H) 03/15/2019    LIPASE 119 07/13/2015    HIMANSHU 69 (H) 07/13/2015    PTT 42 (H) 07/14/2015    INR 2.3 (A) 07/01/2019    FIBR 333 12/12/2013    TSH 5.48 (H)  "01/03/2019    T4 1.11 01/03/2019       Preop Vitals  BP Readings from Last 3 Encounters:   06/25/19 (P) 113/57   03/21/19 102/63   03/15/19 100/52    Pulse Readings from Last 3 Encounters:   06/25/19 (P) 85   03/21/19 62   03/15/19 72      Resp Readings from Last 3 Encounters:   03/15/19 12   02/06/19 17   02/01/19 16    SpO2 Readings from Last 3 Encounters:   06/25/19 (P) 96%   03/21/19 97%   03/15/19 96%      Temp Readings from Last 1 Encounters:   03/21/19 36.6  C (97.9  F) (Oral)    Ht Readings from Last 1 Encounters:   03/15/19 1.651 m (5' 5\")      Wt Readings from Last 1 Encounters:   06/25/19 (P) 105 kg (231 lb 6.4 oz)    Estimated body mass index is 38.51 kg/m  (pended) as calculated from the following:    Height as of 3/15/19: 1.651 m (5' 5\").    Weight as of 6/25/19: (P) 105 kg (231 lb 6.4 oz).       Anesthesia Plan      History & Physical Review  History and physical reviewed and following examination; no interval change.    ASA Status:  3 .        Plan for General with Intravenous and Propofol induction.   PONV prophylaxis:  Ondansetron (or other 5HT-3)       Postoperative Care  Postoperative pain management:  Multi-modal analgesia.      Consents  Anesthetic plan, risks, benefits and alternatives discussed with:  Patient..                 Jose Alberto Garcia MD  " debi

## 2025-03-19 ENCOUNTER — APPOINTMENT (OUTPATIENT)
Facility: CLINIC | Age: 75
End: 2025-03-19
Payer: MEDICARE

## 2025-03-19 VITALS
DIASTOLIC BLOOD PRESSURE: 89 MMHG | RESPIRATION RATE: 17 BRPM | HEART RATE: 79 BPM | WEIGHT: 168 LBS | SYSTOLIC BLOOD PRESSURE: 137 MMHG | BODY MASS INDEX: 32.98 KG/M2 | TEMPERATURE: 98.3 F | OXYGEN SATURATION: 97 % | HEIGHT: 60 IN

## 2025-03-19 DIAGNOSIS — R01.1 CARDIAC MURMUR, UNSPECIFIED: ICD-10-CM

## 2025-03-19 DIAGNOSIS — R55 SYNCOPE AND COLLAPSE: ICD-10-CM

## 2025-03-19 DIAGNOSIS — J43.9 EMPHYSEMA, UNSPECIFIED: ICD-10-CM

## 2025-03-19 DIAGNOSIS — R91.1 SOLITARY PULMONARY NODULE: ICD-10-CM

## 2025-03-19 DIAGNOSIS — Z99.81 DEPENDENCE ON SUPPLEMENTAL OXYGEN: ICD-10-CM

## 2025-03-19 DIAGNOSIS — G47.33 OBSTRUCTIVE SLEEP APNEA (ADULT) (PEDIATRIC): ICD-10-CM

## 2025-03-19 DIAGNOSIS — I35.0 NONRHEUMATIC AORTIC (VALVE) STENOSIS: ICD-10-CM

## 2025-03-19 DIAGNOSIS — Z87.891 PERSONAL HISTORY OF NICOTINE DEPENDENCE: ICD-10-CM

## 2025-03-19 DIAGNOSIS — J44.1 CHRONIC OBSTRUCTIVE PULMONARY DISEASE WITH (ACUTE) EXACERBATION: ICD-10-CM

## 2025-03-19 DIAGNOSIS — R91.8 OTHER NONSPECIFIC ABNORMAL FINDING OF LUNG FIELD: ICD-10-CM

## 2025-03-19 DIAGNOSIS — J44.9 CHRONIC OBSTRUCTIVE PULMONARY DISEASE, UNSPECIFIED: ICD-10-CM

## 2025-03-19 DIAGNOSIS — Z82.5 FAMILY HISTORY OF ASTHMA AND OTHER CHRONIC LOWER RESPIRATORY DISEASES: ICD-10-CM

## 2025-03-19 PROCEDURE — 99214 OFFICE O/P EST MOD 30 MIN: CPT

## 2025-03-23 PROBLEM — J43.9 COPD WITH EMPHYSEMA: Status: ACTIVE | Noted: 2025-03-23

## 2025-03-23 PROBLEM — R91.1 PULMONARY NODULE: Status: RESOLVED | Noted: 2024-08-27 | Resolved: 2025-03-23

## 2025-03-23 PROBLEM — J44.1 ACUTE EXACERBATION OF CHRONIC OBSTRUCTIVE PULMONARY DISEASE (COPD): Status: RESOLVED | Noted: 2025-01-27 | Resolved: 2025-03-23

## 2025-03-23 PROBLEM — R55 SYNCOPE, UNSPECIFIED SYNCOPE TYPE: Status: ACTIVE | Noted: 2025-03-23

## 2025-04-02 ENCOUNTER — NON-APPOINTMENT (OUTPATIENT)
Age: 75
End: 2025-04-02

## 2025-04-04 ENCOUNTER — APPOINTMENT (OUTPATIENT)
Dept: CARDIOLOGY | Facility: CLINIC | Age: 75
End: 2025-04-04
Payer: MEDICARE

## 2025-04-04 VITALS
HEIGHT: 60 IN | HEART RATE: 64 BPM | SYSTOLIC BLOOD PRESSURE: 170 MMHG | OXYGEN SATURATION: 98 % | RESPIRATION RATE: 15 BRPM | DIASTOLIC BLOOD PRESSURE: 82 MMHG

## 2025-04-04 DIAGNOSIS — I35.0 NONRHEUMATIC AORTIC (VALVE) STENOSIS: ICD-10-CM

## 2025-04-04 DIAGNOSIS — I48.91 UNSPECIFIED ATRIAL FIBRILLATION: ICD-10-CM

## 2025-04-04 DIAGNOSIS — N18.9 CHRONIC KIDNEY DISEASE, UNSPECIFIED: ICD-10-CM

## 2025-04-04 DIAGNOSIS — R55 SYNCOPE AND COLLAPSE: ICD-10-CM

## 2025-04-04 DIAGNOSIS — I10 ESSENTIAL (PRIMARY) HYPERTENSION: ICD-10-CM

## 2025-04-04 DIAGNOSIS — E78.00 PURE HYPERCHOLESTEROLEMIA, UNSPECIFIED: ICD-10-CM

## 2025-04-04 DIAGNOSIS — I50.32 CHRONIC DIASTOLIC (CONGESTIVE) HEART FAILURE: ICD-10-CM

## 2025-04-04 DIAGNOSIS — I25.10 ATHEROSCLEROTIC HEART DISEASE OF NATIVE CORONARY ARTERY W/OUT ANGINA PECTORIS: ICD-10-CM

## 2025-04-04 PROCEDURE — G2211 COMPLEX E/M VISIT ADD ON: CPT

## 2025-04-04 PROCEDURE — 93000 ELECTROCARDIOGRAM COMPLETE: CPT

## 2025-04-04 PROCEDURE — 99215 OFFICE O/P EST HI 40 MIN: CPT

## 2025-04-11 ENCOUNTER — APPOINTMENT (OUTPATIENT)
Dept: NEPHROLOGY | Facility: CLINIC | Age: 75
End: 2025-04-11

## 2025-04-11 VITALS
HEART RATE: 61 BPM | HEIGHT: 60 IN | SYSTOLIC BLOOD PRESSURE: 140 MMHG | DIASTOLIC BLOOD PRESSURE: 64 MMHG | TEMPERATURE: 97.3 F | OXYGEN SATURATION: 97 % | BODY MASS INDEX: 33.96 KG/M2 | WEIGHT: 173 LBS

## 2025-04-11 DIAGNOSIS — N18.31 CHRONIC KIDNEY DISEASE, STAGE 3A: ICD-10-CM

## 2025-04-11 PROCEDURE — 99214 OFFICE O/P EST MOD 30 MIN: CPT

## 2025-04-23 ENCOUNTER — APPOINTMENT (OUTPATIENT)
Dept: CARDIOLOGY | Facility: CLINIC | Age: 75
End: 2025-04-23
Payer: MEDICARE

## 2025-04-23 VITALS
WEIGHT: 172 LBS | BODY MASS INDEX: 33.77 KG/M2 | SYSTOLIC BLOOD PRESSURE: 170 MMHG | OXYGEN SATURATION: 100 % | DIASTOLIC BLOOD PRESSURE: 70 MMHG | HEIGHT: 60 IN | HEART RATE: 54 BPM

## 2025-04-23 PROCEDURE — 93000 ELECTROCARDIOGRAM COMPLETE: CPT

## 2025-04-23 PROCEDURE — 99205 OFFICE O/P NEW HI 60 MIN: CPT | Mod: 25

## 2025-05-13 NOTE — ED PROVIDER NOTE - ASSOCIATED SYMPTOMS
Parameds requested medical records.  Faxed to [employer] for completion.  Fax confirmed   see above

## 2025-05-14 NOTE — DIETITIAN INITIAL EVALUATION ADULT - ORAL INTAKE PTA/DIET HISTORY
chest wall non-tender, breathing is unlabored without accessory muscle use, normal breath sounds
Pt reports fair appetite/intake PTA. Typically consumes 3 meals/day (B- cereal, L- salad, D- turkey, vegetable, applesauce). Pt does not add salt to food. Prepares own meals. Pt does state over this past week she did have company over and she did consume more salty food items.

## 2025-05-21 ENCOUNTER — APPOINTMENT (OUTPATIENT)
Dept: OTOLARYNGOLOGY | Facility: CLINIC | Age: 75
End: 2025-05-21
Payer: MEDICARE

## 2025-05-21 VITALS
DIASTOLIC BLOOD PRESSURE: 77 MMHG | HEIGHT: 60 IN | WEIGHT: 171 LBS | SYSTOLIC BLOOD PRESSURE: 156 MMHG | HEART RATE: 68 BPM | BODY MASS INDEX: 33.57 KG/M2

## 2025-05-21 DIAGNOSIS — J31.0 CHRONIC RHINITIS: ICD-10-CM

## 2025-05-21 DIAGNOSIS — R04.0 EPISTAXIS: ICD-10-CM

## 2025-05-21 DIAGNOSIS — D68.9 POISONING BY ANTICOAGULANT ANTAGONISTS, VITAMIN K AND OTHER COAGULANTS, ACCIDENTAL (UNINTENTIONAL), INITIAL ENCOUNTER: ICD-10-CM

## 2025-05-21 DIAGNOSIS — T45.7X1A POISONING BY ANTICOAGULANT ANTAGONISTS, VITAMIN K AND OTHER COAGULANTS, ACCIDENTAL (UNINTENTIONAL), INITIAL ENCOUNTER: ICD-10-CM

## 2025-05-21 PROCEDURE — 31238 NSL/SINS NDSC SRG NSL HEMRRG: CPT | Mod: LT

## 2025-05-21 PROCEDURE — 99213 OFFICE O/P EST LOW 20 MIN: CPT | Mod: 25

## 2025-05-22 ENCOUNTER — NON-APPOINTMENT (OUTPATIENT)
Age: 75
End: 2025-05-22

## 2025-05-22 ENCOUNTER — APPOINTMENT (OUTPATIENT)
Dept: CARDIOLOGY | Facility: CLINIC | Age: 75
End: 2025-05-22
Payer: MEDICARE

## 2025-05-22 VITALS
HEIGHT: 60 IN | WEIGHT: 172 LBS | DIASTOLIC BLOOD PRESSURE: 74 MMHG | OXYGEN SATURATION: 97 % | HEART RATE: 65 BPM | SYSTOLIC BLOOD PRESSURE: 142 MMHG | BODY MASS INDEX: 33.77 KG/M2

## 2025-05-22 DIAGNOSIS — I50.32 CHRONIC DIASTOLIC (CONGESTIVE) HEART FAILURE: ICD-10-CM

## 2025-05-22 DIAGNOSIS — J44.9 CHRONIC OBSTRUCTIVE PULMONARY DISEASE, UNSPECIFIED: ICD-10-CM

## 2025-05-22 DIAGNOSIS — I35.0 NONRHEUMATIC AORTIC (VALVE) STENOSIS: ICD-10-CM

## 2025-05-22 DIAGNOSIS — E78.00 PURE HYPERCHOLESTEROLEMIA, UNSPECIFIED: ICD-10-CM

## 2025-05-22 DIAGNOSIS — I48.91 UNSPECIFIED ATRIAL FIBRILLATION: ICD-10-CM

## 2025-05-22 DIAGNOSIS — I10 ESSENTIAL (PRIMARY) HYPERTENSION: ICD-10-CM

## 2025-05-22 PROCEDURE — 99214 OFFICE O/P EST MOD 30 MIN: CPT

## 2025-05-22 PROCEDURE — 93000 ELECTROCARDIOGRAM COMPLETE: CPT

## 2025-05-22 PROCEDURE — G2211 COMPLEX E/M VISIT ADD ON: CPT

## 2025-05-22 RX ORDER — PREDNISONE 5 MG/1
5 TABLET ORAL DAILY
Refills: 0 | Status: ACTIVE | COMMUNITY

## 2025-06-18 ENCOUNTER — APPOINTMENT (OUTPATIENT)
Facility: CLINIC | Age: 75
End: 2025-06-18
Payer: MEDICARE

## 2025-06-18 VITALS
RESPIRATION RATE: 16 BRPM | TEMPERATURE: 97.7 F | OXYGEN SATURATION: 95 % | WEIGHT: 171 LBS | HEIGHT: 62 IN | SYSTOLIC BLOOD PRESSURE: 140 MMHG | HEART RATE: 71 BPM | DIASTOLIC BLOOD PRESSURE: 76 MMHG | BODY MASS INDEX: 31.47 KG/M2

## 2025-06-18 PROBLEM — Z86.2 HISTORY OF ANEMIA: Status: RESOLVED | Noted: 2024-08-27 | Resolved: 2025-06-18

## 2025-06-18 PROBLEM — Z87.09 HISTORY OF BRONCHIECTASIS: Status: RESOLVED | Noted: 2024-08-27 | Resolved: 2025-06-18

## 2025-06-18 PROBLEM — Z86.0100 HISTORY OF COLONIC POLYPS: Status: RESOLVED | Noted: 2024-08-27 | Resolved: 2025-06-18

## 2025-06-18 PROBLEM — I50.9 ACUTE CONGESTIVE HEART FAILURE, UNSPECIFIED HEART FAILURE TYPE: Status: ACTIVE | Noted: 2025-06-18

## 2025-06-18 PROBLEM — N17.9 AKI (ACUTE KIDNEY INJURY): Status: RESOLVED | Noted: 2023-03-09 | Resolved: 2025-06-18

## 2025-06-18 PROCEDURE — 99215 OFFICE O/P EST HI 40 MIN: CPT

## 2025-06-18 PROCEDURE — G2211 COMPLEX E/M VISIT ADD ON: CPT

## 2025-06-25 NOTE — PROGRESS NOTE ADULT - PROBLEM/PLAN-6
Typhoid  
DISPLAY PLAN FREE TEXT

## 2025-06-30 ENCOUNTER — APPOINTMENT (OUTPATIENT)
Facility: CLINIC | Age: 75
End: 2025-06-30

## 2025-07-11 ENCOUNTER — APPOINTMENT (OUTPATIENT)
Dept: NEPHROLOGY | Facility: CLINIC | Age: 75
End: 2025-07-11
Payer: MEDICARE

## 2025-07-11 VITALS
SYSTOLIC BLOOD PRESSURE: 118 MMHG | WEIGHT: 174 LBS | OXYGEN SATURATION: 94 % | HEART RATE: 66 BPM | BODY MASS INDEX: 32.02 KG/M2 | TEMPERATURE: 96.3 F | HEIGHT: 62 IN | DIASTOLIC BLOOD PRESSURE: 62 MMHG

## 2025-07-11 PROCEDURE — 99214 OFFICE O/P EST MOD 30 MIN: CPT

## 2025-07-11 RX ORDER — HYDROCORTISONE 10 MG/1
10 TABLET ORAL
Qty: 30 | Refills: 0 | Status: ACTIVE | COMMUNITY
Start: 2025-07-11

## 2025-07-18 ENCOUNTER — APPOINTMENT (OUTPATIENT)
Dept: CARDIOLOGY | Facility: CLINIC | Age: 75
End: 2025-07-18
Payer: MEDICARE

## 2025-07-18 VITALS
BODY MASS INDEX: 32.02 KG/M2 | DIASTOLIC BLOOD PRESSURE: 70 MMHG | SYSTOLIC BLOOD PRESSURE: 160 MMHG | OXYGEN SATURATION: 97 % | HEART RATE: 60 BPM | WEIGHT: 174 LBS | HEIGHT: 62 IN

## 2025-07-18 PROBLEM — E27.40 ADRENAL INSUFFICIENCY: Status: ACTIVE | Noted: 2025-06-18

## 2025-07-18 PROBLEM — Z95.0 S/P PLACEMENT OF LEADLESS CARDIAC PACEMAKER: Status: ACTIVE | Noted: 2025-07-18

## 2025-07-18 PROCEDURE — 99214 OFFICE O/P EST MOD 30 MIN: CPT

## 2025-07-18 PROCEDURE — 93000 ELECTROCARDIOGRAM COMPLETE: CPT

## 2025-07-18 PROCEDURE — G2211 COMPLEX E/M VISIT ADD ON: CPT

## 2025-07-18 RX ORDER — FLUTICASONE PROPIONATE AND SALMETEROL 500; 50 UG/1; UG/1
500-50 POWDER RESPIRATORY (INHALATION) TWICE DAILY
Refills: 0 | Status: ACTIVE | COMMUNITY

## 2025-07-18 RX ORDER — CYANOCOBALAMIN (VITAMIN B-12) 1000 MCG
TABLET ORAL DAILY
Refills: 0 | Status: ACTIVE | COMMUNITY

## 2025-07-18 RX ORDER — TIOTROPIUM BROMIDE 18 UG/1
18 CAPSULE ORAL; RESPIRATORY (INHALATION) DAILY
Refills: 0 | Status: ACTIVE | COMMUNITY

## 2025-07-18 RX ORDER — AMLODIPINE BESYLATE 5 MG/1
5 TABLET ORAL DAILY
Qty: 30 | Refills: 1 | Status: ACTIVE | COMMUNITY
Start: 2025-07-18 | End: 1900-01-01

## 2025-07-18 RX ORDER — ASCORBIC ACID 125 MG
TABLET,CHEWABLE ORAL
Refills: 0 | Status: ACTIVE | COMMUNITY

## 2025-07-28 ENCOUNTER — MED ADMIN CHARGE (OUTPATIENT)
Age: 75
End: 2025-07-28

## 2025-07-28 RX ADMIN — TRIAMCINOLONE ACETONIDE 1.5 MG/ML: 40 INJECTION, SUSPENSION INTRA-ARTICULAR; INTRAMUSCULAR at 00:00

## 2025-07-29 ENCOUNTER — APPOINTMENT (OUTPATIENT)
Facility: CLINIC | Age: 75
End: 2025-07-29

## 2025-07-29 VITALS
SYSTOLIC BLOOD PRESSURE: 114 MMHG | BODY MASS INDEX: 31.83 KG/M2 | OXYGEN SATURATION: 95 % | RESPIRATION RATE: 17 BRPM | TEMPERATURE: 98.2 F | WEIGHT: 173 LBS | HEIGHT: 62 IN | DIASTOLIC BLOOD PRESSURE: 70 MMHG | HEART RATE: 70 BPM

## 2025-07-29 DIAGNOSIS — J44.1 CHRONIC OBSTRUCTIVE PULMONARY DISEASE WITH (ACUTE) EXACERBATION: ICD-10-CM

## 2025-07-29 DIAGNOSIS — Z87.891 PERSONAL HISTORY OF NICOTINE DEPENDENCE: ICD-10-CM

## 2025-07-29 DIAGNOSIS — R91.8 OTHER NONSPECIFIC ABNORMAL FINDING OF LUNG FIELD: ICD-10-CM

## 2025-07-29 DIAGNOSIS — Z99.81 DEPENDENCE ON SUPPLEMENTAL OXYGEN: ICD-10-CM

## 2025-07-29 DIAGNOSIS — J43.9 EMPHYSEMA, UNSPECIFIED: ICD-10-CM

## 2025-07-29 DIAGNOSIS — J31.0 CHRONIC RHINITIS: ICD-10-CM

## 2025-07-29 DIAGNOSIS — J44.9 CHRONIC OBSTRUCTIVE PULMONARY DISEASE, UNSPECIFIED: ICD-10-CM

## 2025-07-29 DIAGNOSIS — G47.33 OBSTRUCTIVE SLEEP APNEA (ADULT) (PEDIATRIC): ICD-10-CM

## 2025-07-29 PROCEDURE — 96372 THER/PROPH/DIAG INJ SC/IM: CPT

## 2025-07-29 PROCEDURE — 99214 OFFICE O/P EST MOD 30 MIN: CPT | Mod: 25

## 2025-08-08 ENCOUNTER — APPOINTMENT (OUTPATIENT)
Dept: CARDIOLOGY | Facility: CLINIC | Age: 75
End: 2025-08-08
Payer: MEDICARE

## 2025-08-08 VITALS
WEIGHT: 178 LBS | OXYGEN SATURATION: 95 % | SYSTOLIC BLOOD PRESSURE: 136 MMHG | DIASTOLIC BLOOD PRESSURE: 74 MMHG | BODY MASS INDEX: 32.76 KG/M2 | HEIGHT: 62 IN | HEART RATE: 78 BPM

## 2025-08-08 DIAGNOSIS — E78.00 PURE HYPERCHOLESTEROLEMIA, UNSPECIFIED: ICD-10-CM

## 2025-08-08 DIAGNOSIS — I10 ESSENTIAL (PRIMARY) HYPERTENSION: ICD-10-CM

## 2025-08-08 DIAGNOSIS — N18.9 CHRONIC KIDNEY DISEASE, UNSPECIFIED: ICD-10-CM

## 2025-08-08 DIAGNOSIS — I50.32 CHRONIC DIASTOLIC (CONGESTIVE) HEART FAILURE: ICD-10-CM

## 2025-08-08 DIAGNOSIS — I35.0 NONRHEUMATIC AORTIC (VALVE) STENOSIS: ICD-10-CM

## 2025-08-08 DIAGNOSIS — R09.89 OTHER SPECIFIED SYMPTOMS AND SIGNS INVOLVING THE CIRCULATORY AND RESPIRATORY SYSTEMS: ICD-10-CM

## 2025-08-08 DIAGNOSIS — I48.91 UNSPECIFIED ATRIAL FIBRILLATION: ICD-10-CM

## 2025-08-08 PROCEDURE — 99214 OFFICE O/P EST MOD 30 MIN: CPT

## 2025-08-08 PROCEDURE — 93000 ELECTROCARDIOGRAM COMPLETE: CPT

## 2025-08-08 PROCEDURE — G2211 COMPLEX E/M VISIT ADD ON: CPT

## 2025-08-16 PROBLEM — J44.1 ACUTE EXACERBATION OF CHRONIC OBSTRUCTIVE PULMONARY DISEASE (COPD): Status: ACTIVE | Noted: 2025-08-16

## 2025-08-16 RX ORDER — FLUTICASONE FUROATE, UMECLIDINIUM BROMIDE AND VILANTEROL TRIFENATATE 200; 62.5; 25 UG/1; UG/1; UG/1
200-62.5-25 POWDER RESPIRATORY (INHALATION)
Qty: 1 | Refills: 5 | Status: ACTIVE | COMMUNITY
Start: 2025-08-16 | End: 1900-01-01

## 2025-08-16 RX ORDER — TRIAMCINOLONE ACETONIDE 40 MG/ML
40 SUSPENSION INTRA-ARTERIAL; INTRAMUSCULAR
Qty: 1 | Refills: 0 | Status: COMPLETED | OUTPATIENT
Start: 2025-07-28

## 2025-08-19 ENCOUNTER — APPOINTMENT (OUTPATIENT)
Facility: CLINIC | Age: 75
End: 2025-08-19
Payer: MEDICARE

## 2025-08-19 DIAGNOSIS — J43.9 EMPHYSEMA, UNSPECIFIED: ICD-10-CM

## 2025-08-19 DIAGNOSIS — J44.9 CHRONIC OBSTRUCTIVE PULMONARY DISEASE, UNSPECIFIED: ICD-10-CM

## 2025-08-19 PROCEDURE — 99212 OFFICE O/P EST SF 10 MIN: CPT | Mod: 93

## 2025-08-19 RX ORDER — FLUTICASONE PROPIONATE AND SALMETEROL 500; 50 UG/1; UG/1
500-50 POWDER RESPIRATORY (INHALATION) TWICE DAILY
Qty: 3 | Refills: 3 | Status: ACTIVE | COMMUNITY
Start: 2025-08-19

## 2025-08-27 ENCOUNTER — APPOINTMENT (OUTPATIENT)
Dept: CARDIOLOGY | Facility: CLINIC | Age: 75
End: 2025-08-27

## 2025-08-27 ENCOUNTER — APPOINTMENT (OUTPATIENT)
Dept: CARDIOLOGY | Facility: CLINIC | Age: 75
End: 2025-08-27
Payer: MEDICARE

## 2025-08-27 VITALS
SYSTOLIC BLOOD PRESSURE: 147 MMHG | WEIGHT: 176 LBS | DIASTOLIC BLOOD PRESSURE: 66 MMHG | BODY MASS INDEX: 32.39 KG/M2 | HEART RATE: 65 BPM | HEIGHT: 62 IN | OXYGEN SATURATION: 100 %

## 2025-08-27 DIAGNOSIS — I35.0 NONRHEUMATIC AORTIC (VALVE) STENOSIS: ICD-10-CM

## 2025-08-27 PROCEDURE — G2211 COMPLEX E/M VISIT ADD ON: CPT

## 2025-08-27 PROCEDURE — 99213 OFFICE O/P EST LOW 20 MIN: CPT

## 2025-08-28 ENCOUNTER — APPOINTMENT (OUTPATIENT)
Dept: CARDIOLOGY | Facility: CLINIC | Age: 75
End: 2025-08-28
Payer: MEDICARE

## 2025-08-28 VITALS
HEIGHT: 62 IN | OXYGEN SATURATION: 98 % | HEART RATE: 79 BPM | WEIGHT: 172.5 LBS | SYSTOLIC BLOOD PRESSURE: 176 MMHG | DIASTOLIC BLOOD PRESSURE: 78 MMHG | BODY MASS INDEX: 31.74 KG/M2

## 2025-08-28 DIAGNOSIS — I48.0 PAROXYSMAL ATRIAL FIBRILLATION: ICD-10-CM

## 2025-08-28 DIAGNOSIS — I47.29 OTHER VENTRICULAR TACHYCARDIA: ICD-10-CM

## 2025-08-28 DIAGNOSIS — I10 ESSENTIAL (PRIMARY) HYPERTENSION: ICD-10-CM

## 2025-08-28 DIAGNOSIS — Z95.0 PRESENCE OF CARDIAC PACEMAKER: ICD-10-CM

## 2025-08-28 DIAGNOSIS — I35.0 NONRHEUMATIC AORTIC (VALVE) STENOSIS: ICD-10-CM

## 2025-08-28 DIAGNOSIS — E66.9 OBESITY, UNSPECIFIED: ICD-10-CM

## 2025-08-28 DIAGNOSIS — I50.32 CHRONIC DIASTOLIC (CONGESTIVE) HEART FAILURE: ICD-10-CM

## 2025-08-28 DIAGNOSIS — E78.00 PURE HYPERCHOLESTEROLEMIA, UNSPECIFIED: ICD-10-CM

## 2025-08-28 PROCEDURE — 93000 ELECTROCARDIOGRAM COMPLETE: CPT

## 2025-08-28 PROCEDURE — G2211 COMPLEX E/M VISIT ADD ON: CPT

## 2025-08-28 PROCEDURE — 99214 OFFICE O/P EST MOD 30 MIN: CPT

## 2025-08-28 RX ORDER — TIOTROPIUM BROMIDE 18 UG/1
18 CAPSULE ORAL; RESPIRATORY (INHALATION) DAILY
Qty: 90 | Refills: 3 | Status: DISCONTINUED | COMMUNITY
Start: 2025-08-19 | End: 2025-08-28

## 2025-08-28 RX ORDER — HYDRALAZINE HYDROCHLORIDE 50 MG/1
50 TABLET ORAL 3 TIMES DAILY
Qty: 270 | Refills: 0 | Status: ACTIVE | COMMUNITY

## 2025-08-28 RX ORDER — TIOTROPIUM BROMIDE 18 UG/1
18 CAPSULE ORAL; RESPIRATORY (INHALATION) DAILY
Refills: 0 | Status: ACTIVE | COMMUNITY

## (undated) DEVICE — SOL IRR NS 0.9% 250ML

## (undated) DEVICE — TUBING IV SET SECONDARY 34"

## (undated) DEVICE — SUT HEWSON RETRIEVER

## (undated) DEVICE — POLY TRAP ETRAP

## (undated) DEVICE — SYR LUER SLIP TIP 50CC

## (undated) DEVICE — SUCTION YANKAUER TAPERED BULBOUS NO VENT

## (undated) DEVICE — TRAP SPECIMEN SPUTUM 40CC

## (undated) DEVICE — PACK IV START WITH CHG

## (undated) DEVICE — TRAP QUICK CATCH  SINGL CHAMBER

## (undated) DEVICE — SENSOR O2 FINGER XL ADULT 24/BX 6BX/CA

## (undated) DEVICE — SNARE LRG

## (undated) DEVICE — RETRIEVER ROTH NET PLATINUM-UNIVERSAL

## (undated) DEVICE — SYR IV POSIFLUSH NS 3ML 30/TY

## (undated) DEVICE — TUBE O2 SUPL CRUSH RESIS CONN SOUTHSIDE ONLY

## (undated) DEVICE — MASK O2 NON REBREATH 3IN1 ADULT

## (undated) DEVICE — CATH IV SAFE BC 22G X 1" (BLUE)

## (undated) DEVICE — CATH IV SAFE BC 20G X 1.16" (PINK)

## (undated) DEVICE — SOL IRR POUR H2O 500ML

## (undated) DEVICE — FORMALIN CUPS 10% BUFFERED

## (undated) DEVICE — SYR LUER SLIP TIP 30CC

## (undated) DEVICE — ENDOCUFF VISION SZ 3 SM PRPL

## (undated) DEVICE — CATH ELECHMSTAT  INJ 7FR 210CM

## (undated) DEVICE — Device

## (undated) DEVICE — ELCTR GROUNDING PAD ADULT COVIDIEN

## (undated) DEVICE — NDL INJ SCLERO INTERJECT 23G

## (undated) DEVICE — FORCEP RADIAL JAW 4 JUMBO 2.8MM 3.2MM 240CM ORANGE DISP

## (undated) DEVICE — MASK O2 ADLT POM ELITE W/ MED AND HI CONCEN M TO M 10FT

## (undated) DEVICE — SYR ORISE GEL SNGL PACK

## (undated) DEVICE — TUBING IV SET GRAVITY 3Y 100" MACRO

## (undated) DEVICE — CANISTER SUCTION 1200CC 10/SL

## (undated) DEVICE — SET IV PUMP BLOOD 1VALVE 180FILTER NON-DEHP

## (undated) DEVICE — FORCEP RADIAL JAW 4 W NDL 2.4MM 2.8MM 240CM ORANGE DISP

## (undated) DEVICE — TUBING ENDO EXT OLYMPUS 160 24HR USE

## (undated) DEVICE — MASK OXYGEN PANORAMIC

## (undated) DEVICE — CLAMP BX HOT RAD JAW 3

## (undated) DEVICE — TUBING CANNULA SALTER LABS NASAL ADULT 7FT

## (undated) DEVICE — ENDOCUFF VISION SZ 2 LG GRN

## (undated) DEVICE — CATH ELCTR GLIDE PRB 7FR

## (undated) DEVICE — BRUSH COLONOSCOPY CYTOLOGY

## (undated) DEVICE — TUBE RECTAL 24FR

## (undated) DEVICE — STERIS DEFENDO 3-PIECE KIT (AIR/WATER, SUCTION & BIOPSY VALVES)

## (undated) DEVICE — TUBING SUCTION CONN 6FT STERILE

## (undated) DEVICE — MARKER ENDO SPOT EX

## (undated) DEVICE — VALVE BIOPSY

## (undated) DEVICE — SNARE POLYP SENS 27MM 240CM

## (undated) DEVICE — MASK LRG MED AND HIGH O2 CONC M TO M 10FT